# Patient Record
Sex: MALE | Race: BLACK OR AFRICAN AMERICAN | NOT HISPANIC OR LATINO | Employment: OTHER | URBAN - METROPOLITAN AREA
[De-identification: names, ages, dates, MRNs, and addresses within clinical notes are randomized per-mention and may not be internally consistent; named-entity substitution may affect disease eponyms.]

---

## 2017-01-04 ENCOUNTER — OFFICE VISIT - HEALTHEAST (OUTPATIENT)
Dept: CARDIOLOGY | Facility: CLINIC | Age: 62
End: 2017-01-04

## 2017-01-04 DIAGNOSIS — I10 ESSENTIAL HYPERTENSION WITH GOAL BLOOD PRESSURE LESS THAN 140/90: ICD-10-CM

## 2017-01-04 DIAGNOSIS — G47.33 OBSTRUCTIVE SLEEP APNEA (ADULT) (PEDIATRIC): ICD-10-CM

## 2017-01-04 DIAGNOSIS — I50.32 CHRONIC DIASTOLIC HEART FAILURE (H): ICD-10-CM

## 2017-01-04 DIAGNOSIS — Q21.0 VENTRICULAR SEPTAL DEFECT: ICD-10-CM

## 2017-01-04 DIAGNOSIS — I34.0 NON-RHEUMATIC MITRAL REGURGITATION: ICD-10-CM

## 2017-01-04 DIAGNOSIS — I48.19 PERSISTENT ATRIAL FIBRILLATION (H): ICD-10-CM

## 2017-01-04 ASSESSMENT — MIFFLIN-ST. JEOR: SCORE: 3204.38

## 2017-01-12 ENCOUNTER — RECORDS - HEALTHEAST (OUTPATIENT)
Dept: ADMINISTRATIVE | Facility: OTHER | Age: 62
End: 2017-01-12

## 2017-01-13 ENCOUNTER — COMMUNICATION - HEALTHEAST (OUTPATIENT)
Dept: NURSING | Facility: CLINIC | Age: 62
End: 2017-01-13

## 2017-01-13 DIAGNOSIS — I48.91 A-FIB (H): ICD-10-CM

## 2017-01-18 ENCOUNTER — COMMUNICATION - HEALTHEAST (OUTPATIENT)
Dept: FAMILY MEDICINE | Facility: CLINIC | Age: 62
End: 2017-01-18

## 2017-01-18 DIAGNOSIS — M10.9 GOUT: ICD-10-CM

## 2017-01-19 ENCOUNTER — OFFICE VISIT - HEALTHEAST (OUTPATIENT)
Dept: SURGERY | Facility: CLINIC | Age: 62
End: 2017-01-19

## 2017-01-19 DIAGNOSIS — E66.01 OBESITY, MORBID, BMI 50 OR HIGHER (H): ICD-10-CM

## 2017-01-19 DIAGNOSIS — Z98.84 BARIATRIC SURGERY STATUS: ICD-10-CM

## 2017-01-19 DIAGNOSIS — Z71.3 DIETARY COUNSELING: ICD-10-CM

## 2017-01-19 ASSESSMENT — MIFFLIN-ST. JEOR: SCORE: 3140.88

## 2017-01-24 ENCOUNTER — COMMUNICATION - HEALTHEAST (OUTPATIENT)
Dept: SURGERY | Facility: CLINIC | Age: 62
End: 2017-01-24

## 2017-01-24 ENCOUNTER — OFFICE VISIT - HEALTHEAST (OUTPATIENT)
Dept: SURGERY | Facility: CLINIC | Age: 62
End: 2017-01-24

## 2017-01-24 DIAGNOSIS — I48.21 PERMANENT ATRIAL FIBRILLATION (H): ICD-10-CM

## 2017-01-24 DIAGNOSIS — Z98.84 BARIATRIC SURGERY STATUS: ICD-10-CM

## 2017-01-24 DIAGNOSIS — G47.33 OBSTRUCTIVE SLEEP APNEA (ADULT) (PEDIATRIC): ICD-10-CM

## 2017-01-24 DIAGNOSIS — M1A.0490 IDIOPATHIC CHRONIC GOUT OF HAND WITHOUT TOPHUS, UNSPECIFIED LATERALITY: ICD-10-CM

## 2017-01-24 DIAGNOSIS — I10 ESSENTIAL HYPERTENSION WITH GOAL BLOOD PRESSURE LESS THAN 140/90: ICD-10-CM

## 2017-01-24 DIAGNOSIS — E66.01 MORBID OBESITY DUE TO EXCESS CALORIES (H): ICD-10-CM

## 2017-01-24 ASSESSMENT — MIFFLIN-ST. JEOR: SCORE: 3177.16

## 2017-02-01 ENCOUNTER — AMBULATORY - HEALTHEAST (OUTPATIENT)
Dept: LAB | Facility: CLINIC | Age: 62
End: 2017-02-01

## 2017-02-01 ENCOUNTER — COMMUNICATION - HEALTHEAST (OUTPATIENT)
Dept: NURSING | Facility: CLINIC | Age: 62
End: 2017-02-01

## 2017-02-01 DIAGNOSIS — I48.91 A-FIB (H): ICD-10-CM

## 2017-02-03 ENCOUNTER — COMMUNICATION - HEALTHEAST (OUTPATIENT)
Dept: SURGERY | Facility: CLINIC | Age: 62
End: 2017-02-03

## 2017-02-06 ENCOUNTER — OFFICE VISIT - HEALTHEAST (OUTPATIENT)
Dept: SURGERY | Facility: CLINIC | Age: 62
End: 2017-02-06

## 2017-02-06 DIAGNOSIS — E66.01 MORBID OBESITY (H): ICD-10-CM

## 2017-02-10 ENCOUNTER — HOSPITAL ENCOUNTER (OUTPATIENT)
Dept: RADIOLOGY | Facility: CLINIC | Age: 62
Discharge: HOME | End: 2017-02-10
Attending: SPECIALIST

## 2017-02-10 DIAGNOSIS — E66.01 MORBID OBESITY DUE TO EXCESS CALORIES (H): ICD-10-CM

## 2017-02-10 DIAGNOSIS — Z98.84 BARIATRIC SURGERY STATUS: ICD-10-CM

## 2017-02-28 ENCOUNTER — COMMUNICATION - HEALTHEAST (OUTPATIENT)
Dept: FAMILY MEDICINE | Facility: CLINIC | Age: 62
End: 2017-02-28

## 2017-02-28 DIAGNOSIS — I10 HYPERTENSION: ICD-10-CM

## 2017-03-01 ENCOUNTER — COMMUNICATION - HEALTHEAST (OUTPATIENT)
Dept: FAMILY MEDICINE | Facility: CLINIC | Age: 62
End: 2017-03-01

## 2017-03-01 DIAGNOSIS — M10.9 GOUT: ICD-10-CM

## 2017-03-02 ENCOUNTER — AMBULATORY - HEALTHEAST (OUTPATIENT)
Dept: LAB | Facility: CLINIC | Age: 62
End: 2017-03-02

## 2017-03-02 ENCOUNTER — COMMUNICATION - HEALTHEAST (OUTPATIENT)
Dept: FAMILY MEDICINE | Facility: CLINIC | Age: 62
End: 2017-03-02

## 2017-03-02 DIAGNOSIS — I48.91 A-FIB (H): ICD-10-CM

## 2017-03-07 ENCOUNTER — COMMUNICATION - HEALTHEAST (OUTPATIENT)
Dept: NURSING | Facility: CLINIC | Age: 62
End: 2017-03-07

## 2017-03-07 DIAGNOSIS — I48.91 ATRIAL FIBRILLATION (H): ICD-10-CM

## 2017-03-13 ENCOUNTER — OFFICE VISIT - HEALTHEAST (OUTPATIENT)
Dept: SURGERY | Facility: CLINIC | Age: 62
End: 2017-03-13

## 2017-03-13 DIAGNOSIS — E66.01 MORBID OBESITY (H): ICD-10-CM

## 2017-03-17 ENCOUNTER — COMMUNICATION - HEALTHEAST (OUTPATIENT)
Dept: SURGERY | Facility: CLINIC | Age: 62
End: 2017-03-17

## 2017-04-20 ENCOUNTER — COMMUNICATION - HEALTHEAST (OUTPATIENT)
Dept: NURSING | Facility: CLINIC | Age: 62
End: 2017-04-20

## 2017-05-16 ENCOUNTER — COMMUNICATION - HEALTHEAST (OUTPATIENT)
Dept: FAMILY MEDICINE | Facility: CLINIC | Age: 62
End: 2017-05-16

## 2017-05-16 DIAGNOSIS — D64.9 ANEMIA, UNSPECIFIED: ICD-10-CM

## 2017-05-17 ENCOUNTER — AMBULATORY - HEALTHEAST (OUTPATIENT)
Dept: LAB | Facility: CLINIC | Age: 62
End: 2017-05-17

## 2017-05-17 ENCOUNTER — COMMUNICATION - HEALTHEAST (OUTPATIENT)
Dept: NURSING | Facility: CLINIC | Age: 62
End: 2017-05-17

## 2017-05-17 DIAGNOSIS — I48.91 ATRIAL FIBRILLATION (H): ICD-10-CM

## 2017-05-17 DIAGNOSIS — I48.91 A-FIB (H): ICD-10-CM

## 2017-05-22 ENCOUNTER — AMBULATORY - HEALTHEAST (OUTPATIENT)
Dept: LAB | Facility: CLINIC | Age: 62
End: 2017-05-22

## 2017-05-22 ENCOUNTER — COMMUNICATION - HEALTHEAST (OUTPATIENT)
Dept: FAMILY MEDICINE | Facility: CLINIC | Age: 62
End: 2017-05-22

## 2017-05-22 DIAGNOSIS — I48.91 A-FIB (H): ICD-10-CM

## 2017-05-22 DIAGNOSIS — I48.91 ATRIAL FIBRILLATION (H): ICD-10-CM

## 2017-05-29 ENCOUNTER — COMMUNICATION - HEALTHEAST (OUTPATIENT)
Dept: FAMILY MEDICINE | Facility: CLINIC | Age: 62
End: 2017-05-29

## 2017-05-29 DIAGNOSIS — R60.0 LOWER EXTREMITY EDEMA: ICD-10-CM

## 2017-06-06 ENCOUNTER — AMBULATORY - HEALTHEAST (OUTPATIENT)
Dept: LAB | Facility: CLINIC | Age: 62
End: 2017-06-06

## 2017-06-06 ENCOUNTER — COMMUNICATION - HEALTHEAST (OUTPATIENT)
Dept: FAMILY MEDICINE | Facility: CLINIC | Age: 62
End: 2017-06-06

## 2017-06-06 DIAGNOSIS — I48.91 A-FIB (H): ICD-10-CM

## 2017-06-06 DIAGNOSIS — I48.91 ATRIAL FIBRILLATION (H): ICD-10-CM

## 2017-06-08 ENCOUNTER — COMMUNICATION - HEALTHEAST (OUTPATIENT)
Dept: FAMILY MEDICINE | Facility: CLINIC | Age: 62
End: 2017-06-08

## 2017-06-08 DIAGNOSIS — I10 HYPERTENSION: ICD-10-CM

## 2017-06-19 ENCOUNTER — COMMUNICATION - HEALTHEAST (OUTPATIENT)
Dept: FAMILY MEDICINE | Facility: CLINIC | Age: 62
End: 2017-06-19

## 2017-06-19 DIAGNOSIS — M10.9 GOUT: ICD-10-CM

## 2017-06-19 DIAGNOSIS — I10 ESSENTIAL HYPERTENSION: ICD-10-CM

## 2017-06-27 ENCOUNTER — COMMUNICATION - HEALTHEAST (OUTPATIENT)
Dept: NURSING | Facility: CLINIC | Age: 62
End: 2017-06-27

## 2017-07-06 ENCOUNTER — COMMUNICATION - HEALTHEAST (OUTPATIENT)
Dept: NURSING | Facility: CLINIC | Age: 62
End: 2017-07-06

## 2017-07-06 ENCOUNTER — AMBULATORY - HEALTHEAST (OUTPATIENT)
Dept: LAB | Facility: CLINIC | Age: 62
End: 2017-07-06

## 2017-07-06 DIAGNOSIS — I48.91 A-FIB (H): ICD-10-CM

## 2017-07-06 DIAGNOSIS — I48.91 ATRIAL FIBRILLATION (H): ICD-10-CM

## 2017-07-31 ENCOUNTER — COMMUNICATION - HEALTHEAST (OUTPATIENT)
Dept: FAMILY MEDICINE | Facility: CLINIC | Age: 62
End: 2017-07-31

## 2017-07-31 DIAGNOSIS — I10 HYPERTENSION: ICD-10-CM

## 2017-07-31 DIAGNOSIS — I48.91 ATRIAL FIBRILLATION (H): ICD-10-CM

## 2017-08-24 ENCOUNTER — COMMUNICATION - HEALTHEAST (OUTPATIENT)
Dept: NURSING | Facility: CLINIC | Age: 62
End: 2017-08-24

## 2017-08-25 ENCOUNTER — RECORDS - HEALTHEAST (OUTPATIENT)
Dept: ADMINISTRATIVE | Facility: OTHER | Age: 62
End: 2017-08-25

## 2017-08-25 ENCOUNTER — COMMUNICATION - HEALTHEAST (OUTPATIENT)
Dept: NURSING | Facility: CLINIC | Age: 62
End: 2017-08-25

## 2017-08-25 ENCOUNTER — AMBULATORY - HEALTHEAST (OUTPATIENT)
Dept: LAB | Facility: CLINIC | Age: 62
End: 2017-08-25

## 2017-08-25 DIAGNOSIS — I48.91 ATRIAL FIBRILLATION (H): ICD-10-CM

## 2017-09-20 ENCOUNTER — COMMUNICATION - HEALTHEAST (OUTPATIENT)
Dept: FAMILY MEDICINE | Facility: CLINIC | Age: 62
End: 2017-09-20

## 2017-09-20 DIAGNOSIS — M10.9 GOUT: ICD-10-CM

## 2017-09-20 DIAGNOSIS — I10 ESSENTIAL HYPERTENSION: ICD-10-CM

## 2017-09-22 ENCOUNTER — COMMUNICATION - HEALTHEAST (OUTPATIENT)
Dept: SCHEDULING | Facility: CLINIC | Age: 62
End: 2017-09-22

## 2017-09-22 ENCOUNTER — COMMUNICATION - HEALTHEAST (OUTPATIENT)
Dept: FAMILY MEDICINE | Facility: CLINIC | Age: 62
End: 2017-09-22

## 2017-09-22 DIAGNOSIS — I10 HYPERTENSION: ICD-10-CM

## 2017-10-13 ENCOUNTER — COMMUNICATION - HEALTHEAST (OUTPATIENT)
Dept: NURSING | Facility: CLINIC | Age: 62
End: 2017-10-13

## 2017-11-07 ENCOUNTER — AMBULATORY - HEALTHEAST (OUTPATIENT)
Dept: LAB | Facility: CLINIC | Age: 62
End: 2017-11-07

## 2017-11-07 ENCOUNTER — COMMUNICATION - HEALTHEAST (OUTPATIENT)
Dept: NURSING | Facility: CLINIC | Age: 62
End: 2017-11-07

## 2017-11-07 DIAGNOSIS — I48.91 ATRIAL FIBRILLATION (H): ICD-10-CM

## 2017-11-09 ENCOUNTER — COMMUNICATION - HEALTHEAST (OUTPATIENT)
Dept: ADMINISTRATIVE | Facility: CLINIC | Age: 62
End: 2017-11-09

## 2017-11-15 ENCOUNTER — AMBULATORY - HEALTHEAST (OUTPATIENT)
Dept: LAB | Facility: CLINIC | Age: 62
End: 2017-11-15

## 2017-11-15 ENCOUNTER — COMMUNICATION - HEALTHEAST (OUTPATIENT)
Dept: NURSING | Facility: CLINIC | Age: 62
End: 2017-11-15

## 2017-11-15 DIAGNOSIS — I48.91 ATRIAL FIBRILLATION (H): ICD-10-CM

## 2017-12-06 ENCOUNTER — COMMUNICATION - HEALTHEAST (OUTPATIENT)
Dept: NURSING | Facility: CLINIC | Age: 62
End: 2017-12-06

## 2017-12-20 ENCOUNTER — COMMUNICATION - HEALTHEAST (OUTPATIENT)
Dept: NURSING | Facility: CLINIC | Age: 62
End: 2017-12-20

## 2017-12-20 ENCOUNTER — AMBULATORY - HEALTHEAST (OUTPATIENT)
Dept: LAB | Facility: CLINIC | Age: 62
End: 2017-12-20

## 2017-12-20 DIAGNOSIS — I48.91 ATRIAL FIBRILLATION (H): ICD-10-CM

## 2017-12-21 ENCOUNTER — COMMUNICATION - HEALTHEAST (OUTPATIENT)
Dept: FAMILY MEDICINE | Facility: CLINIC | Age: 62
End: 2017-12-21

## 2017-12-30 ENCOUNTER — COMMUNICATION - HEALTHEAST (OUTPATIENT)
Dept: FAMILY MEDICINE | Facility: CLINIC | Age: 62
End: 2017-12-30

## 2017-12-30 DIAGNOSIS — D64.9 ANEMIA: ICD-10-CM

## 2017-12-30 DIAGNOSIS — I10 HYPERTENSION: ICD-10-CM

## 2017-12-30 DIAGNOSIS — E55.9 VITAMIN D DEFICIENCY: ICD-10-CM

## 2018-01-02 ENCOUNTER — COMMUNICATION - HEALTHEAST (OUTPATIENT)
Dept: FAMILY MEDICINE | Facility: CLINIC | Age: 63
End: 2018-01-02

## 2018-01-02 DIAGNOSIS — I10 HYPERTENSION: ICD-10-CM

## 2018-01-03 ENCOUNTER — COMMUNICATION - HEALTHEAST (OUTPATIENT)
Dept: NURSING | Facility: CLINIC | Age: 63
End: 2018-01-03

## 2018-01-08 ENCOUNTER — COMMUNICATION - HEALTHEAST (OUTPATIENT)
Dept: FAMILY MEDICINE | Facility: CLINIC | Age: 63
End: 2018-01-08

## 2018-01-08 ENCOUNTER — OFFICE VISIT - HEALTHEAST (OUTPATIENT)
Dept: FAMILY MEDICINE | Facility: CLINIC | Age: 63
End: 2018-01-08

## 2018-01-08 ENCOUNTER — COMMUNICATION - HEALTHEAST (OUTPATIENT)
Dept: NURSING | Facility: CLINIC | Age: 63
End: 2018-01-08

## 2018-01-08 DIAGNOSIS — E66.9 OBESITY (BMI 35.0-39.9 WITHOUT COMORBIDITY): ICD-10-CM

## 2018-01-08 DIAGNOSIS — Z00.00 HEALTH CARE MAINTENANCE: ICD-10-CM

## 2018-01-08 DIAGNOSIS — R73.03 PREDIABETES: ICD-10-CM

## 2018-01-08 DIAGNOSIS — N18.30 CHRONIC KIDNEY DISEASE, STAGE III (MODERATE) (H): ICD-10-CM

## 2018-01-08 DIAGNOSIS — I48.91 ATRIAL FIBRILLATION (H): ICD-10-CM

## 2018-01-08 DIAGNOSIS — I50.32 CHRONIC DIASTOLIC HEART FAILURE (H): ICD-10-CM

## 2018-01-08 DIAGNOSIS — I48.21 PERMANENT ATRIAL FIBRILLATION (H): ICD-10-CM

## 2018-01-08 DIAGNOSIS — M1A.0490 IDIOPATHIC CHRONIC GOUT OF HAND WITHOUT TOPHUS, UNSPECIFIED LATERALITY: ICD-10-CM

## 2018-01-08 DIAGNOSIS — H54.7 DECREASED VISUAL ACUITY: ICD-10-CM

## 2018-01-08 DIAGNOSIS — I10 ESSENTIAL HYPERTENSION: ICD-10-CM

## 2018-01-08 DIAGNOSIS — E74.9 DISORDER OF CARBOHYDRATE TRANSPORT AND METABOLISM (H): ICD-10-CM

## 2018-01-08 LAB
ALBUMIN SERPL-MCNC: 3.7 G/DL (ref 3.5–5)
ALP SERPL-CCNC: 73 U/L (ref 45–120)
ALT SERPL W P-5'-P-CCNC: 10 U/L (ref 0–45)
ANION GAP SERPL CALCULATED.3IONS-SCNC: 13 MMOL/L (ref 5–18)
AST SERPL W P-5'-P-CCNC: 22 U/L (ref 0–40)
BILIRUB SERPL-MCNC: 0.7 MG/DL (ref 0–1)
BUN SERPL-MCNC: 44 MG/DL (ref 8–22)
CALCIUM SERPL-MCNC: 9.5 MG/DL (ref 8.5–10.5)
CHLORIDE BLD-SCNC: 105 MMOL/L (ref 98–107)
CO2 SERPL-SCNC: 22 MMOL/L (ref 22–31)
CREAT SERPL-MCNC: 2.21 MG/DL (ref 0.7–1.3)
ERYTHROCYTE [DISTWIDTH] IN BLOOD BY AUTOMATED COUNT: 12.5 % (ref 11–14.5)
GFR SERPL CREATININE-BSD FRML MDRD: 30 ML/MIN/1.73M2
GLUCOSE BLD-MCNC: 101 MG/DL (ref 70–125)
HBA1C MFR BLD: 5.7 % (ref 3.5–6)
HCT VFR BLD AUTO: 39.1 % (ref 40–54)
HGB BLD-MCNC: 12.5 G/DL (ref 14–18)
INR PPP: 1.2 (ref 0.9–1.1)
MCH RBC QN AUTO: 27.4 PG (ref 27–34)
MCHC RBC AUTO-ENTMCNC: 32.1 G/DL (ref 32–36)
MCV RBC AUTO: 85 FL (ref 80–100)
PLATELET # BLD AUTO: 121 THOU/UL (ref 140–440)
PMV BLD AUTO: 9.3 FL (ref 7–10)
POTASSIUM BLD-SCNC: 5 MMOL/L (ref 3.5–5)
PROT SERPL-MCNC: 8.3 G/DL (ref 6–8)
RBC # BLD AUTO: 4.58 MILL/UL (ref 4.4–6.2)
SODIUM SERPL-SCNC: 140 MMOL/L (ref 136–145)
URATE SERPL-MCNC: 8 MG/DL (ref 3–8)
WBC: 6 THOU/UL (ref 4–11)

## 2018-01-08 ASSESSMENT — MIFFLIN-ST. JEOR: SCORE: 3154.49

## 2018-01-09 ENCOUNTER — COMMUNICATION - HEALTHEAST (OUTPATIENT)
Dept: FAMILY MEDICINE | Facility: CLINIC | Age: 63
End: 2018-01-09

## 2018-01-09 DIAGNOSIS — I10 ESSENTIAL HYPERTENSION: ICD-10-CM

## 2018-01-11 ENCOUNTER — AMBULATORY - HEALTHEAST (OUTPATIENT)
Dept: FAMILY MEDICINE | Facility: CLINIC | Age: 63
End: 2018-01-11

## 2018-01-12 ENCOUNTER — COMMUNICATION - HEALTHEAST (OUTPATIENT)
Dept: FAMILY MEDICINE | Facility: CLINIC | Age: 63
End: 2018-01-12

## 2018-01-16 ENCOUNTER — COMMUNICATION - HEALTHEAST (OUTPATIENT)
Dept: NURSING | Facility: CLINIC | Age: 63
End: 2018-01-16

## 2018-01-16 ENCOUNTER — AMBULATORY - HEALTHEAST (OUTPATIENT)
Dept: LAB | Facility: CLINIC | Age: 63
End: 2018-01-16

## 2018-01-16 DIAGNOSIS — I48.91 ATRIAL FIBRILLATION (H): ICD-10-CM

## 2018-01-16 LAB — INR PPP: 1.3 (ref 0.9–1.1)

## 2018-01-17 ENCOUNTER — RECORDS - HEALTHEAST (OUTPATIENT)
Dept: ADMINISTRATIVE | Facility: OTHER | Age: 63
End: 2018-01-17

## 2018-01-17 ENCOUNTER — COMMUNICATION - HEALTHEAST (OUTPATIENT)
Dept: FAMILY MEDICINE | Facility: CLINIC | Age: 63
End: 2018-01-17

## 2018-01-17 DIAGNOSIS — M10.9 GOUT: ICD-10-CM

## 2018-01-18 ENCOUNTER — OFFICE VISIT - HEALTHEAST (OUTPATIENT)
Dept: CARDIOLOGY | Facility: CLINIC | Age: 63
End: 2018-01-18

## 2018-01-18 DIAGNOSIS — I27.81 COR PULMONALE (H): ICD-10-CM

## 2018-01-18 DIAGNOSIS — E66.01 MORBID OBESITY DUE TO EXCESS CALORIES (H): ICD-10-CM

## 2018-01-18 ASSESSMENT — MIFFLIN-ST. JEOR: SCORE: 3128.63

## 2018-01-24 ENCOUNTER — AMBULATORY - HEALTHEAST (OUTPATIENT)
Dept: LAB | Facility: CLINIC | Age: 63
End: 2018-01-24

## 2018-01-24 ENCOUNTER — COMMUNICATION - HEALTHEAST (OUTPATIENT)
Dept: INTERNAL MEDICINE | Facility: CLINIC | Age: 63
End: 2018-01-24

## 2018-01-24 DIAGNOSIS — I48.91 ATRIAL FIBRILLATION (H): ICD-10-CM

## 2018-01-24 LAB — INR PPP: 1.9 (ref 0.9–1.1)

## 2018-01-25 ENCOUNTER — AMBULATORY - HEALTHEAST (OUTPATIENT)
Dept: SLEEP MEDICINE | Facility: CLINIC | Age: 63
End: 2018-01-25

## 2018-02-01 ENCOUNTER — COMMUNICATION - HEALTHEAST (OUTPATIENT)
Dept: FAMILY MEDICINE | Facility: CLINIC | Age: 63
End: 2018-02-01

## 2018-02-01 ENCOUNTER — AMBULATORY - HEALTHEAST (OUTPATIENT)
Dept: LAB | Facility: CLINIC | Age: 63
End: 2018-02-01

## 2018-02-01 ENCOUNTER — COMMUNICATION - HEALTHEAST (OUTPATIENT)
Dept: NURSING | Facility: CLINIC | Age: 63
End: 2018-02-01

## 2018-02-01 DIAGNOSIS — R60.0 LOWER EXTREMITY EDEMA: ICD-10-CM

## 2018-02-01 DIAGNOSIS — I48.91 ATRIAL FIBRILLATION (H): ICD-10-CM

## 2018-02-01 LAB — INR PPP: 2.9 (ref 0.9–1.1)

## 2018-02-19 ENCOUNTER — COMMUNICATION - HEALTHEAST (OUTPATIENT)
Dept: NURSING | Facility: CLINIC | Age: 63
End: 2018-02-19

## 2018-02-21 ENCOUNTER — AMBULATORY - HEALTHEAST (OUTPATIENT)
Dept: LAB | Facility: CLINIC | Age: 63
End: 2018-02-21

## 2018-02-21 ENCOUNTER — COMMUNICATION - HEALTHEAST (OUTPATIENT)
Dept: NURSING | Facility: CLINIC | Age: 63
End: 2018-02-21

## 2018-02-21 DIAGNOSIS — I48.91 ATRIAL FIBRILLATION (H): ICD-10-CM

## 2018-02-21 LAB — INR PPP: 3.5 (ref 0.9–1.1)

## 2018-03-05 ENCOUNTER — COMMUNICATION - HEALTHEAST (OUTPATIENT)
Dept: FAMILY MEDICINE | Facility: CLINIC | Age: 63
End: 2018-03-05

## 2018-03-05 DIAGNOSIS — M10.9 GOUT: ICD-10-CM

## 2018-03-07 ENCOUNTER — COMMUNICATION - HEALTHEAST (OUTPATIENT)
Dept: NURSING | Facility: CLINIC | Age: 63
End: 2018-03-07

## 2018-03-08 ENCOUNTER — AMBULATORY - HEALTHEAST (OUTPATIENT)
Dept: LAB | Facility: CLINIC | Age: 63
End: 2018-03-08

## 2018-03-08 ENCOUNTER — COMMUNICATION - HEALTHEAST (OUTPATIENT)
Dept: NURSING | Facility: CLINIC | Age: 63
End: 2018-03-08

## 2018-03-08 DIAGNOSIS — I48.91 ATRIAL FIBRILLATION (H): ICD-10-CM

## 2018-03-08 LAB — INR PPP: 2.8 (ref 0.9–1.1)

## 2018-03-14 ENCOUNTER — RECORDS - HEALTHEAST (OUTPATIENT)
Dept: ADMINISTRATIVE | Facility: OTHER | Age: 63
End: 2018-03-14

## 2018-03-16 ENCOUNTER — COMMUNICATION - HEALTHEAST (OUTPATIENT)
Dept: FAMILY MEDICINE | Facility: CLINIC | Age: 63
End: 2018-03-16

## 2018-03-22 ENCOUNTER — COMMUNICATION - HEALTHEAST (OUTPATIENT)
Dept: NURSING | Facility: CLINIC | Age: 63
End: 2018-03-22

## 2018-03-22 ENCOUNTER — AMBULATORY - HEALTHEAST (OUTPATIENT)
Dept: LAB | Facility: CLINIC | Age: 63
End: 2018-03-22

## 2018-03-22 DIAGNOSIS — I48.91 ATRIAL FIBRILLATION (H): ICD-10-CM

## 2018-03-22 LAB — INR PPP: 2.5 (ref 0.9–1.1)

## 2018-04-19 ENCOUNTER — OFFICE VISIT - HEALTHEAST (OUTPATIENT)
Dept: FAMILY MEDICINE | Facility: CLINIC | Age: 63
End: 2018-04-19

## 2018-04-19 ENCOUNTER — COMMUNICATION - HEALTHEAST (OUTPATIENT)
Dept: SURGERY | Facility: CLINIC | Age: 63
End: 2018-04-19

## 2018-04-19 ENCOUNTER — AMBULATORY - HEALTHEAST (OUTPATIENT)
Dept: LAB | Facility: CLINIC | Age: 63
End: 2018-04-19

## 2018-04-19 ENCOUNTER — COMMUNICATION - HEALTHEAST (OUTPATIENT)
Dept: ANTICOAGULATION | Facility: CLINIC | Age: 63
End: 2018-04-19

## 2018-04-19 DIAGNOSIS — K08.89 PAIN, DENTAL: ICD-10-CM

## 2018-04-19 DIAGNOSIS — I48.21 PERMANENT ATRIAL FIBRILLATION (H): ICD-10-CM

## 2018-04-19 DIAGNOSIS — M1A.0490 IDIOPATHIC CHRONIC GOUT OF HAND WITHOUT TOPHUS, UNSPECIFIED LATERALITY: ICD-10-CM

## 2018-04-19 DIAGNOSIS — I48.91 ATRIAL FIBRILLATION (H): ICD-10-CM

## 2018-04-19 DIAGNOSIS — E66.9 OBESITY: ICD-10-CM

## 2018-04-19 LAB
ALT SERPL W P-5'-P-CCNC: 20 U/L (ref 0–45)
CREAT SERPL-MCNC: 2.06 MG/DL (ref 0.7–1.3)
ERYTHROCYTE [DISTWIDTH] IN BLOOD BY AUTOMATED COUNT: 14.3 % (ref 11–14.5)
GFR SERPL CREATININE-BSD FRML MDRD: 33 ML/MIN/1.73M2
HCT VFR BLD AUTO: 40.1 % (ref 40–54)
HGB BLD-MCNC: 12.6 G/DL (ref 14–18)
INR PPP: 1.8 (ref 0.9–1.1)
MCH RBC QN AUTO: 26.9 PG (ref 27–34)
MCHC RBC AUTO-ENTMCNC: 31.5 G/DL (ref 32–36)
MCV RBC AUTO: 85 FL (ref 80–100)
PLATELET # BLD AUTO: 110 THOU/UL (ref 140–440)
PMV BLD AUTO: 10.2 FL (ref 7–10)
RBC # BLD AUTO: 4.69 MILL/UL (ref 4.4–6.2)
URATE SERPL-MCNC: 6.6 MG/DL (ref 3–8)
WBC: 6.1 THOU/UL (ref 4–11)

## 2018-04-20 ENCOUNTER — COMMUNICATION - HEALTHEAST (OUTPATIENT)
Dept: FAMILY MEDICINE | Facility: CLINIC | Age: 63
End: 2018-04-20

## 2018-05-10 ENCOUNTER — COMMUNICATION - HEALTHEAST (OUTPATIENT)
Dept: ANTICOAGULATION | Facility: CLINIC | Age: 63
End: 2018-05-10

## 2018-05-11 ENCOUNTER — COMMUNICATION - HEALTHEAST (OUTPATIENT)
Dept: FAMILY MEDICINE | Facility: CLINIC | Age: 63
End: 2018-05-11

## 2018-05-11 DIAGNOSIS — I10 HYPERTENSION: ICD-10-CM

## 2018-05-13 ENCOUNTER — COMMUNICATION - HEALTHEAST (OUTPATIENT)
Dept: FAMILY MEDICINE | Facility: CLINIC | Age: 63
End: 2018-05-13

## 2018-05-13 DIAGNOSIS — E55.9 VITAMIN D DEFICIENCY: ICD-10-CM

## 2018-05-16 ENCOUNTER — COMMUNICATION - HEALTHEAST (OUTPATIENT)
Dept: FAMILY MEDICINE | Facility: CLINIC | Age: 63
End: 2018-05-16

## 2018-05-16 DIAGNOSIS — M10.9 GOUT: ICD-10-CM

## 2018-06-08 ENCOUNTER — AMBULATORY - HEALTHEAST (OUTPATIENT)
Dept: LAB | Facility: CLINIC | Age: 63
End: 2018-06-08

## 2018-06-08 ENCOUNTER — COMMUNICATION - HEALTHEAST (OUTPATIENT)
Dept: ANTICOAGULATION | Facility: CLINIC | Age: 63
End: 2018-06-08

## 2018-06-08 DIAGNOSIS — I48.91 ATRIAL FIBRILLATION (H): ICD-10-CM

## 2018-06-08 LAB — INR PPP: 2.4 (ref 0.9–1.1)

## 2018-06-14 ENCOUNTER — COMMUNICATION - HEALTHEAST (OUTPATIENT)
Dept: FAMILY MEDICINE | Facility: CLINIC | Age: 63
End: 2018-06-14

## 2018-06-19 ENCOUNTER — OFFICE VISIT - HEALTHEAST (OUTPATIENT)
Dept: FAMILY MEDICINE | Facility: CLINIC | Age: 63
End: 2018-06-19

## 2018-06-19 ENCOUNTER — COMMUNICATION - HEALTHEAST (OUTPATIENT)
Dept: ANTICOAGULATION | Facility: CLINIC | Age: 63
End: 2018-06-19

## 2018-06-19 DIAGNOSIS — I48.21 PERMANENT ATRIAL FIBRILLATION (H): ICD-10-CM

## 2018-06-19 DIAGNOSIS — I10 ESSENTIAL HYPERTENSION: ICD-10-CM

## 2018-06-19 DIAGNOSIS — I50.32 CHRONIC DIASTOLIC HEART FAILURE (H): ICD-10-CM

## 2018-06-19 DIAGNOSIS — Z01.818 PRE-OP EXAM: ICD-10-CM

## 2018-06-19 LAB
ATRIAL RATE - MUSE: 50 BPM
DIASTOLIC BLOOD PRESSURE - MUSE: NORMAL MMHG
INR PPP: 2.9 (ref 0.9–1.1)
INTERPRETATION ECG - MUSE: NORMAL
P AXIS - MUSE: NORMAL DEGREES
POTASSIUM BLD-SCNC: 4.2 MMOL/L (ref 3.5–5)
PR INTERVAL - MUSE: NORMAL MS
QRS DURATION - MUSE: 118 MS
QT - MUSE: 430 MS
QTC - MUSE: 460 MS
R AXIS - MUSE: 7 DEGREES
SYSTOLIC BLOOD PRESSURE - MUSE: NORMAL MMHG
T AXIS - MUSE: -8 DEGREES
VENTRICULAR RATE- MUSE: 69 BPM

## 2018-06-28 ENCOUNTER — COMMUNICATION - HEALTHEAST (OUTPATIENT)
Dept: FAMILY MEDICINE | Facility: CLINIC | Age: 63
End: 2018-06-28

## 2018-07-19 ENCOUNTER — COMMUNICATION - HEALTHEAST (OUTPATIENT)
Dept: FAMILY MEDICINE | Facility: CLINIC | Age: 63
End: 2018-07-19

## 2018-07-19 DIAGNOSIS — D64.9 ANEMIA: ICD-10-CM

## 2018-07-24 ENCOUNTER — COMMUNICATION - HEALTHEAST (OUTPATIENT)
Dept: ANTICOAGULATION | Facility: CLINIC | Age: 63
End: 2018-07-24

## 2018-08-06 ENCOUNTER — AMBULATORY - HEALTHEAST (OUTPATIENT)
Dept: LAB | Facility: CLINIC | Age: 63
End: 2018-08-06

## 2018-08-06 ENCOUNTER — COMMUNICATION - HEALTHEAST (OUTPATIENT)
Dept: ANTICOAGULATION | Facility: CLINIC | Age: 63
End: 2018-08-06

## 2018-08-06 DIAGNOSIS — I48.91 ATRIAL FIBRILLATION (H): ICD-10-CM

## 2018-08-06 LAB — INR PPP: 1.8 (ref 0.9–1.1)

## 2018-08-09 ENCOUNTER — OUTPATIENT (OUTPATIENT)
Dept: OUTPATIENT SERVICES | Facility: HOSPITAL | Age: 63
LOS: 1 days | Discharge: HOME | End: 2018-08-09
Payer: MEDICARE

## 2018-08-09 DIAGNOSIS — R52 PAIN, UNSPECIFIED: Chronic | ICD-10-CM

## 2018-08-09 DIAGNOSIS — B99.9 UNSPECIFIED INFECTIOUS DISEASE: Chronic | ICD-10-CM

## 2018-08-09 DIAGNOSIS — T14.90 INJURY, UNSPECIFIED: Chronic | ICD-10-CM

## 2018-08-09 DIAGNOSIS — L97.801 NON-PRESSURE CHRONIC ULCER OF OTHER PART OF UNSPECIFIED LOWER LEG LIMITED TO BREAKDOWN OF SKIN: ICD-10-CM

## 2018-08-09 DIAGNOSIS — M51.9 UNSPECIFIED THORACIC, THORACOLUMBAR AND LUMBOSACRAL INTERVERTEBRAL DISC DISORDER: Chronic | ICD-10-CM

## 2018-08-09 DIAGNOSIS — K46.9 UNSPECIFIED ABDOMINAL HERNIA WITHOUT OBSTRUCTION OR GANGRENE: Chronic | ICD-10-CM

## 2018-08-09 DIAGNOSIS — D69.3 IMMUNE THROMBOCYTOPENIC PURPURA: Chronic | ICD-10-CM

## 2018-08-09 DIAGNOSIS — I25.10 ATHEROSCLEROTIC HEART DISEASE OF NATIVE CORONARY ARTERY WITHOUT ANGINA PECTORIS: Chronic | ICD-10-CM

## 2018-08-09 PROCEDURE — 99204 OFFICE O/P NEW MOD 45 MIN: CPT

## 2018-08-09 PROCEDURE — G0463: CPT

## 2018-08-11 DIAGNOSIS — Z89.511 ACQUIRED ABSENCE OF RIGHT LEG BELOW KNEE: ICD-10-CM

## 2018-08-11 DIAGNOSIS — D69.6 THROMBOCYTOPENIA, UNSPECIFIED: ICD-10-CM

## 2018-08-11 DIAGNOSIS — R73.03 PREDIABETES: ICD-10-CM

## 2018-08-11 DIAGNOSIS — F32.9 MAJOR DEPRESSIVE DISORDER, SINGLE EPISODE, UNSPECIFIED: ICD-10-CM

## 2018-08-11 DIAGNOSIS — Z79.899 OTHER LONG TERM (CURRENT) DRUG THERAPY: ICD-10-CM

## 2018-08-11 DIAGNOSIS — E66.8 OTHER OBESITY: ICD-10-CM

## 2018-08-11 DIAGNOSIS — L97.822 NON-PRESSURE CHRONIC ULCER OF OTHER PART OF LEFT LOWER LEG WITH FAT LAYER EXPOSED: ICD-10-CM

## 2018-08-11 DIAGNOSIS — Z72.0 TOBACCO USE: ICD-10-CM

## 2018-08-11 DIAGNOSIS — M41.9 SCOLIOSIS, UNSPECIFIED: ICD-10-CM

## 2018-08-11 DIAGNOSIS — Z98.61 CORONARY ANGIOPLASTY STATUS: ICD-10-CM

## 2018-08-11 DIAGNOSIS — Z79.82 LONG TERM (CURRENT) USE OF ASPIRIN: ICD-10-CM

## 2018-08-11 DIAGNOSIS — I83.228 VARICOSE VEINS OF LEFT LOWER EXTREMITY WITH BOTH ULCER OF OTHER PART OF LOWER EXTREMITY AND INFLAMMATION: ICD-10-CM

## 2018-08-11 DIAGNOSIS — I48.91 UNSPECIFIED ATRIAL FIBRILLATION: ICD-10-CM

## 2018-08-11 DIAGNOSIS — G62.9 POLYNEUROPATHY, UNSPECIFIED: ICD-10-CM

## 2018-08-11 DIAGNOSIS — I25.10 ATHEROSCLEROTIC HEART DISEASE OF NATIVE CORONARY ARTERY WITHOUT ANGINA PECTORIS: ICD-10-CM

## 2018-08-16 ENCOUNTER — OUTPATIENT (OUTPATIENT)
Dept: OUTPATIENT SERVICES | Facility: HOSPITAL | Age: 63
LOS: 1 days | Discharge: HOME | End: 2018-08-16
Payer: MEDICARE

## 2018-08-16 DIAGNOSIS — K46.9 UNSPECIFIED ABDOMINAL HERNIA WITHOUT OBSTRUCTION OR GANGRENE: Chronic | ICD-10-CM

## 2018-08-16 DIAGNOSIS — T14.90 INJURY, UNSPECIFIED: Chronic | ICD-10-CM

## 2018-08-16 DIAGNOSIS — M51.9 UNSPECIFIED THORACIC, THORACOLUMBAR AND LUMBOSACRAL INTERVERTEBRAL DISC DISORDER: Chronic | ICD-10-CM

## 2018-08-16 DIAGNOSIS — R52 PAIN, UNSPECIFIED: Chronic | ICD-10-CM

## 2018-08-16 DIAGNOSIS — D69.3 IMMUNE THROMBOCYTOPENIC PURPURA: Chronic | ICD-10-CM

## 2018-08-16 DIAGNOSIS — I25.10 ATHEROSCLEROTIC HEART DISEASE OF NATIVE CORONARY ARTERY WITHOUT ANGINA PECTORIS: Chronic | ICD-10-CM

## 2018-08-16 DIAGNOSIS — B99.9 UNSPECIFIED INFECTIOUS DISEASE: Chronic | ICD-10-CM

## 2018-08-16 DIAGNOSIS — I83.228 VARICOSE VEINS OF LEFT LOWER EXTREMITY WITH BOTH ULCER OF OTHER PART OF LOWER EXTREMITY AND INFLAMMATION: ICD-10-CM

## 2018-08-16 PROCEDURE — G0463: CPT

## 2018-08-17 DIAGNOSIS — F32.9 MAJOR DEPRESSIVE DISORDER, SINGLE EPISODE, UNSPECIFIED: ICD-10-CM

## 2018-08-17 DIAGNOSIS — Z98.61 CORONARY ANGIOPLASTY STATUS: ICD-10-CM

## 2018-08-17 DIAGNOSIS — R73.03 PREDIABETES: ICD-10-CM

## 2018-08-17 DIAGNOSIS — G62.9 POLYNEUROPATHY, UNSPECIFIED: ICD-10-CM

## 2018-08-17 DIAGNOSIS — D69.6 THROMBOCYTOPENIA, UNSPECIFIED: ICD-10-CM

## 2018-08-17 DIAGNOSIS — I83.228 VARICOSE VEINS OF LEFT LOWER EXTREMITY WITH BOTH ULCER OF OTHER PART OF LOWER EXTREMITY AND INFLAMMATION: ICD-10-CM

## 2018-08-17 DIAGNOSIS — I48.91 UNSPECIFIED ATRIAL FIBRILLATION: ICD-10-CM

## 2018-08-17 DIAGNOSIS — M41.9 SCOLIOSIS, UNSPECIFIED: ICD-10-CM

## 2018-08-17 DIAGNOSIS — L97.822 NON-PRESSURE CHRONIC ULCER OF OTHER PART OF LEFT LOWER LEG WITH FAT LAYER EXPOSED: ICD-10-CM

## 2018-08-17 DIAGNOSIS — I25.10 ATHEROSCLEROTIC HEART DISEASE OF NATIVE CORONARY ARTERY WITHOUT ANGINA PECTORIS: ICD-10-CM

## 2018-08-17 DIAGNOSIS — Z79.899 OTHER LONG TERM (CURRENT) DRUG THERAPY: ICD-10-CM

## 2018-08-17 DIAGNOSIS — E66.8 OTHER OBESITY: ICD-10-CM

## 2018-08-17 DIAGNOSIS — Z79.82 LONG TERM (CURRENT) USE OF ASPIRIN: ICD-10-CM

## 2018-08-17 DIAGNOSIS — Z72.0 TOBACCO USE: ICD-10-CM

## 2018-08-17 DIAGNOSIS — Z89.511 ACQUIRED ABSENCE OF RIGHT LEG BELOW KNEE: ICD-10-CM

## 2018-08-20 ENCOUNTER — COMMUNICATION - HEALTHEAST (OUTPATIENT)
Dept: ANTICOAGULATION | Facility: CLINIC | Age: 63
End: 2018-08-20

## 2018-08-20 ENCOUNTER — AMBULATORY - HEALTHEAST (OUTPATIENT)
Dept: LAB | Facility: CLINIC | Age: 63
End: 2018-08-20

## 2018-08-20 DIAGNOSIS — I48.91 ATRIAL FIBRILLATION (H): ICD-10-CM

## 2018-08-20 LAB — INR PPP: 2.5 (ref 0.9–1.1)

## 2018-08-29 ENCOUNTER — FORM ENCOUNTER (OUTPATIENT)
Age: 63
End: 2018-08-29

## 2018-08-30 ENCOUNTER — OUTPATIENT (OUTPATIENT)
Dept: OUTPATIENT SERVICES | Facility: HOSPITAL | Age: 63
LOS: 1 days | Discharge: HOME | End: 2018-08-30
Payer: MEDICARE

## 2018-08-30 ENCOUNTER — OUTPATIENT (OUTPATIENT)
Dept: OUTPATIENT SERVICES | Facility: HOSPITAL | Age: 63
LOS: 1 days | End: 2018-08-30

## 2018-08-30 DIAGNOSIS — R52 PAIN, UNSPECIFIED: Chronic | ICD-10-CM

## 2018-08-30 DIAGNOSIS — K46.9 UNSPECIFIED ABDOMINAL HERNIA WITHOUT OBSTRUCTION OR GANGRENE: Chronic | ICD-10-CM

## 2018-08-30 DIAGNOSIS — I83.228 VARICOSE VEINS OF LEFT LOWER EXTREMITY WITH BOTH ULCER OF OTHER PART OF LOWER EXTREMITY AND INFLAMMATION: ICD-10-CM

## 2018-08-30 DIAGNOSIS — B99.9 UNSPECIFIED INFECTIOUS DISEASE: Chronic | ICD-10-CM

## 2018-08-30 DIAGNOSIS — D69.3 IMMUNE THROMBOCYTOPENIC PURPURA: Chronic | ICD-10-CM

## 2018-08-30 DIAGNOSIS — S81.802A UNSPECIFIED OPEN WOUND, LEFT LOWER LEG, INITIAL ENCOUNTER: ICD-10-CM

## 2018-08-30 DIAGNOSIS — T14.90 INJURY, UNSPECIFIED: Chronic | ICD-10-CM

## 2018-08-30 DIAGNOSIS — I25.10 ATHEROSCLEROTIC HEART DISEASE OF NATIVE CORONARY ARTERY WITHOUT ANGINA PECTORIS: Chronic | ICD-10-CM

## 2018-08-30 DIAGNOSIS — M51.9 UNSPECIFIED THORACIC, THORACOLUMBAR AND LUMBOSACRAL INTERVERTEBRAL DISC DISORDER: Chronic | ICD-10-CM

## 2018-08-30 PROCEDURE — 93922 UPR/L XTREMITY ART 2 LEVELS: CPT

## 2018-08-30 PROCEDURE — 99214 OFFICE O/P EST MOD 30 MIN: CPT

## 2018-08-30 PROCEDURE — G0463: CPT

## 2018-08-30 PROCEDURE — 93926 LOWER EXTREMITY STUDY: CPT

## 2018-08-30 PROCEDURE — 93922 UPR/L XTREMITY ART 2 LEVELS: CPT | Mod: 26

## 2018-09-03 DIAGNOSIS — L97.822 NON-PRESSURE CHRONIC ULCER OF OTHER PART OF LEFT LOWER LEG WITH FAT LAYER EXPOSED: ICD-10-CM

## 2018-09-03 DIAGNOSIS — F32.9 MAJOR DEPRESSIVE DISORDER, SINGLE EPISODE, UNSPECIFIED: ICD-10-CM

## 2018-09-03 DIAGNOSIS — Z72.0 TOBACCO USE: ICD-10-CM

## 2018-09-03 DIAGNOSIS — I25.10 ATHEROSCLEROTIC HEART DISEASE OF NATIVE CORONARY ARTERY WITHOUT ANGINA PECTORIS: ICD-10-CM

## 2018-09-03 DIAGNOSIS — G62.9 POLYNEUROPATHY, UNSPECIFIED: ICD-10-CM

## 2018-09-03 DIAGNOSIS — D69.6 THROMBOCYTOPENIA, UNSPECIFIED: ICD-10-CM

## 2018-09-03 DIAGNOSIS — L29.9 PRURITUS, UNSPECIFIED: ICD-10-CM

## 2018-09-03 DIAGNOSIS — R73.03 PREDIABETES: ICD-10-CM

## 2018-09-03 DIAGNOSIS — Z98.61 CORONARY ANGIOPLASTY STATUS: ICD-10-CM

## 2018-09-03 DIAGNOSIS — I48.91 UNSPECIFIED ATRIAL FIBRILLATION: ICD-10-CM

## 2018-09-03 DIAGNOSIS — E66.8 OTHER OBESITY: ICD-10-CM

## 2018-09-03 DIAGNOSIS — Z79.82 LONG TERM (CURRENT) USE OF ASPIRIN: ICD-10-CM

## 2018-09-03 DIAGNOSIS — M79.605 PAIN IN LEFT LEG: ICD-10-CM

## 2018-09-03 DIAGNOSIS — Z79.899 OTHER LONG TERM (CURRENT) DRUG THERAPY: ICD-10-CM

## 2018-09-03 DIAGNOSIS — M41.9 SCOLIOSIS, UNSPECIFIED: ICD-10-CM

## 2018-09-03 DIAGNOSIS — Z89.511 ACQUIRED ABSENCE OF RIGHT LEG BELOW KNEE: ICD-10-CM

## 2018-09-03 DIAGNOSIS — I83.228 VARICOSE VEINS OF LEFT LOWER EXTREMITY WITH BOTH ULCER OF OTHER PART OF LOWER EXTREMITY AND INFLAMMATION: ICD-10-CM

## 2018-09-05 ENCOUNTER — RECORDS - HEALTHEAST (OUTPATIENT)
Dept: ADMINISTRATIVE | Facility: OTHER | Age: 63
End: 2018-09-05

## 2018-09-10 ENCOUNTER — COMMUNICATION - HEALTHEAST (OUTPATIENT)
Dept: ANTICOAGULATION | Facility: CLINIC | Age: 63
End: 2018-09-10

## 2018-09-12 ENCOUNTER — AMBULATORY - HEALTHEAST (OUTPATIENT)
Dept: LAB | Facility: CLINIC | Age: 63
End: 2018-09-12

## 2018-09-12 ENCOUNTER — COMMUNICATION - HEALTHEAST (OUTPATIENT)
Dept: ANTICOAGULATION | Facility: CLINIC | Age: 63
End: 2018-09-12

## 2018-09-12 DIAGNOSIS — I48.91 ATRIAL FIBRILLATION (H): ICD-10-CM

## 2018-09-12 LAB — INR PPP: 2.8 (ref 0.9–1.1)

## 2018-09-13 ENCOUNTER — OUTPATIENT (OUTPATIENT)
Dept: OUTPATIENT SERVICES | Facility: HOSPITAL | Age: 63
LOS: 1 days | Discharge: HOME | End: 2018-09-13
Payer: MEDICARE

## 2018-09-13 DIAGNOSIS — B99.9 UNSPECIFIED INFECTIOUS DISEASE: Chronic | ICD-10-CM

## 2018-09-13 DIAGNOSIS — M51.9 UNSPECIFIED THORACIC, THORACOLUMBAR AND LUMBOSACRAL INTERVERTEBRAL DISC DISORDER: Chronic | ICD-10-CM

## 2018-09-13 DIAGNOSIS — I83.228 VARICOSE VEINS OF LEFT LOWER EXTREMITY WITH BOTH ULCER OF OTHER PART OF LOWER EXTREMITY AND INFLAMMATION: ICD-10-CM

## 2018-09-13 DIAGNOSIS — K46.9 UNSPECIFIED ABDOMINAL HERNIA WITHOUT OBSTRUCTION OR GANGRENE: Chronic | ICD-10-CM

## 2018-09-13 DIAGNOSIS — R52 PAIN, UNSPECIFIED: Chronic | ICD-10-CM

## 2018-09-13 DIAGNOSIS — I25.10 ATHEROSCLEROTIC HEART DISEASE OF NATIVE CORONARY ARTERY WITHOUT ANGINA PECTORIS: Chronic | ICD-10-CM

## 2018-09-13 DIAGNOSIS — T14.90 INJURY, UNSPECIFIED: Chronic | ICD-10-CM

## 2018-09-13 DIAGNOSIS — D69.3 IMMUNE THROMBOCYTOPENIC PURPURA: Chronic | ICD-10-CM

## 2018-09-13 PROCEDURE — G0463: CPT

## 2018-09-15 DIAGNOSIS — F32.9 MAJOR DEPRESSIVE DISORDER, SINGLE EPISODE, UNSPECIFIED: ICD-10-CM

## 2018-09-15 DIAGNOSIS — G62.9 POLYNEUROPATHY, UNSPECIFIED: ICD-10-CM

## 2018-09-15 DIAGNOSIS — Z79.82 LONG TERM (CURRENT) USE OF ASPIRIN: ICD-10-CM

## 2018-09-15 DIAGNOSIS — R73.03 PREDIABETES: ICD-10-CM

## 2018-09-15 DIAGNOSIS — Z79.899 OTHER LONG TERM (CURRENT) DRUG THERAPY: ICD-10-CM

## 2018-09-15 DIAGNOSIS — L97.822 NON-PRESSURE CHRONIC ULCER OF OTHER PART OF LEFT LOWER LEG WITH FAT LAYER EXPOSED: ICD-10-CM

## 2018-09-15 DIAGNOSIS — M41.9 SCOLIOSIS, UNSPECIFIED: ICD-10-CM

## 2018-09-15 DIAGNOSIS — I83.228 VARICOSE VEINS OF LEFT LOWER EXTREMITY WITH BOTH ULCER OF OTHER PART OF LOWER EXTREMITY AND INFLAMMATION: ICD-10-CM

## 2018-09-15 DIAGNOSIS — M79.605 PAIN IN LEFT LEG: ICD-10-CM

## 2018-09-15 DIAGNOSIS — I25.10 ATHEROSCLEROTIC HEART DISEASE OF NATIVE CORONARY ARTERY WITHOUT ANGINA PECTORIS: ICD-10-CM

## 2018-09-15 DIAGNOSIS — Z72.0 TOBACCO USE: ICD-10-CM

## 2018-09-15 DIAGNOSIS — Z98.61 CORONARY ANGIOPLASTY STATUS: ICD-10-CM

## 2018-09-15 DIAGNOSIS — D69.6 THROMBOCYTOPENIA, UNSPECIFIED: ICD-10-CM

## 2018-09-15 DIAGNOSIS — Z89.511 ACQUIRED ABSENCE OF RIGHT LEG BELOW KNEE: ICD-10-CM

## 2018-09-15 DIAGNOSIS — E66.8 OTHER OBESITY: ICD-10-CM

## 2018-09-15 DIAGNOSIS — I48.91 UNSPECIFIED ATRIAL FIBRILLATION: ICD-10-CM

## 2018-09-17 ENCOUNTER — OFFICE VISIT - HEALTHEAST (OUTPATIENT)
Dept: FAMILY MEDICINE | Facility: CLINIC | Age: 63
End: 2018-09-17

## 2018-09-17 ENCOUNTER — COMMUNICATION - HEALTHEAST (OUTPATIENT)
Dept: SCHEDULING | Facility: CLINIC | Age: 63
End: 2018-09-17

## 2018-09-17 ENCOUNTER — COMMUNICATION - HEALTHEAST (OUTPATIENT)
Dept: ANTICOAGULATION | Facility: CLINIC | Age: 63
End: 2018-09-17

## 2018-09-17 DIAGNOSIS — I48.21 PERMANENT ATRIAL FIBRILLATION (H): ICD-10-CM

## 2018-09-17 DIAGNOSIS — T14.8XXA HEMATOMA OF SKIN: ICD-10-CM

## 2018-09-17 LAB — INR PPP: 2.6 (ref 0.9–1.1)

## 2018-09-17 ASSESSMENT — MIFFLIN-ST. JEOR: SCORE: 3024.08

## 2018-09-27 ENCOUNTER — OUTPATIENT (OUTPATIENT)
Dept: OUTPATIENT SERVICES | Facility: HOSPITAL | Age: 63
LOS: 1 days | Discharge: HOME | End: 2018-09-27
Payer: MEDICARE

## 2018-09-27 DIAGNOSIS — K46.9 UNSPECIFIED ABDOMINAL HERNIA WITHOUT OBSTRUCTION OR GANGRENE: Chronic | ICD-10-CM

## 2018-09-27 DIAGNOSIS — I83.228 VARICOSE VEINS OF LEFT LOWER EXTREMITY WITH BOTH ULCER OF OTHER PART OF LOWER EXTREMITY AND INFLAMMATION: ICD-10-CM

## 2018-09-27 DIAGNOSIS — I25.10 ATHEROSCLEROTIC HEART DISEASE OF NATIVE CORONARY ARTERY WITHOUT ANGINA PECTORIS: Chronic | ICD-10-CM

## 2018-09-27 DIAGNOSIS — B99.9 UNSPECIFIED INFECTIOUS DISEASE: Chronic | ICD-10-CM

## 2018-09-27 DIAGNOSIS — M51.9 UNSPECIFIED THORACIC, THORACOLUMBAR AND LUMBOSACRAL INTERVERTEBRAL DISC DISORDER: Chronic | ICD-10-CM

## 2018-09-27 DIAGNOSIS — T14.90 INJURY, UNSPECIFIED: Chronic | ICD-10-CM

## 2018-09-27 DIAGNOSIS — D69.3 IMMUNE THROMBOCYTOPENIC PURPURA: Chronic | ICD-10-CM

## 2018-09-27 DIAGNOSIS — R52 PAIN, UNSPECIFIED: Chronic | ICD-10-CM

## 2018-09-27 PROCEDURE — 99213 OFFICE O/P EST LOW 20 MIN: CPT

## 2018-09-27 PROCEDURE — G0463: CPT

## 2018-09-29 DIAGNOSIS — M41.9 SCOLIOSIS, UNSPECIFIED: ICD-10-CM

## 2018-09-29 DIAGNOSIS — I83.228 VARICOSE VEINS OF LEFT LOWER EXTREMITY WITH BOTH ULCER OF OTHER PART OF LOWER EXTREMITY AND INFLAMMATION: ICD-10-CM

## 2018-09-29 DIAGNOSIS — E66.8 OTHER OBESITY: ICD-10-CM

## 2018-09-29 DIAGNOSIS — Z95.5 PRESENCE OF CORONARY ANGIOPLASTY IMPLANT AND GRAFT: ICD-10-CM

## 2018-09-29 DIAGNOSIS — Z79.899 OTHER LONG TERM (CURRENT) DRUG THERAPY: ICD-10-CM

## 2018-09-29 DIAGNOSIS — G62.9 POLYNEUROPATHY, UNSPECIFIED: ICD-10-CM

## 2018-09-29 DIAGNOSIS — Z79.82 LONG TERM (CURRENT) USE OF ASPIRIN: ICD-10-CM

## 2018-09-29 DIAGNOSIS — R73.03 PREDIABETES: ICD-10-CM

## 2018-09-29 DIAGNOSIS — Z72.0 TOBACCO USE: ICD-10-CM

## 2018-09-29 DIAGNOSIS — Z89.511 ACQUIRED ABSENCE OF RIGHT LEG BELOW KNEE: ICD-10-CM

## 2018-09-29 DIAGNOSIS — I48.91 UNSPECIFIED ATRIAL FIBRILLATION: ICD-10-CM

## 2018-09-29 DIAGNOSIS — L97.822 NON-PRESSURE CHRONIC ULCER OF OTHER PART OF LEFT LOWER LEG WITH FAT LAYER EXPOSED: ICD-10-CM

## 2018-09-29 DIAGNOSIS — I25.10 ATHEROSCLEROTIC HEART DISEASE OF NATIVE CORONARY ARTERY WITHOUT ANGINA PECTORIS: ICD-10-CM

## 2018-09-29 DIAGNOSIS — D69.6 THROMBOCYTOPENIA, UNSPECIFIED: ICD-10-CM

## 2018-09-29 DIAGNOSIS — F32.9 MAJOR DEPRESSIVE DISORDER, SINGLE EPISODE, UNSPECIFIED: ICD-10-CM

## 2018-10-03 ENCOUNTER — COMMUNICATION - HEALTHEAST (OUTPATIENT)
Dept: FAMILY MEDICINE | Facility: CLINIC | Age: 63
End: 2018-10-03

## 2018-10-03 DIAGNOSIS — M10.9 GOUT: ICD-10-CM

## 2018-10-17 ENCOUNTER — COMMUNICATION - HEALTHEAST (OUTPATIENT)
Dept: FAMILY MEDICINE | Facility: CLINIC | Age: 63
End: 2018-10-17

## 2018-10-19 ENCOUNTER — COMMUNICATION - HEALTHEAST (OUTPATIENT)
Dept: SCHEDULING | Facility: CLINIC | Age: 63
End: 2018-10-19

## 2018-10-20 ENCOUNTER — COMMUNICATION - HEALTHEAST (OUTPATIENT)
Dept: FAMILY MEDICINE | Facility: CLINIC | Age: 63
End: 2018-10-20

## 2018-10-22 ENCOUNTER — COMMUNICATION - HEALTHEAST (OUTPATIENT)
Dept: ANTICOAGULATION | Facility: CLINIC | Age: 63
End: 2018-10-22

## 2018-11-05 ENCOUNTER — COMMUNICATION - HEALTHEAST (OUTPATIENT)
Dept: ANTICOAGULATION | Facility: CLINIC | Age: 63
End: 2018-11-05

## 2018-11-05 ENCOUNTER — AMBULATORY - HEALTHEAST (OUTPATIENT)
Dept: LAB | Facility: CLINIC | Age: 63
End: 2018-11-05

## 2018-11-05 DIAGNOSIS — I48.91 ATRIAL FIBRILLATION (H): ICD-10-CM

## 2018-11-05 LAB — INR PPP: 3.1 (ref 0.9–1.1)

## 2018-11-15 ENCOUNTER — COMMUNICATION - HEALTHEAST (OUTPATIENT)
Dept: ANTICOAGULATION | Facility: CLINIC | Age: 63
End: 2018-11-15

## 2018-11-15 ENCOUNTER — AMBULATORY - HEALTHEAST (OUTPATIENT)
Dept: LAB | Facility: CLINIC | Age: 63
End: 2018-11-15

## 2018-11-15 DIAGNOSIS — I48.91 ATRIAL FIBRILLATION (H): ICD-10-CM

## 2018-11-15 LAB — INR PPP: 2.9 (ref 0.9–1.1)

## 2018-11-29 ENCOUNTER — AMBULATORY - HEALTHEAST (OUTPATIENT)
Dept: LAB | Facility: CLINIC | Age: 63
End: 2018-11-29

## 2018-11-29 ENCOUNTER — COMMUNICATION - HEALTHEAST (OUTPATIENT)
Dept: ANTICOAGULATION | Facility: CLINIC | Age: 63
End: 2018-11-29

## 2018-11-29 DIAGNOSIS — I48.91 ATRIAL FIBRILLATION (H): ICD-10-CM

## 2018-11-29 LAB — INR PPP: 2.5 (ref 0.9–1.1)

## 2019-01-03 ENCOUNTER — COMMUNICATION - HEALTHEAST (OUTPATIENT)
Dept: ANTICOAGULATION | Facility: CLINIC | Age: 64
End: 2019-01-03

## 2019-01-10 ENCOUNTER — COMMUNICATION - HEALTHEAST (OUTPATIENT)
Dept: ANTICOAGULATION | Facility: CLINIC | Age: 64
End: 2019-01-10

## 2019-01-10 ENCOUNTER — COMMUNICATION - HEALTHEAST (OUTPATIENT)
Dept: FAMILY MEDICINE | Facility: CLINIC | Age: 64
End: 2019-01-10

## 2019-01-10 ENCOUNTER — OFFICE VISIT - HEALTHEAST (OUTPATIENT)
Dept: FAMILY MEDICINE | Facility: CLINIC | Age: 64
End: 2019-01-10

## 2019-01-10 DIAGNOSIS — N18.30 CHRONIC KIDNEY DISEASE, STAGE III (MODERATE) (H): ICD-10-CM

## 2019-01-10 DIAGNOSIS — G47.33 OBSTRUCTIVE SLEEP APNEA (ADULT) (PEDIATRIC): ICD-10-CM

## 2019-01-10 DIAGNOSIS — I48.91 ATRIAL FIBRILLATION, UNSPECIFIED TYPE (H): ICD-10-CM

## 2019-01-10 DIAGNOSIS — I48.19 PERSISTENT ATRIAL FIBRILLATION (H): ICD-10-CM

## 2019-01-10 DIAGNOSIS — J20.9 ACUTE BRONCHITIS, UNSPECIFIED ORGANISM: ICD-10-CM

## 2019-01-10 LAB — INR PPP: 2.1 (ref 0.9–1.1)

## 2019-01-16 ENCOUNTER — AMBULATORY - HEALTHEAST (OUTPATIENT)
Dept: LAB | Facility: CLINIC | Age: 64
End: 2019-01-16

## 2019-01-16 ENCOUNTER — COMMUNICATION - HEALTHEAST (OUTPATIENT)
Dept: ANTICOAGULATION | Facility: CLINIC | Age: 64
End: 2019-01-16

## 2019-01-16 ENCOUNTER — COMMUNICATION - HEALTHEAST (OUTPATIENT)
Dept: FAMILY MEDICINE | Facility: CLINIC | Age: 64
End: 2019-01-16

## 2019-01-16 DIAGNOSIS — M10.9 GOUT: ICD-10-CM

## 2019-01-16 DIAGNOSIS — I48.91 ATRIAL FIBRILLATION, UNSPECIFIED TYPE (H): ICD-10-CM

## 2019-01-16 LAB — INR PPP: 2.2 (ref 0.9–1.1)

## 2019-01-17 ENCOUNTER — RECORDS - HEALTHEAST (OUTPATIENT)
Dept: ADMINISTRATIVE | Facility: OTHER | Age: 64
End: 2019-01-17

## 2019-02-01 ENCOUNTER — COMMUNICATION - HEALTHEAST (OUTPATIENT)
Dept: FAMILY MEDICINE | Facility: CLINIC | Age: 64
End: 2019-02-01

## 2019-02-01 DIAGNOSIS — M1A.0490 IDIOPATHIC CHRONIC GOUT OF HAND WITHOUT TOPHUS, UNSPECIFIED LATERALITY: ICD-10-CM

## 2019-02-01 DIAGNOSIS — I10 ESSENTIAL HYPERTENSION: ICD-10-CM

## 2019-02-19 ENCOUNTER — COMMUNICATION - HEALTHEAST (OUTPATIENT)
Dept: ANTICOAGULATION | Facility: CLINIC | Age: 64
End: 2019-02-19

## 2019-02-21 ENCOUNTER — COMMUNICATION - HEALTHEAST (OUTPATIENT)
Dept: ANTICOAGULATION | Facility: CLINIC | Age: 64
End: 2019-02-21

## 2019-02-22 ENCOUNTER — COMMUNICATION - HEALTHEAST (OUTPATIENT)
Dept: PHYSICAL MEDICINE AND REHAB | Facility: CLINIC | Age: 64
End: 2019-02-22

## 2019-03-01 ENCOUNTER — COMMUNICATION - HEALTHEAST (OUTPATIENT)
Dept: FAMILY MEDICINE | Facility: CLINIC | Age: 64
End: 2019-03-01

## 2019-03-01 DIAGNOSIS — R60.0 LOWER EXTREMITY EDEMA: ICD-10-CM

## 2019-03-13 ENCOUNTER — AMBULATORY - HEALTHEAST (OUTPATIENT)
Dept: LAB | Facility: CLINIC | Age: 64
End: 2019-03-13

## 2019-03-13 ENCOUNTER — COMMUNICATION - HEALTHEAST (OUTPATIENT)
Dept: ANTICOAGULATION | Facility: CLINIC | Age: 64
End: 2019-03-13

## 2019-03-13 DIAGNOSIS — I48.91 ATRIAL FIBRILLATION, UNSPECIFIED TYPE (H): ICD-10-CM

## 2019-03-13 LAB — INR PPP: 2.5 (ref 0.9–1.1)

## 2019-04-14 ENCOUNTER — COMMUNICATION - HEALTHEAST (OUTPATIENT)
Dept: SCHEDULING | Facility: CLINIC | Age: 64
End: 2019-04-14

## 2019-04-16 ENCOUNTER — OFFICE VISIT - HEALTHEAST (OUTPATIENT)
Dept: FAMILY MEDICINE | Facility: CLINIC | Age: 64
End: 2019-04-16

## 2019-04-16 ENCOUNTER — COMMUNICATION - HEALTHEAST (OUTPATIENT)
Dept: ANTICOAGULATION | Facility: CLINIC | Age: 64
End: 2019-04-16

## 2019-04-16 DIAGNOSIS — M19.90 OSTEOARTHRITIS: ICD-10-CM

## 2019-04-16 DIAGNOSIS — I87.8 VENOUS STASIS: ICD-10-CM

## 2019-04-16 DIAGNOSIS — M54.50 CHRONIC BILATERAL LOW BACK PAIN WITHOUT SCIATICA: ICD-10-CM

## 2019-04-16 DIAGNOSIS — G89.29 CHRONIC BILATERAL LOW BACK PAIN WITHOUT SCIATICA: ICD-10-CM

## 2019-04-16 DIAGNOSIS — R73.9 HYPERGLYCEMIA: ICD-10-CM

## 2019-04-16 DIAGNOSIS — I48.91 ATRIAL FIBRILLATION, UNSPECIFIED TYPE (H): ICD-10-CM

## 2019-04-16 DIAGNOSIS — I50.32 CHRONIC DIASTOLIC HEART FAILURE (H): ICD-10-CM

## 2019-04-16 DIAGNOSIS — M1A.0490 IDIOPATHIC CHRONIC GOUT OF HAND WITHOUT TOPHUS, UNSPECIFIED LATERALITY: ICD-10-CM

## 2019-04-16 DIAGNOSIS — I10 ESSENTIAL HYPERTENSION: ICD-10-CM

## 2019-04-16 DIAGNOSIS — E66.9 OBESITY (BMI 35.0-39.9 WITHOUT COMORBIDITY): ICD-10-CM

## 2019-04-16 DIAGNOSIS — M72.2 PLANTAR FASCIITIS: ICD-10-CM

## 2019-04-16 DIAGNOSIS — N18.30 CHRONIC KIDNEY DISEASE, STAGE III (MODERATE) (H): ICD-10-CM

## 2019-04-16 DIAGNOSIS — C18.7 MALIGNANT NEOPLASM OF SIGMOID COLON (H): ICD-10-CM

## 2019-04-16 LAB
ALBUMIN SERPL-MCNC: 3.8 G/DL (ref 3.5–5)
ALP SERPL-CCNC: 83 U/L (ref 45–120)
ALT SERPL W P-5'-P-CCNC: 13 U/L (ref 0–45)
ANION GAP SERPL CALCULATED.3IONS-SCNC: 13 MMOL/L (ref 5–18)
AST SERPL W P-5'-P-CCNC: 17 U/L (ref 0–40)
BILIRUB SERPL-MCNC: 0.5 MG/DL (ref 0–1)
BUN SERPL-MCNC: 42 MG/DL (ref 8–22)
CALCIUM SERPL-MCNC: 9.4 MG/DL (ref 8.5–10.5)
CHLORIDE BLD-SCNC: 106 MMOL/L (ref 98–107)
CO2 SERPL-SCNC: 21 MMOL/L (ref 22–31)
CREAT SERPL-MCNC: 2.18 MG/DL (ref 0.7–1.3)
CREAT UR-MCNC: 37.1 MG/DL
ERYTHROCYTE [DISTWIDTH] IN BLOOD BY AUTOMATED COUNT: 13.7 % (ref 11–14.5)
GFR SERPL CREATININE-BSD FRML MDRD: 31 ML/MIN/1.73M2
GLUCOSE BLD-MCNC: 108 MG/DL (ref 70–125)
HBA1C MFR BLD: 6 % (ref 3.5–6)
HCT VFR BLD AUTO: 37.5 % (ref 40–54)
HGB BLD-MCNC: 12.5 G/DL (ref 14–18)
INR PPP: 2 (ref 0.9–1.1)
LDLC SERPL CALC-MCNC: 116 MG/DL
MCH RBC QN AUTO: 26.6 PG (ref 27–34)
MCHC RBC AUTO-ENTMCNC: 33.4 G/DL (ref 32–36)
MCV RBC AUTO: 80 FL (ref 80–100)
MICROALBUMIN UR-MCNC: 2.47 MG/DL (ref 0–1.99)
MICROALBUMIN/CREAT UR: 66.6 MG/G
PLATELET # BLD AUTO: 135 THOU/UL (ref 140–440)
PMV BLD AUTO: 8.8 FL (ref 7–10)
POTASSIUM BLD-SCNC: 4.7 MMOL/L (ref 3.5–5)
PROT SERPL-MCNC: 7.8 G/DL (ref 6–8)
RBC # BLD AUTO: 4.7 MILL/UL (ref 4.4–6.2)
SODIUM SERPL-SCNC: 140 MMOL/L (ref 136–145)
URATE SERPL-MCNC: 6.7 MG/DL (ref 3–8)
WBC: 4.8 THOU/UL (ref 4–11)

## 2019-04-17 ENCOUNTER — COMMUNICATION - HEALTHEAST (OUTPATIENT)
Dept: FAMILY MEDICINE | Facility: CLINIC | Age: 64
End: 2019-04-17

## 2019-04-17 DIAGNOSIS — I10 ESSENTIAL HYPERTENSION: ICD-10-CM

## 2019-04-17 DIAGNOSIS — M10.9 GOUT: ICD-10-CM

## 2019-04-17 LAB — 25(OH)D3 SERPL-MCNC: 24.9 NG/ML (ref 30–80)

## 2019-05-01 ENCOUNTER — RECORDS - HEALTHEAST (OUTPATIENT)
Dept: ADMINISTRATIVE | Facility: OTHER | Age: 64
End: 2019-05-01

## 2019-05-02 ENCOUNTER — COMMUNICATION - HEALTHEAST (OUTPATIENT)
Dept: SCHEDULING | Facility: CLINIC | Age: 64
End: 2019-05-02

## 2019-05-02 ENCOUNTER — COMMUNICATION - HEALTHEAST (OUTPATIENT)
Dept: FAMILY MEDICINE | Facility: CLINIC | Age: 64
End: 2019-05-02

## 2019-05-03 ENCOUNTER — OFFICE VISIT - HEALTHEAST (OUTPATIENT)
Dept: FAMILY MEDICINE | Facility: CLINIC | Age: 64
End: 2019-05-03

## 2019-05-03 DIAGNOSIS — L97.521 SKIN ULCER OF LEFT FOOT, LIMITED TO BREAKDOWN OF SKIN (H): ICD-10-CM

## 2019-05-03 DIAGNOSIS — I87.8 VENOUS STASIS: ICD-10-CM

## 2019-05-03 DIAGNOSIS — M17.10 ARTHRITIS OF KNEE: ICD-10-CM

## 2019-05-03 LAB
APPEARANCE FLD: ABNORMAL
COLOR FLD: YELLOW
CRYSTALS SNV MICRO: ABNORMAL
GLUCOSE FLD-MCNC: 97 MG/DL
MACROPHAGE % - HISTORICAL: 1 %
MONOCYTE % - HISTORICAL: 7 %
NEUTS BAND NFR FLD MANUAL: 93 %
PROT FLD-MCNC: 5.3 G/DL
RBC FLUID - HISTORICAL: ABNORMAL /UL
WBC # FLD AUTO: ABNORMAL /UL (ref 0–99)

## 2019-05-06 ENCOUNTER — COMMUNICATION - HEALTHEAST (OUTPATIENT)
Dept: FAMILY MEDICINE | Facility: CLINIC | Age: 64
End: 2019-05-06

## 2019-05-06 DIAGNOSIS — D64.9 ANEMIA: ICD-10-CM

## 2019-05-06 LAB
BACTERIA SPEC CULT: NO GROWTH
GRAM STAIN RESULT: NORMAL
GRAM STAIN RESULT: NORMAL

## 2019-05-07 ENCOUNTER — COMMUNICATION - HEALTHEAST (OUTPATIENT)
Dept: FAMILY MEDICINE | Facility: CLINIC | Age: 64
End: 2019-05-07

## 2019-05-29 ENCOUNTER — COMMUNICATION - HEALTHEAST (OUTPATIENT)
Dept: SCHEDULING | Facility: CLINIC | Age: 64
End: 2019-05-29

## 2019-05-31 ENCOUNTER — COMMUNICATION - HEALTHEAST (OUTPATIENT)
Dept: FAMILY MEDICINE | Facility: CLINIC | Age: 64
End: 2019-05-31

## 2019-05-31 DIAGNOSIS — M1A.0490 IDIOPATHIC CHRONIC GOUT OF HAND WITHOUT TOPHUS, UNSPECIFIED LATERALITY: ICD-10-CM

## 2019-06-04 ENCOUNTER — COMMUNICATION - HEALTHEAST (OUTPATIENT)
Dept: ANTICOAGULATION | Facility: CLINIC | Age: 64
End: 2019-06-04

## 2019-06-19 ENCOUNTER — COMMUNICATION - HEALTHEAST (OUTPATIENT)
Dept: ANTICOAGULATION | Facility: CLINIC | Age: 64
End: 2019-06-19

## 2019-06-19 ENCOUNTER — AMBULATORY - HEALTHEAST (OUTPATIENT)
Dept: LAB | Facility: CLINIC | Age: 64
End: 2019-06-19

## 2019-06-19 DIAGNOSIS — I48.91 ATRIAL FIBRILLATION, UNSPECIFIED TYPE (H): ICD-10-CM

## 2019-06-19 LAB — INR PPP: 2.4 (ref 0.9–1.1)

## 2019-06-20 ENCOUNTER — COMMUNICATION - HEALTHEAST (OUTPATIENT)
Dept: FAMILY MEDICINE | Facility: CLINIC | Age: 64
End: 2019-06-20

## 2019-06-20 DIAGNOSIS — I48.91 ATRIAL FIBRILLATION, UNSPECIFIED TYPE (H): ICD-10-CM

## 2019-07-15 ENCOUNTER — COMMUNICATION - HEALTHEAST (OUTPATIENT)
Dept: FAMILY MEDICINE | Facility: CLINIC | Age: 64
End: 2019-07-15

## 2019-07-15 DIAGNOSIS — M19.90 OSTEOARTHRITIS: ICD-10-CM

## 2019-08-01 ENCOUNTER — COMMUNICATION - HEALTHEAST (OUTPATIENT)
Dept: FAMILY MEDICINE | Facility: CLINIC | Age: 64
End: 2019-08-01

## 2019-08-01 DIAGNOSIS — I10 HYPERTENSION: ICD-10-CM

## 2019-08-07 ENCOUNTER — COMMUNICATION - HEALTHEAST (OUTPATIENT)
Dept: ANTICOAGULATION | Facility: CLINIC | Age: 64
End: 2019-08-07

## 2019-09-04 ENCOUNTER — AMBULATORY - HEALTHEAST (OUTPATIENT)
Dept: LAB | Facility: CLINIC | Age: 64
End: 2019-09-04

## 2019-09-04 ENCOUNTER — COMMUNICATION - HEALTHEAST (OUTPATIENT)
Dept: ANTICOAGULATION | Facility: CLINIC | Age: 64
End: 2019-09-04

## 2019-09-04 DIAGNOSIS — I48.91 ATRIAL FIBRILLATION, UNSPECIFIED TYPE (H): ICD-10-CM

## 2019-09-04 LAB — INR PPP: 2.5 (ref 0.9–1.1)

## 2019-09-19 ENCOUNTER — COMMUNICATION - HEALTHEAST (OUTPATIENT)
Dept: FAMILY MEDICINE | Facility: CLINIC | Age: 64
End: 2019-09-19

## 2019-09-19 DIAGNOSIS — R60.0 LOWER EXTREMITY EDEMA: ICD-10-CM

## 2019-11-07 ENCOUNTER — COMMUNICATION - HEALTHEAST (OUTPATIENT)
Dept: ANTICOAGULATION | Facility: CLINIC | Age: 64
End: 2019-11-07

## 2019-11-12 ENCOUNTER — AMBULATORY - HEALTHEAST (OUTPATIENT)
Dept: LAB | Facility: CLINIC | Age: 64
End: 2019-11-12

## 2019-11-12 ENCOUNTER — COMMUNICATION - HEALTHEAST (OUTPATIENT)
Dept: ANTICOAGULATION | Facility: CLINIC | Age: 64
End: 2019-11-12

## 2019-11-12 DIAGNOSIS — I48.91 ATRIAL FIBRILLATION, UNSPECIFIED TYPE (H): ICD-10-CM

## 2019-11-12 LAB — INR PPP: 2 (ref 0.9–1.1)

## 2019-11-20 ENCOUNTER — OFFICE VISIT - HEALTHEAST (OUTPATIENT)
Dept: FAMILY MEDICINE | Facility: CLINIC | Age: 64
End: 2019-11-20

## 2019-11-20 DIAGNOSIS — N39.41 URGE INCONTINENCE OF URINE: ICD-10-CM

## 2019-11-20 DIAGNOSIS — E66.9 OBESITY (BMI 35.0-39.9 WITHOUT COMORBIDITY): ICD-10-CM

## 2019-11-20 DIAGNOSIS — R73.9 HYPERGLYCEMIA: ICD-10-CM

## 2019-11-20 DIAGNOSIS — H54.7 DECREASED VISUAL ACUITY: ICD-10-CM

## 2019-11-20 DIAGNOSIS — I50.32 CHRONIC DIASTOLIC HEART FAILURE (H): ICD-10-CM

## 2019-11-20 DIAGNOSIS — M1A.0490 IDIOPATHIC CHRONIC GOUT OF HAND WITHOUT TOPHUS, UNSPECIFIED LATERALITY: ICD-10-CM

## 2019-11-20 DIAGNOSIS — N18.30 CHRONIC KIDNEY DISEASE, STAGE III (MODERATE) (H): ICD-10-CM

## 2019-11-20 DIAGNOSIS — I87.8 VENOUS STASIS: ICD-10-CM

## 2019-11-20 DIAGNOSIS — I48.91 ATRIAL FIBRILLATION, UNSPECIFIED TYPE (H): ICD-10-CM

## 2019-11-20 DIAGNOSIS — E78.2 MIXED HYPERLIPIDEMIA: ICD-10-CM

## 2019-11-20 DIAGNOSIS — C18.7 MALIGNANT NEOPLASM OF SIGMOID COLON (H): ICD-10-CM

## 2019-11-20 LAB
ALBUMIN SERPL-MCNC: 4 G/DL (ref 3.5–5)
ALBUMIN UR-MCNC: NEGATIVE MG/DL
ALP SERPL-CCNC: 76 U/L (ref 45–120)
ALT SERPL W P-5'-P-CCNC: 10 U/L (ref 0–45)
ANION GAP SERPL CALCULATED.3IONS-SCNC: 11 MMOL/L (ref 5–18)
APPEARANCE UR: CLEAR
AST SERPL W P-5'-P-CCNC: 18 U/L (ref 0–40)
BACTERIA #/AREA URNS HPF: ABNORMAL HPF
BILIRUB SERPL-MCNC: 0.7 MG/DL (ref 0–1)
BILIRUB UR QL STRIP: NEGATIVE
BUN SERPL-MCNC: 54 MG/DL (ref 8–22)
CALCIUM SERPL-MCNC: 9.2 MG/DL (ref 8.5–10.5)
CHLORIDE BLD-SCNC: 108 MMOL/L (ref 98–107)
CO2 SERPL-SCNC: 21 MMOL/L (ref 22–31)
COLOR UR AUTO: YELLOW
CREAT SERPL-MCNC: 2.65 MG/DL (ref 0.7–1.3)
ERYTHROCYTE [DISTWIDTH] IN BLOOD BY AUTOMATED COUNT: 14.1 % (ref 11–14.5)
GFR SERPL CREATININE-BSD FRML MDRD: 24 ML/MIN/1.73M2
GLUCOSE BLD-MCNC: 94 MG/DL (ref 70–125)
GLUCOSE UR STRIP-MCNC: NEGATIVE MG/DL
HBA1C MFR BLD: 5.5 % (ref 3.5–6)
HCT VFR BLD AUTO: 36.9 % (ref 40–54)
HGB BLD-MCNC: 12.2 G/DL (ref 14–18)
HGB UR QL STRIP: NEGATIVE
KETONES UR STRIP-MCNC: NEGATIVE MG/DL
LEUKOCYTE ESTERASE UR QL STRIP: ABNORMAL
MCH RBC QN AUTO: 27.3 PG (ref 27–34)
MCHC RBC AUTO-ENTMCNC: 33.1 G/DL (ref 32–36)
MCV RBC AUTO: 82 FL (ref 80–100)
NITRATE UR QL: NEGATIVE
PH UR STRIP: 5.5 [PH] (ref 5–8)
PLATELET # BLD AUTO: 117 THOU/UL (ref 140–440)
PMV BLD AUTO: 9.5 FL (ref 7–10)
POTASSIUM BLD-SCNC: 4.9 MMOL/L (ref 3.5–5)
PROT SERPL-MCNC: 7.8 G/DL (ref 6–8)
RBC # BLD AUTO: 4.47 MILL/UL (ref 4.4–6.2)
RBC #/AREA URNS AUTO: ABNORMAL HPF
SODIUM SERPL-SCNC: 140 MMOL/L (ref 136–145)
SP GR UR STRIP: 1.01 (ref 1–1.03)
SQUAMOUS #/AREA URNS AUTO: ABNORMAL LPF
URATE SERPL-MCNC: 6 MG/DL (ref 3–8)
UROBILINOGEN UR STRIP-ACNC: ABNORMAL
WBC #/AREA URNS AUTO: ABNORMAL HPF
WBC: 6.7 THOU/UL (ref 4–11)

## 2019-11-20 ASSESSMENT — MIFFLIN-ST. JEOR: SCORE: 3118.2

## 2019-11-21 LAB — 25(OH)D3 SERPL-MCNC: 21.4 NG/ML (ref 30–80)

## 2019-11-22 ENCOUNTER — COMMUNICATION - HEALTHEAST (OUTPATIENT)
Dept: FAMILY MEDICINE | Facility: CLINIC | Age: 64
End: 2019-11-22

## 2019-11-22 ENCOUNTER — COMMUNICATION - HEALTHEAST (OUTPATIENT)
Dept: ANTICOAGULATION | Facility: CLINIC | Age: 64
End: 2019-11-22

## 2019-11-22 ENCOUNTER — AMBULATORY - HEALTHEAST (OUTPATIENT)
Dept: FAMILY MEDICINE | Facility: CLINIC | Age: 64
End: 2019-11-22

## 2019-11-22 DIAGNOSIS — N39.41 URGE INCONTINENCE OF URINE: ICD-10-CM

## 2019-11-22 LAB — BACTERIA SPEC CULT: NORMAL

## 2019-11-26 ENCOUNTER — COMMUNICATION - HEALTHEAST (OUTPATIENT)
Dept: ANTICOAGULATION | Facility: CLINIC | Age: 64
End: 2019-11-26

## 2019-11-26 ENCOUNTER — AMBULATORY - HEALTHEAST (OUTPATIENT)
Dept: LAB | Facility: CLINIC | Age: 64
End: 2019-11-26

## 2019-11-26 DIAGNOSIS — I48.91 ATRIAL FIBRILLATION, UNSPECIFIED TYPE (H): ICD-10-CM

## 2019-11-26 LAB — INR PPP: 2.2 (ref 0.9–1.1)

## 2019-12-05 ENCOUNTER — OFFICE VISIT - HEALTHEAST (OUTPATIENT)
Dept: FAMILY MEDICINE | Facility: CLINIC | Age: 64
End: 2019-12-05

## 2019-12-05 DIAGNOSIS — I48.91 ATRIAL FIBRILLATION, UNSPECIFIED TYPE (H): ICD-10-CM

## 2019-12-05 DIAGNOSIS — Z01.818 PREOPERATIVE EXAMINATION: ICD-10-CM

## 2019-12-05 DIAGNOSIS — E66.813 CLASS 3 SEVERE OBESITY WITH SERIOUS COMORBIDITY AND BODY MASS INDEX (BMI) OF 50.0 TO 59.9 IN ADULT, UNSPECIFIED OBESITY TYPE (H): ICD-10-CM

## 2019-12-05 DIAGNOSIS — C18.7 MALIGNANT NEOPLASM OF SIGMOID COLON (H): ICD-10-CM

## 2019-12-05 DIAGNOSIS — I50.32 CHRONIC DIASTOLIC HEART FAILURE (H): ICD-10-CM

## 2019-12-05 DIAGNOSIS — E66.01 CLASS 3 SEVERE OBESITY WITH SERIOUS COMORBIDITY AND BODY MASS INDEX (BMI) OF 50.0 TO 59.9 IN ADULT, UNSPECIFIED OBESITY TYPE (H): ICD-10-CM

## 2019-12-05 DIAGNOSIS — N18.30 CHRONIC KIDNEY DISEASE, STAGE III (MODERATE) (H): ICD-10-CM

## 2019-12-05 LAB
ATRIAL RATE - MUSE: 93 BPM
DIASTOLIC BLOOD PRESSURE - MUSE: NORMAL
INTERPRETATION ECG - MUSE: NORMAL
P AXIS - MUSE: NORMAL
PR INTERVAL - MUSE: NORMAL
QRS DURATION - MUSE: 110 MS
QT - MUSE: 400 MS
QTC - MUSE: 444 MS
R AXIS - MUSE: 11 DEGREES
SYSTOLIC BLOOD PRESSURE - MUSE: NORMAL
T AXIS - MUSE: -2 DEGREES
VENTRICULAR RATE- MUSE: 74 BPM

## 2019-12-05 ASSESSMENT — MIFFLIN-ST. JEOR: SCORE: 3038.25

## 2019-12-09 ENCOUNTER — COMMUNICATION - HEALTHEAST (OUTPATIENT)
Dept: FAMILY MEDICINE | Facility: CLINIC | Age: 64
End: 2019-12-09

## 2019-12-10 ENCOUNTER — ANESTHESIA - HEALTHEAST (OUTPATIENT)
Dept: SURGERY | Facility: HOSPITAL | Age: 64
End: 2019-12-10

## 2019-12-12 ENCOUNTER — SURGERY - HEALTHEAST (OUTPATIENT)
Dept: SURGERY | Facility: HOSPITAL | Age: 64
End: 2019-12-12

## 2019-12-13 ENCOUNTER — COMMUNICATION - HEALTHEAST (OUTPATIENT)
Dept: ANTICOAGULATION | Facility: CLINIC | Age: 64
End: 2019-12-13

## 2019-12-13 DIAGNOSIS — I48.91 ATRIAL FIBRILLATION, UNSPECIFIED TYPE (H): ICD-10-CM

## 2019-12-24 ENCOUNTER — COMMUNICATION - HEALTHEAST (OUTPATIENT)
Dept: ANTICOAGULATION | Facility: CLINIC | Age: 64
End: 2019-12-24

## 2020-01-02 ENCOUNTER — AMBULATORY - HEALTHEAST (OUTPATIENT)
Dept: LAB | Facility: CLINIC | Age: 65
End: 2020-01-02

## 2020-01-02 ENCOUNTER — COMMUNICATION - HEALTHEAST (OUTPATIENT)
Dept: ANTICOAGULATION | Facility: CLINIC | Age: 65
End: 2020-01-02

## 2020-01-02 DIAGNOSIS — I48.91 ATRIAL FIBRILLATION, UNSPECIFIED TYPE (H): ICD-10-CM

## 2020-01-02 LAB — INR PPP: 1.4 (ref 0.9–1.1)

## 2020-01-05 ENCOUNTER — COMMUNICATION - HEALTHEAST (OUTPATIENT)
Dept: FAMILY MEDICINE | Facility: CLINIC | Age: 65
End: 2020-01-05

## 2020-01-05 DIAGNOSIS — I48.91 ATRIAL FIBRILLATION, UNSPECIFIED TYPE (H): ICD-10-CM

## 2020-01-10 ENCOUNTER — AMBULATORY - HEALTHEAST (OUTPATIENT)
Dept: LAB | Facility: CLINIC | Age: 65
End: 2020-01-10

## 2020-01-10 ENCOUNTER — COMMUNICATION - HEALTHEAST (OUTPATIENT)
Dept: ANTICOAGULATION | Facility: CLINIC | Age: 65
End: 2020-01-10

## 2020-01-10 DIAGNOSIS — I48.91 ATRIAL FIBRILLATION, UNSPECIFIED TYPE (H): ICD-10-CM

## 2020-01-10 LAB — INR PPP: 1.9 (ref 0.9–1.1)

## 2020-01-16 ENCOUNTER — OFFICE VISIT - HEALTHEAST (OUTPATIENT)
Dept: FAMILY MEDICINE | Facility: CLINIC | Age: 65
End: 2020-01-16

## 2020-01-16 DIAGNOSIS — M1A.0220 IDIOPATHIC CHRONIC GOUT OF LEFT ELBOW WITHOUT TOPHUS: ICD-10-CM

## 2020-01-16 DIAGNOSIS — Z23 ENCOUNTER FOR IMMUNIZATION: ICD-10-CM

## 2020-01-16 DIAGNOSIS — N18.30 CHRONIC KIDNEY DISEASE, STAGE III (MODERATE) (H): ICD-10-CM

## 2020-01-16 DIAGNOSIS — I48.91 ATRIAL FIBRILLATION, UNSPECIFIED TYPE (H): ICD-10-CM

## 2020-01-16 DIAGNOSIS — J40 BRONCHITIS: ICD-10-CM

## 2020-01-20 ENCOUNTER — COMMUNICATION - HEALTHEAST (OUTPATIENT)
Dept: FAMILY MEDICINE | Facility: CLINIC | Age: 65
End: 2020-01-20

## 2020-01-24 ENCOUNTER — AMBULATORY - HEALTHEAST (OUTPATIENT)
Dept: LAB | Facility: CLINIC | Age: 65
End: 2020-01-24

## 2020-01-24 ENCOUNTER — COMMUNICATION - HEALTHEAST (OUTPATIENT)
Dept: ANTICOAGULATION | Facility: CLINIC | Age: 65
End: 2020-01-24

## 2020-01-24 DIAGNOSIS — I48.91 ATRIAL FIBRILLATION, UNSPECIFIED TYPE (H): ICD-10-CM

## 2020-01-24 LAB — INR PPP: 2.3 (ref 0.9–1.1)

## 2020-02-05 ENCOUNTER — OFFICE VISIT - HEALTHEAST (OUTPATIENT)
Dept: FAMILY MEDICINE | Facility: CLINIC | Age: 65
End: 2020-02-05

## 2020-02-05 ENCOUNTER — COMMUNICATION - HEALTHEAST (OUTPATIENT)
Dept: ANTICOAGULATION | Facility: CLINIC | Age: 65
End: 2020-02-05

## 2020-02-05 ENCOUNTER — COMMUNICATION - HEALTHEAST (OUTPATIENT)
Dept: FAMILY MEDICINE | Facility: CLINIC | Age: 65
End: 2020-02-05

## 2020-02-05 DIAGNOSIS — I10 ESSENTIAL HYPERTENSION: ICD-10-CM

## 2020-02-05 DIAGNOSIS — Z01.818 PREOP EXAMINATION: ICD-10-CM

## 2020-02-05 DIAGNOSIS — I48.91 ATRIAL FIBRILLATION, UNSPECIFIED TYPE (H): ICD-10-CM

## 2020-02-05 LAB
ANION GAP SERPL CALCULATED.3IONS-SCNC: 14 MMOL/L (ref 5–18)
BUN SERPL-MCNC: 57 MG/DL (ref 8–22)
CALCIUM SERPL-MCNC: 9 MG/DL (ref 8.5–10.5)
CHLORIDE BLD-SCNC: 107 MMOL/L (ref 98–107)
CO2 SERPL-SCNC: 18 MMOL/L (ref 22–31)
CREAT SERPL-MCNC: 2.93 MG/DL (ref 0.7–1.3)
GFR SERPL CREATININE-BSD FRML MDRD: 22 ML/MIN/1.73M2
GLUCOSE BLD-MCNC: 98 MG/DL (ref 70–125)
INR PPP: 2.3 (ref 0.9–1.1)
POTASSIUM BLD-SCNC: 5.7 MMOL/L (ref 3.5–5)
SODIUM SERPL-SCNC: 139 MMOL/L (ref 136–145)

## 2020-02-05 ASSESSMENT — MIFFLIN-ST. JEOR: SCORE: 3109.13

## 2020-02-07 ENCOUNTER — COMMUNICATION - HEALTHEAST (OUTPATIENT)
Dept: FAMILY MEDICINE | Facility: CLINIC | Age: 65
End: 2020-02-07

## 2020-03-10 ENCOUNTER — AMBULATORY - HEALTHEAST (OUTPATIENT)
Dept: LAB | Facility: CLINIC | Age: 65
End: 2020-03-10

## 2020-03-10 ENCOUNTER — COMMUNICATION - HEALTHEAST (OUTPATIENT)
Dept: FAMILY MEDICINE | Facility: CLINIC | Age: 65
End: 2020-03-10

## 2020-03-10 ENCOUNTER — COMMUNICATION - HEALTHEAST (OUTPATIENT)
Dept: ANTICOAGULATION | Facility: CLINIC | Age: 65
End: 2020-03-10

## 2020-03-10 DIAGNOSIS — I48.91 ATRIAL FIBRILLATION, UNSPECIFIED TYPE (H): ICD-10-CM

## 2020-03-10 LAB — INR PPP: 2.2 (ref 0.9–1.1)

## 2020-04-06 ENCOUNTER — COMMUNICATION - HEALTHEAST (OUTPATIENT)
Dept: FAMILY MEDICINE | Facility: CLINIC | Age: 65
End: 2020-04-06

## 2020-04-06 DIAGNOSIS — I48.91 ATRIAL FIBRILLATION, UNSPECIFIED TYPE (H): ICD-10-CM

## 2020-04-09 ENCOUNTER — COMMUNICATION - HEALTHEAST (OUTPATIENT)
Dept: FAMILY MEDICINE | Facility: CLINIC | Age: 65
End: 2020-04-09

## 2020-04-09 DIAGNOSIS — M19.90 OSTEOARTHRITIS: ICD-10-CM

## 2020-04-14 ENCOUNTER — COMMUNICATION - HEALTHEAST (OUTPATIENT)
Dept: ANTICOAGULATION | Facility: CLINIC | Age: 65
End: 2020-04-14

## 2020-04-28 ENCOUNTER — COMMUNICATION - HEALTHEAST (OUTPATIENT)
Dept: FAMILY MEDICINE | Facility: CLINIC | Age: 65
End: 2020-04-28

## 2020-04-28 DIAGNOSIS — I10 HYPERTENSION: ICD-10-CM

## 2020-04-28 DIAGNOSIS — I10 ESSENTIAL HYPERTENSION: ICD-10-CM

## 2020-05-15 ENCOUNTER — COMMUNICATION - HEALTHEAST (OUTPATIENT)
Dept: FAMILY MEDICINE | Facility: CLINIC | Age: 65
End: 2020-05-15

## 2020-05-18 ENCOUNTER — AMBULATORY - HEALTHEAST (OUTPATIENT)
Dept: LAB | Facility: CLINIC | Age: 65
End: 2020-05-18

## 2020-05-18 ENCOUNTER — COMMUNICATION - HEALTHEAST (OUTPATIENT)
Dept: ANTICOAGULATION | Facility: CLINIC | Age: 65
End: 2020-05-18

## 2020-05-18 DIAGNOSIS — I48.91 ATRIAL FIBRILLATION, UNSPECIFIED TYPE (H): ICD-10-CM

## 2020-05-18 LAB — INR PPP: 2.8 (ref 0.9–1.1)

## 2020-05-19 ENCOUNTER — COMMUNICATION - HEALTHEAST (OUTPATIENT)
Dept: FAMILY MEDICINE | Facility: CLINIC | Age: 65
End: 2020-05-19

## 2020-05-19 DIAGNOSIS — D64.9 ANEMIA: ICD-10-CM

## 2020-06-05 ENCOUNTER — COMMUNICATION - HEALTHEAST (OUTPATIENT)
Dept: FAMILY MEDICINE | Facility: CLINIC | Age: 65
End: 2020-06-05

## 2020-06-05 DIAGNOSIS — M1A.0490 IDIOPATHIC CHRONIC GOUT OF HAND WITHOUT TOPHUS, UNSPECIFIED LATERALITY: ICD-10-CM

## 2020-06-28 ENCOUNTER — COMMUNICATION - HEALTHEAST (OUTPATIENT)
Dept: FAMILY MEDICINE | Facility: CLINIC | Age: 65
End: 2020-06-28

## 2020-06-28 DIAGNOSIS — I48.91 ATRIAL FIBRILLATION, UNSPECIFIED TYPE (H): ICD-10-CM

## 2020-06-29 ENCOUNTER — COMMUNICATION - HEALTHEAST (OUTPATIENT)
Dept: SCHEDULING | Facility: CLINIC | Age: 65
End: 2020-06-29

## 2020-06-29 ENCOUNTER — AMBULATORY - HEALTHEAST (OUTPATIENT)
Dept: FAMILY MEDICINE | Facility: CLINIC | Age: 65
End: 2020-06-29

## 2020-06-29 DIAGNOSIS — Z01.818 PREOP EXAMINATION: ICD-10-CM

## 2020-07-02 ENCOUNTER — OFFICE VISIT - HEALTHEAST (OUTPATIENT)
Dept: FAMILY MEDICINE | Facility: CLINIC | Age: 65
End: 2020-07-02

## 2020-07-02 ENCOUNTER — COMMUNICATION - HEALTHEAST (OUTPATIENT)
Dept: ANTICOAGULATION | Facility: CLINIC | Age: 65
End: 2020-07-02

## 2020-07-02 ENCOUNTER — COMMUNICATION - HEALTHEAST (OUTPATIENT)
Dept: FAMILY MEDICINE | Facility: CLINIC | Age: 65
End: 2020-07-02

## 2020-07-02 DIAGNOSIS — I48.91 ATRIAL FIBRILLATION, UNSPECIFIED TYPE (H): ICD-10-CM

## 2020-07-02 DIAGNOSIS — M1A.0490 IDIOPATHIC CHRONIC GOUT OF HAND WITHOUT TOPHUS, UNSPECIFIED LATERALITY: ICD-10-CM

## 2020-07-02 DIAGNOSIS — M10.9 GOUT: ICD-10-CM

## 2020-07-02 DIAGNOSIS — Z01.818 PREOP EXAMINATION: ICD-10-CM

## 2020-07-02 DIAGNOSIS — H26.9 CATARACT OF RIGHT EYE, UNSPECIFIED CATARACT TYPE: ICD-10-CM

## 2020-07-02 LAB
INR PPP: 2.6 (ref 0.9–1.1)
POTASSIUM BLD-SCNC: 5.2 MMOL/L (ref 3.5–5)

## 2020-07-02 ASSESSMENT — MIFFLIN-ST. JEOR: SCORE: 2941.29

## 2020-07-07 DIAGNOSIS — Z01.818 PREOP EXAMINATION: Primary | ICD-10-CM

## 2020-07-07 PROCEDURE — U0003 INFECTIOUS AGENT DETECTION BY NUCLEIC ACID (DNA OR RNA); SEVERE ACUTE RESPIRATORY SYNDROME CORONAVIRUS 2 (SARS-COV-2) (CORONAVIRUS DISEASE [COVID-19]), AMPLIFIED PROBE TECHNIQUE, MAKING USE OF HIGH THROUGHPUT TECHNOLOGIES AS DESCRIBED BY CMS-2020-01-R: HCPCS | Performed by: FAMILY MEDICINE

## 2020-07-07 NOTE — LETTER
July 11, 2020        Panda Miranda  [address] [place] [id number]    This letter provides a written record that you were tested for COVID-19 on 7/7/20.       Your result was negative. This means that we didn t find the virus that causes COVID-19 in your sample. A test may show negative when you do actually have the virus. This can happen when the virus is in the early stages of infection, before you feel illness symptoms.    If you have symptoms   Stay home and away from others (self-isolate) until you meet ALL of the guidelines below:    You ve had no fever--and no medicine that reduces fever--for 3 full days (72 hours). And      Your other symptoms have gotten better. For example, your cough or breathing has improved. And     At least 10 days have passed since your symptoms started.    During this time:    Stay home. Don t go to work, school or anywhere else.     Stay in your own room, including for meals. Use your own bathroom if you can.    Stay away from others in your home. No hugging, kissing or shaking hands. No visitors.    Clean  high touch  surfaces often (doorknobs, counters, handles, etc.). Use a household cleaning spray or wipes. You can find a full list on the EPA website at www.epa.gov/pesticide-registration/list-n-disinfectants-use-against-sars-cov-2.    Cover your mouth and nose with a mask, tissue or washcloth to avoid spreading germs.    Wash your hands and face often with soap and water.    Going back to work  Check with your employer for any guidelines to follow for going back to work.    Employers: This document serves as formal notice that your employee tested negative for COVID-19, as of the testing date shown above.

## 2020-07-08 LAB
SARS-COV-2 PCR COMMENT: NORMAL
SARS-COV-2 RNA SPEC QL NAA+PROBE: NEGATIVE
SARS-COV-2 RNA SPEC QL NAA+PROBE: NORMAL
SPECIMEN SOURCE: NORMAL
SPECIMEN SOURCE: NORMAL

## 2020-08-24 ENCOUNTER — COMMUNICATION - HEALTHEAST (OUTPATIENT)
Dept: ANTICOAGULATION | Facility: CLINIC | Age: 65
End: 2020-08-24

## 2020-08-24 ENCOUNTER — COMMUNICATION - HEALTHEAST (OUTPATIENT)
Dept: FAMILY MEDICINE | Facility: CLINIC | Age: 65
End: 2020-08-24

## 2020-08-24 DIAGNOSIS — D64.9 ANEMIA: ICD-10-CM

## 2020-09-02 ENCOUNTER — AMBULATORY - HEALTHEAST (OUTPATIENT)
Dept: LAB | Facility: CLINIC | Age: 65
End: 2020-09-02

## 2020-09-02 ENCOUNTER — COMMUNICATION - HEALTHEAST (OUTPATIENT)
Dept: ANTICOAGULATION | Facility: CLINIC | Age: 65
End: 2020-09-02

## 2020-09-02 DIAGNOSIS — I48.91 ATRIAL FIBRILLATION, UNSPECIFIED TYPE (H): ICD-10-CM

## 2020-09-02 LAB — INR PPP: 2.7 (ref 0.9–1.1)

## 2020-09-21 ENCOUNTER — COMMUNICATION - HEALTHEAST (OUTPATIENT)
Dept: SCHEDULING | Facility: CLINIC | Age: 65
End: 2020-09-21

## 2020-09-23 ENCOUNTER — COMMUNICATION - HEALTHEAST (OUTPATIENT)
Dept: SCHEDULING | Facility: CLINIC | Age: 65
End: 2020-09-23

## 2020-10-01 ENCOUNTER — COMMUNICATION - HEALTHEAST (OUTPATIENT)
Dept: FAMILY MEDICINE | Facility: CLINIC | Age: 65
End: 2020-10-01

## 2020-10-01 DIAGNOSIS — M1A.0490 IDIOPATHIC CHRONIC GOUT OF HAND WITHOUT TOPHUS, UNSPECIFIED LATERALITY: ICD-10-CM

## 2020-10-11 ENCOUNTER — COMMUNICATION - HEALTHEAST (OUTPATIENT)
Dept: FAMILY MEDICINE | Facility: CLINIC | Age: 65
End: 2020-10-11

## 2020-10-11 DIAGNOSIS — R60.0 LOWER EXTREMITY EDEMA: ICD-10-CM

## 2020-11-04 ENCOUNTER — COMMUNICATION - HEALTHEAST (OUTPATIENT)
Dept: ANTICOAGULATION | Facility: CLINIC | Age: 65
End: 2020-11-04

## 2020-11-06 ENCOUNTER — COMMUNICATION - HEALTHEAST (OUTPATIENT)
Dept: ANTICOAGULATION | Facility: CLINIC | Age: 65
End: 2020-11-06

## 2020-11-06 ENCOUNTER — AMBULATORY - HEALTHEAST (OUTPATIENT)
Dept: LAB | Facility: CLINIC | Age: 65
End: 2020-11-06

## 2020-11-06 DIAGNOSIS — I48.91 ATRIAL FIBRILLATION, UNSPECIFIED TYPE (H): ICD-10-CM

## 2020-11-06 LAB — INR PPP: 2.5 (ref 0.9–1.1)

## 2020-12-08 ENCOUNTER — COMMUNICATION - HEALTHEAST (OUTPATIENT)
Dept: FAMILY MEDICINE | Facility: CLINIC | Age: 65
End: 2020-12-08

## 2020-12-08 DIAGNOSIS — D64.9 ANEMIA: ICD-10-CM

## 2020-12-10 ENCOUNTER — AMBULATORY - HEALTHEAST (OUTPATIENT)
Dept: ANTICOAGULATION | Facility: CLINIC | Age: 65
End: 2020-12-10

## 2020-12-10 DIAGNOSIS — I48.91 ATRIAL FIBRILLATION, UNSPECIFIED TYPE (H): ICD-10-CM

## 2021-01-05 ENCOUNTER — OFFICE VISIT - HEALTHEAST (OUTPATIENT)
Dept: FAMILY MEDICINE | Facility: CLINIC | Age: 66
End: 2021-01-05

## 2021-01-05 DIAGNOSIS — I87.312 CHRONIC VENOUS HYPERTENSION (IDIOPATHIC) WITH ULCER OF LEFT LOWER EXTREMITY (H): ICD-10-CM

## 2021-01-05 DIAGNOSIS — L97.929 CHRONIC VENOUS HYPERTENSION (IDIOPATHIC) WITH ULCER OF LEFT LOWER EXTREMITY (H): ICD-10-CM

## 2021-01-05 DIAGNOSIS — E66.01 MORBID OBESITY (H): ICD-10-CM

## 2021-01-05 DIAGNOSIS — Z00.00 HEALTHCARE MAINTENANCE: ICD-10-CM

## 2021-01-05 DIAGNOSIS — M1A.0490 IDIOPATHIC CHRONIC GOUT OF HAND WITHOUT TOPHUS, UNSPECIFIED LATERALITY: ICD-10-CM

## 2021-01-05 DIAGNOSIS — L97.321 NON-PRESSURE CHRONIC ULCER OF LEFT ANKLE LIMITED TO BREAKDOWN OF SKIN (H): ICD-10-CM

## 2021-01-05 DIAGNOSIS — M10.9 GOUT: ICD-10-CM

## 2021-01-05 ASSESSMENT — MIFFLIN-ST. JEOR: SCORE: 3086.45

## 2021-01-08 ENCOUNTER — COMMUNICATION - HEALTHEAST (OUTPATIENT)
Dept: ANTICOAGULATION | Facility: CLINIC | Age: 66
End: 2021-01-08

## 2021-01-26 ENCOUNTER — COMMUNICATION - HEALTHEAST (OUTPATIENT)
Dept: ANTICOAGULATION | Facility: CLINIC | Age: 66
End: 2021-01-26

## 2021-01-26 ENCOUNTER — OFFICE VISIT - HEALTHEAST (OUTPATIENT)
Dept: FAMILY MEDICINE | Facility: CLINIC | Age: 66
End: 2021-01-26

## 2021-01-26 DIAGNOSIS — I34.0 NON-RHEUMATIC MITRAL REGURGITATION: ICD-10-CM

## 2021-01-26 DIAGNOSIS — M19.90 OSTEOARTHRITIS, UNSPECIFIED OSTEOARTHRITIS TYPE, UNSPECIFIED SITE: ICD-10-CM

## 2021-01-26 DIAGNOSIS — N18.32 STAGE 3B CHRONIC KIDNEY DISEASE (H): ICD-10-CM

## 2021-01-26 DIAGNOSIS — I50.32 CHRONIC DIASTOLIC HEART FAILURE (H): ICD-10-CM

## 2021-01-26 DIAGNOSIS — I48.91 ATRIAL FIBRILLATION, UNSPECIFIED TYPE (H): ICD-10-CM

## 2021-01-26 DIAGNOSIS — G47.33 OBSTRUCTIVE SLEEP APNEA (ADULT) (PEDIATRIC): ICD-10-CM

## 2021-01-26 DIAGNOSIS — I87.312 CHRONIC VENOUS HYPERTENSION (IDIOPATHIC) WITH ULCER OF LEFT LOWER EXTREMITY (H): ICD-10-CM

## 2021-01-26 DIAGNOSIS — L97.929 CHRONIC VENOUS HYPERTENSION (IDIOPATHIC) WITH ULCER OF LEFT LOWER EXTREMITY (H): ICD-10-CM

## 2021-01-26 LAB
ALT SERPL W P-5'-P-CCNC: <9 U/L (ref 0–45)
ANION GAP SERPL CALCULATED.3IONS-SCNC: 11 MMOL/L (ref 5–18)
BUN SERPL-MCNC: 40 MG/DL (ref 8–22)
CALCIUM SERPL-MCNC: 8.6 MG/DL (ref 8.5–10.5)
CHLORIDE BLD-SCNC: 106 MMOL/L (ref 98–107)
CO2 SERPL-SCNC: 25 MMOL/L (ref 22–31)
CREAT SERPL-MCNC: 2.46 MG/DL (ref 0.7–1.3)
ERYTHROCYTE [DISTWIDTH] IN BLOOD BY AUTOMATED COUNT: 12.7 % (ref 11–14.5)
GFR SERPL CREATININE-BSD FRML MDRD: 27 ML/MIN/1.73M2
GLUCOSE BLD-MCNC: 81 MG/DL (ref 70–125)
HCT VFR BLD AUTO: 32 % (ref 40–54)
HGB BLD-MCNC: 10.6 G/DL (ref 14–18)
INR PPP: 1.7 (ref 0.9–1.1)
MCH RBC QN AUTO: 27.3 PG (ref 27–34)
MCHC RBC AUTO-ENTMCNC: 33.1 G/DL (ref 32–36)
MCV RBC AUTO: 82 FL (ref 80–100)
PLATELET # BLD AUTO: 128 THOU/UL (ref 140–440)
PMV BLD AUTO: 8.3 FL (ref 7–10)
POTASSIUM BLD-SCNC: 4.7 MMOL/L (ref 3.5–5)
RBC # BLD AUTO: 3.88 MILL/UL (ref 4.4–6.2)
SODIUM SERPL-SCNC: 142 MMOL/L (ref 136–145)
URATE SERPL-MCNC: 6.5 MG/DL (ref 3–8)
WBC: 6.6 THOU/UL (ref 4–11)

## 2021-01-26 ASSESSMENT — MIFFLIN-ST. JEOR: SCORE: 3054.69

## 2021-01-28 ENCOUNTER — COMMUNICATION - HEALTHEAST (OUTPATIENT)
Dept: FAMILY MEDICINE | Facility: CLINIC | Age: 66
End: 2021-01-28

## 2021-02-03 ENCOUNTER — HOSPITAL ENCOUNTER (OUTPATIENT)
Dept: ULTRASOUND IMAGING | Facility: CLINIC | Age: 66
Discharge: HOME | End: 2021-02-03
Attending: FAMILY MEDICINE

## 2021-02-03 ENCOUNTER — COMMUNICATION - HEALTHEAST (OUTPATIENT)
Dept: FAMILY MEDICINE | Facility: CLINIC | Age: 66
End: 2021-02-03

## 2021-02-03 ENCOUNTER — COMMUNICATION - HEALTHEAST (OUTPATIENT)
Dept: SCHEDULING | Facility: CLINIC | Age: 66
End: 2021-02-03

## 2021-02-03 DIAGNOSIS — M10.9 GOUT: ICD-10-CM

## 2021-02-03 DIAGNOSIS — L97.321 NON-PRESSURE CHRONIC ULCER OF LEFT ANKLE LIMITED TO BREAKDOWN OF SKIN (H): ICD-10-CM

## 2021-02-09 ENCOUNTER — COMMUNICATION - HEALTHEAST (OUTPATIENT)
Dept: ANTICOAGULATION | Facility: CLINIC | Age: 66
End: 2021-02-09

## 2021-02-09 ENCOUNTER — AMBULATORY - HEALTHEAST (OUTPATIENT)
Dept: LAB | Facility: CLINIC | Age: 66
End: 2021-02-09

## 2021-02-09 DIAGNOSIS — I48.91 ATRIAL FIBRILLATION, UNSPECIFIED TYPE (H): ICD-10-CM

## 2021-02-09 LAB — INR PPP: 2.1 (ref 0.9–1.1)

## 2021-02-23 ENCOUNTER — COMMUNICATION - HEALTHEAST (OUTPATIENT)
Dept: ANTICOAGULATION | Facility: CLINIC | Age: 66
End: 2021-02-23

## 2021-02-23 ENCOUNTER — AMBULATORY - HEALTHEAST (OUTPATIENT)
Dept: LAB | Facility: CLINIC | Age: 66
End: 2021-02-23

## 2021-02-23 ENCOUNTER — COMMUNICATION - HEALTHEAST (OUTPATIENT)
Dept: HOME HEALTH SERVICES | Facility: HOME HEALTH | Age: 66
End: 2021-02-23

## 2021-02-23 ENCOUNTER — OFFICE VISIT - HEALTHEAST (OUTPATIENT)
Dept: VASCULAR SURGERY | Facility: CLINIC | Age: 66
End: 2021-02-23

## 2021-02-23 DIAGNOSIS — I48.91 ATRIAL FIBRILLATION, UNSPECIFIED TYPE (H): ICD-10-CM

## 2021-02-23 DIAGNOSIS — E55.9 VITAMIN D DEFICIENCY: ICD-10-CM

## 2021-02-23 DIAGNOSIS — R60.0 VENOUS STASIS ULCER OF LEFT LOWER LEG WITH EDEMA OF LEFT LOWER LEG (H): ICD-10-CM

## 2021-02-23 DIAGNOSIS — L97.929 VENOUS STASIS ULCER OF LEFT LOWER LEG WITH EDEMA OF LEFT LOWER LEG (H): ICD-10-CM

## 2021-02-23 DIAGNOSIS — N18.32 STAGE 3B CHRONIC KIDNEY DISEASE (H): ICD-10-CM

## 2021-02-23 DIAGNOSIS — R60.9 DEPENDENT EDEMA: ICD-10-CM

## 2021-02-23 DIAGNOSIS — I87.303 VENOUS HYPERTENSION OF BOTH LOWER EXTREMITIES: ICD-10-CM

## 2021-02-23 DIAGNOSIS — I50.32 CHRONIC DIASTOLIC HEART FAILURE (H): ICD-10-CM

## 2021-02-23 DIAGNOSIS — I83.892 VENOUS STASIS ULCER OF LEFT LOWER LEG WITH EDEMA OF LEFT LOWER LEG (H): ICD-10-CM

## 2021-02-23 DIAGNOSIS — I83.029 VENOUS STASIS ULCER OF LEFT LOWER LEG WITH EDEMA OF LEFT LOWER LEG (H): ICD-10-CM

## 2021-02-23 DIAGNOSIS — E66.01 MORBID OBESITY WITH BMI OF 50.0-59.9, ADULT (H): ICD-10-CM

## 2021-02-23 DIAGNOSIS — I87.8 VENOUS STASIS: ICD-10-CM

## 2021-02-23 LAB — INR PPP: 2.2 (ref 0.9–1.1)

## 2021-02-23 ASSESSMENT — MIFFLIN-ST. JEOR: SCORE: 3077.83

## 2021-02-24 ENCOUNTER — COMMUNICATION - HEALTHEAST (OUTPATIENT)
Dept: FAMILY MEDICINE | Facility: CLINIC | Age: 66
End: 2021-02-24

## 2021-02-24 DIAGNOSIS — I48.91 ATRIAL FIBRILLATION, UNSPECIFIED TYPE (H): ICD-10-CM

## 2021-02-25 LAB
BACTERIA SPEC CULT: ABNORMAL
GRAM STAIN RESULT: ABNORMAL
GRAM STAIN RESULT: ABNORMAL

## 2021-03-02 ENCOUNTER — COMMUNICATION - HEALTHEAST (OUTPATIENT)
Dept: FAMILY MEDICINE | Facility: CLINIC | Age: 66
End: 2021-03-02

## 2021-03-02 ENCOUNTER — COMMUNICATION - HEALTHEAST (OUTPATIENT)
Dept: VASCULAR SURGERY | Facility: CLINIC | Age: 66
End: 2021-03-02

## 2021-03-02 ENCOUNTER — AMBULATORY - HEALTHEAST (OUTPATIENT)
Dept: FAMILY MEDICINE | Facility: CLINIC | Age: 66
End: 2021-03-02

## 2021-03-02 DIAGNOSIS — I10 ESSENTIAL HYPERTENSION: ICD-10-CM

## 2021-03-03 ENCOUNTER — COMMUNICATION - HEALTHEAST (OUTPATIENT)
Dept: FAMILY MEDICINE | Facility: CLINIC | Age: 66
End: 2021-03-03

## 2021-03-03 DIAGNOSIS — I10 HYPERTENSION: ICD-10-CM

## 2021-03-19 ENCOUNTER — COMMUNICATION - HEALTHEAST (OUTPATIENT)
Dept: FAMILY MEDICINE | Facility: CLINIC | Age: 66
End: 2021-03-19

## 2021-03-19 DIAGNOSIS — D64.9 ANEMIA: ICD-10-CM

## 2021-03-22 ENCOUNTER — COMMUNICATION - HEALTHEAST (OUTPATIENT)
Dept: FAMILY MEDICINE | Facility: CLINIC | Age: 66
End: 2021-03-22

## 2021-03-22 DIAGNOSIS — D64.9 ANEMIA: ICD-10-CM

## 2021-03-29 ENCOUNTER — OFFICE VISIT - HEALTHEAST (OUTPATIENT)
Dept: VASCULAR SURGERY | Facility: CLINIC | Age: 66
End: 2021-03-29

## 2021-03-29 DIAGNOSIS — L97.929 VENOUS STASIS ULCER OF LEFT LOWER LEG WITH EDEMA OF LEFT LOWER LEG (H): ICD-10-CM

## 2021-03-29 DIAGNOSIS — R60.0 VENOUS STASIS ULCER OF LEFT LOWER LEG WITH EDEMA OF LEFT LOWER LEG (H): ICD-10-CM

## 2021-03-29 DIAGNOSIS — I83.029 VENOUS STASIS ULCER OF LEFT LOWER LEG WITH EDEMA OF LEFT LOWER LEG (H): ICD-10-CM

## 2021-03-29 DIAGNOSIS — I83.892 VENOUS STASIS ULCER OF LEFT LOWER LEG WITH EDEMA OF LEFT LOWER LEG (H): ICD-10-CM

## 2021-03-29 ASSESSMENT — MIFFLIN-ST. JEOR: SCORE: 3094.38

## 2021-03-30 ENCOUNTER — COMMUNICATION - HEALTHEAST (OUTPATIENT)
Dept: ANTICOAGULATION | Facility: CLINIC | Age: 66
End: 2021-03-30

## 2021-03-30 LAB
LAB AP CHARGES (HE HISTORICAL CONVERSION): NORMAL
PATH REPORT.COMMENTS IMP SPEC: NORMAL
PATH REPORT.COMMENTS IMP SPEC: NORMAL
PATH REPORT.FINAL DX SPEC: NORMAL
PATH REPORT.GROSS SPEC: NORMAL
PATH REPORT.MICROSCOPIC SPEC OTHER STN: NORMAL
PATH REPORT.RELEVANT HX SPEC: NORMAL
RESULT FLAG (HE HISTORICAL CONVERSION): NORMAL

## 2021-03-31 ENCOUNTER — COMMUNICATION - HEALTHEAST (OUTPATIENT)
Dept: ANTICOAGULATION | Facility: CLINIC | Age: 66
End: 2021-03-31

## 2021-03-31 ENCOUNTER — COMMUNICATION - HEALTHEAST (OUTPATIENT)
Dept: VASCULAR SURGERY | Facility: CLINIC | Age: 66
End: 2021-03-31

## 2021-03-31 ENCOUNTER — AMBULATORY - HEALTHEAST (OUTPATIENT)
Dept: LAB | Facility: CLINIC | Age: 66
End: 2021-03-31

## 2021-03-31 DIAGNOSIS — I48.91 ATRIAL FIBRILLATION, UNSPECIFIED TYPE (H): ICD-10-CM

## 2021-03-31 LAB — INR PPP: 2.6 (ref 0.9–1.1)

## 2021-04-26 ENCOUNTER — COMMUNICATION - HEALTHEAST (OUTPATIENT)
Dept: ADMINISTRATIVE | Facility: CLINIC | Age: 66
End: 2021-04-26

## 2021-04-26 ENCOUNTER — OFFICE VISIT - HEALTHEAST (OUTPATIENT)
Dept: VASCULAR SURGERY | Facility: CLINIC | Age: 66
End: 2021-04-26

## 2021-04-26 DIAGNOSIS — N18.32 STAGE 3B CHRONIC KIDNEY DISEASE (H): ICD-10-CM

## 2021-04-26 DIAGNOSIS — I50.32 CHRONIC DIASTOLIC HEART FAILURE (H): ICD-10-CM

## 2021-04-26 DIAGNOSIS — I87.303 VENOUS HYPERTENSION OF BOTH LOWER EXTREMITIES: ICD-10-CM

## 2021-04-26 DIAGNOSIS — I87.8 VENOUS STASIS: ICD-10-CM

## 2021-04-26 DIAGNOSIS — L97.929 VENOUS STASIS ULCER OF LEFT LOWER LEG WITH EDEMA OF LEFT LOWER LEG (H): ICD-10-CM

## 2021-04-26 DIAGNOSIS — E55.9 VITAMIN D DEFICIENCY: ICD-10-CM

## 2021-04-26 DIAGNOSIS — I83.892 VENOUS STASIS ULCER OF LEFT LOWER LEG WITH EDEMA OF LEFT LOWER LEG (H): ICD-10-CM

## 2021-04-26 DIAGNOSIS — I83.029 VENOUS STASIS ULCER OF LEFT LOWER LEG WITH EDEMA OF LEFT LOWER LEG (H): ICD-10-CM

## 2021-04-26 DIAGNOSIS — I87.2 VENOUS STASIS ULCER OF OTHER PART OF RIGHT LOWER LEG WITH FAT LAYER EXPOSED WITHOUT VARICOSE VEINS (H): ICD-10-CM

## 2021-04-26 DIAGNOSIS — R60.0 VENOUS STASIS ULCER OF LEFT LOWER LEG WITH EDEMA OF LEFT LOWER LEG (H): ICD-10-CM

## 2021-04-26 DIAGNOSIS — L97.812 VENOUS STASIS ULCER OF OTHER PART OF RIGHT LOWER LEG WITH FAT LAYER EXPOSED WITHOUT VARICOSE VEINS (H): ICD-10-CM

## 2021-04-26 DIAGNOSIS — E66.01 MORBID OBESITY WITH BMI OF 50.0-59.9, ADULT (H): ICD-10-CM

## 2021-04-26 DIAGNOSIS — R60.9 DEPENDENT EDEMA: ICD-10-CM

## 2021-04-28 ENCOUNTER — AMBULATORY - HEALTHEAST (OUTPATIENT)
Dept: LAB | Facility: CLINIC | Age: 66
End: 2021-04-28

## 2021-04-28 ENCOUNTER — COMMUNICATION - HEALTHEAST (OUTPATIENT)
Dept: ANTICOAGULATION | Facility: CLINIC | Age: 66
End: 2021-04-28

## 2021-04-28 DIAGNOSIS — I48.91 ATRIAL FIBRILLATION, UNSPECIFIED TYPE (H): ICD-10-CM

## 2021-04-28 LAB — INR PPP: 2.6 (ref 0.9–1.1)

## 2021-04-29 ENCOUNTER — AMBULATORY - HEALTHEAST (OUTPATIENT)
Dept: VASCULAR SURGERY | Facility: CLINIC | Age: 66
End: 2021-04-29

## 2021-04-29 DIAGNOSIS — I87.303 VENOUS HYPERTENSION OF BOTH LOWER EXTREMITIES: ICD-10-CM

## 2021-04-29 DIAGNOSIS — R60.0 VENOUS STASIS ULCER OF LEFT LOWER LEG WITH EDEMA OF LEFT LOWER LEG (H): ICD-10-CM

## 2021-04-29 DIAGNOSIS — I83.029 VENOUS STASIS ULCER OF LEFT LOWER LEG WITH EDEMA OF LEFT LOWER LEG (H): ICD-10-CM

## 2021-04-29 DIAGNOSIS — L97.929 VENOUS STASIS ULCER OF LEFT LOWER LEG WITH EDEMA OF LEFT LOWER LEG (H): ICD-10-CM

## 2021-04-29 DIAGNOSIS — I83.892 VENOUS STASIS ULCER OF LEFT LOWER LEG WITH EDEMA OF LEFT LOWER LEG (H): ICD-10-CM

## 2021-05-03 ENCOUNTER — AMBULATORY - HEALTHEAST (OUTPATIENT)
Dept: VASCULAR SURGERY | Facility: CLINIC | Age: 66
End: 2021-05-03

## 2021-05-03 DIAGNOSIS — I87.2 VENOUS STASIS ULCER OF OTHER PART OF RIGHT LOWER LEG WITH FAT LAYER EXPOSED WITHOUT VARICOSE VEINS (H): ICD-10-CM

## 2021-05-03 DIAGNOSIS — I87.303 VENOUS HYPERTENSION OF BOTH LOWER EXTREMITIES: ICD-10-CM

## 2021-05-03 DIAGNOSIS — R60.0 VENOUS STASIS ULCER OF LEFT LOWER LEG WITH EDEMA OF LEFT LOWER LEG (H): ICD-10-CM

## 2021-05-03 DIAGNOSIS — I83.892 VENOUS STASIS ULCER OF LEFT LOWER LEG WITH EDEMA OF LEFT LOWER LEG (H): ICD-10-CM

## 2021-05-03 DIAGNOSIS — I83.029 VENOUS STASIS ULCER OF LEFT LOWER LEG WITH EDEMA OF LEFT LOWER LEG (H): ICD-10-CM

## 2021-05-03 DIAGNOSIS — L97.812 VENOUS STASIS ULCER OF OTHER PART OF RIGHT LOWER LEG WITH FAT LAYER EXPOSED WITHOUT VARICOSE VEINS (H): ICD-10-CM

## 2021-05-03 DIAGNOSIS — L97.929 VENOUS STASIS ULCER OF LEFT LOWER LEG WITH EDEMA OF LEFT LOWER LEG (H): ICD-10-CM

## 2021-05-06 ENCOUNTER — AMBULATORY - HEALTHEAST (OUTPATIENT)
Dept: VASCULAR SURGERY | Facility: CLINIC | Age: 66
End: 2021-05-06

## 2021-05-06 DIAGNOSIS — I87.2 VENOUS STASIS ULCER OF OTHER PART OF RIGHT LOWER LEG WITH FAT LAYER EXPOSED WITHOUT VARICOSE VEINS (H): ICD-10-CM

## 2021-05-06 DIAGNOSIS — I83.892 VENOUS STASIS ULCER OF LEFT LOWER LEG WITH EDEMA OF LEFT LOWER LEG (H): ICD-10-CM

## 2021-05-06 DIAGNOSIS — I87.303 VENOUS HYPERTENSION OF BOTH LOWER EXTREMITIES: ICD-10-CM

## 2021-05-06 DIAGNOSIS — I83.029 VENOUS STASIS ULCER OF LEFT LOWER LEG WITH EDEMA OF LEFT LOWER LEG (H): ICD-10-CM

## 2021-05-06 DIAGNOSIS — L97.929 VENOUS STASIS ULCER OF LEFT LOWER LEG WITH EDEMA OF LEFT LOWER LEG (H): ICD-10-CM

## 2021-05-06 DIAGNOSIS — R60.0 VENOUS STASIS ULCER OF LEFT LOWER LEG WITH EDEMA OF LEFT LOWER LEG (H): ICD-10-CM

## 2021-05-06 DIAGNOSIS — L97.812 VENOUS STASIS ULCER OF OTHER PART OF RIGHT LOWER LEG WITH FAT LAYER EXPOSED WITHOUT VARICOSE VEINS (H): ICD-10-CM

## 2021-05-11 ENCOUNTER — RECORDS - HEALTHEAST (OUTPATIENT)
Dept: FAMILY MEDICINE | Facility: CLINIC | Age: 66
End: 2021-05-11

## 2021-05-14 ENCOUNTER — TELEPHONE (OUTPATIENT)
Dept: ENDOCRINOLOGY | Facility: CLINIC | Age: 66
End: 2021-05-14

## 2021-05-14 NOTE — TELEPHONE ENCOUNTER
Patient had 1995 weight loss surgery lap band      Scheduled to see CNP or HERBERT at 0915.    Spoke to patient.      Instructed to complete pre-visit questionnaires on [facility], or arrive 15 minutes early to complete.    [phone] contact center phone number.    Marcy Azevedo, EMT

## 2021-05-17 ENCOUNTER — RECORDS - HEALTHEAST (OUTPATIENT)
Dept: ADMINISTRATIVE | Facility: OTHER | Age: 66
End: 2021-05-17

## 2021-05-17 ENCOUNTER — COMMUNICATION - HEALTHEAST (OUTPATIENT)
Dept: VASCULAR SURGERY | Facility: CLINIC | Age: 66
End: 2021-05-17

## 2021-05-17 ENCOUNTER — OFFICE VISIT (OUTPATIENT)
Dept: ENDOCRINOLOGY | Facility: CLINIC | Age: 66
End: 2021-05-17
Payer: COMMERCIAL

## 2021-05-17 VITALS
WEIGHT: 315 LBS | HEIGHT: 78 IN | SYSTOLIC BLOOD PRESSURE: 114 MMHG | DIASTOLIC BLOOD PRESSURE: 61 MMHG | BODY MASS INDEX: 36.45 KG/M2

## 2021-05-17 DIAGNOSIS — K90.9 INTESTINAL MALABSORPTION, UNSPECIFIED TYPE: ICD-10-CM

## 2021-05-17 DIAGNOSIS — E66.01 MORBID OBESITY (H): ICD-10-CM

## 2021-05-17 DIAGNOSIS — Z98.84 STATUS POST BARIATRIC SURGERY: Primary | ICD-10-CM

## 2021-05-17 DIAGNOSIS — Z98.84 STATUS POST BARIATRIC SURGERY: ICD-10-CM

## 2021-05-17 PROBLEM — M17.10 ARTHRITIS OF KNEE: Status: ACTIVE | Noted: 2019-05-03

## 2021-05-17 LAB
ALBUMIN SERPL-MCNC: 3.8 G/DL (ref 3.4–5)
ALP SERPL-CCNC: 73 U/L (ref 40–150)
ALT SERPL W P-5'-P-CCNC: 14 U/L (ref 0–70)
ANION GAP SERPL CALCULATED.3IONS-SCNC: 8 MMOL/L (ref 3–14)
AST SERPL W P-5'-P-CCNC: 11 U/L (ref 0–45)
BILIRUB SERPL-MCNC: 0.7 MG/DL (ref 0.2–1.3)
BUN SERPL-MCNC: 55 MG/DL (ref 7–30)
CALCIUM SERPL-MCNC: 9.2 MG/DL (ref 8.5–10.1)
CHLORIDE SERPL-SCNC: 106 MMOL/L (ref 94–109)
CHOLEST SERPL-MCNC: 146 MG/DL
CO2 SERPL-SCNC: 25 MMOL/L (ref 20–32)
CREAT SERPL-MCNC: 2.65 MG/DL (ref 0.66–1.25)
ERYTHROCYTE [DISTWIDTH] IN BLOOD BY AUTOMATED COUNT: 15.1 % (ref 10–15)
FERRITIN SERPL-MCNC: 297 NG/ML (ref 26–388)
GFR SERPL CREATININE-BSD FRML MDRD: 24 ML/MIN/{1.73_M2}
GLUCOSE SERPL-MCNC: 96 MG/DL (ref 70–99)
HCT VFR BLD AUTO: 33.7 % (ref 40–53)
HDLC SERPL-MCNC: 50 MG/DL
HGB BLD-MCNC: 10.5 G/DL (ref 13.3–17.7)
LDLC SERPL CALC-MCNC: 79 MG/DL
MCH RBC QN AUTO: 26.7 PG (ref 26.5–33)
MCHC RBC AUTO-ENTMCNC: 31.2 G/DL (ref 31.5–36.5)
MCV RBC AUTO: 86 FL (ref 78–100)
NONHDLC SERPL-MCNC: 96 MG/DL
PLATELET # BLD AUTO: 123 10E9/L (ref 150–450)
POTASSIUM SERPL-SCNC: 4 MMOL/L (ref 3.4–5.3)
PROT SERPL-MCNC: 8.4 G/DL (ref 6.8–8.8)
PTH-INTACT SERPL-MCNC: 544 PG/ML (ref 18–80)
RBC # BLD AUTO: 3.93 10E12/L (ref 4.4–5.9)
SODIUM SERPL-SCNC: 139 MMOL/L (ref 133–144)
TRIGL SERPL-MCNC: 85 MG/DL
VIT B12 SERPL-MCNC: 348 PG/ML (ref 193–986)
WBC # BLD AUTO: 6.3 10E9/L (ref 4–11)

## 2021-05-17 PROCEDURE — 84590 ASSAY OF VITAMIN A: CPT | Mod: 90 | Performed by: PATHOLOGY

## 2021-05-17 PROCEDURE — 83970 ASSAY OF PARATHORMONE: CPT | Performed by: PATHOLOGY

## 2021-05-17 PROCEDURE — 85027 COMPLETE CBC AUTOMATED: CPT | Performed by: PATHOLOGY

## 2021-05-17 PROCEDURE — 82306 VITAMIN D 25 HYDROXY: CPT | Performed by: PATHOLOGY

## 2021-05-17 PROCEDURE — 99204 OFFICE O/P NEW MOD 45 MIN: CPT | Performed by: PHYSICIAN ASSISTANT

## 2021-05-17 PROCEDURE — 82607 VITAMIN B-12: CPT | Performed by: PATHOLOGY

## 2021-05-17 PROCEDURE — 36415 COLL VENOUS BLD VENIPUNCTURE: CPT | Performed by: PATHOLOGY

## 2021-05-17 PROCEDURE — 80053 COMPREHEN METABOLIC PANEL: CPT | Performed by: PATHOLOGY

## 2021-05-17 PROCEDURE — 82728 ASSAY OF FERRITIN: CPT | Performed by: PATHOLOGY

## 2021-05-17 PROCEDURE — 80061 LIPID PANEL: CPT | Performed by: PATHOLOGY

## 2021-05-17 RX ORDER — METOPROLOL TARTRATE 100 MG
100 TABLET ORAL 2 TIMES DAILY
COMMUNITY
Start: 2021-03-02 | End: 2021-08-24

## 2021-05-17 RX ORDER — LISINOPRIL 10 MG/1
TABLET ORAL
Status: ON HOLD | COMMUNITY
Start: 2020-04-28 | End: 2022-01-30

## 2021-05-17 RX ORDER — WARFARIN SODIUM 5 MG/1
TABLET ORAL
COMMUNITY
Start: 2021-02-24 | End: 2021-11-10

## 2021-05-17 ASSESSMENT — PAIN SCALES - GENERAL: PAINLEVEL: NO PAIN (0)

## 2021-05-17 ASSESSMENT — MIFFLIN-ST. JEOR: SCORE: 3102.09

## 2021-05-17 NOTE — NURSING NOTE
"Chief Complaint   Patient presents with     Consult     Consultation Re-establish Bariatric Larscopic Lap band Surgery       Vitals:    05/17/21 0910   BP: 114/61   BP Location: Left arm   Patient Position: Chair   Cuff Size: Thigh   Weight: (!) 219.2 kg (483 lb 3.2 oz)   Height: 1.969 m (6' 5.5\")       Body mass index is 56.56 kg/m .                          "

## 2021-05-17 NOTE — ASSESSMENT & PLAN NOTE
PLAN:  Remote hx of open VBG with Dr Arizmendi with weight regain (still down 400 lbs from highest wt 880 lbs but has regained all weight loss with VBG). Maladaptive eating due to tight VBG ring.  Also Upper GI shows GG fistula    Start topiramate ramp to 75 mg if ok per nephrologist. Will work on medical weight management.  Follow up Dr Damico to discuss possible VBG ring removal via laparoscopic vs endoscopic for maladaptive eating/weight regain after VBG in person visit. (teeth lost, vomiting once a week, maladaptive eating for years, UGI 2017 shows GG fistula)  Follow up [facility] pharmacist Lauren Bloch in 1 month phone visit to follow up topiramate start  Follow up Yamilex in 3 months return [facility] by video or in person on a Monday

## 2021-05-17 NOTE — PROGRESS NOTES
"45 minutes spent on the date of the encounter doing chart review, history and exam, documentation and further activities per the note    New Bariatric Surgery Consultation Note    May 17, 2021    RE: Panda Miranda  MR#: [id number]  : 1955      Referring provider: Self referred    Chief Complaint/Reason for visit: evaluation for possible weight loss surgery    Dear Ben Hamlin MD (General),    I had the pleasure of seeing your patient, Panda Miranda, to evaluate his obesity and consider him for possible weight loss surgery. As you know, Panda Miranda is 65 year old.  He has a height of 6' 5.5\", a weight of 483 lbs 3.2 oz, and calculated Body mass index is 56.56 kg/m .    Assessment & Plan   Problem List Items Addressed This Visit        Endocrine Diagnoses    Morbid obesity (H)    Relevant Orders    CBC with platelets (Completed)    Comprehensive metabolic panel (Completed)    Ferritin (Completed)    Lipid panel reflex to direct LDL Fasting (Completed)    Vitamin D Deficiency (Completed)    Vitamin B12 (Completed)    Vitamin A (Completed)    Parathyroid Hormone Intact (Completed)       Other    Status post bariatric surgery - Primary     PLAN:  Remote hx of open VBG with Dr Arizmendi with weight regain (still down 400 lbs from highest wt 880 lbs but has regained all weight loss with VBG). Maladaptive eating due to tight VBG ring.  Also Upper GI shows GG fistula    Start topiramate ramp to 75 mg if ok per nephrologist. Will work on medical weight management.  Follow up Dr Damico to discuss possible VBG ring removal via laparoscopic vs endoscopic for maladaptive eating/weight regain after VBG in person visit. (teeth lost, vomiting once a week, maladaptive eating for years, UGI  shows GG fistula)  Follow up [facility] pharmacist Lauren Bloch in 1 month phone visit to follow up topiramate start  Follow up Yamilex in 3 months return [facility] by video or in person on a Monday           Relevant Orders    CBC " with platelets (Completed)    Comprehensive metabolic panel (Completed)    Ferritin (Completed)    Lipid panel reflex to direct LDL Fasting (Completed)    Vitamin D Deficiency (Completed)    Vitamin B12 (Completed)    Vitamin A (Completed)    Parathyroid Hormone Intact (Completed)      Other Visit Diagnoses     Intestinal malabsorption, unspecified type        Relevant Orders    CBC with platelets (Completed)    Comprehensive metabolic panel (Completed)    Ferritin (Completed)    Lipid panel reflex to direct LDL Fasting (Completed)    Vitamin D Deficiency (Completed)    Vitamin B12 (Completed)    Vitamin A (Completed)    Parathyroid Hormone Intact (Completed)           HISTORY OF PRESENT ILLNESS:  Weight Loss History Reviewed with Patient 5/17/2021   How long have you been overweight? From Middle age and beyond   What is the most that you have ever weighed? 880   What is the most weight you have lost? 400   I have tried the following methods to lose weight Weight Loss Surgery   I have tried the following weight loss medications? (Check all that apply) None   Have you ever had weight loss surgery? Yes   Please select the type of weight loss surgery you had (select all that apply): vertical banded gastroplasty / VBG   Open VBG in 1996 Dr Arizmendi  880 lbs highest  Lost 400 lbs before doing VBG by walking 2-3 miles per day.   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  Maladaptive eating  Lost many teeth but still able to chew  Avoids steak due to VBG.  Can eat some ground beef and turkey and salami  Can eat some salads  Can't eat raw  kristi    Wants to re-establish care.  Having difficult with difficulty breathing due to weight regain.    No DM: A1C 5.5 one year ago  Gout  Taking iron oral once daily 325mg but not taking other vitamins.    No hx of kidney stones or glaucoma    TASHA: uses CPAP    CO-MORBIDITIES OF OBESITY INCLUDE:     5/17/2021   I have the following health issues associated with obesity: Heart Disease, High Blood Pressure, Sleep Apnea     Patient Active Problem List    Diagnosis     Morbid obesity (H)     Chronic venous hypertension (idiopathic) with ulcer of left lower extremity (H)     Urge incontinence of urine     Arthritis of knee     Malignant neoplasm of sigmoid colon (H)     Venous stasis     Non-rheumatic mitral regurgitation     Vitamin D deficiency     Atrial fibrillation (H)     Calcium pyrophosphate deposition disease     Diastolic congestive heart failure (H)     Essential hypertension     Stage 3 chronic kidney disease     Ventricular septal defect       PAST MEDICAL HISTORY:  No past medical history on file.  Afib: on warfarin  CKD stage 3   [facility]    [address] [place] [address] [phone]   Alfredo Coreas MD    [address] [place] [address] [phone] (Work)    [phone] (Fax)         PAST SURGICAL HISTORY:  VBG 1997 (open midline incision)  Knee surgery  Colorectal surgery colon polyps (low horizontal incision)    SOCIAL HISTORY:   Social History Questions Reviewed With Patient 5/17/2021   Which best describes your employment status (select all that apply) I am retired   If you work, what is your occupation? none   Which best describes your marital status:    Do you have children? Yes   Who do you have in your support network that can be available to help you for the first 2 weeks after surgery? wife   Who can you count on for support throughout your weight loss surgery journey? wife   Can you afford 3 meals a day?  Yes   Can you afford 50-60 dollars a  "month for vitamins? Yes       HABITS:     5/17/2021   How often do you drink alcohol? 2-3 times a week   If you do drink alcohol, how many drinks might you have in a day? (one drink = 5 oz. wine, 1 can/bottle of beer, 1 shot liquor) 1 or 2   Have you ever used any of the following nicotine products? No   Have you or are you currently using street drugs or prescription strength medication for which you do not have a prescription for? No   Do you have a history of chemical dependency (alcohol or drug abuse)? No     Currently taking narcotic/opioids No    PSYCHOLOGICAL HISTORY:   Psychological History Reviewed With Patient 5/17/2021   Have you ever attempted suicide? Never.   Have you had thoughts of suicide in the past year? No   Have you ever been hospitalized for mental illness or a suicide attempt? Never.   Do you have a history of chronic pain? Yes   Have you ever been diagnosed with fibromyalgia? No   Are you currently seeing a therapist or counselor?  No   Are you currently seeing a psychiatrist? No       ROS:     5/17/2021   Skin:  None of the above   HEENT: Teeth, dentures, or bridges needing repairs, None of these   If you answered yes to missing teeth, please indicate how many: 4   Musculoskeletal: Joint Pain, Back pain   Cardiovascular: Shortness of breath with activity   Pulmonary: Shortness of breath with activity   Gastrointestinal: None of the above   Genitourinary: None of the above   Hematological: None of the above   Neurological: None of the above       EATING BEHAVIORS:     5/17/2021   Have you or anyone else thought that you had an eating disorder? Yes   If you answered yes to the previous eating disorder question, select the types that apply from this list: Other   If you answered \"Other\" to the type of eating disorder question above, please describe what it is: eatting more than usual over eatting at times   Do you currently binge eat (eat a large amount of food in a short time)? No   Are you an " emotional eater? Yes   Do you get up to eat after falling asleep? No       EXERCISE:     5/17/2021   How often do you exercise? Never   What keeps you from being more active?  Pain, I have just had surgery on one or more of my joints, My ability to walk or move around is limited       MEDICATIONS:  Current Outpatient Medications   Medication Sig Dispense Refill     acetaminophen-codeine (TYLENOL W/CODEINE #3) 300-30 MG per tablet Take 1-2 tablets by mouth       amLODIPine (NORVASC) 5 MG tablet Take 5 mg by mouth       bumetanide (BUMEX) 1 MG tablet TAKE 3 TABLETS BY MOUTH TWICE DAILY AT 9AM AND 6PM       cholecalciferol (VITAMIN D-1000 MAX ST) 25 MCG (1000 UT) TABS Take 3,000 Units by mouth       colchicine (COLCYRS) 0.6 MG tablet TAKE 1 TABLET(0.6 MG) BY MOUTH DAILY       febuxostat (ULORIC) 80 MG TABS tablet Take 1 tablet by mouth       ferrous sulfate (FEROSUL) 325 (65 Fe) MG tablet Take 325 mg by mouth       lisinopril (ZESTRIL) 10 MG tablet TAKE 1 TABLET(10 MG) BY MOUTH DAILY       metoprolol tartrate (LOPRESSOR) 100 MG tablet Take 100 mg by mouth       warfarin ANTICOAGULANT (COUMADIN) 5 MG tablet Take 5 to 7.5mg (1 or 1.5 tabs) by mouth daily as directed.  Adjust dose based on INR.         ALLERGIES:  No Known Allergies    XR UPPER GI WO KUB WO AIR CONTRAST W DIGITAL RECORDING  2/10/2017 11:50 AM    INDICATION: s/p vertical banded gastroplasty, r/o gastrogastric fistula    COMPARISON: 1/26/2012.  TECHNIQUE: Upper GI exam with oral contrast.  1.7 minutes fluoroscopy time. 43 images saved (including spot fluoroscopic saves).    FINDINGS: Patient has a history of vertical banded gastroplasty. Oral contrast flows into the gastric pouch and then into the remaining stomach. No evidence of fistula or leak. Contrast flows freely through the esophagus and proximal small bowel. No   esophageal stricture or mass. Visualized small bowel normal in caliber and pattern. No gastroesophageal reflux during the time of the  "exam.   Other Result Information   Interface, Rad Results In - 02/10/2017 12:32 PM CST  XR UPPER GI WO KUB WO AIR CONTRAST W DIGITAL RECORDING  2/10/2017 11:50 AM    INDICATION: s/p vertical banded gastroplasty, r/o gastrogastric fistula    COMPARISON: 1/26/2012.  TECHNIQUE: Upper GI exam with oral contrast.  1.7 minutes fluoroscopy time. 43 images saved (including spot fluoroscopic saves).    FINDINGS: Patient has a history of vertical banded gastroplasty. Oral contrast flows into the gastric pouch and then into the remaining stomach. No evidence of fistula or leak. Contrast flows freely through the esophagus and proximal small bowel. No   esophageal stricture or mass. Visualized small bowel normal in caliber and pattern. No gastroesophageal reflux during the time of the exam.       PHYSICAL EXAM:  Objective      /61 (BP Location: Left arm, Patient Position: Chair, Cuff Size: Thigh)   Ht 1.969 m (6' 5.5\")   Wt (!) 219.2 kg (483 lb 3.2 oz)   BMI 56.56 kg/m    Body mass index is 56.56 kg/m .  Physical Exam   GENERAL: Healthy, alert and no distress  EYES: Eyes grossly normal to inspection.  No discharge or erythema, or obvious scleral/conjunctival abnormalities.  RESP: No audible wheeze, cough, or visible cyanosis.  No visible retractions or increased work of breathing.    SKIN: Visible skin clear. No significant rash, abnormal pigmentation or lesions.  NEURO: Cranial nerves grossly intact.  Mentation and speech appropriate for age.  PSYCH: Mentation appears normal, affect normal/bright, judgement and insight intact, normal speech and appearance well-groomed.        Sincerely,     Yamilex Trejo PA-C        "

## 2021-05-17 NOTE — LETTER
"2021       RE: Panda Miranda  [address]  [facility] [address]     Dear Colleague,    Thank you for referring your patient, Panda Miranda, to the [facility]. Please see a copy of my visit note below.    45 minutes spent on the date of the encounter doing chart review, history and exam, documentation and further activities per the note    New Bariatric Surgery Consultation Note    May 17, 2021    RE: Panda Miranda  MR#: [id number]  : 1955      Referring provider: Self referred    Chief Complaint/Reason for visit: evaluation for possible weight loss surgery    Dear Ben Hamlin MD (General),    I had the pleasure of seeing your patient, Panda Miranda, to evaluate his obesity and consider him for possible weight loss surgery. As you know, Panda Miranda is 65 year old.  He has a height of 6' 5.5\", a weight of 483 lbs 3.2 oz, and calculated Body mass index is 56.56 kg/m .    Assessment & Plan   Problem List Items Addressed This Visit        Endocrine Diagnoses    Morbid obesity (H)    Relevant Orders    CBC with platelets (Completed)    Comprehensive metabolic panel (Completed)    Ferritin (Completed)    Lipid panel reflex to direct LDL Fasting (Completed)    Vitamin D Deficiency (Completed)    Vitamin B12 (Completed)    Vitamin A (Completed)    Parathyroid Hormone Intact (Completed)       Other    Status post bariatric surgery - Primary     PLAN:  Remote hx of open VBG with Dr Arizmendi with weight regain (still down 400 lbs from highest wt 880 lbs but has regained all weight loss with VBG). Maladaptive eating due to tight VBG ring.  Also Upper GI shows GG fistula    Start topiramate ramp to 75 mg if ok per nephrologist. Will work on medical weight management.  Follow up Dr Damico to discuss possible VBG ring removal via laparoscopic vs endoscopic for maladaptive eating/weight " regain after VBG in person visit. (teeth lost, vomiting once a week, maladaptive eating for years, UGI 2017 shows GG fistula)  Follow up [facility] pharmacist Lauren Bloch in 1 month phone visit to follow up topiramate start  Follow up Yamilex in 3 months return [facility] by video or in person on a Monday           Relevant Orders    CBC with platelets (Completed)    Comprehensive metabolic panel (Completed)    Ferritin (Completed)    Lipid panel reflex to direct LDL Fasting (Completed)    Vitamin D Deficiency (Completed)    Vitamin B12 (Completed)    Vitamin A (Completed)    Parathyroid Hormone Intact (Completed)      Other Visit Diagnoses     Intestinal malabsorption, unspecified type        Relevant Orders    CBC with platelets (Completed)    Comprehensive metabolic panel (Completed)    Ferritin (Completed)    Lipid panel reflex to direct LDL Fasting (Completed)    Vitamin D Deficiency (Completed)    Vitamin B12 (Completed)    Vitamin A (Completed)    Parathyroid Hormone Intact (Completed)           HISTORY OF PRESENT ILLNESS:  Weight Loss History Reviewed with Patient 5/17/2021   How long have you been overweight? From Middle age and beyond   What is the most that you have ever weighed? 880   What is the most weight you have lost? 400   I have tried the following methods to lose weight Weight Loss Surgery   I have tried the following weight loss medications? (Check all that apply) None   Have you ever had weight loss surgery? Yes   Please select the type of weight loss surgery you had (select all that apply): vertical banded gastroplasty / VBG   Open VBG in 1996 Dr Arizmendi  880 lbs highest  Lost 400 lbs before doing VBG by walking 2-3 miles per day.   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  Maladaptive eating  Lost many teeth but still able to chew  Avoids steak due to VBG.  Can eat some ground beef and turkey and salami  Can eat some salads  Can't eat raw veggies    Wants to re-establish care.  Having difficult with difficulty breathing due to weight regain.    No DM: A1C 5.5 one year ago  Gout  Taking iron oral once daily 325mg but not taking other vitamins.    No hx of kidney stones or glaucoma    TASHA: uses CPAP    CO-MORBIDITIES OF OBESITY INCLUDE:     5/17/2021   I have the following health issues associated with obesity: Heart Disease, High Blood Pressure, Sleep Apnea     Patient Active Problem List    Diagnosis     Morbid obesity (H)     Chronic venous hypertension (idiopathic) with ulcer of left lower extremity (H)     Urge incontinence of urine     Arthritis of knee     Malignant neoplasm of sigmoid colon (H)     Venous stasis     Non-rheumatic mitral regurgitation     Vitamin D deficiency     Atrial fibrillation (H)     Calcium pyrophosphate deposition disease     Diastolic congestive heart failure (H)     Essential hypertension     Stage 3 chronic kidney disease     Ventricular septal defect       PAST MEDICAL HISTORY:  No past medical history on file.  Afib: on warfarin  CKD stage 3   [facility]    [address] [place] [address] [phone]   Alfredo Coreas MD    [address] [place] [address] [phone] (Work)    [phone] (Fax)         PAST SURGICAL HISTORY:  VBG 1997 (open midline incision)  Knee surgery  Colorectal surgery colon polyps (low horizontal incision)    SOCIAL HISTORY:   Social History Questions Reviewed With Patient 5/17/2021   Which best describes your employment status (select all that apply) I am retired   If you work, what is your occupation? none   Which best describes your  marital status:    Do you have children? Yes   Who do you have in your support network that can be available to help you for the first 2 weeks after surgery? wife   Who can you count on for support throughout your weight loss surgery journey? wife   Can you afford 3 meals a day?  Yes   Can you afford 50-60 dollars a month for vitamins? Yes       HABITS:     5/17/2021   How often do you drink alcohol? 2-3 times a week   If you do drink alcohol, how many drinks might you have in a day? (one drink = 5 oz. wine, 1 can/bottle of beer, 1 shot liquor) 1 or 2   Have you ever used any of the following nicotine products? No   Have you or are you currently using street drugs or prescription strength medication for which you do not have a prescription for? No   Do you have a history of chemical dependency (alcohol or drug abuse)? No     Currently taking narcotic/opioids No    PSYCHOLOGICAL HISTORY:   Psychological History Reviewed With Patient 5/17/2021   Have you ever attempted suicide? Never.   Have you had thoughts of suicide in the past year? No   Have you ever been hospitalized for mental illness or a suicide attempt? Never.   Do you have a history of chronic pain? Yes   Have you ever been diagnosed with fibromyalgia? No   Are you currently seeing a therapist or counselor?  No   Are you currently seeing a psychiatrist? No       ROS:     5/17/2021   Skin:  None of the above   HEENT: Teeth, dentures, or bridges needing repairs, None of these   If you answered yes to missing teeth, please indicate how many: 4   Musculoskeletal: Joint Pain, Back pain   Cardiovascular: Shortness of breath with activity   Pulmonary: Shortness of breath with activity   Gastrointestinal: None of the above   Genitourinary: None of the above   Hematological: None of the above   Neurological: None of the above       EATING BEHAVIORS:     5/17/2021   Have you or anyone else thought that you had an eating disorder? Yes   If you answered yes to  "the previous eating disorder question, select the types that apply from this list: Other   If you answered \"Other\" to the type of eating disorder question above, please describe what it is: eatting more than usual over eatting at times   Do you currently binge eat (eat a large amount of food in a short time)? No   Are you an emotional eater? Yes   Do you get up to eat after falling asleep? No       EXERCISE:     5/17/2021   How often do you exercise? Never   What keeps you from being more active?  Pain, I have just had surgery on one or more of my joints, My ability to walk or move around is limited       MEDICATIONS:  Current Outpatient Medications   Medication Sig Dispense Refill     acetaminophen-codeine (TYLENOL W/CODEINE #3) 300-30 MG per tablet Take 1-2 tablets by mouth       amLODIPine (NORVASC) 5 MG tablet Take 5 mg by mouth       bumetanide (BUMEX) 1 MG tablet TAKE 3 TABLETS BY MOUTH TWICE DAILY AT 9AM AND 6PM       cholecalciferol (VITAMIN D-1000 MAX ST) 25 MCG (1000 UT) TABS Take 3,000 Units by mouth       colchicine (COLCYRS) 0.6 MG tablet TAKE 1 TABLET(0.6 MG) BY MOUTH DAILY       febuxostat (ULORIC) 80 MG TABS tablet Take 1 tablet by mouth       ferrous sulfate (FEROSUL) 325 (65 Fe) MG tablet Take 325 mg by mouth       lisinopril (ZESTRIL) 10 MG tablet TAKE 1 TABLET(10 MG) BY MOUTH DAILY       metoprolol tartrate (LOPRESSOR) 100 MG tablet Take 100 mg by mouth       warfarin ANTICOAGULANT (COUMADIN) 5 MG tablet Take 5 to 7.5mg (1 or 1.5 tabs) by mouth daily as directed.  Adjust dose based on INR.         ALLERGIES:  No Known Allergies    XR UPPER GI WO KUB WO AIR CONTRAST W DIGITAL RECORDING  2/10/2017 11:50 AM    INDICATION: s/p vertical banded gastroplasty, r/o gastrogastric fistula    COMPARISON: 1/26/2012.  TECHNIQUE: Upper GI exam with oral contrast.  1.7 minutes fluoroscopy time. 43 images saved (including spot fluoroscopic saves).    FINDINGS: Patient has a history of vertical banded " "gastroplasty. Oral contrast flows into the gastric pouch and then into the remaining stomach. No evidence of fistula or leak. Contrast flows freely through the esophagus and proximal small bowel. No   esophageal stricture or mass. Visualized small bowel normal in caliber and pattern. No gastroesophageal reflux during the time of the exam.   Other Result Information   Interface, Rad Results In - 02/10/2017 12:32 PM CST  XR UPPER GI WO KUB WO AIR CONTRAST W DIGITAL RECORDING  2/10/2017 11:50 AM    INDICATION: s/p vertical banded gastroplasty, r/o gastrogastric fistula    COMPARISON: 1/26/2012.  TECHNIQUE: Upper GI exam with oral contrast.  1.7 minutes fluoroscopy time. 43 images saved (including spot fluoroscopic saves).    FINDINGS: Patient has a history of vertical banded gastroplasty. Oral contrast flows into the gastric pouch and then into the remaining stomach. No evidence of fistula or leak. Contrast flows freely through the esophagus and proximal small bowel. No   esophageal stricture or mass. Visualized small bowel normal in caliber and pattern. No gastroesophageal reflux during the time of the exam.       PHYSICAL EXAM:  Objective      /61 (BP Location: Left arm, Patient Position: Chair, Cuff Size: Thigh)   Ht 1.969 m (6' 5.5\")   Wt (!) 219.2 kg (483 lb 3.2 oz)   BMI 56.56 kg/m    Body mass index is 56.56 kg/m .  Physical Exam   GENERAL: Healthy, alert and no distress  EYES: Eyes grossly normal to inspection.  No discharge or erythema, or obvious scleral/conjunctival abnormalities.  RESP: No audible wheeze, cough, or visible cyanosis.  No visible retractions or increased work of breathing.    SKIN: Visible skin clear. No significant rash, abnormal pigmentation or lesions.  NEURO: Cranial nerves grossly intact.  Mentation and speech appropriate for age.  PSYCH: Mentation appears normal, affect normal/bright, judgement and insight intact, normal speech and appearance well-groomed.    Sincerely, "     Yamilex Trejo PA-C

## 2021-05-17 NOTE — PATIENT INSTRUCTIONS
Start topiramate ramp to 75 mg if ok per nephrologist. Will work on medical weight management.  Follow up Dr Damico to discuss possible VBG ring removal via laparoscopic vs endoscopic for maladaptive eating/weight regain after VBG in person visit. (teeth lost, vomiting once a week, maladaptive eating for years, UGI 2017 shows GG fistula)  Follow up [facility] pharmacist Lauren Bloch in 1 month phone visit to follow up topiramate start  Follow up Yamilex in 3 months return [facility] by video or in person on a Monday  Follow up RD visit soon      MEDICATION STARTED AT THIS APPOINTMENT  We are starting topiramate at bedtime.  Start one tab, 25 mg, for a week. Go up to 50 mg (2 tabs) for the next week. At the third week, take  3 tabs (75 mg).  Stay at 3 tabs until you are seen again.     For any questions/concerns contact Stella Ward LPN at [phone]    (Do not stop taking it if you don't think it's working. For some people it works even though they do not feel much different.)    Topiramate (Topamax) is a medication that is used most often to treat migraine headaches or for seizures. It has also been found to help with weight loss. Although it's not currently FDA approved for weight loss, it has been used safely for a number of years to help people who are carrying extra weight.     Just how topiramate helps with weight loss has not been exactly determined. However it seems to work on areas of the brain to quiet down signals related to eating.      Topiramate may make you:    >feel less interest in eating in between meals   >think less about food and eating   >find it easier to push the plate away   >find giving up pop easier    >have an easier time eating less    For some of our patients, the pills work right away. They feel and think quite differently about food. Other patients don't feel much of a change but find in fact they have lost weight! Like all weight loss medications, topiramate works best when you help it work.   This means:    1) Have less tempting high calorie (fattening) food around the house or office    2) Have lower calorie food (fruits, vegetables,low fat meats and dairy) for snacks    3) Eat out only one time or less each week.   4) Eat your meals at a table with the TV or computer off.    Side-effects. Topiramate is generally well tolerated. The main side-effects we see are:   Tingling in hands,feet, or face (usually not very troublesome)   Mental confusion and word finding trouble (about 10% of patients have this.)     Feeling sleepy or a bit dopey- this goes away very soon after starting.    One of the dangers of topiramate is the possibility of birth defects--if you get pregnant when you are on it, there is the risk that your baby will be born with a cleft lip or palate.  If you are on topiramate and of child bearing age, you need to be on a reliable form of birth control or refrain from sexual intercourse.     Please refer to the pharmacy insert for more information on side-effects. Since many pharmacists are not familiar with the use of topiramate in weight loss, calling the clinic will get you the most accurate information on the use of this medication for weight loss.     In order to get refills of this or any medication we prescribe you must be seen in the medical weight mgmt clinic every 2-3 months. Please have your pharmacy fax a refill request to [phone].

## 2021-05-18 ENCOUNTER — RECORDS - HEALTHEAST (OUTPATIENT)
Dept: ADMINISTRATIVE | Facility: OTHER | Age: 66
End: 2021-05-18

## 2021-05-18 ENCOUNTER — COMMUNICATION - HEALTHEAST (OUTPATIENT)
Dept: VASCULAR SURGERY | Facility: CLINIC | Age: 66
End: 2021-05-18

## 2021-05-18 ENCOUNTER — TELEPHONE (OUTPATIENT)
Dept: ENDOCRINOLOGY | Facility: CLINIC | Age: 66
End: 2021-05-18

## 2021-05-18 LAB — DEPRECATED CALCIDIOL+CALCIFEROL SERPL-MC: 21 UG/L (ref 20–75)

## 2021-05-19 LAB
ANNOTATION COMMENT IMP: NORMAL
RETINYL PALMITATE SERPL-MCNC: <0.02 MG/L (ref 0–0.1)
VIT A SERPL-MCNC: 0.32 MG/L (ref 0.3–1.2)

## 2021-05-21 NOTE — TELEPHONE ENCOUNTER
Instructed to contact patient's nephrologis for approval to start on Topiramate.    Phone call to:     [facility]    [address] [place] [address] [phone]  Alfredo Coreas MD    [address] [place] [address] [phone] (Work)    [phone] (Fax)      Notified by office that patient never showed up for appointment so he is not an established patient.  Yamilex Trejo PA-C notified.

## 2021-05-24 ENCOUNTER — OFFICE VISIT - HEALTHEAST (OUTPATIENT)
Dept: VASCULAR SURGERY | Facility: CLINIC | Age: 66
End: 2021-05-24

## 2021-05-24 DIAGNOSIS — N18.32 STAGE 3B CHRONIC KIDNEY DISEASE (H): ICD-10-CM

## 2021-05-24 DIAGNOSIS — I50.32 CHRONIC DIASTOLIC HEART FAILURE (H): ICD-10-CM

## 2021-05-24 DIAGNOSIS — I87.2 VENOUS STASIS ULCER OF OTHER PART OF RIGHT LOWER LEG WITH FAT LAYER EXPOSED WITHOUT VARICOSE VEINS (H): ICD-10-CM

## 2021-05-24 DIAGNOSIS — E66.01 MORBID OBESITY WITH BMI OF 50.0-59.9, ADULT (H): ICD-10-CM

## 2021-05-24 DIAGNOSIS — E55.9 VITAMIN D DEFICIENCY: ICD-10-CM

## 2021-05-24 DIAGNOSIS — I83.029 VENOUS STASIS ULCER OF LEFT LOWER LEG WITH EDEMA OF LEFT LOWER LEG (H): ICD-10-CM

## 2021-05-24 DIAGNOSIS — L97.929 VENOUS STASIS ULCER OF LEFT LOWER LEG WITH EDEMA OF LEFT LOWER LEG (H): ICD-10-CM

## 2021-05-24 DIAGNOSIS — L97.812 VENOUS STASIS ULCER OF OTHER PART OF RIGHT LOWER LEG WITH FAT LAYER EXPOSED WITHOUT VARICOSE VEINS (H): ICD-10-CM

## 2021-05-24 DIAGNOSIS — I87.8 VENOUS STASIS: ICD-10-CM

## 2021-05-24 DIAGNOSIS — I83.892 VENOUS STASIS ULCER OF LEFT LOWER LEG WITH EDEMA OF LEFT LOWER LEG (H): ICD-10-CM

## 2021-05-24 DIAGNOSIS — R60.9 DEPENDENT EDEMA: ICD-10-CM

## 2021-05-24 DIAGNOSIS — R60.0 VENOUS STASIS ULCER OF LEFT LOWER LEG WITH EDEMA OF LEFT LOWER LEG (H): ICD-10-CM

## 2021-05-24 DIAGNOSIS — I87.303 VENOUS HYPERTENSION OF BOTH LOWER EXTREMITIES: ICD-10-CM

## 2021-05-26 ENCOUNTER — OFFICE VISIT - HEALTHEAST (OUTPATIENT)
Dept: FAMILY MEDICINE | Facility: CLINIC | Age: 66
End: 2021-05-26

## 2021-05-26 DIAGNOSIS — R06.09 DOE (DYSPNEA ON EXERTION): ICD-10-CM

## 2021-05-26 DIAGNOSIS — I34.0 NON-RHEUMATIC MITRAL REGURGITATION: ICD-10-CM

## 2021-05-26 DIAGNOSIS — E66.9 OBESITY (BMI 35.0-39.9 WITHOUT COMORBIDITY): ICD-10-CM

## 2021-05-26 DIAGNOSIS — C18.7 MALIGNANT NEOPLASM OF SIGMOID COLON (H): ICD-10-CM

## 2021-05-26 DIAGNOSIS — I50.32 CHRONIC DIASTOLIC HEART FAILURE (H): ICD-10-CM

## 2021-05-26 DIAGNOSIS — Q21.0 VENTRICULAR SEPTAL DEFECT: ICD-10-CM

## 2021-05-26 DIAGNOSIS — N18.32 STAGE 3B CHRONIC KIDNEY DISEASE (H): ICD-10-CM

## 2021-05-26 DIAGNOSIS — N25.81 SECONDARY RENAL HYPERPARATHYROIDISM (H): ICD-10-CM

## 2021-05-27 VITALS
RESPIRATION RATE: 18 BRPM | TEMPERATURE: 97.6 F | HEART RATE: 78 BPM | DIASTOLIC BLOOD PRESSURE: 78 MMHG | SYSTOLIC BLOOD PRESSURE: 124 MMHG

## 2021-05-27 VITALS
TEMPERATURE: 98 F | DIASTOLIC BLOOD PRESSURE: 78 MMHG | HEART RATE: 80 BPM | RESPIRATION RATE: 18 BRPM | SYSTOLIC BLOOD PRESSURE: 132 MMHG

## 2021-05-27 VITALS
TEMPERATURE: 99.2 F | SYSTOLIC BLOOD PRESSURE: 128 MMHG | DIASTOLIC BLOOD PRESSURE: 78 MMHG | HEART RATE: 76 BPM | RESPIRATION RATE: 20 BRPM

## 2021-05-27 NOTE — TELEPHONE ENCOUNTER
Central PA team  [phone]  Pool: YAMILETH PA MED ([id number])          PA has been initiated.       PA form completed and faxed insurance via Cover My Meds     Key:  [id number]     Medication: ULORIC 80 mg Tab    Insurance:  [employer] Part D        Response will be received via fax and may take up to 5-10 business days depending on plan         unknown

## 2021-05-27 NOTE — TELEPHONE ENCOUNTER
Pt calling  [phone]    Pt states he uses a CPAP  Sometimes he gets a scratchy/irritable sore throat that he believes is due to his CPAP use.  Sx began yesterday.  Denies any other Sx.  Pt states he was having the same Sx when he saw Dr Herring in clinic on 1/10/19 - Dr Herring prescribed him an abx ( Z-Marco) which cleared up his Sx.  Pt is requesting to have a Z-Marco called into his pharmacy without him having to come into clinic.    PLAN  Writer advised Pt he should schedule an appt.  Pt declined and would like a message sent to Dr Herring requesting an abx for his scratchy/irritable throat.  Joanna Maya, RN, [facility] RN Triage/Med Refills      Reason for Disposition    [1] Sore throat is the only symptom AND [2] sore throat present < 48 hours    Protocols used: SORE THROAT-A-

## 2021-05-27 NOTE — TELEPHONE ENCOUNTER
"Called pt and left a message to call clinic back. Called Home [phone], mobile number on file is invalid. Please get up valid mobile number.  Pt has a package that was delivered to the Clinic. The package consist of Nasal mask.  Pt can pick package up from front the desk.  \"okay to relay message\"  "

## 2021-05-27 NOTE — PROGRESS NOTES
Assessment/ Plan  I spent over 45 minutes spent with patient discussing multiple issues below, greater than 50% of this counseling  Problem List Items Addressed This Visit        High    Essential hypertension - Primary     Well-controlled on metoprolol 150, amlodipine 5 and lisinopril 10         Relevant Orders    Comprehensive Metabolic Panel    LDL Cholesterol, Direct    Obesity (BMI 35.0-39.9 without comorbidity)    Relevant Orders    Glycosylated Hemoglobin A1c (Completed)    Atrial fibrillation (H)     Strategy is rate control/ anticoagulation  Anticoagulation? Yes/ medication for stroke prevention: Warfarin  INR ordered today? Yes   Lab Results   Component Value Date    INR 2.00 (H) 04/16/2019    INR 2.50 (H) 03/13/2019    INR 2.20 (H) 01/16/2019        Chronotropic med? Yes: Metoprolol 150 mg daily  Pulse today:  Pulse Readings from Last 3 Encounters:   04/16/19 64   02/18/19 69   01/10/19 66     On warfarin, INR ordered         Relevant Orders    INR (Completed)    Ambulatory referral to Cardiology    Diastolic Congestive Heart Failure     Diastolic Congestive Heart Failure iswell compensated  Wt Readings from Last 3 Encounters:   04/16/19 (!) 476 lb (215.9 kg)   02/18/19 (!) 490 lb (222.3 kg)   01/10/19 (!) 480 lb (217.7 kg)     BP Readings from Last 3 Encounters:   04/16/19 127/73   02/18/19 125/65   01/10/19 118/72       Meds:  Antihypertensives: Metoprolol 150, amlodipine 5, lisinopril 10 mg  Diuretic?  Yes/Bumex, patient reports he takes 1 mg daily  Nitrites/other? No    Changes/Recommendations: Needs to follow-up with cardiology, CMP ordered             Relevant Orders    Comprehensive Metabolic Panel    Ambulatory referral to Cardiology    Chronic Kidney Disease, Stage 3     Condition followed by: Dr. Coreas    Next visit (approx): He is due for a visit, last visit was about a year ago, Dr. Coreas wish to see him in 9 months  Comment: none referral made, on lisinopril, hemoglobin, CMP checked            Relevant Orders    Comprehensive Metabolic Panel    Vitamin D, Total (25-Hydroxy)    Microalbumin, Random Urine    Ambulatory referral to Nephrology    Gout     No recent attacks, uric acid ordered today  You [place] and colchicine         Relevant Orders    Uric Acid    HM2(CBC w/o Differential) (Completed)       Medium    Osteoarthritis Of The Knee     Ongoing severe pain, orthopedics is told me he would need to lose a significant amount of weight before surgery            Unprioritized    Lower Back Pain     Chronic pain, requesting a few Tylenol 3 for severe pain which I prescribed         Hyperglycemia      A1c ordered today  Encouraged healthy eating and exercise, have pushed for weight loss surgery redo         Relevant Orders    Glycosylated Hemoglobin A1c (Completed)    Venous stasis     Severe, lower extremities, with occasional ulcers that bleed.  Had one bleeding this morning, small, pinpoint, reports these usually heal  Discussed importance of elevation and compression, missed placed Jobst stockings, reordered today         Plantar fasciitis     On ball of the foot as well as the heel, recommend supportive foot wear which he has been using and is already helpful.  Discussed possibility of prescribing orthotics         Malignant neoplasm of sigmoid colon (H)     Needs follow-up colonoscopy from Dr. Magaña, Dr. Magaña is actually called him to get him in.  Referral made         Relevant Orders    Ambulatory referral to Colorectal Surgery      Other Visit Diagnoses     Osteoarthritis        Relevant Medications    acetaminophen-codeine (TYLENOL #3) 300-30 mg per tablet        Subjective  CC:  Chief Complaint   Patient presents with     medication review     Sore     on both legs     HPI:  Upper Respiratory infection  Duration 4day(s)      Worst Symptoms: runny nose, congestion, cough and sore throat  Runny nose?  Yes  Sore throat?  Yes: scratchy  Cough/ productive or dry?  Yes: Productive  Fever?   No  HA/ achiness?  No  Symptoms at start of illness : runny nose, congestion and cough  Any medications?  No          Sores on legs opening up occasionally  Not a lot of swelling   Diastolic CHF - Stable. EF 60% on echo 2017 with no/mild MR and left to right shunt across the small perimembranous VSD. His physical exam is not fluid overloaded, but does show some persistent lower extremity edema due to venous insufficiency. Will continue BB, ACE-I, and bumex and have re-encouraged low salt/sodium diet. He should wear his knee high compression socks as well.       Right ventricular dilation and dysfunction - I am asking him to f/u with Dr. Ireland regarding his sleep apnea to make sure things are as optimized as possible from a sleep apnea standpoint. There is septal flattening on his most recent echo but the VSD shunt remains left to right.     Intermittent MR - dependent on fluid status. Last echo showed no MR.       Atrial Fibrillation - His heart rate seems to be well controlled on metoprolol. Last Holter in 2013 did show some pauses when likely sleeping due to sleep apnea so his BB has not been increased. He is compliant with his coumadin.       Ventricular ectopy - metoprolol       Small ednise-membranous VSD - stable on last echo      F/U 1 year       Atrial fibrillation      Symptomatic: No       CHADS2 : 2  Anticoagulation: Yes    Follows with cardiology?  Yes: Dr. Arvizu due to see        Reviewed Dr. Magaña's note dated 9/10/2018, history of malignant neoplasm sigmoid colon, small polyp, recommended follow-up colonoscopy.  Follow-up CHF  diastolic    Current medications: bumex 1 metop 150, lis 10  Patient reports Good control of symtoms    Baseline symptoms class II  ____________________  NYHA Stages:  Symptomatic at ...  I- unusual activities  II-usual activities  III-self care  IV- at rest  ____________________    Wt Readings from Last 3 Encounters:   02/18/19 (!) 490 lb (222.3 kg)   01/10/19 (!) 480 lb  (217.7 kg)   09/17/18 (!) 471 lb 4 oz (213.8 kg)       Is patient monitoring weight?  Yes    Results for orders placed or performed in visit on 10/20/14   BASIC METABOLIC PANEL   Result Value Ref Range    Sodium 140 136 - 145 mmol/L    Potassium 4.9 3.5 - 5.0 mmol/L    Chloride 105 98 - 107 mmol/L    CO2 23 22 - 31 mmol/L    Anion Gap, Calculation 12 5 - 18 mmol/L    Glucose 98 70 - 125 mg/dL    Calcium 9.5 8.5 - 10.5 mg/dL    BUN 35 (H) 8 - 22 mg/dL    Creatinine 2.03 (H) 0.70 - 1.30 mg/dL    GFR MDRD Af Amer 41 (L) >60 mL/min/1.73m2    GFR MDRD Non Af Amer 34 (L) >60 mL/min/1.73m2     Lab Results   Component Value Date     (H) 10/20/2014       Chronic Kidney Disease    GFR 3Stage a  3a GFR 45-59  3b GFR 30-44  4    GFR 15-29  5    GFR < 15  Results for orders placed or performed in visit on 10/20/14   BASIC METABOLIC PANEL   Result Value Ref Range    Sodium 140 136 - 145 mmol/L    Potassium 4.9 3.5 - 5.0 mmol/L    Chloride 105 98 - 107 mmol/L    CO2 23 22 - 31 mmol/L    Anion Gap, Calculation 12 5 - 18 mmol/L    Glucose 98 70 - 125 mg/dL    Calcium 9.5 8.5 - 10.5 mg/dL    BUN 35 (H) 8 - 22 mg/dL    Creatinine 2.03 (H) 0.70 - 1.30 mg/dL    GFR MDRD Af Amer 41 (L) >60 mL/min/1.73m2    GFR MDRD Non Af Amer 34 (L) >60 mL/min/1.73m2       Meds  Ace ARB?  Lisinopril 10  Statin? No, not currently  Vitamin D?  No  Off of NSAIDs, reduced dose allopurinol, off bisphosphonate (GFR<30)  BP Readings from Last 3 Encounters:   02/18/19 125/65   01/10/19 118/72   09/17/18 128/80       Follow Up Gout  Narrative: Suzette omitted pseudogout and gout no recent flares    Medications:   PRN/Flares: colchicine  Prevention: Uloric 80  Comment of dietary efforts/ alcohol intake: Trying to eat better    Lab Results   Component Value Date    URICACID 6.6 04/19/2018     Lab Results   Component Value Date    CREATININE 2.06 (H) 04/19/2018     Lab Results   Component Value Date    WBC 6.1 04/19/2018    HGB 12.6 (L) 04/19/2018    HCT 40.1  04/19/2018    MCV 85 04/19/2018     (L) 04/19/2018     Lab Results   Component Value Date    ALT 20 04/19/2018       Comment: Uric acid ordered today  PFSH:  Patient Active Problem List   Diagnosis     Lower Back Pain     Osteoarthritis Of The Knee     Hematuria     Glucose Intolerance     Essential hypertension     Anemia     Ventricular Septal Defect     Obesity (BMI 35.0-39.9 without comorbidity)     Obstructive Sleep Apnea     Pseudogout     Atrial fibrillation (H)     Diastolic Congestive Heart Failure     Chronic Kidney Disease, Stage 3     Onychomycosis     Gout     Vitamin D deficiency     Health care maintenance     Compliance with medication regimen     Non-rheumatic mitral regurgitation     Cor pulmonale (H)     Obesity     Hyperglycemia      Venous stasis     Plantar fasciitis     Malignant neoplasm of sigmoid colon (H)     Current medications reviewed as follows:  Current Outpatient Medications on File Prior to Visit   Medication Sig     amLODIPine (NORVASC) 5 MG tablet Take 5 mg by mouth.     bumetanide (BUMEX) 1 MG tablet TAKE 3 TABLETS BY MOUTH TWICE DAILY AT 9AM AND 6PM     cholecalciferol, vitamin D3, (CHOLECALCIFEROL) 1,000 unit tablet Take 3 tablets (3,000 Units total) by mouth daily.     COLCRYS 0.6 mg tablet TAKE 1 TABLET(0.6 MG) BY MOUTH DAILY     ferrous sulfate 325 (65 FE) MG tablet TAKE 1 TABLET BY MOUTH ONCE DAILY WITH BREAKFAST     lisinopril (PRINIVIL,ZESTRIL) 10 MG tablet Take 1 tablet (10 mg total) by mouth daily.     metoprolol tartrate (LOPRESSOR) 50 MG tablet TAKE 3 TABLETS BY MOUTH DAILY     ULORIC 80 mg Tab TAKE 1 TABLET BY MOUTH DAILY     warfarin (COUMADIN) 5 MG tablet TAKE 1 AND 1/2 TABLETS BY MOUTH EVERY DAY     [DISCONTINUED] acetaminophen-codeine (TYLENOL #3) 300-30 mg per tablet Take 1-2 tablets by mouth every 6 (six) hours as needed for pain.     aspirin 81 MG EC tablet Take 81 mg by mouth.     cyclobenzaprine (FLEXERIL) 10 MG tablet TK 1 T PO 30 MINUTES BEFORE  BEDTIME     lidocaine 4 % patch Place 1 patch on the skin daily. Remove and discard patch with 12 hours or as directed by MD.     metoprolol tartrate (LOPRESSOR) 50 MG tablet TAKE 1 TABLET BY MOUTH DAILY.     oxyCODONE (ROXICODONE) 5 MG immediate release tablet Take 1 tablet (5 mg total) by mouth every 4 (four) hours as needed for pain.     senna (SENNA) 8.6 mg tablet Take 8.6 mg by mouth daily as needed.      warfarin (COUMADIN/JANTOVEN) 5 MG tablet TAKE ONE TO ONE AND ONE-HALF TABLET BY MOUTH DAILY AS DIRECTED BY CLINIC     [DISCONTINUED] acetaminophen-codeine (TYLENOL #3) 300-30 mg per tablet TAKE 1 TO 2 TABLETS BY MOUTH EVERY 4 HOURS AS NEEDED FOR PAIN     No current facility-administered medications on file prior to visit.      Social History     Tobacco Use   Smoking Status Never Smoker   Smokeless Tobacco Never Used     Social History     Social History Narrative     Not on file     Patient Care Team:  Ben Hamlin MD as PCP - General  ROS  Full 10 system review including constitutional, respiratory, cardiac, gi, urinary, rheumatologic, neurologic, reproductive, dermatologic psychiatric is  performed (via questionnaire) and is negative except back pain, joint pain        Objective  Physical Exam  Vitals:    04/16/19 1133   BP: 127/73   Patient Site: Left Arm   Patient Position: Sitting   Cuff Size: Adult Large   Pulse: 64   Resp: 16   Weight: (!) 476 lb (215.9 kg)     Body mass index is 55.01 kg/m .  Gen- alert, oriented/ appropriately responsive  Morbidly obese  HEENT- normal cephalic, atraumatic.   Chest- Normal inspiration and expiration.    Clear to ascultation.    No chest wall deformity or scar.  CV- Heart regular rate and rhythm  normal tones, no murmurs   No gallops or rubs.  Ext- appear well perfused, no edema  Skin-pigmentation, small diameter full-thickness ulcers on both legs, 2+ edema both sides  no visualized rash    Diagnostics:   Results for orders placed or performed in visit on  04/16/19   Glycosylated Hemoglobin A1c   Result Value Ref Range    Hemoglobin A1c 6.0 3.5 - 6.0 %   INR   Result Value Ref Range    INR 2.00 (H) 0.90 - 1.10   HM2(CBC w/o Differential)   Result Value Ref Range    WBC 4.8 4.0 - 11.0 thou/uL    RBC 4.70 4.40 - 6.20 mill/uL    Hemoglobin 12.5 (L) 14.0 - 18.0 g/dL    Hematocrit 37.5 (L) 40.0 - 54.0 %    MCV 80 80 - 100 fL    MCH 26.6 (L) 27.0 - 34.0 pg    MCHC 33.4 32.0 - 36.0 g/dL    RDW 13.7 11.0 - 14.5 %    Platelets 135 (L) 140 - 440 thou/uL    MPV 8.8 7.0 - 10.0 fL           Please note: Voice recognition software was used in this dictation.  It may therefore contain typographical errors.

## 2021-05-27 NOTE — TELEPHONE ENCOUNTER
Prior Authorization Request  Who s requesting:  [facility]  Pharm[facility] and Location[facility]  Medication Name:   ULORIC 80 mg Tab 90 tablet 0 2/4/2019     Sig: TAKE 1 TABLET BY MOUTH DAILY    Sent to pharmacy as: ULORIC 80 mg Tab        Insurance Plan: Medicare  Insurance Member ID Number:  [id number]  Informed patient that prior authorizations can take up to 10 business days for response:   No  Okay to leave a detailed message: No

## 2021-05-28 NOTE — PROGRESS NOTES
Assessment/ Plan  Problem List Items Addressed This Visit        Unprioritized    Venous stasis     With venous stasis ulcers, patient has been instructed on elevating and wrapping, on Bumex in the past.  Question adherence.  Given foot ulcers coexisting, referral to [facility] vascular         Relevant Orders    Ambulatory referral to Vascular Medicine    Arthritis of knee - Primary     Acute inflammatory arthritis left lower extremity, suspect gout or pseudogout, rule out joint infection    Knee Injection Procedure Note    Pre-operative Diagnosis: left,  Knee osteoarthritis    Post-operative Diagnosis: same    Indications: pain      Procedure Details   Verbal consent was obtained for procedure.  Area was prepped with Betadine landmarks were palpated and 27-gauge 1-1/4 needle advanced from medial approach, 4 cc of's of lidocaine injecte  19-gauge needle then advanced into the joint space d aspirated about 20 cc of straw-colored slightly cloudy fluid.  Then medications injected in typical fashion.  Medications injected:  Depo-medrol 80 mg/1 cc  Lidocaine 1 cc    Complications:  None; patient tolerated the procedure well.         Relevant Medications    lidocaine 10 mg/mL (1 %) injection 1 mL (Completed)    methylPREDNISolone acetate injection 80 mg (DEPO-MEDROL) (Completed)    oxyCODONE-acetaminophen (PERCOCET) 5-325 mg per tablet    Other Relevant Orders    Joint Fluid Analysis Panel(IP Only)    Joint Fluid Exam    Glucose, Body Fluid    Protein, Body Fluid    Culture/Gram Stain: Body Fluid    Skin ulcer of left foot, limited to breakdown of skin (H)     Full-thickness on end of the toe, ulcerated callus, distal end of left fifth metatarsal.  Extensively pared down today.  Discussed foot care, referral to [facility] vascular/wound care             Subjective  CC:  Chief Complaint   Patient presents with     Knee Pain     left knee     HPI:  L Knee Pain    Duration/ timing of onset: 4 days  Location of pain post  lateral    Severity/ Quality: sharp  Modifying factors: Make it worse- moving it bearing wt   Make it better- sitting  History of knee problems/injury or previous work-up/ treatment? Has gone   Clicking or popping? no  Swelling? yes  Patient has had a little bit of a headache lately, no overt fevers.        L fFoot pain    Duration/ timing of onset: last week  Location of pain foot is somewhat numb but describes this in the hindfoot, plantar surface.  Patient had some discharge in the bathtub/shower earlier, concerned about infection  Severity/ Quality: Vague pain given numbness    Swelling?  Yes, and patient has had vascular ulcers in his lower extremities, elated the swelling before and has an open one on inside of his left lower extremity    PFSH:  Patient Active Problem List   Diagnosis     Lower Back Pain     Osteoarthritis Of The Knee     Hematuria     Glucose Intolerance     Essential hypertension     Anemia     Ventricular Septal Defect     Obesity (BMI 35.0-39.9 without comorbidity)     Obstructive Sleep Apnea     Pseudogout     Atrial fibrillation (H)     Diastolic Congestive Heart Failure     Chronic Kidney Disease, Stage 3     Onychomycosis     Gout     Vitamin D deficiency     Health care maintenance     Compliance with medication regimen     Non-rheumatic mitral regurgitation     Cor pulmonale (H)     Obesity     Hyperglycemia      Venous stasis     Plantar fasciitis     Malignant neoplasm of sigmoid colon (H)     Arthritis of knee     Skin ulcer of left foot, limited to breakdown of skin (H)     Current medications reviewed as follows:  Current Outpatient Medications on File Prior to Visit   Medication Sig     acetaminophen-codeine (TYLENOL #3) 300-30 mg per tablet Take 1-2 tablets by mouth every 6 (six) hours as needed for pain (maximum 8 pills/ day).     amLODIPine (NORVASC) 5 MG tablet Take 5 mg by mouth.     aspirin 81 MG EC tablet Take 81 mg by mouth.     bumetanide (BUMEX) 1 MG tablet TAKE 3  TABLETS BY MOUTH TWICE DAILY AT 9AM AND 6PM     cholecalciferol, vitamin D3, (CHOLECALCIFEROL) 1,000 unit tablet Take 3 tablets (3,000 Units total) by mouth daily.     COLCRYS 0.6 mg tablet TAKE 1 TABLET(0.6 MG) BY MOUTH DAILY     cyclobenzaprine (FLEXERIL) 10 MG tablet TK 1 T PO 30 MINUTES BEFORE BEDTIME     ferrous sulfate 325 (65 FE) MG tablet TAKE 1 TABLET BY MOUTH ONCE DAILY WITH BREAKFAST     lidocaine 4 % patch Place 1 patch on the skin daily. Remove and discard patch with 12 hours or as directed by MD.     lisinopril (PRINIVIL,ZESTRIL) 10 MG tablet Take 1 tablet (10 mg total) by mouth daily.     metoprolol tartrate (LOPRESSOR) 50 MG tablet TAKE 3 TABLETS BY MOUTH DAILY     metoprolol tartrate (LOPRESSOR) 50 MG tablet TAKE 1 TABLET BY MOUTH DAILY.     oxyCODONE (ROXICODONE) 5 MG immediate release tablet Take 1 tablet (5 mg total) by mouth every 4 (four) hours as needed for pain.     senna (SENNA) 8.6 mg tablet Take 8.6 mg by mouth daily as needed.      ULORIC 80 mg Tab TAKE 1 TABLET BY MOUTH DAILY     warfarin (COUMADIN) 5 MG tablet TAKE 1 AND 1/2 TABLETS BY MOUTH EVERY DAY     No current facility-administered medications on file prior to visit.      Social History     Tobacco Use   Smoking Status Never Smoker   Smokeless Tobacco Never Used     Social History     Social History Narrative     Not on file     Patient Care Team:  Ben Hamlin MD as PCP - General  ROS  Full 10 system review including constitutional, respiratory, cardiac, gi, urinary, rheumatologic, neurologic, reproductive, dermatologic psychiatric is  performed  and is otherwise negative         Objective  Physical Exam  Vitals:    05/03/19 1525 05/03/19 1646   BP: (!) 136/99 138/84   Patient Site: Left Arm Left Arm   Patient Position: Sitting Sitting   Cuff Size: Adult Large Adult Large   Pulse: 83    Resp: 18    Temp: 98.5  F (36.9  C)    TempSrc: Oral      There is no height or weight on file to calculate BMI.  Left knee much warmer  than right knee, effusion present, limited tenderness on palpation.  Negative drawer test, unable to do marries, no tenderness on stressing medial or lateral collaterals  Patient has medial ulcer left lower extremity, venous stasis changes/hyperpigmentation  Shallow ulcer which was debrided on the very end of his left great toe, large ulcerated callus, lateral foot.  Was extensively debrided  Diagnostics:   None      Please note: Voice recognition software was used in this dictation.  It may therefore contain typographical errors.

## 2021-05-28 NOTE — TELEPHONE ENCOUNTER
----- Message from Ben Hamlin MD sent at 5/7/2019 11:43 AM CDT -----  Please contact him and let him know that his joint fluid showed gout  Usually a cortisone injection helps this a lot,let me know if it is not better.  No sign of infection

## 2021-05-28 NOTE — TELEPHONE ENCOUNTER
Refill Approved    Rx renewed per Medication Renewal Policy. Medication was last renewed on 7/19/18.    Corina Ortiz, [facility] Triage/Med Refill 5/6/2019     Requested Prescriptions   Pending Prescriptions Disp Refills     ferrous sulfate 325 (65 FE) MG tablet [Pharmacy Med Name: FERROUS SULFATE 325MG (5GR) TABS] 90 tablet 0     Sig: TAKE 1 TABLET BY MOUTH ONCE DAILY WITH BREAKFAST       Iron Supplements Refill Protocol Passed - 5/6/2019 11:22 AM        Passed - PCP or prescribing provider visit in past 12 months       Last office visit with prescriber/PCP: 5/3/2019 Ben Hamlin MD OR same dept: 5/3/2019 Ben Hamlin MD OR same specialty: 5/3/2019 Ben Hamlin MD  Last physical: 6/19/2018 Last MTM visit: Visit date not found   Next visit within 3 mo: Visit date not found  Next physical within 3 mo: Visit date not found  Prescriber OR PCP: Ben Hamlin MD  Last diagnosis associated with med order: 1. Anemia  - ferrous sulfate 325 (65 FE) MG tablet [Pharmacy Med Name: FERROUS SULFATE 325MG (5GR) TABS]; TAKE 1 TABLET BY MOUTH ONCE DAILY WITH BREAKFAST  Dispense: 90 tablet; Refill: 0    If protocol passes may refill for 12 months if within 3 months of last provider visit (or a total of 15 months).             Passed - Hemoglobin or Hematocrit in last 12 months     Hemoglobin   Date Value Ref Range Status   04/16/2019 12.5 (L) 14.0 - 18.0 g/dL Final     Hematocrit   Date Value Ref Range Status   04/16/2019 37.5 (L) 40.0 - 54.0 % Final

## 2021-05-28 NOTE — TELEPHONE ENCOUNTER
Patient needs an appointment with provider for knee injections. Please call and help make appointment for patient to come in to be seen.

## 2021-05-28 NOTE — TELEPHONE ENCOUNTER
Triage call:   Patient reports severe pain in his right Knee- started earlier this week and over did it with activity yesterday- patient thought that he could not use his pain medications while using his lidocaine patches- advised that he can take his prescription pain medication while having the lidocaine patch on. Patient will take his medication as prescribed for pain while waiting to be seen by provider to help with pain. Patient is requesting a cortisone injection at appointment.     Triaged to be seen today but no available appointments- patient only wants to see PCP- patient requesting to see PCP tomorrow. Reviewed additional care advice and patient verbalizes understanding. Patient warm transferred to scheduling for appointment. Appointment scheduled for tomorrow @ 3:20 pm with PCP.     Margaux Marquez RN BA [facility] Triage/Med Refill 5/2/2019 3:35 PM    Reason for Disposition    SEVERE pain (e.g., excruciating, unable to walk)    Protocols used: KNEE PAIN-A-OH

## 2021-05-28 NOTE — TELEPHONE ENCOUNTER
Refill Approved    Rx renewed per Medication Renewal Policy. Medication was last renewed on 1/16/19. 9/22/17    Corina Ortiz, [facility] Connection Triage/Med Refill 4/18/2019     Requested Prescriptions   Pending Prescriptions Disp Refills     metoprolol tartrate (LOPRESSOR) 50 MG tablet [Pharmacy Med Name: METOPROLOL TARTRATE 50MG TABLETS] 90 tablet 0     Sig: TAKE 1 TABLET BY MOUTH DAILY.       Beta-Blockers Refill Protocol Passed - 4/17/2019  9:40 AM        Passed - PCP or prescribing provider visit in past 12 months or next 3 months     Last office visit with prescriber/PCP: 4/16/2019 Ben Hamlin MD OR same dept: 4/16/2019 Ben Hamlin MD OR same specialty: 4/16/2019 Ben Hamlin MD  Last physical: 6/19/2018 Last MTM visit: Visit date not found   Next visit within 3 mo: Visit date not found  Next physical within 3 mo: Visit date not found  Prescriber OR PCP: Ben Hamlin MD  Last diagnosis associated with med order: 1. Essential hypertension  - metoprolol tartrate (LOPRESSOR) 50 MG tablet [Pharmacy Med Name: METOPROLOL TARTRATE 50MG TABLETS]; TAKE 1 TABLET BY MOUTH DAILY.  Dispense: 90 tablet; Refill: 0    2. Gout  - COLCRYS 0.6 mg tablet [Pharmacy Med Name: COLCRYS 0.6MG TABLETS]; TAKE 1 TABLET(0.6 MG) BY MOUTH DAILY  Dispense: 90 tablet; Refill: 0    If protocol passes may refill for 12 months if within 3 months of last provider visit (or a total of 15 months).             Passed - Blood pressure filed in past 12 months     BP Readings from Last 1 Encounters:   04/16/19 127/73             COLCRYS 0.6 mg tablet [Pharmacy Med Name: COLCRYS 0.6MG TABLETS] 90 tablet 0     Sig: TAKE 1 TABLET(0.6 MG) BY MOUTH DAILY       Colchicine Refill Protocol Passed - 4/17/2019  9:40 AM        Passed - LFT or AST or ALT in last year     Albumin   Date Value Ref Range Status   04/16/2019 3.8 3.5 - 5.0 g/dL Final     Bilirubin, Total   Date Value Ref Range Status   04/16/2019 0.5 0.0 - 1.0 mg/dL  Final     Alkaline Phosphatase   Date Value Ref Range Status   04/16/2019 83 45 - 120 U/L Final     AST   Date Value Ref Range Status   04/16/2019 17 0 - 40 U/L Final     ALT   Date Value Ref Range Status   04/16/2019 13 0 - 45 U/L Final     Protein, Total   Date Value Ref Range Status   04/16/2019 7.8 6.0 - 8.0 g/dL Final                Passed - CBC (Hemogram 2) in last year      WBC   Date Value Ref Range Status   04/16/2019 4.8 4.0 - 11.0 thou/uL Final     RBC   Date Value Ref Range Status   04/16/2019 4.70 4.40 - 6.20 mill/uL Final     Hemoglobin   Date Value Ref Range Status   04/16/2019 12.5 (L) 14.0 - 18.0 g/dL Final     Hematocrit   Date Value Ref Range Status   04/16/2019 37.5 (L) 40.0 - 54.0 % Final     MCV   Date Value Ref Range Status   04/16/2019 80 80 - 100 fL Final     MCH   Date Value Ref Range Status   04/16/2019 26.6 (L) 27.0 - 34.0 pg Final     MCHC   Date Value Ref Range Status   04/16/2019 33.4 32.0 - 36.0 g/dL Final     RDW   Date Value Ref Range Status   04/16/2019 13.7 11.0 - 14.5 % Final     Platelets   Date Value Ref Range Status   04/16/2019 135 (L) 140 - 440 thou/uL Final     MPV   Date Value Ref Range Status   04/16/2019 8.8 7.0 - 10.0 fL Final                Passed - Visit with PCP or prescribing provider visit in past 12 months     Last office visit with prescriber/PCP: 4/16/2019 Ben Hamlin MD OR same dept: 4/16/2019 Ben Hamlin MD OR same specialty: 4/16/2019 Ben Hamlin MD  Last physical: 6/19/2018 Last MTM visit: Visit date not found   Next visit within 3 mo: Visit date not found  Next physical within 3 mo: Visit date not found  Prescriber OR PCP: Ben Hamlin MD  Last diagnosis associated with med order: 1. Essential hypertension  - metoprolol tartrate (LOPRESSOR) 50 MG tablet [Pharmacy Med Name: METOPROLOL TARTRATE 50MG TABLETS]; TAKE 1 TABLET BY MOUTH DAILY.  Dispense: 90 tablet; Refill: 0    2. Gout  - COLCRYS 0.6 mg tablet [Pharmacy Med  Name: COLCRYS 0.6MG TABLETS]; TAKE 1 TABLET(0.6 MG) BY MOUTH DAILY  Dispense: 90 tablet; Refill: 0    If protocol passes may refill for 12 months if within 3 months of last provider visit (or a total of 15 months).             Passed - Serum creatinine in last year     Creatinine   Date Value Ref Range Status   04/16/2019 2.18 (H) 0.70 - 1.30 mg/dL Final

## 2021-05-28 NOTE — TELEPHONE ENCOUNTER
New Appointment Needed  What is the reason for the visit:    Same Date/Next Day Appt Request  What is the reason for your visit?: Knee pain requesting shot    Provider Preference: PCP only  How soon do you need to be seen?: tomorrow  Waitlist offered?: No  Okay to leave a detailed message:  Yes

## 2021-05-29 NOTE — TELEPHONE ENCOUNTER
Refill Approved    Rx renewed per Medication Renewal Policy. Medication was last renewed on 2/4/2019.  Last office visit: 5/3/2019 with PCP Dr MARIA GUADALUPE Plummer, [facility] Triage/Med Refill 6/1/2019     Requested Prescriptions   Pending Prescriptions Disp Refills     ULORIC 80 mg Tab [Pharmacy Med Name: ULORIC 80MG TABLETS] 90 tablet 0     Sig: TAKE 1 TABLET BY MOUTH DAILY       Allopurinol/Febuxostat Refill Protocol  Passed - 5/31/2019 10:24 AM        Passed - LFT or AST or ALT in last 12 months     Albumin   Date Value Ref Range Status   04/16/2019 3.8 3.5 - 5.0 g/dL Final     Bilirubin, Total   Date Value Ref Range Status   04/16/2019 0.5 0.0 - 1.0 mg/dL Final     Alkaline Phosphatase   Date Value Ref Range Status   04/16/2019 83 45 - 120 U/L Final     AST   Date Value Ref Range Status   04/16/2019 17 0 - 40 U/L Final     ALT   Date Value Ref Range Status   04/16/2019 13 0 - 45 U/L Final     Protein, Total   Date Value Ref Range Status   04/16/2019 7.8 6.0 - 8.0 g/dL Final                Passed - Visit with PCP or prescribing provider visit in past 12 months     Last office visit with prescriber/PCP: 5/3/2019 Ben Hamlin MD OR same dept: 5/3/2019 Ben Hamlin MD OR same specialty: 5/3/2019 Ben Hamlin MD  Last physical: 6/19/2018 Last MTM visit: Visit date not found   Next visit within 3 mo: Visit date not found  Next physical within 3 mo: Visit date not found  Prescriber OR PCP: Ben Hamlin MD  Last diagnosis associated with med order: 1. 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

## 2021-05-29 NOTE — TELEPHONE ENCOUNTER
Spoke with Cathi she put in a request to have the supplies picked up. She is not sure on the actual date. Please completed task after .

## 2021-05-29 NOTE — TELEPHONE ENCOUNTER
ANTICOAGULATION  MANAGEMENT PROGRAM    Panda Miranda is overdue for INR check.  Reminder call made.    Left message for Panda. If returning call, please schedule INR check as soon as possible.    Virginia Ferguson RN

## 2021-05-29 NOTE — TELEPHONE ENCOUNTER
"Called and left message for Cathi to call clinic back. Please let Cathi know that the supplies are at the  and are ready for . \"  Okay to relay message\"  "

## 2021-05-29 NOTE — TELEPHONE ENCOUNTER
ANTICOAGULATION  MANAGEMENT    Assessment     Today's INR result of 2.4 is Therapeutic (goal INR of 2.0-3.0)        Warfarin taken as previously instructed    No new diet changes affecting INR    No new medication/supplements affecting INR    Continues to tolerate warfarin with no reported s/s of bleeding or thromboembolism     Previous INR was Therapeutic    Plan:     Left a detailed message for Panda regarding INR result and instructed:     Warfarin Dosing Instructions:  Continue current warfarin dose 7.5 mg daily on sun/thu; and 5 mg daily rest of week      Instructed patient to follow up no later than: 6 weeks      Instructed to call the [facility] for any changes, questions or concerns. [phone])   ?   Patricia Acosta RN    Subjective/Objective:      Panda Miranda, a 64 y.o. male is on warfarin.     Panda reports:     Home warfarin dose: as updated on anticoagulation calendar per template     Missed doses: No     Medication changes:  No     S/S of bleeding or thromboembolism:  No     New Injury or illness:  No     Changes in diet or alcohol consumption:  No     Upcoming surgery, procedure or cardioversion:  No    Anticoagulation Episode Summary     Current INR goal:   2.0-3.0   TTR:   71.4 % (2.4 y)   Next INR check:   7/31/2019   INR from last check:   2.40 (6/19/2019)   Weekly max warfarin dose:      Target end date:   Indefinite   INR check location:      Preferred lab:      Send INR reminders to:   [facility]    Indications    A-fib (H) (Resolved) [I48.91]           Comments:            Anticoagulation Care Providers     Provider Role Specialty Phone number    Ben Hamlin MD Referring Family Medicine [phone]

## 2021-05-29 NOTE — TELEPHONE ENCOUNTER
Lab Results   Component Value Date    INR 2.40 (H) 06/19/2019    INR 2.00 (H) 04/16/2019    INR 2.50 (H) 03/13/2019         Next INR advised: 7/31/19    Current warfarin dose per anticoagulation flowsheet:   Outpatient Anticoagulation Plan Latest Ref Rng & Units 6/19/2019   ANTICOAG FULL INSTRUCTIONS  7.5 mg every Sun, Thu; 5 mg all other days       Last health maintenance OV/physical exam: 5/3/19    Patient is up to date.  Warfarin refilled per protocol.

## 2021-05-29 NOTE — TELEPHONE ENCOUNTER
Who is calling:  Cathi from [facility]                                  Reason for Call:  C-Pap supplies for patient Panda Miranda. The supplies where sent to the clinic instead of the patient.  Can someone look to see if the supplies are still at the clinic.  They were a mask, filter, cushion and hose.  Date of last appointment with primary care: 05/03/2019  Okay to leave a detailed message: Yes.  Please call Cathi at [facility] at [phone] ext [phone].

## 2021-05-29 NOTE — TELEPHONE ENCOUNTER
Who is calling:  The patient   Reason for Call:  The patient is calling to check the status of the medication request. The patient is aware that the script is pending review. The patient would like the script marked as high priority due to being out of the as of today.    Date of last appointment with primary care: 5/3/2019  Okay to leave a detailed message: Yes

## 2021-05-29 NOTE — TELEPHONE ENCOUNTER
ANTICOAGULATION  MANAGEMENT PROGRAM    Panda Miranda is overdue for INR check.  Reminder call made.    Left message for Panda. If returning call, please schedule INR check as soon as possible.    Carmencita Lopez RN

## 2021-05-30 VITALS — HEIGHT: 76 IN | BODY MASS INDEX: 38.36 KG/M2 | WEIGHT: 315 LBS

## 2021-05-30 VITALS — HEIGHT: 76 IN | WEIGHT: 315 LBS | BODY MASS INDEX: 38.36 KG/M2

## 2021-05-30 NOTE — TELEPHONE ENCOUNTER
Please contact patient.  I am renewing medications but I am concerned he is due for a cardiology follow-up (was referred earlier this year) and I want to make sure that his leg wound healed-last time I referred him to a wound doctor but I do not see that he followed up.    Also, notify him that he should make an appointment before he needs his next refill on his Tylenol 3.  Rather than requesting refills over the phone, I would like to proactively this medicine on a regular basis if he is using it on a regular basis and we should sign a controlled substance agreement

## 2021-05-30 NOTE — TELEPHONE ENCOUNTER
Patient is not responding. We attempted three attempts to reach patient. Are you willing to send a letter?

## 2021-05-30 NOTE — TELEPHONE ENCOUNTER
Controlled Substance Refill Request  Medication Name:   Requested Prescriptions     Pending Prescriptions Disp Refills     acetaminophen-codeine (TYLENOL #3) 300-30 mg per tablet 40 tablet 2     Sig: Take 1-2 tablets by mouth every 6 (six) hours as needed for pain (maximum 8 pills/ day).     Date Last Fill: 4/16/2019  Pharmacy: [facility] [address] [place] [address] AV AT [facility] [address]      Submit electronically to pharmacy  Controlled Substance Agreement Date Scanned:   Encounter-Level CSA Scan Date:    There are no encounter-level csa scan date.       Last office visit with prescriber/PCP: 5/3/2019 Ben Hamlin MD OR same dept: 5/3/2019 Ben Hamlin MD OR same specialty: 5/3/2019 Ben Hamlin MD  Last physical: 6/19/2018 Last MTM visit: Visit date not found      Due to knew prescribing laws the pharmacist states that a new prescription is needed.

## 2021-05-31 ENCOUNTER — COMMUNICATION - HEALTHEAST (OUTPATIENT)
Dept: FAMILY MEDICINE | Facility: CLINIC | Age: 66
End: 2021-05-31

## 2021-05-31 VITALS — WEIGHT: 315 LBS | BODY MASS INDEX: 38.36 KG/M2 | HEIGHT: 76 IN

## 2021-05-31 VITALS — HEIGHT: 76 IN | BODY MASS INDEX: 38.36 KG/M2 | WEIGHT: 315 LBS

## 2021-05-31 DIAGNOSIS — I10 ESSENTIAL HYPERTENSION: ICD-10-CM

## 2021-05-31 NOTE — TELEPHONE ENCOUNTER
Refill Approved    Rx renewed per Medication Renewal Policy. Medication was last renewed on 5/12/18.    Corina Ortiz, [facility] Connection Triage/Med Refill 8/1/2019     Requested Prescriptions   Pending Prescriptions Disp Refills     lisinopril (PRINIVIL,ZESTRIL) 10 MG tablet [Pharmacy Med Name: LISINOPRIL 10MG TABLETS] 90 tablet 0     Sig: TAKE 1 TABLET(10 MG) BY MOUTH DAILY       Ace Inhibitors Refill Protocol Passed - 8/1/2019 10:31 AM        Passed - PCP or prescribing provider visit in past 12 months       Last office visit with prescriber/PCP: Visit date not found OR same dept: 5/3/2019 Ben Hamlin MD OR same specialty: 5/3/2019 Ben Hamlin MD  Last physical: Visit date not found Last MTM visit: Visit date not found   Next visit within 3 mo: Visit date not found  Next physical within 3 mo: Visit date not found  Prescriber OR PCP: Félix Guevara MD  Last diagnosis associated with med order: 1. Hypertension  - lisinopril (PRINIVIL,ZESTRIL) 10 MG tablet [Pharmacy Med Name: LISINOPRIL 10MG TABLETS]; TAKE 1 TABLET(10 MG) BY MOUTH DAILY  Dispense: 90 tablet; Refill: 0    If protocol passes may refill for 12 months if within 3 months of last provider visit (or a total of 15 months).             Passed - Serum Potassium in past 12 months     Lab Results   Component Value Date    Potassium 4.7 04/16/2019             Passed - Blood pressure filed in past 12 months     BP Readings from Last 1 Encounters:   05/03/19 138/84             Passed - Serum Creatinine in past 12 months     Creatinine   Date Value Ref Range Status   04/16/2019 2.18 (H) 0.70 - 1.30 mg/dL Final

## 2021-05-31 NOTE — TELEPHONE ENCOUNTER
Who is calling:   Patient  Reason for Call:  Patient is scheduled for INR 09/04/19 at 2:00 pm.  Date of last appointment with primary care: none  Okay to leave a detailed message: Yes

## 2021-06-01 ENCOUNTER — COMMUNICATION - HEALTHEAST (OUTPATIENT)
Dept: ANTICOAGULATION | Facility: CLINIC | Age: 66
End: 2021-06-01

## 2021-06-01 ENCOUNTER — RECORDS - HEALTHEAST (OUTPATIENT)
Dept: GENERAL RADIOLOGY | Facility: CLINIC | Age: 66
End: 2021-06-01

## 2021-06-01 ENCOUNTER — RECORDS - HEALTHEAST (OUTPATIENT)
Dept: ADMINISTRATIVE | Facility: CLINIC | Age: 66
End: 2021-06-01

## 2021-06-01 ENCOUNTER — OFFICE VISIT - HEALTHEAST (OUTPATIENT)
Dept: FAMILY MEDICINE | Facility: CLINIC | Age: 66
End: 2021-06-01

## 2021-06-01 VITALS — BODY MASS INDEX: 59.28 KG/M2 | WEIGHT: 315 LBS

## 2021-06-01 VITALS — WEIGHT: 315 LBS | BODY MASS INDEX: 57.97 KG/M2

## 2021-06-01 DIAGNOSIS — R06.09 DOE (DYSPNEA ON EXERTION): ICD-10-CM

## 2021-06-01 DIAGNOSIS — I48.91 ATRIAL FIBRILLATION, UNSPECIFIED TYPE (H): ICD-10-CM

## 2021-06-01 DIAGNOSIS — E66.01 MORBID OBESITY (H): ICD-10-CM

## 2021-06-01 DIAGNOSIS — I50.32 CHRONIC DIASTOLIC HEART FAILURE (H): ICD-10-CM

## 2021-06-01 DIAGNOSIS — I50.32 CHRONIC DIASTOLIC (CONGESTIVE) HEART FAILURE (H): ICD-10-CM

## 2021-06-01 DIAGNOSIS — R06.00 DYSPNEA, UNSPECIFIED: ICD-10-CM

## 2021-06-01 LAB — INR PPP: 2.9 (ref 0.9–1.1)

## 2021-06-01 NOTE — TELEPHONE ENCOUNTER
Anticoagulation Management    Unable to reach Panda today.    Today's INR result of 2.5 is Therapeutic (goal INR of 2.0-3.0)     Follow up required to confirm home dose     Continue same dose tonight         ACN to follow up    Virginia Ferguson RN

## 2021-06-01 NOTE — TELEPHONE ENCOUNTER
Anticoagulation Management    Unable to reach Panda today.    Yesterday's INR result of 2.5 is Therapeutic (goal INR of 2.0-3.0)     Follow up required to confirm home dose     Left message to continue current dose of warfarin tonight     ACN to follow up    Carmencita Lopez RN

## 2021-06-01 NOTE — TELEPHONE ENCOUNTER
ANTICOAGULATION  MANAGEMENT    Assessment     Wednesday's INR result of 2.5 is Therapeutic (goal INR of 2.0-3.0)        Unable to determine if warfarin was taken as instructed, after multiple calls and VM left patient has not called back    No new diet changes affecting INR    No new medication/supplements affecting INR    Continues to tolerate warfarin with no reported s/s of bleeding or thromboembolism     Previous INR was Therapeutic    Plan:     Left a detailed message for aPnda regarding INR result and instructed:     Warfarin Dosing Instructions:  Continue current warfarin dose    7.5 mg every Sun, Thu; 5 mg all other days     Asked patient to please call back to clarify if this is not the dose he is taking    Instructed patient to follow up no later than: 6-8 weeks    Education provided: importance of therapeutic range and target INR goal and significance of current INR result    Instructed to call the [facility] for any changes, questions or concerns. (#[phone])   ?   Carmencita Lopez RN    Subjective/Objective:      Panda Miranda, a 64 y.o. male is on warfarin.     Panda reports:     Home warfarin dose: Unable to verify, patient did not fill out a dose on the template and has not returned multiple calls. Template was filled out with correct dose with last INR in June.     Missed doses: No     Medication changes:  No     S/S of bleeding or thromboembolism:  No     New Injury or illness:  No     Changes in diet or alcohol consumption:  No     Upcoming surgery, procedure or cardioversion:  No    Anticoagulation Episode Summary     Current INR goal:   2.0-3.0   TTR:   93.4 %   Next INR check:   10/31/2019   INR from last check:   2.50 (9/4/2019)   Weekly max warfarin dose:      Target end date:   Indefinite   INR check location:      Preferred lab:      Send INR reminders to:   [facility]    Indications    A-fib (H) (Resolved) [I48.91]           Comments:            Anticoagulation Care  Providers     Provider Role Specialty Phone number    Ben Hamlin MD Referring Family Medicine [phone]

## 2021-06-01 NOTE — TELEPHONE ENCOUNTER
Refill Approved    Rx renewed per Medication Renewal Policy. Medication was last renewed on 3/1/19.    Corina Ortiz, [facility] Connection Triage/Med Refill 9/19/2019     Requested Prescriptions   Pending Prescriptions Disp Refills     bumetanide (BUMEX) 1 MG tablet [Pharmacy Med Name: BUMETANIDE 1MG TABLETS] 540 tablet 0     Sig: TAKE 3 TABLETS BY MOUTH TWICE DAILY AT 9 AM AND 6 PM       Diuretics/Combination Diuretics Refill Protocol  Passed - 9/19/2019 11:20 AM        Passed - Visit with PCP or prescribing provider visit in past 12 months     Last office visit with prescriber/PCP: Visit date not found OR same dept: 5/3/2019 Ben Hamlin MD OR same specialty: 5/3/2019 Ben Hamlin MD  Last physical: Visit date not found Last MTM visit: Visit date not found   Next visit within 3 mo: Visit date not found  Next physical within 3 mo: Visit date not found  Prescriber OR PCP: Marilou Rosario MD  Last diagnosis associated with med order: 1. Lower extremity edema  - bumetanide (BUMEX) 1 MG tablet [Pharmacy Med Name: BUMETANIDE 1MG TABLETS]; TAKE 3 TABLETS BY MOUTH TWICE DAILY AT 9 AM AND 6 PM  Dispense: 540 tablet; Refill: 0    If protocol passes may refill for 12 months if within 3 months of last provider visit (or a total of 15 months).             Passed - Serum Potassium in past 12 months      Lab Results   Component Value Date    Potassium 4.7 04/16/2019             Passed - Serum Sodium in past 12 months      Lab Results   Component Value Date    Sodium 140 04/16/2019             Passed - Blood pressure on file in past 12 months     BP Readings from Last 1 Encounters:   05/03/19 138/84             Passed - Serum Creatinine in past 12 months      Creatinine   Date Value Ref Range Status   04/16/2019 2.18 (H) 0.70 - 1.30 mg/dL Final

## 2021-06-02 ENCOUNTER — RECORDS - HEALTHEAST (OUTPATIENT)
Dept: ADMINISTRATIVE | Facility: CLINIC | Age: 66
End: 2021-06-02

## 2021-06-02 VITALS — WEIGHT: 315 LBS | HEIGHT: 76 IN | BODY MASS INDEX: 38.36 KG/M2

## 2021-06-02 VITALS — BODY MASS INDEX: 58.43 KG/M2 | WEIGHT: 315 LBS

## 2021-06-02 LAB — BNP SERPL-MCNC: 523 PG/ML (ref 0–59)

## 2021-06-03 VITALS — WEIGHT: 315 LBS | BODY MASS INDEX: 55.01 KG/M2

## 2021-06-03 NOTE — TELEPHONE ENCOUNTER
ANTICOAGULATION  MANAGEMENT PROGRAM    Panda Miranda is overdue for INR check.     Left message to call and schedule INR appointment as soon as possible.      Carmencita Lopez RN

## 2021-06-03 NOTE — TELEPHONE ENCOUNTER
Who is calling:  patient  Reason for Call:  Patient called to schedule his overdue INR.  Patient did schedule for 11/12 at [facility] for 11:40.   Date of last appointment with primary care: 05/03/19  Okay to leave a detailed message: yes

## 2021-06-03 NOTE — TELEPHONE ENCOUNTER
ANTICOAGULATION  MANAGEMENT    Assessment     Today's INR result of 2.0 is Therapeutic (goal INR of 2.0-3.0)        Warfarin taken as previously instructed    No new diet changes affecting INR    No new medication/supplements affecting INR    Continues to tolerate warfarin with no reported s/s of bleeding or thromboembolism     Previous INR was Therapeutic    Plan:     Spoke with Panda regarding INR result and instructed:     Warfarin Dosing Instructions:  Continue current warfarin dose    7.5 mg every Sun, Thu; 5 mg all other days         Instructed patient to follow up no later than: 6-8 weeks.     Education provided: importance of taking warfarin as instructed    Panda verbalizes understanding and agrees to warfarin dosing plan.    Instructed to call the [facility] for any changes, questions or concerns. (#[phone])   ?   Flash Barraza RN    Subjective/Objective:      Panda Miranda, a 64 y.o. male is on warfarin.     Panda reports:     Home warfarin dose: template incorrect; verbally confirmed home dose with pt and updated on anticoagulation calendar     Missed doses: No     Medication changes:  No     S/S of bleeding or thromboembolism:  No     New Injury or illness:  No     Changes in diet or alcohol consumption:  No     Upcoming surgery, procedure or cardioversion:  No    Anticoagulation Episode Summary     Current INR goal:   2.0-3.0   TTR:   100.0 %   Next INR check:   1/7/2020   INR from last check:   2.00 (11/12/2019)   Weekly max warfarin dose:      Target end date:   Indefinite   INR check location:      Preferred lab:      Send INR reminders to:   [facility]    Indications    A-fib (H) (Resolved) [I48.91]           Comments:            Anticoagulation Care Providers     Provider Role Specialty Phone number    Ben Hamlin MD Referring Family Medicine [phone]

## 2021-06-03 NOTE — PROGRESS NOTES
Assessment/ Plan  Problem List Items Addressed This Visit        High    Obesity (BMI 35.0-39.9 without comorbidity)     Touched on briefly, did not discuss gastric bypass again,  May come into play if patient needs right knee replacement         Relevant Orders    Influenza, Recombinant, Inj, Quadrivalent, PF, 18+YRS (Completed)    Atrial fibrillation (H)     Strategy is rate control and anticoagulation.  Rate is controlled, recent INR normal         Diastolic Congestive Heart Failure     With A. fib, mitral valve disease, has not seen Dr. Morgan for over a year and a half.  Referral to cardiology.  Appears euvolemic, blood pressure controlled         Relevant Orders    Ambulatory referral to Cardiology    Chronic Kidney Disease, Stage 3     Did not follow-up with Dr. Coreas when referred back in May.  Re-referral, check CMP.         Relevant Orders    Vitamin D, Total (25-Hydroxy)    Comprehensive Metabolic Panel    Influenza, Recombinant, Inj, Quadrivalent, PF, 18+YRS (Completed)    Gout    Relevant Orders    HM2(CBC w/o Differential) (Completed)    Uric Acid    Comprehensive Metabolic Panel       Medium    Osteoarthritis Of The Knee     Severe, posttraumatic end-stage disease right knee.  Now with some swelling though joint is not warm.  Consider gout/pseudogout but gradual onset and joints not warm or red so doubt this.  Suspect osteoarthritis with inflammation.    Patient has proven end-stage disease on x-ray.  Weight makes surgery less attractive.  Still may need to pursue this.  Will try injection.  Patient requests, agrees    Prescribe Tylenol 3, emphasized that this is not a long-term since/chronic solution      Knee Injection Procedure Note    Pre-operative Diagnosis: right,  Knee osteoarthritis    Post-operative Diagnosis: same    Indications: pain      Procedure Details   Verbal consent was obtained for procedure.  Area was prepped with alcohol.  Landmarks were palpated and 27-gauge 1-1/4 needle  advanced from medial approach under the patella into the joint space.  Medications then injected in typical fashion.  Medications injected:  Depo-medrol 80 mg/1 cc  Lidocaine 1 cc    Complications:  None; patient tolerated the procedure well.         Relevant Medications    lidocaine 10 mg/mL (1 %) injection 1 mL (Completed)    methylPREDNISolone acetate injection 80 mg (DEPO-MEDROL) (Completed)    acetaminophen-codeine (TYLENOL-CODEINE #3) 300-30 mg per tablet       Unprioritized    Hyperglycemia      Check A1c         Relevant Orders    Glycosylated Hemoglobin A1c (Completed)    Venous stasis     Referred last time to wound care.  Patient never went to this appointment.  Wounds healed, currently minimal problem.         Malignant neoplasm of sigmoid colon (H)     Needs annual screening colonoscopy.  Referred in May, patient canceled appointment.  Referred again         Relevant Orders    Ambulatory referral to Colorectal Surgery    Urge incontinence of urine - Primary     Quite severe, patient not describing preceding slow flow.  Check UA, referral to urology.  Complicated by treatment of CHF.         Relevant Orders    Ambulatory referral to Urology    Urinalysis-UC if Indicated (Completed)    Culture, Urine    Decreased visual acuity    Relevant Orders    Ambulatory referral to Ophthalmology      Other Visit Diagnoses     Mixed hyperlipidemia        Relevant Orders    Influenza, Recombinant, Inj, Quadrivalent, PF, 18+YRS (Completed)        Subjective  CC:  Chief Complaint   Patient presents with     Knee Pain     right     Other     medication check     HPI:  Panda is a 64-year-old with history of obesity, comes in for knee pain and medication check.    Patient with history of morbid obesity, vertical banded gastroplasty in the past.  Recommended pursuing tree previously.  She has considered and decided against for now.    History of chronic kidney disease followed by Dr. Coreas.  Dr. Coreas is recommended q.  9-month follow-up.  On lisinopril 10 mg.  Reviewed chart and I do not see a kidney follow-up.    Hypertension.  Takes amlodipine 5 mg, Bumex 3 tablets twice daily, lisinopril 10, metoprolol 150 mg daily.    Mitral valve regurgitation and history of atrial fibrillation, on warfarin.  Cardiology, Dr. Argueta.  Due to see her    History of pseudogout, on Colcrys 0.6 daily    Patient had a a colon malignancy removed a number of years ago.  Was to have follow-up with Dr. Magaña.  Wt Readings from Last 3 Encounters:   11/20/19 (!) 485 lb (220 kg)   04/16/19 (!) 476 lb (215.9 kg)   02/18/19 (!) 490 lb (222.3 kg)   Last INR was done 11/12    Left knee Pain    Duration/ timing of onset: Long-standing pain, gradually worse over the last few years, much worse over the last few weeks.  Location of pain global  Severity/ Quality: Achy, severe, particularly when he bears weight, sometimes at night when he sleeps  Modifying factors: Make it worse-bearing weight   make it better-nothing  History of knee problems/injury or previous work-up/ treatment?  Yes, severe/devastating knee injury in high school  Clicking or popping?  No  Swelling?  Yes  Comments injected here 5/3/2019    With lower extremity edema, venous stasis changes, refer to [facility]   Care.  He was never seen.    Complains of urinary incontinence, wearing depends because he is leaking.  Wonders if there is some sort of device -scribes condom catheter-that can help.  No previous history of slow stream.    Previous cataract surgery and right eye, now having similar problems on the left.  Requesting referral.  PFSH:  Patient Active Problem List   Diagnosis     Lower Back Pain     Osteoarthritis Of The Knee     Hematuria     Glucose Intolerance     Essential hypertension     Anemia     Ventricular Septal Defect     Obesity (BMI 35.0-39.9 without comorbidity)     Obstructive Sleep Apnea     Pseudogout     Atrial fibrillation (H)     Diastolic Congestive Heart  Failure     Chronic Kidney Disease, Stage 3     Onychomycosis     Gout     Vitamin D deficiency     Health care maintenance     Compliance with medication regimen     Non-rheumatic mitral regurgitation     Cor pulmonale (H)     Obesity     Hyperglycemia      Venous stasis     Plantar fasciitis     Malignant neoplasm of sigmoid colon (H)     Arthritis of knee     Skin ulcer of left foot, limited to breakdown of skin (H)     Urge incontinence of urine     Decreased visual acuity     Current medications reviewed as follows:  Current Outpatient Medications on File Prior to Visit   Medication Sig     amLODIPine (NORVASC) 5 MG tablet Take 5 mg by mouth.     aspirin 81 MG EC tablet Take 81 mg by mouth.     bumetanide (BUMEX) 1 MG tablet TAKE 3 TABLETS BY MOUTH TWICE DAILY AT 9 AM AND 6 PM     cholecalciferol, vitamin D3, (CHOLECALCIFEROL) 1,000 unit tablet Take 3 tablets (3,000 Units total) by mouth daily.     COLCRYS 0.6 mg tablet TAKE 1 TABLET(0.6 MG) BY MOUTH DAILY     cyclobenzaprine (FLEXERIL) 10 MG tablet TK 1 T PO 30 MINUTES BEFORE BEDTIME     febuxostat (ULORIC) 80 mg Tab Take 1 tablet by mouth daily.     ferrous sulfate 325 (65 FE) MG tablet TAKE 1 TABLET BY MOUTH ONCE DAILY WITH BREAKFAST     lidocaine 4 % patch Place 1 patch on the skin daily. Remove and discard patch with 12 hours or as directed by MD.     lisinopril (PRINIVIL,ZESTRIL) 10 MG tablet TAKE 1 TABLET(10 MG) BY MOUTH DAILY     metoprolol tartrate (LOPRESSOR) 50 MG tablet TAKE 3 TABLETS BY MOUTH DAILY     metoprolol tartrate (LOPRESSOR) 50 MG tablet TAKE 1 TABLET BY MOUTH DAILY.     senna (SENNA) 8.6 mg tablet Take 8.6 mg by mouth daily as needed.      warfarin (COUMADIN/JANTOVEN) 5 MG tablet TAKE 1 TO 1 AND 1/2 TABLETS(5 TO 7.5 MG) BY MOUTH DAILY AS DIRECTED. ADJUST DOSE BASED ON INR     acetaminophen-codeine (TYLENOL #3) 300-30 mg per tablet Take 1-2 tablets by mouth every 6 (six) hours as needed for pain (maximum 8 pills/ day).     oxyCODONE  "(ROXICODONE) 5 MG immediate release tablet Take 1 tablet (5 mg total) by mouth every 4 (four) hours as needed for pain.     oxyCODONE-acetaminophen (PERCOCET) 5-325 mg per tablet Take 1-2 tablets by mouth every 6 (six) hours as needed for pain. Max 8 per day     No current facility-administered medications on file prior to visit.      Social History     Tobacco Use   Smoking Status Never Smoker   Smokeless Tobacco Never Used     Social History     Social History Narrative     Not on file     Patient Care Team:  Ben Hamlin MD as PCP - General  Ben Hamlin MD as Assigned PCP  ROS  Full 10 system review including constitutional, respiratory, cardiac, gi, urinary, rheumatologic, neurologic, reproductive, dermatologic psychiatric is  performed  and is otherwise negative         Objective  Physical Exam  Vitals:    11/20/19 1347   BP: 106/67   Patient Site: Right Arm   Patient Position: Sitting   Cuff Size: Thigh   Pulse: 77   Resp: 18   Weight: (!) 485 lb (220 kg)   Height: 6' 6\" (1.981 m)     Body mass index is 56.05 kg/m .  Gen- alert, oriented/ appropriately responsive  HEENT- normal cephalic, atraumatic.   Chest- Normal inspiration and expiration.    Clear to ascultation.    No chest wall deformity or scar.  CV- Heart regular rate and rhythm  normal tones, no murmurs   No gallops or rubs.  Right knee is swollen, not warm, obvious degenerative changes, medial scar from previous surgery.    1+ lower extremity edema, venous stasis changes.  No open sores except over the knee.  Diagnostics:   Results for orders placed or performed in visit on 11/20/19   Glycosylated Hemoglobin A1c   Result Value Ref Range    Hemoglobin A1c 5.5 3.5 - 6.0 %   HM2(CBC w/o Differential)   Result Value Ref Range    WBC 6.7 4.0 - 11.0 thou/uL    RBC 4.47 4.40 - 6.20 mill/uL    Hemoglobin 12.2 (L) 14.0 - 18.0 g/dL    Hematocrit 36.9 (L) 40.0 - 54.0 %    MCV 82 80 - 100 fL    MCH 27.3 27.0 - 34.0 pg    MCHC 33.1 32.0 - " 36.0 g/dL    RDW 14.1 11.0 - 14.5 %    Platelets 117 (L) 140 - 440 thou/uL    MPV 9.5 7.0 - 10.0 fL   Urinalysis-UC if Indicated   Result Value Ref Range    Color, UA Yellow Colorless, Yellow, Straw, Light Yellow    Clarity, UA Clear Clear    Glucose, UA Negative Negative    Bilirubin, UA Negative Negative    Ketones, UA Negative Negative    Specific Gravity, UA 1.010 1.005 - 1.030    Blood, UA Negative Negative    pH, UA 5.5 5.0 - 8.0    Protein, UA Negative Negative mg/dL    Urobilinogen, UA 0.2 E.U./dL 0.2 E.U./dL, 1.0 E.U./dL    Nitrite, UA Negative Negative    Leukocytes, UA Trace (!) Negative    Bacteria, UA Moderate (!) None Seen hpf    RBC, UA 0-2 None Seen, 0-2 hpf    WBC, UA 5-10 (!) None Seen, 0-5 hpf    Squam Epithel, UA 0-5 None Seen, 0-5 lpf           Please note: Voice recognition software was used in this dictation.  It may therefore contain typographical errors.

## 2021-06-03 NOTE — TELEPHONE ENCOUNTER
ANTICOAGULATION  MANAGEMENT    Assessment     Today's INR result of 2.2 is Therapeutic (goal INR of 2.0-3.0)        Warfarin taken as previously instructed    No new diet changes affecting INR    No new medication/supplements affecting INR    Continues to tolerate warfarin with no reported s/s of bleeding or thromboembolism     Previous INR was Therapeutic    Plan:     Spoke with Panda regarding INR result and instructed:     Warfarin Dosing Instructions:  Continue current warfarin dose 7.5 mg daily on Sun/Thurs; and 5 mg daily rest of week  (0 % change)    Instructed patient to follow up no later than: one week after colonoscopy (12/12) unless directed otherwise (sent message to PCP regarding holding/bridging)    Education provided: importance of therapeutic range    Panda verbalizes understanding and agrees to warfarin dosing plan.    Instructed to call the [facility] for any changes, questions or concerns. (#[phone])   ?   Virginia Ferguson RN    Subjective/Objective:      Panda Miranda, a 64 y.o. male is on warfarin.     Panda reports:     Home warfarin dose: as updated on anticoagulation calendar per template     Missed doses: No     Medication changes:  No     S/S of bleeding or thromboembolism:  No     New Injury or illness:  No     Changes in diet or alcohol consumption:  No     Upcoming surgery, procedure or cardioversion:  Yes colonoscopy on 12/12    Anticoagulation Episode Summary     Current INR goal:   2.0-3.0   TTR:   100.0 % (1 y)   Next INR check:   12/19/2019   INR from last check:   2.20 (11/26/2019)   Weekly max warfarin dose:      Target end date:   Indefinite   INR check location:      Preferred lab:      Send INR reminders to:   [facility]    Indications    A-fib (H) (Resolved) [I48.91]           Comments:            Anticoagulation Care Providers     Provider Role Specialty Phone number    Ben Hamlin MD Referring Family Medicine [phone]

## 2021-06-03 NOTE — TELEPHONE ENCOUNTER
HUYEN-PROCEDURAL ANTICOAGULATION  MANAGEMENT    Assessment     Warfarin interruption plan for Colonoscopy on 12/12.    Atrial Fibrillation      Risk stratification for thromboembolism: low. (2017 ACC periprocedure pathway for NVAF)       [id number] = 1-2  (HTN, CHF)    2017 ACC periprocedure pathway for NVAF advises NO bridge for low risk stratification ([id number] score <=4 or and no prior hx of stroke, TIA or systemic embolism       Hx Intermittent MR (no MR last echo) per cardiology 1/2018 visit  CKD    Recommendation         Okay to hold warfarin 5 days prior to procedure    No Bridge    Restart warfarin evening of procedure 12/12 is okay with provider doing procedure.    Recheck INR ~ 1week after resuming warfarin   ?   Lucrecia Tucker, PharmD    Subjective/Objective:      Panda Miranda, a 64 y.o. male    Reason for Anticoagulation: Atrial Fibrillation    Goal INR Range: 2-3    Pertinent History: Diastolic CHF (EF 60% 2017);  Intermittent MR (no MR last echo) per cardiology 1/2018 visit,  Hypertension, CKD    Wt Readings from Last 3 Encounters:   11/20/19 (!) 485 lb (220 kg)   04/16/19 (!) 476 lb (215.9 kg)   02/18/19 (!) 490 lb (222.3 kg)     Ideal body weight: 91.4 kg (201 lb 8 oz)  Adjusted ideal body weight: 142.8 kg (314 lb 14.4 oz)     Lab Results   Component Value Date    INR 2.20 (H) 11/26/2019    INR 2.00 (H) 11/12/2019    INR 2.50 (H) 09/04/2019     Lab Results   Component Value Date    HGB 12.2 (L) 11/20/2019    HCT 36.9 (L) 11/20/2019     (L) 11/20/2019     Lab Results   Component Value Date    CREATININE 2.65 (H) 11/20/2019    CREATININE 2.18 (H) 04/16/2019    CREATININE 2.06 (H) 04/19/2018

## 2021-06-03 NOTE — TELEPHONE ENCOUNTER
"----- Message from Ben Hamlin MD sent at 11/22/2019 12:56 PM CST -----  Please contact Panda.  While his urine culture came back negative and therefore he technically does not have a urinary tract infection, his urine did show some signs of bladder inflammation and it is possible for a man to \"hide\" a UTI in his prostate.  So, I want to treat him with 14 days of an antibiotic to see if it helps his urinary symptoms.  I have called in trimethoprim sulfa.  It is at a somewhat lower dose than usual because of his kidney function being low.  Also, let him know, his kidney function was lower than before and it is super important that he see the kidney doctor.  "

## 2021-06-03 NOTE — TELEPHONE ENCOUNTER
ANTICOAGULATION  MANAGEMENT - BPA Interacting Medication Review    Interacting medication(s): Sulfamethoxazole-trimethoprim (Bactrim) with warfarin.    Duration: 14 days, 11/22 to 12/6    New medication?:  Yes, interaction per Micromedex, may increase INR and risk of bleeding.    Plan:    Spoke with Panda regarding potential interaction.     Warfarin instructions: continue current warfarin dose    Follow up INR recommended in:  11/26     Anticoagulation calendar updated    Virginia Ferguson RN

## 2021-06-03 NOTE — TELEPHONE ENCOUNTER
HUYEN-PROCEDURAL ANTICOAGULATION MANAGEMENT    Returned call to Panda  Relayed      Okay to hold warfarin 5 days prior to procedure, starting 12/7    No bridge     Resume warfarin night of procedure if okay with provider doing procedure    INR check 1 week after procedure    Panda noted he'd be at stroke risk while off warfarin. Confirmed this but indicated Dr. Hamlin had reviewed and approved that it would be acceptable to hold for a few days, due to importance of colonoscopy. Discussed there are Lovenox injections used for short term protection but we did not recommend them for him due to his overall risk and potential bleeding risk of the Lovenox.  Discussed monitoring for s/sx of stroke and when to go to the emergency room.    Panda verbalized understanding and agrees to plan.    Scheduled follow up INR 12/19    Lucrecia Tucker, PharmD  [facility]

## 2021-06-03 NOTE — TELEPHONE ENCOUNTER
"Attempt call 1. Unable to leave message - \"wireless user you are trying to contact is currently not available.\" Will try again later. Okay to relay message.   "

## 2021-06-03 NOTE — TELEPHONE ENCOUNTER
Panda has a colonoscopy on 12/12. Panda states he thinks he was told to hold his warfarin 5 days but doesn't remember if he was told anything about bridging. Please verify if patient to hold warfarin 5 days prior to procedure and if he needs to bridge with Lovenox.

## 2021-06-04 VITALS
HEIGHT: 76 IN | HEART RATE: 97 BPM | WEIGHT: 315 LBS | TEMPERATURE: 95.9 F | SYSTOLIC BLOOD PRESSURE: 106 MMHG | RESPIRATION RATE: 24 BRPM | BODY MASS INDEX: 38.36 KG/M2 | DIASTOLIC BLOOD PRESSURE: 73 MMHG | OXYGEN SATURATION: 100 %

## 2021-06-04 VITALS
HEART RATE: 73 BPM | WEIGHT: 315 LBS | SYSTOLIC BLOOD PRESSURE: 96 MMHG | DIASTOLIC BLOOD PRESSURE: 64 MMHG | TEMPERATURE: 99 F | BODY MASS INDEX: 60.22 KG/M2

## 2021-06-04 VITALS
RESPIRATION RATE: 18 BRPM | HEART RATE: 77 BPM | BODY MASS INDEX: 36.45 KG/M2 | SYSTOLIC BLOOD PRESSURE: 106 MMHG | WEIGHT: 315 LBS | HEIGHT: 78 IN | DIASTOLIC BLOOD PRESSURE: 67 MMHG

## 2021-06-04 VITALS
SYSTOLIC BLOOD PRESSURE: 92 MMHG | DIASTOLIC BLOOD PRESSURE: 58 MMHG | BODY MASS INDEX: 38.36 KG/M2 | OXYGEN SATURATION: 99 % | HEART RATE: 75 BPM | RESPIRATION RATE: 22 BRPM | TEMPERATURE: 99.5 F | WEIGHT: 315 LBS | HEIGHT: 76 IN

## 2021-06-04 VITALS
BODY MASS INDEX: 39.17 KG/M2 | RESPIRATION RATE: 16 BRPM | OXYGEN SATURATION: 98 % | DIASTOLIC BLOOD PRESSURE: 63 MMHG | HEART RATE: 76 BPM | HEIGHT: 75 IN | TEMPERATURE: 98.3 F | WEIGHT: 315 LBS | SYSTOLIC BLOOD PRESSURE: 90 MMHG

## 2021-06-04 VITALS — BODY MASS INDEX: 59.23 KG/M2 | WEIGHT: 315 LBS

## 2021-06-04 NOTE — ANESTHESIA CARE TRANSFER NOTE
Last vitals:   Vitals:    12/12/19 0657   BP: 118/64   Pulse: 81   Resp: 18   Temp: 37.1  C (98.7  F)   SpO2: 100%     Patient's level of consciousness is drowsy  Spontaneous respirations: yes  Maintains airway independently: yes  Dentition unchanged: yes  Oropharynx: oropharynx clear of all foreign objects    QCDR Measures:  ASA# 20 - Surgical Safety Checklist: WHO surgical safety checklist completed prior to induction    PQRS# 430 - Adult PONV Prevention: 4558F - Pt received => 2 anti-emetic agents (different classes) preop & intraop  ASA# 8 - Peds PONV Prevention: NA - Not pediatric patient, not GA or 2 or more risk factors NOT present  PQRS# 424 - Latasha-op Temp Management: 4559F - At least one body temp DOCUMENTED => 35.5C or 95.9F within required timeframe  PQRS# 426 - PACU Transfer Protocol: - Transfer of care checklist used  ASA# 14 - Acute Post-op Pain: ASA14B - Patient did NOT experience pain >= 7 out of 10

## 2021-06-04 NOTE — TELEPHONE ENCOUNTER
ANTICOAGULATION  MANAGEMENT PROGRAM    Panda Miranda is overdue for INR check.     Left message to call and schedule INR appointment as soon as possible.      Virginia Ferguson RN

## 2021-06-04 NOTE — TELEPHONE ENCOUNTER
The order for cologuard has been entered in their system and they will mail the kit,,, we do not have kits here,, the only delivery is my mail

## 2021-06-04 NOTE — TELEPHONE ENCOUNTER
ANTICOAGULATION  MANAGEMENT: Discharge Review    Panda Miranda chart reviewed for anticoagulation continuity of care    Outpatient surgery/procedure on  12/12 for colonoscopy.    Discharge disposition: Home    INR Results:       Recent labs: (last 7 days)     12/12/19  0741   INR 1.43*       Warfarin inpatient management: not applicable    Warfarin discharge instructions: home regimen continued     Medication Changes Affecting Anticoagulation: No    Additional Factors Affecting Anticoagulation: No    Plan     No adjustment to anticoagulation plan needed      Recommended follow up is scheduled         Carmencita Lopez RN

## 2021-06-04 NOTE — TELEPHONE ENCOUNTER
Called [phone] and left message for patient to return call. I also called [phone] and got a hold of the patient. He stated to disregard this encounter as it was a mistake. Completing task.

## 2021-06-04 NOTE — TELEPHONE ENCOUNTER
Who is calling:  Patient  Reason for Call:  Patient is questioning where he is supposed to  the cologuard kit? Patient stated that he needs this as soon as possible and does not want this mailed. Patient would like a call back.  Date of last appointment with primary care: 12/5/19  Okay to leave a detailed message: No  [phone]

## 2021-06-04 NOTE — TELEPHONE ENCOUNTER
"Attempted to call patient and received a recording stating \"The number you have dialed is not in service\". Will try again. If patient returns call please relay below message.  "

## 2021-06-04 NOTE — TELEPHONE ENCOUNTER
RN cannot approve Refill Request    RN can NOT refill this medication med is not covered by policy/route to provider. Last office visit: 11/20/2019 Ben Hamlin MD Last Physical: 12/5/2019 Last MTM visit: Visit date not found Last visit same specialty: 11/20/2019 Ben Hamlin MD.  Next visit within 3 mo: Visit date not found  Next physical within 3 mo: Visit date not found      Emily Severino, [facility] Triage/Med Refill 1/5/2020    Requested Prescriptions   Pending Prescriptions Disp Refills     warfarin ANTICOAGULANT (COUMADIN/JANTOVEN) 5 MG tablet [Pharmacy Med Name: WARFARIN SOD 5MG TABLETS (PEACH)] 125 tablet 0     Sig: TAKE 1 TO 1 AND 1/2 TABLETS(5 TO 7.5 MG) BY MOUTH DAILY AS DIRECTED. ADJUST DOSE BASED ON INR       Warfarin Refill Protocol  Failed - 1/5/2020 12:22 PM        Failed -  Route to appropriate pool/provider     Last Anticoagulation Summary:   Anticoagulation Episode Summary     Current INR goal:   2.0-3.0   TTR:   90.9 % (1 y)   Next INR check:   1/9/2020   INR from last check:   1.40! (1/2/2020)   Weekly max warfarin dose:      Target end date:   Indefinite   INR check location:      Preferred lab:      Send INR reminders to:   [facility]    Indications    A-fib (H) (Resolved) [I48.91]           Comments:            Anticoagulation Care Providers     Provider Role Specialty Phone number    Ben Hamlin MD Referring Family Medicine [phone]                Passed - Provider visit in last year     Last office visit with prescriber/PCP: 11/20/2019 Ben Hamlin MD OR same dept: 11/20/2019 Ben Hamlin MD OR same specialty: 11/20/2019 Ben Hamlin MD  Last physical: 12/5/2019 Last MTM visit: Visit date not found    Next appt within 3 mo: Visit date not found Next physical within 3 mo: Visit date not found  Prescriber OR PCP: Ben Hamlin MD  Last diagnosis associated with med order: 1. Atrial fibrillation, unspecified  type (H)  - warfarin ANTICOAGULANT (COUMADIN/JANTOVEN) 5 MG tablet [Pharmacy Med Name: WARFARIN SOD 5MG TABLETS (PEACH)]; TAKE 1 TO 1 AND 1/2 TABLETS(5 TO 7.5 MG) BY MOUTH DAILY AS DIRECTED. ADJUST DOSE BASED ON INR  Dispense: 125 tablet; Refill: 0    If protocol passes may refill for 6 months if within 3 months of last provider visit (or a total of 9 months).

## 2021-06-04 NOTE — ANESTHESIA POSTPROCEDURE EVALUATION
Patient: Panda Miranda  COLONOSCOPY  Anesthesia type: MAC    Patient location: Phase II Recovery  Last vitals:   Vitals Value Taken Time   /70 12/12/2019  9:48 AM   Temp 36.1  C (97  F) 12/12/2019  9:37 AM   Pulse 79 12/12/2019  9:49 AM   Resp 20 12/12/2019  9:47 AM   SpO2 100 % 12/12/2019  9:49 AM   Vitals shown include unvalidated device data.  Post vital signs: stable  Level of consciousness: awake and responds to simple questions  Post-anesthesia pain: pain controlled  Post-anesthesia nausea and vomiting: no  Pulmonary: unassisted, return to baseline  Cardiovascular: stable and blood pressure at baseline  Hydration: adequate  Anesthetic events: no    QCDR Measures:  ASA# 11 - Latasha-op Cardiac Arrest: ASA11B - Patient did NOT experience unanticipated cardiac arrest  ASA# 12 - Latasha-op Mortality Rate: ASA12B - Patient did NOT die  ASA# 13 - PACU Re-Intubation Rate: NA - No ETT / LMA used for case  ASA# 10 - Composite Anes Safety: ASA10A - No serious adverse event    Additional Notes:

## 2021-06-04 NOTE — ANESTHESIA PREPROCEDURE EVALUATION
"Anesthesia Evaluation      Patient summary reviewed     Airway   Mallampati: III  Neck ROM: full   Pulmonary     breath sounds clear to auscultation  (+) sleep apnea on CPAP, ,                          Cardiovascular   Exercise tolerance: (Ambulates with cane. Able to do stairs)  (+) hypertension, valvular problems/murmurs (VSD), dysrhythmias (a-fib), CHF, ,     Rhythm: irregular   murmur      Neuro/Psych - negative ROS     Endo/Other    (+) arthritis, obesity,      Comments: Hx of colon CA    GI/Hepatic/Renal    (+)   chronic renal disease CRI,      Other findings:     NPO > 8 hrs     ECHO 9/30/17:    Indications for Study:Cor Pulmonale.    CONCLUSIONS    SUMMARY    Estimated left ventricular ejection fraction; 50-55%.  No regional wall motion abnormality, probable.  Right ventricular enlargement with decreased systolic function.  Tricuspid valve insufficiency, moderate.  Biatrial enlargement, marked.  Decreased right ventricular systolic performance-severe.  The estimated PA systolic pressure is 41 mmHg + RA.  Based on the IVC, the RA pressure is probably elevated.  Ventricular septal defect \"restrictive\" - left to right shunt.  Pericardial effusion - posterior, small.      Dental    (+) poor dentition    Comment: Multiple missing teeth                       Anesthesia Plan  Planned anesthetic: MAC      Light MAC  Lateral position  GETA prn if not tolerating light MAC  No ketamine, no versed, no fentanyl      ASA 3     Anesthetic plan and risks discussed with: patient  Anesthesia plan special considerations: antiemetics,   Post-op plan: routine recovery          "

## 2021-06-04 NOTE — TELEPHONE ENCOUNTER
Anticoagulation Annual Referral Renewal Review    Panda Miranda's chart reviewed for annual renewal of referral to anticoagulation monitoring.        Criteria for anticoagulation nurse and/or pharmacist renewal met   Warfarin indication: Atrial Fibrillation Yes, per indication   Current with INR monitoring/compliant Yes Yes   Date of last office visit 12/5/19 Yes, had office visit within last year   Time in Therapeutic Range (TTR) 97 % Yes, TTR > 60%       Panda Miranda met all criteria for anticoagulation management program initiated renewal.  New INR standing orders and anticoagulation referral renewal placed.      Carmencita Lopez RN  3:18 PM

## 2021-06-04 NOTE — TELEPHONE ENCOUNTER
ANTICOAGULATION  MANAGEMENT    Assessment     Today's INR result of 1.4 is Subtherapeutic (goal INR of 2.0-3.0)        Warfarin taken as previously instructed    No new diet changes affecting INR    No new medication/supplements affecting INR    Continues to tolerate warfarin with no reported s/s of bleeding or thromboembolism     Previous INR was Subtherapeutic    Plan:     Left a detailed message for Panda regarding INR result and instructed:     Warfarin Dosing Instructions:  boost dose of warfarin today Thur 1/2 take 15mg then change warfarin dose to    7.5 mg every Sun, Tue, Thu; 5 mg all other days      (6.3 % change with larger boost dose, patient was previously quite stable on 7.5mg x 2 warfarin dose)    Instructed patient to follow up no later than: 1 week    Education provided: importance of consistent vitamin K intake, target INR goal and significance of current INR result and monitoring for clotting signs and symptoms        Instructed to call the [facility] for any changes, questions or concerns. (#[phone])   ?   Carmencita Lopez RN    Subjective/Objective:      Panda Miranda, a 64 y.o. male is on warfarin.     Panda reports:     Home warfarin dose: as updated on anticoagulation calendar per template     Missed doses: No     Medication changes:  No     S/S of bleeding or thromboembolism:  No     New Injury or illness:  No     Changes in diet or alcohol consumption:  No     Upcoming surgery, procedure or cardioversion:  No    Anticoagulation Episode Summary     Current INR goal:   2.0-3.0   TTR:   91.0 % (1 y)   Next INR check:   1/9/2020   INR from last check:   1.40! (1/2/2020)   Weekly max warfarin dose:      Target end date:   Indefinite   INR check location:      Preferred lab:      Send INR reminders to:   [facility]    Indications    A-fib (H) (Resolved) [I48.91]           Comments:            Anticoagulation Care Providers     Provider Role Specialty Phone number    Boubacar,  Ben Bennett MD [facility] [phone]

## 2021-06-04 NOTE — PROGRESS NOTES
[facility] PRE-OPERATIVE VISIT FOR:    Panda Miranda,  1955, MRN [id number]    Upcoming procedure date:   Surgeon: Dr. Magaña  Surgery Facility: [facility]    Chief Complaint: Preprocedure evaluation, colonoscopy for follow-up sigmoid adenocarcinoma    PCP: Ben Hamlin MD, [phone]    SUBJECTIVE:  History of Present Illness:   Panda Miranda is a 64 y.o. male with history of colon cancer resection/re-anastomosis with Dr. aMgaña  .  Colonoscopy scheduled for follow-up surveillance.    Patient with multiple medical problems that include:  Severe obesity, BMI 59  Diastolic congestive heart failure, mitral regurgitation, atrial fibrillation.  Obstructive sleep apnea with CPAP at 14 cmH2O  Cor pulmonale  Chronic kidney disease stage IV  Osteoarthritis of the knee, gout and pseudogout    Currently stable, feeling well.    Takes warfarin for atrial fibrillation  Reviewed MTM recommendations, chads 2 is 2, no bridging recommended    Past Medical History:  Patient Active Problem List   Diagnosis     Lower Back Pain     Osteoarthritis Of The Knee     Hematuria     Glucose Intolerance     Essential hypertension     Anemia     Ventricular Septal Defect     Obesity (BMI 35.0-39.9 without comorbidity)     Obstructive Sleep Apnea     Pseudogout     Atrial fibrillation (H)     Diastolic Congestive Heart Failure     Chronic Kidney Disease, Stage 3     Onychomycosis     Gout     Vitamin D deficiency     Health care maintenance     Compliance with medication regimen     Non-rheumatic mitral regurgitation     Cor pulmonale (H)     Obesity     Hyperglycemia      Venous stasis     Plantar fasciitis     Malignant neoplasm of sigmoid colon (H)     Arthritis of knee     Skin ulcer of left foot, limited to breakdown of skin (H)     Urge incontinence of urine     Decreased visual acuity     Past Surgical History:   Procedure Laterality Date     GASTROPLASTY VERTICAL BANDED        [facility] 1996 Initial weight 800++ Lost to 440- (was 320# at lowest)     KNEE SURGERY       CO PART REMOVAL COLON W ANASTOMOSIS      Description: Partial Colectomy;  Recorded: 12/02/2011;  Comments: Dr Magaña     Any history of anesthesia reaction no  Do you have difficulty breathing or chest pain after walking up a flight of stairs: No  History of obstructive sleep apnea: Yes: CPAP, 14  Steroid use in the last 6 months: Yes: Corticosteroid injected in right knee, 11/20  Frequent Aspirin/NSAID use: No  Prior Blood Transfusion: No  Prior Blood Transfusion Reaction: No  If for some reason prior to, during or after the procedure, if it is medically indicated, would you be willing to have a blood transfusion?:  There is no transfusion refusal.    Social History:  he  reports that he has never smoked. He has never used smokeless tobacco. He reports current alcohol use of about 0.8 standard drinks of alcohol per week. He reports that he does not use drugs.    Family History:  Family History   Problem Relation Age of Onset     Diabetes type II Unknown      Heart failure Unknown      Heart disease Mother      Hypertension Mother      Diabetes Sister        Current Medications:  Current Outpatient Medications   Medication Sig Dispense Refill     acetaminophen-codeine (TYLENOL #3) 300-30 mg per tablet Take 1-2 tablets by mouth every 6 (six) hours as needed for pain (maximum 8 pills/ day). 40 tablet 2     acetaminophen-codeine (TYLENOL-CODEINE #3) 300-30 mg per tablet Take 1-2 tablets by mouth every 6 (six) hours as needed for pain (Maximum 8 tablets/day). 40 tablet 0     amLODIPine (NORVASC) 5 MG tablet Take 5 mg by mouth.       bumetanide (BUMEX) 1 MG tablet TAKE 3 TABLETS BY MOUTH TWICE DAILY AT 9 AM AND 6  tablet 2     cholecalciferol, vitamin D3, (CHOLECALCIFEROL) 1,000 unit tablet Take 3 tablets (3,000 Units total) by mouth daily. 270 tablet 3     COLCRYS 0.6 mg tablet TAKE 1 TABLET(0.6 MG) BY  MOUTH DAILY 90 tablet 3     febuxostat (ULORIC) 80 mg Tab Take 1 tablet by mouth daily. 90 tablet 3     ferrous sulfate 325 (65 FE) MG tablet TAKE 1 TABLET BY MOUTH ONCE DAILY WITH BREAKFAST 90 tablet 3     lisinopril (PRINIVIL,ZESTRIL) 10 MG tablet TAKE 1 TABLET(10 MG) BY MOUTH DAILY 90 tablet 2     metoprolol tartrate (LOPRESSOR) 50 MG tablet TAKE 1 TABLET BY MOUTH DAILY. 90 tablet 3     sulfamethoxazole-trimethoprim (SEPTRA DS) 800-160 mg per tablet Take 1 tablet by mouth daily for 14 doses. 14 tablet 0     warfarin (COUMADIN/JANTOVEN) 5 MG tablet TAKE 1 TO 1 AND 1/2 TABLETS(5 TO 7.5 MG) BY MOUTH DAILY AS DIRECTED. ADJUST DOSE BASED ON  tablet 1     aspirin 81 MG EC tablet Take 81 mg by mouth.       cyclobenzaprine (FLEXERIL) 10 MG tablet TK 1 T PO 30 MINUTES BEFORE BEDTIME  0     lidocaine 4 % patch Place 1 patch on the skin daily. Remove and discard patch with 12 hours or as directed by MD. 12 patch 0     metoprolol tartrate (LOPRESSOR) 50 MG tablet TAKE 3 TABLETS BY MOUTH DAILY 270 tablet 0     oxyCODONE (ROXICODONE) 5 MG immediate release tablet Take 1 tablet (5 mg total) by mouth every 4 (four) hours as needed for pain. 12 tablet 0     oxyCODONE-acetaminophen (PERCOCET) 5-325 mg per tablet Take 1-2 tablets by mouth every 6 (six) hours as needed for pain. Max 8 per day 20 tablet 0     senna (SENNA) 8.6 mg tablet Take 8.6 mg by mouth daily as needed.        No current facility-administered medications for this visit.        Allergies:  Patient has no known allergies.    Review or Systems:  Constitution: normal  HEENT: normal  Pulmonary: normal  Cardiovascular: Occasional, sharp chest pain just for seconds.  Nothing sustained, no shortness of breath.  GI: normal  : normal  Musculoskeletal: normal  Neuro: normal  Endocrine: normal  Psych: normal  Lymph/Heme: normal    OBJECTIVE:  Vitals:    12/05/19 1040   BP: 90/63   Pulse: 76   Resp: 16   Temp: 98.3  F (36.8  C)   SpO2: 98%     General Appearance:     Alert, cooperative, no distress, appears stated age   Head:    Normocephalic, without obvious abnormality, atraumatic   Eyes:    PERRL, conjunctiva/corneas clear, EOM's intact   Nose:   Mucosa moist, normal    Throat:   Lips, mucosa, and tongue normal; ora phaynx clear   Neck:   Supple, symmetrical, trachea midline, no adenopathy;     thyroid:  no enlargement/tenderness/nodules; no carotid    bruit or JVD   Lungs:     Clear to auscultation bilaterally, respirations unlabored    Heart:    Regular rate and rhythm, S1 and S2 normal, no murmur, rub   or gallop   Abdomen:     Soft, non-tender, bowel sounds active all four quadrants,     no masses, no organomegaly   Extremities:   Extremities normal, atraumatic, no cyanosis or edema   Pulses:   2+ and symmetric all extremities   Skin:   Skin color, texture, turgor normal, no rashes or lesions   Neurologic:   No focal deficits         Labs:  Results for orders placed or performed in visit on 11/26/19   INR   Result Value Ref Range    INR 2.20 (H) 0.90 - 1.10       Results for orders placed or performed in visit on 12/05/19   Electrocardiogram Perform - Clinic   Result Value Ref Range    SYSTOLIC BLOOD PRESSURE      DIASTOLIC BLOOD PRESSURE      VENTRICULAR RATE 74 BPM    ATRIAL RATE 93 BPM    P-R INTERVAL      QRS DURATION 110 ms    Q-T INTERVAL 400 ms    QTC CALCULATION (BEZET) 444 ms    P Axis      R AXIS 11 degrees    T AXIS -2 degrees    MUSE DIAGNOSIS       Atrial fibrillation with premature ventricular or aberrantly conducted complexes  Abnormal ECG  When compared with ECG of 19-JUN-2018 09:15,  No significant change was found         EKG is personally reviewed.  Atrial fibrillation with rate of 74.  Borderline left axis deviation, incomplete right bundle branch block.  Agree as above  ASSESSMENT/PLAN:  1. Preoperative examination  Electrocardiogram Perform - Clinic   2. Malignant neoplasm of sigmoid colon (H)     3. Class 3 severe obesity with serious comorbidity  and body mass index (BMI) of 50.0 to 59.9 in adult, unspecified obesity type (H)     4. Atrial fibrillation, unspecified type (H)  Electrocardiogram Perform - Clinic   5. Chronic diastolic heart failure (H)  Electrocardiogram Perform - Clinic   6. Chronic Kidney Disease, Stage 3       Low risk procedure  No known coronary artery disease the patient does have diastolic congestive heart failure/atrial fibrillation.  Chest pain is very brief/sharp in nature and description is not consistent with angina.  Unknown functional capacity    Acceptable risk for procedure  No special recommendations:  Okay to hold warfarin.  Otherwise medications with clear liquids morning of surgery as recommended by gastroenterologist.          Ben Hamlin MD

## 2021-06-05 VITALS
SYSTOLIC BLOOD PRESSURE: 114 MMHG | HEIGHT: 76 IN | TEMPERATURE: 97.3 F | HEART RATE: 75 BPM | RESPIRATION RATE: 26 BRPM | BODY MASS INDEX: 38.36 KG/M2 | WEIGHT: 315 LBS | DIASTOLIC BLOOD PRESSURE: 74 MMHG

## 2021-06-05 VITALS
DIASTOLIC BLOOD PRESSURE: 66 MMHG | TEMPERATURE: 97.9 F | RESPIRATION RATE: 23 BRPM | WEIGHT: 315 LBS | BODY MASS INDEX: 38.36 KG/M2 | HEIGHT: 76 IN | HEART RATE: 88 BPM | SYSTOLIC BLOOD PRESSURE: 106 MMHG

## 2021-06-05 VITALS
SYSTOLIC BLOOD PRESSURE: 116 MMHG | HEART RATE: 84 BPM | TEMPERATURE: 98.8 F | RESPIRATION RATE: 20 BRPM | HEIGHT: 78 IN | BODY MASS INDEX: 36.45 KG/M2 | DIASTOLIC BLOOD PRESSURE: 62 MMHG | WEIGHT: 315 LBS

## 2021-06-05 VITALS
HEIGHT: 78 IN | DIASTOLIC BLOOD PRESSURE: 45 MMHG | SYSTOLIC BLOOD PRESSURE: 110 MMHG | BODY MASS INDEX: 36.45 KG/M2 | WEIGHT: 315 LBS | HEART RATE: 80 BPM | RESPIRATION RATE: 20 BRPM | TEMPERATURE: 98.5 F

## 2021-06-05 NOTE — TELEPHONE ENCOUNTER
----- Message from Barrington Ward PharmD sent at 1/20/2020  9:08 AM CST -----  GoodRx Coupons would bring the cost for 30 tabs down to about $50 per month.     If that is still not affordable it looks like there is a patient assistance application directly through the  (does have some income requirements, but seems comparatively generous). If pt feels comfortable downloading, completing and sending in with appropriate documentation, the link is [email or web address]     Otherwise, he can contact [place] Patient Assistance Program at [phone] and they can help with this process.       ----- Message -----  From: Meri Urrutia MD  Sent: 1/16/2020   2:35 PM CST  To: Barrington Ward, PharmD    Pt notes he can't afford colchicine on regular basis but needs to take 1 a day to prevent gout.  Any ideas?

## 2021-06-05 NOTE — TELEPHONE ENCOUNTER
ANTICOAGULATION  MANAGEMENT    Assessment     Today's INR result of 2.3 is Therapeutic (goal INR of 2.0-3.0)        Warfarin taken as previously instructed    No new diet changes affecting INR    He took 5 days of prednisone.    Continues to tolerate warfarin with no reported s/s of bleeding or thromboembolism     Previous INR was Subtherapeutic    Plan:     Spoke with Panda regarding INR result and instructed:     Warfarin Dosing Instructions:  Continue current warfarin dose 7.5 mg daily on Sundays, Tuesdays and Thursdays; and 5 mg daily rest of week  (0 % change)    Instructed patient to follow up no later than: 1-2 weeks.    Education provided: importance of therapeutic range, importance of following up for INR monitoring at instructed interval and importance of taking warfarin as instructed    Panda verbalizes understanding and agrees to warfarin dosing plan.    Instructed to call the [facility] for any changes, questions or concerns. (#[phone])   ?   Chely Groves RN    Subjective/Objective:      Panda Miranda, a 64 y.o. male is on warfarin.     Panda reports:     Home warfarin dose: verbally confirmed home dose with Panda and updated on anticoagulation calendar     Missed doses: No     Medication changes:  Yes: took 5 days of Prednisone. Finished a few days ago.      S/S of bleeding or thromboembolism:  No     New Injury or illness:  Yes: gout.     Changes in diet or alcohol consumption:  No     Upcoming surgery, procedure or cardioversion:  No    Anticoagulation Episode Summary     Current INR goal:   2.0-3.0   TTR:   87.8 % (1 y)   Next INR check:   2/7/2020   INR from last check:   2.30 (1/24/2020)   Weekly max warfarin dose:      Target end date:   Indefinite   INR check location:      Preferred lab:      Send INR reminders to:   [facility]    Indications    A-fib (H) (Resolved) [I48.91]           Comments:            Anticoagulation Care Providers     Provider Role Specialty  Phone number    Ben Hamlin MD [facility] [phone]

## 2021-06-05 NOTE — TELEPHONE ENCOUNTER
----- Message from Ben Hamlin MD sent at 2/6/2020  6:52 PM CST -----  Please forward this for preop and contact patient.  His potassium is a little bit high this time which, at this point is okay.  I would not make any changes.  I would ask that he follow-up with me in 1 or 2 months to recheck.  That is, unless he sees the kidney doctor in the meantime.

## 2021-06-05 NOTE — TELEPHONE ENCOUNTER
Can you please contact pt with this information regarding the cost of his gout pill    How is his elbow doing this week?

## 2021-06-05 NOTE — TELEPHONE ENCOUNTER
ANTICOAGULATION  MANAGEMENT    Assessment     Today's INR result of 1.9 is Subtherapeutic (goal INR of 2.0-3.0)        Warfarin taken as previously instructed    No new diet changes affecting INR    No new medication/supplements affecting INR    Continues to tolerate warfarin with no reported s/s of bleeding or thromboembolism     Previous INR was Subtherapeutic    Plan:     Spoke with Panda regarding INR result and instructed:     Warfarin Dosing Instructions:  Continue current warfarin dose    7.5 mg every Sun, Tue, Thu; 5 mg all other days         Instructed patient to follow up no later than: 2 weeks    Education provided: target INR goal and significance of current INR result    Panda verbalizes understanding and agrees to warfarin dosing plan.    Instructed to call the [facility] for any changes, questions or concerns. (#[phone])   ?   Carmencita Lopez RN    Subjective/Objective:      Panda Miranda, a 64 y.o. male is on warfarin.     Panda reports:     Home warfarin dose: verbally confirmed home dose with Panda and updated on anticoagulation calendar     Missed doses: No     Medication changes:  No     S/S of bleeding or thromboembolism:  No     New Injury or illness:  No     Changes in diet or alcohol consumption:  No     Upcoming surgery, procedure or cardioversion:  No    Anticoagulation Episode Summary     Current INR goal:   2.0-3.0   TTR:   88.8 % (1 y)   Next INR check:   1/24/2020   INR from last check:   1.90! (1/10/2020)   Weekly max warfarin dose:      Target end date:   Indefinite   INR check location:      Preferred lab:      Send INR reminders to:   [facility]    Indications    A-fib (H) (Resolved) [I48.91]           Comments:            Anticoagulation Care Providers     Provider Role Specialty Phone number    Ben Hamlin MD Referring Family Medicine [phone]

## 2021-06-05 NOTE — PROGRESS NOTES
[facility] Office Visit    Chief Complaint:  Chief Complaint   Patient presents with     Edema     left elbow, painful, interested in aspiration of elbow     Gout         Assessment/Plan:  1. 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  Diffuse swelling in left elbow joint without obvious fluid collection to remove.  No signs of infection.  On coumadin so cautious about joint injection.  Stage 3-4 ckd so avoid NSAIDs.  Pt did restart colchicine- will consult with MTM to see if he can get assistance with drug costs to prevent exacerbation of his kidney stones and gout.  Continue uloric 80mg daily.  Prednisone burst as below as colchicine is kicking in.  F/u if not better in 3-4 days.   - predniSONE (DELTASONE) 20 MG tablet; Take 40 mg by mouth daily for 5 days.  Dispense: 10 tablet; Refill: 0    2. Encounter for immunization  - Pneumococcal conjugate vaccine 13-valent 6wks-17yrs; >50yrs    3. Bronchitis  Pt gets recurrent cough/URI.  Mild sx at this time.  Gets 1 bottle of cough syrup with codeine/ year and needs a refill.    - codeine-guaiFENesin (GUAIFENESIN AC)  mg/5 mL liquid; Take 5-10 mL by mouth every 6 (six) hours as needed for cough.  Dispense: 240 mL; Refill: 0    4. Chronic Kidney Disease, Stage 3  Careful about NSAIDs.  Continue current meds.  F/u with [facility] and pcp as scheduled.      5. Atrial fibrillation, unspecified type (H)  Stable on coumadin.  F/u next week for recheck on INR.  Avoid left elbow injection today.        Return in about 6 weeks (around 2/27/2020) for Recheck.    Patient Education/AVS:  There are no Patient Instructions on file for this visit.    HPI:   Panda Miranda is a 64 y.o. male c/o left elbow swelling and wondering if he can get this aspirated.  Has h/o gout and pseudogout in the past in his knees requiring aspiration and steroid  injection, most recently 5/2019.      Ran out of colchicine a week ago and it is expensive over $100 for 15 pills.  Doesn't like his current insurance.  Had to wait until he got paid.  Left elbow started swelling and pain a couple days ago and has been getting worse and seems like it will spread to the left hand.  Mostly will get gout in knee, hands/wrist.  This is the first time he has had this in the elbow has been affected.  Did end up paying for his colchicine with his credit card and started today. Not using his T#3 because pain isn't bad enough to justify his constipation.  Continues to take the uloric but finds that it only works if he also takes the colchicine.  No fevers/chills. No trauma to elbow.       Has also developed a cold with bad cough- does have his T#3 for pain but needs a refill on his cough syrup with codeine- uses about 1 bottle a year.      Did get his colonoscopy.  Has to reschedule his heart doctor b/c he was running too late.      ROS:  Constitutional, ENT, CV, Resp, GI, , MSK, skin, neuro, psych all negative except as outlined in the HPI above and patient denies any other symptoms.      History summarized from1-2:12/5/19 preop for colonoscopy.   Old Records-1: Outside allergies, meds, problems and immunizations were reconciled as needed  Lab tests reviewed-1: Cr 2.65 11/19, joint fluid from left knee reviewed showing gout crystals 5/2019    Health Maintenance reviewed and ordered as appropriate as part of shared decision making with patient.     Physical Exam:  BP 96/64 (Patient Site: Right Arm, Patient Position: Standing, Cuff Size: Adult Large)   Pulse 73   Temp 99  F (37.2  C) (Oral)   Wt (!) 485 lb (220 kg)   BMI 60.22 kg/m   Body mass index is 60.22 kg/m . No LMP for male patient.  Vital signs reviewed  Wt Readings from Last 3 Encounters:   01/16/20 (!) 485 lb (220 kg)   12/10/19 (!) 477 lb (216.4 kg)   12/05/19 (!) 477 lb (216.4 kg)     Social History     Tobacco Use   Smoking  Status Never Smoker   Smokeless Tobacco Never Used     Social History     Substance and Sexual Activity   Sexual Activity Not on file     No data recorded  No data recorded  PHQ-2 Total Score: 0 (11/20/2019  1:47 PM)    No data recorded    All normal as below except abnormalities include: left elbow moderately swollen compared to right.  Tender to palpation superiorly more than distal.  Mildly warm to touch.  No redness.  No discreet effusion or fluid collection.  Limited movement due to swellign and pain.  No pain in left shoulder/upper arm, left forearm, wrist.  Mildly tender and swollen left index finger with possible tophi.  Bilateral lower ext 1+ edema in both feet/ankles/lower shins without redness/warmth/swelling  General is a  64 y.o. male sitting comfortably in no apparent distress.   HEENT: Eye, nasal, oral exams within normal   CV: distant heart sounds, irregularly irregular  Lungs: Clear to auscultation bilaterally- occ productive cough  Extremities: Warm, 2+ radial pulses bilaterally  Neuro/MSK: Able to ambulate around the exam room with equal movement, strength and normal coordination of the lower extremeties symmetrically    Results for orders placed or performed in visit on 01/10/20   INR   Result Value Ref Range    INR 1.90 (H) 0.90 - 1.10       Med list and active problem list reviewed and updated as part of this encounter    Current Outpatient Medications on File Prior to Visit   Medication Sig Dispense Refill     acetaminophen-codeine (TYLENOL #3) 300-30 mg per tablet Take 1-2 tablets by mouth every 6 (six) hours as needed for pain (maximum 8 pills/ day). 40 tablet 2     amLODIPine (NORVASC) 5 MG tablet Take 5 mg by mouth.       bumetanide (BUMEX) 1 MG tablet TAKE 3 TABLETS BY MOUTH TWICE DAILY AT 9 AM AND 6  tablet 2     cholecalciferol, vitamin D3, (CHOLECALCIFEROL) 1,000 unit tablet Take 3 tablets (3,000 Units total) by mouth daily. 270 tablet 3     COLCRYS 0.6 mg tablet TAKE 1  TABLET(0.6 MG) BY MOUTH DAILY 90 tablet 3     febuxostat (ULORIC) 80 mg Tab Take 1 tablet by mouth daily. 90 tablet 3     ferrous sulfate 325 (65 FE) MG tablet TAKE 1 TABLET BY MOUTH ONCE DAILY WITH BREAKFAST 90 tablet 3     lidocaine 4 % patch Place 1 patch on the skin daily. Remove and discard patch with 12 hours or as directed by MD. 12 patch 0     lisinopril (PRINIVIL,ZESTRIL) 10 MG tablet TAKE 1 TABLET(10 MG) BY MOUTH DAILY 90 tablet 2     metoprolol tartrate (LOPRESSOR) 50 MG tablet TAKE 1 TABLET BY MOUTH DAILY. 90 tablet 3     warfarin ANTICOAGULANT (COUMADIN/JANTOVEN) 5 MG tablet Take 1-1.5 tablets (5-7.5 mg) daily as directed. Adjust dose per INR results. 125 tablet 0     No current facility-administered medications on file prior to visit.          Meri Urrutia MD

## 2021-06-05 NOTE — TELEPHONE ENCOUNTER
"Called and spoke with Panda Miranda's wife Lana on [facility]  , Message was given, Panda Miranda's wife understood, no further questions.    \" Okay to relay message\"   Labs sent to [facility] [phone]  "

## 2021-06-05 NOTE — PROGRESS NOTES
[facility] PRE-OPERATIVE VISIT FOR:    Panda Miranda,  1955, MRN [id number]    Upcoming surgery date:   Surgeon: Laureano  Surgery Facility: [facility]    Chief Complaint: Preop left cataract    PCP: Ben Hamlin MD, [phone]    SUBJECTIVE:  History of Present Illness:   Panda Miranda is a 64 y.o. male with history of gradual visual loss, painless, left eye    History of severe obesity, BMI nearly 60, chronic kidney disease, diastolic congestive heart failure, mitral regurgitation, hypertension, colon cancer, obstructive sleep apnea    Patient takes warfarin for atrial fibrillation.  Chads 2 score is 2, bridging not needed if he needs to hold warfarin.     Uses CPAP at 14 cm of water.    Past Medical History:  Patient Active Problem List   Diagnosis     Lower Back Pain     Osteoarthritis Of The Knee     Hematuria     Glucose Intolerance     Essential hypertension     Anemia     Ventricular Septal Defect     Obesity (BMI 35.0-39.9 without comorbidity)     Obstructive Sleep Apnea     Pseudogout     Atrial fibrillation (H)     Diastolic Congestive Heart Failure     Chronic Kidney Disease, Stage 3     Onychomycosis     Gout     Vitamin D deficiency     Health care maintenance     Compliance with medication regimen     Non-rheumatic mitral regurgitation     Cor pulmonale (H)     Obesity     Hyperglycemia      Venous stasis     Plantar fasciitis     Malignant neoplasm of sigmoid colon (H)     Arthritis of knee     Skin ulcer of left foot, limited to breakdown of skin (H)     Urge incontinence of urine     Decreased visual acuity     Past Surgical History:   Procedure Laterality Date     COLON SURGERY       COLONOSCOPY N/A 2019    Procedure: COLONOSCOPY;  Surgeon: Flaco Magaña MD;  Location: [facility];  Service: General     GASTROPLASTY VERTICAL BANDED      Dr. Arizmendi  of [place]  Initial weight 800++ Lost to 440- (was 320# at lowest)     KNEE SURGERY        NV PART REMOVAL COLON W ANASTOMOSIS      Description: Partial Colectomy;  Recorded: 12/02/2011;  Comments: Dr Magaña     Any history of anesthesia reaction no  Do you have difficulty breathing or chest pain after walking up a flight of stairs: No  History of obstructive sleep apnea: Yes: CPAP at 14  Steroid use in the last 6 months: Yes: Knee corticosteroid in November  Frequent Aspirin/NSAID use: Yes: Warfarin  Prior Blood Transfusion: No  Prior Blood Transfusion Reaction: No  If for some reason prior to, during or after the procedure, if it is medically indicated, would you be willing to have a blood transfusion?:  There is no transfusion refusal.    Social History:  he  reports that he has never smoked. He has never used smokeless tobacco. He reports current alcohol use of about 0.8 standard drinks of alcohol per week. He reports that he does not use drugs.    Family History:  Family History   Problem Relation Age of Onset     Diabetes type II Other      Heart failure Other      Heart disease Mother      Hypertension Mother      Diabetes Sister        Current Medications:  Current Outpatient Medications   Medication Sig Dispense Refill     acetaminophen-codeine (TYLENOL #3) 300-30 mg per tablet Take 1-2 tablets by mouth every 6 (six) hours as needed for pain (maximum 8 pills/ day). 40 tablet 2     amLODIPine (NORVASC) 5 MG tablet Take 5 mg by mouth.       bumetanide (BUMEX) 1 MG tablet TAKE 3 TABLETS BY MOUTH TWICE DAILY AT 9 AM AND 6  tablet 2     cholecalciferol, vitamin D3, (CHOLECALCIFEROL) 1,000 unit tablet Take 3 tablets (3,000 Units total) by mouth daily. 270 tablet 3     COLCRYS 0.6 mg tablet TAKE 1 TABLET(0.6 MG) BY MOUTH DAILY 90 tablet 3     febuxostat (ULORIC) 80 mg Tab Take 1 tablet by mouth daily. 90 tablet 3     ferrous sulfate 325 (65 FE) MG tablet TAKE 1 TABLET BY MOUTH ONCE DAILY WITH BREAKFAST 90 tablet 3     lidocaine 4 % patch Place 1 patch on the skin daily. Remove and discard  patch with 12 hours or as directed by MD. 12 patch 0     lisinopril (PRINIVIL,ZESTRIL) 10 MG tablet TAKE 1 TABLET(10 MG) BY MOUTH DAILY 90 tablet 2     metoprolol tartrate (LOPRESSOR) 50 MG tablet TAKE 1 TABLET BY MOUTH DAILY. 90 tablet 3     warfarin ANTICOAGULANT (COUMADIN/JANTOVEN) 5 MG tablet Take 1-1.5 tablets (5-7.5 mg) daily as directed. Adjust dose per INR results. 125 tablet 0     No current facility-administered medications for this visit.        Allergies:  Patient has no known allergies.    Review or Systems:  Constitution: normal  HEENT: normal  Pulmonary: normal  Cardiovascular: normal  GI: normal  : normal  Musculoskeletal: normal  Neuro: normal  Endocrine: normal  Psych: normal  Lymph/Heme: normal    OBJECTIVE:  Vitals:    02/05/20 1107   BP: 92/58   Pulse: 75   Resp: 22   Temp: 99.5  F (37.5  C)   SpO2: 99%     General Appearance:    Alert, cooperative, no distress, appears stated age   Head:    Normocephalic, without obvious abnormality, atraumatic   Eyes:    PERRL, conjunctiva/corneas clear, EOM's intact   Nose:   Mucosa moist, normal    Throat:   Lips, mucosa, and tongue normal; ora phaynx clear   Neck:   Supple, symmetrical, trachea midline, no adenopathy;     thyroid:  no enlargement/tenderness/nodules; no carotid    bruit or JVD   Lungs:     Clear to auscultation bilaterally, respirations unlabored    Heart:    Regular rate and rhythm, S1 and S2 normal, no murmur, rub   or gallop   Abdomen:     Soft, non-tender, bowel sounds active all four quadrants,     no masses, no organomegaly   Extremities:   Extremities normal, 1+ lower extremity edema,or edema                     Labs:  Results for orders placed or performed in visit on 02/05/20   INR   Result Value Ref Range    INR 2.30 (H) 0.90 - 1.10     BMP is drawn today and is pending  Last EKG 12/5/2019, printed and included  ASSESSMENT/PLAN:  1. Preop examination  Basic Metabolic Panel   2. Atrial fibrillation, unspecified type (H)  INR   3.  Essential hypertension  Basic Metabolic Panel     Low risk surgery  No known cardiac disease  Acceptable functional capacity    Acceptable risk for surgery  Please note sleep apnea  We will contact Dr Tinsley to determine whether patient should continue to take warfarin through the time of surgery or whether he should hold it.  Okay to hold if recommendation is to do so, 5 days, no bridging is necessary given the fact that his chads 2 score is 2    Lisinopril, metoprolol, amlodipine on morning of surgery.    Ben Hamlin MD

## 2021-06-05 NOTE — TELEPHONE ENCOUNTER
Writer did not note the message to ask how patient elbow was doing.  Writer called the and left a vm for patient to call to update on how his elbow is doing.      Patient Returning Call  Reason for call: Medication  Information relayed to patient:  The writer read the following to patient per Dr Urrutia: GoodRx Coupons would bring the cost for 30 tabs down to about $50 per month.    If that is still not affordable it looks like there is a patient assistance application directly through the  (does have some income requirements, but seems comparatively generous). If pt feels comfortable downloading, completing and sending in with appropriate documentation, the link is [email or web address].mobintent/[email or web address]-us/corporate-[email or web address]-assistance    Otherwise, he can contact [facility] Patient Assistance Program at [phone] and they can help with this process.     Patient opted to call the [employer] Patient Assistance with this.  He cannot afford the price with coupon.    Patient has additional questions:  No  If YES, what are your questions/concerns:  NA  Okay to leave a detailed message?: Yes

## 2021-06-05 NOTE — TELEPHONE ENCOUNTER
Called and left message#1 for patient to return call to clinic for message.  Okay to relay message

## 2021-06-05 NOTE — TELEPHONE ENCOUNTER
ANTICOAGULATION  MANAGEMENT    Assessment     Today's INR result of 2.3 is Therapeutic (goal INR of 2.0-3.0)        Warfarin taken as previously instructed    No new diet changes affecting INR    No new medication/supplements affecting INR    Continues to tolerate warfarin with no reported s/s of bleeding or thromboembolism     Previous INR was Therapeutic     Cataract surgery 2/13- no need to hold warfarin (confirmed with [facility] today).    Plan:     Spoke with Panda regarding INR result and instructed:     Warfarin Dosing Instructions:  Continue current warfarin dose    7.5 mg every Sun, Tue, Thu; 5 mg all other days         Instructed patient to follow up no later than: 4 weeks.    Education provided: importance of taking warfarin as instructed    Panda verbalizes understanding and agrees to warfarin dosing plan.    Instructed to call the [facility] for any changes, questions or concerns. (#[phone])   ?   Flash Barraza RN    Subjective/Objective:      Panda Miranda, a 64 y.o. male is on warfarin.     Panda reports:     Home warfarin dose: template incorrect; verbally confirmed home dose with pt and updated on anticoagulation calendar     Missed doses: No     Medication changes:  No     S/S of bleeding or thromboembolism:  No     New Injury or illness:  No     Changes in diet or alcohol consumption:  No     Upcoming surgery, procedure or cardioversion:  Yes, cataract surgery 2/13.    Anticoagulation Episode Summary     Current INR goal:   2.0-3.0   TTR:   87.8 % (1 y)   Next INR check:   3/4/2020   INR from last check:   2.30 (2/5/2020)   Weekly max warfarin dose:      Target end date:   Indefinite   INR check location:      Preferred lab:      Send INR reminders to:   [facility]    Indications    A-fib (H) (Resolved) [I48.91]           Comments:            Anticoagulation Care Providers     Provider Role Specialty Phone number    Ben Hamlin MD Referring Family  Medicine [phone]

## 2021-06-05 NOTE — TELEPHONE ENCOUNTER
Who is calling:  Whitley from [facility]  Reason for Call:  Caller stated that someone was reaching to them regarding this patient. Caller stated that the phone may have dropped the call or technical issues etc. Caller is returning that call. Please call back.  Date of last appointment with primary care: 2/5/2020  Okay to leave a detailed message: No, ask for Whitley, triage  [phone]

## 2021-06-05 NOTE — TELEPHONE ENCOUNTER
[place] confirmed with nurse Arreaga from [facility] that patient does not need to hold warfarin for his cataract surgery 2/13.

## 2021-06-06 ENCOUNTER — COMMUNICATION - HEALTHEAST (OUTPATIENT)
Dept: FAMILY MEDICINE | Facility: CLINIC | Age: 66
End: 2021-06-06

## 2021-06-06 DIAGNOSIS — M1A.0490 IDIOPATHIC CHRONIC GOUT OF HAND WITHOUT TOPHUS, UNSPECIFIED LATERALITY: ICD-10-CM

## 2021-06-06 NOTE — TELEPHONE ENCOUNTER
New Appointment Needed  What is the reason for the visit:    Same Date/Next Day Appt Request  What is the reason for your visit?: Same day INR lab. Patient added for today at 2pm.    Provider Preference: Any available  How soon do you need to be seen?: today  Waitlist offered?: No  Okay to leave a detailed message:  Yes, not needed

## 2021-06-07 NOTE — TELEPHONE ENCOUNTER
ANTICOAGULATION  MANAGEMENT PROGRAM    Panda Miranda is overdue for INR check.     First reminder letter sent.       Carmencita Lopze RN

## 2021-06-07 NOTE — TELEPHONE ENCOUNTER
Called and spoke with Panda Miranda, Message was given, medication refill request done and sent to pharmacy. Panda Miranda understood, no further questions.

## 2021-06-07 NOTE — TELEPHONE ENCOUNTER
Controlled Substance Refill Request  Medication Name:   Requested Prescriptions     Pending Prescriptions Disp Refills     acetaminophen-codeine (TYLENOL #3) 300-30 mg per tablet 40 tablet 2     Sig: Take 1-2 tablets by mouth every 6 (six) hours as needed for pain (maximum 8 pills/ day).     Date Last Fill: 7/16/19  [facility]ed [id number]  Submit electronically to pharmacy  Controlled Substance Agreement on file:   Encounter-Level CSA Scan Date:    There are no encounter-level csa scan date.        Last office visit:  2/5/2020      Patient was not sure if he had refills left.  Patient's right knee is starting to hurt again.  Once in a while he gets a sharp pain when he takes a step.  Sharp pain started yesterday, 4/8/2020.  There is no swelling or tightness.  If it continues to get worse, patient will call the Provider.  Thank you.

## 2021-06-07 NOTE — TELEPHONE ENCOUNTER
Requested Prescriptions     Pending Prescriptions Disp Refills     warfarin ANTICOAGULANT (COUMADIN/JANTOVEN) 5 MG tablet [Pharmacy Med Name: WARFARIN SOD 5MG TABLETS (PEACH)] 125 tablet 0     Sig: TAKE 1 TO 1 AND 1/2 TABLETS BY MOUTH DAILY AS DIRECTED. ADJUST DOSE PER INR RESULTS       Kaitlynn Denise

## 2021-06-08 NOTE — TELEPHONE ENCOUNTER
ANTICOAGULATION  MANAGEMENT    Assessment     Today's INR result of 2.8 is Therapeutic (goal INR of 2.0-3.0)        patient has been taking a lower warfarin dose at least since his INR in Feb. See notes below    No new diet changes affecting INR    No new medication/supplements affecting INR    Continues to tolerate warfarin with no reported s/s of bleeding or thromboembolism     Previous INR was Therapeutic    Plan:     Spoke with Panda regarding INR result and instructed:     Warfarin Dosing Instructions:  Continue current warfarin dose    7.5 mg every Sun, Thu; 5 mg all other days         Instructed patient to follow up no later than: 6 weeks    Education provided: target INR goal and significance of current INR result    Panda verbalizes understanding and agrees to warfarin dosing plan.    Instructed to call the [facility] for any changes, questions or concerns. (#[phone])   ?   Carmencita Lopez RN    Subjective/Objective:      Panda Miranda, a 64 y.o. male is on warfarin.     Panda reports:     Home warfarin dose: verbally confirmed home dose with Panda and updated on anticoagulation calendar. Dose reported on template is 7.5mg x 2, 5mg ROW. We have his dose recorded as 7.5mg x 3, 5mg ROW. He states he has been taking this way for a long time. Reviewed template from last INR on 3/10, he did indeed report 7.5mg x 2 and 5mg ROW but this was not verbally confirmed by [facility]. He likely switched unintentionally sometime after his INR in Feb.      Missed doses: No     Medication changes:  No     S/S of bleeding or thromboembolism:  No     New Injury or illness:  No     Changes in diet or alcohol consumption:  No     Upcoming surgery, procedure or cardioversion:  No    Anticoagulation Episode Summary     Current INR goal:   2.0-3.0   TTR:   87.8 % (1 y)   Next INR check:   6/29/2020   INR from last check:   2.80 (5/18/2020)   Weekly max warfarin dose:      Target end date:   Indefinite   INR check  location:      Preferred lab:      Send INR reminders to:   [facility]    Indications    A-fib (H) (Resolved) [I48.91]           Comments:            Anticoagulation Care Providers     Provider Role Specialty Phone number    Ben Hamlin MD Referring Family Medicine [phone]

## 2021-06-08 NOTE — TELEPHONE ENCOUNTER
Refill Request  Did you contact pharmacy: Yes.  Date: 6/3/20  Medication name:   Requested Prescriptions     Pending Prescriptions Disp Refills     febuxostat (ULORIC) 80 mg Tab 90 tablet 3     Sig: Take 1 tablet by mouth daily.     Who prescribed the medication: Ben Hamlin MD   Requested Pharmacy: [facility]  Is patient out of medication: Yes  Patient notified refills processed in 3 business days:  yes  Okay to leave a detailed message: yes

## 2021-06-08 NOTE — PROGRESS NOTES
Thank you for asking the [facility] team to see Panda Miranda      Assessment/Plan:      Diastolic CHF - Stable. EF 60% on echo 2014 with no MR and left to right shunt across the small perimembranous VSD. His physical exam is not fluid overloaded, but does show some persistent lower extremity edema due to venous insufficiency. Will continue BB, ACE-I, and bumex and have re-encouraged low salt/sodium diet. He should wear his knee high compression socks as well.      Intermittent MR - dependent on fluid status. Last echo showed no MR.      Atrial Fibrillation - His heart rate seems to be well controlled on metoprolol. Last Holter in 2013 did show some pauses when likely sleeping due to sleep apnea so his BB has not been increased. He is compliant with his coumadin.      Ventricular ectopy - metoprolol      Small denise-membranous VSD - stable on last echo      TASHA - reports compliance    Pre-operative assessment - currently he is going up a flight of stairs and he had a normal left coronary artery on angiogram within the past 5 years. His RCA could not be engaged. He will contact us once he has lost the 10% of his body weight required to undergo weight loss surgery and we will reassess his needs for pre-operative evaluation at that time.    F/U 1 year     Current History:   Panda Miranda is a 61 y.o. man with super morbid obesity, hypertension, obstructive sleep apnea and persistent atrial fibrillation. He also has a history of restrictive small perimembranous VSD and intermittent mitral regurgitation depending on his fluid status.     He returns for annual follow up today and denies PND or orthopnea, chest pain, and palpitations. There have been no hospitalizations. His lower extremity is stable/improved with compression socks, but there is still some present. Currently his venous stasis ulcers are not open. He chronic dyspnea is stable and he is going up/down the stairs at home. He is mostly limited  by severe knee pain.    He was recently seen in the weight management clinic and is considering weight loss surgery if he can meet their goal of a 10% weight loss pre-op.     Past Medical History:     Past Medical History   Diagnosis Date     Arthropathy of wrist      Atrial fibrillation      CHF (congestive heart failure)      CKD (chronic kidney disease)      Gout      Hand swelling      HTN (hypertension)      Obesity      TASHA (obstructive sleep apnea)      Pseudogout        Past Surgical History:     Past Surgical History   Procedure Laterality Date     Pr part removal colon w anastomosis       Description: Partial Colectomy;  Recorded: 12/02/2011;  Comments: Dr Magaña     Knee surgery       Gastroplasty vertical banded       Dr. Arizmendi [facility] 1996 Initial weight 800++ Lost to 440- (was 320# at lowest)       Family History:     Family History   Problem Relation Age of Onset     Diabetes type II       Heart failure       Heart disease Mother      Hypertension Mother      Diabetes Sister        Social History:    reports that he has never smoked. He has never used smokeless tobacco. He reports that he drinks about 0.5 oz of alcohol per week  He reports that he uses illicit drugs about 3.5 times per week.    Meds:     Current Outpatient Prescriptions   Medication Sig Note     acetaminophen-codeine (TYLENOL #3) 300-30 mg per tablet Take 1-2 tablets by mouth every 6 (six) hours as needed for pain.      amLODIPine (NORVASC) 5 MG tablet Take 5 mg by mouth. 2/6/2015: Received from: [facility]     bumetanide (BUMEX) 1 MG tablet TAKE 3 TABLETS BY MOUTH TWICE DAILY AT 9 AM AND AT 6 PM      cholecalciferol, vitamin D3, (VITAMIN D3) 1,000 unit capsule Take 3 capsules (3,000 Units total) by mouth daily.      codeine-guaiFENesin (GUAIFENESIN AC)  mg/5 mL liquid Take 5 mL by mouth every 4 (four) hours as needed. Take 5-10ML by mouth every 4 hours as needed      colchicine (COLCRYS) 0.6 mg tablet  "Take 1 tablet (0.6 mg total) by mouth daily.      febuxostat (ULORIC) 40 mg tablet TAKE 1 TABLET BY MOUTH EVERY DAY      ferrous sulfate 325 (65 FE) MG tablet TAKE 1 TABLET BY MOUTH EVERY DAY      HYDROcodone-acetaminophen (NORCO )  mg per tablet Take by mouth every 4 (four) hours as needed.  2/6/2015: Received from: [facility]     lisinopril (PRINIVIL,ZESTRIL) 10 MG tablet TAKE 1 TABLET BY MOUTH EVERY DAY      metoprolol (LOPRESSOR) 50 MG tablet TAKE 3 TABLETS BY MOUTH DAILY      metoprolol tartrate (LOPRESSOR) 50 MG tablet TAKE 3 TABLETS BY MOUTH EVERY DAY      predniSONE (DELTASONE) 10 MG tablet TAKE 1 TABLET BY MOUTH EVERY DAY      predniSONE (DELTASONE) 5 MG tablet Take 1 tablet (5 mg total) by mouth daily.      senna (SENNA) 8.6 mg tablet Take 8.6 mg by mouth daily as needed.  2/6/2015: Received from: [facility]     warfarin (COUMADIN) 5 MG tablet TAKE ONE AND ONE-HALF TABLET BY MOUTH DAILY      warfarin (COUMADIN) 5 MG tablet TAKE 1 AND 1/2 TABLETS BY MOUTH EVERY DAY      warfarin (COUMADIN) 5 MG tablet TAKE 1 AND 1/2 TABLETS BY MOUTH EVERY DAY        Allergies:   Review of patient's allergies indicates no known allergies.    Review of Systems:   Review of Systems:   General: WNL  Eyes: WNL  Ears/Nose/Throat: WNL  Lungs: WNL  Heart: WNL  Stomach: WNL  Bladder: WNL  Muscle/Joints: WNL  Skin: WNL  Nervous System: WNL  Mental Health: WNL     Blood: WNL       Objective:      Physical Exam  @LASTENCWT:3@  6' 4\" (1.93 m)  @BMI:3@  Visit Vitals     /84 (Patient Site: Left Arm, Patient Position: Sitting, Cuff Size: Adult Large)     Pulse 82     Resp 20     Ht 6' 4\" (1.93 m)     Wt (!) 511 lb (231.8 kg)     SpO2 99%     BMI 62.2 kg/m2       General Appearance:   Alert, cooperative and in no acute distress.   HEENT:  No scleral icterus; the mucous membranes were pink and moist.   Neck: JVP flat. No thyromegaly. No HJR   Chest: The spine was straight. The chest " was symmetric.   Lungs:   Respirations unlabored; the lungs are clear to auscultation.   Cardiovascular:   S1 and S2 irregular and without murmur. No clicks or rubs. No carotid bruits noted. Right DP, PT, and radial pulses 2+. Left DP, PT, and radial pulses 2+.   Abdomen:  No organomegaly, masses, bruits, or tenderness. Bowels sounds are present   Extremities: No cyanosis, clubbing. Chronic edema.   Skin: No xanthelasma.   Neurologic: Mood and affect are appropriate.         Lab Review   Lab Results   Component Value Date     12/23/2016     03/09/2016     01/08/2016    K 4.6 12/23/2016    K 5.1 (H) 03/09/2016    K 4.9 01/08/2016     12/23/2016     03/09/2016     (H) 01/08/2016    CO2 20 (L) 12/23/2016    CO2 20 (L) 03/09/2016    CO2 21 (L) 01/08/2016    BUN 33 (H) 12/23/2016    BUN 39 (H) 03/09/2016    BUN 46 (H) 01/08/2016    CREATININE 2.15 (H) 12/23/2016    CREATININE 2.42 (H) 03/09/2016    CREATININE 2.11 (H) 01/08/2016    CALCIUM 9.4 12/23/2016    CALCIUM 9.5 03/09/2016    CALCIUM 9.2 01/08/2016     Lab Results   Component Value Date    WBC 7.8 12/23/2016    WBC 7.4 03/09/2016    WBC 8.2 01/08/2016    HGB 13.0 (L) 12/23/2016    HGB 13.5 (L) 12/01/2016    HGB 13.7 (L) 03/09/2016    HCT 40.4 12/23/2016    HCT 43.5 03/09/2016    HCT 40.6 01/08/2016    MCV 87 12/23/2016    MCV 86 03/09/2016    MCV 86 01/08/2016     12/23/2016     (L) 03/09/2016     (L) 01/08/2016     Lab Results   Component Value Date    CHOL 173 08/17/2016    CHOL 157 01/26/2012    TRIG 103 08/17/2016    TRIG 78 01/26/2012    HDL 49 08/17/2016    HDL 45 01/26/2012    LDLDIRECT 89 10/20/2014    LDLDIRECT 90 12/21/2012    LDLDIRECT 90 11/02/2009     Lab Results   Component Value Date     (H) 10/20/2014     (H) 03/11/2014     (H) 10/31/2011         Cynthia Argueta M.D.

## 2021-06-08 NOTE — PROGRESS NOTES
Health and Behavior Assessment with Intervention, Initial (60 minutes): Met with patient 1:1 to obtain demographics and background information, reasons for surgery, typical eating pattern/fluid intake as well as psychiatric history.  Panda is a 61-year-old  -American male who would like to follow through with vertical sleeve gastrectomy for health reasons.  He has struggled with his weight for much of his life and now is concerned about various comorbidities.  He has a history of stress, emotional and boredom eating.  He did have a vertical banded gastroplasty in 1996 through the [facility] and was up to 880 pounds at that time.  He did lose down to less than 300 pounds but has since gained over 200 pounds.  He has good knowledge of the surgery and good support.  He will follow-up and complete psychological testing.  Diagnoses: F 54; E 66.01

## 2021-06-08 NOTE — TELEPHONE ENCOUNTER
Who is calling:  Patient   Reason for Call:  Patient wants a call from the [facility] clinic in regards to being open or closed. Reason being is that patient stated he went for his INR at 12 pm on 05.07.20 and he stated that the clinic was closed at that time and wants to make sure that they will be open for his upcoming appointment.  Date of last appointment with primary care: 02.05.20  Okay to leave a detailed message: Yes

## 2021-06-08 NOTE — PROGRESS NOTES
Initial Structured Weight Loss Supervised Diet Evaluation     Assessment:  Pt. is a 61 y.o. male is being seen today for initial RD nutritional evaluation. Pt. has been unsuccessful with non-surgical weight loss methods and is interested in bariatric surgery. Today we reviewed current eating habits and level of physical activity, and instructed on the changes that are required for successful bariatric outcomes.    Workflow review: Pt has not attended support group, has had initial labs drawn and has not started with psych.  Weight goal: 450lbs  ++pt getting revision     Pt's Initial Weight: 507 lbs  Weight: 497 lb (225.4 kg)  Weight loss from initial: 10  % Weight loss: 1.97 %  BMI: Body mass index is 60.5 kg/(m^2).    Estimated RMR ([place]-St Jeor equation):  3211 calories    Food allergies, intolerances, and Tenriism customs: none    Vitamins   Educated on post-op vitamin regimen: Multi Vit + iron 2x/day, calcium citrate 500-600 mg 2x/day, 1240-6652 mcg of Sublingual B-12 daily, and 5000 IU Vitamin D3 daily.    Biggest struggle with weight loss: controlling food choices and amounts (craves a lot)    Diet/Weight History  Most amount lost with first surgery over 400lbs    Who does the grocery shopping for your household? Wife and Self   Who prepares your meals at home? Wife and self during the week  Lives with wife and son    Diet Recall/Time:   Breakfast: 2 sausage, 2 eggs w/ wheat toast butter  Am Snack: fruit occasionally  Lunch:  Turkey burger (no fries) OR soup   Pm snack: chips  Dinner: Pro/Veg/CHO - (21g)  HS Snack: none    Typical Snacks: chips, fruit, corn-less popcorn    Meal preparation methods: boil, stove-top  Fats used at home: nuts, olive oil, veg oil, butter    Fried Foods: 1 times per week    Meals per week away from home: daily  Sit down: none  Fast Food: 2X/week  Ice Cream/FrozenYogurt/Bakery: stopped eating ice cream in 2 months    Recommended limiting eating out to no more than  2x/week.  Patient and I reviewed the importance of eating three consistent meals per day; as well as meal timing to be spaced 4-5 hours apart.  Snack choices: 100-150 calories (1-2x/day if physically hungry), incorporating a fruit/vegetable w/ protein source.    Portion Sizes problematic? Yes per patient/diet recall  Encouraged slowing meal times down, 20-30 minutes, chewing to applesauce consistency.   To aid in proper portion control and slow meal time down discussed consuming meals off smaller plates, use toddler/children utensils and set utensils down after each bite.    Protein, vegetables/fruits, carbohydrates:   Reviewed lean protein sources today. Recommended consuming 15-20gm protein at 3 meals daily    Beverages (Type/Oz. per day)  Water: 64oz  Coffee: 1cup/every other day  Tea: black tea sweetened - 1 cup/day  Milk: none  Regular soda: 2-3X/day cut down to 1X/day - recently eliminated  Diet soda: none  Juice: diluted juice 1glass/day with meds  Lamont-Aid/lemonade/etc: none  Alcohol: 1-2 beers/week and couple shots of whiskey    Discussed the importance of adequate hydration after surgery and the goal of 64+ oz. of fluid daily.   The patient understands the importance of avoiding all carbonated, caffeinated and sweetened drinks; instead choosing 64 oz. plain water.    Fluid-meal separation:  The patient and I reviewed the anatomy of the bypass and why  fluids from a meal is so important.    Exercise  None with bad knees - bought a stationary bike   Pt. s understands that 30-60 minutes of daily activity is an important part of bariatric surgery success.   Encouraged pt. to incorporate upper body strength training exercise, even if its lifting soup cans while watching TV at night, doing pushups/sit-ups, and abdominal work.    PES statement:   1. (NI-1.3)Excessive energy intake related to Food and nutrition related knowledge deficit concerning excessive energy/oral intake as evidenced by Intake of  high caloric density foods/beverages (juice, soda, alcohol) at meals and/or snacks; large portions; frequent grazing; Estimated intake that exceeds estimated daily energy intake; Frequent excessive fast food or restaurant intake; and BMI 60.50    2. (NC-3.3.5) Obese, class III, BMI ?40 related to physical inactivity as evidenced by Infrequent, low-duration and or low intensity physical activity; and Large amounts of sedentary activities, no structured exercise regimen.     Intervention  Discussion:    1. Educated pt on the [place] to Bariatric Success handout.  2. Reviewed lean protein sources today. Recommended consuming 15-20gm protein at 3 meals daily  3.   Instructions/Goals:     1. Include 15-20gm lean protein at each meal.  2. Increase vegetable/fruit intake, by having a vegetable or fruit with each meal daily. Recommended pt to increase vegetable/fruit intake to 4-5 servings daily.  3. Increase fluid intake to 64oz daily: choose plain or calorie/carbonation-free beverages.  4. Incorporate daily structured activity, 30-60 minutes most days of the week  5. Practice plate method: 1/2 plate lean/low fat protein source, vegetable/fruit, <25% of plate complex carbohydrates.  6. Read food labels more consistently: keeping total fat grams <10, total sugar grams <10,  saturated fat <3gm per serving.  7. Separate fluids 30 minutes before/after meal times.  8. Practice eating off of smaller plates/bowls, chewing to applesauce consistency, taking 20-30 minutes to eat in a calm/relaxed environment without distractions of tv/email/cell phone.    Handouts Provided:  Keys to Bariatric Success  Lean Protein sources  Protein supplement list    Monitor/Evaluation:  Pt. s target weight: no gain from initial visit, pt. verbalized understanding.     Has realistic expectations for weight loss: Yes  Verbalizes understanding of dietary changes post procedure: No  Verbalizes understanding of supplement needs: No  Verbalizes willingness  to participate in physical activity: Yes  Motivation for change: average  RD s prediction for client success and compliance on a scale of 1 (low) to 10 (high):  7    Plan for next visit:   Bariatric plate. Give food journal homework.  Educate on dumping syndrome and reading food labels.  Check understanding    Time In: 10:00a  Time Out: 11:00a    ABN signed: Yes

## 2021-06-08 NOTE — PROGRESS NOTES
HPI: Panda Miranda is a 61 y.o. male here today for consideration of metabolic and bariatric surgery. He is referred by Dr. Hamlin.  He actually has had a vertical banded gastroplasty performed at the [facility] in the 1990s.  He started out in 880 pounds.  He was told at the [facility] that he would have to lose 400 pounds before they would do surgery which he did.  He states he was close to 450 pounds when they did surgery.  He was able to lose down to about 230 pounds which lasted for a couple years but he slowly started regaining that.  In 2011 he had a partial colectomy though I do not have that operative report.  It's unclear whether this is a right left colectomy.  He states it was for polyps.  He would like to give consideration to weight loss surgery is other attempts have not worked well.  He is finding it more and more difficult to get around.  He has severe pain in his right knee and uses a cane to ambulate.  He has obstructive sleep apnea which is controlled with CPAP.  He has chronic atrial fibrillation for which she is on Coumadin.  He denies any history of DVTs or PEs.  He has a history of gout which mostly affects his hands.  He had been on prednisone in the past for this but this has been discontinued.  He has a history of chronic renal insufficiency with a creatinine of 2.15.  He also has history of hypertension.      Allergies:Review of patient's allergies indicates no known allergies.    Past Medical History   Diagnosis Date     Arthropathy of wrist      Atrial fibrillation      CHF (congestive heart failure)      CKD (chronic kidney disease)      Gout      Hand swelling      HTN (hypertension)      Obesity      TASHA (obstructive sleep apnea)      Pseudogout        Past Surgical History   Procedure Laterality Date     Pr part removal colon w anastomosis       Description: Partial Colectomy;  Recorded: 12/02/2011;  Comments: Dr Magaña     Knee surgery       Gastroplasty vertical  farhan Arizmendi [facility] 1996 Initial weight 800++ Lost to 440- (was 320# at lowest)       CURRENT MEDS:  Current Outpatient Prescriptions   Medication Sig Dispense Refill     acetaminophen-codeine (TYLENOL #3) 300-30 mg per tablet Take 1-2 tablets by mouth every 6 (six) hours as needed for pain. 40 tablet 2     amLODIPine (NORVASC) 5 MG tablet Take 5 mg by mouth.       bumetanide (BUMEX) 1 MG tablet TAKE 3 TABLETS BY MOUTH TWICE DAILY AT 9 AM AND AT 6  tablet 0     cholecalciferol, vitamin D3, (VITAMIN D3) 1,000 unit capsule Take 3 capsules (3,000 Units total) by mouth daily. 90 capsule 11     codeine-guaiFENesin (GUAIFENESIN AC)  mg/5 mL liquid Take 5 mL by mouth every 4 (four) hours as needed. Take 5-10ML by mouth every 4 hours as needed       colchicine 0.6 mg tablet TAKE 1 TABLET BY MOUTH DAILY 90 tablet 0     febuxostat (ULORIC) 40 mg tablet TAKE 1 TABLET BY MOUTH EVERY DAY 90 tablet 0     ferrous sulfate 325 (65 FE) MG tablet TAKE 1 TABLET BY MOUTH EVERY DAY 30 tablet 11     HYDROcodone-acetaminophen (NORCO )  mg per tablet Take by mouth every 4 (four) hours as needed.        lisinopril (PRINIVIL,ZESTRIL) 10 MG tablet TAKE 1 TABLET BY MOUTH EVERY DAY 90 tablet 0     metoprolol (LOPRESSOR) 50 MG tablet TAKE 3 TABLETS BY MOUTH DAILY 270 tablet 0     metoprolol tartrate (LOPRESSOR) 50 MG tablet TAKE 3 TABLETS BY MOUTH EVERY  tablet 3     senna (SENNA) 8.6 mg tablet Take 8.6 mg by mouth daily as needed.        warfarin (COUMADIN) 5 MG tablet TAKE ONE AND ONE-HALF TABLET BY MOUTH DAILY 135 tablet 0     warfarin (COUMADIN) 5 MG tablet TAKE 1 AND 1/2 TABLETS BY MOUTH EVERY  tablet 0     warfarin (COUMADIN) 5 MG tablet TAKE 1 AND 1/2 TABLETS BY MOUTH EVERY  tablet 0     predniSONE (DELTASONE) 10 MG tablet TAKE 1 TABLET BY MOUTH EVERY DAY 30 tablet 0     predniSONE (DELTASONE) 5 MG tablet Take 1 tablet (5 mg total) by mouth daily. 30 tablet 3     No current  "facility-administered medications for this visit.          Family History   Problem Relation Age of Onset     Diabetes type II       Heart failure       Heart disease Mother      Hypertension Mother      Diabetes Sister         reports that he has never smoked. He has never used smokeless tobacco. He reports that he drinks about 0.5 oz of alcohol per week  He reports that he uses illicit drugs about 3.5 times per week.    Review of Systems -  A 12 point ROS was reviewed and except for what is listed in the HPI above, all others are negative  PSYCHIATRIC: He has undergone a lifestyle assessment and has been deemed a good candidate for bariatric surgery by the psychologist.    Visit Vitals     /76 (Patient Site: Right Arm, Patient Position: Sitting, Cuff Size: Adult Large)     Pulse 91     Resp 22     Ht 6' 4\" (1.93 m)     Wt (!) 505 lb (229.1 kg)     SpO2 97%     BMI 61.47 kg/m2     Wt Readings from Last 3 Encounters:   01/24/17 (!) 505 lb (229.1 kg)   01/19/17 (!) 497 lb (225.4 kg)   01/04/17 (!) 511 lb (231.8 kg)     Body mass index is 61.47 kg/(m^2).    EXAM:  GENERAL: This is a well-developed 61 y.o. male who appears his stated age  HEAD & NECK: Grossly normal.  No palpable thyroid lesions  CARDIAC: Irregularly irregular rhythm with a 2/6 systolic murmur  CHEST/LUNG:  Clear to auscultation  ABDOMEN: Central obesity.  He has a well-healed midline scar with no appreciable hernia.  LYMPHATIC:  No significant adenopathy appreciated.    EXTREMITIES: Grossly normal.  He has evidence of chronic venous stasis bilaterally    NEUROLOGIC: Focally intact  INTEGUMENT: No open lesions or ulcers  PSYCHIATRIC: Normal affect. He has a good grasp on the nature of his obesity and the treatment options.    LABS:  Lab Results   Component Value Date    WBC 7.8 12/23/2016    HGB 13.0 (L) 12/23/2016    HCT 40.4 12/23/2016    MCV 87 12/23/2016     12/23/2016     INR/Prothrombin Time      Lab Results   Component Value Date    " HGBA1C 5.8 12/23/2016     Lab Results   Component Value Date    ALT 16 12/23/2016    AST 20 12/23/2016    ALKPHOS 89 12/23/2016    BILITOT 0.7 12/23/2016       Assessment/Plan: 61 y.o. male who is status post a vertical banded gastroplasty in the past as well as a partial colectomy.  He has an extremely large waist circumference and super morbid obesity.  He has multiple comorbid conditions which I think could be alleviated with significant weight loss.  However he is at increased risk for any surgery.  I discussed this at length with him but also told him that I think that there are surgical options for him.    I would like to get an upper GI to better define his anatomy.  I think at this time would be okay for him to continue in the program but I also discussed with him that I would like to have him continue with his weight loss regimen and I would like to see him down to 425 pounds before surgery.    I think it may be a situation where converting him to a sleeve gastrectomy may be an option, but likely given his colectomy in the past.  I also briefly discussed with him the possibility of doing a Manjula-en-Y gastric bypass.  I told him we would meet up again when he has completed the requirements for the bariatric program as well as his weight loss.  I would then determine at that time what the best surgical options are.    Félix Costello MD  [facility] Department of Surgery

## 2021-06-09 ENCOUNTER — OFFICE VISIT - HEALTHEAST (OUTPATIENT)
Dept: CARDIOLOGY | Facility: CLINIC | Age: 66
End: 2021-06-09

## 2021-06-09 DIAGNOSIS — R06.09 DOE (DYSPNEA ON EXERTION): ICD-10-CM

## 2021-06-09 DIAGNOSIS — E66.2 OBESITY HYPOVENTILATION SYNDROME (H): ICD-10-CM

## 2021-06-09 DIAGNOSIS — G47.33 OSA (OBSTRUCTIVE SLEEP APNEA): ICD-10-CM

## 2021-06-09 DIAGNOSIS — I34.0 NONRHEUMATIC MITRAL VALVE REGURGITATION: ICD-10-CM

## 2021-06-09 DIAGNOSIS — I48.20 CHRONIC ATRIAL FIBRILLATION (H): ICD-10-CM

## 2021-06-09 ASSESSMENT — MIFFLIN-ST. JEOR: SCORE: 3121.14

## 2021-06-09 NOTE — TELEPHONE ENCOUNTER
RN cannot approve Refill Request    RN can NOT refill this medication med is not covered by policy/route to provider. Last office visit: 11/20/2019 Ben Hamlin MD Last Physical: 2/5/2020 Last MTM visit: Visit date not found Last visit same specialty: 1/16/2020 Meri Urrutia MD.  Next visit within 3 mo: Visit date not found  Next physical within 3 mo: Visit date not found      Emily Severino, [facility] Triage/Med Refill 6/28/2020    Requested Prescriptions   Pending Prescriptions Disp Refills     warfarin ANTICOAGULANT (COUMADIN/JANTOVEN) 5 MG tablet [Pharmacy Med Name: WARFARIN SOD 5MG TABLETS (PEACH)] 125 tablet 0     Sig: TAKE 1 TO 1.5 TABLETS BY MOUTH DAILY AS DIRECTED. ADJUST DOSE PER INR RESULT       Warfarin Refill Protocol  Failed - 6/28/2020 10:30 AM        Failed -  Route to appropriate pool/provider     Last Anticoagulation Summary:   Anticoagulation Episode Summary     Current INR goal:   2.0-3.0   TTR:   86.3 % (10.8 mo)   Next INR check:   6/29/2020   INR from last check:   2.80 (5/18/2020)   Weekly max warfarin dose:      Target end date:   Indefinite   INR check location:      Preferred lab:      Send INR reminders to:   [facility]    Indications    A-fib (H) (Resolved) [I48.91]           Comments:            Anticoagulation Care Providers     Provider Role Specialty Phone number    Ben Hamlin MD Referring Family Medicine [phone]                Passed - Provider visit in last year     Last office visit with prescriber/PCP: 11/20/2019 Ben Hamlin MD OR same dept: 1/16/2020 Meri Urrutia MD OR same specialty: 1/16/2020 Meri Urrutia MD  Last physical: 2/5/2020 Last MTM visit: Visit date not found    Next appt within 3 mo: Visit date not found Next physical within 3 mo: Visit date not found  Prescriber OR PCP: Ben Hamlin MD  Last diagnosis associated with med order: 1. Atrial fibrillation, unspecified type (H)  - warfarin  ANTICOAGULANT (COUMADIN/JANTOVEN) 5 MG tablet [Pharmacy Med Name: WARFARIN SOD 5MG TABLETS (PEACH)]; TAKE 1 TO 1.5 TABLETS BY MOUTH DAILY AS DIRECTED. ADJUST DOSE PER INR RESULT  Dispense: 125 tablet; Refill: 0    If protocol passes may refill for 6 months if within 3 months of last provider visit (or a total of 9 months).

## 2021-06-09 NOTE — TELEPHONE ENCOUNTER
ANTICOAGULATION  MANAGEMENT    Assessment     Today's INR result of 2.6 is Therapeutic (goal INR of 2.0-3.0)        Warfarin taken as previously instructed    No new diet changes affecting INR    No new medication/supplements affecting INR    Continues to tolerate warfarin with no reported s/s of bleeding or thromboembolism     Previous INR was Therapeutic    Plan:     Spoke with Panda regarding INR result and instructed:     Warfarin Dosing Instructions:  Continue current warfarin dose 7.5 mg daily on Sundays and Thursdays; and 5 mg daily rest of week  (0 % change)    Instructed patient to follow up no later than: 6 weeks.    Education provided: importance of therapeutic range, importance of following up for INR monitoring at instructed interval and importance of taking warfarin as instructed    Panda verbalizes understanding and agrees to warfarin dosing plan.    Instructed to call the [facility] for any changes, questions or concerns. (#[phone])   ?   Chely Groves RN    Subjective/Objective:      Panda Miranda, a 65 y.o. male is on warfarin.     Panda reports:     Home warfarin dose: verbally confirmed home dose with Panda and updated on anticoagulation calendar     Missed doses: No     Medication changes:  No     S/S of bleeding or thromboembolism:  No     New Injury or illness:  No     Changes in diet or alcohol consumption:  No     Upcoming surgery, procedure or cardioversion:  Yes: cataract 7/9/20.    Anticoagulation Episode Summary     Current INR goal:   2.0-3.0   TTR:   87.8 % (1 y)   Next INR check:   8/13/2020   INR from last check:   2.60 (7/2/2020)   Weekly max warfarin dose:      Target end date:   Indefinite   INR check location:      Preferred lab:      Send INR reminders to:   [facility]    Indications    A-fib (H) (Resolved) [I48.91]           Comments:            Anticoagulation Care Providers     Provider Role Specialty Phone number    Ben Hamlin MD  [facility] [phone]

## 2021-06-09 NOTE — PROGRESS NOTES
[facility] PRE-OPERATIVE VISIT FOR:     Panda Miranda,  1955, MRN [id number]     Upcoming surgery date:   Surgeon: Laureano  Surgery Facility: [facility]     Chief Complaint: Preop R cataract     PCP: Ben Hamlin MD, [phone]     SUBJECTIVE:  History of Present Illness:   Panda Miranda is a 65 y.o. male with history of gradual visual loss, painless, left eye     History of severe obesity, BMI nearly 60, chronic kidney disease, diastolic congestive heart failure, mitral regurgitation, hypertension, colon cancer, obstructive sleep apnea     Patient takes warfarin for atrial fibrillation.  Chads 2 score is 2, bridging not needed if he needs to hold warfarin.      Uses CPAP at 14 cm of water.     Past Medical History:      Patient Active Problem List   Diagnosis     Lower Back Pain     Osteoarthritis Of The Knee     Hematuria     Glucose Intolerance     Essential hypertension     Anemia     Ventricular Septal Defect     Obesity (BMI 35.0-39.9 without comorbidity)     Obstructive Sleep Apnea     Pseudogout     Atrial fibrillation (H)     Diastolic Congestive Heart Failure     Chronic Kidney Disease, Stage 3     Onychomycosis     Gout     Vitamin D deficiency     Health care maintenance     Compliance with medication regimen     Non-rheumatic mitral regurgitation     Cor pulmonale (H)     Obesity     Hyperglycemia      Venous stasis     Plantar fasciitis     Malignant neoplasm of sigmoid colon (H)     Arthritis of knee     Skin ulcer of left foot, limited to breakdown of skin (H)     Urge incontinence of urine     Decreased visual acuity           Past Surgical History:   Procedure Laterality Date     COLON SURGERY         COLONOSCOPY N/A 2019     Procedure: COLONOSCOPY;  Surgeon: Flaco Magaña MD;  Location: [facility];  Service: General     GASTROPLASTY VERTICAL BANDED         Dr. Arizmendi  of [place]  Initial weight 800++ Lost to 440- (was 320# at  Return in about 3 months (around 4/3/2018).    During the hours of 8:00 am to 5:00 pm Monday through Thursday, please contact us at [facility] [phone]. You can also contact us anytime thru [facility] to make an appointment, questions for your provider and medication refills.    If it is urgent that you speak with someone outside of these hours, our [facility] Call Center will be able to assist you at [phone].    Thank you for choosing [facility] for your Dermatology care.      Add doxycycline 100 mg daily.   lowest)     KNEE SURGERY         [place] PART REMOVAL COLON W ANASTOMOSIS         Description: Partial Colectomy;  Recorded: 12/02/2011;  Comments: Dr Magaña   Cataract, L2/2020  Any history of anesthesia reaction no  Do you have difficulty breathing or chest pain after walking up a flight of stairs: No  History of obstructive sleep apnea: Yes: CPAP at 14  Steroid use in the last 6 months: Yes: Knee corticosteroid in November  Frequent Aspirin/NSAID use: Yes: Warfarin  Prior Blood Transfusion: No  Prior Blood Transfusion Reaction: No  If for some reason prior to, during or after the procedure, if it is medically indicated, would you be willing to have a blood transfusion?:  There is no transfusion refusal.     Social History:  he  reports that he has never smoked. He has never used smokeless tobacco. He reports current alcohol use of about 0.8 standard drinks of alcohol per week. He reports that he does not use drugs.     Family History:        Family History   Problem Relation Age of Onset     Diabetes type II Other       Heart failure Other       Heart disease Mother       Hypertension Mother       Diabetes Sister          Current Medications:  Current Medications          Current Outpatient Medications   Medication Sig Dispense Refill     acetaminophen-codeine (TYLENOL #3) 300-30 mg per tablet Take 1-2 tablets by mouth every 6 (six) hours as needed for pain (maximum 8 pills/ day). 40 tablet 2     amLODIPine (NORVASC) 5 MG tablet Take 5 mg by mouth.         bumetanide (BUMEX) 1 MG tablet TAKE 3 TABLETS BY MOUTH TWICE DAILY AT 9 AM AND 6  tablet 2     cholecalciferol, vitamin D3, (CHOLECALCIFEROL) 1,000 unit tablet Take 3 tablets (3,000 Units total) by mouth daily. 270 tablet 3     COLCRYS 0.6 mg tablet TAKE 1 TABLET(0.6 MG) BY MOUTH DAILY 90 tablet 3     febuxostat (ULORIC) 80 mg Tab Take 1 tablet by mouth daily. 90 tablet 3     ferrous sulfate 325 (65 FE) MG tablet TAKE 1 TABLET BY MOUTH ONCE DAILY WITH  BREAKFAST 90 tablet 3     lidocaine 4 % patch Place 1 patch on the skin daily. Remove and discard patch with 12 hours or as directed by MD. 12 patch 0     lisinopril (PRINIVIL,ZESTRIL) 10 MG tablet TAKE 1 TABLET(10 MG) BY MOUTH DAILY 90 tablet 2     metoprolol tartrate (LOPRESSOR) 50 MG tablet TAKE 1 TABLET BY MOUTH DAILY. 90 tablet 3     warfarin ANTICOAGULANT (COUMADIN/JANTOVEN) 5 MG tablet Take 1-1.5 tablets (5-7.5 mg) daily as directed. Adjust dose per INR results. 125 tablet 0      No current facility-administered medications for this visit.            Allergies:  Patient has no known allergies.     Review or Systems:  Constitution: normal  HEENT: normal  Pulmonary: normal  Cardiovascular: normal  GI: normal  : normal  Musculoskeletal: normal  Neuro: normal  Endocrine: normal  Psych: normal  Lymph/Heme: normal     OBJECTIVE:      Vitals:     02/05/20 1107   BP: 92/58   Pulse: 75   Resp: 22   Temp: 99.5  F (37.5  C)   SpO2: 99%     General Appearance:    Alert, cooperative, no distress, appears stated age   Head:    Normocephalic, without obvious abnormality, atraumatic   Eyes:    PERRL, conjunctiva/corneas clear, EOM's intact   Nose:   Mucosa moist, normal    Throat:   Lips, mucosa, and tongue normal; ora phaynx clear   Neck:   Supple, symmetrical, trachea midline, no adenopathy;     thyroid:  no enlargement/tenderness/nodules; no carotid    bruit or JVD   Lungs:     Clear to auscultation bilaterally, respirations unlabored    Heart:    Regular rate and rhythm, S1 and S2 normal, no murmur, rub   or gallop   Abdomen:     Soft, non-tender, bowel sounds active all four quadrants,     no masses, no organomegaly   Extremities:   Extremities normal, 1+ lower extremity edema,or edema                              Labs:  potassium is drawn today and pending  Last EKG 12/5/2019, printed and included  ASSESSMENT/PLAN:  1. Preop examination  Basic Metabolic Panel   2. Atrial fibrillation, unspecified type (H)  INR   3.  Essential hypertension  Basic Metabolic Panel     Low risk surgery  No known cardiac disease  Acceptable functional capacity     Acceptable risk for surgery  Please note sleep apnea  Instructed him to continue warfarin unless he is otherwise ophthalmology.  Lisinopril, metoprolol, amlodipine on morning of surgery.     Ben Hamlin MD

## 2021-06-09 NOTE — TELEPHONE ENCOUNTER
Who is calling:  Patient    Reason for Call:  Patient is having surgery on 7/9 at [facility] and need a COVID test. Please contact patient if you are able to get an order placed for the COvid test    Date of last appointment with primary care: NA    Okay to leave a detailed message: No

## 2021-06-09 NOTE — PROGRESS NOTES
Health and Behavior Assessment with Intervention, Follow up (60 minutes): Met with patient 1:1 to review support system, psychological testing and mindful eating strategies.  Weight today was 492-1/2 pounds wearing shoes.  He has started to make some changes in his eating and lifestyle but still needs to be more mindful about his eating.  He also has not yet completed psychological testing.  He will follow up with a list of activities and mindful eating strategies.  Diagnoses: F 54; E 66.01

## 2021-06-09 NOTE — TELEPHONE ENCOUNTER
"Attempted#1 to call patient w/'s message below. \"The person you are trying to call is not available at this time. Please try again later. Unable to leave message. \"Okay to relay message\"    "

## 2021-06-10 NOTE — TELEPHONE ENCOUNTER
ANTICOAGULATION  MANAGEMENT PROGRAM    Panda Miranda is overdue for INR check.     Was unable to reach patient and was unable to leave a voicemail. If patient calls, please schedule INR appointment as soon as possible.       Carmencita Lopez RN

## 2021-06-11 NOTE — TELEPHONE ENCOUNTER
RN Triage:     Patient calling in stating that he is having diarrhea. Per patient he has liquid stools that leak at night. 3-4 episodes per day.  He reports he does not feel the stool coming out. He is having to wear a diaper at night. This has never happened before. This has been going on for a week. Not black stool. He is having a gout attack for the last week. He stated his right elbow and right wrist are sore. Unknown temp, no thermometer. He has a cough that he stated is from choking on popcorn he was eating. He stated he is a little short of breath which is not new for him.       Patient requests only PCP and asked for an appointment tomorrow.   Transferred to scheduling.     Fabiola Cardenas RN, BSN [facility] Triage Nurse      Reason for Disposition    Patient wants to be seen    MODERATE pain (e.g., interferes with normal activities) and present > 3 days    Additional Information    Negative: Shock suspected (e.g., cold/pale/clammy skin, too weak to stand, low BP, rapid pulse)    Negative: Difficult to awaken or acting confused (e.g., disoriented, slurred speech)    Negative: Sounds like a life-threatening emergency to the triager    Negative: Vomiting also present and worse than the diarrhea    Negative: Blood in stool and without diarrhea    Negative: SEVERE abdominal pain (e.g., excruciating) and present > 1 hour    Negative: SEVERE abdominal pain and age > 60    Negative: Bloody, black, or tarry bowel movements    Negative: SEVERE diarrhea (e.g., 7 or more times / day more than normal) and age > 60 years    Negative: Constant abdominal pain lasting > 2 hours    Negative: Drinking very little and has signs of dehydration (e.g., no urine > 12 hours, very dry mouth, very lightheaded)    Negative: Patient sounds very sick or weak to the triager    Negative: SEVERE diarrhea (e.g., 7 or more times / day more than normal) and present > 24 hours (1 day)    Negative: MODERATE diarrhea (e.g., 4-6 times / day  more than normal) and present > 48 hours (2 days)    Negative: MODERATE diarrhea (e.g., 4-6 times / day more than normal) and age > 70 years    Negative: Abdominal pain  (Exception: pain clears completely with each passage of diarrhea stool)    Negative: Fever > 101 F (38.3 C)    Negative: Blood in the stool    Negative: Mucus or pus in stool has been present > 2 days and diarrhea is more than mild    Negative: Weak immune system (e.g., HIV positive, cancer chemo, splenectomy, organ transplant, chronic steroids)    Negative: MILD diarrhea (e.g., 1-3 or more stools than normal in past 24 hours) diarrhea without known cause and present > 7 days    Negative: Travel to a foreign country in past month    Negative: Recent antibiotic therapy (i.e., within last 2 months) and diarrhea present > 3 days since antibiotic was stopped    Negative: Recent hospitalization and diarrhea present > 3 days    Negative: Tube feedings (e.g., nasogastric, g-tube, j-tube)    Negative: Followed a hand or wrist injury    Negative: Chest pain    Negative: Caused by an animal bite    Negative: Wound looks infected    Negative: Similar pain previously and it was from 'heart attack'    Negative: Similar pain previously from 'angina' and not relieved by nitroglycerin    Negative: Sounds like a life-threatening emergency to the triager    Negative: Fever and red area (or area very tender to touch)    Negative: Fever and swollen joint    Negative: Patient sounds very sick or weak to the triager    Negative: Weakness (i.e., loss of strength) of new onset in hand or fingers    Negative: Numbness (i.e., loss of sensation) of new onset in hand or fingers    Negative: Looks like a boil, infected sore, deep ulcer, or other infected rash (spreading redness, pus)    Negative: Localized rash is very painful (no fever)    Negative: SEVERE pain (e.g., excruciating, unable to use hand at all)    Negative: Red area or streak and large (> 2 in or 5  cm)    Protocols used: DIARRHEA-A-OH, HAND AND WRIST PAIN-A-OH

## 2021-06-11 NOTE — TELEPHONE ENCOUNTER
"Called pt, unable to leave VM due to being full, will try again#2. Okay to schedule virtual visit upon return call.  \" Okay to relay message\"    "

## 2021-06-11 NOTE — TELEPHONE ENCOUNTER
Medication Request  Medication name: Pain medication  Requested [facility]acy: [id number]  Reason for request: Patient states is having a significant flare up of his gout, right elbow and that it's traveling towards his right thumb.  Patient is on the Colchizine, but really needs something stronger to help with the pain that he is in.  Please advise.  When did you use medication last?:  unknown  Patient offered appointment:  patient declined, patient request handled via phone if at all possible please.  Okay to leave a detailed message: yes

## 2021-06-11 NOTE — TELEPHONE ENCOUNTER
ANTICOAGULATION  MANAGEMENT PROGRAM    Panda KIRA Ruben is overdue for INR check.     Spoke with adult female who answered the phone who declined to schedule INR at this time, states she will ask Panda to call back. If calling back please schedule as soon as possible.      Carmencita Lopez RN

## 2021-06-11 NOTE — TELEPHONE ENCOUNTER
ANTICOAGULATION  MANAGEMENT    Assessment     Today's INR result of 2.7 is Therapeutic (goal INR of 2.0-3.0)        Warfarin taken differently than instructed, but no impact to total weekly dose    No new diet changes affecting INR    No new medication/supplements affecting INR    Continues to tolerate warfarin with no reported s/s of bleeding or thromboembolism     Previous INR was Therapeutic    Plan:     Spoke on phone with Panda regarding INR result and instructed:     Warfarin Dosing Instructions:  Continue current warfarin dose 7.5 mg daily on Sundays and Thursdays; and 5 mg daily rest of week  (0 % change)    Instructed patient to follow up no later than: 8 weeks.    Education provided: importance of therapeutic range, importance of following up for INR monitoring at instructed interval and importance of taking warfarin as instructed    Panda verbalizes understanding and agrees to warfarin dosing plan.    Instructed to call the [facility] for any changes, questions or concerns. (#[phone])   ?   Chely Groves RN    Subjective/Objective:      Panda MALONE Ruben, a 65 y.o. male is on warfarin.     Panda reports:     Home warfarin dose: verbally confirmed home dose with Panda and updated on anticoagulation calendar     Missed doses: No     Medication changes:  No     S/S of bleeding or thromboembolism:  No     New Injury or illness:  No     Changes in diet or alcohol consumption:  No     Upcoming surgery, procedure or cardioversion:  No    Anticoagulation Episode Summary     Current INR goal:   2.0-3.0   TTR:   87.8 % (1 y)   Next INR check:   10/28/2020   INR from last check:   2.70 (9/2/2020)   Weekly max warfarin dose:      Target end date:   Indefinite   INR check location:      Preferred lab:      Send INR reminders to:   [facility]    Indications    A-fib (H) (Resolved) [I48.91]           Comments:            Anticoagulation Care Providers     Provider Role Specialty Phone number     Ben Hamlin MD [facility] [phone]

## 2021-06-12 NOTE — TELEPHONE ENCOUNTER
ANTICOAGULATION  MANAGEMENT    Assessment     Today's INR result of 2.5 is Therapeutic (goal INR of 2.0-3.0)        Warfarin taken as previously instructed    No new diet changes affecting INR    No new medication/supplements affecting INR    Continues to tolerate warfarin with no reported s/s of bleeding or thromboembolism     Previous INR was Therapeutic    Plan:     Spoke on phone with Panda regarding INR result and instructed:     Warfarin Dosing Instructions:  Continue current warfarin dose 7.5 mg daily on Sundays and Thursdays; and 5 mg daily rest of week  (0 % change)    Instructed patient to follow up no later than: 8 weeks    Education provided: importance of therapeutic range, importance of following up for INR monitoring at instructed interval and importance of taking warfarin as instructed    Panda verbalizes understanding and agrees to warfarin dosing plan.    Instructed to call the [facility] for any changes, questions or concerns. (#[phone])   ?   Chely Groves RN    Subjective/Objective:      Panda Miranda, a 65 y.o. male is on warfarin.     Panda reports:     Home warfarin dose: verbally confirmed home dose with Panda and updated on anticoagulation calendar     Missed doses: No     Medication changes:  No     S/S of bleeding or thromboembolism:  No     New Injury or illness:  No     Changes in diet or alcohol consumption:  No     Upcoming surgery, procedure or cardioversion:  No    Anticoagulation Episode Summary     Current INR goal:  2.0-3.0   TTR:  87.8 % (1 y)   Next INR check:  1/1/2021   INR from last check:  2.50 (11/6/2020)   Weekly max warfarin dose:     Target end date:  Indefinite   INR check location:     Preferred lab:     Send INR reminders to:  [facility]    Indications    A-fib (H) (Resolved) [I48.91]           Comments:           Anticoagulation Care Providers     Provider Role Specialty Phone number    Ben Hamlin MD Referring Family  Medicine [phone]

## 2021-06-12 NOTE — TELEPHONE ENCOUNTER
ANTICOAGULATION  MANAGEMENT PROGRAM    Panda Miranda is overdue for INR check.     Spoke with Panda and scheduled INR appointment on 11/6.      Chely Groves RN

## 2021-06-13 NOTE — PROGRESS NOTES
Anticoagulation Annual Referral Renewal Review    Panda Miranda's chart reviewed for annual renewal of referral to anticoagulation monitoring.        Criteria for anticoagulation nurse and/or pharmacist renewal met   Warfarin indication: Atrial Fibrillation Yes, per indication   Current with INR monitoring/compliant Yes Yes   Date of last office visit 7/2/20 Yes, had office visit within last year   Time in Therapeutic Range (TTR) 88 % Yes, TTR > 60%       Panda Miranda met all criteria for anticoagulation management program initiated renewal.  New INR standing orders and anticoagulation referral renewal placed.      Carmencita Lopez RN  3:04 PM

## 2021-06-14 ENCOUNTER — VIRTUAL VISIT (OUTPATIENT)
Dept: PHARMACY | Facility: CLINIC | Age: 66
End: 2021-06-14

## 2021-06-14 ENCOUNTER — TELEPHONE (OUTPATIENT)
Dept: ENDOCRINOLOGY | Facility: CLINIC | Age: 66
End: 2021-06-14

## 2021-06-14 DIAGNOSIS — Z53.9 ERRONEOUS ENCOUNTER--DISREGARD: Primary | ICD-10-CM

## 2021-06-14 NOTE — PATIENT INSTRUCTIONS - HE
Duoderm CGF to deep ankle wound.  Change 2 or 3 times per week.  Use nonadherent dressing like Telfa on top of Vaseline to the shallower smaller ulcers.  Change every day  Wrap the whole thing with Kerlix gauze roll to absorb the drainage.  You will probably need to change that every day  Keep the whole leg compressed with either Ace wraps or compression stockings when you are up and about.  Okay to take them off overnight.  Also, elevate your legs when possible.  See me back for wound recheck 2 to 3 weeks.    We should get an ultrasound of your arteries to make sure your legs are getting enough blood flow for wound healing.  I put in a referral and somebody should contact you.

## 2021-06-14 NOTE — TELEPHONE ENCOUNTER
Seen a week ago and was given gel dressings for wounds.    Cannot find them anywhere. Found thin ones but he likes the thick ones.    WHere can he order them?  I also gave him the number to [facility] as they do order many special thngs in for patients that we use in clinic.  Clinic assistant may call over there and give product info and patient info to them so they can prep an order for patient to order.      Janett Funk RN  Triage Nurse Advisor

## 2021-06-14 NOTE — PROGRESS NOTES
Assessment/ Plan  Problem List Items Addressed This Visit        High    Gout     With pseudogout, no recent outbreaks.  On Uloric.  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  Deeper ulcer lateral left ankle.  Stage II.  Does not appear infected, moderate amount of discharge  Duoderm CG-had a packet in the office.  Gave patient this, change twice weekly.  Nonstick dressing on top of Vaseline on shallower ulcers.  Wrap the whole thing with gauze.  Then compression is mandatory.  Ace wraps or Job stockings plus elevation.  Follow-up 2 weeks.  Lower extremity Doppler ultrasound.  Wound care clinic if not improving         Relevant Orders    US Arterial Legs Bilateral Complete      Other Visit Diagnoses     Chronic venous hypertension (idiopathic) with ulcer of left lower extremity (H)    -  Primary        Subjective  CC:  Chief Complaint   Patient presents with     Other     sores on legs     HPI:  65-year-old with history of super morbid obesity, lower extremity edema, diastolic heart failure, atrial fibrillation, presents with wounds of lower extremity that not been healing over the last 2 weeks.  Worst wound is on left lateral ankle.  More shallow wounds elsewhere.  Background of dry skin and itching.  Patient has been trying to keep it covered, using bacitracin.  Painful at night.  PFSH:  Patient Active Problem List   Diagnosis     Lower Back Pain     Osteoarthritis Of The Knee      Hematuria     Glucose Intolerance     Essential hypertension     Anemia     Ventricular Septal Defect     Obesity (BMI 35.0-39.9 without comorbidity)     Obstructive Sleep Apnea     Pseudogout     Atrial fibrillation (H)     Diastolic Congestive Heart Failure     Chronic Kidney Disease, Stage 3     Onychomycosis     Gout     Vitamin D deficiency     Healthcare maintenance     Compliance with medication regimen     Non-rheumatic mitral regurgitation     Cor pulmonale (H)     Obesity     Hyperglycemia      Venous stasis     Plantar fasciitis     Malignant neoplasm of sigmoid colon (H)     Arthritis of knee     Skin ulcer of left foot, limited to breakdown of skin (H)     Urge incontinence of urine     Decreased visual acuity     Morbid obesity (H)     Non-pressure chronic ulcer of left ankle limited to breakdown of skin (H)      Current medications reviewed as follows:  Current Outpatient Medications on File Prior to Visit   Medication Sig     amLODIPine (NORVASC) 5 MG tablet Take 5 mg by mouth.     bumetanide (BUMEX) 1 MG tablet TAKE 3 TABLETS BY MOUTH TWICE DAILY AT 9AM AND 6PM     cholecalciferol, vitamin D3, (CHOLECALCIFEROL) 1,000 unit tablet Take 3 tablets (3,000 Units total) by mouth daily.     COLCRYS 0.6 mg tablet TAKE 1 TABLET(0.6 MG) BY MOUTH DAILY     ferrous sulfate 325 (65 FE) MG tablet Take 1 tablet (325 mg total) by mouth daily with breakfast.     lisinopriL (PRINIVIL,ZESTRIL) 10 MG tablet TAKE 1 TABLET(10 MG) BY MOUTH DAILY     metoprolol tartrate (LOPRESSOR) 50 MG tablet TAKE 1 TABLET BY MOUTH DAILY.     warfarin ANTICOAGULANT (COUMADIN/JANTOVEN) 5 MG tablet Take 1-1.5 tablets (5-7.5 mg) daily as directed. Adjust dose per INR results.     [DISCONTINUED] colchicine (COLCRYS) 0.6 mg tablet TAKE 1 TABLET(0.6 MG) BY MOUTH DAILY     [DISCONTINUED] febuxostat (ULORIC) 80 mg Tab Take 1 tablet by mouth daily.     acetaminophen-codeine (TYLENOL #3) 300-30 mg per tablet Take 1-2 tablets by mouth every 6 (six)  "hours as needed for pain (maximum 8 pills/ day).     ferrous sulfate 325 (65 FE) MG tablet Take 1 tablet (325 mg total) by mouth daily with breakfast.     lidocaine 4 % patch Place 1 patch on the skin daily. Remove and discard patch with 12 hours or as directed by MD.     No current facility-administered medications on file prior to visit.      Social History     Tobacco Use   Smoking Status Never Smoker   Smokeless Tobacco Never Used     Social History     Social History Narrative     Not on file     Patient Care Team:  Ben Hamlin MD as PCP - General  Ben Hamlin MD as Assigned PCP  ROS  Denies fever, chills, shortness of breath      Objective  Physical Exam  Vitals:    01/05/21 1656   BP: 106/66   Patient Site: Left Arm   Patient Position: Sitting   Cuff Size: Adult Large   Pulse: 88   Resp: 23   Temp: 97.9  F (36.6  C)   TempSrc: Temporal   Weight: (!) 485 lb (220 kg)   Height: 6' 4\" (1.93 m)     Body mass index is 59.04 kg/m .  Swelling is quite mild.  Venous stasis changes lower extremity, dry skin background.  Multiple shallow ulcers in anterior and lateral shins.  Per ulcer, measuring about 1 cm diameter, clean edges, no erythema left lateral ankle.  Diagnostics:   Pending      Please note: Voice recognition software was used in this dictation.  It may therefore contain typographical errors.      "

## 2021-06-14 NOTE — TELEPHONE ENCOUNTER
No show for today's video visit. Sent text with link to join. Called pt, no answer, unable to leave voicemail (not accepting messages at this time).Closed video after 15 mins.     Elizabeth Lerma RD, LD

## 2021-06-14 NOTE — PROGRESS NOTES
Error.    Patient was scheduled for 9 am video visit. Sent text message x 2 on [facility] to connect to video visit. Also tried calling patient x2 to connect. Patient's phone message said that the person was not available and no way to leave voicemail.     Lauren Bloch, PharmD  Medication Therapy Management Pharmacist   [facility]

## 2021-06-14 NOTE — TELEPHONE ENCOUNTER
ANTICOAGULATION  MANAGEMENT PROGRAM    Panda KIRA Ruben is overdue for INR check.     Was unable to reach patient and was unable to leave a voicemail. If patient calls, please schedule INR appointment as soon as possible.      Reminder letter generated and mailed.       Carmencita Lopez RN

## 2021-06-14 NOTE — PROGRESS NOTES
Assessment/ Plan  Problem List Items Addressed This Visit        High    Atrial fibrillation (H)     INR ordered         Diastolic Congestive Heart Failure     As discussed, increase Bumex to recommended dose of 3 twice daily, follow-up with Dr. Argueta         Relevant Orders    Ambulatory referral to Cardiology - [facility]    Chronic Kidney Disease, Stage 3 - Primary     Referral for follow-up with nephrology.         Relevant Orders    Ambulatory referral to Nephrology    Non-rheumatic mitral regurgitation     For cardiology follow-up, referral made.  Some weight gain, lower extremity edema worsened.  Prescribed Bumex 3 mg twice daily, only taking daily.  Encouraged twice daily dosing but second dose early afternoon              Unprioritized    Obstructive Sleep Apnea     Requesting referral to sleep medicine, focus is on equipment         Relevant Orders    Ambulatory referral to Sleep Medicine    Chronic venous hypertension (idiopathic) with ulcer of left lower extremity (H)     Despite compression, treatment with DuoDERM, ulcer left lower extremity has worsened.  Does not appear infected, referral to wound care         Relevant Orders    Ambulatory referral to Vascular Surgery - [facility]      Other Visit Diagnoses     Osteoarthritis, unspecified osteoarthritis type, unspecified site        Relevant Medications    acetaminophen-codeine (TYLENOL #3) 300-30 mg per tablet        Subjective  CC:  Chief Complaint   Patient presents with     Sore     HPI:  65-year-old with multiple medical problems listed below, severe obesity, presents for follow-up on lower extremity ulcer.  Patient was seen 1/5/2021, DuoDERM CG, gauze wrap, elastic wrap recommended for compression and treatment.  Largest ulcer has worsened.  Somewhat painful to him.  Requesting pain medications for this.    Otherwise, doing well, admits to some increased lower extremity edema.  Has been taking Bumex but only 1 dose a day because of problems with  urination.  PFSH:  Patient Active Problem List   Diagnosis     Lower Back Pain     Osteoarthritis Of The Knee     Hematuria     Glucose Intolerance     Essential hypertension     Anemia     Ventricular Septal Defect     Obesity (BMI 35.0-39.9 without comorbidity)     Obstructive Sleep Apnea     Pseudogout     Atrial fibrillation (H)     Diastolic Congestive Heart Failure     Chronic Kidney Disease, Stage 3     Onychomycosis     Gout     Vitamin D deficiency     Healthcare maintenance     Compliance with medication regimen     Non-rheumatic mitral regurgitation     Cor pulmonale (H)     Obesity     Hyperglycemia      Venous stasis     Plantar fasciitis     Malignant neoplasm of sigmoid colon (H)     Arthritis of knee     Skin ulcer of left foot, limited to breakdown of skin (H)     Urge incontinence of urine     Decreased visual acuity     Morbid obesity (H)     Non-pressure chronic ulcer of left ankle limited to breakdown of skin (H)      Chronic venous hypertension (idiopathic) with ulcer of left lower extremity (H)     Current medications reviewed as follows:  Current Outpatient Medications on File Prior to Visit   Medication Sig     amLODIPine (NORVASC) 5 MG tablet Take 5 mg by mouth.     bumetanide (BUMEX) 1 MG tablet TAKE 3 TABLETS BY MOUTH TWICE DAILY AT 9AM AND 6PM     cholecalciferol, vitamin D3, (CHOLECALCIFEROL) 1,000 unit tablet Take 3 tablets (3,000 Units total) by mouth daily.     colchicine (COLCRYS) 0.6 mg tablet TAKE 1 TABLET(0.6 MG) BY MOUTH DAILY     COLCRYS 0.6 mg tablet TAKE 1 TABLET(0.6 MG) BY MOUTH DAILY     febuxostat (ULORIC) 80 mg Tab Take 1 tablet by mouth daily.     ferrous sulfate 325 (65 FE) MG tablet Take 1 tablet (325 mg total) by mouth daily with breakfast.     ferrous sulfate 325 (65 FE) MG tablet Take 1 tablet (325 mg total) by mouth daily with breakfast.     lidocaine 4 % patch Place 1 patch on the skin daily. Remove and discard patch with 12 hours or as directed by MD.      "lisinopriL (PRINIVIL,ZESTRIL) 10 MG tablet TAKE 1 TABLET(10 MG) BY MOUTH DAILY     metoprolol tartrate (LOPRESSOR) 50 MG tablet TAKE 1 TABLET BY MOUTH DAILY.     warfarin ANTICOAGULANT (COUMADIN/JANTOVEN) 5 MG tablet Take 1-1.5 tablets (5-7.5 mg) daily as directed. Adjust dose per INR results.     [DISCONTINUED] acetaminophen-codeine (TYLENOL #3) 300-30 mg per tablet Take 1-2 tablets by mouth every 6 (six) hours as needed for pain (maximum 8 pills/ day).     No current facility-administered medications on file prior to visit.      Social History     Tobacco Use   Smoking Status Never Smoker   Smokeless Tobacco Never Used     Social History     Social History Narrative     Not on file     Patient Care Team:  Ben Hamlin MD as PCP - General  Ben Hamlin MD as Assigned PCP  ROS  As above      Objective  Physical Exam  Vitals:    01/26/21 1605   BP: 114/74   Patient Site: Right Arm   Patient Position: Sitting   Cuff Size: Adult Large   Pulse: 75   Resp: 26   Temp: 97.3  F (36.3  C)   Weight: (!) 478 lb (216.8 kg)   Height: 6' 4\" (1.93 m)     Body mass index is 58.18 kg/m .  3 cm oval ulcer left lateral leg, some exudate, no significant redness.  Smaller pinpoint ulcers scattered throughout the leg present elsewhere.  3+ lower extremity edema  Patient pleasant, alert and oriented  Diagnostics:   Pending      Please note: Voice recognition software was used in this dictation.  It may therefore contain typographical errors.        Wt Readings from Last 3 Encounters:   01/26/21 (!) 478 lb (216.8 kg)   01/05/21 (!) 485 lb (220 kg)   07/02/20 (!) 453 lb (205.5 kg)     "

## 2021-06-15 ENCOUNTER — HOSPITAL ENCOUNTER (INPATIENT)
Facility: CLINIC | Age: 66
LOS: 3 days | Discharge: HOME | DRG: 291 | End: 2021-06-18
Attending: EMERGENCY MEDICINE | Admitting: INTERNAL MEDICINE
Payer: COMMERCIAL

## 2021-06-15 ENCOUNTER — APPOINTMENT (OUTPATIENT)
Dept: GENERAL RADIOLOGY | Facility: CLINIC | Age: 66
DRG: 291 | End: 2021-06-15
Attending: EMERGENCY MEDICINE
Payer: COMMERCIAL

## 2021-06-15 ENCOUNTER — RECORDS - HEALTHEAST (OUTPATIENT)
Dept: ADMINISTRATIVE | Facility: OTHER | Age: 66
End: 2021-06-15

## 2021-06-15 DIAGNOSIS — I10 ESSENTIAL HYPERTENSION: Primary | ICD-10-CM

## 2021-06-15 DIAGNOSIS — Z11.52 ENCOUNTER FOR SCREENING LABORATORY TESTING FOR SEVERE ACUTE RESPIRATORY SYNDROME CORONAVIRUS 2 (SARS-COV-2): ICD-10-CM

## 2021-06-15 DIAGNOSIS — I50.33 ACUTE ON CHRONIC DIASTOLIC CONGESTIVE HEART FAILURE (H): ICD-10-CM

## 2021-06-15 LAB
ALBUMIN SERPL-MCNC: 3.5 G/DL (ref 3.4–5)
ALBUMIN SERPL-MCNC: 3.5 G/DL (ref 3.4–5)
ALP SERPL-CCNC: 75 U/L (ref 40–150)
ALP SERPL-CCNC: 76 U/L (ref 40–150)
ALT SERPL W P-5'-P-CCNC: 12 U/L (ref 0–70)
ALT SERPL W P-5'-P-CCNC: 14 U/L (ref 0–70)
ANION GAP SERPL CALCULATED.3IONS-SCNC: 2 MMOL/L (ref 3–14)
AST SERPL W P-5'-P-CCNC: 7 U/L (ref 0–45)
AST SERPL W P-5'-P-CCNC: 9 U/L (ref 0–45)
BASOPHILS # BLD AUTO: 0 10E9/L (ref 0–0.2)
BASOPHILS NFR BLD AUTO: 0.4 %
BILIRUB DIRECT SERPL-MCNC: 0.3 MG/DL (ref 0–0.2)
BILIRUB SERPL-MCNC: 0.8 MG/DL (ref 0.2–1.3)
BILIRUB SERPL-MCNC: 0.9 MG/DL (ref 0.2–1.3)
BUN SERPL-MCNC: 37 MG/DL (ref 7–30)
CALCIUM SERPL-MCNC: 8.7 MG/DL (ref 8.5–10.1)
CHLORIDE SERPL-SCNC: 109 MMOL/L (ref 94–109)
CO2 SERPL-SCNC: 27 MMOL/L (ref 20–32)
CREAT SERPL-MCNC: 2.21 MG/DL (ref 0.66–1.25)
CRP SERPL-MCNC: 14 MG/L (ref 0–8)
DIFFERENTIAL METHOD BLD: ABNORMAL
EOSINOPHIL # BLD AUTO: 0.1 10E9/L (ref 0–0.7)
EOSINOPHIL NFR BLD AUTO: 1.2 %
ERYTHROCYTE [DISTWIDTH] IN BLOOD BY AUTOMATED COUNT: 15.9 % (ref 10–15)
ERYTHROCYTE [SEDIMENTATION RATE] IN BLOOD BY WESTERGREN METHOD: 62 MM/H (ref 0–20)
GFR SERPL CREATININE-BSD FRML MDRD: 30 ML/MIN/{1.73_M2}
GLUCOSE SERPL-MCNC: 86 MG/DL (ref 70–99)
HBA1C MFR BLD: 5.9 % (ref 0–5.6)
HCT VFR BLD AUTO: 35 % (ref 40–53)
HGB BLD-MCNC: 10.4 G/DL (ref 13.3–17.7)
IMM GRANULOCYTES # BLD: 0 10E9/L (ref 0–0.4)
IMM GRANULOCYTES NFR BLD: 0.4 %
INR PPP: 2.06 (ref 0.86–1.14)
LABORATORY COMMENT REPORT: NORMAL
LYMPHOCYTES # BLD AUTO: 1.1 10E9/L (ref 0.8–5.3)
LYMPHOCYTES NFR BLD AUTO: 21.3 %
MCH RBC QN AUTO: 26.3 PG (ref 26.5–33)
MCHC RBC AUTO-ENTMCNC: 29.7 G/DL (ref 31.5–36.5)
MCV RBC AUTO: 89 FL (ref 78–100)
MONOCYTES # BLD AUTO: 0.4 10E9/L (ref 0–1.3)
MONOCYTES NFR BLD AUTO: 8.3 %
NEUTROPHILS # BLD AUTO: 3.4 10E9/L (ref 1.6–8.3)
NEUTROPHILS NFR BLD AUTO: 68.4 %
NRBC # BLD AUTO: 0 10*3/UL
NRBC BLD AUTO-RTO: 0 /100
NT-PROBNP SERPL-MCNC: 9028 PG/ML (ref 0–900)
PLATELET # BLD AUTO: 121 10E9/L (ref 150–450)
POTASSIUM SERPL-SCNC: 4 MMOL/L (ref 3.4–5.3)
POTASSIUM SERPL-SCNC: 4.2 MMOL/L (ref 3.4–5.3)
PROT SERPL-MCNC: 7.9 G/DL (ref 6.8–8.8)
PROT SERPL-MCNC: 8.1 G/DL (ref 6.8–8.8)
RBC # BLD AUTO: 3.95 10E12/L (ref 4.4–5.9)
SARS-COV-2 RNA RESP QL NAA+PROBE: NEGATIVE
SODIUM SERPL-SCNC: 139 MMOL/L (ref 133–144)
SPECIMEN SOURCE: NORMAL
TROPONIN I SERPL-MCNC: <0.015 UG/L (ref 0–0.04)
WBC # BLD AUTO: 4.9 10E9/L (ref 4–11)

## 2021-06-15 PROCEDURE — 93308 TTE F-UP OR LMTD: CPT | Performed by: EMERGENCY MEDICINE

## 2021-06-15 PROCEDURE — 99285 EMERGENCY DEPT VISIT HI MDM: CPT | Mod: 25 | Performed by: EMERGENCY MEDICINE

## 2021-06-15 PROCEDURE — 84484 ASSAY OF TROPONIN QUANT: CPT | Performed by: EMERGENCY MEDICINE

## 2021-06-15 PROCEDURE — 96366 THER/PROPH/DIAG IV INF ADDON: CPT | Performed by: EMERGENCY MEDICINE

## 2021-06-15 PROCEDURE — 71045 X-RAY EXAM CHEST 1 VIEW: CPT | Mod: 26 | Performed by: RADIOLOGY

## 2021-06-15 PROCEDURE — 93308 TTE F-UP OR LMTD: CPT | Mod: 26 | Performed by: EMERGENCY MEDICINE

## 2021-06-15 PROCEDURE — 36415 COLL VENOUS BLD VENIPUNCTURE: CPT | Performed by: NURSE PRACTITIONER

## 2021-06-15 PROCEDURE — 250N000011 HC RX IP 250 OP 636: Performed by: NURSE PRACTITIONER

## 2021-06-15 PROCEDURE — U0003 INFECTIOUS AGENT DETECTION BY NUCLEIC ACID (DNA OR RNA); SEVERE ACUTE RESPIRATORY SYNDROME CORONAVIRUS 2 (SARS-COV-2) (CORONAVIRUS DISEASE [COVID-19]), AMPLIFIED PROBE TECHNIQUE, MAKING USE OF HIGH THROUGHPUT TECHNOLOGIES AS DESCRIBED BY CMS-2020-01-R: HCPCS | Performed by: EMERGENCY MEDICINE

## 2021-06-15 PROCEDURE — 80053 COMPREHEN METABOLIC PANEL: CPT | Performed by: EMERGENCY MEDICINE

## 2021-06-15 PROCEDURE — 93005 ELECTROCARDIOGRAM TRACING: CPT | Performed by: EMERGENCY MEDICINE

## 2021-06-15 PROCEDURE — 93010 ELECTROCARDIOGRAM REPORT: CPT | Performed by: EMERGENCY MEDICINE

## 2021-06-15 PROCEDURE — 85652 RBC SED RATE AUTOMATED: CPT | Performed by: EMERGENCY MEDICINE

## 2021-06-15 PROCEDURE — 84132 ASSAY OF SERUM POTASSIUM: CPT | Performed by: NURSE PRACTITIONER

## 2021-06-15 PROCEDURE — 96375 TX/PRO/DX INJ NEW DRUG ADDON: CPT | Performed by: EMERGENCY MEDICINE

## 2021-06-15 PROCEDURE — 71045 X-RAY EXAM CHEST 1 VIEW: CPT

## 2021-06-15 PROCEDURE — 96365 THER/PROPH/DIAG IV INF INIT: CPT | Performed by: EMERGENCY MEDICINE

## 2021-06-15 PROCEDURE — 120N000003 HC R&B IMCU UMMC

## 2021-06-15 PROCEDURE — 83880 ASSAY OF NATRIURETIC PEPTIDE: CPT | Performed by: EMERGENCY MEDICINE

## 2021-06-15 PROCEDURE — 80076 HEPATIC FUNCTION PANEL: CPT | Performed by: NURSE PRACTITIONER

## 2021-06-15 PROCEDURE — C9803 HOPD COVID-19 SPEC COLLECT: HCPCS | Performed by: EMERGENCY MEDICINE

## 2021-06-15 PROCEDURE — 250N000009 HC RX 250: Performed by: NURSE PRACTITIONER

## 2021-06-15 PROCEDURE — 250N000011 HC RX IP 250 OP 636: Performed by: EMERGENCY MEDICINE

## 2021-06-15 PROCEDURE — 96376 TX/PRO/DX INJ SAME DRUG ADON: CPT | Performed by: EMERGENCY MEDICINE

## 2021-06-15 PROCEDURE — 250N000013 HC RX MED GY IP 250 OP 250 PS 637: Performed by: EMERGENCY MEDICINE

## 2021-06-15 PROCEDURE — 85025 COMPLETE CBC W/AUTO DIFF WBC: CPT | Performed by: EMERGENCY MEDICINE

## 2021-06-15 PROCEDURE — 86140 C-REACTIVE PROTEIN: CPT | Performed by: EMERGENCY MEDICINE

## 2021-06-15 PROCEDURE — 99291 CRITICAL CARE FIRST HOUR: CPT | Mod: 25 | Performed by: EMERGENCY MEDICINE

## 2021-06-15 PROCEDURE — 85610 PROTHROMBIN TIME: CPT | Performed by: EMERGENCY MEDICINE

## 2021-06-15 PROCEDURE — U0005 INFEC AGEN DETEC AMPLI PROBE: HCPCS | Performed by: EMERGENCY MEDICINE

## 2021-06-15 PROCEDURE — 83036 HEMOGLOBIN GLYCOSYLATED A1C: CPT | Performed by: EMERGENCY MEDICINE

## 2021-06-15 RX ORDER — AZITHROMYCIN 250 MG/1
500 TABLET, FILM COATED ORAL ONCE
Status: COMPLETED | OUTPATIENT
Start: 2021-06-15 | End: 2021-06-15

## 2021-06-15 RX ORDER — VITAMIN B COMPLEX
3000 TABLET ORAL DAILY
Status: DISCONTINUED | OUTPATIENT
Start: 2021-06-16 | End: 2021-06-18 | Stop reason: HOSPADM

## 2021-06-15 RX ORDER — MAGNESIUM HYDROXIDE/ALUMINUM HYDROXICE/SIMETHICONE 120; 1200; 1200 MG/30ML; MG/30ML; MG/30ML
30 SUSPENSION ORAL EVERY 4 HOURS PRN
Status: DISCONTINUED | OUTPATIENT
Start: 2021-06-15 | End: 2021-06-18 | Stop reason: HOSPADM

## 2021-06-15 RX ORDER — ONDANSETRON 2 MG/ML
4 INJECTION INTRAMUSCULAR; INTRAVENOUS EVERY 6 HOURS PRN
Status: DISCONTINUED | OUTPATIENT
Start: 2021-06-15 | End: 2021-06-18 | Stop reason: HOSPADM

## 2021-06-15 RX ORDER — AMLODIPINE BESYLATE 5 MG/1
5 TABLET ORAL DAILY
Status: DISCONTINUED | OUTPATIENT
Start: 2021-06-16 | End: 2021-06-18 | Stop reason: HOSPADM

## 2021-06-15 RX ORDER — ACETAMINOPHEN 325 MG/1
650 TABLET ORAL EVERY 4 HOURS PRN
Status: DISCONTINUED | OUTPATIENT
Start: 2021-06-15 | End: 2021-06-18 | Stop reason: HOSPADM

## 2021-06-15 RX ORDER — LISINOPRIL 10 MG/1
10 TABLET ORAL DAILY
Status: DISCONTINUED | OUTPATIENT
Start: 2021-06-16 | End: 2021-06-18 | Stop reason: HOSPADM

## 2021-06-15 RX ORDER — ONDANSETRON 4 MG/1
4 TABLET, ORALLY DISINTEGRATING ORAL EVERY 6 HOURS PRN
Status: DISCONTINUED | OUTPATIENT
Start: 2021-06-15 | End: 2021-06-18 | Stop reason: HOSPADM

## 2021-06-15 RX ORDER — NITROGLYCERIN 0.4 MG/1
0.4 TABLET SUBLINGUAL EVERY 5 MIN PRN
Status: DISCONTINUED | OUTPATIENT
Start: 2021-06-15 | End: 2021-06-18 | Stop reason: HOSPADM

## 2021-06-15 RX ORDER — FUROSEMIDE 10 MG/ML
20 INJECTION INTRAMUSCULAR; INTRAVENOUS ONCE
Status: COMPLETED | OUTPATIENT
Start: 2021-06-15 | End: 2021-06-15

## 2021-06-15 RX ORDER — FEBUXOSTAT 40 MG/1
80 TABLET, FILM COATED ORAL DAILY
Status: DISCONTINUED | OUTPATIENT
Start: 2021-06-16 | End: 2021-06-18 | Stop reason: HOSPADM

## 2021-06-15 RX ORDER — ACETAMINOPHEN 650 MG/1
650 SUPPOSITORY RECTAL EVERY 4 HOURS PRN
Status: DISCONTINUED | OUTPATIENT
Start: 2021-06-15 | End: 2021-06-18 | Stop reason: HOSPADM

## 2021-06-15 RX ORDER — FERROUS SULFATE 325(65) MG
325 TABLET ORAL
Status: DISCONTINUED | OUTPATIENT
Start: 2021-06-16 | End: 2021-06-18 | Stop reason: HOSPADM

## 2021-06-15 RX ORDER — LIDOCAINE 40 MG/G
CREAM TOPICAL
Status: DISCONTINUED | OUTPATIENT
Start: 2021-06-15 | End: 2021-06-18 | Stop reason: HOSPADM

## 2021-06-15 RX ORDER — WARFARIN SODIUM 7.5 MG/1
7.5 TABLET ORAL
Status: COMPLETED | OUTPATIENT
Start: 2021-06-15 | End: 2021-06-15

## 2021-06-15 RX ORDER — BUMETANIDE 0.25 MG/ML
4 INJECTION INTRAMUSCULAR; INTRAVENOUS ONCE
Status: COMPLETED | OUTPATIENT
Start: 2021-06-15 | End: 2021-06-15

## 2021-06-15 RX ADMIN — BUMETANIDE 1 MG/HR: 0.25 INJECTION, SOLUTION INTRAMUSCULAR; INTRAVENOUS at 18:51

## 2021-06-15 RX ADMIN — FUROSEMIDE 20 MG: 10 INJECTION, SOLUTION INTRAVENOUS at 16:16

## 2021-06-15 RX ADMIN — FUROSEMIDE 20 MG: 10 INJECTION, SOLUTION INTRAVENOUS at 15:54

## 2021-06-15 RX ADMIN — BUMETANIDE 4 MG: 0.25 INJECTION, SOLUTION INTRAMUSCULAR; INTRAVENOUS at 18:47

## 2021-06-15 RX ADMIN — WARFARIN SODIUM 7.5 MG: 7.5 TABLET ORAL at 18:50

## 2021-06-15 RX ADMIN — AZITHROMYCIN MONOHYDRATE 500 MG: 250 TABLET ORAL at 16:15

## 2021-06-15 ASSESSMENT — ENCOUNTER SYMPTOMS
WOUND: 1
SHORTNESS OF BREATH: 1
FEVER: 0

## 2021-06-15 NOTE — TELEPHONE ENCOUNTER
Refill Approved    Rx renewed per Medication Renewal Policy. Medication was last renewed on 4/28/20.    [facility], [facility] Triage/Med Refill 3/3/2021     Requested Prescriptions   Pending Prescriptions Disp Refills     lisinopriL (PRINIVIL,ZESTRIL) 10 MG tablet [Pharmacy Med Name: LISINOPRIL 10MG TABLETS] 90 tablet 2     Sig: TAKE 1 TABLET(10 MG) BY MOUTH DAILY       [employer] Inhibitors Refill Protocol Passed - 3/3/2021 10:59 AM        Passed - PCP or prescribing provider visit in past 12 months       Last office visit with prescriber/PCP: 1/26/2021 Ben Hamlin MD OR same dept: 1/26/2021 Ben Hamlin MD OR same specialty: 1/26/2021 Ben Hamlin MD  Last physical: 7/2/2020 Last MTM visit: Visit date not found   Next visit within 3 mo: Visit date not found  Next physical within 3 mo: Visit date not found  Prescriber OR PCP: Ben Hamlin MD  Last diagnosis associated with med order: 1. Hypertension  - lisinopriL (PRINIVIL,ZESTRIL) 10 MG tablet [Pharmacy Med Name: LISINOPRIL 10MG TABLETS]; TAKE 1 TABLET(10 MG) BY MOUTH DAILY  Dispense: 90 tablet; Refill: 2    If protocol passes may refill for 12 months if within 3 months of last provider visit (or a total of 15 months).             Passed - Serum Potassium in past 12 months     Lab Results   Component Value Date    Potassium 4.7 01/26/2021             Passed - Blood pressure filed in past 12 months     BP Readings from Last 1 Encounters:   02/23/21 116/62             Passed - Serum Creatinine in past 12 months     Creatinine   Date Value Ref Range Status   01/26/2021 2.46 (H) 0.70 - 1.30 mg/dL Final

## 2021-06-15 NOTE — TELEPHONE ENCOUNTER
ANTICOAGULATION  MANAGEMENT    Assessment     Today's INR result of 2.1 is Therapeutic (goal INR of 2.0-3.0)        Warfarin taken as previously instructed    No new diet changes affecting INR    No new medication/supplements affecting INR    Continues to tolerate warfarin with no reported s/s of bleeding or thromboembolism     Previous INR was Subtherapeutic    Plan:     Spoke on phone with Panda regarding INR result and instructed:      Warfarin Dosing Instructions:  Continue current warfarin dose 7.5 mg daily on Tue, thu; and 5 mg daily rest of week  (0 % change)    Instructed patient to follow up no later than: 2 weeks    Education provided: target INR goal and significance of current INR result, importance of following up for INR monitoring at instructed interval, importance of taking warfarin as instructed, importance of notifying clinic for changes in medications and importance of notifying clinic for diarrhea, nausea/vomiting, reduced intake and/or illness    Panda verbalizes understanding and agrees to warfarin dosing plan.    Instructed to call the [facility] for any changes, questions or concerns. (#[phone])   ?   Trang Batista RN    Subjective/Objective:      Panda MALONE Ruben, a 65 y.o. male is on warfarin. Panda Mosqueda reports:     Home warfarin dose: verbally confirmed home dose with Panda and updated on anticoagulation calendar     Missed doses: No     Medication changes:  No     S/S of bleeding or thromboembolism:  No     New Injury or illness:  No     Changes in diet or alcohol consumption:  No     Upcoming surgery, procedure or cardioversion:  No    Anticoagulation Episode Summary     Current INR goal:  2.0-3.0   TTR:  88.8 % (1 y)   Next INR check:  2/23/2021   INR from last check:  2.10 (2/9/2021)   Weekly max warfarin dose:     Target end date:  Indefinite   INR check location:     Preferred lab:     Send INR reminders to:  [facility]    Indications    A-fib (H)  (Resolved) [I48.91]           Comments:           Anticoagulation Care Providers     Provider Role Specialty Phone number    Ben Hamlin MD Referring Family Medicine [phone]

## 2021-06-15 NOTE — TELEPHONE ENCOUNTER
Hello,    Recently an order was placed with [facility]  to provide in home nursing for this patient.    Unfortunately at this time we are unable to accept this referral due to our capacity. We are also unable to vend this patient to another agency due to their limited capacity as well.    We have sent this patient information to our Clinic Care Transition team to see what they can do to assist with the situation.    We are so sorry for the inconvenience this causes. If you have any questions, please call our office at [phone].    Thank you,  Velia

## 2021-06-15 NOTE — H&P
"    [facility]   Cardiology Service  History and Physical      Panda Miranda MRN# [id number]   YOB: 1955 Age: 66 year old       Admission Date: 6/15/2021      Assessment and plan:   Panda Miranda is a 66 y.o.M with a PMHx of HFpEF, mild-mod mitral regurgitation, permanent atrial fibrillation (on Warfarin), perimembranous VSD, HTN, CKD 3, TASHA (CPAP), gout, morbid obesity s/p VBG ring (5/21) admitted with several weeks of progressing SOB c/f HFpEF exacerbation    # Acute on Chronic Diastolic Heart Failure  # Mild-Mod Mitral Regurgitation  # Small, perimembranous VSD  Pt admitted with several weeks of progressing MACKEY, BLE edema. Chest xray with pulm edema, NT BNP 9K. EDW around 478-485#, current 491#. Patient recently seen by primary cardiologist ([facility] [place]) and had Bumex increased from 3 mg daily to BID with no improvement. He does report some increased salt intake over last several days.     - Volume status: hypervolemic, given 4 mg IV Bumex x 1, then start gtt 1 mg/hr  - Strict I/O, goal net negative 2-4L/day  - Daily weights  - BID BMP    # Permanent Atrial Fibrillation  Currently in afib, HR 70-90's. CHADSVASC 3, on chronic Coumadin  - Rate Control: PTA Metoprolol on hold while aggressively diuresing, consider resuming tomorrow  - Anticoagulation: Continue Warfarin  - Pharmacy to dose  - Goal INR 2-3  - Daily INR    # HTN  BP stable, 100-128/70-80's  - Diuresis as listed above  - Continue PTA Lisinopril, Norvasc, holding parameters in place    # CKD 3  # IRA  Baseline Cr 2.1-2.9. Currently 2.2. Hgb stable 10.4  - BID BMP  - Diuresis as listed above  - Continue PTA Iron     # Chronic Venous Stasis  # Chronic, non healing ulcers  Pt reports chronic ulcers BLE; left ankle, right heel for months, worsened with \"extra fluid.\" He changes dressings at home  - WOCN consult  - Lymphedema consult    # Morbid Obesity  Patient historically 880 pounds, lost 400 pounds prior to " "bariatric surgery. S/p vertical banded gastroplasty 5/21. Working with bariatric surgery for possible VBG ring removal  - Ongoing OP management; per chart review, follow up in 1-2 months    # Gout  - Continue PTA Uloric, Colchicine    # TASHA  - Continue CPAP at night    Vizient Present on Admission:   Cardiovascular: Cardiac Arrhythmia: Chronic atrial fibrillation, mitral regurgitation, VSD  Fluid & Electrolyte Disorders: Fluid overload, unspecified  Nephrology: Chronic Kidney Disease: Chronic kidney disease, stage 3 unspecified  Pulmonology: Other Pulmonary Conditions: Other specified pleural conditions- TASHA    FEN: 2 gm Sodium, 2L Fluid restriction  Code status: Full  Prophylaxis:  PO Warfarin  Isolation: None; COVID swab in process, pt not PUI, SOB likely 2/2 CHF  Disposition: Admit to inpatient    Patient to be formally staffed tomorrow am. Dr. Mohamud aware of plan    Kanwal Gaytan, APRN, CNP  [facility] Cardiology  [phone]      Chief Complaint: SOB    HPI:   Panda Miranda is a 66 y.o.M with a PMHx of HFpEF, mild-mod mitral regurgitation, permanent atrial fibrillation (on Warfarin), perimembranous VSD, HTN, CKD 3, TASHA (CPAP), gout, morbid obesity admitted with several weeks of progressing SOB c/f HFpEF exacerbation    Patient reports over last several weeks he has noticed increased MACKEY. This morning he was struggling to breath getting out of bed and to the shower.  He also reports increased pain in chronic wounds, \"from fluid,\" is currently wearing lymphedema wraps. He takes Bumex 3 mg BID, does report some missed doses in increased salt intake over last several days.      ED work up included chest xray with pulm congestion, NT BNP 9028, Cr 2.2 (around baseline), and weight 491lbs, EDW per patient around 485 # (per chart review, some weights near 480#).  Patient was given 20 mg IV Lasix in ED, as well as IV abx for possible pneumonia.    Patient follows with cardiology at [facility] [place] [place], was seen in clinic 2 weeks ago, and had " Bumex increased from 3 mg daily to BID. Patient reports no change in symptoms with this dose increase.     At time of interview, patient reports mild SOB while laying in bed, reports if he laid flat, would be very SOB. He denies any chest pain, palpitations, lightheadedness, or dizziness. Afebrile, -120/80's, HR 70-90's.    No past medical history on file.    No past surgical history on file.    No current facility-administered medications on file prior to encounter.   acetaminophen-codeine (TYLENOL W/CODEINE #3) 300-30 MG per tablet, Take 1-2 tablets by mouth  amLODIPine (NORVASC) 5 MG tablet, Take 5 mg by mouth  bumetanide (BUMEX) 1 MG tablet, TAKE 3 TABLETS BY MOUTH TWICE DAILY AT 9AM AND 6PM  cholecalciferol (VITAMIN D-1000 MAX ST) 25 MCG (1000 UT) TABS, Take 3,000 Units by mouth  colchicine (COLCYRS) 0.6 MG tablet, TAKE 1 TABLET(0.6 MG) BY MOUTH DAILY  febuxostat (ULORIC) 80 MG TABS tablet, Take 1 tablet by mouth  ferrous sulfate (FEROSUL) 325 (65 Fe) MG tablet, Take 325 mg by mouth  lisinopril (ZESTRIL) 10 MG tablet, TAKE 1 TABLET(10 MG) BY MOUTH DAILY  metoprolol tartrate (LOPRESSOR) 100 MG tablet, Take 100 mg by mouth  warfarin ANTICOAGULANT (COUMADIN) 5 MG tablet, Take 5 to 7.5mg (1 or 1.5 tabs) by mouth daily as directed.  Adjust dose based on INR.        No family history on file.    Social History     Tobacco Use     Smoking status: Not on file   Substance Use Topics     Alcohol use: Not on file       No Known Allergies      ROS:   CONSTITUTIONAL:No report of fevers or chills  RESPIRATORY: See HPI  CARDIOVASCULAR: see HPI  MUSCULO-SKELETAL: No joint pain/swelling, no muscle pain  NEURO: No paresthesias, syncope, pre-syncope, lightheadness, dizziness or vertigo  ENDOCRINE: No temperature intolerance, no skin/hair changes  PSYCHIATRIC: No change in mood, feeling down/anxious, no change in sleep or appetite  GI: no melena or hematochezia, no change in bowel habits  : no hematuria or dysuria, no  hesitancy, dribbling or incontinence  HEME: no easy bruising or bleeding, no history of anemia, no history of blood clots  SKIN: chronic bilateral wounds      Physical Examination:  Vitals: /83   Pulse 79   Temp 98.5  F (36.9  C) (Oral)   Resp 22   Wt (!) 223.1 kg (491 lb 13.5 oz)   SpO2 100%   BMI 57.57 kg/m    BMI= Body mass index is 57.57 kg/m .    GENERAL APPEARANCE: healthy, alert and no distress  HEENT: no icterus, no xanthelasmas, normal pupil size and reaction, normal palate, mucosa moist  NECK: JVP elevated 10-12 cm, brisk carotid upstroke bilaterally  CHEST: lungs clear upper lobes, crackles in bases  CARDIOVASCULAR: regular rhythm, normal S1 and S2, 2/6 diastolic murmur  ABDOMEN: rounded, soft, non tender, without hepatosplenomegaly, no masses palpable, bowel sounds normal  EXTREMITIES: warm, 1+ BLE edema, DP/PT pulses 2+ bilaterally, no clubbing or cyanosis   NEURO: alert and oriented to person/place/time, normal speech, gait and affect  SKIN: no ecchymoses, no rashes; chronic venous stasis, wounds left ankle, right heel    Laboratory:  CMP  Recent Labs   Lab 06/15/21  1428      POTASSIUM 4.2   CHLORIDE 109   CO2 27   ANIONGAP 2*   GLC 86   BUN 37*   CR 2.21*   GFRESTIMATED 30*   GFRESTBLACK 35*   JOHN 8.7   PROTTOTAL 7.9   ALBUMIN 3.5   BILITOTAL 0.9   ALKPHOS 75   AST 7   ALT 12     CBC  Recent Labs   Lab 06/15/21  1428   WBC 4.9   RBC 3.95*   HGB 10.4*   HCT 35.0*   MCV 89   MCH 26.3*   MCHC 29.7*   RDW 15.9*   *       Lab Results   Component Value Date    TROPI <0.015 06/15/2021       EKG 6/15/2021: Afib with PVC, HR 74      TTE  9/2017 ([facility]):  CONCLUSIONS    SUMMARY  Estimated left ventricular ejection fraction; 50-55%.  No regional wall motion abnormality, probable.  Right ventricular enlargement with decreased systolic function.  Tricuspid valve insufficiency, moderate.  Biatrial enlargement, marked.  Decreased right ventricular systolic performance-severe.  The estimated  "PA systolic pressure is 41 mmHg + RA.  Based on the IVC, the RA pressure is probably elevated.  Ventricular septal defect \"restrictive\" - left to right shunt.  Pericardial effusion - posterior, small.        "

## 2021-06-15 NOTE — ED NOTES
Bed: ED17  Expected date: 6/15/21  Expected time: 1:20 PM  Means of arrival: Ambulance  Comments:  [facility] [place] [facility] with a 65 yo M reports of possible pneumonia. Triaged yellow in 12 mins

## 2021-06-15 NOTE — TELEPHONE ENCOUNTER
----- Message from Xin Brush NP sent at 2/26/2021  7:38 AM CST -----  Can you call the patient and let him know that his culture was negative no need for oral antibiotics at this time

## 2021-06-15 NOTE — TELEPHONE ENCOUNTER
Reason for Call:  Other call back      Detailed comments: Mary Grace called and spoke to him he has more questions regarding the medications     Phone Number Patient can be reached at: Home number on file [phone] (home)    Best Time: as soon as possible    Can we leave a detailed message on this number?: Yes    Call taken on 3/3/2021 at 10:57 AM by Tracy Ferrell

## 2021-06-15 NOTE — TELEPHONE ENCOUNTER
ANTICOAGULATION  MANAGEMENT    Assessment     Today's INR result of 2.2 is Therapeutic (goal INR of 2.0-3.0)        Warfarin taken as previously instructed    No new diet changes affecting INR    No new medication/supplements affecting INR    Continues to tolerate warfarin with no reported s/s of bleeding or thromboembolism     Previous INR was Therapeutic    Plan:     Spoke on phone with Panda regarding INR result and instructed:      Warfarin Dosing Instructions:  Continue current warfarin dose 7.5 mg daily on Tuesdays and Thurdsays; and 5 mg daily rest of week  (0 % change)    Instructed patient to follow up no later than: 4 weeks.    Education provided: importance of therapeutic range, importance of following up for INR monitoring at instructed interval and importance of taking warfarin as instructed    Panda verbalizes understanding and agrees to warfarin dosing plan.    Instructed to call the [facility] for any changes, questions or concerns. (#[phone])   ?   Chely Groves RN    Subjective/Objective:      Panda Miranda, a 65 y.o. male is on warfarin. Panda Mosqueda reports:     Home warfarin dose: verbally confirmed home dose with Panda and updated on anticoagulation calendar     Missed doses: No     Medication changes:  No     S/S of bleeding or thromboembolism:  No     New Injury or illness:  No     Changes in diet or alcohol consumption:  No     Upcoming surgery, procedure or cardioversion:  No    Anticoagulation Episode Summary     Current INR goal:  2.0-3.0   TTR:  88.8 % (1 y)   Next INR check:  3/23/2021   INR from last check:  2.20 (2/23/2021)   Weekly max warfarin dose:     Target end date:  Indefinite   INR check location:     Preferred lab:     Send INR reminders to:  [employer] [facility] [employer]    Indications    A-fib (H) (Resolved) [I48.91]           Comments:           Anticoagulation Care Providers     Provider Role Specialty Phone number    Ben Hamlin MD Referring  Family Medicine [phone]

## 2021-06-15 NOTE — PROGRESS NOTES
Panda is scheduled for a Cpap follow up visit 2/5/18. Documentation shows [facility] as his DME. They have no record of patient. After further investigation, I found that he was using [facility] as his DME most recently. [email or web address] last recorded 12/10/15. I called Margie to get more information. Conclusion was that patients Cpap was picked up in June 2016. No longer using Cpap.

## 2021-06-15 NOTE — TELEPHONE ENCOUNTER
RN cannot approve Refill Request    RN can NOT refill this medication med is not covered by policy/route to provider. Last office visit: 1/26/2021 Ben Hamlin MD Last Physical: 7/2/2020 Last MTM visit: Visit date not found Last visit same specialty: 1/26/2021 Ben Hamlin MD.  Next visit within 3 mo: Visit date not found  Next physical within 3 mo: Visit date not found      Nohemi Plummer, [facility] Triage/Med Refill 2/24/2021    Requested Prescriptions   Pending Prescriptions Disp Refills     warfarin ANTICOAGULANT (COUMADIN/JANTOVEN) 5 MG tablet 120 tablet 1     Sig: Take 1-1.5 tablets (5-7.5 mg) daily as directed. Adjust dose per INR results.       Warfarin Refill Protocol  Failed - 2/24/2021 10:54 AM        Failed -  Route to appropriate pool/provider     Last Anticoagulation Summary:   Anticoagulation Episode Summary     Current INR goal:  2.0-3.0   TTR:  88.8 % (1 y)   Next INR check:  3/23/2021   INR from last check:  2.20 (2/23/2021)   Weekly max warfarin dose:     Target end date:  Indefinite   INR check location:     Preferred lab:     Send INR reminders to:  [facility]    Indications    A-fib (H) (Resolved) [I48.91]           Comments:           Anticoagulation Care Providers     Provider Role Specialty Phone number    Ben Hamlin MD Referring Family Medicine [phone]                Passed - Provider visit in last year     Last office visit with prescriber/PCP: 1/26/2021 Ben Hamlin MD OR same dept: 1/26/2021 Ben Hamlin MD OR same specialty: 1/26/2021 Ben Hamlin MD  Last physical: 7/2/2020 Last MTM visit: Visit date not found    Next appt within 3 mo: Visit date not found Next physical within 3 mo: Visit date not found  Prescriber OR PCP: Ben Hamlin MD  Last diagnosis associated with med order: 1. Atrial fibrillation, unspecified type (H)  - warfarin ANTICOAGULANT (COUMADIN/JANTOVEN) 5 MG tablet; Take 1-1.5  tablets (5-7.5 mg) daily as directed. Adjust dose per INR results.  Dispense: 120 tablet; Refill: 1    If protocol passes may refill for 6 months if within 3 months of last provider visit (or a total of 9 months).

## 2021-06-15 NOTE — ED NOTES
[facility]   ED Nurse to Floor Handoff     Panda Miranda is a 66 year old male who speaks English and lives with a spouse,  in a home  They arrived in the ED by ambulance from home    ED Chief Complaint: Shortness of Breath    ED Dx;   Final diagnoses:   Acute on chronic diastolic congestive heart failure (H)         Needed?: No    Allergies: No Known Allergies.  Past Medical Hx: No past medical history on file.   Baseline Mental status: WDL  Current Mental Status changes: at basesline    Infection present or suspected this encounter: no and yes respiratory  Sepsis suspected: No  Isolation type: Special Precautions-COVID-19  Patient tested for COVID 19 prior to admission: YES     Activity level - Baseline/Home:  Cane  Activity Level - Current:   Cane    Bariatric equipment needed?: Yes    In the ED these meds were given:   Medications   azithromycin (ZITHROMAX) tablet 500 mg (has no administration in time range)   furosemide (LASIX) injection 20 mg (has no administration in time range)   furosemide (LASIX) injection 20 mg (20 mg Intravenous Given 6/15/21 1554)       Drips running?  No    Home pump  No    Current LDAs  Peripheral IV 06/15/21 Right Wrist (Active)   Site Assessment WDL 06/15/21 1440   Line Status Saline locked 06/15/21 1440   Dressing Intervention New dressing  06/15/21 1440   Phlebitis Scale 0-->no symptoms 06/15/21 1440   Number of days: 0       Labs results:   Labs Ordered and Resulted from Time of ED Arrival Up to the Time of Departure from the ED   CBC WITH PLATELETS DIFFERENTIAL - Abnormal; Notable for the following components:       Result Value    RBC Count 3.95 (*)     Hemoglobin 10.4 (*)     Hematocrit 35.0 (*)     MCH 26.3 (*)     MCHC 29.7 (*)     RDW 15.9 (*)     Platelet Count 121 (*)     All other components within normal limits   INR - Abnormal; Notable for the following components:    INR 2.06 (*)     All other components  within normal limits   COMPREHENSIVE METABOLIC PANEL - Abnormal; Notable for the following components:    Anion Gap 2 (*)     Urea Nitrogen 37 (*)     Creatinine 2.21 (*)     GFR Estimate 30 (*)     GFR Estimate If Black 35 (*)     All other components within normal limits   NT PROBNP INPATIENT - Abnormal; Notable for the following components:    N-Terminal Pro BNP Inpatient 9,028 (*)     All other components within normal limits   CRP INFLAMMATION - Abnormal; Notable for the following components:    CRP Inflammation 14.0 (*)     All other components within normal limits   TROPONIN I   ERYTHROCYTE SEDIMENTATION RATE AUTO   SARS-COV-2 (COVID-19) VIRUS RT-PCR       Imaging Studies:   Recent Results (from the past 24 hour(s))   XR Chest Port 1 View    Narrative    EXAM: XR CHEST PORT 1 VIEW  6/15/2021 3:19 PM     HISTORY:  chest pain, increased cough       COMPARISON:  None available    FINDINGS:     Portable upright view of the chest. Trachea is midline. Pulmonary  vasculature is within normal limits. Prominent cardiac silhouette.  Cardiac silhouette is largely obscured. Aortic atherosclerosis.   Diffuse mixed interstitial and airspace opacities.    No acute osseous abnormality. Visualized upper abdomen is  unremarkable.        Impression    IMPRESSION:   Diffuse interstitial and airspace opacities, may represent infectious  processes vs. edema. Consider CT for further evaluation.     I have personally reviewed the examination and initial interpretation  and I agree with the findings.    PHU SO MD   POC US ECHO LIMITED    Impression    Limited Bedside Cardiac Ultrasound, performed and interpreted by me.   Indication: Shortness of Breath.  Parasternal long axis, parasternal short axis and apical 4 chamber views were acquired.   Image quality was limited due to habitus.    Findings:    Global left ventricular function appears intact.  Dilated right atrium  There is no evidence of free fluid within the  pericardium.    IMPRESSION: Grossly normal left ventricular function and chamber size.  No pericardial effusion..       Recent vital signs:   /78   Pulse 78   Temp 98.5  F (36.9  C) (Oral)   Resp 22   Wt (!) 223.1 kg (491 lb 13.5 oz)   SpO2 100%   BMI 57.57 kg/m      Charis Coma Scale Score: 15 (06/15/21 1357)       Cardiac Rhythm: A fib  Pt needs tele? Yes  Skin/wound Issues: leg wounds    Code Status: Full Code    Pain control: good    Nausea control: pt had none    Abnormal labs/tests/findings requiring intervention: See labs    Family present during ED course? No   Family Comments/Social Situation comments: Pt lives at home with wife    Tasks needing completion: None    Lo Knott    [phone] [facility]

## 2021-06-15 NOTE — PROGRESS NOTES
Thank you for asking the [facility] team to see Panda Miranda      Assessment/Plan:     Diastolic CHF - Stable. EF 60% on echo 2017 with no/mild MR and left to right shunt across the small perimembranous VSD. His physical exam is not fluid overloaded, but does show some persistent lower extremity edema due to venous insufficiency. Will continue BB, ACE-I, and bumex and have re-encouraged low salt/sodium diet. He should wear his knee high compression socks as well.       Right ventricular dilation and dysfunction - I am asking him to f/u with Dr. Ireland regarding his sleep apnea to make sure things are as optimized as possible from a sleep apnea standpoint. There is septal flattening on his most recent echo but the VSD shunt remains left to right.    Intermittent MR - dependent on fluid status. Last echo showed no MR.       Atrial Fibrillation - His heart rate seems to be well controlled on metoprolol. Last Holter in 2013 did show some pauses when likely sleeping due to sleep apnea so his BB has not been increased. He is compliant with his coumadin.       Ventricular ectopy - metoprolol       Small denise-membranous VSD - stable on last echo      F/U 1 year        Current History:   Panda Miranda is a 62 y.o. man with super morbid obesity, hypertension, obstructive sleep apnea and persistent atrial fibrillation. He also has a history of restrictive small perimembranous VSD and intermittent mitral regurgitation depending on his fluid status. He was hospitalized in September for hypotension and his metoprolol was decreased. Echo showed EF 50-55%, moderate TR, and severe RV enlargement and dysfunction. The discharge note attributes this to TASHA and likely obesity-hypoventilation syndrome.      He returns for annual follow up today and denies PND or orthopnea, chest pain, and palpitations. His lower extremity is stable/improved. He has difficulty wearing compression socks. Currently his venous stasis ulcers  are not open. He chronic dyspnea is stable and he is going up/down the stairs at home. He is mostly limited by severe knee pain.     He talks about exercise and diet modification, as he has for years, and is frustrated with his inability to act on his desire to exercise and modify his diet. 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    Past Medical History:     Past Medical History:   Diagnosis Date     Arthropathy of wrist      Atrial fibrillation      CHF (congestive heart failure)      CKD (chronic kidney disease)      Gout      Hand swelling      HTN (hypertension)      Obesity      TASHA (obstructive sleep apnea)      Pseudogout        Past Surgical History:     Past Surgical History:   Procedure Laterality Date     GASTROPLASTY VERTICAL BANDED      Dr. Arizmendi [facility] 1996 Initial weight 800++ Lost to 440- (was 320# at lowest)     KNEE SURGERY       IN PART REMOVAL COLON W ANASTOMOSIS      Description: Partial Colectomy;  Recorded: 12/02/2011;  Comments: Dr Magaña       Family History:     Family History   Problem Relation Age of Onset     Diabetes type II       Heart failure       Heart disease Mother      Hypertension Mother      Diabetes Sister        Social History:    reports that he has never smoked. He has never used smokeless tobacco. He reports that he drinks about 0.5 oz of alcohol per week  He reports that he uses illicit drugs about 3.5 times per week.    Meds:     Current Outpatient Prescriptions   Medication Sig Note     aspirin 81 MG EC tablet Take 81 mg by mouth. 1/18/2018: Received from: [facility] Received Sig: Take 81 mg by mouth once daily with a meal.     bumetanide (BUMEX) 1 MG tablet TAKE 3 TABLETS BY MOUTH TWICE DAILY AT 9 AM AND AT 6 PM      cholecalciferol, vitamin D3, (CHOLECALCIFEROL) 1,000 unit tablet Take 3 tablets (3,000 Units total) by mouth daily.       "colchicine (COLCRYS) 0.6 mg tablet Take 1 tablet (0.6 mg total) by mouth daily. Due for lab work in December for more refills.      febuxostat (ULORIC) 80 mg Tab Take 80 mg by mouth daily.      ferrous sulfate 325 (65 FE) MG tablet Take 1 tablet (325 mg total) by mouth daily with breakfast.      lisinopril (PRINIVIL,ZESTRIL) 10 MG tablet TAKE 1 TABLET BY MOUTH EVERY DAY      metoprolol tartrate (LOPRESSOR) 50 MG tablet TAKE 1 TABLET BY MOUTH DAILY.      warfarin (COUMADIN) 5 MG tablet TAKE 1 AND 1/2 TABLETS BY MOUTH EVERY DAY      acetaminophen-codeine (TYLENOL #3) 300-30 mg per tablet Take 1-2 tablets by mouth every 6 (six) hours as needed for pain.      amLODIPine (NORVASC) 5 MG tablet Take 5 mg by mouth. 2/6/2015: Received from: [facility]     metoprolol tartrate (LOPRESSOR) 50 MG tablet TAKE 3 TABLETS BY MOUTH DAILY      senna (SENNA) 8.6 mg tablet Take 8.6 mg by mouth daily as needed.  2/6/2015: Received from: [facility]     warfarin (COUMADIN) 5 MG tablet Take 1 tablet (5 mg) by mouth daily as directed. Adjust dose per INR results.        Allergies:   Review of patient's allergies indicates no known allergies.    Review of Systems:   Review of Systems:   General: WNL  Eyes: WNL  Ears/Nose/Throat: WNL  Lungs: WNL  Heart: WNL  Stomach: WNL  Bladder: WNL  Muscle/Joints: Joint Pain  Skin: WNL  Nervous System: WNL  Mental Health: WNL     Blood: WNL       Objective:      Physical Exam  @LASTENCWT:3@  6' 4\" (1.93 m)  @BMI:3@  /90 (Patient Site: Right Arm, Patient Position: Standing, Cuff Size: Adult Large)  Pulse 82  Resp 16  Ht 6' 4\" (1.93 m)  Wt (!) 494 lb 4.8 oz (224.2 kg)  BMI 60.17 kg/m2    General Appearance:   Alert, cooperative and in no acute distress.   HEENT:  No scleral icterus; the mucous membranes were pink and moist.   Neck: JVP flat. No thyromegaly. No HJR   Chest: The spine was straight. The chest was symmetric.   Lungs:   Respirations unlabored; the " lungs are clear to auscultation.   Cardiovascular:   S1 and S2 normal and with systolic murmur. No clicks or rubs. No carotid bruits noted. Right DP, PT, and radial pulses 2+. Left DP, PT, and radial pulses 2+.   Abdomen:  No organomegaly, masses, bruits, or tenderness. Bowels sounds are present   Extremities: No cyanosis, clubbing. + chronic edema.   Skin: No xanthelasma.   Neurologic: Mood and affect are appropriate.         Lab Review   Lab Results   Component Value Date     01/08/2018     12/23/2016     03/09/2016    K 5.0 01/08/2018    K 4.6 12/23/2016    K 5.1 (H) 03/09/2016     01/08/2018     12/23/2016     03/09/2016    CO2 22 01/08/2018    CO2 20 (L) 12/23/2016    CO2 20 (L) 03/09/2016    BUN 44 (H) 01/08/2018    BUN 33 (H) 12/23/2016    BUN 39 (H) 03/09/2016    CREATININE 2.21 (H) 01/08/2018    CREATININE 2.15 (H) 12/23/2016    CREATININE 2.42 (H) 03/09/2016    CALCIUM 9.5 01/08/2018    CALCIUM 9.4 12/23/2016    CALCIUM 9.5 03/09/2016     Lab Results   Component Value Date    WBC 6.0 01/08/2018    WBC 7.8 12/23/2016    WBC 7.4 03/09/2016    HGB 12.5 (L) 01/08/2018    HGB 13.0 (L) 12/23/2016    HGB 13.5 (L) 12/01/2016    HCT 39.1 (L) 01/08/2018    HCT 40.4 12/23/2016    HCT 43.5 03/09/2016    MCV 85 01/08/2018    MCV 87 12/23/2016    MCV 86 03/09/2016     (L) 01/08/2018     12/23/2016     (L) 03/09/2016     Lab Results   Component Value Date    CHOL 173 08/17/2016    CHOL 157 01/26/2012    TRIG 103 08/17/2016    TRIG 78 01/26/2012    HDL 49 08/17/2016    HDL 45 01/26/2012    LDLDIRECT 89 10/20/2014    LDLDIRECT 90 12/21/2012    LDLDIRECT 90 11/02/2009     Lab Results   Component Value Date     (H) 10/20/2014     (H) 03/11/2014     (H) 10/31/2011         Cynthia Argueta M.D.

## 2021-06-15 NOTE — TELEPHONE ENCOUNTER
----- Message from Ben Hamlin MD sent at 3/2/2021  1:04 PM CST -----  Regarding: medication  Please contact patient blood pressure medicine.    Wound clear clinic made the suggestion that we stop amlodipine because of lower extremity edema.  I agree with this suggestion.  I am going to call in a higher dose of his metoprolol.  I will increase it to 100 mg twice a day.  He should stop taking the amlodipine when he starts that.  Then we should make him follow-up in 2 to 3 weeks to recheck blood pressure.

## 2021-06-15 NOTE — PHARMACY-ANTICOAGULATION SERVICE
Clinical Pharmacy - Warfarin Dosing Consult     Pharmacy has been consulted to manage this patient s warfarin therapy.  Indication: Atrial Fibrillation  Therapy Goal: INR 2-3  Warfarin Prior to Admission: Yes  Warfarin PTA Regimen: 7.5mg TueThu, 5mg ROW  Recent documented change in oral intake/nutrition: Unknown    INR   Date Value Ref Range Status   06/15/2021 2.06 (H) 0.86 - 1.14 Final       Recommend warfarin 7.5 mg today.  Pharmacy will monitor Panda Miranda daily and order warfarin doses to achieve specified goal.      Please contact pharmacy as soon as possible if the warfarin needs to be held for a procedure or if the warfarin goals change.      Andreas Espinosa, PharmD, BCPS

## 2021-06-15 NOTE — PROGRESS NOTES
Assessment/ Plan  Problem List Items Addressed This Visit        High    Glucose Intolerance     A1c checked today and is excellent, again encourage weight loss and exercise         Essential hypertension     Controlled on amlodipine 5 mg, metoprolol 50 mg twice daily (though there is some question about this dosing since patient had the dose changed at [facility] by his account.  I note that he was supposed to be on 150 mg of extended release daily per the last HCM see note-will ask him to bring in this medication bottle with next visit) lisinopril 10 mg.  Underlying chronic kidney disease stage IIIb         Relevant Orders    Comprehensive Metabolic Panel    Obesity (BMI 35.0-39.9 without comorbidity)     Reviewed above explanation with him.  Encouraged him that if it was not for his weight related problems he be in pretty good health.  I think he should pursue the surgery.  He may need to enroll again.  Patient indicated understanding and agreement         Atrial fibrillation     Rate reasonably controlled, anticoagulated         Relevant Orders    INR (Completed)    Diastolic Congestive Heart Failure     Weight up but edema not bad, blood pressure well controlled.  On Bumex.  Check basic metabolic profile         Relevant Orders    Glycosylated Hemoglobin A1c (Completed)    Ambulatory referral to Cardiology    Chronic Kidney Disease, Stage 3 - Primary     Patient is on ACE inhibitor, hemoglobin slightly low and rechecked, blood pressure well controlled, avoiding nonsteroidals, referral to nephrology for routine follow-up         Relevant Orders    Comprehensive Metabolic Panel    Ambulatory referral to Nephrology    Gout     Check uric acid, continue Uloric, colchicine daily, no recent flares up when he ran out of medication         Relevant Orders    HM2(CBC w/o Differential) (Completed)    Uric Acid    Comprehensive Metabolic Panel       Unprioritized    Health care maintenance     Flu and  zoster even         Relevant Orders    Influenza, Seasonal,Quad Inj, 36+ MOS (Completed)    Varicella-zoster vaccine subcutaneous (Completed)      Other Visit Diagnoses     Decreased visual acuity        Prediabetes        Relevant Orders    Glycosylated Hemoglobin A1c (Completed)      Patient unfortunately does not have insurance to cover eye doctor visit currently by his account.  Consider optometry visit in the short run and ophthalmology when he has full insurance  Subjective  CC:  Chief Complaint   Patient presents with     Medication Check     HPI:  Gradually onset of blurry vision - distant vision  See some spots  cpap mask maybe caused problems      Chronic Kidney Disease    GFR 41 stage 3B  3a GFR 45-59  3b GFR 30-44  4    GFR 15-29  5    GFR < 15  Results for orders placed or performed in visit on 10/20/14   BASIC METABOLIC PANEL   Result Value Ref Range    Sodium 140 136 - 145 mmol/L    Potassium 4.9 3.5 - 5.0 mmol/L    Chloride 105 98 - 107 mmol/L    CO2 23 22 - 31 mmol/L    Anion Gap, Calculation 12 5 - 18 mmol/L    Glucose 98 70 - 125 mg/dL    Calcium 9.5 8.5 - 10.5 mg/dL    BUN 35 (H) 8 - 22 mg/dL    Creatinine 2.03 (H) 0.70 - 1.30 mg/dL    GFR MDRD Af Amer 41 (L) >60 mL/min/1.73m2    GFR MDRD Non Af Amer 34 (L) >60 mL/min/1.73m2       Meds  Ace ARB?  Lisinopril 10  Statin?  No  Vitamin D?  Previously yes, not currently  Off of NSAIDs, reduced dose allopurinol, off bisphosphonate (GFR<30)  BP Readings from Last 3 Encounters:   01/08/18 116/80   01/24/17 138/76   01/04/17 130/84       Follow Up Gout  Narrative: Long-standing history of severe knee problems, gout with pseudogout  Recent flares?  Only one, when he ran out of medication  Medications:  PRN/Flares: Does not have currently  Prevention: Colchicine, Uloric 40  Comment of dietary efforts/ alcohol intake: Has tried to do well with diet by his indication    Lab Results   Component Value Date    URICACID 7.3 03/09/2016     Lab Results   Component  Value Date    CREATININE 2.15 (H) 12/23/2016     Lab Results   Component Value Date    WBC 7.8 12/23/2016    HGB 13.0 (L) 12/23/2016    HCT 40.4 12/23/2016    MCV 87 12/23/2016     12/23/2016     Lab Results   Component Value Date    ALT 16 12/23/2016         HTN Follow-up  Narrative/ comments: There was some change made in metoprolol when he was and [place] with some presyncope.  Attempted to review this but it does not correlate with what he indicates  Adherent with antihypertensives?  Amlodipine 5 mg, metoprolol 50 twice daily, lisinopril 10 (see assessment section for drugs)  Does patient check blood pressure on his/her own?  No  Comments on lifestyle factors: Up-and-down with efforts  Meds that may raise BP (NSAIDs, decongestants etc)?  None  ----------------------  JNC 8 bp goals  > 60 treat to < 150/90- good evidence for SBP number in this group, DBP number based on extrapolation  < 60 140/90.  Systolic goal is consensus and there is little evidence.  Diastolic goal is strongly evidence based.   DM or CKD at any age 140/90- this is an ongoing area of study   adults with DM/ CKD or age < 70 albuminuria, GFR < 60- < 140/90  It may make more sense to treat all 60-70 more aggressively.    Follow-up CHF  Diastolic  Narrative/ current symptoms very little  Note current mediations/adherance    CHF class II comment/ comparison to baseline at baseline  _Patient is playing [employer] for Super Bowl festivities.  Needs to be of the brief to place trombone _________________  NYHA Stages:  Symptomatic at ...  I- unusual activities  II-usual activities  III-self care  IV- at rest  ____________________    Wt Readings from Last 3 Encounters:   01/08/18 (!) 500 lb (226.8 kg)   01/24/17 (!) 505 lb (229.1 kg)   01/19/17 (!) 497 lb (225.4 kg)         Monitoring weight?  No    Results for orders placed or performed in visit on 10/20/14   BASIC METABOLIC PANEL   Result Value Ref Range    Sodium 140 136 - 145  mmol/L    Potassium 4.9 3.5 - 5.0 mmol/L    Chloride 105 98 - 107 mmol/L    CO2 23 22 - 31 mmol/L    Anion Gap, Calculation 12 5 - 18 mmol/L    Glucose 98 70 - 125 mg/dL    Calcium 9.5 8.5 - 10.5 mg/dL    BUN 35 (H) 8 - 22 mg/dL    Creatinine 2.03 (H) 0.70 - 1.30 mg/dL    GFR MDRD Af Amer 41 (L) >60 mL/min/1.73m2    GFR MDRD Non Af Amer 34 (L) >60 mL/min/1.73m2     Lab Results   Component Value Date     (H) 10/20/2014     She takes Bumex 3 mg twice daily.  Due for basic metabolic profile    Obesity  Note from Dr. Costello last spring, was going to have him enter bariatric program and work through then follow-up.  Dr. Costello did not know the details of partial colectomy and wondered whether he be a candidate for a Manjula-en-Y procedure, thought probably a sleeve gastrectomy.    He saw Dr. Maher 1/12/17.  Tuyet Beckwith recommended six-month follow-up.  Saw Dr. Argueta 1/4/17.  Recommended follow-up in 1 year.  All was stable.  PFSH:  Patient Active Problem List   Diagnosis     Lower Back Pain     Osteoarthritis Of The Knee     Hematuria     Glucose Intolerance     Essential hypertension     Anemia     Ventricular Septal Defect     Obesity (BMI 35.0-39.9 without comorbidity)     Obstructive Sleep Apnea     Pseudogout     Atrial fibrillation     Diastolic Congestive Heart Failure     Chronic Kidney Disease, Stage 3     Onychomycosis     Gout     Vitamin D deficiency     Health care maintenance     Compliance with medication regimen     Non-rheumatic mitral regurgitation     Current medications reviewed as follows:  Current Outpatient Prescriptions on File Prior to Visit   Medication Sig     acetaminophen-codeine (TYLENOL #3) 300-30 mg per tablet Take 1-2 tablets by mouth every 6 (six) hours as needed for pain.     amLODIPine (NORVASC) 5 MG tablet Take 5 mg by mouth.     bumetanide (BUMEX) 1 MG tablet TAKE 3 TABLETS BY MOUTH TWICE DAILY AT 9 AM AND AT 6 PM     cholecalciferol, vitamin D3, (CHOLECALCIFEROL) 1,000 unit  tablet Take 3 tablets (3,000 Units total) by mouth daily.     colchicine (COLCRYS) 0.6 mg tablet Take 1 tablet (0.6 mg total) by mouth daily. Due for lab work in December for more refills.     ferrous sulfate 325 (65 FE) MG tablet Take 1 tablet (325 mg total) by mouth daily with breakfast.     lisinopril (PRINIVIL,ZESTRIL) 10 MG tablet TAKE 1 TABLET BY MOUTH EVERY DAY     metoprolol tartrate (LOPRESSOR) 50 MG tablet Take 1 tablet (50 mg total) by mouth daily. Due for visit in December for more refills.Call 24/7     metoprolol tartrate (LOPRESSOR) 50 MG tablet TAKE 3 TABLETS BY MOUTH DAILY     senna (SENNA) 8.6 mg tablet Take 8.6 mg by mouth daily as needed.      ULORIC 40 mg tablet TAKE ONE TABLET BY MOUTH EVERY DAY     warfarin (COUMADIN) 5 MG tablet Take 1 tablet (5 mg) by mouth daily as directed. Adjust dose per INR results.     [DISCONTINUED] predniSONE (DELTASONE) 10 MG tablet TAKE 1 TABLET BY MOUTH EVERY DAY     [DISCONTINUED] predniSONE (DELTASONE) 5 MG tablet Take 1 tablet (5 mg total) by mouth daily.     [DISCONTINUED] warfarin (COUMADIN) 5 MG tablet TAKE ONE AND ONE-HALF TABLET BY MOUTH DAILY     [DISCONTINUED] warfarin (COUMADIN) 5 MG tablet TAKE 1 AND 1/2 TABLETS BY MOUTH EVERY DAY     [DISCONTINUED] codeine-guaiFENesin (GUAIFENESIN AC)  mg/5 mL liquid Take 5 mL by mouth every 4 (four) hours as needed. Take 5-10ML by mouth every 4 hours as needed     [DISCONTINUED] HYDROcodone-acetaminophen (NORCO )  mg per tablet Take by mouth every 4 (four) hours as needed.      No current facility-administered medications on file prior to visit.      History   Smoking Status     Never Smoker   Smokeless Tobacco     Never Used     Social History     Social History Narrative     Patient Care Team:  Ben Hamlin MD as PCP - General  ROS  Full 10 system review including constitutional, respiratory, cardiac, gi, urinary, rheumatologic, neurologic, reproductive, dermatologic psychiatric is   "performed (via questionnaire) and is negative except change in vision, blurred vision      Objective  Physical Exam  Vitals:    01/08/18 1331   BP: 116/80   Pulse: 76   Resp: 20   Weight: (!) 500 lb (226.8 kg)   Height: 6' 4\" (1.93 m)     Body mass index is 60.86 kg/(m^2).  Morbidly obese 62-year-old, no acute distress.  Gen- alert, oriented/ appropriately responsive  HEENT- normal cephalic, atraumatic.   Chest- Normal inspiration and expiration.  Clear to ascultation.  No chest wall deformity or scar.  CV- Heart regular rate and rhythm, normal tones, no murmurs gallops or rubs.  Ext- appear well perfused, no edema  Skin- warm and dry, no visualized rash    Diagnostics:   Results for orders placed or performed in visit on 01/08/18   INR   Result Value Ref Range    INR 1.20 (H) 0.90 - 1.10   Glycosylated Hemoglobin A1c   Result Value Ref Range    Hemoglobin A1c 5.7 3.5 - 6.0 %   HM2(CBC w/o Differential)   Result Value Ref Range    WBC 6.0 4.0 - 11.0 thou/uL    RBC 4.58 4.40 - 6.20 mill/uL    Hemoglobin 12.5 (L) 14.0 - 18.0 g/dL    Hematocrit 39.1 (L) 40.0 - 54.0 %    MCV 85 80 - 100 fL    MCH 27.4 27.0 - 34.0 pg    MCHC 32.1 32.0 - 36.0 g/dL    RDW 12.5 11.0 - 14.5 %    Platelets 121 (L) 140 - 440 thou/uL    MPV 9.3 7.0 - 10.0 fL       Data Documentation  Additional History from Old Records Summarized (2):'s, searched care everywhere for [facility] records, also reviewed notes from cardiology, surgery and renal in the last year  Decision to Obtain Records (1): Yes  Radiology Tests Summarized or Ordered (1): No  Labs Reviewed or Ordered (1): Yes  Medicine Test Summarized or Ordered (1): Echocardiogram reviewed from 9/30/17.  Technically difficult, ejection fraction 50-55%.  Right ventricle with decreased systolic function, ventricular septal defect with left-to-right shunt, small pericardial effusion.  Independent Review of EKG or X-RAY(2 each): No      Please note: Voice recognition software was " used in this dictation.  It may therefore contain typographical errors.

## 2021-06-15 NOTE — TELEPHONE ENCOUNTER
Lab Results   Component Value Date    INR 2.20 (H) 02/23/2021    INR 2.10 (H) 02/09/2021    INR 1.70 (H) 01/26/2021         Next INR advised: 3/23    Current warfarin dose per anticoagulation flowsheet:   Outpatient Anticoagulation Plan Latest Ref Rng & Units 2/23/2021   ANTICOAG FULL INSTRUCTIONS  7.5 mg every Tue, Thu; 5 mg all other days       Last health maintenance OV/physical exam: 1/26    Patient is up to date.  Warfarin refilled per protocol.

## 2021-06-15 NOTE — ED PROVIDER NOTES
"    [place] EMERGENCY DEPARTMENT ([facility])  6/15/21     History     Chief Complaint   Patient presents with     Shortness of Breath     The history is provided by the patient and medical records.     Panda Miranda is a 66 year old male with a past medical history significant for morbid obesity, HTN, chronic renal insufficiency who presents here to the Emergency Department via EMS due to shortness of breath and chest pain.  Patient reports he has had pain in his \"left lung\" since Saturday, 3 days ago.  He describes the pain as sharp.  He states he has been producing a lot of mucus that he coughs up for the past 1-2 weeks as well.  Patient states he became more short of breath this morning with increased pain with inhalation.  Patient states he has had similar symptoms in the past with bronchitis.  He denies a history of clots.  Patient notes he is anticoagulated on Warfarin due to atrial fibrillation.  He denies missed or extra doses.  Patient denies recent antibiotic use.  Patient denies fevers.  Patient notes he has had leg pain and swelling with some wounds, all of which he has been treated for.  Patient states he last saw the wound clinic a few weeks ago.  His only open wound left is on his left ankle and it has been getting smaller.    Past Medical History  No past medical history on file.  No past surgical history on file.  acetaminophen-codeine (TYLENOL W/CODEINE #3) 300-30 MG per tablet  amLODIPine (NORVASC) 5 MG tablet  bumetanide (BUMEX) 1 MG tablet  cholecalciferol (VITAMIN D-1000 MAX ST) 25 MCG (1000 UT) TABS  colchicine (COLCYRS) 0.6 MG tablet  febuxostat (ULORIC) 80 MG TABS tablet  ferrous sulfate (FEROSUL) 325 (65 Fe) MG tablet  lisinopril (ZESTRIL) 10 MG tablet  metoprolol tartrate (LOPRESSOR) 100 MG tablet  warfarin ANTICOAGULANT (COUMADIN) 5 MG tablet      No Known Allergies  Family History  No family history on file.  Social History   Social History     Tobacco Use     Smoking " status: Not on file   Substance Use Topics     Alcohol use: Not on file     Drug use: Not on file      Past medical history, past surgical history, medications, allergies, family history, and social history were reviewed with the patient. No additional pertinent items.       Review of Systems   Constitutional: Negative for fever.   Respiratory: Positive for shortness of breath.    Cardiovascular: Positive for chest pain.   Skin: Positive for wound (L ankle).   All other systems reviewed and are negative.    A complete review of systems was performed with pertinent positives and negatives noted in the HPI, and all other systems negative.    Physical Exam   BP: 112/86  Pulse: 72  Temp: 98.5  F (36.9  C)  Resp: 22  Weight: (!) 223.1 kg (491 lb 13.5 oz)  SpO2: 98 %  Physical Exam  General: patient is alert and oriented and in no acute distress   Head: atraumatic and normocephalic   EENT: moist mucus membranes, sclera anicteric  Neck: supple   Cardiovascular: Regular rate, regular rhythm, no murmur appreciated, extremities warm and well perfused, 2+ bilateral lower extremity edema similar to baseline  Pulmonary: lungs clear to auscultation bilaterally   Abdomen: soft, non-tender   Musculoskeletal: normal range of motion   Neurological: alert and oriented, moving all extremities symmetrically, gait normal   Skin: warm, dry    ED Course     1:54 PM  The patient was seen and examined by Sheila Larios MD in Room ED17.    Procedures             EKG Interpretation:      Interpreted by Sheila Larios MD  Time reviewed: 1410  Symptoms at time of EKG: chest pain   Rhythm: atrial fibrillation - controlled  Rate: Normal  Axis: Normal  Ectopy: none  Conduction: normal  ST Segments/ T Waves: No acute ischemic changes  Q Waves: none  Comparison to prior: Unchanged    Clinical Impression: atrial fibrillation (chronic)                   No results found for any visits on 06/15/21.  Medications - No data to display     Assessments  & Plan (with Medical Decision Making)   Mr. Miranda is a 66 year old male with a past medical history significant for morbid obesity, HTN, chronic renal insufficiency who presents here to the Emergency Department via EMS due to shortness of breath.  He is hemodynamically stable, afebrile and in no respiratory distress.  He is saturating 98% on room air.  His ECG shows he is in atrial fibrillation which has been chronic for him.  He does not have any acute ischemic changes. Differential diagnosis includes but is not limited to pneumonia, bronchitis, ACS, CHF exacerbation, PE, pericarditis, pericardial effusion, covid.  I have reviewed his labs which demonstrate a negative troponin.  His BNP is elevated over 9000.  He has no white count and CRP is 14.  Creatinine is elevated to 2.21 though similar to previous.  His INR is therapeutic at 2.06.  Lower suspicion for PE at this time given his therapeutic INR.  Bedside cardiac ultrasound shows no pericardial effusion and relatively good LV function.  His chest x-ray does show multiple opacities that could be infectious versus edema.  I suspect that this is most likely pulmonary edema as he has not been compliant with his home Bumex and yesterday was traveling and eating fast food, soup and pizza.  He was given Lasix in the ED.  He did also get a dose of azithromycin to cover for possible atypical pneumonia.  Patient will be admitted to cardiology to continue diuresis.    I have reviewed the nursing notes. I have reviewed the findings, diagnosis, plan and need for follow up with the patient.    New Prescriptions    No medications on file       Final diagnoses:   Acute on chronic diastolic congestive heart failure (H)   ILacy, am serving as a trained medical scribe to document services personally performed by Sheila Larios MD, based on the provider's statements to me.     I, Sheila Larios MD, was physically present and have reviewed and verified  the accuracy of this note documented by Lacy Bocanegra.     --  Sheila Larios MD  [facility] EMERGENCY DEPARTMENT  6/15/2021     Sheila Larios MD  06/15/21 [id number]

## 2021-06-15 NOTE — ED TRIAGE NOTES
66 yr old male hx CHF, BIBA from home with SOB and productive cough x couple of weeks. SOB worsened this am. Left sided chest pain x 4 days. VSS en route. 117/84. HR 95. 95% on RA. 12 lead concerning for afib en route. Anticoagulated with coumadin. Describes chest pain as sharp. Chest pain worse with deep breathing.

## 2021-06-16 ENCOUNTER — APPOINTMENT (OUTPATIENT)
Dept: PHYSICAL THERAPY | Facility: CLINIC | Age: 66
DRG: 291 | End: 2021-06-16
Attending: NURSE PRACTITIONER
Payer: COMMERCIAL

## 2021-06-16 ENCOUNTER — APPOINTMENT (OUTPATIENT)
Dept: CARDIOLOGY | Facility: CLINIC | Age: 66
DRG: 291 | End: 2021-06-16
Attending: NURSE PRACTITIONER
Payer: COMMERCIAL

## 2021-06-16 LAB
ANION GAP SERPL CALCULATED.3IONS-SCNC: 3 MMOL/L (ref 3–14)
ANION GAP SERPL CALCULATED.3IONS-SCNC: 5 MMOL/L (ref 3–14)
BUN SERPL-MCNC: 37 MG/DL (ref 7–30)
BUN SERPL-MCNC: 40 MG/DL (ref 7–30)
CALCIUM SERPL-MCNC: 9 MG/DL (ref 8.5–10.1)
CALCIUM SERPL-MCNC: 9 MG/DL (ref 8.5–10.1)
CHLORIDE SERPL-SCNC: 108 MMOL/L (ref 94–109)
CHLORIDE SERPL-SCNC: 110 MMOL/L (ref 94–109)
CO2 SERPL-SCNC: 26 MMOL/L (ref 20–32)
CO2 SERPL-SCNC: 29 MMOL/L (ref 20–32)
CREAT SERPL-MCNC: 2.09 MG/DL (ref 0.66–1.25)
CREAT SERPL-MCNC: 2.18 MG/DL (ref 0.66–1.25)
ERYTHROCYTE [DISTWIDTH] IN BLOOD BY AUTOMATED COUNT: 15.9 % (ref 10–15)
GFR SERPL CREATININE-BSD FRML MDRD: 30 ML/MIN/{1.73_M2}
GFR SERPL CREATININE-BSD FRML MDRD: 32 ML/MIN/{1.73_M2}
GLUCOSE BLDC GLUCOMTR-MCNC: 103 MG/DL (ref 70–99)
GLUCOSE BLDC GLUCOMTR-MCNC: 105 MG/DL (ref 70–99)
GLUCOSE SERPL-MCNC: 90 MG/DL (ref 70–99)
GLUCOSE SERPL-MCNC: 92 MG/DL (ref 70–99)
HCT VFR BLD AUTO: 34.9 % (ref 40–53)
HGB BLD-MCNC: 10.7 G/DL (ref 13.3–17.7)
INR PPP: 2 (ref 0.86–1.14)
INTERPRETATION ECG - MUSE: NORMAL
MCH RBC QN AUTO: 26.5 PG (ref 26.5–33)
MCHC RBC AUTO-ENTMCNC: 30.7 G/DL (ref 31.5–36.5)
MCV RBC AUTO: 86 FL (ref 78–100)
PLATELET # BLD AUTO: 125 10E9/L (ref 150–450)
POTASSIUM SERPL-SCNC: 4 MMOL/L (ref 3.4–5.3)
POTASSIUM SERPL-SCNC: 4.1 MMOL/L (ref 3.4–5.3)
POTASSIUM SERPL-SCNC: 4.1 MMOL/L (ref 3.4–5.3)
RBC # BLD AUTO: 4.04 10E12/L (ref 4.4–5.9)
SODIUM SERPL-SCNC: 140 MMOL/L (ref 133–144)
SODIUM SERPL-SCNC: 141 MMOL/L (ref 133–144)
WBC # BLD AUTO: 5.3 10E9/L (ref 4–11)

## 2021-06-16 PROCEDURE — 99221 1ST HOSP IP/OBS SF/LOW 40: CPT | Mod: 25 | Performed by: INTERNAL MEDICINE

## 2021-06-16 PROCEDURE — 999N000157 HC STATISTIC RCP TIME EA 10 MIN

## 2021-06-16 PROCEDURE — 250N000009 HC RX 250: Performed by: NURSE PRACTITIONER

## 2021-06-16 PROCEDURE — 84132 ASSAY OF SERUM POTASSIUM: CPT | Performed by: NURSE PRACTITIONER

## 2021-06-16 PROCEDURE — 36415 COLL VENOUS BLD VENIPUNCTURE: CPT | Performed by: NURSE PRACTITIONER

## 2021-06-16 PROCEDURE — G0463 HOSPITAL OUTPT CLINIC VISIT: HCPCS

## 2021-06-16 PROCEDURE — 97530 THERAPEUTIC ACTIVITIES: CPT | Mod: GP

## 2021-06-16 PROCEDURE — 250N000013 HC RX MED GY IP 250 OP 250 PS 637: Performed by: INTERNAL MEDICINE

## 2021-06-16 PROCEDURE — 80048 BASIC METABOLIC PNL TOTAL CA: CPT | Performed by: NURSE PRACTITIONER

## 2021-06-16 PROCEDURE — 97161 PT EVAL LOW COMPLEX 20 MIN: CPT | Mod: GP

## 2021-06-16 PROCEDURE — 85610 PROTHROMBIN TIME: CPT | Performed by: NURSE PRACTITIONER

## 2021-06-16 PROCEDURE — 93306 TTE W/DOPPLER COMPLETE: CPT | Mod: 26 | Performed by: INTERNAL MEDICINE

## 2021-06-16 PROCEDURE — 94660 CPAP INITIATION&MGMT: CPT

## 2021-06-16 PROCEDURE — 255N000002 HC RX 255 OP 636: Performed by: INTERNAL MEDICINE

## 2021-06-16 PROCEDURE — 999N001017 HC STATISTIC GLUCOSE BY METER IP

## 2021-06-16 PROCEDURE — 999N000208 ECHOCARDIOGRAM COMPLETE

## 2021-06-16 PROCEDURE — 120N000003 HC R&B IMCU UMMC

## 2021-06-16 PROCEDURE — 250N000013 HC RX MED GY IP 250 OP 250 PS 637: Performed by: NURSE PRACTITIONER

## 2021-06-16 PROCEDURE — 85027 COMPLETE CBC AUTOMATED: CPT | Performed by: NURSE PRACTITIONER

## 2021-06-16 RX ORDER — WARFARIN SODIUM 7.5 MG/1
7.5 TABLET ORAL
Status: COMPLETED | OUTPATIENT
Start: 2021-06-16 | End: 2021-06-16

## 2021-06-16 RX ORDER — METOPROLOL TARTRATE 100 MG
100 TABLET ORAL 2 TIMES DAILY
Status: DISCONTINUED | OUTPATIENT
Start: 2021-06-16 | End: 2021-06-18 | Stop reason: HOSPADM

## 2021-06-16 RX ADMIN — AMLODIPINE BESYLATE 5 MG: 5 TABLET ORAL at 08:25

## 2021-06-16 RX ADMIN — LISINOPRIL 10 MG: 10 TABLET ORAL at 08:25

## 2021-06-16 RX ADMIN — Medication 3000 UNITS: at 08:25

## 2021-06-16 RX ADMIN — METOPROLOL TARTRATE 100 MG: 50 TABLET, FILM COATED ORAL at 20:02

## 2021-06-16 RX ADMIN — METOPROLOL TARTRATE 100 MG: 50 TABLET, FILM COATED ORAL at 10:31

## 2021-06-16 RX ADMIN — FERROUS SULFATE TAB 325 MG (65 MG ELEMENTAL FE) 325 MG: 325 (65 FE) TAB at 08:25

## 2021-06-16 RX ADMIN — FEBUXOSTAT 80 MG: 40 TABLET, FILM COATED ORAL at 08:25

## 2021-06-16 RX ADMIN — BUMETANIDE 1 MG/HR: 0.25 INJECTION, SOLUTION INTRAMUSCULAR; INTRAVENOUS at 18:57

## 2021-06-16 RX ADMIN — HUMAN ALBUMIN MICROSPHERES AND PERFLUTREN 5 ML: 10; .22 INJECTION, SOLUTION INTRAVENOUS at 07:27

## 2021-06-16 RX ADMIN — WARFARIN SODIUM 7.5 MG: 7.5 TABLET ORAL at 18:07

## 2021-06-16 ASSESSMENT — ACTIVITIES OF DAILY LIVING (ADL)
ADLS_ACUITY_SCORE: 17
ADLS_ACUITY_SCORE: 14
ADLS_ACUITY_SCORE: 17

## 2021-06-16 NOTE — PROGRESS NOTES
"1 month f/u left leg ulcer.    \"it wont heal, it is about the same, it is not as deep as it was. I ran out of bandages, they told me I have to call the doctor. I am at the bare minimum. I have been trying to give it some air\"  "

## 2021-06-16 NOTE — TELEPHONE ENCOUNTER
Telephone Encounter by Jeanette Marie at 4/19/2019  2:25 PM     Author: Jeanette Marie Service: -- Author Type: --    Filed: 4/19/2019  2:25 PM Encounter Date: 4/17/2019 Status: Signed    : Jeanette Marie APPROVED:    Approval start date:4/18/19  Approval end date: 12/31/2019    Pharmacy has been notified of approval and will contact patient when medication is ready for pickup.

## 2021-06-16 NOTE — TELEPHONE ENCOUNTER
Wound care supplies was not ordered on 3/29, writer ordered. Writer updated pt that [facility] should be contacting him for shipment. Writer asked if current dressing are intact and they are. Informed him to call back if they come off or get saturated.

## 2021-06-16 NOTE — CONSULTS
[facility] Nurse Inpatient Wound Assessment   Reason for consultation: Evaluate and treat bilateral leg wounds    Assessment  Lower leg wounds due to Venous Ulcer  Status: initial assessment    Treatment Plan  Bilateral lower leg wounds: Every other day and PRN  Cleanse both legs and wound beds with MicroKlenz and dry with gauze.   Paint periwound skin with No sting barrier film (653204) and allow to dry  Cut Aquacel Ag ([id number]) to fit over open wound beds and apply.   Cover with Mepilex 4x4 ([id number])   Ok to omit Aquacel AG from right ankle wound only.   Apply Sween 24 (pink top) to bilateral shins and calves.  Ok to apply leg wraps over top of dressings.     Orders Written  Recommended provider order: None, at this time. Lymphedema consulted already.   [facility] Nurse follow-up plan:weekly  Nursing to notify the Provider(s) and re-consult the [facility] Nurse if wound(s) deteriorates or new skin concern.    Patient History  According to provider note(s):  Panda Miranda is a 66 y.o.M with a PMHx of HFpEF, mild-mod mitral regurgitation, permanent atrial fibrillation (on Warfarin), perimembranous VSD, HTN, CKD 3, TASHA (CPAP), gout, morbid obesity s/p VBG ring (5/21) admitted with several weeks of progressing SOB c/f HFpEF exacerbation    Objective Data  Containment of urine/stool: Continent of bladder and Continent of bowel    Active Diet Order  Orders Placed This Encounter      2 Gram Sodium Diet      Output:   I/O last 3 completed shifts:  In: 1433.6 [P.O.:1397; I.V.:36.6]  Out: 5200 [Urine:5200]    Risk Assessment:   Sensory Perception: 4-->no impairment  Moisture: 3-->occasionally moist  Activity: 3-->walks occasionally  Mobility: 3-->slightly limited  Nutrition: 2-->probably inadequate  Friction and Shear: 2-->potential problem  Sandoval Score: 17                          Labs:   Recent Labs   Lab 06/16/21  0523 06/15/21  2156 06/15/21  1428   ALBUMIN  --  3.5 3.5   HGB 10.7*  --  10.4*   INR 2.00*  --  2.06*   WBC 5.3  --  4.9   A1C   --   --  5.9*   CRP  --   --  14.0*       Physical Exam  Areas of skin assessed: focused bilateral lower legs and feet      Left lateral lower leg wound 6/16    Right shin wound (just below knee) 6/16    Right lateral ankle wound 6/16    Wound History: Present on admission. Patient follows with [facility] in outpatient setting for wound cares.     Wound Base: Left: 100% dermis   Right knee:100% dermis and superficial scab Right ankle: 100% serous scabbing and fibrin     Palpation of the wound bed: normal      Drainage: moderate     Description of drainage: bloody from left leg wound only     Measurements (length x width x depth, in cm) Left: 5 x 3 x 0.2 cm; Right knee: 5 x 0.7 x 0.1 cm; Right ankle: 0.5 x 0.5 x 0.1cm     Tunneling N/A     Undermining N/A  Periwound skin: dry/scaly and macerated - maceration primarily on left lower leg      Color: normal and consistent with surrounding tissue      Temperature: normal   Odor: none  Pain: denies , none    Interventions  Visual inspection and assessment completed   Wound Care Rationale Protect periwound skin, Improve absorptive capacity and Decrease bacterial load  Wound Care: done per plan of care  Supplies: ordered: Aquacel AG and discussed with patient  Current off-loading measures: Pillows  Current support surface: Standard  Atmos Air mattress  Education provided to: plan of care and wound progress  Discussed plan of care with Patient    Marcelina Hernandes RN, CWOCN

## 2021-06-16 NOTE — TELEPHONE ENCOUNTER
ANTICOAGULATION  MANAGEMENT PROGRAM    Panda Miranda is overdue for INR check.     Spoke with Panda and scheduled INR appointment on 3/31.      Mackenzie Ross RN

## 2021-06-16 NOTE — TELEPHONE ENCOUNTER
Pt called to check on the status of his wound care supplies. Pt stated he needs to change his bandages as they were last changed on 3/29/21 when he was seen in clinic. Please call pt to discuss.

## 2021-06-16 NOTE — TELEPHONE ENCOUNTER
ANTICOAGULATION  MANAGEMENT    Assessment     Today's INR result of 2.6 is Therapeutic (goal INR of 2.0-3.0)        Warfarin taken as previously instructed    No new diet changes affecting INR    No new medication/supplements affecting INR    Continues to tolerate warfarin with no reported s/s of bleeding or thromboembolism     Previous INR was Therapeutic    Plan:     Spoke on phone with Panda regarding INR result and instructed:      Warfarin Dosing Instructions:  Continue current warfarin dose 7.5 mg daily on Tue/Thur; and 5 mg daily rest of week      Instructed patient to follow up no later than: 1 month    Education provided: target INR goal and significance of current INR result    Panda verbalizes understanding and agrees to warfarin dosing plan.    Instructed to call the [facility] for any changes, questions or concerns. (#[phone])   ?   Carmencita Lopez RN    Subjective/Objective:      Panda Miranda, a 65 y.o. male is on warfarin. Panda Mosqueda reports:     Home warfarin dose: as updated on anticoagulation calendar per template     Missed doses: No     Medication changes:  No     S/S of bleeding or thromboembolism:  No     New Injury or illness:  No     Changes in diet or alcohol consumption:  No     Upcoming surgery, procedure or cardioversion:  No    Anticoagulation Episode Summary     Current INR goal:  2.0-3.0   TTR:  88.8 % (1 y)   Next INR check:  4/28/2021   INR from last check:  2.60 (3/31/2021)   Weekly max warfarin dose:     Target end date:  Indefinite   INR check location:     Preferred lab:     Send INR reminders to:  [facility]    Indications    A-fib (H) (Resolved) [I48.91]           Comments:           Anticoagulation Care Providers     Provider Role Specialty Phone number    Ben Hamlin MD Referring Family Medicine [phone]

## 2021-06-16 NOTE — PROGRESS NOTES
06/16/21 1545   Quick Adds   Type of Visit Initial PT Evaluation   Living Environment   People in home spouse   Current Living Arrangements house   Home Accessibility stairs to enter home;stairs within home   Number of Stairs, Main Entrance 5   Number of Stairs, Within Home, Primary other (see comments)  (16 to 2nd level bed/bath; 14 to basement)   Transportation Anticipated family or friend will provide   Living Environment Comments Pt lives with supportive spouse that can provide PRN assist   Self-Care   Usual Activity Tolerance moderate   Current Activity Tolerance fair   Regular Exercise No   Equipment Currently Used at Home cane, straight   Activity/Exercise/Self-Care Comment Retired . Doesn't have many hobbies now outside of watching TV   Disability/Function   Change in Functional Status Since Onset of Current Illness/Injury yes   General Information   Onset of Illness/Injury or Date of Surgery 06/15/21   Patient/Family Therapy Goals Statement (PT) Walk with less SOB   Pertinent History of Current Problem (include personal factors and/or comorbidities that impact the POC) Panda Miranda is a 66 y.o.M with a PMHx of HFpEF, mild-mod mitral regurgitation, permanent atrial fibrillation (on Warfarin), perimembranous VSD, HTN, CKD 3, TASHA (CPAP), gout, morbid obesity s/p VBG ring (5/21) admitted with several weeks of progressing SOB c/f HFpEF exacerbation.   Cognition   Orientation Status (Cognition) oriented x 4   Pain Assessment   Patient Currently in Pain Yes, see Vital Sign flowsheet   Integumentary/Edema   Integumentary/Edema Comments Mild non pitting firm edema. Wraps self with short stretch bandage from wound clinic. Needs new bandages and base layer. Otherwise not contributing to functional loss   Posture    Posture Forward head position;Protracted shoulders   Range of Motion (ROM)   ROM Comment B LE AROM grossly WFL   Strength   Strength Comments B LE strength at least 3+/5   Bed Mobility    Comment (Bed Mobility) Supine>sit: mod A mainly limited by environmental barriers   Transfers   Transfer Safety Comments sit<>stand: CGA up with SPC   Gait/Stairs (Locomotion)   Comment (Gait/Stairs) Amb 15' with SPC and other UE on IV pole. Forward flexed, staggered and very slow shakila.   Balance   Balance Comments CGA for standing static/dynamic balanc for safety   Clinical Impression   Criteria for Skilled Therapeutic Intervention yes, treatment indicated   PT Diagnosis (PT) Impaired functional mobility   Influenced by the following impairments SOB, activity intolerance, pain, posture   Functional limitations due to impairments Transfers, gait, balance, endurance   Clinical Presentation Stable/Uncomplicated   Clinical Presentation Rationale Clinical judgement   Clinical Decision Making (Complexity) low complexity   Therapy Frequency (PT) 4x/week   Predicted Duration of Therapy Intervention (days/wks) 1-2 weeks   Planned Therapy Interventions (PT) gait training;home exercise program;postural re-education;stair training;strengthening;stretching;neuromuscular re-education;transfer training   Risk & Benefits of therapy have been explained evaluation/treatment results reviewed;care plan/treatment goals reviewed;risks/benefits reviewed;current/potential barriers reviewed;participants voiced agreement with care plan;participants included;patient   PT Discharge Planning    PT Discharge Recommendation (DC Rec) home with assist   PT Rationale for DC Rec Pending ability to amb further and perform stairs   PT Brief overview of current status  A x 1 for pivoting bed<>chair and ambulation   Total Evaluation Time   Total Evaluation Time (Minutes) 10

## 2021-06-16 NOTE — PLAN OF CARE
Plan of Care [id number]  Neuro:    - A&Ox4    Cardiac:    - Rhythm: AFib   - VSS.    - 4 AM BP 96/68    Respiratory:    - SPO2 > 90% on RA.   - CPAP NOC    :   - Voiding in Urinal    GI:   - Last BM: 6/15    Diet/appetite:    - 2g Sodium, 2000 mL restriction diet.    Activity:     - Assist of 1 and Cane.   - Bed Alarm Active.    Pain:    - No pain reported.    Skin:    - Multiple wounds to lower extremities    LDA's:   - 1 PIV - Bumex gtt    Plan:    - Continue with POC.    - Notify primary team with changes.    - Continue Bumex.   - Fluid Restrictions   - Wound Cares.

## 2021-06-16 NOTE — PROGRESS NOTES
"[facility]    Cardiology Progress Note- Cards 1        Date of Admission:  6/15/2021     Assessment & Plan: HVSL   Panda Miranda is a 66 y.o.M with a PMHx of HFpEF, mild-mod mitral regurgitation, permanent atrial fibrillation (on Warfarin), perimembranous VSD, HTN, CKD 3, TASHA (CPAP), gout, morbid obesity s/p VBG ring (5/21) admitted with several weeks of progressing SOB c/f HFpEF exacerbation.    # Acute on Chronic Diastolic Heart Failure  # Mild-Mod Mitral Regurgitation  # Small, perimembranous VSD  Pt admitted with several weeks of progressing MACKEY, BLE edema. Chest xray with pulm edema, NT BNP 9K. EDW around 478-485#, admit weight 491#. Patient recently [place] [place] and had Bumex increased from 3 mg daily to BID with no improvement. He does report some increased salt intake over last several days.      I/Os: - 863/-1063   Weight: 474 lbs today   - Volume status: hypervolemic, continue IV Bumex gtt 1 mg/hr, consider change to PO tomorrow  - Strict I/O, goal net negative -1-2L/day  - Daily weights  - BID BMP     # Permanent Atrial Fibrillation  #CHADSVASC 3, on chronic Coumadin  - Resume PTA Metoprolol   - Continue PTA Warfarin, pharmacy to dose (goal INR 2-3)  - Daily INR     # HTN  BP stable, 113/75 today  - Diuresis as listed above  - Continue PTA Lisinopril, Norvasc     # CKD 3  # IRA  Baseline Cr 2.1-2.9. Currently 2.09. Hgb stable 10.7  - BID BMP  - Diuresis as listed above  - Continue PTA Iron      # Chronic Venous Stasis  # Chronic, non healing ulcers  Pt reports chronic ulcers BLE; left ankle, right heel for months, worsened with \"extra fluid.\" He changes dressings at home  - WOCN consult  - Lymphedema consult     # Morbid Obesity  Patient historically 880 pounds, lost 400 pounds prior to bariatric surgery. S/p vertical banded gastroplasty 5/21. Working with bariatric surgery for possible VBG ring removal  - Ongoing OP management; " per chart review, follow up in 1-2 months     # Gout  - Continue PTA Uloric, Colchicine     # TASHA  - Continue CPAP at night    Diet:  2g sodium, 2L fluid restriction  DVT Prophylaxis: PO Warfarin  Cheema Catheter: not present  Code Status:  Full        Disposition Plan   Expected discharge: 2 - 3 days, recommended to prior living arrangement once fluid volume status optimized on oral medication.      Entered: Trang Mathews 06/16/2021, 8:15 AM       The patient's care was discussed with the Attending Physician, Dr. Mohamud.    Trang Mathews, MS4  [employer]    [facility]    ICaryl, COLIN, saw this patient with the Med Student and agree with the findings and plan of care as documented in the above note. I personally reviewed vital signs, labs, images (including EKG, echocardiogram, chest xray, I/O) . Key findings: pt remains hypervolemic, greatly improving, Cr improving. PT to see pt today, monitor symptoms with activity  ______________________________________________________________________    Interval History   Breathing better today, was able to stand up yesterday without dyspnea. Notes little sleep due to frequent urination. Denies any chest pain or palpitations. No cramping in legs or arms. Denies abd pain, n/v.       Physical Exam   Vital Signs: Temp: 98.3  F (36.8  C) Temp src: Oral BP: 113/75 Pulse: 85   Resp: 16 SpO2: 100 % O2 Device: None (Room air)    Weight: 491 lbs 13.54 oz  Constitutional: awake, alert, cooperative, no apparent distress, and appears stated age  Respiratory: bibasilar crackles, no wheezing  Cardiovascular: RRR, normal S1 and S2, no S3 or S4, and no murmur noted. JVP elevated ~8, mild 1+ pitting edema of LE BL   GI: No scars, normal bowel sounds, soft, non-distended, non-tender  Neuropsychiatric: General: normal, calm and normal eye contact    Data   Recent Labs   Lab 06/16/21  0523 06/15/21  2156 06/15/21  1428   WBC  5.3  --  4.9   HGB 10.7*  --  10.4*   MCV 86  --  89   *  --  121*   INR 2.00*  --  2.06*   NA  --   --  139   POTASSIUM 4.1 4.0 4.2   CHLORIDE  --   --  109   CO2  --   --  27   BUN  --   --  37*   CR  --   --  2.21*   ANIONGAP  --   --  2*   JOHN  --   --  8.7   GLC  --   --  86   ALBUMIN  --  3.5 3.5   PROTTOTAL  --  8.1 7.9   BILITOTAL  --  0.8 0.9   ALKPHOS  --  76 75   ALT  --  14 12   AST  --  9 7   TROPI  --   --  <0.015     Recent Results (from the past 24 hour(s))   XR Chest Port 1 View    Narrative    EXAM: XR CHEST PORT 1 VIEW  6/15/2021 3:19 PM     HISTORY:  chest pain, increased cough       COMPARISON:  None available    FINDINGS:     Portable upright view of the chest. Trachea is midline. Pulmonary  vasculature is within normal limits. Prominent cardiac silhouette.  Cardiac silhouette is largely obscured. Aortic atherosclerosis.   Diffuse mixed interstitial and airspace opacities.    No acute osseous abnormality. Visualized upper abdomen is  unremarkable.        Impression    IMPRESSION:   Diffuse interstitial and airspace opacities, may represent infectious  processes vs. edema. Consider CT for further evaluation.     I have personally reviewed the examination and initial interpretation  and I agree with the findings.    PHU OS MD   POC US ECHO LIMITED    Impression    Limited Bedside Cardiac Ultrasound, performed and interpreted by me.   Indication: Shortness of Breath.  Parasternal long axis, parasternal short axis and apical 4 chamber views were acquired.   Image quality was limited due to habitus.    Findings:    Global left ventricular function appears intact.  Dilated right atrium  There is no evidence of free fluid within the pericardium.    IMPRESSION: Grossly normal left ventricular function and chamber size.  No pericardial effusion..     Echocardiogram 6/16/2021:  Interpretation Summary  Technically difficult study. Poor acoustic windows due to body habitus.  Mild left  ventricular dilation. Borderline (EF 50-55%) reduced left  ventricular function.  Small, perimembranous VSD with bidirectional flow. Holosystolic, left to right  predominant with peak velocity of 4.4m/s. Early diastolic right to left flow.  Based on limited views the right ventricle is probably moderately dilated with  moderately reduced global systolic function.  Mild to moderate MR.  Moderate TR.  Moderate pulmonary hypertension. Estimated pulmonary artery systolic pressure  is 46 mmHg plus right atrial pressure.  IVC diameter and respiratory changes fall into an intermediate range  suggesting an RA pressure of 8 mmHg.  No pericardial effusion is present.     There is no prior study for direct comparison. The VSD peak velocity is higher  than reported in CareEverywhere from echo done at [facility] in 2017.  Otherwise the findings are similar.    Medications     bumetanide 1 mg/hr (06/16/21 0400)     - MEDICATION INSTRUCTIONS -       - MEDICATION INSTRUCTIONS -       Warfarin Therapy Reminder         amLODIPine  5 mg Oral Daily     febuxostat  80 mg Oral Daily     ferrous sulfate  325 mg Oral Daily with breakfast     lisinopril  10 mg Oral Daily     sodium chloride (PF)  3 mL Intracatheter Q8H     Vitamin D3  3,000 Units Oral Daily

## 2021-06-16 NOTE — TELEPHONE ENCOUNTER
----- Message from Xin Brush NP sent at 3/31/2021  3:28 PM CDT -----  Can you call the patient and let him know that his biopsy showed that the wound is from his swelling; or venous stasis

## 2021-06-16 NOTE — PHARMACY-ADMISSION MEDICATION HISTORY
Admission Medication History Completed by Pharmacy    See [facility] Admission Navigator for allergy information, preferred outpatient pharmacy, prior to admission medications and immunization status.     Medication History Sources:     Patient, dispense report, care everywhere.    Changes made to PTA medication list (reason):    Added: None    Deleted:   o Acetaminophen-codeine 300-30mg tablet - take 1-2 tablets by mouth daily  o Amlodipine 5mg tablet - take 5mg by mouth daily    Changed: None    Additional Information:    Medications verified with patient via phone.    Warfarin:  anticoag office: [facility]  Diagnosis (patient reported): heart palpitations - hx of afib  INR goal: 2-3  Reported missed doses in past 2 weeks: yes, this AM (6/15/21)  Last dose: 6/14/21 AM    Prior to Admission medications    Medication Sig Last Dose Taking? Auth Provider   bumetanide (BUMEX) 1 MG tablet TAKE 3 TABLETS BY MOUTH TWICE DAILY AT 9AM AND 6PM 6/14/2021 at PM Yes Reported, Patient   colchicine (COLCYRS) 0.6 MG tablet TAKE 1 TABLET(0.6 MG) BY MOUTH DAILY 6/14/2021 at AM Yes Reported, Patient   febuxostat (ULORIC) 80 MG TABS tablet Take 1 tablet by mouth daily  6/14/2021 at AM Yes Reported, Patient   ferrous sulfate (FEROSUL) 325 (65 Fe) MG tablet Take 325 mg by mouth daily  6/14/2021 at AM Yes Reported, Patient   lisinopril (ZESTRIL) 10 MG tablet TAKE 1 TABLET(10 MG) BY MOUTH DAILY 6/14/2021 at AM Yes Reported, Patient   metoprolol tartrate (LOPRESSOR) 100 MG tablet Take 100 mg by mouth 2 times daily  6/14/2021 at AM Yes Reported, Patient   warfarin ANTICOAGULANT (COUMADIN) 5 MG tablet Take 7.5mg by mouth every Tuesday and Thursday, and take 5mg by mouth all other days. 6/14/2021 at AM Yes Reported, Patient       Date completed: 06/15/21    Medication history completed by: Catarino Barillas

## 2021-06-16 NOTE — TELEPHONE ENCOUNTER
Telephone Encounter by Patricia Acosta RN at 3/10/2020  2:09 PM     Author: Patricia Acosta RN Service: -- Author Type: Registered Nurse    Filed: 3/10/2020  2:19 PM Encounter Date: 3/10/2020 Status: Attested    : Patricia Acosta RN (Registered Nurse) Cosigner: Ben Hamlin MD at 3/13/2020  4:57 PM    Attestation signed by Ben Hamlin MD at 3/13/2020  4:57 PM    reviewed                ANTICOAGULATION  MANAGEMENT    Assessment     Today's INR result of 2.2 is Therapeutic (goal INR of 2.0-3.0)        Warfarin taken as previously instructed    No new diet changes affecting INR    No new medication/supplements affecting INR    Continues to tolerate warfarin with no reported s/s of bleeding or thromboembolism     Previous INR was Therapeutic     Cataract surgery 4/9    Plan:     Spoke with Panda regarding INR result and instructed:     Warfarin Dosing Instructions:  Continue current warfarin dose 7.5 mg daily on sun/tue; and 5 mg daily rest of week  (0 % change)    Instructed patient to follow up no later than: one month    Panda verbalizes understanding and agrees to warfarin dosing plan.    Instructed to call the [facility] for any changes, questions or concerns. (#[phone])   ?   Patricia Acosta RN    Subjective/Objective:      Panda Miranda, a 64 y.o. male is on warfarin.     Panda reports:     Home warfarin dose: verbally confirmed home dose with Panda and updated on anticoagulation calendar     Missed doses: No     Medication changes:  No     S/S of bleeding or thromboembolism:  No     New Injury or illness:  No     Changes in diet or alcohol consumption:  No     Upcoming surgery, procedure or cardioversion:  No    Anticoagulation Episode Summary     Current INR goal:   2.0-3.0   TTR:   87.8 % (1 y)   Next INR check:   4/7/2020   INR from last check:   2.20 (3/10/2020)   Weekly max warfarin dose:      Target end date:   Indefinite   INR check location:      Preferred lab:       Send INR reminders to:   [facility] [employer] [place]    Indications    A-fib (H) (Resolved) [I48.91]           Comments:            Anticoagulation Care Providers     Provider Role Specialty Phone number    Ben Hamlin MD Referring Family Medicine [phone]

## 2021-06-17 LAB
ANION GAP SERPL CALCULATED.3IONS-SCNC: 5 MMOL/L (ref 3–14)
ANION GAP SERPL CALCULATED.3IONS-SCNC: 6 MMOL/L (ref 3–14)
BUN SERPL-MCNC: 41 MG/DL (ref 7–30)
BUN SERPL-MCNC: 43 MG/DL (ref 7–30)
CALCIUM SERPL-MCNC: 9.5 MG/DL (ref 8.5–10.1)
CALCIUM SERPL-MCNC: 9.5 MG/DL (ref 8.5–10.1)
CHLORIDE SERPL-SCNC: 105 MMOL/L (ref 94–109)
CHLORIDE SERPL-SCNC: 107 MMOL/L (ref 94–109)
CO2 SERPL-SCNC: 26 MMOL/L (ref 20–32)
CO2 SERPL-SCNC: 28 MMOL/L (ref 20–32)
CREAT SERPL-MCNC: 2.13 MG/DL (ref 0.66–1.25)
CREAT SERPL-MCNC: 2.38 MG/DL (ref 0.66–1.25)
ERYTHROCYTE [DISTWIDTH] IN BLOOD BY AUTOMATED COUNT: 15.8 % (ref 10–15)
GFR SERPL CREATININE-BSD FRML MDRD: 27 ML/MIN/{1.73_M2}
GFR SERPL CREATININE-BSD FRML MDRD: 31 ML/MIN/{1.73_M2}
GLUCOSE SERPL-MCNC: 116 MG/DL (ref 70–99)
GLUCOSE SERPL-MCNC: 98 MG/DL (ref 70–99)
HCT VFR BLD AUTO: 37.9 % (ref 40–53)
HGB BLD-MCNC: 11.8 G/DL (ref 13.3–17.7)
INR PPP: 2.06 (ref 0.86–1.14)
MCH RBC QN AUTO: 26.8 PG (ref 26.5–33)
MCHC RBC AUTO-ENTMCNC: 31.1 G/DL (ref 31.5–36.5)
MCV RBC AUTO: 86 FL (ref 78–100)
PLATELET # BLD AUTO: 143 10E9/L (ref 150–450)
POTASSIUM SERPL-SCNC: 4.2 MMOL/L (ref 3.4–5.3)
POTASSIUM SERPL-SCNC: 4.4 MMOL/L (ref 3.4–5.3)
RBC # BLD AUTO: 4.4 10E12/L (ref 4.4–5.9)
SODIUM SERPL-SCNC: 138 MMOL/L (ref 133–144)
SODIUM SERPL-SCNC: 139 MMOL/L (ref 133–144)
WBC # BLD AUTO: 5.6 10E9/L (ref 4–11)

## 2021-06-17 PROCEDURE — 94660 CPAP INITIATION&MGMT: CPT

## 2021-06-17 PROCEDURE — 85027 COMPLETE CBC AUTOMATED: CPT | Performed by: NURSE PRACTITIONER

## 2021-06-17 PROCEDURE — 250N000013 HC RX MED GY IP 250 OP 250 PS 637: Performed by: PHYSICIAN ASSISTANT

## 2021-06-17 PROCEDURE — 80048 BASIC METABOLIC PNL TOTAL CA: CPT | Performed by: NURSE PRACTITIONER

## 2021-06-17 PROCEDURE — 999N000157 HC STATISTIC RCP TIME EA 10 MIN

## 2021-06-17 PROCEDURE — 250N000013 HC RX MED GY IP 250 OP 250 PS 637: Performed by: NURSE PRACTITIONER

## 2021-06-17 PROCEDURE — 250N000013 HC RX MED GY IP 250 OP 250 PS 637: Performed by: INTERNAL MEDICINE

## 2021-06-17 PROCEDURE — 36415 COLL VENOUS BLD VENIPUNCTURE: CPT | Performed by: NURSE PRACTITIONER

## 2021-06-17 PROCEDURE — 120N000003 HC R&B IMCU UMMC

## 2021-06-17 PROCEDURE — 85610 PROTHROMBIN TIME: CPT | Performed by: NURSE PRACTITIONER

## 2021-06-17 PROCEDURE — 99232 SBSQ HOSP IP/OBS MODERATE 35: CPT | Performed by: INTERNAL MEDICINE

## 2021-06-17 RX ORDER — WARFARIN SODIUM 7.5 MG/1
7.5 TABLET ORAL
Status: COMPLETED | OUTPATIENT
Start: 2021-06-17 | End: 2021-06-17

## 2021-06-17 RX ADMIN — Medication 3000 UNITS: at 08:18

## 2021-06-17 RX ADMIN — BUMETANIDE 3 MG: 2 TABLET ORAL at 10:09

## 2021-06-17 RX ADMIN — FEBUXOSTAT 80 MG: 40 TABLET, FILM COATED ORAL at 08:17

## 2021-06-17 RX ADMIN — FERROUS SULFATE TAB 325 MG (65 MG ELEMENTAL FE) 325 MG: 325 (65 FE) TAB at 08:18

## 2021-06-17 RX ADMIN — WARFARIN SODIUM 7.5 MG: 7.5 TABLET ORAL at 17:29

## 2021-06-17 RX ADMIN — LISINOPRIL 10 MG: 10 TABLET ORAL at 08:18

## 2021-06-17 RX ADMIN — METOPROLOL TARTRATE 100 MG: 50 TABLET, FILM COATED ORAL at 21:34

## 2021-06-17 RX ADMIN — BUMETANIDE 3 MG: 2 TABLET ORAL at 15:15

## 2021-06-17 RX ADMIN — AMLODIPINE BESYLATE 5 MG: 5 TABLET ORAL at 08:18

## 2021-06-17 RX ADMIN — METOPROLOL TARTRATE 100 MG: 50 TABLET, FILM COATED ORAL at 08:18

## 2021-06-17 ASSESSMENT — ACTIVITIES OF DAILY LIVING (ADL)
ADLS_ACUITY_SCORE: 17

## 2021-06-17 NOTE — PATIENT INSTRUCTIONS - HE
- Please call us if your compression wraps fall more than 1-2 inches below the bend of the knee. Remove the wraps if you experience any shortness of breathe or notice your toes turning blue/purple and then give us a call. Call if they are too painful. Call if they get wet. If it is a weekend and the wraps fall down, are too painful, or get wet take the wraps off and put on another form of compression. Compression such as velcro wraps, compression stockings, short stretch bandages, or tubular compression. Apply one of these until you can be seen in clinic. Please call us if you have any questions [phone].    - Treatment:  Layered Compression Bandaging (2-layer)    What is it?  The layered compression bandaging has a layer of absorbent material that will soak up drainage.     Why we do it.   This is done to treat swelling, wounds, or both.  This will in turn help circulation and healing.    How to care for your bandages.  The wraps need to be kept dry. If  the wraps become wet, remove them and call the clinic to have another wrap applied.    What to expect.  It is common for the wraps to be uncomfortable at the beginning. The first two days are usually the hardest; then they will become more comfortable.       Elevating your legs will help the discomfort. Try to elevate your legs as much as possible.    If rest and elevation does not help your discomfort, call your provider.  If your provider is not available you can remove the wrap and leave a message for further instructions.    - Swelling and Compression Therapy    Swelling in the legs can be caused by many reasons. No matter what the reason, treatment usually includes some type of compression. This may be done with a support sock, dressing, ace wrap, or layered wraps.     It is important to treat the swelling for many reasons. If the swelling is not treated you may develop blisters that can lead to ulcerations. This is caused when extra fluid goes into tissue  causing damage and blocking blood flow to the tissue.     It is important that you wear your compression every day, including days that you will be seen in clinic.     Compression is often the most important part of treating leg wounds. Without controlling the swelling it is often not possible to heal wounds.     Going without compression for even brief periods of time can be damaging to your legs and your health.  Your compression should be put on first thing in the morning. Take the compression off at night only when instructed by your care provider to do so. Sometimes wearing compression 24 hours a day will be recommended.       If you are having difficulty wearing your compression it is important to notify your care provider so that other options may be reviewed.    Thank you for choosing [facility]. Please call us if you have any questions [phone].

## 2021-06-17 NOTE — PROGRESS NOTES
Assessment/ Plan  Problem List Items Addressed This Visit        High    Atrial fibrillation     INR ordered today         Gout - Primary     cochicne prn for gout attacks  uloric 80  Will check uric acid           Relevant Orders    Uric Acid    HM2(CBC w/o Differential) (Completed)    ALT (SGPT)    Creatinine       Unprioritized    Obesity     Encourage patient's follow-up, again, with surgical bariatrics given his BMI of nearly 60 and comorbidity.  Patient admits fear to going through with surgery.  I encouraged patient at least to follow through with obtaining knowledge for options.         Relevant Orders    Ambulatory referral to Bariatric Care: Surgical and Non-Surgical      Other Visit Diagnoses     Pain, dental        Agree with this dentist that this is dental pain.  Prescribed 30 Tylenol 3, procedure anticipated 1 week        Subjective  CC:  Chief Complaint   Patient presents with     Jaw Pain     and headaches     HPI:  Facial Pain  duration/onset about 1 week  location: Right maxillary region,  quality/severity: Throbbing/better now, was severe last week  frequency/ duration of episodes (if applicable): Constant but gradually improved  associated symptoms: Patient has had problems with his only molar left on the right side.  Saw a dentist last week and needs to have a root canal on that side.  Also, wears his CPAP mask and has needed to loosen the strap.  Also, was told that he grinds his teeth and given cyclobenzaprine.  All of these things have helped his pain significantly.  He still has some however and is requesting some pain medication  triggers?  Cold and hot food have bothered  Anything that helps?  Ibuprofen has not helped adequately            Follow Up Gout  Narrativedoesnt have a plan for attacks  Recent flares? No   Medications:  PRN/Flares: none- instructions given for colchicne  Prevention: Colchicine 0.6, uloric 80 qd  Comment of dietary efforts/ alcohol intake: Minimal    Lab Results    Component Value Date    URICACID 8.0 01/08/2018     Lab Results   Component Value Date    CREATININE 2.21 (H) 01/08/2018     Lab Results   Component Value Date    WBC 6.0 01/08/2018    HGB 12.5 (L) 01/08/2018    HCT 39.1 (L) 01/08/2018    MCV 85 01/08/2018     (L) 01/08/2018     Lab Results   Component Value Date    ALT 10 01/08/2018       Goal is uric acid level less than 6      Discussed obesity at length as well.  Reviewed Dr. Félix Crawford from 1/24/17.  Dr. Costello had wished that he obtained an upper GI to better define his anatomy.  Discussed option of sleeve gastrectomy versus Manjula-en-Y    XR UPPER GI WO KUB WO AIR CONTRAST W DIGITAL RECORDING  2/10/2017 11:50 AM     INDICATION: s/p vertical banded gastroplasty, r/o gastrogastric fistula     COMPARISON: 1/26/2012.  TECHNIQUE: Upper GI exam with oral contrast.  1.7 minutes fluoroscopy time. 43 images saved (including spot fluoroscopic saves).     FINDINGS: Patient has a history of vertical banded gastroplasty. Oral contrast flows into the gastric pouch and then into the remaining stomach. No evidence of fistula or leak. Contrast flows freely through the esophagus and proximal small bowel. No   esophageal stricture or mass. Visualized small bowel normal in caliber and pattern. No gastroesophageal reflux during the time of the exam.        PFSH:  Patient Active Problem List   Diagnosis     Lower Back Pain     Osteoarthritis Of The Knee     Hematuria     Glucose Intolerance     Essential hypertension     Anemia     Ventricular Septal Defect     Obesity (BMI 35.0-39.9 without comorbidity)     Obstructive Sleep Apnea     Pseudogout     Atrial fibrillation     Diastolic Congestive Heart Failure     Chronic Kidney Disease, Stage 3     Onychomycosis     Gout     Vitamin D deficiency     Health care maintenance     Compliance with medication regimen     Non-rheumatic mitral regurgitation     Cor pulmonale     Obesity     Current medications reviewed as  follows:  Current Outpatient Prescriptions on File Prior to Visit   Medication Sig     acetaminophen-codeine (TYLENOL #3) 300-30 mg per tablet Take 1-2 tablets by mouth every 6 (six) hours as needed for pain.     amLODIPine (NORVASC) 5 MG tablet Take 5 mg by mouth.     aspirin 81 MG EC tablet Take 81 mg by mouth.     bumetanide (BUMEX) 1 MG tablet TAKE 3 TABLETS BY MOUTH TWICE DAILY AT 9AM AND AT 6PM     cholecalciferol, vitamin D3, (CHOLECALCIFEROL) 1,000 unit tablet Take 3 tablets (3,000 Units total) by mouth daily.     colchicine 0.6 mg tablet TAKE 1 TABLET BY MOUTH DAILY     COLCRYS 0.6 mg tablet TAKE 1 TABLET(0.6 MG) BY MOUTH DAILY     febuxostat (ULORIC) 80 mg Tab Take 80 mg by mouth daily.     lisinopril (PRINIVIL,ZESTRIL) 10 MG tablet TAKE 1 TABLET BY MOUTH EVERY DAY     metoprolol tartrate (LOPRESSOR) 50 MG tablet TAKE 3 TABLETS BY MOUTH DAILY     metoprolol tartrate (LOPRESSOR) 50 MG tablet TAKE 1 TABLET BY MOUTH DAILY.     senna (SENNA) 8.6 mg tablet Take 8.6 mg by mouth daily as needed.      warfarin (COUMADIN) 5 MG tablet TAKE 1 AND 1/2 TABLETS BY MOUTH EVERY DAY     [DISCONTINUED] ferrous sulfate 325 (65 FE) MG tablet Take 1 tablet (325 mg total) by mouth daily with breakfast.     [DISCONTINUED] warfarin (COUMADIN) 5 MG tablet Take 1 tablet (5 mg) by mouth daily as directed. Adjust dose per INR results.     No current facility-administered medications on file prior to visit.      History   Smoking Status     Never Smoker   Smokeless Tobacco     Never Used     Social History     Social History Narrative     Patient Care Team:  Ben Hamlin MD as PCP - General  ROS  Full 10 system review including constitutional, respiratory, cardiac, gi, urinary, rheumatologic, neurologic, reproductive, dermatologic psychiatric is  performed (via questionnaire) and is negative except change in vision        Objective  Physical Exam  Vitals:    04/19/18 1356   BP: 104/68   Patient Site: Right Arm   Patient  Position: Sitting   Cuff Size: Adult Large   Pulse: 80   Resp: 20   Temp: 98.4  F (36.9  C)   TempSrc: Oral   Weight: (!) 487 lb (220.9 kg)     Body mass index is 59.28 kg/(m^2).  Mild discomfort in right facial region, in maxillary region, some distance below the zygoma.  Mouth examined and there is a single molar in upper right gum with multiple caries  No significant swelling noted in gum itself.  Patient has some TMJ dysfunction with tracking but no tenderness over the TMJ and no palpable click  Diagnostics:   Results for orders placed or performed in visit on 04/19/18   HM2(CBC w/o Differential)   Result Value Ref Range    WBC 6.1 4.0 - 11.0 thou/uL    RBC 4.69 4.40 - 6.20 mill/uL    Hemoglobin 12.6 (L) 14.0 - 18.0 g/dL    Hematocrit 40.1 40.0 - 54.0 %    MCV 85 80 - 100 fL    MCH 26.9 (L) 27.0 - 34.0 pg    MCHC 31.5 (L) 32.0 - 36.0 g/dL    RDW 14.3 11.0 - 14.5 %    Platelets 110 (L) 140 - 440 thou/uL    MPV 10.2 (H) 7.0 - 10.0 fL           Please note: Voice recognition software was used in this dictation.  It may therefore contain typographical errors.

## 2021-06-17 NOTE — PLAN OF CARE
End of Shift Summary. See flowsheets for vital signs and detailed assessment.    Changes this shift: On bumex gtt. Frequent urination, unable to wear primapore external catheter d/t body habitus. Walked with PT. Denies SOB. Legs wrapped and wound care completed to venous stasis ulcers per WOC. Echo completed.     Plan:  Continue with diuresis and physical therapy.

## 2021-06-17 NOTE — PROGRESS NOTES
Pt is alert and oriented x4. Pt was on a Bumex gtt at the beginning of the shift and was transitioned to a PO Bumix order. Pt continues to urinate using a urinal  And remains on a 2L fluid restriction. Pt reported no pain during the shift and with assist of one ws able to sit on the edge of the bed. Pt was observed in the hallway, walking with a cane. Pt was stopped and educated that he is at a risk of falls and needs to call for a nurse to assist him if he would like ambulate. Pt verbalizes he understands. NST walked with pt in house and back to his room. Pt's gait was steady and even,  pt did not report any SOB. Pt reports that laying in bed is causing his skin to itch. Pt was provided moisturizer and assisted with applying it. Pt reports improvement and decline in itching. Pt was asked if he wanted to sit in a chair, rather then the edge of the bed and pt declined. Pt provided a bed bath and linen change, pt was able to perform all repositions and boost in bed independently.

## 2021-06-17 NOTE — PATIENT INSTRUCTIONS - HE
You should expect a call from [facility] within the next 2 business days. They will want to schedule a time with you to come out to your home. If you need to reach [facility] please call them at [phone].        - Please call us if your compression wraps fall more than 1-2 inches below the bend of the knee. Remove the wraps if you experience any shortness of breathe or notice your toes turning blue/purple and then give us a call. Call if they are too painful. Call if they get wet. If it is a weekend and the wraps fall down, are too painful, or get wet take the wraps off and put on another form of compression. Compression such as velcro wraps, compression stockings, short stretch bandages, or tubular compression. Apply one of these until you can be seen in clinic. Please call us if you have any questions [phone].    - Treatment:  Layered Compression Bandaging (2-layer)    What is it?  The layered compression bandaging has a layer of absorbent material that will soak up drainage.     Why we do it.   This is done to treat swelling, wounds, or both.  This will in turn help circulation and healing.    How to care for your bandages.  The wraps need to be kept dry. If  the wraps become wet, remove them and call the clinic to have another wrap applied.    What to expect.  It is common for the wraps to be uncomfortable at the beginning. The first two days are usually the hardest; then they will become more comfortable.       Elevating your legs will help the discomfort. Try to elevate your legs as much as possible.    If rest and elevation does not help your discomfort, call your provider.  If your provider is not available you can remove the wrap and leave a message for further instructions.  [facility] the next 2-3 business days. They will want to schedule a time with you to come out to your home. If you need to reach [facility] please call them at  [phone].        - Swelling and Compression Therapy    Swelling in the legs can be caused by many reasons. No matter what the reason, treatment usually includes some type of compression. This may be done with a support sock, dressing, ace wrap, or layered wraps.     It is important to treat the swelling for many reasons. If the swelling is not treated you may develop blisters that can lead to ulcerations. This is caused when extra fluid goes into tissue causing damage and blocking blood flow to the tissue.     It is important that you wear your compression every day, including days that you will be seen in clinic.     Compression is often the most important part of treating leg wounds. Without controlling the swelling it is often not possible to heal wounds.     Going without compression for even brief periods of time can be damaging to your legs and your health.  Your compression should be put on first thing in the morning. Take the compression off at night only when instructed by your care provider to do so. Sometimes wearing compression 24 hours a day will be recommended.       If you are having difficulty wearing your compression it is important to notify your care provider so that other options may be reviewed.    Thank you for choosing [facility]. Please call us if you have any questions [phone].

## 2021-06-17 NOTE — TELEPHONE ENCOUNTER
Caller: Corina with [facility]    Provider: COLIN Brush    Detailed reason for call: [facility] asking if it is ok to delay start of home care until 05/09/2021.    Best phone number to contact: [phone]     Best time to contact: any    Ok to leave a detailed message: yes

## 2021-06-17 NOTE — TELEPHONE ENCOUNTER
Telephone Encounter by Yeimi Mckay RN at 1/26/2021  4:37 PM     Author: Yeimi Mckay RN Service: -- Author Type: Registered Nurse    Filed: 1/26/2021  5:13 PM Encounter Date: 1/26/2021 Status: Signed    : Yeimi Mckay RN (Registered Nurse)       ANTICOAGULATION  MANAGEMENT    Assessment     Today's INR result of 1.7 is Subtherapeutic (goal INR of 2.0-3.0)        Missed dose(on Sat) may be affecting INR    Patient reported that he takes 7.5 mg on Tues and Thurs then 5 mg all other days    No new diet changes affecting INR    No new medication/supplements affecting INR    Continues to tolerate warfarin with no reported s/s of bleeding or thromboembolism     Previous INR was Therapeutic    Plan:     Spoke on phone with Panda regarding INR result and instructed:   Per patient, he already took his dose this morning.    Warfarin Dosing Instructions:  take 7.5 mg booster dose tomorrow then continue current warfarin dose    7.5 mg every Tue, Thu; 5 mg all other days     (0 % change)    Instructed patient to follow up no later than: 2 weeks, appointment made    Education provided: importance of therapeutic range, target INR goal and significance of current INR result and importance of taking warfarin as instructed    Panda verbalizes understanding and agrees to warfarin dosing plan.    Instructed to call the [facility] for any changes, questions or concerns. (#[phone])   ?   Yeimi Mckay RN    Subjective/Objective:      Panda Miranda, a 65 y.o. male is on warfarin.     Panda reports:     Home warfarin dose: as updated on anticoagulation calendar per template     Missed doses: Yes: Sat     Medication changes:  No     S/S of bleeding or thromboembolism:  No     New Injury or illness:  No     Changes in diet or alcohol consumption:  No     Upcoming surgery, procedure or cardioversion:  No    Anticoagulation Episode Summary     Current INR goal:  2.0-3.0   TTR:  91.7 % (1 y)   Next INR  check:  2/9/2021   INR from last check:  1.70 (1/26/2021)   Weekly max warfarin dose:     Target end date:  Indefinite   INR check location:     Preferred lab:     Send INR reminders to:  [facility]    Indications    A-fib (H) (Resolved) [I48.91]           Comments:           Anticoagulation Care Providers     Provider Role Specialty Phone number    Ben Hamlin MD Referring Family Medicine [phone]

## 2021-06-17 NOTE — TELEPHONE ENCOUNTER
[facility]    Provider: COLIN Brush    Detailed reason for call: HC will be seeing patient tomorrow around noon, but patient is requesting to be discharged as he has been doing wound cares by himself and states his wounds are looking better. HC wondering if [facility] is OK with this. Patient is scheduled for f/u next week.     Best phone number to contact: [phone]    Best time to contact: Any time    Ok to leave a detailed message: Yes    Ok to speak to authorized person if needed: N/A      (Noted to patient if reason is related to wound or incision, to please send a photo via email or InboundWritert.)

## 2021-06-17 NOTE — PLAN OF CARE
Neuro: A&Ox4. Afebrile and denies headache.   Cardiac: A fib. VSS.   Respiratory: Sating >92 on RA and CPAP at night.  GI/: Adequate urine output via urinal. Last BM yesterday.  Diet/appetite: Tolerating 2g Na diet with 2L FR. Eating well.  Activity:  Assist of 1-2 with cane.  Pain: At acceptable level on current regimen.   Skin: No new deficits noted.   LDA's: PIV with Bumex gtt    /79 (BP Location: Left arm)   Pulse 68   Temp 97.1  F (36.2  C) (Axillary)   Resp 17   Wt (!) 215.3 kg (474 lb 11.2 oz)   SpO2 99%   BMI 55.57 kg/m      Plan: Continue with POC. Notify primary team with changes.

## 2021-06-17 NOTE — TELEPHONE ENCOUNTER
ANTICOAGULATION  MANAGEMENT    Assessment     Today's INR result of 2.6 is Therapeutic (goal INR of 2.0-3.0)        Warfarin taken as previously instructed    No new diet changes affecting INR    No new medication/supplements affecting INR    Continues to tolerate warfarin with no reported s/s of bleeding or thromboembolism     Previous INR was Therapeutic    Plan:     Spoke on phone with Panda regarding INR result and instructed:      Warfarin Dosing Instructions:  Continue current warfarin dose 7.5 mg daily on Tuesdays and Thursdays; and 5 mg daily rest of week  (0 % change)    Instructed patient to follow up no later than: 4 weeks or sooner if changes. Per chart home care may start next month. Explained the nurse that sees him can also check his INR .    Education provided: importance of therapeutic range and target INR goal and significance of current INR result    Panda verbalizes understanding and agrees to warfarin dosing plan.    Instructed to call the [facility] for any changes, questions or concerns. (#[phone])   ?   Chely Groves RN    Subjective/Objective:      Panda Miranda, a 65 y.o. male is on warfarin. Panda Mosqueda reports:     Home warfarin dose: verbally confirmed home dose with Panda and updated on anticoagulation calendar     Missed doses: No     Medication changes:  No     S/S of bleeding or thromboembolism:  No     New Injury or illness:  No     Changes in diet or alcohol consumption:  No     Upcoming surgery, procedure or cardioversion:  No    Anticoagulation Episode Summary     Current INR goal:  2.0-3.0   TTR:  88.8 % (1 y)   Next INR check:  5/26/2021   INR from last check:  2.60 (4/28/2021)   Weekly max warfarin dose:     Target end date:  Indefinite   INR check location:     Preferred lab:     Send INR reminders to:  [facility]    Indications    A-fib (H) (Resolved) [I48.91]           Comments:           Anticoagulation Care Providers     Provider Role  Specialty Phone number    Ben Hamlin MD Referring Family Medicine [phone]

## 2021-06-17 NOTE — PROGRESS NOTES
"[facility]    Cardiology Progress Note- Cards 1        Date of Admission:  6/15/2021     Assessment & Plan: HVSL   Panda Miranda is a 66 y.o.M with a PMHx of HFpEF, mild-mod mitral regurgitation, permanent atrial fibrillation (on Warfarin), perimembranous VSD, HTN, CKD 3, TASHA (CPAP), gout, morbid obesity s/p VBG ring (5/21) admitted with several weeks of progressing SOB c/f HFpEF exacerbation.    # Acute on Chronic HFpEF exacerbation   # Mild-Mod Mitral Regurgitation  # Small, perimembranous VSD  Pt admitted with several weeks of progressing MACKEY, BLE edema. Chest xray with pulm edema, NT BNP 9K. EDW around 478-485#, admit weight 491#. Patient recently seen by primary cardiologist ([facility]) and had Bumex increased from 3 mg daily to BID with no improvement. He does report some increased salt intake over last several days before admission.     I/Os: - 4734 yesterday/-893 since midnight  Weight: 462 lbs today (down 12 lb from yesterday). EDW unknown.   - Volume status: continues to appear mildly hypervolemic but breathing is back to baseline.  transition to PTA oral Bumex 3 mg BID, plan for discharge tomorrow  - [facility] clinic consultation for outpatient follow up  - Strict I/O, goal net negative -1-2L/day  - Daily weights  - BID BMP  - replete K and Mg per protocol     # Permanent Atrial Fibrillation  # CHADSVASC 3, on chronic Coumadin  - Continue PTA metoprolol tartrate 100 mg BID   - Continue PTA Warfarin, pharmacy to dose (goal INR 2-3)  - Daily INR     # HTN  BP stable, 104/75 today  - Diuresis as listed above  - Continue PTA Lisinopril 10 mg, Norvasc 5 mg      # CKD 3  # IRA  Baseline Cr 2.1-2.9. Currently 2.13. Hgb stable 11.8  - BID BMP  - Diuresis as listed above  - Continue PTA Iron      # Chronic Venous Stasis  # Chronic, non healing ulcers  Pt reports chronic ulcers BLE; left ankle, right heel for months, worsened with \"extra fluid.\" He changes dressings at " home  - WOCN consult  - Lymphedema consult     # Morbid Obesity  # S/p bariatric surgery  Patient historically 880 pounds, lost 400 pounds prior to bariatric surgery. S/p vertical banded gastroplasty 5/21. Working with bariatric surgery for possible VBG ring removal  - Ongoing OP management; per chart review, follow up in 1-2 months     # Gout  - Continue PTA Uloric, Colchicine     # TASHA  - Continue CPAP at night    Diet:  2g sodium, 2L fluid restriction  DVT Prophylaxis: PO Warfarin  Cheema Catheter: not present  Code Status:  Full        Disposition Plan   Expected discharge: Tomorrow, recommended to prior living arrangement once fluid volume status optimized on oral medication.      Entered: Trang Mathews 06/17/2021, 9:28 AM       The patient's care was discussed with the Attending Physician, Dr. Mohamud.    Trang Mathews, MS4  [employer]    [facility]      I personally saw and evaluated Mr. Miranda, reviewed all laboratory findings, and discussed the above plan with the medical student. Summary of my findings: Significant improvement in dyspnea and lower extremity edema. Patient reports breathing is almost back to baseline. EDW uncertain but excellent response to diuresis yesterday. Mild bump in Cr from yesterday indicating that patient is likely close to euvolemic. Agree with the plan to transition to PTA po Bumex 3 mg BID. No need to increase diuretic regimen as the precipitator for decompensation was medication and dietary noncompliance.     Patient was also seen and staffed with Dr. Mohamud.     Lore Mcginnis PA-C  Cardiology      ________________________________________________    Interval History   Breathing well, was able to stand up and walk around yesterday. Denies any chest pain or SOB at rest. Denies any palpitations or lightheadedness. No cramping in legs or arms. Denies abd pain, n/v.       Physical Exam   Vital Signs: Temp: 98.1  F  (36.7  C) Temp src: Oral BP: 104/75 Pulse: 68   Resp: 18 SpO2: 99 % O2 Device: BiPAP/CPAP    Weight: 462 lbs 4.87 oz  Constitutional: awake, alert, cooperative, no apparent distress, and appears stated age  Respiratory: mild bibasilar crackles, no wheezing  Cardiovascular: intermittent irregularly irregular rhythm, regular rate, normal S1 and S2, no S3 or S4, and no murmur noted. No appreciated elevation in JVP   GI: No scars, normal bowel sounds, soft, non-distended, non-tender  Neuropsychiatric: General: normal, calm and normal eye contact    Data   Recent Labs   Lab 06/17/21  0745 06/16/21  1744 06/16/21  0746 06/16/21  0523 06/15/21  2156 06/15/21  1428   WBC 5.6  --   --  5.3  --  4.9   HGB 11.8*  --   --  10.7*  --  10.4*   MCV 86  --   --  86  --  89   *  --   --  125*  --  121*   INR 2.06*  --   --  2.00*  --  2.06*    140 141  --   --  139   POTASSIUM 4.2 4.1 4.0 4.1 4.0 4.2   CHLORIDE 107 108 110*  --   --  109   CO2 26 29 26  --   --  27   BUN 41* 40* 37*  --   --  37*   CR 2.13* 2.18* 2.09*  --   --  2.21*   ANIONGAP 6 3 5  --   --  2*   JOHN 9.5 9.0 9.0  --   --  8.7   GLC 98 92 90  --   --  86   ALBUMIN  --   --   --   --  3.5 3.5   PROTTOTAL  --   --   --   --  8.1 7.9   BILITOTAL  --   --   --   --  0.8 0.9   ALKPHOS  --   --   --   --  76 75   ALT  --   --   --   --  14 12   AST  --   --   --   --  9 7   TROPI  --   --   --   --   --  <0.015     No results found for this or any previous visit (from the past 24 hour(s)).  Echocardiogram 6/16/2021:  Interpretation Summary  Technically difficult study. Poor acoustic windows due to body habitus.  Mild left ventricular dilation. Borderline (EF 50-55%) reduced left  ventricular function.  Small, perimembranous VSD with bidirectional flow. Holosystolic, left to right  predominant with peak velocity of 4.4m/s. Early diastolic right to left flow.  Based on limited views the right ventricle is probably moderately dilated with  moderately reduced  global systolic function.  Mild to moderate MR.  Moderate TR.  Moderate pulmonary hypertension. Estimated pulmonary artery systolic pressure  is 46 mmHg plus right atrial pressure.  IVC diameter and respiratory changes fall into an intermediate range  suggesting an RA pressure of 8 mmHg.  No pericardial effusion is present.     There is no prior study for direct comparison. The VSD peak velocity is higher  than reported in [facility]where from echo done at [facility] in 2017.  Otherwise the findings are similar.    Medications     - MEDICATION INSTRUCTIONS -       - MEDICATION INSTRUCTIONS -       Warfarin Therapy Reminder         amLODIPine  5 mg Oral Daily     bumetanide  3 mg Oral BID     febuxostat  80 mg Oral Daily     ferrous sulfate  325 mg Oral Daily with breakfast     lisinopril  10 mg Oral Daily     metoprolol tartrate  100 mg Oral BID     sodium chloride (PF)  3 mL Intracatheter Q8H     Vitamin D3  3,000 Units Oral Daily     warfarin ANTICOAGULANT  7.5 mg Oral ONCE at 18:00

## 2021-06-17 NOTE — TELEPHONE ENCOUNTER
Spoke to Corina in intake at [facility]. Let her know I spoke to Xin Brush CNP and she okayed the start date for home care of 5/9/21. Plan is for patient to come in to clinic twice weekly until HC starts.     Per Corina tejeda to respond to telephone message in Epic.

## 2021-06-17 NOTE — TELEPHONE ENCOUNTER
Reason for Call:  Home Health Care    Aleyda with [facility] called regarding (reason for call): Verbal Order    Orders are needed for this patient. Skilled Nursing    PT: n/a    OT: n/a    Skilled Nursin x/ week for the next 60 days for wound care    Pt Provider: Dr. Hamlin    Phone Number Homecare Nurse can be reached at: [phone]    Can we leave a detailed message on this number? Yes     Phone number patient can be reached at: Home number on file [phone] (home)    Best Time: any    Call taken on 2021 at 1:29 PM by Fernando Dalton

## 2021-06-18 ENCOUNTER — RECORDS - HEALTHEAST (OUTPATIENT)
Dept: ADMINISTRATIVE | Facility: OTHER | Age: 66
End: 2021-06-18

## 2021-06-18 VITALS
OXYGEN SATURATION: 98 % | WEIGHT: 315 LBS | TEMPERATURE: 96.7 F | SYSTOLIC BLOOD PRESSURE: 110 MMHG | HEART RATE: 63 BPM | BODY MASS INDEX: 54.12 KG/M2 | DIASTOLIC BLOOD PRESSURE: 64 MMHG | RESPIRATION RATE: 16 BRPM

## 2021-06-18 LAB
ANION GAP SERPL CALCULATED.3IONS-SCNC: 8 MMOL/L (ref 3–14)
BUN SERPL-MCNC: 42 MG/DL (ref 7–30)
CALCIUM SERPL-MCNC: 9.2 MG/DL (ref 8.5–10.1)
CHLORIDE SERPL-SCNC: 103 MMOL/L (ref 94–109)
CO2 SERPL-SCNC: 28 MMOL/L (ref 20–32)
CREAT SERPL-MCNC: 2.35 MG/DL (ref 0.66–1.25)
ERYTHROCYTE [DISTWIDTH] IN BLOOD BY AUTOMATED COUNT: 15.6 % (ref 10–15)
GFR SERPL CREATININE-BSD FRML MDRD: 28 ML/MIN/{1.73_M2}
GLUCOSE SERPL-MCNC: 122 MG/DL (ref 70–99)
HCT VFR BLD AUTO: 37 % (ref 40–53)
HGB BLD-MCNC: 11.2 G/DL (ref 13.3–17.7)
INR PPP: 2.28 (ref 0.86–1.14)
MCH RBC QN AUTO: 26.5 PG (ref 26.5–33)
MCHC RBC AUTO-ENTMCNC: 30.3 G/DL (ref 31.5–36.5)
MCV RBC AUTO: 88 FL (ref 78–100)
PLATELET # BLD AUTO: 147 10E9/L (ref 150–450)
POTASSIUM SERPL-SCNC: 4 MMOL/L (ref 3.4–5.3)
RBC # BLD AUTO: 4.22 10E12/L (ref 4.4–5.9)
SODIUM SERPL-SCNC: 140 MMOL/L (ref 133–144)
WBC # BLD AUTO: 6.3 10E9/L (ref 4–11)

## 2021-06-18 PROCEDURE — 250N000013 HC RX MED GY IP 250 OP 250 PS 637: Performed by: NURSE PRACTITIONER

## 2021-06-18 PROCEDURE — 36415 COLL VENOUS BLD VENIPUNCTURE: CPT | Performed by: NURSE PRACTITIONER

## 2021-06-18 PROCEDURE — 85027 COMPLETE CBC AUTOMATED: CPT | Performed by: NURSE PRACTITIONER

## 2021-06-18 PROCEDURE — 85610 PROTHROMBIN TIME: CPT | Performed by: NURSE PRACTITIONER

## 2021-06-18 PROCEDURE — 999N000157 HC STATISTIC RCP TIME EA 10 MIN

## 2021-06-18 PROCEDURE — 80048 BASIC METABOLIC PNL TOTAL CA: CPT | Performed by: NURSE PRACTITIONER

## 2021-06-18 PROCEDURE — 99238 HOSP IP/OBS DSCHRG MGMT 30/<: CPT | Performed by: INTERNAL MEDICINE

## 2021-06-18 PROCEDURE — 250N000013 HC RX MED GY IP 250 OP 250 PS 637: Performed by: PHYSICIAN ASSISTANT

## 2021-06-18 PROCEDURE — 94660 CPAP INITIATION&MGMT: CPT

## 2021-06-18 RX ORDER — AMLODIPINE BESYLATE 5 MG/1
5 TABLET ORAL DAILY
Qty: 90 TABLET | Refills: 0 | Status: SHIPPED | OUTPATIENT
Start: 2021-06-18 | End: 2021-09-21

## 2021-06-18 RX ORDER — WARFARIN SODIUM 5 MG/1
5 TABLET ORAL
Status: DISCONTINUED | OUTPATIENT
Start: 2021-06-18 | End: 2021-06-18 | Stop reason: HOSPADM

## 2021-06-18 RX ADMIN — LISINOPRIL 10 MG: 10 TABLET ORAL at 09:13

## 2021-06-18 RX ADMIN — METOPROLOL TARTRATE 100 MG: 50 TABLET, FILM COATED ORAL at 09:12

## 2021-06-18 RX ADMIN — BUMETANIDE 3 MG: 2 TABLET ORAL at 09:11

## 2021-06-18 RX ADMIN — FEBUXOSTAT 80 MG: 40 TABLET, FILM COATED ORAL at 09:11

## 2021-06-18 RX ADMIN — AMLODIPINE BESYLATE 5 MG: 5 TABLET ORAL at 09:14

## 2021-06-18 RX ADMIN — FERROUS SULFATE TAB 325 MG (65 MG ELEMENTAL FE) 325 MG: 325 (65 FE) TAB at 09:14

## 2021-06-18 RX ADMIN — Medication 3000 UNITS: at 09:20

## 2021-06-18 ASSESSMENT — ACTIVITIES OF DAILY LIVING (ADL)
ADLS_ACUITY_SCORE: 17

## 2021-06-18 NOTE — PATIENT INSTRUCTIONS - HE
Patient Instructions by Xin Brush NP at 2/23/2021  8:40 AM     Author: Xin Brush NP Service: -- Author Type: Nurse Practitioner    Filed: 2/23/2021  9:38 AM Encounter Date: 2/23/2021 Status: Signed    : Xin Brush NP (Nurse Practitioner)       Today we obtained a culture this will take 5 days to get results we will call you with these    Today we ordered supplies for you from [employer]. To reorder supplies or if you have any questions about your order please call [employer] Customer Service at [phone]            Wound Care Instructions    daily Cleanse your left lateral leg wound(s) with Dilute hibiclens 30cc in 500cc NS    Pat Dry with non-sterile gauze    Apply  Skin prep to the skin surrounding the wound    Apply silvercel  into/onto the wounds; cut to the size of your wound    Cover with bordered foam; note insurance will only cover 12 per month    Secure with non-sterile roll gauze and tape as needed; avoid adhesive directly on the skin    Compression: tubular compression and short stretch bilaterally    It is not ok to get your wound wet in the bath or shower    SEEK MEDICAL CARE IF:    You have an increase in swelling, pain, or redness around the wound.    You have an increase in the amount of pus coming from the wound.    There is a bad smell coming from the wound.    The wound appears to be worsening/enlarging    You have a fever greater than 101.5 F        It is ok to continue current wound care treatment/products for the next 2-3 days until new wound care supplies are ordered and arrive. If longer than this please contact our office at [phone].      Xin Brush DNP, RN, CNP, [facility]  [phone]      It is recommended that you do not get your ulcer wet when showering.  Listed below are several ways of keeping it dry when you shower.     1. Wrap it with [employer] Seal plastic wrap.  It can be found in the stores where the plastic wraps or tin  foil is kept.    2.  Some people take a bath and hang their leg/foot out of the tub.    3  Put your leg in a plastic bag and tape it on.         4. You can purchase a shower cover for casts at some pharmacies and through the Internet.

## 2021-06-18 NOTE — DISCHARGE SUMMARY
I, Kaylie Rodriguez, was present with the medical/KEO student who participated in the service and in the documentation of the note.  I have verified the history and personally performed the physical exam and medical decision making.  I agree with the assessment and plan of care as documented in the note.      I personally reviewed vital signs, medications, labs and imaging.    Panda Miranda is a 66 y.o male with PMHx of HFpEF, mild-mod MR, permanent atrial fibrillation (on Warfarin), perimembranous VSD, HTN, CKD 3, TASHA (CPAP), gout, morbid obesity s/p [facility] (5/21) admitted with HFpEF exacerbation due to medication non-compliance.     Patient was appropriately diuresed, euvolemic at discharge. Plan to follow-up with primary cardiology at [facility].     Kaylie Rodriguez, CNP  Date of Service (when I saw the patient): 06/18/21        [facility]    Cardiology Discharge Summary- Cards 1         Date of Admission:  6/15/2021  Date of Discharge:  6/18/2021  Discharging Provider: Dr. Mohamud      Discharge Diagnoses   # Acute on Chronic HFpEF exacerbation   # Mild-Mod Mitral Regurgitation  # Small, perimembranous VSD  # Permanent Atrial Fibrillation  # CHADSVASC 3, on chronic Coumadin  # HTN  # CKD 3  # IRA  # Chronic Venous Stasis  # Chronic, non healing ulcers  # Morbid Obesity  # S/p bariatric surgery  # Gout  # TASHA    Follow-ups Needed After Discharge   - PCP in 7-10 days for hospitalization follow-up  - Primary cardiologist, Dr. Cynthia Argueta, in 7-10 days at [facility]     Unresulted Labs Ordered in the Past 30 Days of this Admission     No orders found from 5/16/2021 to 6/16/2021.          Discharge Disposition   Discharged to home  Condition at discharge: Stable    Hospital Course by Diagnosis   Panda Miranda is a 66 y.o.M with a PMHx of HFpEF, mild-mod mitral regurgitation, permanent atrial fibrillation (on Warfarin), perimembranous VSD, HTN, CKD 3, TASHA (CPAP),  gout, morbid obesity s/p VBG ring (5/21) admitted with several weeks of progressing SOB c/f HFpEF exacerbation.    # Acute on Chronic HFpEF exacerbation   # Mild-Mod Mitral Regurgitation  # Small, perimembranous VSD  Pt admitted with several weeks of progressing MACKEY and BLE edema. Chest xray with pulm edema, NT BNP 9K. EDW around 478-485#, admit weight 491#. Patient recently seen by primary cardiologist ([facility]) and had Bumex increased from 3 mg daily to BID with no improvement. He does report some increased salt intake over last several days before admission. Patient also reported only taking his morning dose of bumex due to forgetfulness. Echo (6/16/21) revealed borderline reduced (EF 50-55%) LV function with mild left ventricular dilation. Small, perimembranous VSD with bidirectional flow. Mild to moderate MR and moderate TR. Over hospital course, patient was on IV Bumex gtt 1 mg/hr and transitioned to PO Bumex 3 mg BID day before discharge. Strict I/Os and patient met goal net negative of -1-2L/day. Patient's measured weight upon admission was 491 lbs, measured weight upon discharge was 462 lbs. Dry weight unknown.      - Volume status: breathing is back to baseline. Continue PTA oral Bumex 3 mg BID  - Discussed importance of medication compliance with diuretics to ensure twice daily dosing.   - Repeat BMP in 1-2 weeks   - Outpatient follow up with Dr. Argueta at [facility] in 7-10 days     # Permanent Atrial Fibrillation  # CHADSVASC 3, on chronic Coumadin  - Continue PTA metoprolol tartrate 100 mg BID   - Continue PTA Warfarin (goal INR 2-3)  - INR check as scheduled      # HTN  BP stable, 100/63 today  - Diuresis as listed above  - Continue PTA Lisinopril 10 mg, Norvasc 5 mg      # CKD 3  # IRA  Baseline Cr 2.1-2.9. Today: 2.35. Hgb stable 11.2  - Repeat BMP/CBC in 1-2 weeks   - Continue diuresis, as above   - Continue PTA Iron      # Chronic Venous Stasis  # Chronic, non healing ulcers  Pt reports chronic  "ulcers BLE; left ankle, right heel for months, worsened with \"extra fluid.\" He changes dressings at home  - Continue PTA dressing changes and home cares      # Morbid Obesity  # S/p bariatric surgery  Patient historically 880 pounds, lost 400 pounds prior to bariatric surgery. S/p vertical banded gastroplasty 5/21. Working with bariatric surgery for possible VBG ring removal  - Follow-up with weight management clinic on 6/24/21     # Gout  - Continue PTA Uloric, Colchicine     # TASHA  - Continue CPAP at night     Consultations This Hospital Stay   PHARMACY TO DOSE WARFARIN  WOUND OSTOMY CONTINENCE NURSE  IP CONSULT  LYMPHEDEMA THERAPY IP CONSULT  PHYSICAL THERAPY ADULT IP CONSULT  CORE CLINIC EVALUATION IP CONSULT  SMOKING CESSATION PROGRAM IP CONSULT    Code Status   Full Code        Trang Mathews, MS4  [employer] [employer]    [facility]    ______________________________________________________________________    Physical Exam   Vital Signs: Temp: 98.1  F (36.7  C) Temp src: Oral BP: 100/63 Pulse: 69   Resp: 14 SpO2: 98 % O2 Device: None (Room air)    Weight: 462 lbs 4.87 oz  Constitutional: awake, alert, cooperative, no apparent distress, and appears stated age  Respiratory: mild bibasilar crackles, no wheezing  Cardiovascular: regular rate, irregularly irregular rhythm, normal S1 and S2, no S3 or S4, and no murmur noted  GI: soft, non-distended, non-tender  Skin: normal skin color, texture, turgor  Musculoskeletal: minimal lower extremity pitting edema present  Neuropsychiatric: General: normal, calm and normal eye contact         Primary Care Physician   Ben Hamlin    Discharge Orders   No discharge procedures on file.    Significant Results and Procedures   Results for orders placed or performed during the hospital encounter of 06/15/21   XR Chest Port 1 View    Narrative    EXAM: XR CHEST PORT 1 VIEW  6/15/2021 3:19 PM     HISTORY:  chest pain, " increased cough       COMPARISON:  None available    FINDINGS:     Portable upright view of the chest. Trachea is midline. Pulmonary  vasculature is within normal limits. Prominent cardiac silhouette.  Cardiac silhouette is largely obscured. Aortic atherosclerosis.   Diffuse mixed interstitial and airspace opacities.    No acute osseous abnormality. Visualized upper abdomen is  unremarkable.        Impression    IMPRESSION:   Diffuse interstitial and airspace opacities, may represent infectious  processes vs. edema. Consider CT for further evaluation.     I have personally reviewed the examination and initial interpretation  and I agree with the findings.    PHU SO MD   POC US ECHO LIMITED    Impression    Limited Bedside Cardiac Ultrasound, performed and interpreted by me.   Indication: Shortness of Breath.  Parasternal long axis, parasternal short axis and apical 4 chamber views were acquired.   Image quality was limited due to habitus.    Findings:    Global left ventricular function appears intact.  Dilated right atrium  There is no evidence of free fluid within the pericardium.    IMPRESSION: Grossly normal left ventricular function and chamber size.  No pericardial effusion..   Echocardiogram Complete    Narrative    [id number]  [id number]  [id number]  [id number]^TREY^COOKIE^ROBERT     [facility],[place]  Echocardiography Laboratory  [address] [place] [id number]     Name: NARENDRA WADE  MRN: [id number]  : 1955  Study Date: 2021 07:05 AM  Age: 66 yrs  Gender: Male  Patient Location: [facility]  Reason For Study: Heart Failure  Ordering Physician: COOKIE YANG  Performed By: Nataly Sorensen RDCS     BSA: 3.2 m2  Height: 77 in  Weight: 474 lb  HR: 79  BP: 96/68 mmHg  ______________________________________________________________________________  Procedure  Echocardiogram with two-dimensional, color and spectral Doppler performed.  Contrast Optison.  Technically difficult study. Poor acoustic windows. Optison  (NDC #6057-9538-16) given intravenously. Patient was given 5 ml mixture of 3  ml Optison and 6 ml saline. 4 ml wasted.  ______________________________________________________________________________  Interpretation Summary  Technically difficult study. Poor acoustic windows due to body habitus.  Mild left ventricular dilation. Borderline (EF 50-55%) reduced left  ventricular function.  Small, perimembranous VSD with bidirectional flow. Holosystolic, left to right  predominant with peak velocity of 4.4m/s. Early diastolic right to left flow.  Based on limited views the right ventricle is probably moderately dilated with  moderately reduced global systolic function.  Mild to moderate MR.  Moderate TR.  Moderate pulmonary hypertension. Estimated pulmonary artery systolic pressure  is 46 mmHg plus right atrial pressure.  IVC diameter and respiratory changes fall into an intermediate range  suggesting an RA pressure of 8 mmHg.  No pericardial effusion is present.     There is no prior study for direct comparison. The VSD peak velocity is higher  than reported in [facility] from echo done at [facility] in 2017.  Otherwise the findings are similar.  ______________________________________________________________________________  Left Ventricle  Mild left ventricular dilation is present. Borderline (EF 50-55%) reduced left  ventricular function is present. Diastolic function not assessed due to atrial  fibrillation. Flattened septum is consistent with right ventricular pressure  and volume overload. Small, perimembranous VSD with bidirectional flow.  Holosystolic, left to right predominant with peak velocity of 4.4m/s. Early  diastolic right to left flow.     Right Ventricle  Based on limited views the right ventricle is probably moderately dilated with  moderately reduced global systolic function.     Atria  The atria cannot be assessed.     Mitral  Valve  Mild mitral annular calcification is present. Mitral leaflet thickness is  increased . Mild to moderate mitral insufficiency is present.     Aortic Valve  Trileaflet aortic sclerosis without stenosis.     Tricuspid Valve  Moderate tricuspid insufficiency is present. Estimated pulmonary artery  systolic pressure is 46 mmHg plus right atrial pressure. Moderate pulmonary  hypertension is present.     Pulmonic Valve  The pulmonic valve is normal. Trace pulmonic insufficiency is present.     Vessels  Normal diameter aortic root and proximal ascending aorta. IVC diameter and  respiratory changes fall into an intermediate range suggesting an RA pressure  of 8 mmHg.     Pericardium  No pericardial effusion is present.     Compared to Previous Study  There is no prior study for direct comparison.  ______________________________________________________________________________  MMode/2D Measurements & Calculations     IVSd: 1.0 cm  LVIDd: 6.2 cm  LVIDs: 3.8 cm  LVPWd: 1.4 cm  FS: 38.4 %  LV mass(C)d: 335.2 grams  LV mass(C)dI: 103.9 grams/m2  Ao root diam: 3.9 cm  asc Aorta Diam: 3.9 cm  LVOT diam: 2.4 cm  LVOT area: 4.5 cm2  RWT: 0.46     Doppler Measurements & Calculations  TR max [place]: 323.3 cm/sec  TR max P.9 mmHg     ______________________________________________________________________________  Report approved by: Hussain Hollis 2021 08:43 AM             Discharge Medications   Current Discharge Medication List      CONTINUE these medications which have CHANGED    Details   amLODIPine (NORVASC) 5 MG tablet Take 1 tablet (5 mg) by mouth daily  Qty: 90 tablet, Refills: 0    Associated Diagnoses: Essential hypertension         CONTINUE these medications which have NOT CHANGED    Details   bumetanide (BUMEX) 1 MG tablet TAKE 3 TABLETS BY MOUTH TWICE DAILY AT 9AM AND 6PM      colchicine (COLCYRS) 0.6 MG tablet TAKE 1 TABLET(0.6 MG) BY MOUTH DAILY      febuxostat (ULORIC) 80 MG TABS tablet Take 1  tablet by mouth daily       ferrous sulfate (FEROSUL) 325 (65 Fe) MG tablet Take 325 mg by mouth daily       lisinopril (ZESTRIL) 10 MG tablet TAKE 1 TABLET(10 MG) BY MOUTH DAILY      metoprolol tartrate (LOPRESSOR) 100 MG tablet Take 100 mg by mouth 2 times daily       warfarin ANTICOAGULANT (COUMADIN) 5 MG tablet Take 7.5mg by mouth every Tuesday and Thursday, and take 5mg by mouth all other days.         STOP taking these medications       cholecalciferol (VITAMIN D-1000 MAX ST) 25 MCG (1000 UT) TABS Comments:   Reason for Stopping:             Allergies   No Known Allergies

## 2021-06-18 NOTE — PLAN OF CARE
Transfer  Transferred to: 6C  Via:bed  Reason for transfer:Pt no longer appropriate for 6B- improved patient condition  Family: Not aware of transfer yet  Belongings: Packed and sent with pt  Chart: Delivered with pt to next unit  Medications: Meds sent to new unit with pt  Report given to: 6C charge nurse  Pt status:  Neuro: A&Ox4. Afebrile and denies headache. Able to make needs known.  Cardiac: A fib, rate controlled. VSS.   Respiratory: Sating >92 on RA. CPAP at night @ 21%.  GI/: Adequate urine output via urinal. BM X1 last evening  Diet/appetite: Tolerating 2g Na diet with 2L FR. Eating well.  Activity:  Assist of 1-2, uses cane.  Pain: At acceptable level on current regimen. Denies pain throughout the shift.  Skin: No new deficits noted. BLE ulcers.  LDA's: PIVx1    /52 (BP Location: Left arm)   Pulse 68   Temp 98.4  F (36.9  C) (Oral)   Resp 16   Wt (!) 209.7 kg (462 lb 4.9 oz)   SpO2 97%   BMI 54.12 kg/m      Plan: Discharge today. Continue with POC. Notify primary team with changes.

## 2021-06-18 NOTE — PATIENT INSTRUCTIONS - HE
"Patient Instructions by Xin Brush NP at 5/24/2021  9:20 AM     Author: Xin Brush NP Service: -- Author Type: Nurse Practitioner    Filed: 5/24/2021  9:41 AM Encounter Date: 5/24/2021 Status: Addendum    : Xin Brush NP (Nurse Practitioner)    Related Notes: Original Note by Xin Brush NP (Nurse Practitioner) filed at 5/24/2021  9:12 AM       Wound Care Instructions    Twice per week Cleanse your BLE wound(s) with Dilute hibiclens 30cc in 500cc NS; use non-sterile 4x4 gauze to gently remove any loosening tissue or debris    Pat Dry with non-sterile gauze    Apply Lotion to the intact skin surrounding your wound and other dry skin locations. Some good lotions include: Remedy Skin Repair Cream, Sarna, Vanicream or Cetaphil    Primary Dressing: Apply silvercel into/onto the wounds    Secondary dressing: Cover with ABD    Secure with non-sterile roll gauze (4\" x 75\" roll) and tape (1\" roll tape) as needed; avoid adhesive directly on the skin    Compression: tubular compression with short stretch    It is not ok to get your wound wet in the bath or shower    SEEK MEDICAL CARE IF:    You have an increase in swelling, pain, or redness around the wound.    You have an increase in the amount of pus coming from the wound.    There is a bad smell coming from the wound.    The wound appears to be worsening/enlarging    You have a fever greater than 101.5 F        It is ok to continue current wound care treatment/products for the next 2-3 days until new wound care supplies are ordered and arrive. If longer than this please contact our office at [phone].      Xin Brush DNP, RN, CNP, [facility]  [phone]        It is recommended that you do not get your ulcer wet when showering.  Listed below are several ways of keeping it dry when you shower.     1. Wrap it with Press and Seal plastic wrap.  It can be found in the stores where the plastic wraps or tin foil " is kept.    2.  Some people take a bath and hang their leg/foot out of the tub.    3  Put your leg in a plastic bag and tape it on.         4. You can purchase a shower cover for casts at some pharmacies and through the Internet.      Comprilan Wrapping Instructions(short stretch Bandaging)          1. Before bandaging, carefully      Apply lotion into the skin.   Avoid any open sores  You may be instructed to use cotton padding,stockinette, or tubigrip as a first layer.    2.  Begin with two turns of the        short-stretch bandage(like a [place])       (Comprilan ) foot     No bandage tension...Do not pull it tight.        3.  Take the bandage into a         extended figure  eight          4. The bandage runs in figures of       eight 2-3 times around the upper       ankle and the foot.      5. Bandage the rest of the lower leg      In a circular method up to the knee.  Between the individual turns of bandage is especially important. The bandage should  be smoothed down well and creasing avoided.      The Comprilan bandages should NOT be placed in the washer or dryer but washed by hand and hung to dry.  Compression bandages (and compression garments - see below) used in the management of lymphedema should be properly washed on a regular basis, so skin cells and oils wont become trapped in the fibers of the bandages and damage the integrity of the textile. Compression bandages may be machine or hand washed; machine wash is generally the preferred method. Once the bandages go through the spin cycle they are easy to hang and will dry much faster.  Daily washing is recommended, especially if lotions or creams are being used. If the bandages are machine washed it is recommended to place the unrolled bandages in a mesh laundry bag in order to protect the fabric during the washing cycle (the gentle cycle should be utilized).  Bandages are best washed in warm water (between 108 - 140oF); if the bandages are very dirty,  they may be boil-washed up to 203oF.  It is best to have more than one set of bandages - one to wear, one to wash. The sets should be applied alternately to allow the build in elasticity of the bandages to recover and to prolong their effectiveness.  Tips for hand washing procedures:  1. Start by filling a bowl, bucket, sink, or small tub with water.   2. The compression bandages should be dipped gently into the water to dampen.   3. Add a small amount of washing solution (see below).   4. Let the compression bandages soak for a few minutes.   5. For better cleaning, gently rub the fibers of the compression bandages together without stretching them excessively.   6. Then, empty the tub and refill with water - dip or rinse the clean compression bandages thoroughly to rid the bandages of residual salts and oils from perspiration.   7. Gently squeeze the compression bandages to remove excess water.   8. Refer to the drying options below

## 2021-06-18 NOTE — PLAN OF CARE
DISCHARGE   Discharged to: Home  Via: Automobile  Accompanied by: Family  Discharge Instructions: diet, activity, medications, follow up appointments, when to call the MD, and what to watchout for (i.e. s/s of infection, increasing SOB, palpitations, chest pain)  Prescriptions: To be filled by [place] discharge pharmacy per pt's request; medication list reviewed & sent with pt  Follow Up Appointments: arranged; information given  Belongings: All sent with pt  IV: out  Telemetry: off  Pt exhibits understanding of above discharge instructions; all questions answered.    Suki Kunz RN

## 2021-06-18 NOTE — LETTER
Letter by Ben Hamlin MD at      Author: Ben Hamlin MD Service: -- Author Type: --    Filed:  Encounter Date: 2/21/2019 Status: (Other)       Panda Miranda  [address]  [facility] [address]      March 12, 2019      Dear Mr. Miranda,    You are currently under the care of [facility] for your warfarin (Coumadin ) therapy. Our records show that your last INR was on 1/16/2019 and you were due to come back on 2/13/2019. We have attempted to reach you by phone to schedule an INR appointment, but have not heard back from you.    The correct dose of warfarin for you may change from time to time based on your INR result. We would like to ensure that your warfarin dose is correct and that you are not having any side effects. It is not safe for you to be on Warfarin if your INR is not checked regularly. If your INR is too high, serious bleeding can occur. If your INR is too low, blood clots can form and lead to heart attack, stroke or a blood clot in the lung.      Getting your INR checked as instructed is required in order for the [facility] to continue managing and refilling your warfarin. We realize that it might be difficult for you to have frequent blood testing, but it is for your own safety.    Please contact us at [phone] as soon as possible to schedule an INR appointment. Our clinic staff is available Monday through Friday 8:00 am to 5:00 pm.  If you have been told to stop taking warfarin or your warfarin is being managed by another healthcare provider, please call and inform our staff.       Sincerely,     [facility]

## 2021-06-18 NOTE — PATIENT INSTRUCTIONS - HE
"Patient Instructions by Xin Brush NP at 4/26/2021  8:20 AM     Author: Xin Brush NP Service: -- Author Type: Nurse Practitioner    Filed: 4/26/2021  8:51 AM Encounter Date: 4/26/2021 Status: Signed    : Xin Brush NP (Nurse Practitioner)       Wound Care Instructions    Twice per week until home care can start come to the clinic Cleanse your BLE wound(s) with Dilute hibiclens 30cc in 500cc NS; use non-sterile 4x4 gauze to gently remove any loosening tissue or debris    Pat Dry with non-sterile gauze    Apply Lotion to the intact skin surrounding your wound and other dry skin locations. Some good lotions include: Remedy Skin Repair Cream, Sarna, Vanicream or Cetaphil    Primary Dressing: Apply silvercel into/onto the wounds    Secondary dressing: Cover with ABD    Secure with non-sterile roll gauze (4\" x 75\" roll) and tape (1\" roll tape) as needed; avoid adhesive directly on the skin    Compression: 2 layer compression bilaterally    It is not ok to get your wound wet in the bath or shower    SEEK MEDICAL CARE IF:    You have an increase in swelling, pain, or redness around the wound.    You have an increase in the amount of pus coming from the wound.    There is a bad smell coming from the wound.    The wound appears to be worsening/enlarging    You have a fever greater than 101.5 F        It is ok to continue current wound care treatment/products for the next 2-3 days until new wound care supplies are ordered and arrive. If longer than this please contact our office at [phone].      Xin Brush DNP, RN, CNP, [facility]  [phone]        It is recommended that you do not get your ulcer wet when showering.  Listed below are several ways of keeping it dry when you shower.     1. Wrap it with Press and Seal plastic wrap.  It can be found in the stores where the plastic wraps or tin foil is kept.    2.  Some people take a bath and hang their leg/foot out of " the tub.    3  Put your leg in a plastic bag and tape it on.         4. You can purchase a shower cover for casts at some pharmacies and through the Internet.

## 2021-06-18 NOTE — PROGRESS NOTES
"Patient transferred from  to room 6418-1. 2 RN Skin check Anastasia MARIE RN and Sandy WILSON RN. No new skin issues noted. BLE ulcers with lymphedema wraps in place. Belongings at bedside. Patient stated, \"I will call my wife to tell her my new room.\"  Neuro: Alert and oriented x4. Afebrile. Makes needs known.   Cardiac: A fib, rate controlled. Denies chest pain. VSS.  Respiratory: CPAP at night @ 21%. RA during the day. Keep o2 >92%.   GI/: Adequate urine output via urinal. BM on 6/17 per patient.  Diet: Tolerating 2g Na diet with 2L FR.   Activity: Uses cane. Assist of 1. Patient states, \"I am independent with my cane.\"   Pain: Denies pain at this time.  Skin: No new skin issues. BLE ulcers and Lymphedema wraps.   LDA's: PIVx1    POC: Discharge today. Continue with POC. Notify primary team with changes.     "

## 2021-06-18 NOTE — CONSULTS
"66 year old male presenting with SOB and CP. CORE consult requested. Panda is currently admitted with HF    Panda was provided with a CHF book.  I reviewed the importance of daily weights, 2 gm sodium diet, 2L fluid restriction and compliance with medications upon hospital discharge.  CORE contact phone numbers provided and patient is encouraged to call with any questions or concerns, including any weight gain or loss of 2 or more pounds in 24 hours or 5 or more pounds in 1 week.      Patient is not interested in following with [facility] at this time. Left pt the packet with our number in case he changes his mind.     Mireya Lopez RN  Cardiology Care Coordinator - [facility]  Questions and [phone]  First press #1 for the [facility] and then press #3 for \"Medical Advise\" to reach us Cardiology Nurses.     "

## 2021-06-18 NOTE — PATIENT INSTRUCTIONS - HE
Patient Instructions by Xin Brush NP at 3/29/2021  8:20 AM     Author: Xin Brush NP Service: -- Author Type: Nurse Practitioner    Filed: 3/29/2021  9:03 AM Encounter Date: 3/29/2021 Status: Signed    : Xin Brush NP (Nurse Practitioner)       We took biopsies today this will take 2 weeks to get results we will call you with these    Take Vitamin D 2000 units daily    Focus on protein in the diet    culture was negative    Take bumex 3 tabs in the morning and 3 tabs in the evening      Today we ordered supplies for you from [employer]. To reorder supplies or if you have any questions about your order please call [employer] Customer Service at [phone]            Wound Care Instructions    Every 2-3 days Cleanse your left lateral leg wound(s) with Dilute hibiclens 30cc in 500cc NS    Pat Dry with non-sterile gauze    Apply  Skin prep to the skin surrounding the wound    Apply silvercel  into/onto the wounds; cut to the size of your wound    Cover with bordered foam 6x6; note insurance will only cover 12 per month    Secure with non-sterile roll gauze and tape as needed; avoid adhesive directly on the skin    Compression: tubular compression and short stretch bilaterally; toes to below knee; elevation    It is not ok to get your wound wet in the bath or shower    SEEK MEDICAL CARE IF:    You have an increase in swelling, pain, or redness around the wound.    You have an increase in the amount of pus coming from the wound.    There is a bad smell coming from the wound.    The wound appears to be worsening/enlarging    You have a fever greater than 101.5 F        It is ok to continue current wound care treatment/products for the next 2-3 days until new wound care supplies are ordered and arrive. If longer than this please contact our office at [phone].      Xin Brush DNP, RN, CNP, [facility]  [phone]      It is recommended that you do not get your ulcer wet  when showering.  Listed below are several ways of keeping it dry when you shower.     1. Wrap it with Press and Seal plastic wrap.  It can be found in the stores where the plastic wraps or tin foil is kept.    2.  Some people take a bath and hang their leg/foot out of the tub.    3  Put your leg in a plastic bag and tape it on.         4. You can purchase a shower cover for casts at some pharmacies and through the Internet.

## 2021-06-18 NOTE — PROGRESS NOTES
[facility] PRE-OPERATIVE VISIT FOR:    Panda Miranda,  1955, MRN [id number]    Upcoming surgery date:   Surgeon: Jessee Carpenter Facility: [facility]    Chief Complaint: Preop, cataract surgery    PCP: Ben Hamlin MD, [phone]    SUBJECTIVE:  History of Present Illness:   Panda Miranda is a 63 y.o. male with gradual, painless visual loss bilat - worse in R eye; plan right eye phaco with lens implant    Patient with multiple medical problems including:    Severe obesity-previous bariatric surgery, now working on diet and has lost 10 pounds.    Well compensated diastolic heart failure and paroxysmal atrial fibrillation, treatment strategy rate control and anticoagulation.  He has mitral regurgitation.  Patient took his last warfarin yesterday.    Chronic kidney disease stage III    Gout/pseudogout and osteoarthritis.      Past Medical History:  Patient Active Problem List   Diagnosis     Lower Back Pain     Osteoarthritis Of The Knee     Hematuria     Glucose Intolerance     Essential hypertension     Anemia     Ventricular Septal Defect     Obesity (BMI 35.0-39.9 without comorbidity)     Obstructive Sleep Apnea     Pseudogout     Atrial fibrillation (H)     Diastolic Congestive Heart Failure     Chronic Kidney Disease, Stage 3     Onychomycosis     Gout     Vitamin D deficiency     Health care maintenance     Compliance with medication regimen     Non-rheumatic mitral regurgitation     Cor pulmonale (H)     Obesity     Past Surgical History:   Procedure Laterality Date     GASTROPLASTY VERTICAL BANDED      Dr. Arizmendi [facility]  Initial weight 800++ Lost to 440- (was 320# at lowest)     KNEE SURGERY       LA PART REMOVAL COLON W ANASTOMOSIS      Description: Partial Colectomy;  Recorded: 2011;  Comments: Dr Magaña     Any history of anesthesia reaction no  Do you have difficulty breathing or chest pain after walking up a flight of stairs: No  History of obstructive sleep  apnea: Yes: Adherent with CPAP, pressure 14  Steroid use in the last 6 months: No  Frequent Aspirin/NSAID use: No  Prior Blood Transfusion: No  Prior Blood Transfusion Reaction: No  If for some reason prior to, during or after the procedure, if it is medically indicated, would you be willing to have a blood transfusion?:  There is no transfusion refusal.    Social History:  he  reports that he has never smoked. He has never used smokeless tobacco. He reports that he drinks about 0.5 oz of alcohol per week  He reports that he uses illicit drugs about 3.5 times per week.    Family History:  Family History   Problem Relation Age of Onset     Diabetes type II       Heart failure       Heart disease Mother      Hypertension Mother      Diabetes Sister        Current Medications:  Current Outpatient Prescriptions   Medication Sig Dispense Refill     acetaminophen-codeine (TYLENOL #3) 300-30 mg per tablet Take 1-2 tablets by mouth every 6 (six) hours as needed for pain. 40 tablet 2     amLODIPine (NORVASC) 5 MG tablet Take 5 mg by mouth.       aspirin 81 MG EC tablet Take 81 mg by mouth.       bumetanide (BUMEX) 1 MG tablet TAKE 3 TABLETS BY MOUTH TWICE DAILY AT 9AM AND AT 6PM 540 tablet 3     cholecalciferol, vitamin D3, (CHOLECALCIFEROL) 1,000 unit tablet Take 3 tablets (3,000 Units total) by mouth daily. 270 tablet 3     colchicine (COLCRYS) 0.6 mg tablet Take 1 tablet (0.6 mg total) by mouth daily. 90 tablet 0     cyclobenzaprine (FLEXERIL) 10 MG tablet TK 1 T PO 30 MINUTES BEFORE BEDTIME  0     febuxostat (ULORIC) 80 mg Tab Take 80 mg by mouth daily. 90 tablet 3     ferrous sulfate 325 (65 FE) MG tablet Take 1 tablet (325 mg total) by mouth daily with breakfast. 90 tablet 0     lisinopril (PRINIVIL,ZESTRIL) 10 MG tablet Take 1 tablet (10 mg total) by mouth daily. 90 tablet 3     metoprolol tartrate (LOPRESSOR) 50 MG tablet TAKE 3 TABLETS BY MOUTH DAILY 270 tablet 0     metoprolol tartrate (LOPRESSOR) 50 MG tablet TAKE  1 TABLET BY MOUTH DAILY. 90 tablet 3     senna (SENNA) 8.6 mg tablet Take 8.6 mg by mouth daily as needed.        warfarin (COUMADIN) 5 MG tablet TAKE 1 AND 1/2 TABLETS BY MOUTH EVERY  tablet 0     warfarin (COUMADIN) 5 MG tablet Take 1 tablet (5 mg) by mouth daily as directed. Adjust dose per INR results. 90 tablet 1     No current facility-administered medications for this visit.        Allergies:  Review of patient's allergies indicates no known allergies.    Review or Systems:  Constitution: normal  HEENT: normal  Pulmonary: normal  Cardiovascular: normal  GI: normal  : normal  Musculoskeletal: normal  Neuro: normal  Endocrine: normal  Psych: normal  Lymph/Heme: normal    OBJECTIVE:  Vitals:    06/19/18 0842   BP: 102/78   Pulse: (!) 56   Resp: 12   Temp: 98.1  F (36.7  C)     General Appearance:    Alert, cooperative, no distress, appears stated age-obese  Wt Readings from Last 3 Encounters:   06/19/18 (!) 476 lb 4 oz (216 kg)   04/19/18 (!) 487 lb (220.9 kg)   01/18/18 (!) 494 lb 4.8 oz (224.2 kg)        Head:    Normocephalic, without obvious abnormality, atraumatic   Eyes:    PERRL, conjunctiva/corneas clear, EOM's intact   Nose:   Mucosa moist, normal    Throat:   Lips, mucosa, and tongue normal; ora phaynx clear   Neck:   Supple, symmetrical, trachea midline, no adenopathy;     thyroid:  no enlargement/tenderness/nodules; no carotid    bruit or JVD   Lungs:     Clear to auscultation bilaterally, respirations unlabored    Heart:    Regular rate and rhythm, S1 and S2 normal, no murmur, rub   or gallop   Abdomen:     Soft, non-tender, bowel sounds active all four quadrants,     no masses, no organomegaly   Extremities:   Extremities normal, atraumatic, no cyanosis or edema   Pulses:   2+ and symmetric all extremities   Skin:   Skin color, texture, turgor normal, no rashes or lesions   Neurologic:   No focal deficits         Labs:  Results for orders placed or performed in visit on 06/08/18   INR    Result Value Ref Range    INR 2.40 (H) 0.90 - 1.10   Potassium is pending    ASSESSMENT/PLAN:  1. Pre-op exam  Electrocardiogram Perform and Read     Acceptable preop candidate.  Low risk surgery  Intermediate risk candidate, no high risk Romero criteria.    Note, patient's last dose of warfarin was yesterday and surgery is scheduled for tomorrow.  Will check INR today and contact surgeon to make sure that he is willing to operate with patient on anticoagulation.    Note weight and history of obstructive sleep apnea.  Await K+, INR.    Ben Hamlin MD

## 2021-06-18 NOTE — DISCHARGE INSTRUCTIONS
Please call [phone] to schedule a follow up with the [facility] cardiology team.

## 2021-06-19 NOTE — LETTER
Letter by Ben Hamlin MD at      Author: Ben Hamlin MD Service: -- Author Type: --    Filed:  Encounter Date: 5/7/2019 Status: (Other)         Panda Miranda  [address]  [facility] [address]             May 9, 2019        Dear Mr. Miranda,    Below are the results from your recent visit:    Resulted Orders   Joint Fluid Exam   Result Value Ref Range    Color, Fluid Yellow     Clarity, Fluid Cloudy     WBC, Fluid 10,281 (H) 0 - 99 /uL    RBC, Fluid <50,000 <50,000 /ul    Crystals, Fluid Uric Acid Crystals Seen     Neutrophil % 93 (H) <=25 %    Monocyte % 7 <=71 %    Macrophage % 1 <=71 %    Narrative    Slide reviewed.   Glucose, Body Fluid   Result Value Ref Range    Glucose, Fluid 97 mg/dL      Comment:      This is an appended report.  These results have been appended to a previously final verified report.    Narrative    The reference range has not been established for this  analyte in body fluid. The result should be integrated     into the clinical context for interpretation.  This test was developed and its performance characteristics validated by [employer].   The US Food and Drug Adminstration has not approved or cleared this test; FDA clearance is not required for clinical use.   The results are not intended to be the sole means for clinical diagnosis or patient management decisions.   Protein, Body Fluid   Result Value Ref Range    Protein, Fluid 5.3 g/dL      Comment:      This is an appended report.  These results have been appended to a previously final verified report.    Narrative    The reference range has not been established for this  analyte in body fluid. The result should be integrated     into the clinical context for interpretation.  This test was developed and its performance characteristics validated by [employer].   The US Food and Drug Adminstration has not approved or cleared this test; FDA clearance is not  required for clinical use.   The results are not intended to be the sole means for clinical diagnosis or patient management decisions.   Culture/Gram Stain: Body Fluid   Result Value Ref Range    Culture No Growth     Gram Stain Result 3+ Polymorphonuclear leukocytes     Gram Stain Result No organisms seen       Your joint fluid showed gout   Usually a cortisone injection helps this a lot, let me know if it is not better.   No sign of infection       Please call with questions or contact us using [employer].    Sincerely,        Electronically signed by Ben Hamlin MD

## 2021-06-19 NOTE — LETTER
Letter by Ben Hamlin MD at      Author: Ben Hamlin MD Service: -- Author Type: --    Filed:  Encounter Date: 8/7/2019 Status: (Other)         Panda Miranda  [address] [facility] [address]      August 29, 2019      Dear Mr. Miranda,    You are currently under the care of [facility] for your warfarin (Coumadin ) therapy. Our records show that your last INR was on 6/19/2019 and you were due to come back on 7/31/2019. We have attempted to reach you by phone to schedule an INR appointment, but have not heard back from you.    The correct dose of warfarin for you may change from time to time based on your INR result. We would like to ensure that your warfarin dose is correct and that you are not having any side effects. It is not safe for you to be on Warfarin if your INR is not checked regularly. If your INR is too high, serious bleeding can occur. If your INR is too low, blood clots can form and lead to heart attack, stroke or a blood clot in the lung.      Getting your INR checked as instructed is required in order for the [facility] to continue managing and refilling your warfarin. We realize that it might be difficult for you to have frequent blood testing, but it is for your own safety.    Please contact us at [phone] as soon as possible to schedule an INR appointment. Our clinic staff is available Monday through Friday 8:00 am to 5:00 pm.  If you have been told to stop taking warfarin or your warfarin is being managed by another healthcare provider, please call and inform our staff.       Sincerely,     [facility]

## 2021-06-19 NOTE — LETTER
Letter by Ben Hamlin MD at      Author: Ben Hamlin MD Service: -- Author Type: --    Filed:  Encounter Date: 4/17/2019 Status: (Other)         Panda Miranda  [address]  [facility] [address]             April 17, 2019         Dear Mr. Miranda,    Below are the results from your recent visit:    Resulted Orders   Comprehensive Metabolic Panel   Result Value Ref Range    Sodium 140 136 - 145 mmol/L    Potassium 4.7 3.5 - 5.0 mmol/L    Chloride 106 98 - 107 mmol/L    CO2 21 (L) 22 - 31 mmol/L    Anion Gap, Calculation 13 5 - 18 mmol/L    Glucose 108 70 - 125 mg/dL    BUN 42 (H) 8 - 22 mg/dL    Creatinine 2.18 (H) 0.70 - 1.30 mg/dL    GFR MDRD Af Amer 37 (L) >60 mL/min/1.73m2    GFR MDRD Non Af Amer 31 (L) >60 mL/min/1.73m2    Bilirubin, Total 0.5 0.0 - 1.0 mg/dL    Calcium 9.4 8.5 - 10.5 mg/dL    Protein, Total 7.8 6.0 - 8.0 g/dL    Albumin 3.8 3.5 - 5.0 g/dL    Alkaline Phosphatase 83 45 - 120 U/L    AST 17 0 - 40 U/L    ALT 13 0 - 45 U/L    Narrative    Fasting Glucose reference range is 70-99 mg/dL per  American Diabetes Association (ADA) guidelines.   Uric Acid   Result Value Ref Range    Uric Acid 6.7 3.0 - 8.0 mg/dL   LDL Cholesterol, Direct   Result Value Ref Range    Direct  <=129 mg/dl   Glycosylated Hemoglobin A1c   Result Value Ref Range    Hemoglobin A1c 6.0 3.5 - 6.0 %   Vitamin D, Total (25-Hydroxy)   Result Value Ref Range    Vitamin D, Total (25-Hydroxy) 24.9 (L) 30.0 - 80.0 ng/mL    Narrative    Deficiency <10.0 ng/mL  Insufficiency 10.0-29.9 ng/mL  Sufficiency 30.0-80.0 ng/mL  Toxicity (possible) >100.0 ng/mL   Microalbumin, Random Urine   Result Value Ref Range    Microalbumin, Random Urine 2.47 (H) 0.00 - 1.99 mg/dL    Creatinine, Urine 37.1 mg/dL    Microalbumin/Creatinine Ratio Random Urine 66.6 (H) <=19.9 mg/g    Narrative    Microalbumin, Random Urine  <2.0 mg/dL . . . . . . . . Normal  3.0-30.0 mg/dL . . . . . . Microalbuminuria  >30.0 mg/dL . . . . .  .  . Clinical Proteinuria  Microalbumin/Creatinine Ratio, Random Urine  <20 mg/g . . . . .. . . . Normal   mg/g . . . . . . . Microalbuminuria  >300 mg/g . . . . . . . . Clinical Proteinuria   HM2(CBC w/o Differential)   Result Value Ref Range    WBC 4.8 4.0 - 11.0 thou/uL    RBC 4.70 4.40 - 6.20 mill/uL    Hemoglobin 12.5 (L) 14.0 - 18.0 g/dL    Hematocrit 37.5 (L) 40.0 - 54.0 %    MCV 80 80 - 100 fL    MCH 26.6 (L) 27.0 - 34.0 pg    MCHC 33.4 32.0 - 36.0 g/dL    RDW 13.7 11.0 - 14.5 %    Platelets 135 (L) 140 - 440 thou/uL    MPV 8.8 7.0 - 10.0 fL       Panda, here are your test results.    I would make sure you are taking vitamin D, at least 2000 units.    I would bring these results to the kidney doctor, Dr. Coreas.  He may wish to increase her lisinopril.  I want to leave that to him.    Generally, things look stable/okay.    It was good to see you.    Please call with questions or contact us using [employer].    Sincerely,        Electronically signed by Ben Hamlin MD

## 2021-06-20 NOTE — PROGRESS NOTES
"Subjective:  63 y.o. male with concerns as reported in phone call:    Pt fell onto front porch.  Occurred yesterday.  \"Was carrying too many things out the door.\"     Olivera \"bled for a few minutes.\"  Today \"has a big red and purple bruise on L shin.\"  Approx 2.5 inches long.  Pt applied cold packs yesterday.  Therefore no significant swelling today.    Walking ok.  Pain initially, now back at baseline.  Bruise not growing anymore.    Outpatient Medications Prior to Visit   Medication Sig Dispense Refill     acetaminophen-codeine (TYLENOL #3) 300-30 mg per tablet Take 1-2 tablets by mouth every 6 (six) hours as needed for pain. 40 tablet 2     amLODIPine (NORVASC) 5 MG tablet Take 5 mg by mouth.       aspirin 81 MG EC tablet Take 81 mg by mouth.       bumetanide (BUMEX) 1 MG tablet TAKE 3 TABLETS BY MOUTH TWICE DAILY AT 9AM AND AT 6PM 540 tablet 3     cholecalciferol, vitamin D3, (CHOLECALCIFEROL) 1,000 unit tablet Take 3 tablets (3,000 Units total) by mouth daily. 270 tablet 3     colchicine (COLCRYS) 0.6 mg tablet Take 1 tablet (0.6 mg total) by mouth daily. 90 tablet 0     cyclobenzaprine (FLEXERIL) 10 MG tablet TK 1 T PO 30 MINUTES BEFORE BEDTIME  0     febuxostat (ULORIC) 80 mg Tab Take 80 mg by mouth daily. 90 tablet 3     ferrous sulfate 325 (65 FE) MG tablet TAKE 1 TABLET BY MOUTH ONCE DAILY WITH BREAKFAST 90 tablet 2     lisinopril (PRINIVIL,ZESTRIL) 10 MG tablet Take 1 tablet (10 mg total) by mouth daily. 90 tablet 3     metoprolol tartrate (LOPRESSOR) 50 MG tablet TAKE 3 TABLETS BY MOUTH DAILY 270 tablet 0     metoprolol tartrate (LOPRESSOR) 50 MG tablet TAKE 1 TABLET BY MOUTH DAILY. 90 tablet 3     senna (SENNA) 8.6 mg tablet Take 8.6 mg by mouth daily as needed.        warfarin (COUMADIN) 5 MG tablet TAKE 1 AND 1/2 TABLETS BY MOUTH EVERY  tablet 0     warfarin (COUMADIN) 5 MG tablet Take 1 tablet (5 mg) by mouth daily as directed. Adjust dose per INR results. 90 tablet 1     No " "facility-administered medications prior to visit.       History   Smoking Status     Never Smoker   Smokeless Tobacco     Never Used      Objective:  /80 (Patient Site: Left Arm, Patient Position: Sitting, Cuff Size: Adult Large)  Pulse 69  Temp 98.4  F (36.9  C) (Oral)   Ht 6' 4\" (1.93 m)  Wt (!) 471 lb 4 oz (213.8 kg)  SpO2 98%  BMI 57.36 kg/m2  Knee Left   4 cm x 4 cm bruise with a mild abrasion over inferior half the patella and somewhat lower.    No intra-articular effusion  No erythema  No warmth    No tenderness to palpation    Lachmans: negative   Anterior drawer: negative     Varus stress: non tender  Valgus stress: nontender    Patellar grind: deferred  Patella not tender to palpation.     Chronic venous stasis skin changes in more distal extremities.  Trace edema.  Warm and well perfused.  No other bruising.    Recent Results (from the past 24 hour(s))   INR   Result Value Ref Range    INR 2.60 (H) 0.90 - 1.10      Assessment and Plan:   1. Permanent atrial fibrillation (H)  - INR therapeutic, no does changes.    2. Hematoma of skin  Prepatellar and pretibial.  Fairly mild.  Doesn't feel it is expanding.  Discussed compression if it is.  Would not recommend drainage.  Normal activities as tolerated.  Follow up if worsening.  Expect gradual improvement of bruise.      "

## 2021-06-20 NOTE — LETTER
Letter by Carmencita Lopez RN at      Author: Carmencita Lopez RN Service: -- Author Type: --    Filed:  Encounter Date: 4/14/2020 Status: (Other)         Panda Miranda  [address] [address]      April 28, 2020      Dear Mr. Miranda,    We are contacting you because our records show you were due for an INR on 4/7/20.?   ?  We understand that this is a difficult time. In response to the COVID-19 [facility] has made several changes to help keep you safe. Some examples include limiting in person office visits to essential appointments only, wearing masks, and calling patients the day before appointments to screen for symptoms of illness.?   ?  INR testing is considered essential care during this time. There are potentially serious risks when taking warfarin without careful monitoring, and we want to make sure you stay safe.  ?    Please call the INR clinic at [phone] to discuss your warfarin care, and to set up an appointment. If there has been a change in your medications or provider, please let us know so we can update our records.  ?  Sincerely,  ?  [facility]

## 2021-06-21 ENCOUNTER — PATIENT OUTREACH (OUTPATIENT)
Dept: CARE COORDINATION | Facility: CLINIC | Age: 66
End: 2021-06-21

## 2021-06-21 DIAGNOSIS — Z71.89 OTHER SPECIFIED COUNSELING: ICD-10-CM

## 2021-06-21 NOTE — LETTER
Letter by Ben Hamlin MD at      Author: Ben Hamlin MD Service: -- Author Type: --    Filed:  Encounter Date: 1/28/2021 Status: (Other)         Panda Miranda  [address]  [facility] [address]             January 28, 2021         Dear Mr. Miranda,    Below are the results from your recent visit:    Resulted Orders   HM2(CBC w/o Differential)   Result Value Ref Range    WBC 6.6 4.0 - 11.0 thou/uL    RBC 3.88 (L) 4.40 - 6.20 mill/uL    Hemoglobin 10.6 (L) 14.0 - 18.0 g/dL    Hematocrit 32.0 (L) 40.0 - 54.0 %    MCV 82 80 - 100 fL    MCH 27.3 27.0 - 34.0 pg    MCHC 33.1 32.0 - 36.0 g/dL    RDW 12.7 11.0 - 14.5 %    Platelets 128 (L) 140 - 440 thou/uL    MPV 8.3 7.0 - 10.0 fL   Basic Metabolic Panel   Result Value Ref Range    Sodium 142 136 - 145 mmol/L    Potassium 4.7 3.5 - 5.0 mmol/L    Chloride 106 98 - 107 mmol/L    CO2 25 22 - 31 mmol/L    Anion Gap, Calculation 11 5 - 18 mmol/L    Glucose 81 70 - 125 mg/dL    Calcium 8.6 8.5 - 10.5 mg/dL    BUN 40 (H) 8 - 22 mg/dL    Creatinine 2.46 (H) 0.70 - 1.30 mg/dL    GFR MDRD Af Amer 32 (L) >60 mL/min/1.73m2    GFR MDRD Non Af Amer 27 (L) >60 mL/min/1.73m2    Narrative    Fasting Glucose reference range is 70-99 mg/dL per  American Diabetes Association (ADA) guidelines.   ALT (SGPT)   Result Value Ref Range    ALT <9 0 - 45 U/L   Uric Acid   Result Value Ref Range    Uric Acid 6.5 3.0 - 8.0 mg/dL       Matt, kidney function is bad, but actually better than before.  I still think you should see the kidney doctor in follow-up.  Low blood count is probably due to decreased kidney function as well.  Please call with questions or contact us using [email or web address].    Sincerely,        Electronically signed by Ben Hamlin MD

## 2021-06-21 NOTE — LETTER
Letter by Carmencita Lopez RN at      Author: Carmencita Lopez RN Service: -- Author Type: --    Filed:  Encounter Date: 1/8/2021 Status: (Other)         Panda Miranda  [address]  [facility] [address]      January 8, 2021      Dear Mr. Miranda,    You are currently under the care of [facility] for your warfarin (Coumadin ) therapy.  We are contacting you because our records show you were due for an INR on 1/2/21.    There are potentially serious risks when taking warfarin without careful monitoring and we want to make sure you are safely managed.  Routine INR monitoring is required for warfarin refills.     Please call [phone] as soon as possible to schedule an appointment.  If there has been a change in your care or other concerns, please let us know so we can help and or update our records.     Sincerely,       [facility]

## 2021-06-21 NOTE — LETTER
Letter by Xin Brush NP at      Author: Xin Brush NP Service: -- Author Type: --    Filed:  Encounter Date: 3/31/2021 Status: (Other)       3/31/2021    [facility]   Fax: [phone] Wound Dressing Rx and Order Form  Customer Service: [phone] Order Status: New Order   Verbal: Mayra MEADE RN            Patient Info:  Name: Panda Miranda  : 1955  Address:   [address] [place] [address]  Phone: [phone]      Insurance Info:  Primary: Payor: MEDICARE / Plan: MEDICARE A AND B / Product Type: Medicare /    Secondary: N/A N/A  [id number] - (Medicare)      Physician Info:   Name:  Xin Brush NP   Dept Address/Phones:   [address] [place] [address], [address]  [facility] [address] [phone]  Fax: [phone]    Lymphedema circumferential measurements (in cm):  Right just above MTP: 28.8    Right Ankle: 25.7    Right Widest Calf: 45    No data recorded  Left - just above MTP: 29.8    Left Ankle: 27.2    Left Widest Calf: 47.4    Left Thigh Up 10cm: .      Wound info:  No diagnosis found.  Wound info:  No diagnosis found.  VASC Wound 21 Lt leg (Active)   Pre Size Length 4.8 21 0800   Pre Size Width 3.5 21 0800   Pre Size Depth 0.1 21 0800   Pre Total Sq cm 16.8 21 0800       VASC Wound 21 right below knee (Active)   Pre Size Length 1.7 21 0800   Pre Size Width 0.2 21 0800   Pre Size Depth 0 21 0800   Pre Total Sq cm 0.34 21 0800       Wound 04/16/15 Other (Comment) (Active)     Drainage:Heavy  Thickness:  Full  Duration of Need: 30  Days Supply: 30  Start Date: 2021  Starter Kit: Ancillary Kit (saline, gloves, gauze)  Qualifying wound/Debridement Yes      Dressing Type Brand Size Number of pieces Frequency of change   Primary Silvercel   4.25''x4.25'' 10 sheets 2-3 days    Allevyn bordered adhesive dressing   4''x4'' Max allowed  2-3 days      Latex free  tubular compression bandage   Size F  1 box  2-3 days          Comprilan   4'' 8 rolls  2-3 days    Paper Tape   2'' 2 rolls  2-3 days   ABD pads                                                       5x9                  Max                 2-3 days                Note: If total out of pocket is more than $50.00 please contact the patient before processing order.     OK to forward to covered supplier.     Electronically Signed Physician: Xin Brush NP Date: 3/31/2021

## 2021-06-21 NOTE — LETTER
Letter by Ben Hamlin MD at      Author: Ben Hamlin MD Service: -- Author Type: --    Filed:  Encounter Date: 2/3/2021 Status: (Other)         Panda Miranda  [address]  [facility] [address]             February 3, 2021         Dear Mr. Miranda,    Below are the results from your recent visit:    Resulted Orders   US Arterial Segmental Pressures Legs 3 or More Levels    Narrative    EXAM: US ARTERIAL LEGS BILATERAL COMPLETE, US ARTERIAL PRESSURES LEGS BILATERAL  LOCATION: [facility]  DATE/TIME: 2/3/2021 12:16 PM    INDICATION: Non-pressure chronic ulcer of left ankle limited to breakdown of skin  COMPARISON: None.  TECHNIQUE: Duplex utilizing 2D gray-scale imaging, Doppler interrogation with color-flow and spectral waveform analysis.    FINDINGS:   Pressure measurements in mmHg    RIGHT  Brachial: 111  Ankle (PT): 164, 1.48  Ankle (DP): 149, 1.34  Digit: 145, 1.31    LEFT  Brachial: 103  Ankle (PT): 160, 1.44  Ankle (DP): 152, 1.37  Digit: 87, 0.78    RIGHT LOWER EXTREMITY ARTERIAL ASSESSMENT:  External iliac artery 122 cm/s  Common femoral artery: 110 cm/s  Profunda femoris artery: 70 cm/s  SFA (proximal): 109 cm/s  SFA (mid): 112 cm/s  SFA (distal): 82 cm/s  Popliteal artery: 101 cm/s  Posterior tibial artery: 87 cm/s  Anterior tibial artery: 28 cm/s  Dorsalis pedis artery: 76 cm/s    LEFT LOWER EXTREMITY ARTERIAL ASSESSMENT:  External iliac artery 108 cm/s  Common femoral artery: 136 cm/s  Profunda femoris artery: 83 cm/s  SFA (proximal): 119 cm/s  SFA (mid): 89 cm/s  SFA (distal): 29 cm/s  Popliteal artery: 57 cm/s  Posterior tibial artery: 82 cm/s  Anterior tibial artery: 28 cm/s  Dorsalis pedis artery: 76 cm/s    DUPLEX: Diffuse atheromatous plaque. There are normal triphasic waveforms throughout the right and left lower extremities. No evidence of occlusion or significant stenosis.      Impression    1.  Resting NAHID is 1.48. This may be falsely  elevated due to arterial wall calcification. Right lower extremity duplex demonstrates normal waveforms through the right foot without significant stenosis or occlusion.  2.  Resting NAHID is 1.44. This may be falsely elevated due to arterial wall calcification. Left lower extremity duplex demonstrates normal waveforms through the left foot without CT evidence stenosis or occlusion.         Matt, there is hardening of the arteries and calcium seen in your arteries.  But there is no blockage of blood flow that would impair healing of those ulcers.  So I think the problem comes from the veins that drain blood from your legs.  We will wait to see what the wound care doctor says.    Please call with questions or contact us using [employer].    Sincerely,        Electronically signed by Ben Hamlin MD

## 2021-06-21 NOTE — LETTER
Letter by Xin Brush NP at      Author: Xin Brush NP Service: -- Author Type: --    Filed:  Encounter Date: 2021 Status: (Other)       2021    [facility]   Fax: [phone] Wound Dressing Rx and Order Form  Customer Service: [phone] Order Status: New Order   Verbal: Vero    Patient Info:  Name: Panda Miranda  : 1955  Address:   [address] [address]  Phone: [phone]    Insurance Info:  Primary: Payor: [facility] SELECT CARE/LABORCARE / Plan: [facility] MEDICARE ADVANTAGE / Product Type: MEDICARE ADVANTAGE /    Secondary: N/A N/A  [id number] - ([employer])    Physician Info:   Name:  Xin Brush NP   Dept Address/Phones:   [address] [place] [address], [address]  [facility] [address] [phone]  Fax: [phone]    Lymphedema circumferential measurements (in cm):  Right just above MTP: 28.2    Right Ankle: 25.8    Right Widest Calf: 44.6    No data recorded  Left - just above MTP: 28.6    Left Ankle: 26.8    Left Widest Calf: 44.8    No data recorded    Wound info:  Encounter Diagnoses   Name Primary?   ? Venous stasis ulcer of left lower leg with edema of left lower leg (H) Yes   ? Dependent edema    ? Venous hypertension of both lower extremities    ? Morbid obesity with BMI of 50.0-59.9, adult (H)    ? Stage 3b chronic kidney disease    ? Chronic diastolic heart failure (H)    ? Vitamin D deficiency    ? Venous stasis    ? Venous stasis ulcer of other part of right lower leg with fat layer exposed without varicose veins (H)      VASC Wound 21 Lt leg (Active)   Pre Size Length 4 21 1000   Pre Size Width 3.4 21 1000   Pre Size Depth 0.1 21 1000   Pre Total Sq cm 13.6 21 1000       VASC Wound 21 right below knee (Active)   Pre Size Length 3.8 21 1000   Pre Size Width 1.2 21 1000   Pre Size Depth 0.1 21 1000   Pre Total Sq cm 4.56 21 1000        VASC Wound 04/26/21 RT LATERAL LEG (Active)   Pre Size Length 0.3 05/06/21 1000   Pre Size Width 0.4 05/06/21 1000   Pre Size Depth 0.1 05/06/21 1000   Pre Total Sq cm 0.12 05/06/21 1000     Drainage: Moderate  Thickness:  Full  Duration of Need: 30  Days Supply: 30  Start Date: 5/18/2021  Starter Kit: Ancillary Kit (saline, gloves, gauze)  Qualifying wound/Debridement Yes      Dressing Type Brand Size Number of pieces Frequency of change   Primary Silvercel   4.25''x4.25'' 10 sheets  twice a week    Secondary  ABD Pads   5''x9'' 10 pads  twice a week     Sof form roll gauze   4''x75'' 16 rolls  twice a week     Square gauze   4''x4'' 2 loafs  twice a week     2 layer compression system    16 boxes needs one for each leg twice weekly changes  twice a week   Tape Paper  1'' 2 rolls  twice a week   Note: If total out of pocket is more than $50.00 please contact the patient before processing order.     OK to forward to covered supplier.     Electronically Signed Physician: Xin Brush NP Date: 5/18/2021

## 2021-06-21 NOTE — LETTER
Letter by Xin Brush NP at      Author: Xin Brush NP Service: -- Author Type: --    Filed:  Encounter Date: 2021 Status: (Other)       2021    [facility]   Fax: [phone] Wound Dressing Rx and Order Form  Customer Service: [phone] Order Status: New Order   Verbal: Vero              Patient Info:  Name: Panda Miranda  : 1955  Address:   [address] [address]  Phone: [phone]      Insurance Info:  Primary: Payor: [facility] SELECT CARE/LABORCARE / Plan: [facility] MEDICARE ADVANTAGE / Product Type: MEDICARE ADVANTAGE /    Secondary: N/A N/A  [id number] - ([employer])      Physician Info:   Name:  Xin Brush NP   Dept Address/Phones:   [address] [place] [address], [address]  [facility] [address] [phone]  Fax: [phone]    Lymphedema circumferential measurements (in cm):  Right just above MTP: 29.2    Right Ankle: 26.6    Right Widest Calf: 46    No data recorded  Left - just above MTP: 29.2    Left Ankle: 27    Left Widest Calf: 46.8    Left Thigh Up 10cm: .      Wound info:  Encounter Diagnoses   Name Primary?   ? Venous stasis ulcer of left lower leg with edema of left lower leg (H) Yes   ? Dependent edema    ? Venous hypertension of both lower extremities    ? Morbid obesity with BMI of 50.0-59.9, adult (H)    ? Stage 3b chronic kidney disease    ? Chronic diastolic heart failure (H)    ? Vitamin D deficiency    ? Venous stasis      VASC Wound 21 Lt leg (Active)   Pre Size Length 3.5 21 0800   Pre Size Width 3.5 21 0800   Pre Size Depth 0.1 21 0800   Pre Total Sq cm 12.25 21 0800     Drainage: Moderate  Thickness:  Full  Duration of Need: 30  Days Supply: 30  Start Date: 2021  Starter Kit: Ancillary Kit (saline, gloves, gauze)  Qualifying wound/Debridement Yes      Dressing Type Brand Size Number of pieces Frequency of change   Primary Silvercel   4.25''x4.25''  10 sheets Daily    Allevyn bordered adhesive dressing   4''x4'' Max allowed  Daily    Latex free tubular compression bandage   Size F  1 box  Daily    Comprilan   4'' 8 rolls  Daily    Paper Tape   2'' 2 rolls  Daily     Note: If total out of pocket is more than $50.00 please contact the patient before processing order.     OK to forward to covered supplier.     Electronically Signed Physician: Xin Brush NP Date: 2/23/2021

## 2021-06-21 NOTE — PROGRESS NOTES
[facility]: Post-Discharge Note  SITUATION                                                      Admission:    Admission Date: 06/15/21   Reason for Admission: Heart Failure  Discharge:   Discharge Date: 06/18/21  Discharge Diagnosis: Heart Failure    BACKGROUND                                                      66 y.o.M with a PMHx of HFpEF, mild-mod mitral regurgitation, permanent atrial fibrillation (on Warfarin), perimembranous VSD, HTN, CKD 3, TASHA (CPAP), gout, morbid obesity s/p VBG ring (5/21) admitted with several weeks of progressing SOB c/f HFpEF exacerbation.       ASSESSMENT      Discharge Assessment  Patient reports symptoms are: Improved  Does the patient have all of their medications?: Yes  Does patient know what their new medications are for?: Yes  Does patient have a follow-up appointment scheduled?: No  Does patient have any other questions or concerns?: No    Post-op  Did the patient have surgery or a procedure: No  Fever: No  Chills: No  Eating & Drinking: eating and drinking without complaints/concerns  PO Intake: other  Bowel Function: normal  Urinary Status: voiding without complaint/concerns        PLAN                                                      Outpatient Plan:  Adult [facility] Follow-up and recommended labs and tests  Follow up with Dr. Argueta , at [facility], within 7-10 days for  hospital follow- up. The following labs/tests are recommended: CBC/  BMP.  Appointments on [place] and/or [place] campus (with [facility] or  [facility] provider or service). Call [phone] if you haven't heard  regarding these appointments within 7 days of discharge.    Future Appointments   Date Time Provider Department Center   6/24/2021  7:30 AM Nasir Damico MD [facility]   8/16/2021  8:45 AM Yamilex Trejo PA-C [facility]           Josselyn Kramer MA

## 2021-06-21 NOTE — LETTER
Letter by Xin Brush NP at      Author: Xin Brush NP Service: -- Author Type: --    Filed:  Encounter Date: 3/31/2021 Status: (Other)       3/31/2021    [facility]   Fax: [phone] Wound Dressing Rx and Order Form  Customer Service: [phone] Order Status: New Order   Verbal: Mayra MEADE RN             Patient Info:  Name: Panda Miranda  : 1955  Address:   [address] [place] [address]  Phone: [phone]      Insurance Info:  Primary: Payor: MEDICARE / Plan: MEDICARE A AND B / Product Type: Medicare /    Secondary: N/A N/A  [id number] - (Medicare)      Physician Info:   Name:  Xin Brush NP   Dept Address/Phones:   [address] [place] [address], [address]  [facility] [address] [phone]  Fax: [phone]    Lymphedema circumferential measurements (in cm):  No data recorded  No data recorded  No data recorded  No data recorded  No data recorded  No data recorded  No data recorded  No data recorded  Right just above MTP: 28.8    Right Ankle: 25.7    Right Widest Calf: 45    No data recorded  Left - just above MTP: 29.8    Left Ankle: 27.2    Left Widest Calf: 47.4    Left Thigh Up 10cm: .      Wound info:  No diagnosis found.  VASC Wound 21 Lt leg (Active)   Pre Size Length 4.8 21 0800   Pre Size Width 3.5 21 0800   Pre Size Depth 0.1 21 0800   Pre Total Sq cm 16.8 21 0800       VASC Wound 21 right below knee (Active)   Pre Size Length 1.7 21 0800   Pre Size Width 0.2 21 0800   Pre Size Depth 0 21 0800   Pre Total Sq cm 0.34 21 0800       Wound 04/16/15 Other (Comment) (Active)     Drainage: Heavy  Thickness:  Full  Duration of Need: 30  Days Supply: 30  Start Date: 3/31/21  Starter Kit: Ancillary Kit (saline, gloves, gauze)  Qualifying wound/Debridement Yes      Dressing Type Brand Size Number of pieces Frequency of change   Primary     {Change Frequency  (Optional):73403}        {Change Frequency (Optional):05656}        {Change Frequency (Optional):86320}        {Change Frequency (Optional):63006}        {Change Frequency (Optional):12462}        {Change Frequency (Optional):74477}   Secondary     {Change Frequency (Optional):84997}        {Change Frequency (Optional):87352}        {Change Frequency (Optional):22506}        {Change Frequency (Optional):29272}        {Change Frequency (Optional):21790}   Tape     {Change Frequency (Optional):09810}        {Change Frequency (Optional):85913}        {Change Frequency (Optional):18605}     Note: If total out of pocket is more than $50.00 please contact the patient before processing order.     OK to forward to covered supplier.     Electronically Signed Physician: Xin Brush NP Date: 3/31/2021

## 2021-06-23 NOTE — TELEPHONE ENCOUNTER
Refill Approved    Rx renewed per Medication Renewal Policy. Medication was last renewed on 1/10/18. 1/11/18    Corina Ortiz, [facility] Connection Triage/Med Refill 2/4/2019     Requested Prescriptions   Pending Prescriptions Disp Refills     metoprolol tartrate (LOPRESSOR) 50 MG tablet [Pharmacy Med Name: METOPROLOL TARTRATE 50MG TABLETS] 90 tablet 0     Sig: TAKE 1 TABLET BY MOUTH DAILY.    Beta-Blockers Refill Protocol Passed - 2/1/2019 11:52 AM       Passed - PCP or prescribing provider visit in past 12 months or next 3 months    Last office visit with prescriber/PCP: 4/19/2018 Ben Hamlin MD OR same dept: 1/10/2019 Julio César Herring MD OR same specialty: 1/10/2019 Julio César Herring MD  Last physical: 6/19/2018 Last MTM visit: Visit date not found   Next visit within 3 mo: Visit date not found  Next physical within 3 mo: Visit date not found  Prescriber OR PCP: Ben Hamlin MD  Last diagnosis associated with med order: 1. Essential hypertension  - metoprolol tartrate (LOPRESSOR) 50 MG tablet [Pharmacy Med Name: METOPROLOL TARTRATE 50MG TABLETS]; TAKE 1 TABLET BY MOUTH DAILY.  Dispense: 90 tablet; Refill: 0    If protocol passes may refill for 12 months if within 3 months of last provider visit (or a total of 15 months).            Passed - Blood pressure filed in past 12 months    BP Readings from Last 1 Encounters:   01/10/19 118/72             ULORIC 80 mg Tab [Pharmacy Med Name: ULORIC 80MG TABLETS] 90 tablet 0     Sig: TAKE 1 TABLET BY MOUTH DAILY    Allopurinol/Febuxostat Refill Protocol  Passed - 2/1/2019 11:52 AM       Passed - LFT or AST or ALT in last 12 months    Albumin   Date Value Ref Range Status   01/08/2018 3.7 3.5 - 5.0 g/dL Final     Bilirubin, Total   Date Value Ref Range Status   01/08/2018 0.7 0.0 - 1.0 mg/dL Final     Alkaline Phosphatase   Date Value Ref Range Status   01/08/2018 73 45 - 120 U/L Final     AST   Date Value Ref Range Status   01/08/2018 22 0 - 40 U/L Final     Comment:      Specimen slightly hemolyzed - may falsely elevate result     ALT   Date Value Ref Range Status   04/19/2018 20 0 - 45 U/L Final     Protein, Total   Date Value Ref Range Status   01/08/2018 8.3 (H) 6.0 - 8.0 g/dL Final               Passed - Visit with PCP or prescribing provider visit in past 12 months    Last office visit with prescriber/PCP: 4/19/2018 Ben Hamlin MD OR same dept: 1/10/2019 Julio César Herring MD OR same specialty: 1/10/2019 Julio César Herring MD  Last physical: 6/19/2018 Last MTM visit: Visit date not found   Next visit within 3 mo: Visit date not found  Next physical within 3 mo: Visit date not found  Prescriber OR PCP: Ben Hamlin MD  Last diagnosis associated with med order: 1. Essential hypertension  - metoprolol tartrate (LOPRESSOR) 50 MG tablet [Pharmacy Med Name: METOPROLOL TARTRATE 50MG TABLETS]; TAKE 1 TABLET BY MOUTH DAILY.  Dispense: 90 tablet; Refill: 0    If protocol passes may refill for 12 months if within 3 months of last provider visit (or a total of 15 months).

## 2021-06-23 NOTE — TELEPHONE ENCOUNTER
Anticoagulation Annual Referral Renewal Review    Panda Miranda's chart reviewed for annual renewal of referral to anticoagulation monitoring.        Criteria for anticoagulation nurse and/or pharmacist renewal met   Warfarin indication: Atrial Fibrillation Yes, per indication   Current with INR monitoring/compliant Yes Yes   Date of last office visit 1/10/19 Yes, had office visit within last year   Time in Therapeutic Range (TTR) 83.68 % Yes, TTR > 60%       Panda Miranda met all criteria for anticoagulation management program initiated renewal.  New INR standing orders and anticoagulation referral renewal placed.      Carmencita Lopez RN  4:30 PM

## 2021-06-23 NOTE — TELEPHONE ENCOUNTER
Refill Approved    Rx renewed per Medication Renewal Policy. Medication was last renewed on 10/4/18.    Ov: 1/10/19    Liana Osorio, [facility] Triage/Med Refill 1/16/2019     Requested Prescriptions   Pending Prescriptions Disp Refills     COLCRYS 0.6 mg tablet [Pharmacy Med Name: COLCRYS 0.6MG TABLETS] 90 tablet 0     Sig: TAKE 1 TABLET(0.6 MG) BY MOUTH DAILY    Colchicine Refill Protocol Passed - 1/16/2019  9:23 AM       Passed - LFT or AST or ALT in last year    Albumin   Date Value Ref Range Status   01/08/2018 3.7 3.5 - 5.0 g/dL Final     Bilirubin, Total   Date Value Ref Range Status   01/08/2018 0.7 0.0 - 1.0 mg/dL Final     Alkaline Phosphatase   Date Value Ref Range Status   01/08/2018 73 45 - 120 U/L Final     AST   Date Value Ref Range Status   01/08/2018 22 0 - 40 U/L Final     Comment:     Specimen slightly hemolyzed - may falsely elevate result     ALT   Date Value Ref Range Status   04/19/2018 20 0 - 45 U/L Final     Protein, Total   Date Value Ref Range Status   01/08/2018 8.3 (H) 6.0 - 8.0 g/dL Final               Passed - CBC (Hemogram 2) in last year     WBC   Date Value Ref Range Status   04/19/2018 6.1 4.0 - 11.0 thou/uL Final     RBC   Date Value Ref Range Status   04/19/2018 4.69 4.40 - 6.20 mill/uL Final     Hemoglobin   Date Value Ref Range Status   04/19/2018 12.6 (L) 14.0 - 18.0 g/dL Final     Hematocrit   Date Value Ref Range Status   04/19/2018 40.1 40.0 - 54.0 % Final     MCV   Date Value Ref Range Status   04/19/2018 85 80 - 100 fL Final     MCH   Date Value Ref Range Status   04/19/2018 26.9 (L) 27.0 - 34.0 pg Final     MCHC   Date Value Ref Range Status   04/19/2018 31.5 (L) 32.0 - 36.0 g/dL Final     RDW   Date Value Ref Range Status   04/19/2018 14.3 11.0 - 14.5 % Final     Platelets   Date Value Ref Range Status   04/19/2018 110 (L) 140 - 440 thou/uL Final     MPV   Date Value Ref Range Status   04/19/2018 10.2 (H) 7.0 - 10.0 fL Final               Passed - Visit with  PCP or prescribing provider visit in past 12 months    Last office visit with prescriber/PCP: 4/19/2018 Ben Hamlin MD OR same dept: 1/10/2019 Julio éCsar Herring MD OR same specialty: 1/10/2019 Julio César Herring MD  Last physical: 6/19/2018 Last MTM visit: Visit date not found   Next visit within 3 mo: Visit date not found  Next physical within 3 mo: Visit date not found  Prescriber OR PCP: Ben Hamlin MD  Last diagnosis associated with med order: 1. Gout  - COLCRYS 0.6 mg tablet [Pharmacy Med Name: COLCRYS 0.6MG TABLETS]; TAKE 1 TABLET(0.6 MG) BY MOUTH DAILY  Dispense: 90 tablet; Refill: 0    If protocol passes may refill for 12 months if within 3 months of last provider visit (or a total of 15 months).            Passed - Serum creatinine in last year    Creatinine   Date Value Ref Range Status   04/19/2018 2.06 (H) 0.70 - 1.30 mg/dL Final

## 2021-06-23 NOTE — TELEPHONE ENCOUNTER
ANTICOAGULATION  MANAGEMENT    Assessment     Today's INR result of 2.2 is Therapeutic (goal INR of 2.0-3.0)        Warfarin taken as previously instructed    No new diet changes affecting INR    finished zpac with no sign of interaction    Continues to tolerate warfarin with no reported s/s of bleeding or thromboembolism     Previous INR was Therapeutic    Plan:     Left a detailed message for Panda regarding INR result and instructed:     Warfarin Dosing Instructions:  Continue current warfarin dose 7.5 mg daily on Sundays and Thursdays; and 5 mg daily rest of week  (0 % change)    Instructed patient to follow up no later than: one month.    Education provided:     Instructed to call the [facility] for any changes, questions or concerns. (#[phone])   ?   Chely Groves RN    Subjective/Objective:      Panda Miranda, a 63 y.o. male is on warfarin.     Panda reports:     Home warfarin dose: as updated on anticoagulation calendar per template     Missed doses: No     Medication changes:  Yes: finished the z pack.     S/S of bleeding or thromboembolism:  No     New Injury or illness:  No     Changes in diet or alcohol consumption:  No     Upcoming surgery, procedure or cardioversion:  No    Anticoagulation Episode Summary     Current INR goal:   2.0-3.0   TTR:   65.3 % (2 y)   Next INR check:   2/13/2019   INR from last check:   2.20 (1/16/2019)   Weekly max warfarin dose:      Target end date:   Indefinite   INR check location:      Preferred lab:      Send INR reminders to:   ANTICOAGULATION POOL A (WBY,WBE,MID,RSC)    Indications    A-fib (H) (Resolved) [I48.91]           Comments:            Anticoagulation Care Providers     Provider Role Specialty Phone number    Ben Hamlin MD Referring Family Medicine [phone]

## 2021-06-23 NOTE — TELEPHONE ENCOUNTER
ANTICOAGULATION  MANAGEMENT    Assessment     Today's INR result of 2.1 is Therapeutic (goal INR of 2.0-3.0)        Warfarin taken as previously instructed    No new diet changes affecting INR     OV today for URI    Potential interaction between Z pack and warfarin which may affect subsequent INRs, prescribed at OV today    Continues to tolerate warfarin with no reported s/s of bleeding or thromboembolism     Previous INR was Therapeutic    Plan:     Left a detailed message for Panda regarding INR result and instructed:     Warfarin Dosing Instructions:  Continue current warfarin dose    7.5 mg on Sun, Thu; 5 mg all other days         Instructed patient to follow up no later than: 4-5 days, depending on when he starts the Z pack    Education provided: target INR goal and significance of current INR result and potential interaction between warfarin and abx    Instructed to call the [facility] for any changes, questions or concerns. (#[phone])   ?   Carmencita Lopez RN    Subjective/Objective:      Panda Miranda, a 63 y.o. male is on warfarin.     Panda reports:     Home warfarin dose: as updated on anticoagulation calendar per template     Missed doses: No     Medication changes:  Yes: prescribed a Z pack today at OV     S/S of bleeding or thromboembolism:  No     New Injury or illness:  Yes: URI     Changes in diet or alcohol consumption:  No     Upcoming surgery, procedure or cardioversion:  No    Anticoagulation Episode Summary     Current INR goal:   2.0-3.0   TTR:   65.0 % (2 y)   Next INR check:   1/14/2019   INR from last check:   2.10 (1/10/2019)   Weekly max warfarin dose:      Target end date:   Indefinite   INR check location:      Preferred lab:      Send INR reminders to:   ANTICOAGULATION POOL A (WBY,WBE,MID,RSC)    Indications    A-fib (H) (Resolved) [I48.91]           Comments:            Anticoagulation Care Providers     Provider Role Specialty Phone number    Ben Hamlin MD  [facility] [phone]

## 2021-06-23 NOTE — PROGRESS NOTES
Bad cold.  Think it's coming from cpap machine.  The humidifier is broken.   Need a new mask and hose.  Needs a referral for this.  Is congested.  Wants something stronger than otc syrup.  3 wks and worsening.    Productive.  Mucus.  Not really shortness of breath.  But trouble breathing as is congested.      Never smoked    Atrial fibrillation denies palpitations. Denies any bleeding    On warfarin.    Needs inr today.  No ankle edema    Hx sores on legs.  Not as bad as before    Avoid salt.    Renal insufficiency denies nausea or vomiting          OBJECTIVE:   Vitals:    01/10/19 1013   BP: 118/72   Pulse: 66   Resp: 16   Temp: 97.5  F (36.4  C)   SpO2: 99%      Eyes: non icteric, noninflamed  Lungs: no resp distress  Rhonchi  Heart: irregular  Rate 80  Ankles: no edema  Muscles: nontender  Mental status: euthymic  Neuro: nonfocal    Body mass index is 58.43 kg/m .     ASSESSMENT/PLAN:    1. Acute bronchitis, unspecified organism  azithromycin (ZITHROMAX) 250 MG tablet    codeine-guaiFENesin (GUAIFENESIN AC)  mg/5 mL liquid   2. Persistent atrial fibrillation (H)  INR   3. Chronic Kidney Disease, Stage 3     4. Obstructive Sleep Apnea  CPAP    CPAP

## 2021-06-24 NOTE — TELEPHONE ENCOUNTER
ANTICOAGULATION  MANAGEMENT: Discharge Continuity of Care Review    Emergency room visit on  2/18 for back pain.    Discharge disposition: Home    INR Results:     No results for input(s): INR in the last 168 hours.    Warfarin inpatient management: Home regimen continued    Warfarin discharge instructions: home regimen continued     Medication Changes Affecting Anticoagulation: No    Additional Factors Affecting Anticoagulation: No    Plan     No adjustment to anticoagulation plan needed        Virginia Ferguson RN

## 2021-06-24 NOTE — TELEPHONE ENCOUNTER
RN cannot approve Refill Request    RN can NOT refill this medication PCP messaged that patient is overdue for Labs.     Corina Ortiz, [facility] Triage/Med Refill 3/1/2019    Requested Prescriptions   Pending Prescriptions Disp Refills     bumetanide (BUMEX) 1 MG tablet [Pharmacy Med Name: BUMETANIDE 1MG TABLETS] 540 tablet 0     Sig: TAKE 3 TABLETS BY MOUTH TWICE DAILY AT 9AM AND 6PM    Diuretics/Combination Diuretics Refill Protocol  Failed - 3/1/2019  1:22 PM       Failed - Serum Sodium in past 12 months     No results found for: LN-SODIUM         Passed - Visit with PCP or prescribing provider visit in past 12 months    Last office visit with prescriber/PCP: 4/19/2018 Ben Hamlin MD OR same dept: 1/10/2019 Julio César Herring MD OR same specialty: 1/10/2019 Julio César Herring MD  Last physical: 6/19/2018 Last [facility] visit: Visit date not found   Next visit within 3 mo: Visit date not found  Next physical within 3 mo: Visit date not found  Prescriber OR PCP: Ben Hamlin MD  Last diagnosis associated with med order: 1. Lower extremity edema  - bumetanide (BUMEX) 1 MG tablet [Pharmacy Med Name: BUMETANIDE 1MG TABLETS]; TAKE 3 TABLETS BY MOUTH TWICE DAILY AT 9AM AND 6PM  Dispense: 540 tablet; Refill: 0    If protocol passes may refill for 12 months if within 3 months of last provider visit (or a total of 15 months).            Passed - Serum Potassium in past 12 months     Lab Results   Component Value Date    Potassium 4.2 06/19/2018            Passed - Blood pressure on file in past 12 months    BP Readings from Last 1 Encounters:   02/18/19 125/65            Passed - Serum Creatinine in past 12 months     Creatinine   Date Value Ref Range Status   04/19/2018 2.06 (H) 0.70 - 1.30 mg/dL Final

## 2021-06-24 NOTE — TELEPHONE ENCOUNTER
ANTICOAGULATION  MANAGEMENT    Assessment     Today's INR result of 2.5 is Therapeutic (goal INR of 2.0-3.0)        Warfarin taken as previously instructed    No new diet changes affecting INR    No new medication/supplements affecting INR    Continues to tolerate warfarin with no reported s/s of bleeding or thromboembolism     Previous INR was Therapeutic    Plan:     Spoke with Panda regarding INR result and instructed:     Warfarin Dosing Instructions:  Continue current warfarin dose 7.5 mg daily on Sun/Thurs; and 5 mg daily rest of week  (0 % change)    Instructed patient to follow up no later than: 4-6 weeks    Education provided: importance of therapeutic range    Panda verbalizes understanding and agrees to warfarin dosing plan.    Instructed to call the [facility] for any changes, questions or concerns. (#[phone])   ?   Virginia Ferguson RN    Subjective/Objective:      Panda Miranda, a 63 y.o. male is on warfarin.     Panda reports:     Home warfarin dose: as updated on anticoagulation calendar per template     Missed doses: No     Medication changes:  No     S/S of bleeding or thromboembolism:  No     New Injury or illness:  No     Changes in diet or alcohol consumption:  No     Upcoming surgery, procedure or cardioversion:  No    Anticoagulation Episode Summary     Current INR goal:   2.0-3.0   TTR:   67.8 % (2.1 y)   Next INR check:   4/24/2019   INR from last check:   2.50 (3/13/2019)   Weekly max warfarin dose:      Target end date:   Indefinite   INR check location:      Preferred lab:      Send INR reminders to:   ANTICOAGULATION POOL A (WBY,WBE,MID,RSC)    Indications    A-fib (H) (Resolved) [I48.91]           Comments:            Anticoagulation Care Providers     Provider Role Specialty Phone number    Ben Hamlin MD Referring Family Medicine [phone]

## 2021-06-25 NOTE — TELEPHONE ENCOUNTER
metoprolol tartrate (LOPRESSOR) 50 MG tablet [[id number]]    Electronically signed by: Ben Hamlin MD on 04/28/20 1122 Status: Discontinued   Ordering user: Ben Hamlin MD 04/28/20 1122 Authorized by: Ben Hamlin MD   Frequency:  04/28/20 - 03/02/21  Released by: Ben Hamlin MD 04/28/20 1122   Discontinued by: Ben Hamlin MD 03/02/21 1306   Diagnoses   Essential hypertension [I10]

## 2021-06-25 NOTE — PROGRESS NOTES
Panda Miranda is a 65 y.o. male who is being evaluated via a billable telephone visit.      What phone number would you like to be contacted at? [phone]  How would you like to obtain your AVS? AVS Preference: Mail a copy.    Wt Readings from Last 3 Encounters:   03/29/21 (!) 478 lb (216.8 kg)   02/23/21 (!) 476 lb 1.6 oz (216 kg)   01/26/21 (!) 478 lb (216.8 kg)     Subjective   Panda Miranda is 65 y.o. and presents today for the following health issues   65-year-old with history of super morbid obesity, previous bariatric surgery with ring device, prediabetes, VSD, diastolic congestive heart failure, atrial fibrillation, mitral regurgitation, chronic kidney disease stage III, anemia, colon cancer and pseudogout    Patient calling because of shortness of breath which is gradually worsened over the last year.  Always been short of breath with exertion but now worse.  Denies PND, orthopnea.      Patient with chronic severe lower extremity edema, does not think he is retaining extra water at this point.  Patient has followed with cardiology for A. fib, CHF, mitral regurgitation in the past, I have referred him back to cardiology as recent as 1/26/2021 but it does not appear that he has seen Dr. Arvizu since January 2018.  Reviewing labs, labs were done 5/17/2021.  At that time hemoglobin 10.5, creatinine 2.65 ferritin 297.  Parathyroid hormone was elevated at 544 at that time as well  Last visit with nephrology, Dr. Coreas, 5/1/2019, recommended follow-up 9 months    Patient has been seeing the [facility] bariatric program.  Plan is apparently of removal of esophageal ring device and intensive nutrition counseling for weight loss.  He apparently vomits  fruits and vegetables and is unable to eat them.  Start topiramate ramp to 75 mg if ok per nephrologist. Will work on medical weight management.  -------------------------  Follow up Dr Damico to discuss possible VBG ring removal via  laparoscopic vs endoscopic for maladaptive eating/weight regain after VBG in person visit. 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 Arvizu planned upon follow-up, in person.         Relevant Orders    Ambulatory referral to [facility]    Chronic Kidney Disease, Stage 3     Needs to see Dr. Coreas again,  Patient saw him once and 2019 and has been lost to follow-up since.  Now, creatinine 2.65, markedly elevated parathyroid.         Relevant Orders    Ambulatory referral to [facility]    Non-rheumatic mitral regurgitation     Anticipate referral back to cardiology         Relevant Orders    Ambulatory referral to Cardiology [facility]       Unprioritized    Ventricular Septal Defect     Referral back to cardiology         Relevant Orders    Ambulatory referral to [facility]    Secondary renal hyperparathyroidism (H)     Needs to see nephrology         Relevant Orders    Ambulatory referral to [facility]    MACKEY (dyspnea on exertion) - Primary     Causes most likely multifactorial  Obesity, congestive heart failure, chronic A. fib, anemia of chronic kidney disease    In person visit to check BNP, assess fluid status    Needs follow-up with cardiology         Relevant Orders    Ambulatory referral to Cardiology [facility]            Phone call duration: 17 minutes

## 2021-06-25 NOTE — PROGRESS NOTES
Assessment/ Plan    Problem List Items Addressed This Visit        High    Atrial fibrillation (H)    Diastolic Congestive Heart Failure - Primary    Relevant Orders    XR Chest 2 Views (Completed)    BNP(B-type Natriuretic Peptide) (Completed)       Unprioritized    Morbid obesity (H)     Enormous problem, contributing to dyspnea  Requesting wheelchair to help with mobility outside the home.    I stressed importance of maintaining mobility inside the home.  Wife called concerned that he is not walking at all.    Continue to make a plan, work with bariatrics  Prescription written for manual wheelchair           MACKEY (dyspnea on exertion)     Likely multifactorial  Obesity  Obesity causing restrictive lung disease  Congestive heart failure-diastolic dysfunction, mitral insufficiency, atrial fibrillation  BNP ordered  I considered PFTs but will forego these because I think the likelihood of primary pulmonary problem is low  Hemoglobin is slightly low likely due to renal disease but I do not think this is a big contributor to sensation of shortness of breath    Patient is to follow-up with cardiology, has been referred a number of times but still no appointment.  Stressed the importance of this and I sent a message to our schedulers    Consider increasing diuretic but he has chronic kidney disease stage IV.  He will be seeing nephrologist in the near future, this month           Relevant Orders    XR Chest 2 Views (Completed)    BNP(B-type Natriuretic Peptide) (Completed)          Subjective  CC:  Chief Complaint   Patient presents with     Follow-up     HPI:  65-year-old, history of super morbid obesity, complains of shortness of breath exertion that is gradually worsened over the last year and a half or so.  It sounds like there is been some waxing and waning but pretty much in a linear fashion.  Patient denies any pulmonary symptoms to go with this.  No significant cough or wheezing.  Has had severe lower extremity  edema with venous ulcers.  Thinks fluid retention is better than it has been recently but it is hard to tell where he is at in terms of water retention.    Wt Readings from Last 3 Encounters:   21 (!) 485 lb 3.2 oz (220.1 kg)   21 (!) 478 lb (216.8 kg)   21 (!) 476 lb 1.6 oz (216 kg)   Reviewing records, weight on 1/10/2019 was 480 pounds    Patient with following relevant history    Morbid obesity-remote vertical gastroplasty  Recently met with [facility] bariatrics, contemplating taking this down, aggressive dietary measures.    Mitral valve disease, atrial fibrillation, diastolic congestive heart failure, VSD  Patient has cardiologist who he has not seen in a long time.  Due for follow-up.  Appointment has not been made yet.    Chronic kidney disease, now stage IV    Venous stasis ulcers, lower extremities.  Followed by wound care.    Patient posttraumatic osteoarthritis and pseudogout of his knee.  This limits his walking.    Patient had requested wheelchair on telephone encounter with me.  Indicates that he does not want a scooter or power wheelchair but would like to have a manual wheelchair to be able to navigate stores and spend time outside of the house.  I had stressed importance of maintaining his walking for short distances.    Phone call from his wife today.  She apparently was planning to come with him but he snuck out.  She worries that he does not walk at all.  He mckeon his truck on the lawn, walks a few steps to the kitchen table, then goes up to bed.  If it was not for the steps he has to go up, she thinks he would have  by now.    PFSH:  Patient Active Problem List   Diagnosis     Lower Back Pain     Osteoarthritis Of The Knee     Hematuria     Glucose Intolerance     Essential hypertension     Anemia     Ventricular Septal Defect     Obesity (BMI 35.0-39.9 without comorbidity)     Obstructive Sleep Apnea     Pseudogout     Atrial fibrillation (H)     Diastolic  Congestive Heart Failure     Chronic Kidney Disease, Stage 3     Onychomycosis     Gout     Vitamin D deficiency     Healthcare maintenance     Compliance with medication regimen     Non-rheumatic mitral regurgitation     Cor pulmonale (H)     Obesity     Hyperglycemia      Venous stasis     Plantar fasciitis     Malignant neoplasm of sigmoid colon (H)     Arthritis of knee     Skin ulcer of left foot, limited to breakdown of skin (H)     Urge incontinence of urine     Decreased visual acuity     Morbid obesity (H)     Non-pressure chronic ulcer of left ankle limited to breakdown of skin (H)      Chronic venous hypertension (idiopathic) with ulcer of left lower extremity (H)     Secondary renal hyperparathyroidism (H)     MACKEY (dyspnea on exertion)     Current medications reviewed as follows:  Current Outpatient Medications on File Prior to Visit   Medication Sig     acetaminophen-codeine (TYLENOL #3) 300-30 mg per tablet Take 1-2 tablets by mouth every 6 (six) hours as needed for pain (maximum 8 pills/ day).     bumetanide (BUMEX) 1 MG tablet TAKE 3 TABLETS BY MOUTH TWICE DAILY AT 9AM AND 6PM     cholecalciferol, vitamin D3, (CHOLECALCIFEROL) 1,000 unit tablet Take 3 tablets (3,000 Units total) by mouth daily.     colchicine (COLCRYS) 0.6 mg tablet TAKE 1 TABLET(0.6 MG) BY MOUTH DAILY     COLCRYS 0.6 mg tablet TAKE 1 TABLET(0.6 MG) BY MOUTH DAILY     febuxostat (ULORIC) 80 mg Tab Take 1 tablet by mouth daily.     ferrous sulfate 325 (65 FE) MG tablet Take 1 tablet (325 mg total) by mouth daily with breakfast.     ferrous sulfate 325 (65 FE) MG tablet Take 1 tablet (325 mg total) by mouth daily with breakfast.     lidocaine 4 % patch Place 1 patch on the skin daily. Remove and discard patch with 12 hours or as directed by MD.     lisinopriL (PRINIVIL,ZESTRIL) 10 MG tablet TAKE 1 TABLET(10 MG) BY MOUTH DAILY     metoprolol tartrate (LOPRESSOR) 100 MG tablet Take 1 tablet (100 mg total) by mouth 2 (two) times a day.      warfarin ANTICOAGULANT (COUMADIN/JANTOVEN) 5 MG tablet Take 5 to 7.5mg (1 or 1.5 tabs) by mouth daily as directed.  Adjust dose based on INR.     Current Facility-Administered Medications on File Prior to Visit   Medication     lidocaine 2 % jelly (XYLOCAINE)     Social History     Tobacco Use   Smoking Status Never Smoker   Smokeless Tobacco Never Used     Social History     Social History Narrative     Not on file     Patient Care Team:  Ben Hamlin MD as PCP - General  Ben Hamlin MD as Assigned PCP  Xin Brush NP as Assigned Surgical Provider  ROS  Negative orthopnea/PND      Objective  Physical Exam  Vitals:    06/01/21 1522   BP: 95/64   Patient Site: Left Arm   Patient Position: Sitting   Cuff Size: Adult Large   Pulse: 68   Resp: 24   Weight: (!) 485 lb 3.2 oz (220.1 kg)     Body mass index is 55.36 kg/m .  In a wheelchair, very large, no acute distress.  Chest with decreased breath sounds, cardiovascular exam decreased heart tones, no murmurs, gallops, rubs.  Extremities are wrapped.  Diagnostics:   Results for orders placed or performed in visit on 06/01/21   BNP(B-type Natriuretic Peptide)   Result Value Ref Range     (H) 0 - 59 pg/mL   INR   Result Value Ref Range    INR 2.90 (H) 0.90 - 1.10           Please note: Voice recognition software was used in this dictation.  It may therefore contain typographical errors.

## 2021-06-25 NOTE — TELEPHONE ENCOUNTER
ANTICOAGULATION  MANAGEMENT    Assessment     Today's INR result of 2.9 is Therapeutic (goal INR of 2.0-3.0)        Warfarin taken as previously instructed    No new diet changes affecting INR    No new medication/supplements affecting INR    Continues to tolerate warfarin with no reported s/s of bleeding or thromboembolism     Previous INR was Therapeutic    Plan:     Spoke on phone with Panda regarding INR result and instructed:      Warfarin Dosing Instructions:  Continue current warfarin dose 7.5 mg daily on tue/thu; and 5 mg daily rest of week  (0 % change)    Instructed patient to follow up no later than: one month      Panda verbalizes understanding and agrees to warfarin dosing plan.    Instructed to call the [facility] for any changes, questions or concerns. (#[phone])   ?   Patricia Acosta RN    Subjective/Objective:      Panda Miranda, a 65 y.o. male is on warfarin. Panda Mosqueda reports:     Home warfarin dose: verbally confirmed home dose with Gume and updated on anticoagulation calendar     Missed doses: No     Medication changes:  No     S/S of bleeding or thromboembolism:  No     New Injury or illness:  No     Changes in diet or alcohol consumption:  No     Upcoming surgery, procedure or cardioversion:  No    Anticoagulation Episode Summary     Current INR goal:  2.0-3.0   TTR:  88.8 % (1 y)   Next INR check:  6/29/2021   INR from last check:  2.90 (6/1/2021)   Weekly max warfarin dose:     Target end date:  Indefinite   INR check location:     Preferred lab:     Send INR reminders to:  [facility]    Indications    A-fib (H) (Resolved) [I48.91]           Comments:           Anticoagulation Care Providers     Provider Role Specialty Phone number    Ben Hamlin MD Referring Family Medicine [phone]

## 2021-06-26 NOTE — PATIENT INSTRUCTIONS - HE
- Echocardiogram to check out your mitral valve and right heart failure    - Holter monitor to see what your heart rate is doing    - Take that second dose of water pill around dinner time    - See me in 2 months

## 2021-06-29 ENCOUNTER — RECORDS - HEALTHEAST (OUTPATIENT)
Dept: ADMINISTRATIVE | Facility: OTHER | Age: 66
End: 2021-06-29

## 2021-06-30 ENCOUNTER — COMMUNICATION - HEALTHEAST (OUTPATIENT)
Dept: ANTICOAGULATION | Facility: CLINIC | Age: 66
End: 2021-06-30

## 2021-06-30 ENCOUNTER — AMBULATORY - HEALTHEAST (OUTPATIENT)
Dept: LAB | Facility: CLINIC | Age: 66
End: 2021-06-30

## 2021-06-30 DIAGNOSIS — I48.91 ATRIAL FIBRILLATION, UNSPECIFIED TYPE (H): ICD-10-CM

## 2021-06-30 LAB — INR PPP: 2.8 (ref 0.9–1.1)

## 2021-06-30 NOTE — PROGRESS NOTES
Progress Notes by Xin Brush NP at 2/23/2021  8:40 AM     Author: Xin Brush NP Service: -- Author Type: Nurse Practitioner    Filed: 2/23/2021 11:28 AM Encounter Date: 2/23/2021 Status: Signed    : Xin Brush NP (Nurse Practitioner)               [facility] Consult Note    Date of Service:  2/23/2021    Requesting Provider: Dr. Ben Hamlin    Chief Complaint: BLE swelling and leg weeping    History of Present Illness: Panda Miranda is being seen at [facility] today regarding BLE swelling and leg weeping. They arrive to the clinic today alone. The patient reports that he developed swelling in the legs several years ago; believes this coincided with starting on norvasc. He developed weeping and wounds on/off over the past several years. His bumex was just increased from 3 tablets daily  To 6 tablets daily however he admits that he has not started this. Was currently/previously using neosporin and duoderm; changing daily due to amount of drainage. Reports pain of 5/10 pain in the wound; sharp; stabbing; unsure of what makes this worse; pain is worse at night; feels better when wrapped; currently using nothing for pain. Has used ace wraps as compression in the past, is currently using ace wraps for compression. Denies any fevers, chills, or generalized ill feeling.+ history of cancer colon cancer; underwent hemicolectomy in 2011; no chemo no radiation; no recurrence; just had repeat colonoscopy. Sleeps in a bed/recliner with legs elevated; he does occasionally has to hang the left leg over the side so it is dependent. Lives in a house with his wife. P Denies history of DVT, joint replacement, cellulitis and vein procedures. Had knee surgery on the right at age 17. NAHID was done 2/3/2021 and this was normal.    LOCATION: [facility]  DATE/TIME: 2/3/2021 12:16 PM     INDICATION: Non-pressure chronic ulcer of left ankle limited to  breakdown of skin  COMPARISON: None.  TECHNIQUE: Duplex utilizing 2D gray-scale imaging, Doppler interrogation with color-flow and spectral waveform analysis.     FINDINGS:   Pressure measurements in mmHg     RIGHT  Brachial: 111  Ankle (PT): 164, 1.48  Ankle (DP): 149, 1.34  Digit: 145, 1.31     LEFT  Brachial: 103  Ankle (PT): 160, 1.44  Ankle (DP): 152, 1.37  Digit: 87, 0.78     RIGHT LOWER EXTREMITY ARTERIAL ASSESSMENT:  External iliac artery 122 cm/s  Common femoral artery: 110 cm/s  Profunda femoris artery: 70 cm/s  SFA (proximal): 109 cm/s  SFA (mid): 112 cm/s  SFA (distal): 82 cm/s  Popliteal artery: 101 cm/s  Posterior tibial artery: 87 cm/s  Anterior tibial artery: 28 cm/s  Dorsalis pedis artery: 76 cm/s     LEFT LOWER EXTREMITY ARTERIAL ASSESSMENT:  External iliac artery 108 cm/s  Common femoral artery: 136 cm/s  Profunda femoris artery: 83 cm/s  SFA (proximal): 119 cm/s  SFA (mid): 89 cm/s  SFA (distal): 29 cm/s  Popliteal artery: 57 cm/s  Posterior tibial artery: 82 cm/s  Anterior tibial artery: 28 cm/s  Dorsalis pedis artery: 76 cm/s     DUPLEX: Diffuse atheromatous plaque. There are normal triphasic waveforms throughout the right and left lower extremities. No evidence of occlusion or significant stenosis.     IMPRESSION:   1.  Resting NAHID is 1.48. This may be falsely elevated due to arterial wall calcification. Right lower extremity duplex demonstrates normal waveforms through the right foot without significant stenosis or occlusion.  2.  Resting NAHID is 1.44. This may be falsely elevated due to arterial wall calcification. Left lower extremity duplex demonstrates normal waveforms through the left foot without CT evidence stenosis or occlusion.          Review of Systems:   Constitutional:  negative  for fever, chills or night sweats  EENTM: negative for glasses;  negative [place]  GI:  negative for nausea/vomiting;  negative for constipation  negative diarrhea;  negative for fecal incontinence negative  weight loss  :  negative dysuria, negative incontinence  MS: patient is not ambulatory;  does use assistive devices  Cardiac:  positive chf, afib on anticoagulation  Respiratory:  positive shortness of breath with exertion; +wilmer wears cpap  Endocrine:  negative diabetes  Psych:  negative depression/anxiety    Past Medical History:    Past Medical History:   Diagnosis Date   ? Arthritis    ? Arthropathy of wrist    ? Atrial fibrillation (H)    ? Cancer (H) 2011    colon cancer; hemicolectomy   ? CHF (congestive heart failure) (H)    ? Chronic kidney disease    ? Gout    ? Hand swelling    ? HTN (hypertension)    ? Obesity    ? WILMER (obstructive sleep apnea)     CPAP        Surgical History:   Past Surgical History:   Procedure Laterality Date   ? COLON SURGERY     ? COLONOSCOPY N/A 12/12/2019    Procedure: COLONOSCOPY;  Surgeon: Flaco Magaña MD;  Location: [facility];  Service: General   ? GASTROPLASTY VERTICAL BANDED      Dr. Arizmendi U [facility] 1996 Initial weight 800++ Lost to 440- (was 320# at lowest)   ? KNEE SURGERY     ? DE PART REMOVAL COLON W ANASTOMOSIS      Description: Partial Colectomy;  Recorded: 12/02/2011;  Comments: Dr Magaña        Medications:    Current Outpatient Medications:   ?  acetaminophen-codeine (TYLENOL #3) 300-30 mg per tablet, Take 1-2 tablets by mouth every 6 (six) hours as needed for pain (maximum 8 pills/ day)., Disp: 40 tablet, Rfl: 2  ?  amLODIPine (NORVASC) 5 MG tablet, Take 5 mg by mouth., Disp: , Rfl:   ?  bumetanide (BUMEX) 1 MG tablet, TAKE 3 TABLETS BY MOUTH TWICE DAILY AT 9AM AND 6PM, Disp: 540 tablet, Rfl: 2  ?  cholecalciferol, vitamin D3, (CHOLECALCIFEROL) 1,000 unit tablet, Take 3 tablets (3,000 Units total) by mouth daily., Disp: 270 tablet, Rfl: 3  ?  colchicine (COLCRYS) 0.6 mg tablet, TAKE 1 TABLET(0.6 MG) BY MOUTH DAILY, Disp: 90 tablet, Rfl: 3  ?  COLCRYS 0.6 mg tablet, TAKE 1 TABLET(0.6 MG) BY MOUTH DAILY, Disp: 90 tablet, Rfl: 3  ?  febuxostat  (ULORIC) 80 mg Tab, Take 1 tablet by mouth daily., Disp: 90 tablet, Rfl: 3  ?  ferrous sulfate 325 (65 FE) MG tablet, Take 1 tablet (325 mg total) by mouth daily with breakfast., Disp: 90 tablet, Rfl: 0  ?  ferrous sulfate 325 (65 FE) MG tablet, Take 1 tablet (325 mg total) by mouth daily with breakfast., Disp: 90 tablet, Rfl: 0  ?  lidocaine 4 % patch, Place 1 patch on the skin daily. Remove and discard patch with 12 hours or as directed by MD., Disp: 12 patch, Rfl: 0  ?  lisinopriL (PRINIVIL,ZESTRIL) 10 MG tablet, TAKE 1 TABLET(10 MG) BY MOUTH DAILY, Disp: 90 tablet, Rfl: 2  ?  metoprolol tartrate (LOPRESSOR) 50 MG tablet, TAKE 1 TABLET BY MOUTH DAILY., Disp: 90 tablet, Rfl: 3  ?  warfarin ANTICOAGULANT (COUMADIN/JANTOVEN) 5 MG tablet, Take 1-1.5 tablets (5-7.5 mg) daily as directed. Adjust dose per INR results., Disp: 120 tablet, Rfl: 1    Allergies: No Known Allergies    Family history:   Family History   Problem Relation Age of Onset   ? Diabetes type II Other    ? Heart failure Other    ? Heart disease Mother    ? Hypertension Mother    ? Diabetes Sister         Social History:   Social History     Socioeconomic History   ? Marital status:      Spouse name: Not on file   ? Number of children: Not on file   ? Years of education: Not on file   ? Highest education level: Not on file   Occupational History   ? Occupation:      Employer: RETIRED   Social Needs   ? Financial resource strain: Not on file   ? Food insecurity     Worry: Not on file     Inability: Not on file   ? Transportation needs     Medical: Not on file     Non-medical: Not on file   Tobacco Use   ? Smoking status: Never Smoker   ? Smokeless tobacco: Never Used   Substance and Sexual Activity   ? Alcohol use: Yes     Alcohol/week: 0.8 standard drinks     Types: 1 Standard drinks or equivalent per week     Comment: occasional   ? Drug use: No   ? Sexual activity: Not on file   Lifestyle   ? Physical activity     Days per week: Not  "on file     Minutes per session: Not on file   ? Stress: Not on file   Relationships   ? Social connections     Talks on phone: Not on file     Gets together: Not on file     Attends Confucianist service: Not on file     Active member of club or organization: Not on file     Attends meetings of clubs or organizations: Not on file     Relationship status: Not on file   ? Intimate partner violence     Fear of current or ex partner: Not on file     Emotionally abused: Not on file     Physically abused: Not on file     Forced sexual activity: Not on file   Other Topics Concern   ? Not on file   Social History Narrative   ? Not on file        Physical Exam  Vitals: Blood pressure 116/62, pulse 84, temperature 98.8  F (37.1  C), resp. rate 20, height 6' 6\" (1.981 m), weight (!) 476 lb 1.6 oz (216 kg).  General: This is a 65 y.o. male who appears their reported age, not in acute distress  Head: normocephalic, Atraumatic; wearing glasses; non-icteric sclera; no exudate; mild hearing loss exam difficult due to body habitus  Respiratory: Clear but diminished throughout all lung fields; slightly labored breathing; no cough  Cardiac: Irregularly irregular, Rate and Rhythm, no murmurs appreciated   Skin: Uniformly warm and dry  Psych: Alert and oriented x4; calm and cooperative throughout exam  Abdomen: Normal bowel sounds. Morbid obesity; exam limitedSoft, symmetric, no guarding or rigidity, nontender with palpation.  No organomegaly or masses palpated.   Lymphatics: DARLENE due to body habitus  Extremities: BLE with weeping; brawny edema; tissues are firm and fibrotic; the left lateral leg with full thickness ulcer which is slough covered; there his heavy yellow drainage; no pain; strength testing revealed 4/4 to BLEs.    Sensation: Decreased to pinprick and light touch in a stocking distribution bilaterally     Peripheral Vascular: normal dorsalis pedis, posterior tibial pulses to bilateral feet , using a handheld doppler these were " strong biphasic in nature.  Good capillary refill. No unusual venous distention. Positive for hemosiderin deposition or hyperpigmentation and fibrosis or scarring  Negative for spider veins and telangiectasias      Circumferential volume measures:    Vasc Edema 2/23/2021   Right just above MTP 29.2   Right Ankle 26.6   Right Widest Calf 46   Left - just above MTP 29.2   Left Ankle 27   Left Widest Calf 46.8   Left Thigh Up 10cm .       Ulceration(s)/Wound(s):     VASC Wound 02/23/21 Lt leg (Active)   Pre Size Length 3.5 02/23/21 0800   Pre Size Width 3.5 02/23/21 0800   Pre Size Depth 0.1 02/23/21 0800   Pre Total Sq cm 12.25 02/23/21 0800       Wound 04/16/15 Other (Comment) (Active)       Laboratory studies:   Lab Results   Component Value Date    SEDRATE 33 (H) 03/09/2016     Lab Results   Component Value Date    CREATININE 2.46 (H) 01/26/2021     Lab Results   Component Value Date    HGBA1C 5.5 11/20/2019     Lab Results   Component Value Date    BUN 40 (H) 01/26/2021     Lab Results   Component Value Date    ALBUMIN 4.0 11/20/2019     Vitamin D, Total (25-Hydroxy)   Date Value Ref Range Status   11/20/2019 21.4 (L) 30.0 - 80.0 ng/mL Final             Impression:   1. Venous stasis ulcer of left lower leg with edema of left lower leg (H)  Culture/Gram Stain: Wound   2. Dependent edema     3. Venous hypertension of both lower extremities     4. Morbid obesity with BMI of 50.0-59.9, adult (H)     5. Stage 3b chronic kidney disease     6. Chronic diastolic heart failure (H)     7. Vitamin D deficiency     8. Venous stasis     2/23/2021 right leg    2/23/2021 left leg    2/23/2021 left lateral leg          Assessment/Plan:  1. Debridement: After discussion of risk factors and verbal consent was obtained 2% Lidocaine HCL jelly was applied, under clean conditions, the left lateral  ulceration(s) were debrided using currette. Devitalized and nonviable tissue, along with any fibrin and slough, was removed to improve  granulation tissue formation, stimulate wound healing, decrease overall bacteria load, disrupt biofilm formation and decrease edge senescence.  Total excisional debridement was 12.25 sq cm from the epidermis/dermis area and into the subcutaneous tissue with a depth of 0.1 cm.   Ulcers were improved afterwards and .  Measures were unchanged after debridement.    2. Treatment: wound treatment will include irrigation and dressings to promote autolytic debridement which will include: A barkley culture was taken today; will await sensitivities before prescribing antibiotics at this time; we will call patient with results. Pt did not feel he could do the wound cares; will order home care; will use silvercel; bordered foam; rolled gauze; change daily due to drainage amounts; NAHID was normal; due to weight and immobility will hold off on venous insufficiency ultrasound at this time    3. Edema. We spoke at length regarding their swelling, potential causes, and treatment options. Answered all questions. Their swelling is likely multifactorial including but not limited to the following: their weight, dependency of the limbs for most hours of the day, reduced activity with reduced calf pump mechanism, congestive heart failure, kidney disease, venous hypertension, valvular incompetence, high sodium diet, hypoabluminemia and side effect of certain medications. I would like to first reduce their swelling down using short stretch layered compression wraps and then transition them into compression garments; such as compression stockings or velcro wraps. The patient should continue to elevate their legs periodically throughout the day. Continue to take their diuretics per their PMD recommendations. I encouraged him to begin taking him diuretics as prescribed by his PCP. I will send message to PCP re: norvasc and see if alternative can be used         4. Offloading: na    5. Nutrition: noted to have historically low vitamin d;  recommended to take 2000 units daily    6. CKD: has upcoming appt with nephrology    Patient to return to clinic in 4 week(s) for re-evaluation. They were instructed to call the clinic sooner with any further questions or concerns. Answered all questions.              Xin Brush DNP, RN, CNP, CWOCN  [facility]  [phone]      This note was electronically signed by Xin Brush

## 2021-06-30 NOTE — PROGRESS NOTES
Progress Notes by Xin Brush NP at 4/26/2021  8:20 AM     Author: Xin Brush NP Service: -- Author Type: Nurse Practitioner    Filed: 4/26/2021 10:56 AM Encounter Date: 4/26/2021 Status: Signed    : Xin Brush NP (Nurse Practitioner)                   Follow up Vascular Visit       Date of Service:4/26/2021    Date Last Seen: 3/31/2021; 3/31/2021    Chief Complaint: LLE ulcers; chronic; BLE edema; chronic; new right leg ulcers        Pt returns to [facility] with regards to their  LLE ulcers; chronic; BLE edema; chronic; new right leg ulcers.  They arrive today with long time friend. They are currently using nothing to the wounds. He decided to experiment and leave all the wounds open to the air as he felt they were not getting better with the dressing; he arrives today with blood on his socks and pants. He has developed new wounds over the past 2 weeks. He also stopped wearing his compression. He was supposed to be using silvercel; gauze; rolled gauze and short stretch. I have seen the patient 3 times and at each visit he is not wearing dressings or compression appropriately. At his last visit we did a biopsy and this demonstrated venous stasis. He had NAHID done and this was WNL. They are feeling well today. Denies fevers, chills. No shortness of breath. He is now taking his bumex two times a day as prescribed. He has history of lap band weight loss surgery; he is interested in talking with weight loss doctor to see if anything else can be done. Last culture was negative.     Allergies: Patient has no known allergies.    Medications:   Current Outpatient Medications:   ?  acetaminophen-codeine (TYLENOL #3) 300-30 mg per tablet, Take 1-2 tablets by mouth every 6 (six) hours as needed for pain (maximum 8 pills/ day)., Disp: 40 tablet, Rfl: 2  ?  bumetanide (BUMEX) 1 MG tablet, TAKE 3 TABLETS BY MOUTH TWICE DAILY AT 9AM AND 6PM, Disp: 540 tablet, Rfl: 2  ?  cholecalciferol,  vitamin D3, (CHOLECALCIFEROL) 1,000 unit tablet, Take 3 tablets (3,000 Units total) by mouth daily., Disp: 270 tablet, Rfl: 3  ?  colchicine (COLCRYS) 0.6 mg tablet, TAKE 1 TABLET(0.6 MG) BY MOUTH DAILY, Disp: 90 tablet, Rfl: 3  ?  COLCRYS 0.6 mg tablet, TAKE 1 TABLET(0.6 MG) BY MOUTH DAILY, Disp: 90 tablet, Rfl: 3  ?  febuxostat (ULORIC) 80 mg Tab, Take 1 tablet by mouth daily., Disp: 90 tablet, Rfl: 3  ?  ferrous sulfate 325 (65 FE) MG tablet, Take 1 tablet (325 mg total) by mouth daily with breakfast., Disp: 90 tablet, Rfl: 0  ?  ferrous sulfate 325 (65 FE) MG tablet, Take 1 tablet (325 mg total) by mouth daily with breakfast., Disp: 90 tablet, Rfl: 0  ?  lidocaine 4 % patch, Place 1 patch on the skin daily. Remove and discard patch with 12 hours or as directed by MD., Disp: 12 patch, Rfl: 0  ?  lisinopriL (PRINIVIL,ZESTRIL) 10 MG tablet, TAKE 1 TABLET(10 MG) BY MOUTH DAILY, Disp: 90 tablet, Rfl: 3  ?  metoprolol tartrate (LOPRESSOR) 100 MG tablet, Take 1 tablet (100 mg total) by mouth 2 (two) times a day., Disp: 60 tablet, Rfl: 3  ?  warfarin ANTICOAGULANT (COUMADIN/JANTOVEN) 5 MG tablet, Take 5 to 7.5mg (1 or 1.5 tabs) by mouth daily as directed.  Adjust dose based on INR., Disp: 110 tablet, Rfl: 1    Current Facility-Administered Medications:   ?  lidocaine 2 % jelly (XYLOCAINE), , Topical, PRN, Xin Brush NP, 1 application at 03/29/21 0819    History:   Past Medical History:   Diagnosis Date   ? Arthritis    ? Arthropathy of wrist    ? Atrial fibrillation (H)    ? Cancer (H) 2011    colon cancer; hemicolectomy   ? CHF (congestive heart failure) (H)    ? Chronic kidney disease    ? Gout    ? Hand swelling    ? HTN (hypertension)    ? Obesity    ? TASHA (obstructive sleep apnea)     CPAP       Physical Exam:    /78   Pulse 76   Temp 99.2  F (37.3  C)   Resp 20     General:  Patient presents to clinic in no apparent distress.  Head: normocephalic atraumatic  Psychiatric:  Alert and oriented x3.    Respiratory: unlabored breathing; no cough  Integumentary:  Skin is uniformly warm, dry and pink.    Wound #1 Location: left lateral leg  Size: 5L x 4.2W x 0.1depth.  No sinus tract present, Wound base: slough  No undermining present. Wound is full thickness. There is moderate drainage. Periwound: no denudement, erythema, induration, maceration or warmth.        Wound #2 Location: left medial leg  Size: 0.5x0.5x 0.1depth.  No sinus tract present, Wound base: slough  No undermining present. Wound is full thickness. There is moderate drainage. Periwound: no denudement, erythema, induration, maceration or warmth.          Wound #3 Location: right knee Size:5x1.3x0.1cm depth.  No sinus tract present, Wound base: slough  No undermining present. Wound is full thickness. There is moderate drainage. Periwound: no denudement, erythema, induration, maceration or warmth.        Wound #4 Location: right lateral leg Size: 1x0.5x 0.1depth.  No sinus tract present, Wound base: slough  No undermining present. Wound is full thickness. There is moderate drainage. Periwound: no denudement, erythema, induration, maceration or warmth.        Circumferential volume measures:    Vasc Edema 2/23/2021 3/29/2021 4/26/2021   Right just above MTP 29.2 28.8 28.8   Right Ankle 26.6 25.7 26.6   Right Widest Calf 46 45 46.5   Left - just above MTP 29.2 29.8 29.5   Left Ankle 27 27.2 27   Left Widest Calf 46.8 47.4 46.2   Left Thigh Up 10cm . - -       Ulceration(s)/Wound(s):     VASC Wound 02/23/21 Lt leg (Active)   Pre Size Length 5 04/26/21 0800   Pre Size Width 4.2 04/26/21 0800   Pre Size Depth 0.1 04/26/21 0800   Pre Total Sq cm 21 04/26/21 0800       VASC Wound 03/29/21 right below knee (Active)   Pre Size Length 5 04/26/21 0800   Pre Size Width 1.3 04/26/21 0800   Pre Size Depth 0.1 04/26/21 0800   Pre Total Sq cm 6.5 04/26/21 0800       VASC Wound 04/26/21 LT MEDIAL LOWER LEG (Active)   Pre Size Length 0.5 04/26/21 0800   Pre Size Width  0.5 04/26/21 0800   Pre Size Depth 0.1 04/26/21 0800   Pre Total Sq cm 0.25 04/26/21 0800       VASC Wound 04/26/21 RT LATERAL LEG (Active)   Pre Size Length 1 04/26/21 0800   Pre Size Width 0.5 04/26/21 0800   Pre Size Depth 0.1 04/26/21 0800   Pre Total Sq cm 0.5 04/26/21 0800       Wound 04/16/15 Other (Comment) (Active)        Lab Values    Lab Results   Component Value Date    SEDRATE 33 (H) 03/09/2016     Lab Results   Component Value Date    CREATININE 2.46 (H) 01/26/2021     Lab Results   Component Value Date    HGBA1C 5.5 11/20/2019     Lab Results   Component Value Date    BUN 40 (H) 01/26/2021     Lab Results   Component Value Date    ALBUMIN 4.0 11/20/2019     Vitamin D, Total (25-Hydroxy)   Date Value Ref Range Status   11/20/2019 21.4 (L) 30.0 - 80.0 ng/mL Final             Impression:  1. Venous stasis ulcer of left lower leg with edema of left lower leg (H)     2. Dependent edema     3. Venous hypertension of both lower extremities     4. Morbid obesity with BMI of 50.0-59.9, adult (H)     5. Stage 3b chronic kidney disease     6. Chronic diastolic heart failure (H)     7. Vitamin D deficiency     8. Venous stasis     9. Venous stasis ulcer of other part of right lower leg with fat layer exposed without varicose veins (H)                          Are any of these wounds new today: Yes; Location: BLE    Assessment/Plan:          1. Debridement: After discussion of risk factors and verbal consent was obtained 2% Lidocaine HCL jelly was applied, under clean conditions, the BLE ulceration(s) were debrided using currette. Devitalized and nonviable tissue, along with any fibrin and slough, was removed to improve granulation tissue formation, stimulate wound healing, decrease overall bacteria load, disrupt biofilm formation and decrease edge senescence.  Total excisional debridement was 55.55 sq cm from the epidermis/dermis area and into the subcutaneous tissue with a depth of 0.1 cm.   Ulcers were improved  afterwards and .  Measures were unchanged after debridement.       2.  Wound treatment: wound treatment will include irrigation and dressings to promote autolytic debridement which will include:wounds are worse and now has new wounds; I again explained why the wounds need to be covered; need to have warm moist healing environment; pt has been resistant to home care in the past; he is clearing failing on his own; he was more open to this; will order; however they are booked out 1 month so he will have to come to clinic twice per week until then; will use silvercel to the wound; biopsy was negative for neoplasm; culture was negative Worsened            3. Edema: now taking his bumex as prescribed; however he is not wearing compression again today; again explained that without adequate compression these wounds will never heal and will continue to progress; due to his body habitus he is not able to get to the feet; we will wrap him in 2 layers; because home care is booked out 1 month; will have him come to the clinic twice per week until home care is able to start. The compression wraps were applied today in clinic. Worsened            4. Nutrition: was willing to speak with bariatrician again about possibility of donna en y without significant weight loss this will be a continued and progressive problem           5. Offloading: avoid pressure over wound areas     Patient will follow up with me in 4 weeks for reevaluation. They were instructed to call the clinic sooner with any signs or symptoms of infection or any further questions/concerns. Answered all questions.          Xin Brush DNP, RN, CNP, CWOCN, CFCN, CLT  [facility]   [phone]        This note was electronically signed by Xin Brush

## 2021-06-30 NOTE — PROGRESS NOTES
Progress Notes by Kathleen Yang RN at 5/6/2021  9:00 AM     Author: Kathleen Yang RN Service: -- Author Type: Registered Nurse    Filed: 5/6/2021 10:14 AM Encounter Date: 5/6/2021 Status: Signed    : Kathleen Yang RN (Registered Nurse)           Left lateral leg 5/6        Right lateral leg 5/6        Right leg upper (below knee) 5/6    Nurse Visit    Chief Complaint: Patient presents to clinic for assessment and treatment of their bilateral leg wounds and bilateral leg swelling    Dressing on Arrival: 2-layer bilaterally C/D/I    Patient came in today for dressing change and 2 layer rewrap per order from Xin Brush CNP. Patient rated pain 0 out of 10. Removed dressings and cleansed skin with soap and cleaned wound bed with hibilclens. Applied lotion to intact skin.  Applied silver alginate to wound and covered with ABD pad. Rewrapped 2 layer compression wrap to bilateral legs. Patient tolerated dressing change well. Home care is tentatively scheduled to begin on 5/9/21. Provided phone number for patient to call home care if he does not hear from them in the next 2-3 days.       Allergies: No Known Allergies    Medications:   Current Outpatient Medications:   ?  acetaminophen-codeine (TYLENOL #3) 300-30 mg per tablet, Take 1-2 tablets by mouth every 6 (six) hours as needed for pain (maximum 8 pills/ day)., Disp: 40 tablet, Rfl: 2  ?  bumetanide (BUMEX) 1 MG tablet, TAKE 3 TABLETS BY MOUTH TWICE DAILY AT 9AM AND 6PM, Disp: 540 tablet, Rfl: 2  ?  cholecalciferol, vitamin D3, (CHOLECALCIFEROL) 1,000 unit tablet, Take 3 tablets (3,000 Units total) by mouth daily., Disp: 270 tablet, Rfl: 3  ?  colchicine (COLCRYS) 0.6 mg tablet, TAKE 1 TABLET(0.6 MG) BY MOUTH DAILY, Disp: 90 tablet, Rfl: 3  ?  COLCRYS 0.6 mg tablet, TAKE 1 TABLET(0.6 MG) BY MOUTH DAILY, Disp: 90 tablet, Rfl: 3  ?  febuxostat (ULORIC) 80 mg Tab, Take 1 tablet by mouth daily., Disp: 90 tablet, Rfl: 3  ?  ferrous sulfate 325 (65 FE) MG tablet,  Take 1 tablet (325 mg total) by mouth daily with breakfast., Disp: 90 tablet, Rfl: 0  ?  ferrous sulfate 325 (65 FE) MG tablet, Take 1 tablet (325 mg total) by mouth daily with breakfast., Disp: 90 tablet, Rfl: 0  ?  lidocaine 4 % patch, Place 1 patch on the skin daily. Remove and discard patch with 12 hours or as directed by MD., Disp: 12 patch, Rfl: 0  ?  lisinopriL (PRINIVIL,ZESTRIL) 10 MG tablet, TAKE 1 TABLET(10 MG) BY MOUTH DAILY, Disp: 90 tablet, Rfl: 3  ?  metoprolol tartrate (LOPRESSOR) 100 MG tablet, Take 1 tablet (100 mg total) by mouth 2 (two) times a day., Disp: 60 tablet, Rfl: 3  ?  warfarin ANTICOAGULANT (COUMADIN/JANTOVEN) 5 MG tablet, Take 5 to 7.5mg (1 or 1.5 tabs) by mouth daily as directed.  Adjust dose based on INR., Disp: 110 tablet, Rfl: 1    Current Facility-Administered Medications:   ?  lidocaine 2 % jelly (XYLOCAINE), , Topical, PRN, Xin Brush NP, Given at 04/26/21 0820    Vital Signs: /78 (Patient Site: Left Arm, Patient Position: Semi-felix, Cuff Size: Adult Large)   Pulse 78   Temp 97.6  F (36.4  C) (Temporal)   Resp 18       Assessment:    General:  Patient presents to clinic in no apparent distress.  Psychiatric:  Alert and oriented .   Lower extremity:  edema is present.    Integumentary:  Skin is uniformly warm, dry and pink.    Wound size:   VASC Wound 02/23/21 Lt leg (Active)   Pre Size Length 4 05/06/21 1000   Pre Size Width 3.4 05/06/21 1000   Pre Size Depth 0.1 05/06/21 1000   Pre Total Sq cm 13.6 05/06/21 1000       VASC Wound 03/29/21 right below knee (Active)   Pre Size Length 3.8 05/06/21 1000   Pre Size Width 1.2 05/06/21 1000   Pre Size Depth 0.1 05/06/21 1000   Pre Total Sq cm 4.56 05/06/21 1000       VASC Wound 04/26/21 LT MEDIAL LOWER LEG (Active)   Pre Size Length 0.3 04/29/21 0900   Pre Size Width 0.3 04/29/21 0900   Pre Size Depth 0.1 04/29/21 0900   Pre Total Sq cm 0.09 04/29/21 0900       VASC Wound 04/26/21 RT LATERAL LEG (Active)   Pre Size  Length 0.3 21 1000   Pre Size Width 0.4 21 1000   Pre Size Depth 0.1 21 1000   Pre Total Sq cm 0.12 21 1000       Wound 04/16/15 Other (Comment) (Active)      Undermining is not present.    The periwoundskin is WNL          Plan:         1. Patient will follow up on 21 with Xin Brush CNP. Home care to start on 21. Provided number to call if he does not hear from them by 21.         2. Treatment provided will include irrigation and dressings to promote autolytic debridement and will be as listed below     Cleansed with: Dilute Hibiclens    Protected skin with: Remedy Skin Repair    Dressings Applied: silvercel, ABD, roll gauze,    Compression Applied to the Left Le-Layer Coban      2-Layer Coban:Applied the inner foam layer with the foot dorsiflexed and starting atthe base of the fifth metatarsal head. Leaving the bottom of the heel exposed, proceed by winding the foam up the leg using minimaloverlap to just below the fibular head. Apply the compression layer with the foot dorsiflexed and startingat the base of the fifth metatarsal head. Apply at full stretch and proceed up the leg using 50% overlap. The bottom of the heel is covered with the compression layer. The end at the fibular head or just below the back of the knee and levelwith the top edge of the foam layer.  Gently pressed and conformed the entire surface of the system to ensurethat the two layers are firmly bound together    Compression Applied to the Right Le-Layer Coban    2-Layer Coban:Applied the inner foam layer with the foot dorsiflexed and starting atthe base of the fifth metatarsal head. Leaving the bottom of the heel exposed, proceed by winding the foam up the leg using minimaloverlap to just below the fibular head. Apply the compression layer with the foot dorsiflexed and startingat the base of the fifth metatarsal head. Apply at full stretch and proceed up the leg using 50% overlap. The  bottom of the heel is covered with the compression layer. The end at the fibular head or just below the back of the knee and levelwith the top edge of the foam layer.  Gently pressed and conformed the entire surface of the system to ensurethat the two layers are firmly bound together    Offloading applied: None  Trial Products: no  Provider notified regarding concerns: no  Treatment Changes: no    Educational Barriers: none  Taught Regarding: Activity, Compliance, Compression, Dressing, Hygiene and Infection  Teaching Method: Explanation and Handout

## 2021-06-30 NOTE — PROGRESS NOTES
Progress Notes by Kathleen Yang RN at 5/3/2021  9:00 AM     Author: Kathleen Yang RN Service: -- Author Type: Registered Nurse    Filed: 5/3/2021 10:03 AM Encounter Date: 5/3/2021 Status: Signed    : Kathleen Yang RN (Registered Nurse)           Bilateral legs 5/3        Right lateral leg 5/3        Right leg (under knee) 5/3        Left lateral leg 5/3    Nurse Visit    Chief Complaint: Patient presents to clinic for assessment and treatment of their bilateral leg ulcers and bilateral lower extremity swelling.    Dressing on Arrival: 2-layer C/D/I    Patient came in today for dressing change and 2 layer rewrap per order from Xin Brush CNP. Patient rated pain 1 out of 10. Removed dressings and cleansed skin with soap and cleaned wound bed with hibilcens. Applied lotion to intact skin.  Applied silver algiante to wound and covered with ABD and roll gauze. Rewrapped 2 layer compression wrap to bilateral legs. Patient tolerated dressing change well.     Allergies: No Known Allergies    Medications:   Current Outpatient Medications:   ?  acetaminophen-codeine (TYLENOL #3) 300-30 mg per tablet, Take 1-2 tablets by mouth every 6 (six) hours as needed for pain (maximum 8 pills/ day)., Disp: 40 tablet, Rfl: 2  ?  bumetanide (BUMEX) 1 MG tablet, TAKE 3 TABLETS BY MOUTH TWICE DAILY AT 9AM AND 6PM, Disp: 540 tablet, Rfl: 2  ?  cholecalciferol, vitamin D3, (CHOLECALCIFEROL) 1,000 unit tablet, Take 3 tablets (3,000 Units total) by mouth daily., Disp: 270 tablet, Rfl: 3  ?  colchicine (COLCRYS) 0.6 mg tablet, TAKE 1 TABLET(0.6 MG) BY MOUTH DAILY, Disp: 90 tablet, Rfl: 3  ?  COLCRYS 0.6 mg tablet, TAKE 1 TABLET(0.6 MG) BY MOUTH DAILY, Disp: 90 tablet, Rfl: 3  ?  febuxostat (ULORIC) 80 mg Tab, Take 1 tablet by mouth daily., Disp: 90 tablet, Rfl: 3  ?  ferrous sulfate 325 (65 FE) MG tablet, Take 1 tablet (325 mg total) by mouth daily with breakfast., Disp: 90 tablet, Rfl: 0  ?  ferrous sulfate 325 (65 FE) MG tablet,  Take 1 tablet (325 mg total) by mouth daily with breakfast., Disp: 90 tablet, Rfl: 0  ?  lidocaine 4 % patch, Place 1 patch on the skin daily. Remove and discard patch with 12 hours or as directed by MD., Disp: 12 patch, Rfl: 0  ?  lisinopriL (PRINIVIL,ZESTRIL) 10 MG tablet, TAKE 1 TABLET(10 MG) BY MOUTH DAILY, Disp: 90 tablet, Rfl: 3  ?  metoprolol tartrate (LOPRESSOR) 100 MG tablet, Take 1 tablet (100 mg total) by mouth 2 (two) times a day., Disp: 60 tablet, Rfl: 3  ?  warfarin ANTICOAGULANT (COUMADIN/JANTOVEN) 5 MG tablet, Take 5 to 7.5mg (1 or 1.5 tabs) by mouth daily as directed.  Adjust dose based on INR., Disp: 110 tablet, Rfl: 1    Current Facility-Administered Medications:   ?  lidocaine 2 % jelly (XYLOCAINE), , Topical, PRN, Xin Brush NP, Given at 04/26/21 0820    Vital Signs: /78 (Patient Site: Left Arm, Patient Position: Semi-felix, Cuff Size: Adult Large)   Pulse 80   Temp 98  F (36.7  C) (Temporal)   Resp 18       Assessment:    General:  Patient presents to clinic in no apparent distress.  Psychiatric:  Alert and oriented .   Lower extremity:  edema is present.    Integumentary:  Skin is uniformly warm, dry and pink.    Wound size:   VASC Wound 02/23/21 Lt leg (Active)   Pre Size Length 4.6 05/03/21 0900   Pre Size Width 3.4 05/03/21 0900   Pre Size Depth 0.1 05/03/21 0900   Pre Total Sq cm 15.64 05/03/21 0900       VASC Wound 03/29/21 right below knee (Active)   Pre Size Length 4 05/03/21 0900   Pre Size Width 1.4 05/03/21 0900   Pre Size Depth 0.1 05/03/21 0900   Pre Total Sq cm 5.6 05/03/21 0900       VASC Wound 04/26/21 LT MEDIAL LOWER LEG (Active)   Pre Size Length 0.3 04/29/21 0900   Pre Size Width 0.3 04/29/21 0900   Pre Size Depth 0.1 04/29/21 0900   Pre Total Sq cm 0.09 04/29/21 0900       VASC Wound 04/26/21 RT LATERAL LEG (Active)   Pre Size Length 0.5 05/03/21 0900   Pre Size Width 0.5 05/03/21 0900   Pre Size Depth 0.1 05/03/21 0900   Pre Total Sq cm 0.25 05/03/21 0900        Wound 04/16/15 Other (Comment) (Active)      Undermining is not present.    The periwoundskin is WNL        Plan:         1. Patient will follow up on Thursday for nurse visit/dressing change         2. Treatment provided will include irrigation and dressings to promote autolytic debridement and will be as listed below     Cleansed with: Dilute Hibiclens    Protected skin with: Remedy Skin Repair    Dressings Applied: silver alginate/ ABD, roll gauze    Compression Applied to the Left Le-Layer Coban      2-Layer Coban:Applied the inner foam layer with the foot dorsiflexed and starting atthe base of the fifth metatarsal head. Leaving the bottom of the heel exposed, proceed by winding the foam up the leg using minimaloverlap to just below the fibular head. Apply the compression layer with the foot dorsiflexed and startingat the base of the fifth metatarsal head. Apply at full stretch and proceed up the leg using 50% overlap. The bottom of the heel is covered with the compression layer. The end at the fibular head or just below the back of the knee and levelwith the top edge of the foam layer.  Gently pressed and conformed the entire surface of the system to ensurethat the two layers are firmly bound together    Compression Applied to the Right Le-Layer Coban    2-Layer Coban:Applied the inner foam layer with the foot dorsiflexed and starting atthe base of the fifth metatarsal head. Leaving the bottom of the heel exposed, proceed by winding the foam up the leg using minimaloverlap to just below the fibular head. Apply the compression layer with the foot dorsiflexed and startingat the base of the fifth metatarsal head. Apply at full stretch and proceed up the leg using 50% overlap. The bottom of the heel is covered with the compression layer. The end at the fibular head or just below the back of the knee and levelwith the top edge of the foam layer.  Gently pressed and conformed the entire surface of the  system to ensurethat the two layers are firmly bound together    Offloading applied: None  Trial Products: no  Provider notified regarding concerns: no  Treatment Changes: no    Educational Barriers: none  Taught Regarding: Activity, Compliance, Compression and Dressing  Teaching Method: Explanation and Handout

## 2021-06-30 NOTE — PROGRESS NOTES
"Progress Notes by Xin Brush NP at 5/24/2021  9:20 AM     Author: Xin Brush NP Service: -- Author Type: Nurse Practitioner    Filed: 5/24/2021  5:32 PM Encounter Date: 5/24/2021 Status: Signed    : Xin Brush NP (Nurse Practitioner)                   Follow up Vascular Visit       Date of Service:5/24/2021    Date Last Seen: 5/18/2021; 5/18/2021    Chief Complaint: BLE ulcers and swelling; chronic        Pt returns to [facility] with regards to their BLE ulcers and swelling; chronic.  They arrive today with his best friend. They are currently using silvercel; gauze to the wounds. This is being done by clinic staff; then we ordered home care; he was not happy with home care and \"fired them\". States that they did not come out in a timely fashion.  They are using ace wraps for compression; this was not ordered; was ordered to have 2 layers bilaterally. They are feeling well today. Denies fevers, chills. No shortness of breath. He met with bariatrician; they told him that the gastric ring is malpositioned and he will be meeting with a surgeon next month to get this removed. They are unable to do further surgery as it is too high risk. He also has an upcoming appt with a dietician to get a meal plan set up for weight loss. We did a biopsy on the left ankle wound consistent with venous stasis. Nutritional labs done and were normal.  NAHID done 2/2021 and was normal. Venous studies were not done due to body habitus.     Allergies: Patient has no known allergies.    Medications:   Current Outpatient Medications:   ?  acetaminophen-codeine (TYLENOL #3) 300-30 mg per tablet, Take 1-2 tablets by mouth every 6 (six) hours as needed for pain (maximum 8 pills/ day)., Disp: 40 tablet, Rfl: 2  ?  bumetanide (BUMEX) 1 MG tablet, TAKE 3 TABLETS BY MOUTH TWICE DAILY AT 9AM AND 6PM, Disp: 540 tablet, Rfl: 2  ?  cholecalciferol, vitamin D3, (CHOLECALCIFEROL) 1,000 unit tablet, Take 3 tablets " (3,000 Units total) by mouth daily., Disp: 270 tablet, Rfl: 3  ?  colchicine (COLCRYS) 0.6 mg tablet, TAKE 1 TABLET(0.6 MG) BY MOUTH DAILY, Disp: 90 tablet, Rfl: 3  ?  COLCRYS 0.6 mg tablet, TAKE 1 TABLET(0.6 MG) BY MOUTH DAILY, Disp: 90 tablet, Rfl: 3  ?  febuxostat (ULORIC) 80 mg Tab, Take 1 tablet by mouth daily., Disp: 90 tablet, Rfl: 3  ?  ferrous sulfate 325 (65 FE) MG tablet, Take 1 tablet (325 mg total) by mouth daily with breakfast., Disp: 90 tablet, Rfl: 0  ?  ferrous sulfate 325 (65 FE) MG tablet, Take 1 tablet (325 mg total) by mouth daily with breakfast., Disp: 90 tablet, Rfl: 0  ?  lidocaine 4 % patch, Place 1 patch on the skin daily. Remove and discard patch with 12 hours or as directed by MD., Disp: 12 patch, Rfl: 0  ?  lisinopriL (PRINIVIL,ZESTRIL) 10 MG tablet, TAKE 1 TABLET(10 MG) BY MOUTH DAILY, Disp: 90 tablet, Rfl: 3  ?  metoprolol tartrate (LOPRESSOR) 100 MG tablet, Take 1 tablet (100 mg total) by mouth 2 (two) times a day., Disp: 60 tablet, Rfl: 3  ?  warfarin ANTICOAGULANT (COUMADIN/JANTOVEN) 5 MG tablet, Take 5 to 7.5mg (1 or 1.5 tabs) by mouth daily as directed.  Adjust dose based on INR., Disp: 110 tablet, Rfl: 1    Current Facility-Administered Medications:   ?  lidocaine 2 % jelly (XYLOCAINE), , Topical, PRN, Xin Brush NP, Given at 04/26/21 0820    History:   Past Medical History:   Diagnosis Date   ? Arthritis    ? Arthropathy of wrist    ? Atrial fibrillation (H)    ? Cancer (H) 2011    colon cancer; hemicolectomy   ? CHF (congestive heart failure) (H)    ? Chronic kidney disease    ? Gout    ? Hand swelling    ? HTN (hypertension)    ? Obesity    ? TASHA (obstructive sleep apnea)     CPAP       Physical Exam:    /70   Pulse 72   Temp 99.8  F (37.7  C)   Resp 20     General:  Patient presents to clinic in no apparent distress.  Head: normocephalic atraumatic  Psychiatric:  Alert and oriented x3.   Respiratory: unlabored breathing; no cough  Integumentary:  Skin is  uniformly warm, dry and pink.    Wound #1 Location: left lateral leg  Size: 4.5L x 3.4W x 0.1depth.  No sinus tract present, Wound base: slough  No undermining present. Wound is full thickness. There is moderate drainage. Periwound: no denudement, erythema, induration, maceration or warmth.     Wound #2 Location:right shin Size: 4.4x1 x 0.1depth.  No sinus tract present, Wound base: slough  No undermining present. Wound is full thickness. There is moderate drainage. Periwound: no denudement, erythema, induration, maceration or warmth.       Circumferential volume measures:    [facility] Edema 2/23/2021 3/29/2021 4/26/2021 4/29/2021   Right just above MTP 29.2 28.8 28.8 28.2   Right Ankle 26.6 25.7 26.6 25.8   Right Widest Calf 46 45 46.5 44.6   Left - just above MTP 29.2 29.8 29.5 28.6   Left Ankle 27 27.2 27 26.8   Left Widest Calf 46.8 47.4 46.2 44.8   Left Thigh Up 10cm . - - -       Ulceration(s)/Wound(s):     VASC Wound 02/23/21 Lt leg (Active)   Pre Size Length 4.5 05/24/21 0900   Pre Size Width 3.4 05/24/21 0900   Pre Size Depth 0.1 05/24/21 0900   Pre Total Sq cm 15.3 05/24/21 0900       VASC Wound 03/29/21 right below knee (Active)   Pre Size Length 4.4 05/24/21 0900   Pre Size Width 1 05/24/21 0900   Pre Size Depth 0.1 05/24/21 0900   Pre Total Sq cm 4.4 05/24/21 0900       VASC Wound 04/26/21 LT MEDIAL LOWER LEG (Active)   Pre Size Length 0.3 04/29/21 0900   Pre Size Width 0.3 04/29/21 0900   Pre Size Depth 0.1 04/29/21 0900   Pre Total Sq cm 0.09 04/29/21 0900       VASC Wound 04/26/21 RT LATERAL LEG (Active)   Pre Size Length 0.3 05/06/21 1000   Pre Size Width 0.4 05/06/21 1000   Pre Size Depth 0.1 05/06/21 1000   Pre Total Sq cm 0.12 05/06/21 1000       Wound 04/16/15 Other (Comment) (Active)        Lab Values    Lab Results   Component Value Date    SEDRATE 33 (H) 03/09/2016     Lab Results   Component Value Date    CREATININE 2.46 (H) 01/26/2021     Lab Results   Component Value Date    HGBA1C 5.5  11/20/2019     Lab Results   Component Value Date    BUN 40 (H) 01/26/2021     Lab Results   Component Value Date    ALBUMIN 4.0 11/20/2019     Vitamin D, Total (25-Hydroxy)   Date Value Ref Range Status   11/20/2019 21.4 (L) 30.0 - 80.0 ng/mL Final             Impression:  1. Venous stasis ulcer of left lower leg with edema of left lower leg (H)     2. Venous stasis ulcer of other part of right lower leg with fat layer exposed without varicose veins (H)     3. Venous hypertension of both lower extremities     4. Dependent edema     5. Morbid obesity with BMI of 50.0-59.9, adult (H)     6. Stage 3b chronic kidney disease     7. Chronic diastolic heart failure (H)     8. Vitamin D deficiency     9. Venous stasis       5/24/2021 left lateral ankle     5/24/2021 right shin      2/23/2021 left lateral ankle            Are any of these wounds new today: No; Location: na    Assessment/Plan:          1. Debridement: After discussion of risk factors and verbal consent was obtained 2% Lidocaine HCL jelly was applied, under clean conditions, the BLE ulceration(s) were debrided using currette. Devitalized and nonviable tissue, along with any fibrin and slough, was removed to improve granulation tissue formation, stimulate wound healing, decrease overall bacteria load, disrupt biofilm formation and decrease edge senescence.  Total excisional debridement was 19.7 sq cm from the epidermis/dermis area and into the subcutaneous tissue with a depth of 0.1 cm.   Ulcers were improved afterwards and .  Measures were unchanged after debridement.       2.  Wound treatment: wound treatment will include irrigation and dressings to promote autolytic debridement which will include:will continue silvercel; abd; rolled gauze; change every 2-3 days Stable            3. Edema: educated pt again on the importance of adequate compression for healing; ace wraps are not safe to use; he declined to come to clinic for wound care and layered  compression wraps; will use tubular compression and short stretch; encouraged elevation. The compression wraps were applied today in clinic. Stable            4. Nutrition: focus on weight loss; seeing a surgeon next month to discuss removal of the old ring on his stomach; and referral to dietician           5. Offloading: na     Patient will follow up with me in 4 weeks for reevaluation. They were instructed to call the clinic sooner with any signs or symptoms of infection or any further questions/concerns. Answered all questions.          Xin Brush DNP, RN, CNP, CWOCN, CFCN, CLT  [facility]   [phone]        This note was electronically signed by Xin Brush

## 2021-06-30 NOTE — PROGRESS NOTES
Progress Notes by Xin Brush NP at 3/29/2021  8:20 AM     Author: Xin Brush NP Service: -- Author Type: Nurse Practitioner    Filed: 3/29/2021  5:55 PM Encounter Date: 3/29/2021 Status: Signed    : Xin Brush NP (Nurse Practitioner)                   Follow up Vascular Visit       Date of Service:3/29/2021    Date Last Seen: 2/23/2021; 3/2/2021    Chief Complaint: left ankle ulcer; BLE edema; chronic        Pt returns to [facility] with regards to their left ankle ulcer; BLE edema.  They arrive today with friend. They are currently using gauze and coban to the wounds; he has run out of supplies; he has declined home care. This is being done by the patient every day. They are using tubular compression with short stretch from the ankle to the knee for compression; he is applying this incorrectly; he is unable to reach his feet. He is taking vitamin d daily. He still is not taking his bumex as prescribed; supposed to take 3 tabs in the am and pm; he is just taking once per day. We contacted his pcp about the amlodipine and this was discontinued and metoprolol was increased. He feels the weeping on the legs has stopped. . They are feeling well today. Denies fevers, chills. No shortness of breath. Last culture was negative. NAHID done 2/2021 and was normal.     Allergies: Patient has no known allergies.    Medications:   Current Outpatient Medications:   ?  acetaminophen-codeine (TYLENOL #3) 300-30 mg per tablet, Take 1-2 tablets by mouth every 6 (six) hours as needed for pain (maximum 8 pills/ day)., Disp: 40 tablet, Rfl: 2  ?  bumetanide (BUMEX) 1 MG tablet, TAKE 3 TABLETS BY MOUTH TWICE DAILY AT 9AM AND 6PM, Disp: 540 tablet, Rfl: 2  ?  cholecalciferol, vitamin D3, (CHOLECALCIFEROL) 1,000 unit tablet, Take 3 tablets (3,000 Units total) by mouth daily., Disp: 270 tablet, Rfl: 3  ?  colchicine (COLCRYS) 0.6 mg tablet, TAKE 1 TABLET(0.6 MG) BY MOUTH DAILY, Disp: 90 tablet, Rfl:  "3  ?  COLCRYS 0.6 mg tablet, TAKE 1 TABLET(0.6 MG) BY MOUTH DAILY, Disp: 90 tablet, Rfl: 3  ?  febuxostat (ULORIC) 80 mg Tab, Take 1 tablet by mouth daily., Disp: 90 tablet, Rfl: 3  ?  ferrous sulfate 325 (65 FE) MG tablet, Take 1 tablet (325 mg total) by mouth daily with breakfast., Disp: 90 tablet, Rfl: 0  ?  ferrous sulfate 325 (65 FE) MG tablet, Take 1 tablet (325 mg total) by mouth daily with breakfast., Disp: 90 tablet, Rfl: 0  ?  lidocaine 4 % patch, Place 1 patch on the skin daily. Remove and discard patch with 12 hours or as directed by MD., Disp: 12 patch, Rfl: 0  ?  lisinopriL (PRINIVIL,ZESTRIL) 10 MG tablet, TAKE 1 TABLET(10 MG) BY MOUTH DAILY, Disp: 90 tablet, Rfl: 3  ?  metoprolol tartrate (LOPRESSOR) 100 MG tablet, Take 1 tablet (100 mg total) by mouth 2 (two) times a day., Disp: 60 tablet, Rfl: 3  ?  warfarin ANTICOAGULANT (COUMADIN/JANTOVEN) 5 MG tablet, Take 5 to 7.5mg (1 or 1.5 tabs) by mouth daily as directed.  Adjust dose based on INR., Disp: 110 tablet, Rfl: 1    Current Facility-Administered Medications:   ?  lidocaine 2 % jelly (XYLOCAINE), , Topical, PRN, Xin Brush NP, 1 application at 03/29/21 0819    History:   Past Medical History:   Diagnosis Date   ? Arthritis    ? Arthropathy of wrist    ? Atrial fibrillation (H)    ? Cancer (H) 2011    colon cancer; hemicolectomy   ? CHF (congestive heart failure) (H)    ? Chronic kidney disease    ? Gout    ? Hand swelling    ? HTN (hypertension)    ? Obesity    ? TASHA (obstructive sleep apnea)     CPAP       Physical Exam:    /45   Pulse 80   Temp 98.5  F (36.9  C)   Resp 20   Ht 6' 6.5\" (1.994 m)   Wt (!) 478 lb (216.8 kg)   BMI 54.54 kg/m      General:  Patient presents to clinic in no apparent distress.  Head: normocephalic atraumatic  Psychiatric:  Alert and oriented x3.   Respiratory: unlabored breathing; no cough  Integumentary:  Skin is uniformly warm, dry and pink.    Wound #1 Location: left leg  Size: 4.8L x 3.5W x " 0.1depth.  No sinus tract present, Wound base: slough; cleaned up after debridement; friable  No undermining present. Wound is full thickness. There is moderate to heavy drainage. Periwound: no denudement, erythema, induration, maceration or warmth.      Circumferential volume measures:    Vasc Edema 2/23/2021 3/29/2021   Right just above MTP 29.2 28.8   Right Ankle 26.6 25.7   Right Widest Calf 46 45   Left - just above MTP 29.2 29.8   Left Ankle 27 27.2   Left Widest Calf 46.8 47.4   Left Thigh Up 10cm . -       Ulceration(s)/Wound(s):     VASC Wound 02/23/21 Lt leg (Active)   Pre Size Length 4.8 03/29/21 0800   Pre Size Width 3.5 03/29/21 0800   Pre Size Depth 0.1 03/29/21 0800   Pre Total Sq cm 16.8 03/29/21 0800       VASC Wound 03/29/21 right below knee (Active)   Pre Size Length 1.7 03/29/21 0800   Pre Size Width 0.2 03/29/21 0800   Pre Size Depth 0 03/29/21 0800   Pre Total Sq cm 0.34 03/29/21 0800       Wound 04/16/15 Other (Comment) (Active)        Lab Values    Lab Results   Component Value Date    SEDRATE 33 (H) 03/09/2016     Lab Results   Component Value Date    CREATININE 2.46 (H) 01/26/2021     Lab Results   Component Value Date    HGBA1C 5.5 11/20/2019     Lab Results   Component Value Date    BUN 40 (H) 01/26/2021     Lab Results   Component Value Date    ALBUMIN 4.0 11/20/2019     Vitamin D, Total (25-Hydroxy)   Date Value Ref Range Status   11/20/2019 21.4 (L) 30.0 - 80.0 ng/mL Final             Impression:  1. Venous stasis ulcer of left lower leg with edema of left lower leg (H)  lidocaine 2 % jelly (XYLOCAINE)    Change dressing    Biopsy - Clinic    Surgical Pathology Exam       3/29/2021          2/23/2021 left ulcer            Are any of these wounds new today: No; Location: na    Assessment/Plan:          1. Debridement: After discussion of risk factors and verbal consent was obtained 2% Lidocaine HCL jelly was applied, under clean conditions, the left leg ulceration(s) were debrided using  carlos. Devitalized and nonviable tissue, along with any fibrin and slough, was removed to improve granulation tissue formation, stimulate wound healing, decrease overall bacteria load, disrupt biofilm formation and decrease edge senescence.  Total excisional debridement was 16.8 sq cm from the epidermis/dermis area and into the subcutaneous tissue with a depth of 0.1 cm.   Ulcers were improved afterwards and .  Measures were unchanged after debridement.       2.  Wound treatment: wound treatment will include irrigation and dressings to promote autolytic debridement which will include:the wound is worse; with unclear etiology; will obtain biopsy to rule out neoplasm; autoimmune process; he was agreeable to this; will order him additional supplies; he again declined home care; although I strongly feel he would benefit from this; will go back to silvercel; ABD; rolled gauze; change every 1-2 days Worsened       Punch Biopsy Procedure Note    Pre-operative Diagnosis: Suspicious lesion    Post-operative Diagnosis: same    Locations:left lower extremity    Indications: non-healing wound    Anesthesia: Lidocaine 1% with epinephrine without added sodium bicarbonate    Procedure Details   History of allergy to iodine: no  Patient informed of the risks (including bleeding and infection) and benefits of the   procedure and Written informed consent obtained.    The lesion and surrounding area was given a sterile prep using betadyne and draped in the usual sterile fashion. The skin was then stretched perpendicular to the skin tension lines and the lesion removed using the 4mm punch a total of 2 biopsies were done. Bleeding was controlled with surgicel dressing; silver nitrate; pressure dressing for 30 minutes. Once hemostasis achieved a sterile dressing applied. The specimen were sent for pathologic examination. The patient tolerated the procedure well.    EBL: 100  ml    Findings:  tbd    Condition:  Stable    Complications:  bleeding.    Plan:  1. Instructed to keep the wound dry and covered for 24-48h and clean thereafter.  2. Warning signs of infection were reviewed.    3. Recommended that the patient use OTC acetaminophen as needed for pain.              3. Edema: tubular compression; short stretch; elevation; again told him he needs to take bumex two times a day as prescribed by pcp. The compression wraps were applied today in clinic. Stable            4. Nutrition: needs to work on weight loss; continue on vitamin d supplement           5. Offloading: na     Patient will follow up with me in 4 weeks for reevaluation. They were instructed to call the clinic sooner with any signs or symptoms of infection or any further questions/concerns. Answered all questions.          Xin Brush DNP, RN, CNP, CWOCN, CFCN, CLT  [facility]   [phone]        This note was electronically signed by Xin Brush

## 2021-06-30 NOTE — PROGRESS NOTES
Progress Notes by Kathleen Yang RN at 4/29/2021  9:00 AM     Author: Kathleen Yang RN Service: -- Author Type: Registered Nurse    Filed: 4/29/2021 10:15 AM Encounter Date: 4/29/2021 Status: Signed    : Kathleen Yang RN (Registered Nurse)           Bilateral legs 4/29        Left lateral leg 4/29        Right lateral leg 4/29        Right leg (upper-below knee)    Nurse Visit    Chief Complaint: Patient presents to clinic for assessment and treatment of their bilateral leg ulcers and bilateral lower extremity swelling    Dressing on Arrival: 2-layer bilaterally C/D/I    Patient came in today for dressing change and 2 layer rewrap per order from Xin Brush CNP. Patient rated pain 1 out of 10. Removed dressings and cleansed skin with soap and cleaned wound bed with hibiclens. Applied lotion to intact skin.  Applied silver alginate to wound and covered with ABD and roll gauze. Rewrapped 2 layer compression wrap to bilateral legs. Patient tolerated dressing change well.       Allergies: No Known Allergies    Medications:   Current Outpatient Medications:   ?  acetaminophen-codeine (TYLENOL #3) 300-30 mg per tablet, Take 1-2 tablets by mouth every 6 (six) hours as needed for pain (maximum 8 pills/ day)., Disp: 40 tablet, Rfl: 2  ?  bumetanide (BUMEX) 1 MG tablet, TAKE 3 TABLETS BY MOUTH TWICE DAILY AT 9AM AND 6PM, Disp: 540 tablet, Rfl: 2  ?  cholecalciferol, vitamin D3, (CHOLECALCIFEROL) 1,000 unit tablet, Take 3 tablets (3,000 Units total) by mouth daily., Disp: 270 tablet, Rfl: 3  ?  colchicine (COLCRYS) 0.6 mg tablet, TAKE 1 TABLET(0.6 MG) BY MOUTH DAILY, Disp: 90 tablet, Rfl: 3  ?  COLCRYS 0.6 mg tablet, TAKE 1 TABLET(0.6 MG) BY MOUTH DAILY, Disp: 90 tablet, Rfl: 3  ?  febuxostat (ULORIC) 80 mg Tab, Take 1 tablet by mouth daily., Disp: 90 tablet, Rfl: 3  ?  ferrous sulfate 325 (65 FE) MG tablet, Take 1 tablet (325 mg total) by mouth daily with breakfast., Disp: 90 tablet, Rfl: 0  ?  ferrous sulfate 325  (65 FE) MG tablet, Take 1 tablet (325 mg total) by mouth daily with breakfast., Disp: 90 tablet, Rfl: 0  ?  lidocaine 4 % patch, Place 1 patch on the skin daily. Remove and discard patch with 12 hours or as directed by MD., Disp: 12 patch, Rfl: 0  ?  lisinopriL (PRINIVIL,ZESTRIL) 10 MG tablet, TAKE 1 TABLET(10 MG) BY MOUTH DAILY, Disp: 90 tablet, Rfl: 3  ?  metoprolol tartrate (LOPRESSOR) 100 MG tablet, Take 1 tablet (100 mg total) by mouth 2 (two) times a day., Disp: 60 tablet, Rfl: 3  ?  warfarin ANTICOAGULANT (COUMADIN/JANTOVEN) 5 MG tablet, Take 5 to 7.5mg (1 or 1.5 tabs) by mouth daily as directed.  Adjust dose based on INR., Disp: 110 tablet, Rfl: 1    Current Facility-Administered Medications:   ?  lidocaine 2 % jelly (XYLOCAINE), , Topical, PRN, Xin Brush NP, Given at 04/26/21 0820    Vital Signs: There were no vitals taken for this visit.      Assessment:    General:  Patient presents to clinic in no apparent distress.  Psychiatric:  Alert and oriented .   Lower extremity:  edema is present.    Integumentary:  Skin is uniformly warm, dry and pink.    Wound size:   VASC Wound 02/23/21 Lt leg (Active)   Pre Size Length 4.8 04/29/21 0900   Pre Size Width 3.2 04/29/21 0900   Pre Size Depth 0.1 04/29/21 0900   Pre Total Sq cm 15.36 04/29/21 0900       VASC Wound 03/29/21 right below knee (Active)   Pre Size Length 5 04/29/21 0900   Pre Size Width 1 04/29/21 0900   Pre Size Depth 0.1 04/29/21 0900   Pre Total Sq cm 5 04/29/21 0900       VASC Wound 04/26/21 LT MEDIAL LOWER LEG (Active)   Pre Size Length 0.3 04/29/21 0900   Pre Size Width 0.3 04/29/21 0900   Pre Size Depth 0.1 04/29/21 0900   Pre Total Sq cm 0.09 04/29/21 0900       VASC Wound 04/26/21 RT LATERAL LEG (Active)   Pre Size Length 0.8 04/29/21 0900   Pre Size Width 0.8 04/29/21 0900   Pre Size Depth 0.1 04/29/21 0900   Pre Total Sq cm 0.64 04/29/21 0900       Wound 04/16/15 Other (Comment) (Active)      Undermining is not present.    The  periwoundskin is WNL      Vasc Edema 2021 3/29/2021 2021 2021   Right just above MTP 29.2 28.8 28.8 28.2   Right Ankle 26.6 25.7 26.6 25.8   Right Widest Calf 46 45 46.5 44.6   Left - just above MTP 29.2 29.8 29.5 28.6   Left Ankle 27 27.2 27 26.8   Left Widest Calf 46.8 47.4 46.2 44.8   Left Thigh Up 10cm . - - -       Plan:         1. Patient will follow up on Monday for nurse visit/dressing change         2. Treatment provided will include irrigation and dressings to promote autolytic debridement and will be as listed below     Cleansed with: Dilute Hibiclens    Protected skin with: Remedy Skin Repair    Dressings Applied: Silver alginate cut to size, ABD, roll gauze     Compression Applied to the Left Le-Layer Coban      2-Layer Coban:Applied the inner foam layer with the foot dorsiflexed and starting atthe base of the fifth metatarsal head. Leaving the bottom of the heel exposed, proceed by winding the foam up the leg using minimaloverlap to just below the fibular head. Apply the compression layer with the foot dorsiflexed and startingat the base of the fifth metatarsal head. Apply at full stretch and proceed up the leg using 50% overlap. The bottom of the heel is covered with the compression layer. The end at the fibular head or just below the back of the knee and levelwith the top edge of the foam layer.  Gently pressed and conformed the entire surface of the system to ensurethat the two layers are firmly bound together    Compression Applied to the Right Le-Layer Coban    2-Layer Coban:Applied the inner foam layer with the foot dorsiflexed and starting atthe base of the fifth metatarsal head. Leaving the bottom of the heel exposed, proceed by winding the foam up the leg using minimaloverlap to just below the fibular head. Apply the compression layer with the foot dorsiflexed and startingat the base of the fifth metatarsal head. Apply at full stretch and proceed up the leg using 50%  overlap. The bottom of the heel is covered with the compression layer. The end at the fibular head or just below the back of the knee and levelwith the top edge of the foam layer.  Gently pressed and conformed the entire surface of the system to ensurethat the two layers are firmly bound together    Offloading applied: None  Trial Products: no  Provider notified regarding concerns: no  Treatment Changes: no    Educational Barriers: none  Taught Regarding: Activity, Compliance, Compression, Dressing and Hygiene  Teaching Method: Explanation and Handout

## 2021-07-02 ENCOUNTER — COMMUNICATION - HEALTHEAST (OUTPATIENT)
Dept: FAMILY MEDICINE | Facility: CLINIC | Age: 66
End: 2021-07-02

## 2021-07-02 DIAGNOSIS — D64.9 ANEMIA: ICD-10-CM

## 2021-07-04 NOTE — TELEPHONE ENCOUNTER
Telephone Encounter by Omega Jasmine, RN at 7/2/2021 10:38 AM     Author: Omega Jasmine, RN Service: -- Author Type: Registered Nurse    Filed: 7/2/2021 10:39 AM Encounter Date: 7/2/2021 Status: Signed    : Omega Jasmine, RN (Registered Nurse)       Refill Approved    Rx renewed per Medication Renewal Policy. Medication was last renewed on 3/19/21.    Omega Jasmine, [facility] Triage/Med Refill 7/2/2021     Requested Prescriptions   Pending Prescriptions Disp Refills   ? FEROSUL 325 mg (65 mg iron) tablet [Pharmacy Med Name: FERROUS SULFATE 325MG (5GR) TABS] 90 tablet 0     Sig: TAKE 1 TABLET(325 MG) BY MOUTH DAILY WITH BREAKFAST       Iron Supplements Refill Protocol Passed - 7/2/2021 10:09 AM        Passed - PCP or prescribing provider visit in past 12 months       Last office visit with prescriber/PCP: 6/1/2021 Ben Hamlin MD OR same dept: 6/1/2021 Ben Hamlin MD OR same specialty: 6/1/2021 Ben Hamlin MD  Last physical: 7/2/2020 Last MTM visit: Visit date not found   Next visit within 3 mo: Visit date not found  Next physical within 3 mo: Visit date not found  Prescriber OR PCP: Ben Hamlin MD  Last diagnosis associated with med order: 1. Anemia  - FEROSUL 325 mg (65 mg iron) tablet [Pharmacy Med Name: FERROUS SULFATE 325MG (5GR) TABS]; TAKE 1 TABLET(325 MG) BY MOUTH DAILY WITH BREAKFAST  Dispense: 90 tablet; Refill: 0    If protocol passes may refill for 12 months if within 3 months of last provider visit (or a total of 15 months).             Passed - Hemoglobin or Hematocrit in last 12 months     Hemoglobin   Date Value Ref Range Status   01/26/2021 10.6 (L) 14.0 - 18.0 g/dL Final     Hematocrit   Date Value Ref Range Status   01/26/2021 32.0 (L) 40.0 - 54.0 % Final

## 2021-07-04 NOTE — TELEPHONE ENCOUNTER
Telephone Encounter by Carmencita Lopez, RN at 6/30/2021  3:35 PM     Author: Carmencita Lopez RN Service: -- Author Type: Registered Nurse    Filed: 6/30/2021  3:41 PM Encounter Date: 6/30/2021 Status: Signed    : Carmencita Lopez, RN (Registered Nurse)       ANTICOAGULATION MANAGEMENT     Panda Miranda 66 y.o., male is on warfarin with Therapeutic INR result (goal range 2.0-3.0)    Recent labs: (last 7 days)     06/30/21  1347   INR 2.80*       ASSESSMENT     Source: Patient/Caregiver Call and Template      Warfarin dosing taken: Warfarin taken as instructed    Diet: No new diet changes affecting INR    Illness, Injury or hospitalization: No    Medication changes: None    Signs or symptoms of bleeding or clotting: No    Previous INR: therapeutic last 2(+) visits    Additional findings: None     PLAN     Recommended plan for no diet, medication or health factor changes affecting INR:     Dosing instructions: Continue your current warfarin dose    7.5 mg every Tue, Thu; 5 mg all other days           Follow up no later than: 5 weeks     Telephone call with Panda who verbalizes understanding and agrees to plan    Patient offered & declined to schedule next visit    Education provided: target INR goal and significance of current INR result    Plan made per [facility] anticoagulation protocol    Carmencita Lopez  Anticoagulation Clinic   [phone]    Anticoagulation Episode Summary     Current INR goal:  2.0-3.0   TTR:  88.8 % (1 y)   Next INR check:  8/4/2021   INR from last check:  2.80 (6/30/2021)   Weekly max warfarin dose:     Target end date:  Indefinite   INR check location:     Preferred lab:     Send INR reminders to:  [facility]    Indications    A-fib (H) (Resolved) [I48.91]           Comments:           Anticoagulation Care Providers     Provider Role Specialty Phone number    Ben Hamlin MD Referring Family Medicine [phone]

## 2021-07-04 NOTE — PROGRESS NOTES
Progress Notes by Cynthia Argueta MD at 6/9/2021  9:10 AM     Author: Cynthia Argueta MD Service: -- Author Type: Physician    Filed: 6/9/2021 10:19 AM Encounter Date: 6/9/2021 Status: Signed    : Cynthia Argueta MD (Physician)         Thank you, Dr. Hamiln, for asking the [facility] team to see Mr. Panda Miranda to evaluate heart disease.      Assessment/Recommendations   Assessment/Plan:    Dyspnea on exertion - BNP is elevated in the 500s. Will get an echo to re-evaluate his MR and RV size and function. Will also check a holter to look for rate control as he is in chronic afib. Peng is frequently not taking his PM dose of bumex and so I asked him to move it up to dinner time to help him remember.    TASHA/Obesity hypoventilation - refer to sleep medicine to see if he is adequately treated.    MR/pulmonary hypertension/RV failure - echo as above, PM diuretic    F/U 2 months         History of Present Illness/Subjective    Mr. Panda Miranda is a 65 y.o. male with super morbid obesity, hypertension, chronic renal insufficiency (creat 2.6) obstructive sleep apnea/obesity hypoventilation syndrome, persistent atrial fibrillation, a restrictive small perimembranous VSD and intermittent mitral regurgitation depending on his fluid status. He was hospitalized in September 2017 at [facility] for hypotension and his metoprolol was decreased. Echo showed EF 50-55%, moderate TR, and severe RV enlargement and dysfunction. The discharge note attributes this to TASHA and likely obesity-hypoventilation syndrome.     Panda Miranda returns for f/u today. I have not seen him in 2.5 years, at which time he was doing okay. Peng, which is what he goes by, notes that he has had progressive dyspnea with exertion over the past 1-2 years. He notes that he has to stop midway up a flight of stairs and when walking longer distances. He notes no dyspnea getting dressed or laying flat in bed, which he does. His  chronic leg edema is better now that he is wrapping his legs to help the venous stasis ulcers heal. Peng denies chest pain, palpitations or syncope. He does get some orthostasis. Gabe wears his CPAP nightly but has not had it checked out in some time.         Physical Examination Review of Systems   Vitals:    06/09/21 0916   BP: 120/82   Pulse: 85   Resp: 18     Body mass index is 55.34 kg/m .  Wt Readings from Last 3 Encounters:   06/09/21 (!) 485 lb (220 kg)   06/01/21 (!) 485 lb 3.2 oz (220.1 kg)   03/29/21 (!) 478 lb (216.8 kg)     [unfilled]  General Appearance:   no distress, normal body habitus   ENT/Mouth: membranes moist, no oral lesions or bleeding gums.      EYES:  no scleral icterus, normal conjunctivae   Neck: no carotid bruits or thyromegaly   Chest/Lungs:   lungs are clear to auscultation, no rales or wheezing, no sternal scar, equal chest wall expansion    Cardiovascular:   irRegular. Normal first and second heart sounds with faint apical blowing systolic murmur. No rubs or gallops. The right radial, dorsalis pedis and posterior tibial pulses are intact.  The left radial, dorsalis pedis and posterior tibial pulses are intact. Jugular venous pressure elevated, lower legs wrapped and no thigh edema   Abdomen:  no organomegaly, masses, bruits, or tenderness; bowel sounds are present   Extremities: no cyanosis or clubbing   Skin: no xanthelasma, warm.    Neurologic: normal  bilateral, no tremors     Psychiatric: alert and oriented x3, calm     General: WNL  Eyes: WNL  Ears/Nose/Throat: WNL  Lungs: Shortness of Breath  Heart: WNL  Stomach: WNL  Bladder: WNL  Muscle/Joints: WNL  Skin: WNL  Nervous System: WNL  Mental Health: WNL     Blood: WNL     Medical History  Surgical History Family History Social History   Past Medical History:   Diagnosis Date     (HFpEF) heart failure with preserved ejection fraction (H)      Arthritis      Arthropathy of wrist      Atrial fibrillation (H)       Bradycardia      Cancer (H) 2011    colon cancer; hemicolectomy     CHF (congestive heart failure) (H)      Chronic kidney disease      Gout      Hand swelling      Heart valve disease     intermittent mitral regurgitation     HTN (hypertension)      Hyperlipidemia      Morbid obesity (H)      Obesity      TASHA (obstructive sleep apnea)     CPAP     Perimembranous ventricular septal defect     Past Surgical History:   Procedure Laterality Date     COLON SURGERY       COLONOSCOPY N/A 12/12/2019    Procedure: COLONOSCOPY;  Surgeon: Flaco Magaña MD;  Location: [facility] OR;  Service: General     GASTROPLASTY VERTICAL BANDED      Dr. Arizmendi [facility] 1996 Initial weight 800++ Lost to 440- (was 320# at lowest)     KNEE SURGERY       NC PART REMOVAL COLON W ANASTOMOSIS      Description: Partial Colectomy;  Recorded: 12/02/2011;  Comments: Dr Magaña    Family History   Problem Relation Age of Onset     Diabetes type II Other      Heart failure Other      Heart disease Mother      Hypertension Mother      Diabetes Sister     Social History     Socioeconomic History     Marital status:      Spouse name: Not on file     Number of children: Not on file     Years of education: Not on file     Highest education level: Not on file   Occupational History     Occupation:      Employer: RETIRED   Social Needs     Financial resource strain: Not on file     Food insecurity     Worry: Not on file     Inability: Not on file     Transportation needs     Medical: Not on file     Non-medical: Not on file   Tobacco Use     Smoking status: Never Smoker     Smokeless tobacco: Never Used   Substance and Sexual Activity     Alcohol use: Yes     Alcohol/week: 0.8 standard drinks     Types: 1 Standard drinks or equivalent per week     Comment: occasional     Drug use: No     Sexual activity: Not on file   Lifestyle     Physical activity     Days per week: Not on file     Minutes per session: Not on file     Stress:  Not on file   Relationships     Social connections     Talks on phone: Not on file     Gets together: Not on file     Attends Islam service: Not on file     Active member of club or organization: Not on file     Attends meetings of clubs or organizations: Not on file     Relationship status: Not on file     Intimate partner violence     Fear of current or ex partner: Not on file     Emotionally abused: Not on file     Physically abused: Not on file     Forced sexual activity: Not on file   Other Topics Concern     Not on file   Social History Narrative     Not on file          Medications  Allergies   Current Outpatient Medications   Medication Sig Dispense Refill     acetaminophen-codeine (TYLENOL #3) 300-30 mg per tablet Take 1-2 tablets by mouth every 6 (six) hours as needed for pain (maximum 8 pills/ day). 40 tablet 2     bumetanide (BUMEX) 1 MG tablet TAKE 3 TABLETS BY MOUTH TWICE DAILY AT 9AM AND 6PM 540 tablet 2     cholecalciferol, vitamin D3, (CHOLECALCIFEROL) 1,000 unit tablet Take 3 tablets (3,000 Units total) by mouth daily. 270 tablet 3     colchicine (COLCRYS) 0.6 mg tablet TAKE 1 TABLET(0.6 MG) BY MOUTH DAILY 90 tablet 3     COLCRYS 0.6 mg tablet TAKE 1 TABLET(0.6 MG) BY MOUTH DAILY 90 tablet 3     febuxostat (ULORIC) 80 mg Tab Take 1 tablet by mouth daily. 90 tablet 3     ferrous sulfate 325 (65 FE) MG tablet Take 1 tablet (325 mg total) by mouth daily with breakfast. 90 tablet 0     lidocaine 4 % patch Place 1 patch on the skin daily. Remove and discard patch with 12 hours or as directed by MD. 12 patch 0     lisinopriL (PRINIVIL,ZESTRIL) 10 MG tablet TAKE 1 TABLET(10 MG) BY MOUTH DAILY 90 tablet 3     metoprolol tartrate (LOPRESSOR) 100 MG tablet Take 1 tablet (100 mg total) by mouth 2 (two) times a day. 60 tablet 3     warfarin ANTICOAGULANT (COUMADIN/JANTOVEN) 5 MG tablet Take 5 to 7.5mg (1 or 1.5 tabs) by mouth daily as directed.  Adjust dose based on INR. 110 tablet 1     Current  Facility-Administered Medications   Medication Dose Route Frequency Provider Last Rate Last Admin     lidocaine 2 % jelly (XYLOCAINE)   Topical PRN Xin Brush NP   Given at 04/26/21 0820    No Known Allergies      Lab Results    Chemistry/lipid CBC Cardiac Enzymes/BNP/TSH/INR   Lab Results   Component Value Date    CHOL 173 08/17/2016    HDL 49 08/17/2016    LDLCALC 103 08/17/2016    TRIG 103 08/17/2016    CREATININE 2.46 (H) 01/26/2021    BUN 40 (H) 01/26/2021    K 4.7 01/26/2021     01/26/2021     01/26/2021    CO2 25 01/26/2021    Lab Results   Component Value Date    WBC 6.6 01/26/2021    HGB 10.6 (L) 01/26/2021    HCT 32.0 (L) 01/26/2021    MCV 82 01/26/2021     (L) 01/26/2021    Lab Results   Component Value Date    CKMB 1 05/23/2013    TROPONINI 0.04 03/11/2014     (H) 06/01/2021    TSH 1.89 12/23/2016    INR 2.90 (H) 06/01/2021

## 2021-07-06 VITALS
RESPIRATION RATE: 20 BRPM | SYSTOLIC BLOOD PRESSURE: 130 MMHG | DIASTOLIC BLOOD PRESSURE: 70 MMHG | HEART RATE: 72 BPM | TEMPERATURE: 99.8 F

## 2021-07-06 VITALS
SYSTOLIC BLOOD PRESSURE: 95 MMHG | HEART RATE: 68 BPM | WEIGHT: 315 LBS | BODY MASS INDEX: 56.8 KG/M2 | DIASTOLIC BLOOD PRESSURE: 64 MMHG | RESPIRATION RATE: 24 BRPM

## 2021-07-06 VITALS
SYSTOLIC BLOOD PRESSURE: 120 MMHG | RESPIRATION RATE: 18 BRPM | WEIGHT: 315 LBS | HEART RATE: 85 BPM | DIASTOLIC BLOOD PRESSURE: 82 MMHG | BODY MASS INDEX: 36.45 KG/M2 | HEIGHT: 78 IN

## 2021-07-07 ENCOUNTER — RECORDS - HEALTHEAST (OUTPATIENT)
Dept: ADMINISTRATIVE | Facility: OTHER | Age: 66
End: 2021-07-07

## 2021-07-14 ENCOUNTER — TELEPHONE (OUTPATIENT)
Dept: VASCULAR SURGERY | Facility: CLINIC | Age: 66
End: 2021-07-14

## 2021-07-14 NOTE — TELEPHONE ENCOUNTER
Called to let them know that we cannot order dressings as pt has not been seen since 5/24. Insurance will not cover replacement without current wound measurements.

## 2021-07-14 NOTE — TELEPHONE ENCOUNTER
Writer called pt tonight to reschedule the 7/8/21 NO SHOW appt with Xin Brush. Pt stated he needs more wound care supplies. Please call pt to discuss.

## 2021-07-20 DIAGNOSIS — I48.91 ATRIAL FIBRILLATION, UNSPECIFIED TYPE (H): Primary | ICD-10-CM

## 2021-07-27 ENCOUNTER — TRANSFERRED RECORDS (OUTPATIENT)
Dept: HEALTH INFORMATION MANAGEMENT | Facility: CLINIC | Age: 66
End: 2021-07-27

## 2021-08-03 PROBLEM — I48.91 A-FIB (H): Status: RESOLVED | Noted: 2017-01-13 | Resolved: 2017-07-09

## 2021-08-04 DIAGNOSIS — I50.32 CHRONIC DIASTOLIC CONGESTIVE HEART FAILURE (H): Primary | ICD-10-CM

## 2021-08-08 NOTE — TELEPHONE ENCOUNTER
"Routing refill request to provider for review/approval because:  Labs out of range:  Creat    ___________________________________________________________    Copy of Last Rx:        Last office visit with provider:  6/1/21   ___________________________________________________________    Requested Prescriptions   Pending Prescriptions Disp Refills     bumetanide (BUMEX) 1 MG tablet         Diuretics (Including Combos) Protocol Failed - 8/4/2021  4:54 PM        Failed - Normal serum creatinine on file in past 12 months     Recent Labs   Lab Test 06/18/21  0608   CR 2.35*              Passed - Blood pressure under 140/90 in past 12 months     BP Readings from Last 3 Encounters:   06/18/21 110/64   06/09/21 120/82   06/01/21 95/64                 Passed - Recent (12 mo) or future (30 days) visit within the authorizing provider's specialty     Patient has had an office visit with the authorizing provider or a provider within the authorizing providers department within the previous 12 mos or has a future within next 30 days. See \"Patient Info\" tab in inbasket, or \"Choose Columns\" in Meds & Orders section of the refill encounter.              Passed - Medication is active on med list        Passed - Patient is age 18 or older        Passed - Normal serum potassium on file in past 12 months     Recent Labs   Lab Test 06/18/21  0608   POTASSIUM 4.0                    Passed - Normal serum sodium on file in past 12 months     Recent Labs   Lab Test 06/18/21  0608                      Yu Cole RN 08/08/21 1:48 PM  "

## 2021-08-10 RX ORDER — BUMETANIDE 1 MG/1
TABLET ORAL
Qty: 180 TABLET | Refills: 3 | Status: ON HOLD | OUTPATIENT
Start: 2021-08-10 | End: 2022-01-30

## 2021-08-24 DIAGNOSIS — I10 ESSENTIAL HYPERTENSION: Primary | ICD-10-CM

## 2021-08-27 DIAGNOSIS — I10 ESSENTIAL HYPERTENSION: ICD-10-CM

## 2021-08-27 RX ORDER — METOPROLOL TARTRATE 100 MG
100 TABLET ORAL 2 TIMES DAILY
Qty: 180 TABLET | Refills: 3 | Status: SHIPPED | OUTPATIENT
Start: 2021-08-27 | End: 2021-08-28

## 2021-08-27 NOTE — TELEPHONE ENCOUNTER
"Disp Refills Start End REBEKAH    metoprolol tartrate (LOPRESSOR) 100 MG tablet 60 tablet 3 3/2/2021  --   Sig - Route: Take 1 tablet (100 mg total) by mouth 2 (two) times a day. - Oral   Sent to pharmacy as: metoprolol tartrate 100 mg tablet (LOPRESSOR)   E-Prescribing Status: Receipt confirmed by pharmacy (3/2/2021  1:06 PM CST)       Last Written Prescription Date:  3/2/21  Last Fill Quantity: 60,  # refills: 3   Last office visit provider:  6/1/21     Requested Prescriptions   Pending Prescriptions Disp Refills     metoprolol tartrate (LOPRESSOR) 100 MG tablet       Sig: Take 1 tablet (100 mg) by mouth 2 times daily       Beta-Blockers Protocol Passed - 8/24/2021  8:36 AM        Passed - Blood pressure under 140/90 in past 12 months     BP Readings from Last 3 Encounters:   06/18/21 110/64   06/09/21 120/82   06/01/21 95/64                 Passed - Patient is age 6 or older        Passed - Recent (12 mo) or future (30 days) visit within the authorizing provider's specialty     Patient has had an office visit with the authorizing provider or a provider within the authorizing providers department within the previous 12 mos or has a future within next 30 days. See \"Patient Info\" tab in inbasket, or \"Choose Columns\" in Meds & Orders section of the refill encounter.              Passed - Medication is active on med list             Omega Jasmine RN 08/27/21 9:58 AM  "

## 2021-08-28 RX ORDER — METOPROLOL TARTRATE 100 MG
100 TABLET ORAL 2 TIMES DAILY
Qty: 180 TABLET | Refills: 0 | Status: ON HOLD | OUTPATIENT
Start: 2021-08-28 | End: 2022-01-30

## 2021-08-30 ENCOUNTER — DOCUMENTATION ONLY (OUTPATIENT)
Dept: ANTICOAGULATION | Facility: CLINIC | Age: 66
End: 2021-08-30

## 2021-08-30 NOTE — PROGRESS NOTES
Patient is overdue for INR.   Updating check date to today so patient shows on our reminder list.      Patient was due for INR on 8/4/21    Overdue INR reminder updated

## 2021-09-01 ENCOUNTER — TELEPHONE (OUTPATIENT)
Dept: FAMILY MEDICINE | Facility: CLINIC | Age: 66
End: 2021-09-01

## 2021-09-01 NOTE — TELEPHONE ENCOUNTER
ANTICOAGULATION     Panda Miranda is overdue for INR check.      Was unable to reach patient and was unable to leave a voicemail. If patient calls, please schedule INR check as soon as possible.  and Reminder letter sent    Carmencita Lopez RN

## 2021-09-01 NOTE — LETTER
September 1, 2021       Panda Miranda  [address]  [facility] [address]      Dear Panda,    You are currently under the care of [facility] for your warfarin (Coumadin ) therapy.  We are contacting you because our records show you were due for an INR on 8/4/21.    There are potentially serious risks when taking warfarin without careful monitoring and we want to make sure you are safely managed.  Routine INR monitoring is required for warfarin refills.     Please call [phone] as soon as possible to schedule an appointment.  If there has been a change in your care or other concerns, please let us know so we can help and or update our records.     Sincerely,       [facility]

## 2021-09-08 ENCOUNTER — OFFICE VISIT (OUTPATIENT)
Dept: VASCULAR SURGERY | Facility: CLINIC | Age: 66
End: 2021-09-08
Attending: NURSE PRACTITIONER
Payer: COMMERCIAL

## 2021-09-08 VITALS
WEIGHT: 315 LBS | DIASTOLIC BLOOD PRESSURE: 60 MMHG | RESPIRATION RATE: 18 BRPM | SYSTOLIC BLOOD PRESSURE: 100 MMHG | BODY MASS INDEX: 52.05 KG/M2 | HEART RATE: 64 BPM | TEMPERATURE: 97.4 F

## 2021-09-08 DIAGNOSIS — I87.2 VENOUS STASIS ULCER OF OTHER PART OF RIGHT LOWER LEG WITH FAT LAYER EXPOSED WITHOUT VARICOSE VEINS (H): ICD-10-CM

## 2021-09-08 DIAGNOSIS — R60.0 VENOUS STASIS ULCER OF LEFT LOWER LEG WITH EDEMA OF LEFT LOWER LEG (H): Primary | ICD-10-CM

## 2021-09-08 DIAGNOSIS — E66.01 MORBID OBESITY WITH BMI OF 50.0-59.9, ADULT (H): ICD-10-CM

## 2021-09-08 DIAGNOSIS — N18.32 STAGE 3B CHRONIC KIDNEY DISEASE (H): ICD-10-CM

## 2021-09-08 DIAGNOSIS — I87.8 VENOUS STASIS: ICD-10-CM

## 2021-09-08 DIAGNOSIS — L97.929 VENOUS STASIS ULCER OF LEFT LOWER LEG WITH EDEMA OF LEFT LOWER LEG (H): Primary | ICD-10-CM

## 2021-09-08 DIAGNOSIS — R60.9 DEPENDENT EDEMA: ICD-10-CM

## 2021-09-08 DIAGNOSIS — I83.029 VENOUS STASIS ULCER OF LEFT LOWER LEG WITH EDEMA OF LEFT LOWER LEG (H): Primary | ICD-10-CM

## 2021-09-08 DIAGNOSIS — L97.812 VENOUS STASIS ULCER OF OTHER PART OF RIGHT LOWER LEG WITH FAT LAYER EXPOSED WITHOUT VARICOSE VEINS (H): ICD-10-CM

## 2021-09-08 DIAGNOSIS — I50.32 CHRONIC DIASTOLIC HEART FAILURE (H): ICD-10-CM

## 2021-09-08 DIAGNOSIS — I87.303 VENOUS HYPERTENSION OF BOTH LOWER EXTREMITIES: ICD-10-CM

## 2021-09-08 DIAGNOSIS — I83.892 VENOUS STASIS ULCER OF LEFT LOWER LEG WITH EDEMA OF LEFT LOWER LEG (H): Primary | ICD-10-CM

## 2021-09-08 PROCEDURE — 11044 DBRDMT BONE 1ST 20 SQ CM/<: CPT | Performed by: NURSE PRACTITIONER

## 2021-09-08 PROCEDURE — 11045 DBRDMT SUBQ TISS EACH ADDL: CPT | Performed by: NURSE PRACTITIONER

## 2021-09-08 PROCEDURE — 99213 OFFICE O/P EST LOW 20 MIN: CPT | Mod: 25 | Performed by: NURSE PRACTITIONER

## 2021-09-08 PROCEDURE — 11042 DBRDMT SUBQ TIS 1ST 20SQCM/<: CPT | Performed by: NURSE PRACTITIONER

## 2021-09-08 PROCEDURE — G0463 HOSPITAL OUTPT CLINIC VISIT: HCPCS | Mod: 25 | Performed by: NURSE PRACTITIONER

## 2021-09-08 RX ORDER — FUROSEMIDE 20 MG
TABLET ORAL
COMMUNITY
End: 2022-01-28

## 2021-09-08 RX ORDER — LIDOCAINE HYDROCHLORIDE 20 MG/ML
JELLY TOPICAL DAILY PRN
Status: DISCONTINUED | OUTPATIENT
Start: 2021-09-08 | End: 2022-01-30

## 2021-09-08 ASSESSMENT — PAIN SCALES - GENERAL: PAINLEVEL: MILD PAIN (2)

## 2021-09-08 NOTE — PROGRESS NOTES
"            Follow up Vascular Visit       Date of Service:09/08/21      Chief Complaint: BLE swelling and leg ulcers; new      Pt returns to [facility] with regards to their BLE swelling and ulcers; new. Was last seen May 2021.  They arrive today with his friend. He has been lost to follow up. He needs a ride and his friend was unavailable so he could not make it to clinic appt. They are currently using gauze or baudilio to the wounds. This is being done by the patient or his wife. They are using nothing for compression as he is \"trying to dry the legs out\" otherwise he was trying to wrap these with ace wraps . They are feeling well today. Denies fevers, chills. No shortness of breath. Pt is on chronic anticoagulation for afib. He was last seen by cardiology 6/21 was told to take water pill twice per day; was ordered to have holter monitor did not do this; and echo did not do this; was referred to sleep medicine did not do this; was told to follow up in 2 months; did not do this. 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 Again all this is related to him not having a ride.     Allergies: No Known Allergies    Medications:   Current Outpatient Medications:      amLODIPine (NORVASC) 5 MG tablet, Take 1 tablet (5 mg) by mouth daily, Disp: 90 tablet, Rfl: 0     bumetanide (BUMEX) 1 MG tablet, TAKE 3 TABLETS BY MOUTH TWICE DAILY AT 9AM AND 6PM, Disp: 180 tablet, Rfl: 3     cholecalciferol 25 MCG (1000 UT) TABS, Take 3,000 Units by mouth, Disp: , Rfl:      colchicine (COLCYRS) 0.6 MG tablet, TAKE 1 TABLET(0.6 MG) BY MOUTH DAILY, Disp: , Rfl:      febuxostat (ULORIC) 80 MG TABS tablet, Take 1 tablet by mouth daily , Disp: , Rfl:      ferrous sulfate (FEROSUL) 325 (65 Fe) MG tablet, Take 325 mg by mouth daily , Disp: , Rfl:      furosemide (LASIX) 20 MG tablet, , Disp: , Rfl:      lidocaine (XYLOCAINE) 2 % " external gel, , Disp: , Rfl:      lisinopril (ZESTRIL) 10 MG tablet, TAKE 1 TABLET(10 MG) BY MOUTH DAILY, Disp: , Rfl:      metoprolol tartrate (LOPRESSOR) 100 MG tablet, Take 1 tablet (100 mg) by mouth 2 times daily, Disp: 180 tablet, Rfl: 0     warfarin ANTICOAGULANT (COUMADIN) 5 MG tablet, Take 7.5mg by mouth every Tuesday and Thursday, and take 5mg by mouth all other days., Disp: , Rfl:     Current Facility-Administered Medications:      lidocaine (XYLOCAINE) 2 % external gel, , Topical, Daily PRN, Xin Brush NP    History:   Past Medical History:   Diagnosis Date     (HFpEF) heart failure with preserved ejection fraction (H)      Arthritis      Arthropathy of wrist      Atrial fibrillation (H)      Bradycardia      Cancer (H) 2011    colon cancer; hemicolectomy     CHF (congestive heart failure) (H)      Chronic kidney disease      Gout      Hand swelling      Heart valve disease     intermittent mitral regurgitation     HTN (hypertension)      Hyperlipidemia      Morbid obesity (H)      Obesity      TASHA (obstructive sleep apnea)     CPAP     Perimembranous ventricular septal defect        Physical Exam:    /60   Pulse 64   Temp 97.4  F (36.3  C)   Resp 18   Wt (!) 456 lb 3.2 oz (206.9 kg)   BMI 52.05 kg/m      General:  Patient presents to clinic in no apparent distress.  Head: normocephalic atraumatic  Psychiatric:  Alert and oriented x3.   Respiratory: unlabored breathing; no cough  Integumentary:  Skin is uniformly warm, dry and pink.    Extremities: BLE with worsening edema; tissues are taut with fluid/edema; there area multiple full thickness ulcers to both legs the wounds are desiccated with eschar beginning to form on the edema; there is no associated odor or purulence. See wound measures below.        Wound Leg Abrasion(s) (Active)   Number of days: 84       Wound Leg Abrasion(s) (Active)   Number of days:        Wound Leg Abrasion(s) (Active)   Number of days: 84       VASC Wound Right  shin (Active)   Pre Size Length 0.7 09/08/21 1300   Pre Size Width 0.6 09/08/21 1300   Pre Size Depth 0.1 09/08/21 1300   Pre Total Sq cm 0.42 09/08/21 1300   Number of days:        VASC Wound Left lateral ankle (Active)   Number of days:        VASC Wound Rt knee (Active)   Pre Size Length 3.5 09/08/21 1300   Pre Size Width 1.5 09/08/21 1300   Pre Size Depth 0.1 09/08/21 1300   Pre Total Sq cm 5.25 09/08/21 1300   Number of days: 0       VASC Wound rt lateral leg proximal (Active)   Pre Size Length 0.4 09/08/21 1300   Pre Size Width 0.4 09/08/21 1300   Pre Size Depth 0.1 09/08/21 1300   Pre Total Sq cm 0.16 09/08/21 1300   Number of days: 0       VASC Wound Rt leg below knee (Active)   Pre Size Length 1.2 09/08/21 1300   Pre Size Width 0.5 09/08/21 1300   Pre Size Depth 0.1 09/08/21 1300   Pre Total Sq cm 0.6 09/08/21 1300   Number of days: 0       VASC Wound Lt lateral ankle (Active)   Pre Size Length 5.3 09/08/21 1300   Pre Size Width 3.5 09/08/21 1300   Pre Size Depth 0.2 09/08/21 1300   Pre Total Sq cm 18.55 09/08/21 1300   Number of days: 0       VASC Wound Lt alteral shin (Active)   Pre Size Length 1 09/08/21 1300   Pre Size Width 1.2 09/08/21 1300   Pre Size Depth 0.1 09/08/21 1300   Pre Total Sq cm 1.2 09/08/21 1300   Number of days: 0       VASC Wound Lt shin (Active)   Pre Size Length 1.5 09/08/21 1300   Pre Size Width 0.3 09/08/21 1300   Pre Size Depth 0.1 09/08/21 1300   Pre Total Sq cm 0.45 09/08/21 1300   Number of days: 0       VASC Wound Below Lt knee (Active)   Pre Size Length 3.3 09/08/21 1300   Pre Size Width 0.8 09/08/21 1300   Pre Size Depth 0.1 09/08/21 1300   Pre Total Sq cm 2.64 09/08/21 1300   Number of days: 0            Circumferential volume measures:      Circumferential Measures 2/23/2021 3/29/2021 4/26/2021 4/29/2021 9/8/2021   Right just above MTP 29.2 28.8 28.8 28.2 28.8   Right Ankle 26.6 25.7 26.6 25.8 26.3   Right Widest Calf 46 45 46.5 44.6 46.1   Right Knee to Ankle 42 - - - 43    Left - just above MTP 29.2 29.8 29.5 28.6 30.1   Left Ankle 27 27.2 27 26.8 29.9   Left Widest Calf 46.8 47.4 46.2 44.8 48.8   Left Thigh Up 10cm . - - - -   Left Knee to Ankle 42 - - - 43       Labs:    I personally reviewed the following lab results today and those on care everywhere, if indicated     CRP Inflammation   Date Value Ref Range Status   06/15/2021 14.0 (H) 0.0 - 8.0 mg/L Final      Sed Rate   Date Value Ref Range Status   06/15/2021 62 (H) 0 - 20 mm/h Final      Last Renal Panel:  Sodium   Date Value Ref Range Status   06/18/2021 140 133 - 144 mmol/L Final     Potassium   Date Value Ref Range Status   06/18/2021 4.0 3.4 - 5.3 mmol/L Final     Chloride   Date Value Ref Range Status   06/18/2021 103 94 - 109 mmol/L Final     Carbon Dioxide   Date Value Ref Range Status   06/18/2021 28 20 - 32 mmol/L Final     Anion Gap   Date Value Ref Range Status   06/18/2021 8 3 - 14 mmol/L Final     Glucose   Date Value Ref Range Status   06/18/2021 122 (H) 70 - 99 mg/dL Final     Urea Nitrogen   Date Value Ref Range Status   06/18/2021 42 (H) 7 - 30 mg/dL Final     Creatinine   Date Value Ref Range Status   06/18/2021 2.35 (H) 0.66 - 1.25 mg/dL Final     GFR Estimate   Date Value Ref Range Status   06/18/2021 28 (L) >60 mL/min/[1.73_m2] Final     Comment:     Non  GFR Calc  Starting 12/18/2018, serum creatinine based estimated GFR (eGFR) will be   calculated using the Chronic Kidney Disease Epidemiology Collaboration   (CKD-EPI) equation.       Calcium   Date Value Ref Range Status   06/18/2021 9.2 8.5 - 10.1 mg/dL Final     Albumin   Date Value Ref Range Status   06/15/2021 3.5 3.4 - 5.0 g/dL Final      Lab Results   Component Value Date    WBC 6.3 06/18/2021     Lab Results   Component Value Date    RBC 4.22 06/18/2021     Lab Results   Component Value Date    HGB 11.2 06/18/2021     Lab Results   Component Value Date    HCT 37.0 06/18/2021     No components found for: MCT  Lab Results    Component Value Date    MCV 88 06/18/2021     Lab Results   Component Value Date    MCH 26.5 06/18/2021     Lab Results   Component Value Date    MCHC 30.3 06/18/2021     Lab Results   Component Value Date    RDW 15.6 06/18/2021     Lab Results   Component Value Date     06/18/2021      Lab Results   Component Value Date    A1C 5.9 06/15/2021    A1C 5.5 11/20/2019    A1C 6.0 04/16/2019    A1C 5.7 01/08/2018    A1C 5.8 12/23/2016    A1C 5.9 04/15/2016      No results found for: TSH   Lab Results   Component Value Date    VITDT 21 05/17/2021                   Impression:  Encounter Diagnoses   Name Primary?     Venous stasis ulcer of left lower leg with edema of left lower leg (H) Yes     Venous stasis ulcer of other part of right lower leg with fat layer exposed without varicose veins (H)      Venous hypertension of both lower extremities      Dependent edema      Morbid obesity with BMI of 50.0-59.9, adult (H)      Stage 3b chronic kidney disease      Chronic diastolic heart failure (H)      Venous stasis                                Are any of these wounds new today: Yes; Location: BLE    Assessment/Plan:          1. Debridement: After discussion of risk factors and verbal consent was obtained 2% Lidocaine HCL jelly was applied, under clean conditions, the BLE ulceration(s) were debrided using currette. Devitalized and nonviable tissue, along with any fibrin and slough, was removed to improve granulation tissue formation, stimulate wound healing, decrease overall bacteria load, disrupt biofilm formation and decrease edge senescence.  Total excisional debridement was 29.27 sq cm from the epidermis/dermis area and into the subcutaneous tissue with a depth of 0.1-0.2 cm.   Ulcers were improved afterwards and .  Measures were unchanged after debridement.       2.  Wound treatment: wound treatment will include irrigation and dressings to promote autolytic debridement which will include:pt has been lost  to follow up; he does not want home care; will have him wash the wounds with dilute hibilces; apply silvercel to the wounds; cover with gauze and rolled gauze change every 2-3 days; no evidence of infection today; culture not done.  Worsened new wounds           3. Edema: edema is being under managed; will apply tubular compression and short stretch; pt cannot make it to frequent clinic appt for 2 layer or 4 layer and he declines home care. Will have him make appt with cardiology again; he still needs to get holter monitor; echo; recheck labs; continue taking diuretics bid. The compression wraps were applied today in clinic. Last NAHID normal. Last biopsy negative for neoplasm.  Worsened            4. 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 He is down 22 pounds since March; this is mostly water weight; we previously checked nutritional labs and these were normal.            5. Offloading: na     Patient will follow up with me in 4 weeks for reevaluation. They were instructed to call the clinic sooner with any signs or symptoms of infection or any further questions/concerns. Answered all questions.          Xin Brush DNP, RN, CNP, CWOCN, CFCN, CLT  [facility]   [phone]        This note was electronically signed by Xin Brush NP

## 2021-09-08 NOTE — PATIENT INSTRUCTIONS
"Make appt with Dr. Olivia with cardiology you need holter monitor, echo and follow up on your congestive heart failure    Need to make appt with surgeon to address your gastric ring which is malpositioned    Need to make appt with dietician to work on weight loss    Wound Care Instructions    EVERY OTHER DAY and as needed, Cleanse your bilateral leg wound(s) with Dilute hibiclens 30cc in 500cc NS.    Pat Dry with non-sterile gauze    Apply Lotion to the intact skin surrounding your wound and other dry skin locations. Some good lotions include: Remedy Skin Repair Cream, Sarna, Vanicream or Cetaphil    Primary Dressing: Apply silvercel into/onto the wounds    Secondary dressing: Cover with gauze    Secure with non-sterile roll gauze (4\" x 75\" roll) and tape (1\" roll tape) as needed; avoid adhesive directly on the skin    Compression: tubular compression and short stretch bilaterally    It is not ok to get your wound wet in the bath or shower    SEEK MEDICAL CARE IF:    You have an increase in swelling, pain, or redness around the wound.    You have an increase in the amount of pus coming from the wound.    There is a bad smell coming from the wound.    The wound appears to be worsening/enlarging    You have a fever greater than 101.5 F      It is ok to continue current wound care treatment/products for the next 2-3 days until new wound care supplies are ordered and arrive. If longer than this please contact our office at [phone].      It is recommended that you do not get your ulcer wet when showering.  Listed below are several ways of keeping it dry when you shower.     1. Wrap it with Press and Seal plastic wrap.  It can be found in the stores where the plastic wraps or tin foil is kept.               2.  Some people take a bath and hang their leg/foot out of the tub.                        3  Put your leg in a plastic bag and tape it on.           4. You can purchase a shower cover for casts at some " pharmacies and through the Internet.            5. Take a Bed Bath or wash up at the sink

## 2021-09-08 NOTE — LETTER
2021    [facility]  Fax: [phone] Wound Dressing Rx and Order Form  Customer Service: [phone] Order Status: New   Verbal: Vero   Patient Info:  Name: Panda Miranda  : 1955  Address: [address] [facility] [address]    Insurance Info:  INSURER: Payor: [facility] / Plan: [facility] MEDICARE ADVANTAGE / Product Type: HMO /   Policy ID#:  [id number]  : 1955    Physician Info:   Name: Xin Brush   Dept Address/Phones:   [facility], [address]  [facility] [address] [phone]  Fax: [phone]  Impression:   Encounter Diagnoses   Name Primary?     Venous stasis ulcer of left lower leg with edema of left lower leg (H) Yes     Venous stasis ulcer of other part of right lower leg with fat layer exposed without varicose veins (H)        Lymphedema circumferential measurements (in cm):      Circumferential Measures (cm)  Right just above MTP: 28.8  Right Ankle: 26.3  Right Widest Calf: 46.1  Right Knee to Ankle: 43  Left - just above MTP: 30.1  Left Ankle: 29.9  Left Widest Calf: 48.8  Left Knee to Ankle: 43       Wound info:    VASC Wound Right shin (Active)   Pre Size Length 0.7 21 1300   Pre Size Width 0.6 21 1300   Pre Size Depth 0.1 21 1300   Pre Total Sq cm 0.42 21 1300       VASC Wound Rt knee (Active)   Pre Size Length 3.5 21 1300   Pre Size Width 1.5 21 1300   Pre Size Depth 0.1 21 1300   Pre Total Sq cm 5.25 21 1300       VASC Wound rt lateral leg proximal (Active)   Pre Size Length 0.4 21 1300   Pre Size Width 0.4 21 1300   Pre Size Depth 0.1 21 1300   Pre Total Sq cm 0.16 21 1300       VASC Wound Rt leg below knee (Active)   Pre Size Length 1.2 21 1300   Pre Size Width 0.5 21 1300   Pre Size Depth 0.1 21 1300   Pre Total Sq cm 0.6 21 1300       VASC Wound Lt lateral ankle (Active)   Pre Size Length 5.3  09/08/21 1300   Pre Size Width 3.5 09/08/21 1300   Pre Size Depth 0.2 09/08/21 1300   Pre Total Sq cm 18.55 09/08/21 1300       VASC Wound Lt alteral shin (Active)   Pre Size Length 1 09/08/21 1300   Pre Size Width 1.2 09/08/21 1300   Pre Size Depth 0.1 09/08/21 1300   Pre Total Sq cm 1.2 09/08/21 1300       VASC Wound Lt shin (Active)   Pre Size Length 1.5 09/08/21 1300   Pre Size Width 0.3 09/08/21 1300   Pre Size Depth 0.1 09/08/21 1300   Pre Total Sq cm 0.45 09/08/21 1300       VASC Wound Below Lt knee (Active)   Pre Size Length 3.3 09/08/21 1300   Pre Size Width 0.8 09/08/21 1300   Pre Size Depth 0.1 09/08/21 1300   Pre Total Sq cm 2.64 09/08/21 1300         Drainage: moderate  Thickness:  full  Duration of Need: 30 days  Days Supply: 30 days  Start Date: 9/8/2021  Starter Kit:Ancillary   Qualifying wound/Debridement: Yes/Yes      Dressing Type Brand Size Number of pieces Frequency of change   Primary Silvercel   4.25''x4.25'' 15 Every other day   Secondary  Square gauze   4''x4'' 2 loafs  Every other day    Sof form roll gauze   4''x75'' 30 rolls  Every other day    Latex free tubular compression   Size G  1 box  Every other day    Comprillian    4'' 2 boxes  Every other day    Paper tape   1'' 2 rolls  Every other day     Note: If total out of pocket is more than $50.00 please contact the patient before processing order.     OK to forward to covered supplier.    Electronically Signed Physician: CATRACHITO HERR                         Date: 9/8/2021

## 2021-09-08 NOTE — PROGRESS NOTES
Compression Applied to Bilateral  Tubigrip Size g   Compression Applied to Bilateral  Short Stretch

## 2021-09-09 ENCOUNTER — TELEPHONE (OUTPATIENT)
Dept: FAMILY MEDICINE | Facility: CLINIC | Age: 66
End: 2021-09-09

## 2021-09-09 NOTE — TELEPHONE ENCOUNTER
ANTICOAGULATION     Panda Miranda is overdue for INR check.      Spoke with Panda and scheduled lab appointment on 9/13    Carmencita Lopez RN

## 2021-09-13 ENCOUNTER — LAB (OUTPATIENT)
Dept: LAB | Facility: CLINIC | Age: 66
End: 2021-09-13
Payer: COMMERCIAL

## 2021-09-13 ENCOUNTER — ANTICOAGULATION THERAPY VISIT (OUTPATIENT)
Dept: ANTICOAGULATION | Facility: CLINIC | Age: 66
End: 2021-09-13

## 2021-09-13 DIAGNOSIS — I48.91 ATRIAL FIBRILLATION, UNSPECIFIED TYPE (H): ICD-10-CM

## 2021-09-13 LAB — INR BLD: 3 (ref 0.9–1.1)

## 2021-09-13 PROCEDURE — 36416 COLLJ CAPILLARY BLOOD SPEC: CPT | Performed by: FAMILY MEDICINE

## 2021-09-13 PROCEDURE — 85610 PROTHROMBIN TIME: CPT | Performed by: FAMILY MEDICINE

## 2021-09-13 NOTE — PROGRESS NOTES
ANTICOAGULATION MANAGEMENT     Panda Miranda 66 year old male is on warfarin with therapeutic INR result. (Goal INR 2.0-3.0)    Recent labs: (last 7 days)     09/13/21  1329   INR 3.0*       ASSESSMENT     Source(s): Chart Review and Patient/Caregiver Call       Warfarin doses taken: Warfarin taken as instructed    Diet: No new diet changes identified    New illness, injury, or hospitalization: No    Medication/supplement changes: None noted    Signs or symptoms of bleeding or clotting: No    Previous INR: Therapeutic last 2(+) visits    Additional findings: None     PLAN     Recommended plan for no diet, medication or health factor changes affecting INR     Dosing Instructions: Continue your current warfarin dose with next INR in 6 weeks       Summary  As of 9/13/2021    Full warfarin instructions:  7.5 mg every Tue, Thu; 5 mg all other days   Next INR check:  10/25/2021             Telephone call with Panda who verbalizes understanding and agrees to plan    Patient offered & declined to schedule next visit    Education provided: Importance of therapeutic range and Importance of following up at instructed interval    Plan made per [facility] anticoagulation protocol    Chely Groves RN  Anticoagulation Clinic  9/13/2021    _______________________________________________________________________     Anticoagulation Episode Summary     Current INR goal:  2.0-3.0   TTR:  88.3 % (11.7 mo)   Target end date:  Indefinite   Send INR reminders to:  [address]       Comments:           Anticoagulation Care Providers     Provider Role Specialty Phone number    Ben Hamlin MD Referring Family Medicine [phone]

## 2021-09-17 ENCOUNTER — TELEPHONE (OUTPATIENT)
Dept: VASCULAR SURGERY | Facility: CLINIC | Age: 66
End: 2021-09-17

## 2021-09-20 NOTE — TELEPHONE ENCOUNTER
Spoke to Panda this morning. He reports that he still has not received his supplies. Let Panda know that the order has been resent this morning and a message has been sent to the [employer] representative Kathy Lucas requesting a follow up regarding this. Supply order was originally sent on 9/8/21.

## 2021-09-21 ENCOUNTER — TELEPHONE (OUTPATIENT)
Dept: FAMILY MEDICINE | Facility: CLINIC | Age: 66
End: 2021-09-21

## 2021-09-21 DIAGNOSIS — I10 ESSENTIAL HYPERTENSION: ICD-10-CM

## 2021-09-21 RX ORDER — AMLODIPINE BESYLATE 5 MG/1
5 TABLET ORAL DAILY
Qty: 90 TABLET | Refills: 0 | Status: ON HOLD | OUTPATIENT
Start: 2021-09-21 | End: 2022-01-30

## 2021-09-21 NOTE — TELEPHONE ENCOUNTER
Pt was prescribed amlodipine in the hospital. Pt is out and would like take this medication along with his lisinopril. Medication refill requested. Please authorize medication if appropriate.           Reason for Call:  Other prescription    Detailed comments: Patient called to request a call from provider's team regarding these two medication, amlodipine and lisinopril. He was prescribed the amlodipine when he was in the hospital. He is out of the amlodipine and would like to know if he is to continue taking this medication along with his lisinopril? Please call patient to advise.    Phone Number Patient can be reached at: Home number on file [phone] (home)    Best Time: any    Can we leave a detailed message on this number? YES    Call taken on 9/21/2021 at 1:23 PM by Fernando Dalton

## 2021-11-01 ENCOUNTER — TELEPHONE (OUTPATIENT)
Dept: ANTICOAGULATION | Facility: CLINIC | Age: 66
End: 2021-11-01

## 2021-11-01 NOTE — TELEPHONE ENCOUNTER
ANTICOAGULATION     Pnada iMranda is overdue for INR check.      Was unable to reach patient and was unable to leave a voicemail. If patient calls, please schedule INR check as soon as possible.  and Reminder letter sent    Carmencita Lopez RN

## 2021-11-01 NOTE — LETTER
Panda Miranda  [address]  [facility] [address]    Dear Panda,    You are currently under the care of [facility] for your warfarin (Coumadin ) therapy.  We are contacting you because our records show you were due for an INR on 10/25/21.    There are potentially serious risks when taking warfarin without careful monitoring and we want to make sure you are safely managed.  Routine INR monitoring is required for warfarin refills.     Please call [phone] as soon as possible to schedule an appointment.  If there has been a change in your care or other concerns, please let us know so we can help and or update our records.     Sincerely,       [facility]

## 2021-11-09 ENCOUNTER — ANTICOAGULATION THERAPY VISIT (OUTPATIENT)
Dept: ANTICOAGULATION | Facility: CLINIC | Age: 66
End: 2021-11-09

## 2021-11-09 ENCOUNTER — TELEPHONE (OUTPATIENT)
Dept: VASCULAR SURGERY | Facility: CLINIC | Age: 66
End: 2021-11-09

## 2021-11-09 ENCOUNTER — LAB (OUTPATIENT)
Dept: LAB | Facility: CLINIC | Age: 66
End: 2021-11-09
Payer: COMMERCIAL

## 2021-11-09 ENCOUNTER — DOCUMENTATION ONLY (OUTPATIENT)
Dept: ANTICOAGULATION | Facility: CLINIC | Age: 66
End: 2021-11-09

## 2021-11-09 DIAGNOSIS — I48.91 ATRIAL FIBRILLATION, UNSPECIFIED TYPE (H): Primary | ICD-10-CM

## 2021-11-09 DIAGNOSIS — I48.91 ATRIAL FIBRILLATION, UNSPECIFIED TYPE (H): ICD-10-CM

## 2021-11-09 LAB — INR BLD: 2.7 (ref 0.9–1.1)

## 2021-11-09 PROCEDURE — 36416 COLLJ CAPILLARY BLOOD SPEC: CPT | Performed by: FAMILY MEDICINE

## 2021-11-09 PROCEDURE — 85610 PROTHROMBIN TIME: CPT | Performed by: FAMILY MEDICINE

## 2021-11-09 NOTE — PROGRESS NOTES
ANTICOAGULATION MANAGEMENT      Panda Miranda due for annual renewal of referral to anticoagulation monitoring. Order pended for your review and signature.      ANTICOAGULATION SUMMARY      Warfarin indication(s)     Atrial fibrillation    Heart valve present?  NO       Current goal range   INR: 2.0-3.0     Goal appropriate for indication? Yes, INR 2-3 appropriate for hx of DVT, PE, hypercoagulable state, Afib, LVAD, or bileaflet AVR without risk factors     Current duration of therapy Indefinite/long term therapy   Time in Therapeutic Range (TTR)  (Goal > 60%) 88.3%       Office visit with referring provider's group within last year yes on 6/1/21       Chely Groves RN

## 2021-11-09 NOTE — PROGRESS NOTES
ANTICOAGULATION MANAGEMENT     Panda Miranda 66 year old male is on warfarin with therapeutic INR result. (Goal INR 2.0-3.0)    Recent labs: (last 7 days)     11/09/21  1308   INR 2.7*       ASSESSMENT     Source(s): Chart Review, Patient/Caregiver Call and Template       Warfarin doses taken: Warfarin taken as instructed    Diet: No new diet changes identified    New illness, injury, or hospitalization: No    Medication/supplement changes: None noted    Signs or symptoms of bleeding or clotting: No    Previous INR: Therapeutic last 2(+) visits    Additional findings: None     PLAN     Recommended plan for no diet, medication or health factor changes affecting INR     Dosing Instructions: Continue your current warfarin dose with next INR in 6 weeks       Summary  As of 11/9/2021    Full warfarin instructions:  7.5 mg every Tue, Thu; 5 mg all other days   Next INR check:  12/21/2021             Telephone call with Panda who verbalizes understanding and agrees to plan    Lab visit scheduled    Education provided: Importance of therapeutic range    Plan made per [facility] anticoagulation protocol    Chely Groves RN  Anticoagulation Clinic  11/9/2021    _______________________________________________________________________     Anticoagulation Episode Summary     Current INR goal:  2.0-3.0   TTR:  88.3 % (11.7 mo)   Target end date:  Indefinite   Send INR reminders to:  [facility]       Comments:           Anticoagulation Care Providers     Provider Role Specialty Phone number    Ben Hamlin MD Referring Family Medicine [phone]

## 2021-11-09 NOTE — TELEPHONE ENCOUNTER
Called patient and discussed missed nurse visit today at 1000. Pt stated he normally has someone who brings him to his appts in his wheelchair, whom was unavailable today, so he was unable to make it to appt. Pt prefers afternoon appts. Appt rescheduled. Patient denied any questions. Clinic phone number provided.    Future Appointments   Date Time Provider Department Center   11/9/2021  1:00 PM [facility]   12/7/2021  1:00 [facility]

## 2021-11-10 DIAGNOSIS — I48.91 ATRIAL FIBRILLATION (H): Primary | ICD-10-CM

## 2021-11-10 RX ORDER — WARFARIN SODIUM 5 MG/1
TABLET ORAL
Qty: 100 TABLET | Refills: 1 | Status: ON HOLD | OUTPATIENT
Start: 2021-11-10 | End: 2022-01-30

## 2021-11-10 NOTE — TELEPHONE ENCOUNTER
Component      Latest Ref Rng & Units 6/30/2021 9/13/2021 11/9/2021   INR      0.90 - 1.10 2.80 (H)     INR Point of Care      0.9 - 1.1  3.0 (H) 2.7 (H)         Current warfarin dose per last [facility] note: 7.5 mg every Tue, Thu; 5 mg all other days    Last OV with responsible provider's group: 6/1/21    Patient is up to date.   Refilled per protocol.

## 2021-11-15 PROBLEM — I48.91 ATRIAL FIBRILLATION, UNSPECIFIED TYPE (H): Status: ACTIVE | Noted: 2021-11-15

## 2021-12-07 ENCOUNTER — OFFICE VISIT (OUTPATIENT)
Dept: VASCULAR SURGERY | Facility: CLINIC | Age: 66
End: 2021-12-07
Attending: NURSE PRACTITIONER
Payer: COMMERCIAL

## 2021-12-07 VITALS
WEIGHT: 315 LBS | TEMPERATURE: 98.9 F | RESPIRATION RATE: 18 BRPM | DIASTOLIC BLOOD PRESSURE: 60 MMHG | BODY MASS INDEX: 52.65 KG/M2 | SYSTOLIC BLOOD PRESSURE: 126 MMHG | HEART RATE: 80 BPM

## 2021-12-07 DIAGNOSIS — I87.303 VENOUS HYPERTENSION OF BOTH LOWER EXTREMITIES: ICD-10-CM

## 2021-12-07 DIAGNOSIS — R60.0 VENOUS STASIS ULCER OF LEFT LOWER LEG WITH EDEMA OF LEFT LOWER LEG (H): Primary | ICD-10-CM

## 2021-12-07 DIAGNOSIS — E66.01 MORBID OBESITY WITH BMI OF 50.0-59.9, ADULT (H): ICD-10-CM

## 2021-12-07 DIAGNOSIS — I83.892 VENOUS STASIS ULCER OF LEFT LOWER LEG WITH EDEMA OF LEFT LOWER LEG (H): Primary | ICD-10-CM

## 2021-12-07 DIAGNOSIS — I87.2 VENOUS STASIS ULCER OF OTHER PART OF RIGHT LOWER LEG WITH FAT LAYER EXPOSED WITHOUT VARICOSE VEINS (H): ICD-10-CM

## 2021-12-07 DIAGNOSIS — I83.029 VENOUS STASIS ULCER OF LEFT LOWER LEG WITH EDEMA OF LEFT LOWER LEG (H): Primary | ICD-10-CM

## 2021-12-07 DIAGNOSIS — L97.812 VENOUS STASIS ULCER OF OTHER PART OF RIGHT LOWER LEG WITH FAT LAYER EXPOSED WITHOUT VARICOSE VEINS (H): ICD-10-CM

## 2021-12-07 DIAGNOSIS — N18.32 STAGE 3B CHRONIC KIDNEY DISEASE (H): ICD-10-CM

## 2021-12-07 DIAGNOSIS — I50.32 CHRONIC DIASTOLIC HEART FAILURE (H): ICD-10-CM

## 2021-12-07 DIAGNOSIS — L97.929 VENOUS STASIS ULCER OF LEFT LOWER LEG WITH EDEMA OF LEFT LOWER LEG (H): Primary | ICD-10-CM

## 2021-12-07 DIAGNOSIS — I87.8 VENOUS STASIS: ICD-10-CM

## 2021-12-07 DIAGNOSIS — R60.9 DEPENDENT EDEMA: ICD-10-CM

## 2021-12-07 PROCEDURE — 11042 DBRDMT SUBQ TIS 1ST 20SQCM/<: CPT | Performed by: NURSE PRACTITIONER

## 2021-12-07 PROCEDURE — 250N000009 HC RX 250: Performed by: NURSE PRACTITIONER

## 2021-12-07 RX ADMIN — LIDOCAINE HYDROCHLORIDE: 20 JELLY TOPICAL at 13:26

## 2021-12-07 ASSESSMENT — PAIN SCALES - GENERAL: PAINLEVEL: EXTREME PAIN (8)

## 2021-12-07 NOTE — LETTER
2021    [facility]  Fax: [phone] Wound Dressing Rx and Order Form  Customer Service: [phone] Order Status: New   Verbal: Vero  Patient Info:  Name: Panda Miranda  : 1955  Address: [address] [facility] [address]        Insurance Info:  INSURER: Payor: [facility] / Plan: [facility] MEDICARE ADVANTAGE / Product Type: HMO /   Policy ID#:  [id number]  : 1955    Physician Info:   Name: Xin Brush   Dept Address/Phones:   [facility], [address]  [facility] [address] [phone]  Fax: [phone]      Impression:   Encounter Diagnoses   Name Primary?     Venous stasis ulcer of left lower leg with edema of left lower leg (H) Yes     Venous stasis ulcer of other part of right lower leg with fat layer exposed without varicose veins (H)      Venous hypertension of both lower extremities      Dependent edema      Morbid obesity with BMI of 50.0-59.9, adult (H)      Stage 3b chronic kidney disease (H)      Chronic diastolic heart failure (H)      Venous stasis        Lymphedema circumferential measurements (in cm):              Wound info:    VASC Wound Left lateral ankle (Active)   Pre Size Length 4.7 21 1300   Pre Size Width 2 21 1300   Pre Size Depth 0.1 21 1300   Pre Total Sq cm 9.4 21 1300       VASC Wound Rt knee (Active)   Pre Size Length 1 21 1300   Pre Size Width 0.8 21 1300   Pre Size Depth 0.1 21 1300   Pre Total Sq cm 0.8 21 1300       VASC Wound Lt anterior ankle (Active)   Pre Size Length 0.2 21 1300   Pre Size Width 0.8 21 1300   Pre Size Depth 0.1 21 1300   Pre Total Sq cm 0.16 21 1300       VASC Wound Lt lower shin above ankle (Active)   Pre Size Length 0.8 21 1300   Pre Size Width 1 21 1300   Pre Size Depth 0.1 21 1300   Pre Total Sq cm 0.8 21 1300         Drainage: moderate  Thickness:   full  Duration of Need: 30 days  Days Supply: 30 days  Start Date: 12/7/2021  Starter Kit:ancillary   Qualifying wound/Debridement: yes/yes     Dressing Type Brand Size Number of pieces Frequency of change   Primary Silvercel  4.25''x4.25'' 10 sheets 3 times per week     Square gauze  4''x4'' 2 loafs  3 times per week     Sof form roll gauze   4''x75'' 12 rolls 3 times per week     Latex free tubular compression bandage   Size G  1 box 3 times per week     Comprilan   4'' 2 rolls 3 times per week     Paper tape   2'' 2 rolls 3 times per week        Note: If total out of pocket is more than $50.00 please contact the patient before processing order.     OK to forward to covered supplier.    Electronically Signed Physician: CATRACHITO HERR                         Date: 12/7/2021

## 2021-12-07 NOTE — PATIENT INSTRUCTIONS
"Make appt with Dr. 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    Pat Dry with non-sterile gauze    Apply Lotion to the intact skin surrounding your wound and other dry skin locations. Some good lotions include: Remedy Skin Repair Cream, Sarna, Vanicream or Cetaphil    Primary Dressing: Apply silvercel into/onto the wounds    Secondary dressing: Cover with gauze    Secure with non-sterile roll gauze (4\" x 75\" roll) and tape (1\" roll tape) as needed; avoid adhesive directly on the skin    Compression: tubular compression and short stretch bilaterally    It is not ok to get your wound wet in the bath or shower    SEEK MEDICAL CARE IF:    You have an increase in swelling, pain, or redness around the wound.    You have an increase in the amount of pus coming from the wound.    There is a bad smell coming from the wound.    The wound appears to be worsening/enlarging    You have a fever greater than 101.5 F      It is ok to continue current wound care treatment/products for the next 2-3 days until new wound care supplies are ordered and arrive. If longer than this please contact our office at [phone].      It is recommended that you do not get your ulcer wet when showering.  Listed below are several ways of keeping it dry when you shower.     1. Wrap it with Press and Seal plastic wrap.  It can be found in the stores where the plastic wraps or tin foil is kept.               2.  Some people take a bath and hang their leg/foot out of the tub.                        3  Put your leg in a plastic bag and tape it on.           4. You can purchase a shower cover for casts at " some pharmacies and through the Internet.            5. Take a Bed Bath or wash up at the sink

## 2021-12-07 NOTE — PROGRESS NOTES
Follow up Vascular Visit       Date of Service:12/07/21      Chief Complaint: BLE edema and ulcerations      Pt returns to [facility] with regards to their BLE ulcers and edema; chronic.  They arrive today with son. Pt has been lost to follow; several no shows. Last seen in September. He reports that this is due to his  having surgery and he had no way to get to appointments; however he drove himself today. They are currently using gauze; coban to the wounds. This was not ordered supposed to be using silvercel and gauze.  This is being done by the patient and his wife. They are using nothing for compression; he reports though that he is wrapping with short stretch at home. They are feeling well today. Denies fevers, chills. No shortness of breath. At his last appt we discussed making a follow up appt with Dr. Olivia with cardiology for possible holter and echo; he did not do this. We spoke about making follow up with general surgery to get his malpositioned gastric ring fixed; he did not do this. He did not make follow up with dietician to work on weight loss. He did see his nephrologist Dr. Coreas; he has stage 4 CKD; he was told to continue on bumex; low sodium diet and fluid restriction of 2 L or less per day; pt reports he is doing pretty good with this.     Allergies: No Known Allergies    Medications:   Current Outpatient Medications:      amLODIPine (NORVASC) 5 MG tablet, Take 1 tablet (5 mg) by mouth daily, Disp: 90 tablet, Rfl: 0     bumetanide (BUMEX) 1 MG tablet, TAKE 3 TABLETS BY MOUTH TWICE DAILY AT 9AM AND 6PM, Disp: 180 tablet, Rfl: 3     cholecalciferol 25 MCG (1000 UT) TABS, Take 3,000 Units by mouth, Disp: , Rfl:      colchicine (COLCYRS) 0.6 MG tablet, TAKE 1 TABLET(0.6 MG) BY MOUTH DAILY, Disp: , Rfl:      febuxostat (ULORIC) 80 MG TABS tablet, Take 1 tablet by mouth daily , Disp: , Rfl:      ferrous sulfate (FEROSUL) 325 (65 Fe) MG tablet, Take 325 mg by mouth  daily , Disp: , Rfl:      furosemide (LASIX) 20 MG tablet, , Disp: , Rfl:      lidocaine (XYLOCAINE) 2 % external gel, , Disp: , Rfl:      lisinopril (ZESTRIL) 10 MG tablet, TAKE 1 TABLET(10 MG) BY MOUTH DAILY, Disp: , Rfl:      metoprolol tartrate (LOPRESSOR) 100 MG tablet, Take 1 tablet (100 mg) by mouth 2 times daily, Disp: 180 tablet, Rfl: 0     warfarin ANTICOAGULANT (COUMADIN) 5 MG tablet, Take 5 to 7.5mg (1 to 1.5 tabs) by mouth daily OR AS DIRECTED.  Adjust dose based on INR., Disp: 100 tablet, Rfl: 1    Current Facility-Administered Medications:      lidocaine (XYLOCAINE) 2 % external gel, , Topical, Daily PRN, Gonsalo, Xin, NP, Given at 12/07/21 1326    History:   Past Medical History:   Diagnosis Date     (HFpEF) heart failure with preserved ejection fraction (H)      Arthritis      Arthropathy of wrist      Atrial fibrillation (H)      Bradycardia      Cancer (H) 2011    colon cancer; hemicolectomy     CHF (congestive heart failure) (H)      Chronic kidney disease      Gout      Hand swelling      Heart valve disease     intermittent mitral regurgitation     HTN (hypertension)      Hyperlipidemia      Morbid obesity (H)      Obesity      TASHA (obstructive sleep apnea)     CPAP     Perimembranous ventricular septal defect        Physical Exam:    /60   Pulse 80   Temp 98.9  F (37.2  C)   Resp 18   Wt (!) 461 lb 8 oz (209.3 kg)   BMI 52.65 kg/m      General:  Patient presents to clinic in no apparent distress.  Head: normocephalic atraumatic  Psychiatric:  Alert and oriented x3.   Respiratory: unlabored breathing; no cough  Integumentary:  Skin is uniformly warm, dry and pink.    Wound #1 Location: left lateral ankle  Size: 4.7L x 2W x 0.1depth.  No sinus tract present, Wound base: slough; hypergranulation  No undermining present. Wound is full thickness. There is moderate bloody drainage. Periwound: no denudement, erythema, induration, maceration or warmth.      Wound #2 Location: right knee   Size: 1x0.8 x 0.1depth.  No sinus tract present, Wound base: slough; hypergranulation  No undermining present. Wound is full thickness. There is moderate bloody drainage. Periwound: no denudement, erythema, induration, maceration or warmth.      Wound #3 Location: left ankle anterior Size: 0.2x0.8x 0.1depth.  No sinus tract present, Wound base: slough; hypergranulation  No undermining present. Wound is full thickness. There is moderate bloody drainage. Periwound: no denudement, erythema, induration, maceration or warmth.      Wound #4 Location: left shin  Size: 0.8x1 x 0.1depth.  No sinus tract present, Wound base: slough; hypergranulation  No undermining present. Wound is full thickness. There is moderate bloody drainage. Periwound: no denudement, erythema, induration, maceration or warmth.      VASC Wound Left lateral ankle (Active)   Pre Size Length 4.7 12/07/21 1300   Pre Size Width 2 12/07/21 1300   Pre Size Depth 0.1 12/07/21 1300   Pre Total Sq cm 9.4 12/07/21 1300   Number of days:        VASC Wound Rt knee (Active)   Pre Size Length 1 12/07/21 1300   Pre Size Width 0.8 12/07/21 1300   Pre Size Depth 0.1 12/07/21 1300   Pre Total Sq cm 0.8 12/07/21 1300   Number of days: 90   VASC Wound Lt anterior ankle (Active)   Pre Size Length 0.2 12/07/21 1300   Pre Size Width 0.8 12/07/21 1300   Pre Size Depth 0.1 12/07/21 1300   Pre Total Sq cm 0.16 12/07/21 1300   Number of days: 0       VASC Wound Lt lower shin above ankle (Active)   Pre Size Length 0.8 12/07/21 1300   Pre Size Width 1 12/07/21 1300   Pre Size Depth 0.1 12/07/21 1300   Pre Total Sq cm 0.8 12/07/21 1300   Number of days: 0            Circumferential volume measures:      Circumferential Measures 2/23/2021 3/29/2021 4/26/2021 4/29/2021 9/8/2021   Right just above MTP 29.2 28.8 28.8 28.2 28.8   Right Ankle 26.6 25.7 26.6 25.8 26.3   Right Widest Calf 46 45 46.5 44.6 46.1   Right Knee to Ankle 42 - - - 43   Left - just above MTP 29.2 29.8 29.5 28.6 30.1    Left Ankle 27 27.2 27 26.8 29.9   Left Widest Calf 46.8 47.4 46.2 44.8 48.8   Left Thigh Up 10cm . - - - -   Left Knee to Ankle 42 - - - 43       Labs:    I personally reviewed the following lab results today and those on care everywhere    CRP Inflammation   Date Value Ref Range Status   06/15/2021 14.0 (H) 0.0 - 8.0 mg/L Final      Sed Rate   Date Value Ref Range Status   06/15/2021 62 (H) 0 - 20 mm/h Final      Last Renal Panel:  Sodium   Date Value Ref Range Status   06/18/2021 140 133 - 144 mmol/L Final     Potassium   Date Value Ref Range Status   06/18/2021 4.0 3.4 - 5.3 mmol/L Final     Chloride   Date Value Ref Range Status   06/18/2021 103 94 - 109 mmol/L Final     Carbon Dioxide   Date Value Ref Range Status   06/18/2021 28 20 - 32 mmol/L Final     Anion Gap   Date Value Ref Range Status   06/18/2021 8 3 - 14 mmol/L Final     Glucose   Date Value Ref Range Status   06/18/2021 122 (H) 70 - 99 mg/dL Final     Urea Nitrogen   Date Value Ref Range Status   06/18/2021 42 (H) 7 - 30 mg/dL Final     Creatinine   Date Value Ref Range Status   06/18/2021 2.35 (H) 0.66 - 1.25 mg/dL Final     GFR Estimate   Date Value Ref Range Status   06/18/2021 28 (L) >60 mL/min/[1.73_m2] Final     Comment:     Non  GFR Calc  Starting 12/18/2018, serum creatinine based estimated GFR (eGFR) will be   calculated using the Chronic Kidney Disease Epidemiology Collaboration   (CKD-EPI) equation.       Calcium   Date Value Ref Range Status   06/18/2021 9.2 8.5 - 10.1 mg/dL Final     Albumin   Date Value Ref Range Status   06/15/2021 3.5 3.4 - 5.0 g/dL Final      Lab Results   Component Value Date    WBC 6.3 06/18/2021     Lab Results   Component Value Date    RBC 4.22 06/18/2021     Lab Results   Component Value Date    HGB 11.2 06/18/2021     Lab Results   Component Value Date    HCT 37.0 06/18/2021     No components found for: MCT  Lab Results   Component Value Date    MCV 88 06/18/2021     Lab Results   Component  Value Date    MCH 26.5 06/18/2021     Lab Results   Component Value Date    MCHC 30.3 06/18/2021     Lab Results   Component Value Date    RDW 15.6 06/18/2021     Lab Results   Component Value Date     06/18/2021      Lab Results   Component Value Date    A1C 5.9 06/15/2021    A1C 5.5 11/20/2019    A1C 6.0 04/16/2019    A1C 5.7 01/08/2018    A1C 5.8 12/23/2016    A1C 5.9 04/15/2016      No results found for: TSH   Lab Results   Component Value Date    VITDT 21 05/17/2021                   Impression:  Encounter Diagnoses   Name Primary?     Venous stasis ulcer of left lower leg with edema of left lower leg (H) Yes     Venous stasis ulcer of other part of right lower leg with fat layer exposed without varicose veins (H)      Venous hypertension of both lower extremities      Dependent edema      Morbid obesity with BMI of 50.0-59.9, adult (H)      Stage 3b chronic kidney disease (H)      Chronic diastolic heart failure (H)      Venous stasis                            Are any of these wounds new today: No; Location: na    Assessment/Plan:          1. Debridement: After discussion of risk factors and verbal consent was obtained 2% Lidocaine HCL jelly was applied, under clean conditions, the BLE ulceration(s) were debrided using currette. Devitalized and nonviable tissue, along with any fibrin and slough, was removed to improve granulation tissue formation, stimulate wound healing, decrease overall bacteria load, disrupt biofilm formation and decrease edge senescence.  Total excisional debridement was 11.16 sq cm from the epidermis/dermis area and into the subcutaneous tissue with a depth of 0.1 cm.   Ulcers were improved afterwards and .  Measures were unchanged after debridement.       2.  Wound treatment: wound treatment will include irrigation and dressings to promote autolytic debridement which will include:will continue with silvercel; gauze; rolled gauze; change every 1-2 days; encouraged him to  apply lotion to his skin; provided sample of rememdy skin repair cream; will order supplies Stable            3. Edema: encouraged him to follow recommendations by nephrology; low sodium diet; fluid restriction; continue on bumex; encouraged him to make appt with cardiology; provided phone number; encouraged elevation throughout the day; and will continue with tubular compression and short stretch. The compression wraps were applied today in clinic. Stable            4. Nutrition: reminded pt to make appt with dietician and surgeon for malpositioned gastric ring; educated pt that the wounds and swelling in legs is due to his obesity and any weight loss will help with healing           5. Offloading: na     Patient will follow up with me in 4 weeks for reevaluation. They were instructed to call the clinic sooner with any signs or symptoms of infection or any further questions/concerns. Answered all questions.          Xin Brush DNP, RN, CNP, CWOCN, CFCN, CLT  [facility]   [phone]        This note was electronically signed by Xin Brush NP

## 2021-12-23 ENCOUNTER — LAB (OUTPATIENT)
Dept: LAB | Facility: CLINIC | Age: 66
End: 2021-12-23
Payer: COMMERCIAL

## 2021-12-23 ENCOUNTER — ANTICOAGULATION THERAPY VISIT (OUTPATIENT)
Dept: ANTICOAGULATION | Facility: CLINIC | Age: 66
End: 2021-12-23

## 2021-12-23 DIAGNOSIS — I48.91 ATRIAL FIBRILLATION, UNSPECIFIED TYPE (H): ICD-10-CM

## 2021-12-23 DIAGNOSIS — I48.91 ATRIAL FIBRILLATION, UNSPECIFIED TYPE (H): Primary | ICD-10-CM

## 2021-12-23 LAB — INR BLD: 2.8 (ref 0.9–1.1)

## 2021-12-23 PROCEDURE — 85610 PROTHROMBIN TIME: CPT

## 2021-12-23 PROCEDURE — 36416 COLLJ CAPILLARY BLOOD SPEC: CPT

## 2021-12-23 NOTE — PROGRESS NOTES
ANTICOAGULATION MANAGEMENT     Panda Miranda 66 year old male is on warfarin with therapeutic INR result. (Goal INR 2.0-3.0)    Recent labs: (last 7 days)     12/23/21  1346   INR 2.8*       ASSESSMENT     Source(s): Chart Review, Patient/Caregiver Call and Template       Warfarin doses taken: Warfarin taken as instructed    Diet: No new diet changes identified    New illness, injury, or hospitalization: No    Medication/supplement changes: None noted    Signs or symptoms of bleeding or clotting: No    Previous INR: Therapeutic last 2(+) visits    Additional findings: None     PLAN     Recommended plan for no diet, medication or health factor changes affecting INR     Dosing Instructions: Continue your current warfarin dose with next INR in 6 weeks       Summary  As of 12/23/2021    Full warfarin instructions:  7.5 mg every Tue, Thu; 5 mg all other days   Next INR check:  2/3/2022             Telephone call with Panda who verbalizes understanding and agrees to plan    Patient offered & declined to schedule next visit    Education provided: Goal range and significance of current result and Contact [phone] with any changes, questions or concerns.     Plan made per [facility] anticoagulation protocol    Mackenzie Ross RN  Anticoagulation Clinic  12/23/2021    _______________________________________________________________________     Anticoagulation Episode Summary     Current INR goal:  2.0-3.0   TTR:  88.3 % (11.7 mo)   Target end date:  Indefinite   Send INR reminders to:  [facility]    Indications    Atrial fibrillation  unspecified type (H) [I48.91]           Comments:           Anticoagulation Care Providers     Provider Role Specialty Phone number    Ben Hamlin MD Referring Family Medicine [phone]

## 2021-12-27 ENCOUNTER — TELEPHONE (OUTPATIENT)
Dept: FAMILY MEDICINE | Facility: CLINIC | Age: 66
End: 2021-12-27
Payer: COMMERCIAL

## 2021-12-27 ENCOUNTER — NURSE TRIAGE (OUTPATIENT)
Dept: FAMILY MEDICINE | Facility: CLINIC | Age: 66
End: 2021-12-27
Payer: COMMERCIAL

## 2021-12-27 NOTE — TELEPHONE ENCOUNTER
Reason for call:  Patient reporting a symptom    Symptom or request: bad cold,runny nose,coughing , Hacking up mucus     Duration (how long have symptoms been present): 1 week    Have you been treated for this before? Yes    Additional comments: pt would like a rx called in for cough medicine  He has been given cough medicine with codeine in the past    he has appt 01/11 with      Phone Number patient can be reached at:  Work number on file:  [phone] (work)    Best Time:  Anytime     Can we leave a detailed message on this number:  YES    Call taken on 12/27/2021 at 11:05 AM by Tracy Ferrell

## 2021-12-27 NOTE — TELEPHONE ENCOUNTER
RN spoke to patient regarding his symptoms of bad cold,runny nose,coughing , Hacking up mucus.     Per protocol, patient needs to be seen now.     No appointments available today and patient declined walk in care. Patient prefers to see his PCP.     RN assisted patient to make an appointment.   Appointments in Next Year    Dec 28, 2021  3:20 PM  (Arrive by 3:00 PM)  Provider Visit with Ben Hamlin MD  [facility] [phone]             RN advised patient to call back with any questions. Patient verbalized understanding and agreed with plan.        Araseli Tong RN  [facility]      Reason for Disposition    Wheezing is present    Additional Information    Negative: Bluish (or gray) lips or face    Negative: Severe difficulty breathing (e.g., struggling for each breath, speaks in single words)    Negative: Rapid onset of cough and has hives    Negative: Coughing started suddenly after medicine, an allergic food or bee sting    Negative: Difficulty breathing after exposure to flames, smoke, or fumes    Negative: Sounds like a life-threatening emergency to the triager    Negative: Previous asthma attacks and this feels like asthma attack    Negative: Chest pain present when not coughing    Negative: Difficulty breathing    Negative: Passed out (i.e., fainted, collapsed and was not responding)    Negative: Patient sounds very sick or weak to the triager    Negative: Coughed up > 1 tablespoon (15 ml) blood (Exception: blood-tinged sputum)    Negative: Fever > 103 F (39.4 C)    Negative: Fever > 101 F (38.3 C) and over 60 years of age    Negative: Fever > 100.0 F (37.8 C) and has diabetes mellitus or a weak immune system (e.g., HIV positive, cancer chemotherapy, organ transplant, splenectomy, chronic steroids)    Negative: Fever > 100.0 F (37.8 C) and bedridden (e.g., nursing home patient, stroke, chronic illness, recovering from  "surgery)    Negative: Increasing ankle swelling    Answer Assessment - Initial Assessment Questions  1. ONSET: \"When did the cough begin?\"       A week ago    2. SEVERITY: \"How bad is the cough today?\"   Anytime can happen  But no coughing spell  Dry cough    3. RESPIRATORY DISTRESS: \"Describe your breathing.\"   No difficulty breathing  Lots of wheezing    4. FEVER: \"Do you have a fever?\" If so, ask: \"What is your temperature, how was it measured, and when did it start?\"  Slight fever 99.6    5. HEMOPTYSIS: \"Are you coughing up any blood?\" If so ask: \"How much?\" (flecks, streaks, tablespoons, etc.)  No    6. TREATMENT: \"What have you done so far to treat the cough?\" (e.g., meds, fluids, humidifier)  No    7. CARDIAC HISTORY: \"Do you have any history of heart disease?\" (e.g., heart attack, congestive heart failure)   yes    8. LUNG HISTORY: \"Do you have any history of lung disease?\"  (e.g., pulmonary embolus, asthma, emphysema)  No    9. PE RISK FACTORS: \"Do you have a history of blood clots?\" (or: recent major surgery, recent prolonged travel, bedridden)  No    10. OTHER SYMPTOMS: \"Do you have any other symptoms? (e.g., runny nose, wheezing, chest pain)  Wheezing  Patient has tiny bit of chest pain due to coughing hard  Runny nose  Sneeze out some blood    11. PREGNANCY: \"Is there any chance you are pregnant?\" \"When was your last menstrual period?\"  No    12. TRAVEL: \"Have you traveled out of the country in the last month?\" (e.g., travel history, exposures)  No    Protocols used: COUGH-A-OH      "

## 2021-12-27 NOTE — TELEPHONE ENCOUNTER
Please see nurse triage for more information.        Araseli Tong RN  [facility]

## 2021-12-28 ENCOUNTER — VIRTUAL VISIT (OUTPATIENT)
Dept: FAMILY MEDICINE | Facility: CLINIC | Age: 66
End: 2021-12-28
Payer: COMMERCIAL

## 2021-12-28 DIAGNOSIS — J06.9 UPPER RESPIRATORY TRACT INFECTION, UNSPECIFIED TYPE: Primary | ICD-10-CM

## 2021-12-28 DIAGNOSIS — J06.9 VIRAL URI WITH COUGH: Primary | ICD-10-CM

## 2021-12-28 PROCEDURE — 99215 OFFICE O/P EST HI 40 MIN: CPT | Performed by: FAMILY MEDICINE

## 2021-12-28 RX ORDER — CODEINE PHOSPHATE AND GUAIFENESIN 10; 100 MG/5ML; MG/5ML
1-2 SOLUTION ORAL EVERY 4 HOURS PRN
Qty: 150 ML | Refills: 0 | Status: ON HOLD | OUTPATIENT
Start: 2021-12-28 | End: 2022-01-30

## 2021-12-28 NOTE — PROGRESS NOTES
Panda is a 66 year old who is being evaluated via a billable telephone visit.      What phone number would you like to be contacted at? [phone]    How would you like to obtain your AVs? Mail a co  (    1. Viral URI with cough  Versus bronchitis  Check Covid  Follow-up in person  Requesting Robitussin with codeine for symptom control.  - guaiFENesin-codeine (ROBITUSSIN AC) 100-10 MG/5ML solution; Take 5-10 mLs by mouth every 4 hours as needed for cough  Dispense: 150 mL; Refill: 0  Patient is not vaccinated for Covid.  Extreme obesity puts him at high risk.  Strongly encouraged him to do this and will plan to do this when he follows up    66-year-old  2 weeks of runny nose, congestion, cough.  No real shortness of breath  No fevers or chills, no loss of taste or smell  No exposures to Covid  Not vaccinated  Not currently a smoker      Also having severe knee pain.  Was hoping to be seen so he can get any injection.      No shortness of breath on phone call today.  Speaking clearly in full sentences    Phone call duration: 70 minutes

## 2021-12-28 NOTE — TELEPHONE ENCOUNTER
I am okay calling in a cough med  I cant do that from home right now  But he should be tested for covid- unless he has been  Lmk- I can put in orders for that

## 2021-12-28 NOTE — TELEPHONE ENCOUNTER
RN attempted to call patient to relay Dr. Hamlin's message:    I am okay calling in a cough med  I cant do that from home right now  But he should be tested for covid- unless he has been  Lmk- I can put in orders for that      Patient did not answer. Will try to contact him again.          Araseli Tong RN  [facility]

## 2021-12-28 NOTE — TELEPHONE ENCOUNTER
RN spoke with patient to relay Dr. Hamlin's message:  I am okay calling in a cough med  I cant do that from home right now  But he should be tested for covid- unless he has been  Lmk- I can put in orders for that      Patient has not have COVID-19 test yet. Patient agreed to get COVID-19 test.    Patient wants to come in this afternoon.  Patient wants cortisone shot for his knee. Patient wants  to send cough medication to his preferred pharmacy at [facility].     [id number] [place] [address] AT [facility] [address]       Gave patient COVID-19 testing number :[phone].    RN will route this message to Dr. Hamlin to review and advise.        Araseli Tong RN  [facility]

## 2022-01-01 ENCOUNTER — TELEPHONE (OUTPATIENT)
Dept: ANTICOAGULATION | Facility: CLINIC | Age: 67
End: 2022-01-01
Payer: COMMERCIAL

## 2022-01-01 ENCOUNTER — TELEPHONE (OUTPATIENT)
Dept: ANTICOAGULATION | Facility: CLINIC | Age: 67
End: 2022-01-01

## 2022-01-01 ENCOUNTER — APPOINTMENT (OUTPATIENT)
Dept: PHYSICAL THERAPY | Facility: SKILLED NURSING FACILITY | Age: 67
DRG: 948 | End: 2022-01-01
Attending: INTERNAL MEDICINE
Payer: COMMERCIAL

## 2022-01-01 ENCOUNTER — ANTICOAGULATION THERAPY VISIT (OUTPATIENT)
Dept: ANTICOAGULATION | Facility: CLINIC | Age: 67
End: 2022-01-01

## 2022-01-01 ENCOUNTER — APPOINTMENT (OUTPATIENT)
Dept: PHYSICAL THERAPY | Facility: CLINIC | Age: 67
DRG: 291 | End: 2022-01-01
Attending: INTERNAL MEDICINE
Payer: COMMERCIAL

## 2022-01-01 ENCOUNTER — TELEPHONE (OUTPATIENT)
Dept: FAMILY MEDICINE | Facility: CLINIC | Age: 67
End: 2022-01-01

## 2022-01-01 ENCOUNTER — APPOINTMENT (OUTPATIENT)
Dept: PHYSICAL THERAPY | Facility: CLINIC | Age: 67
DRG: 641 | End: 2022-01-01
Payer: COMMERCIAL

## 2022-01-01 ENCOUNTER — LAB REQUISITION (OUTPATIENT)
Dept: LAB | Facility: CLINIC | Age: 67
End: 2022-01-01
Payer: COMMERCIAL

## 2022-01-01 ENCOUNTER — HOSPITAL ENCOUNTER (EMERGENCY)
Facility: CLINIC | Age: 67
Discharge: HOME | End: 2022-06-22
Admitting: NURSE PRACTITIONER
Payer: COMMERCIAL

## 2022-01-01 ENCOUNTER — APPOINTMENT (OUTPATIENT)
Dept: OCCUPATIONAL THERAPY | Facility: CLINIC | Age: 67
DRG: 291 | End: 2022-01-01
Attending: INTERNAL MEDICINE
Payer: COMMERCIAL

## 2022-01-01 ENCOUNTER — APPOINTMENT (OUTPATIENT)
Dept: OCCUPATIONAL THERAPY | Facility: SKILLED NURSING FACILITY | Age: 67
DRG: 948 | End: 2022-01-01
Attending: INTERNAL MEDICINE
Payer: COMMERCIAL

## 2022-01-01 ENCOUNTER — PATIENT OUTREACH (OUTPATIENT)
Dept: CARE COORDINATION | Facility: CLINIC | Age: 67
End: 2022-01-01
Payer: COMMERCIAL

## 2022-01-01 ENCOUNTER — APPOINTMENT (OUTPATIENT)
Dept: PHYSICAL THERAPY | Facility: CLINIC | Age: 67
DRG: 641 | End: 2022-01-01
Attending: STUDENT IN AN ORGANIZED HEALTH CARE EDUCATION/TRAINING PROGRAM
Payer: COMMERCIAL

## 2022-01-01 ENCOUNTER — PATIENT OUTREACH (OUTPATIENT)
Dept: CARE COORDINATION | Facility: CLINIC | Age: 67
End: 2022-01-01

## 2022-01-01 ENCOUNTER — LAB (OUTPATIENT)
Dept: LAB | Facility: CLINIC | Age: 67
End: 2022-01-01
Payer: COMMERCIAL

## 2022-01-01 ENCOUNTER — APPOINTMENT (OUTPATIENT)
Dept: ULTRASOUND IMAGING | Facility: CLINIC | Age: 67
DRG: 641 | End: 2022-01-01
Attending: INTERNAL MEDICINE
Payer: COMMERCIAL

## 2022-01-01 ENCOUNTER — APPOINTMENT (OUTPATIENT)
Dept: GENERAL RADIOLOGY | Facility: CLINIC | Age: 67
DRG: 291 | End: 2022-01-01
Attending: INTERNAL MEDICINE
Payer: COMMERCIAL

## 2022-01-01 ENCOUNTER — DISCHARGE SUMMARY NURSING HOME (OUTPATIENT)
Dept: GERIATRICS | Facility: CLINIC | Age: 67
End: 2022-01-01
Payer: COMMERCIAL

## 2022-01-01 ENCOUNTER — OFFICE VISIT (OUTPATIENT)
Dept: FAMILY MEDICINE | Facility: CLINIC | Age: 67
End: 2022-01-01
Payer: COMMERCIAL

## 2022-01-01 ENCOUNTER — APPOINTMENT (OUTPATIENT)
Dept: LAB | Facility: CLINIC | Age: 67
DRG: 291 | End: 2022-01-01
Attending: INTERNAL MEDICINE
Payer: COMMERCIAL

## 2022-01-01 ENCOUNTER — TRANSFERRED RECORDS (OUTPATIENT)
Dept: HEALTH INFORMATION MANAGEMENT | Facility: CLINIC | Age: 67
End: 2022-01-01

## 2022-01-01 ENCOUNTER — HOSPITAL ENCOUNTER (INPATIENT)
Facility: CLINIC | Age: 67
LOS: 20 days | Discharge: NURSING, REHAB OR LONG-TERM CARE | DRG: 641 | End: 2022-03-18
Attending: EMERGENCY MEDICINE | Admitting: INTERNAL MEDICINE
Payer: COMMERCIAL

## 2022-01-01 ENCOUNTER — APPOINTMENT (OUTPATIENT)
Dept: GENERAL RADIOLOGY | Facility: CLINIC | Age: 67
End: 2022-01-01
Attending: INTERNAL MEDICINE
Payer: COMMERCIAL

## 2022-01-01 ENCOUNTER — HOSPITAL ENCOUNTER (INPATIENT)
Facility: CLINIC | Age: 67
LOS: 32 days | Discharge: HOME HEALTH | DRG: 291 | End: 2022-05-17
Attending: INTERNAL MEDICINE | Admitting: INTERNAL MEDICINE
Payer: COMMERCIAL

## 2022-01-01 ENCOUNTER — MEDICAL CORRESPONDENCE (OUTPATIENT)
Dept: HEALTH INFORMATION MANAGEMENT | Facility: CLINIC | Age: 67
End: 2022-01-01

## 2022-01-01 ENCOUNTER — PATIENT OUTREACH (OUTPATIENT)
Dept: NURSING | Facility: CLINIC | Age: 67
End: 2022-01-01

## 2022-01-01 ENCOUNTER — TRANSITIONAL CARE UNIT VISIT (OUTPATIENT)
Dept: GERIATRICS | Facility: CLINIC | Age: 67
End: 2022-01-01
Payer: COMMERCIAL

## 2022-01-01 ENCOUNTER — APPOINTMENT (OUTPATIENT)
Dept: PHYSICAL THERAPY | Facility: CLINIC | Age: 67
DRG: 641 | End: 2022-01-01
Attending: NURSE PRACTITIONER
Payer: COMMERCIAL

## 2022-01-01 ENCOUNTER — MEDICAL CORRESPONDENCE (OUTPATIENT)
Dept: HEALTH INFORMATION MANAGEMENT | Facility: CLINIC | Age: 67
End: 2022-01-01
Payer: COMMERCIAL

## 2022-01-01 ENCOUNTER — HOSPITAL ENCOUNTER (INPATIENT)
Facility: SKILLED NURSING FACILITY | Age: 67
LOS: 28 days | Discharge: ANOTHER HOSPITAL | DRG: 948 | End: 2022-04-15
Attending: INTERNAL MEDICINE | Admitting: INTERNAL MEDICINE
Payer: COMMERCIAL

## 2022-01-01 ENCOUNTER — DOCUMENTATION ONLY (OUTPATIENT)
Dept: ANTICOAGULATION | Facility: CLINIC | Age: 67
End: 2022-01-01
Payer: COMMERCIAL

## 2022-01-01 ENCOUNTER — THERAPY VISIT (OUTPATIENT)
Dept: PHYSICAL THERAPY | Facility: CLINIC | Age: 67
End: 2022-01-01
Attending: FAMILY MEDICINE
Payer: COMMERCIAL

## 2022-01-01 ENCOUNTER — DOCUMENTATION ONLY (OUTPATIENT)
Dept: GERIATRICS | Facility: CLINIC | Age: 67
End: 2022-01-01

## 2022-01-01 ENCOUNTER — TELEPHONE (OUTPATIENT)
Dept: GERIATRICS | Facility: CLINIC | Age: 67
End: 2022-01-01

## 2022-01-01 ENCOUNTER — APPOINTMENT (OUTPATIENT)
Dept: LAB | Facility: CLINIC | Age: 67
End: 2022-01-01
Payer: COMMERCIAL

## 2022-01-01 ENCOUNTER — TELEPHONE (OUTPATIENT)
Dept: FAMILY MEDICINE | Facility: CLINIC | Age: 67
End: 2022-01-01
Payer: COMMERCIAL

## 2022-01-01 ENCOUNTER — PATIENT OUTREACH (OUTPATIENT)
Dept: NURSING | Facility: CLINIC | Age: 67
End: 2022-01-01
Payer: COMMERCIAL

## 2022-01-01 ENCOUNTER — ANTICOAGULATION THERAPY VISIT (OUTPATIENT)
Dept: ANTICOAGULATION | Facility: CLINIC | Age: 67
End: 2022-01-01
Payer: COMMERCIAL

## 2022-01-01 ENCOUNTER — HOSPITAL ENCOUNTER (INPATIENT)
Facility: SKILLED NURSING FACILITY | Age: 67
End: 2022-01-01
Payer: COMMERCIAL

## 2022-01-01 ENCOUNTER — APPOINTMENT (OUTPATIENT)
Dept: PHYSICAL THERAPY | Facility: CLINIC | Age: 67
DRG: 641 | End: 2022-01-01
Attending: INTERNAL MEDICINE
Payer: COMMERCIAL

## 2022-01-01 VITALS
BODY MASS INDEX: 36.45 KG/M2 | OXYGEN SATURATION: 100 % | WEIGHT: 315 LBS | RESPIRATION RATE: 18 BRPM | SYSTOLIC BLOOD PRESSURE: 119 MMHG | HEIGHT: 78 IN | TEMPERATURE: 97.5 F | HEART RATE: 85 BPM | DIASTOLIC BLOOD PRESSURE: 75 MMHG

## 2022-01-01 VITALS
SYSTOLIC BLOOD PRESSURE: 140 MMHG | HEIGHT: 78 IN | HEART RATE: 84 BPM | RESPIRATION RATE: 18 BRPM | BODY MASS INDEX: 36.45 KG/M2 | DIASTOLIC BLOOD PRESSURE: 74 MMHG | OXYGEN SATURATION: 98 % | WEIGHT: 315 LBS

## 2022-01-01 VITALS
DIASTOLIC BLOOD PRESSURE: 76 MMHG | SYSTOLIC BLOOD PRESSURE: 140 MMHG | OXYGEN SATURATION: 100 % | RESPIRATION RATE: 18 BRPM | BODY MASS INDEX: 36.45 KG/M2 | TEMPERATURE: 97.7 F | HEIGHT: 78 IN | HEART RATE: 95 BPM | WEIGHT: 315 LBS

## 2022-01-01 VITALS
RESPIRATION RATE: 18 BRPM | OXYGEN SATURATION: 92 % | HEIGHT: 78 IN | BODY MASS INDEX: 36.45 KG/M2 | WEIGHT: 315 LBS | DIASTOLIC BLOOD PRESSURE: 68 MMHG | TEMPERATURE: 97.6 F | HEART RATE: 80 BPM | SYSTOLIC BLOOD PRESSURE: 119 MMHG

## 2022-01-01 VITALS
BODY MASS INDEX: 47.67 KG/M2 | HEART RATE: 84 BPM | RESPIRATION RATE: 16 BRPM | TEMPERATURE: 97.3 F | SYSTOLIC BLOOD PRESSURE: 96 MMHG | DIASTOLIC BLOOD PRESSURE: 53 MMHG | WEIGHT: 315 LBS | OXYGEN SATURATION: 99 %

## 2022-01-01 VITALS
DIASTOLIC BLOOD PRESSURE: 80 MMHG | TEMPERATURE: 98.2 F | HEART RATE: 89 BPM | BODY MASS INDEX: 36.45 KG/M2 | SYSTOLIC BLOOD PRESSURE: 112 MMHG | HEIGHT: 78 IN | RESPIRATION RATE: 19 BRPM | WEIGHT: 315 LBS | OXYGEN SATURATION: 100 %

## 2022-01-01 VITALS
HEART RATE: 97 BPM | WEIGHT: 315 LBS | DIASTOLIC BLOOD PRESSURE: 70 MMHG | SYSTOLIC BLOOD PRESSURE: 106 MMHG | BODY MASS INDEX: 48.88 KG/M2 | RESPIRATION RATE: 16 BRPM

## 2022-01-01 VITALS
SYSTOLIC BLOOD PRESSURE: 137 MMHG | DIASTOLIC BLOOD PRESSURE: 82 MMHG | WEIGHT: 315 LBS | BODY MASS INDEX: 48.19 KG/M2 | HEART RATE: 113 BPM | OXYGEN SATURATION: 99 % | RESPIRATION RATE: 18 BRPM | TEMPERATURE: 97.6 F

## 2022-01-01 VITALS
DIASTOLIC BLOOD PRESSURE: 61 MMHG | OXYGEN SATURATION: 100 % | BODY MASS INDEX: 62.63 KG/M2 | SYSTOLIC BLOOD PRESSURE: 110 MMHG | WEIGHT: 315 LBS | TEMPERATURE: 97.5 F | RESPIRATION RATE: 18 BRPM | HEART RATE: 72 BPM

## 2022-01-01 DIAGNOSIS — M17.10 ARTHRITIS OF KNEE: ICD-10-CM

## 2022-01-01 DIAGNOSIS — I50.22 CHRONIC SYSTOLIC CONGESTIVE HEART FAILURE (H): ICD-10-CM

## 2022-01-01 DIAGNOSIS — M54.9 CHRONIC BILATERAL BACK PAIN, UNSPECIFIED BACK LOCATION: Primary | ICD-10-CM

## 2022-01-01 DIAGNOSIS — I48.91 ATRIAL FIBRILLATION, UNSPECIFIED TYPE (H): Primary | ICD-10-CM

## 2022-01-01 DIAGNOSIS — I48.91 ATRIAL FIBRILLATION, UNSPECIFIED TYPE (H): ICD-10-CM

## 2022-01-01 DIAGNOSIS — N25.81 SECONDARY RENAL HYPERPARATHYROIDISM (H): ICD-10-CM

## 2022-01-01 DIAGNOSIS — N18.4 CKD (CHRONIC KIDNEY DISEASE) STAGE 4, GFR 15-29 ML/MIN (H): ICD-10-CM

## 2022-01-01 DIAGNOSIS — M54.51 VERTEBROGENIC LOW BACK PAIN: ICD-10-CM

## 2022-01-01 DIAGNOSIS — I50.32 CHRONIC DIASTOLIC HEART FAILURE (H): ICD-10-CM

## 2022-01-01 DIAGNOSIS — M54.9 CHRONIC BILATERAL BACK PAIN, UNSPECIFIED BACK LOCATION: ICD-10-CM

## 2022-01-01 DIAGNOSIS — N19 RENAL FAILURE, UNSPECIFIED CHRONICITY: ICD-10-CM

## 2022-01-01 DIAGNOSIS — M10.9 GOUT, UNSPECIFIED CAUSE, UNSPECIFIED CHRONICITY, UNSPECIFIED SITE: ICD-10-CM

## 2022-01-01 DIAGNOSIS — L97.929 CHRONIC VENOUS HYPERTENSION (IDIOPATHIC) WITH ULCER OF LEFT LOWER EXTREMITY (H): ICD-10-CM

## 2022-01-01 DIAGNOSIS — I50.30 DIASTOLIC CONGESTIVE HEART FAILURE, UNSPECIFIED HF CHRONICITY (H): ICD-10-CM

## 2022-01-01 DIAGNOSIS — M48.062 SPINAL STENOSIS, LUMBAR REGION, WITH NEUROGENIC CLAUDICATION: Primary | ICD-10-CM

## 2022-01-01 DIAGNOSIS — I48.20 CHRONIC ATRIAL FIBRILLATION (H): ICD-10-CM

## 2022-01-01 DIAGNOSIS — E87.5 HYPERKALEMIA: Primary | ICD-10-CM

## 2022-01-01 DIAGNOSIS — I50.32 CHRONIC DIASTOLIC (CONGESTIVE) HEART FAILURE (H): ICD-10-CM

## 2022-01-01 DIAGNOSIS — Q21.0 VENTRICULAR SEPTAL DEFECT: ICD-10-CM

## 2022-01-01 DIAGNOSIS — D63.1 ANEMIA IN CHRONIC KIDNEY DISEASE (CODE): ICD-10-CM

## 2022-01-01 DIAGNOSIS — Z53.9 ERRONEOUS ENCOUNTER--DISREGARD: ICD-10-CM

## 2022-01-01 DIAGNOSIS — D61.818 PANCYTOPENIA (H): Primary | ICD-10-CM

## 2022-01-01 DIAGNOSIS — I87.312 CHRONIC VENOUS HYPERTENSION (IDIOPATHIC) WITH ULCER OF LEFT LOWER EXTREMITY (H): ICD-10-CM

## 2022-01-01 DIAGNOSIS — Z00.00 HEALTHCARE MAINTENANCE: ICD-10-CM

## 2022-01-01 DIAGNOSIS — E66.01 MORBID OBESITY (H): ICD-10-CM

## 2022-01-01 DIAGNOSIS — K59.00 CONSTIPATION, UNSPECIFIED CONSTIPATION TYPE: ICD-10-CM

## 2022-01-01 DIAGNOSIS — G89.29 CHRONIC BILATERAL BACK PAIN, UNSPECIFIED BACK LOCATION: ICD-10-CM

## 2022-01-01 DIAGNOSIS — R52 ACHES: Primary | ICD-10-CM

## 2022-01-01 DIAGNOSIS — I10 ESSENTIAL HYPERTENSION: ICD-10-CM

## 2022-01-01 DIAGNOSIS — M11.20 CALCIUM PYROPHOSPHATE DEPOSITION DISEASE: Primary | ICD-10-CM

## 2022-01-01 DIAGNOSIS — G89.29 CHRONIC BILATERAL BACK PAIN, UNSPECIFIED BACK LOCATION: Primary | ICD-10-CM

## 2022-01-01 DIAGNOSIS — I34.0 NON-RHEUMATIC MITRAL REGURGITATION: ICD-10-CM

## 2022-01-01 DIAGNOSIS — I48.21 PERMANENT ATRIAL FIBRILLATION (H): ICD-10-CM

## 2022-01-01 DIAGNOSIS — I10 ESSENTIAL HYPERTENSION: Primary | ICD-10-CM

## 2022-01-01 DIAGNOSIS — B37.2 YEAST INFECTION OF THE SKIN: ICD-10-CM

## 2022-01-01 DIAGNOSIS — Z76.0 ENCOUNTER FOR MEDICATION REFILL: ICD-10-CM

## 2022-01-01 DIAGNOSIS — K21.00 GASTROESOPHAGEAL REFLUX DISEASE WITH ESOPHAGITIS, UNSPECIFIED WHETHER HEMORRHAGE: ICD-10-CM

## 2022-01-01 DIAGNOSIS — M79.2 NEUROPATHIC PAIN: ICD-10-CM

## 2022-01-01 DIAGNOSIS — E87.5 HYPERKALEMIA: ICD-10-CM

## 2022-01-01 DIAGNOSIS — Z09 HOSPITAL DISCHARGE FOLLOW-UP: ICD-10-CM

## 2022-01-01 DIAGNOSIS — M17.31 POST-TRAUMATIC OSTEOARTHRITIS OF RIGHT KNEE: ICD-10-CM

## 2022-01-01 DIAGNOSIS — R52 PAIN: ICD-10-CM

## 2022-01-01 DIAGNOSIS — M54.50 ACUTE BILATERAL LOW BACK PAIN WITHOUT SCIATICA: ICD-10-CM

## 2022-01-01 DIAGNOSIS — I50.32 CHRONIC DIASTOLIC CONGESTIVE HEART FAILURE (H): ICD-10-CM

## 2022-01-01 DIAGNOSIS — M48.062 SPINAL STENOSIS, LUMBAR REGION WITH NEUROGENIC CLAUDICATION: ICD-10-CM

## 2022-01-01 DIAGNOSIS — Z11.59 NEED FOR HEPATITIS C SCREENING TEST: ICD-10-CM

## 2022-01-01 DIAGNOSIS — M53.3 SACROILIAC JOINT PAIN: ICD-10-CM

## 2022-01-01 DIAGNOSIS — M62.81 GENERALIZED MUSCLE WEAKNESS: ICD-10-CM

## 2022-01-01 DIAGNOSIS — M54.59 INTRACTABLE LOW BACK PAIN: ICD-10-CM

## 2022-01-01 DIAGNOSIS — M54.50 ACUTE BILATERAL LOW BACK PAIN WITHOUT SCIATICA: Primary | ICD-10-CM

## 2022-01-01 DIAGNOSIS — R53.1 GENERALIZED WEAKNESS: Primary | ICD-10-CM

## 2022-01-01 DIAGNOSIS — H04.123 DRY EYES: ICD-10-CM

## 2022-01-01 DIAGNOSIS — C18.7 MALIGNANT NEOPLASM OF SIGMOID COLON (H): ICD-10-CM

## 2022-01-01 DIAGNOSIS — E61.1 IRON DEFICIENCY: ICD-10-CM

## 2022-01-01 LAB
ALBUMIN SERPL-MCNC: 2.4 G/DL (ref 3.4–5)
ALBUMIN SERPL-MCNC: 2.7 G/DL (ref 3.4–5)
ALP SERPL-CCNC: 68 U/L (ref 40–150)
ALT SERPL W P-5'-P-CCNC: 16 U/L (ref 0–70)
ANION GAP SERPL CALCULATED.3IONS-SCNC: 1 MMOL/L (ref 3–14)
ANION GAP SERPL CALCULATED.3IONS-SCNC: 1 MMOL/L (ref 3–14)
ANION GAP SERPL CALCULATED.3IONS-SCNC: 10 MMOL/L (ref 3–14)
ANION GAP SERPL CALCULATED.3IONS-SCNC: 10 MMOL/L (ref 3–14)
ANION GAP SERPL CALCULATED.3IONS-SCNC: 11 MMOL/L (ref 5–18)
ANION GAP SERPL CALCULATED.3IONS-SCNC: 12 MMOL/L (ref 3–14)
ANION GAP SERPL CALCULATED.3IONS-SCNC: 12 MMOL/L (ref 3–14)
ANION GAP SERPL CALCULATED.3IONS-SCNC: 13 MMOL/L (ref 7–15)
ANION GAP SERPL CALCULATED.3IONS-SCNC: 15 MMOL/L (ref 5–18)
ANION GAP SERPL CALCULATED.3IONS-SCNC: 2 MMOL/L (ref 3–14)
ANION GAP SERPL CALCULATED.3IONS-SCNC: 3 MMOL/L (ref 3–14)
ANION GAP SERPL CALCULATED.3IONS-SCNC: 4 MMOL/L (ref 3–14)
ANION GAP SERPL CALCULATED.3IONS-SCNC: 5 MMOL/L (ref 3–14)
ANION GAP SERPL CALCULATED.3IONS-SCNC: 6 MMOL/L (ref 3–14)
ANION GAP SERPL CALCULATED.3IONS-SCNC: 7 MMOL/L (ref 3–14)
ANION GAP SERPL CALCULATED.3IONS-SCNC: 8 MMOL/L (ref 3–14)
ANION GAP SERPL CALCULATED.3IONS-SCNC: 9 MMOL/L (ref 3–14)
AST SERPL W P-5'-P-CCNC: 12 U/L (ref 0–45)
ATRIAL RATE - MUSE: 75 BPM
BASE EXCESS BLDV CALC-SCNC: -2.4 MMOL/L (ref -7.7–1.9)
BASE EXCESS BLDV CALC-SCNC: -2.9 MMOL/L (ref -7.7–1.9)
BASOPHILS # BLD AUTO: 0 10E3/UL (ref 0–0.2)
BASOPHILS NFR BLD AUTO: 0 %
BILIRUB DIRECT SERPL-MCNC: 0.2 MG/DL (ref 0–0.2)
BILIRUB SERPL-MCNC: 0.5 MG/DL (ref 0.2–1.3)
BUN SERPL-MCNC: 21 MG/DL (ref 7–30)
BUN SERPL-MCNC: 22 MG/DL (ref 7–30)
BUN SERPL-MCNC: 23 MG/DL (ref 7–30)
BUN SERPL-MCNC: 23 MG/DL (ref 7–30)
BUN SERPL-MCNC: 24 MG/DL (ref 7–30)
BUN SERPL-MCNC: 24 MG/DL (ref 7–30)
BUN SERPL-MCNC: 25 MG/DL (ref 7–30)
BUN SERPL-MCNC: 26 MG/DL (ref 7–30)
BUN SERPL-MCNC: 26 MG/DL (ref 7–30)
BUN SERPL-MCNC: 27 MG/DL (ref 7–30)
BUN SERPL-MCNC: 28 MG/DL (ref 7–30)
BUN SERPL-MCNC: 28 MG/DL (ref 7–30)
BUN SERPL-MCNC: 29 MG/DL (ref 7–30)
BUN SERPL-MCNC: 30 MG/DL (ref 7–30)
BUN SERPL-MCNC: 31 MG/DL (ref 7–30)
BUN SERPL-MCNC: 32 MG/DL (ref 7–30)
BUN SERPL-MCNC: 36 MG/DL (ref 7–30)
BUN SERPL-MCNC: 40 MG/DL (ref 7–30)
BUN SERPL-MCNC: 40 MG/DL (ref 7–30)
BUN SERPL-MCNC: 41 MG/DL (ref 8–22)
BUN SERPL-MCNC: 43 MG/DL (ref 7–30)
BUN SERPL-MCNC: 45 MG/DL (ref 7–30)
BUN SERPL-MCNC: 45 MG/DL (ref 7–30)
BUN SERPL-MCNC: 46 MG/DL (ref 7–30)
BUN SERPL-MCNC: 48 MG/DL (ref 7–30)
BUN SERPL-MCNC: 49 MG/DL (ref 7–30)
BUN SERPL-MCNC: 49.2 MG/DL (ref 8–23)
BUN SERPL-MCNC: 55 MG/DL (ref 7–30)
BUN SERPL-MCNC: 55 MG/DL (ref 7–30)
BUN SERPL-MCNC: 56 MG/DL (ref 7–30)
BUN SERPL-MCNC: 57 MG/DL (ref 7–30)
BUN SERPL-MCNC: 58 MG/DL (ref 7–30)
BUN SERPL-MCNC: 58 MG/DL (ref 8–22)
BUN SERPL-MCNC: 59 MG/DL (ref 7–30)
BUN SERPL-MCNC: 59 MG/DL (ref 7–30)
BUN SERPL-MCNC: 61 MG/DL (ref 7–30)
BUN SERPL-MCNC: 62 MG/DL (ref 7–30)
BUN SERPL-MCNC: 63 MG/DL (ref 7–30)
BUN SERPL-MCNC: 64 MG/DL (ref 7–30)
BUN SERPL-MCNC: 65 MG/DL (ref 7–30)
BUN SERPL-MCNC: 67 MG/DL (ref 7–30)
BUN SERPL-MCNC: 69 MG/DL (ref 7–30)
BUN SERPL-MCNC: 73 MG/DL (ref 7–30)
BUN SERPL-MCNC: 75 MG/DL (ref 7–30)
BUN SERPL-MCNC: 76 MG/DL (ref 7–30)
BUN SERPL-MCNC: 81 MG/DL (ref 7–30)
BUN SERPL-MCNC: 81 MG/DL (ref 7–30)
CALCIUM SERPL-MCNC: 10 MG/DL (ref 8.5–10.1)
CALCIUM SERPL-MCNC: 10 MG/DL (ref 8.5–10.1)
CALCIUM SERPL-MCNC: 10.1 MG/DL (ref 8.5–10.1)
CALCIUM SERPL-MCNC: 8.3 MG/DL (ref 8.5–10.1)
CALCIUM SERPL-MCNC: 8.4 MG/DL (ref 8.5–10.1)
CALCIUM SERPL-MCNC: 8.5 MG/DL (ref 8.5–10.1)
CALCIUM SERPL-MCNC: 8.5 MG/DL (ref 8.5–10.1)
CALCIUM SERPL-MCNC: 8.6 MG/DL (ref 8.5–10.1)
CALCIUM SERPL-MCNC: 8.7 MG/DL (ref 8.5–10.1)
CALCIUM SERPL-MCNC: 8.8 MG/DL (ref 8.5–10.1)
CALCIUM SERPL-MCNC: 8.9 MG/DL (ref 8.5–10.1)
CALCIUM SERPL-MCNC: 9 MG/DL (ref 8.5–10.1)
CALCIUM SERPL-MCNC: 9.1 MG/DL (ref 8.5–10.1)
CALCIUM SERPL-MCNC: 9.1 MG/DL (ref 8.5–10.1)
CALCIUM SERPL-MCNC: 9.2 MG/DL (ref 8.5–10.1)
CALCIUM SERPL-MCNC: 9.3 MG/DL (ref 8.5–10.1)
CALCIUM SERPL-MCNC: 9.4 MG/DL (ref 8.5–10.1)
CALCIUM SERPL-MCNC: 9.4 MG/DL (ref 8.5–10.5)
CALCIUM SERPL-MCNC: 9.5 MG/DL (ref 8.5–10.1)
CALCIUM SERPL-MCNC: 9.5 MG/DL (ref 8.5–10.5)
CALCIUM SERPL-MCNC: 9.6 MG/DL (ref 8.5–10.1)
CALCIUM SERPL-MCNC: 9.6 MG/DL (ref 8.8–10.2)
CALCIUM SERPL-MCNC: 9.7 MG/DL (ref 8.5–10.1)
CALCIUM SERPL-MCNC: 9.8 MG/DL (ref 8.5–10.1)
CALCIUM SERPL-MCNC: 9.9 MG/DL (ref 8.5–10.1)
CHLORIDE BLD-SCNC: 100 MMOL/L (ref 94–109)
CHLORIDE BLD-SCNC: 100 MMOL/L (ref 98–107)
CHLORIDE BLD-SCNC: 101 MMOL/L (ref 94–109)
CHLORIDE BLD-SCNC: 102 MMOL/L (ref 94–109)
CHLORIDE BLD-SCNC: 102 MMOL/L (ref 94–109)
CHLORIDE BLD-SCNC: 103 MMOL/L (ref 94–109)
CHLORIDE BLD-SCNC: 103 MMOL/L (ref 94–109)
CHLORIDE BLD-SCNC: 103 MMOL/L (ref 98–107)
CHLORIDE BLD-SCNC: 104 MMOL/L (ref 94–109)
CHLORIDE BLD-SCNC: 105 MMOL/L (ref 94–109)
CHLORIDE BLD-SCNC: 105 MMOL/L (ref 94–109)
CHLORIDE BLD-SCNC: 107 MMOL/L (ref 94–109)
CHLORIDE BLD-SCNC: 108 MMOL/L (ref 94–109)
CHLORIDE BLD-SCNC: 109 MMOL/L (ref 94–109)
CHLORIDE BLD-SCNC: 109 MMOL/L (ref 94–109)
CHLORIDE BLD-SCNC: 110 MMOL/L (ref 94–109)
CHLORIDE BLD-SCNC: 111 MMOL/L (ref 94–109)
CHLORIDE BLD-SCNC: 112 MMOL/L (ref 94–109)
CHLORIDE BLD-SCNC: 112 MMOL/L (ref 94–109)
CHLORIDE BLD-SCNC: 113 MMOL/L (ref 94–109)
CHLORIDE BLD-SCNC: 113 MMOL/L (ref 94–109)
CHLORIDE BLD-SCNC: 97 MMOL/L (ref 94–109)
CHLORIDE BLD-SCNC: 98 MMOL/L (ref 94–109)
CHLORIDE BLD-SCNC: 99 MMOL/L (ref 94–109)
CHLORIDE SERPL-SCNC: 100 MMOL/L (ref 98–107)
CO2 SERPL-SCNC: 20 MMOL/L (ref 20–32)
CO2 SERPL-SCNC: 21 MMOL/L (ref 20–32)
CO2 SERPL-SCNC: 22 MMOL/L (ref 20–32)
CO2 SERPL-SCNC: 23 MMOL/L (ref 20–32)
CO2 SERPL-SCNC: 23 MMOL/L (ref 22–31)
CO2 SERPL-SCNC: 24 MMOL/L (ref 20–32)
CO2 SERPL-SCNC: 24 MMOL/L (ref 22–31)
CO2 SERPL-SCNC: 25 MMOL/L (ref 20–32)
CO2 SERPL-SCNC: 26 MMOL/L (ref 20–32)
CO2 SERPL-SCNC: 27 MMOL/L (ref 20–32)
CO2 SERPL-SCNC: 28 MMOL/L (ref 20–32)
CO2 SERPL-SCNC: 29 MMOL/L (ref 20–32)
CO2 SERPL-SCNC: 30 MMOL/L (ref 20–32)
CO2 SERPL-SCNC: 31 MMOL/L (ref 20–32)
CO2 SERPL-SCNC: 32 MMOL/L (ref 20–32)
CO2 SERPL-SCNC: 32 MMOL/L (ref 20–32)
CO2 SERPL-SCNC: 33 MMOL/L (ref 20–32)
CO2 SERPL-SCNC: 35 MMOL/L (ref 20–32)
CREAT SERPL-MCNC: 1.67 MG/DL (ref 0.66–1.25)
CREAT SERPL-MCNC: 1.74 MG/DL (ref 0.66–1.25)
CREAT SERPL-MCNC: 1.75 MG/DL (ref 0.66–1.25)
CREAT SERPL-MCNC: 1.75 MG/DL (ref 0.66–1.25)
CREAT SERPL-MCNC: 1.76 MG/DL (ref 0.66–1.25)
CREAT SERPL-MCNC: 1.81 MG/DL (ref 0.66–1.25)
CREAT SERPL-MCNC: 1.83 MG/DL (ref 0.66–1.25)
CREAT SERPL-MCNC: 1.87 MG/DL (ref 0.66–1.25)
CREAT SERPL-MCNC: 1.89 MG/DL (ref 0.66–1.25)
CREAT SERPL-MCNC: 1.89 MG/DL (ref 0.66–1.25)
CREAT SERPL-MCNC: 1.9 MG/DL (ref 0.66–1.25)
CREAT SERPL-MCNC: 1.93 MG/DL (ref 0.66–1.25)
CREAT SERPL-MCNC: 1.94 MG/DL (ref 0.66–1.25)
CREAT SERPL-MCNC: 1.95 MG/DL (ref 0.66–1.25)
CREAT SERPL-MCNC: 1.97 MG/DL (ref 0.66–1.25)
CREAT SERPL-MCNC: 1.98 MG/DL (ref 0.66–1.25)
CREAT SERPL-MCNC: 2.01 MG/DL (ref 0.66–1.25)
CREAT SERPL-MCNC: 2.02 MG/DL (ref 0.66–1.25)
CREAT SERPL-MCNC: 2.04 MG/DL (ref 0.66–1.25)
CREAT SERPL-MCNC: 2.05 MG/DL (ref 0.66–1.25)
CREAT SERPL-MCNC: 2.07 MG/DL (ref 0.66–1.25)
CREAT SERPL-MCNC: 2.07 MG/DL (ref 0.66–1.25)
CREAT SERPL-MCNC: 2.08 MG/DL (ref 0.66–1.25)
CREAT SERPL-MCNC: 2.12 MG/DL (ref 0.66–1.25)
CREAT SERPL-MCNC: 2.13 MG/DL (ref 0.66–1.25)
CREAT SERPL-MCNC: 2.15 MG/DL (ref 0.66–1.25)
CREAT SERPL-MCNC: 2.18 MG/DL (ref 0.66–1.25)
CREAT SERPL-MCNC: 2.19 MG/DL (ref 0.66–1.25)
CREAT SERPL-MCNC: 2.2 MG/DL (ref 0.66–1.25)
CREAT SERPL-MCNC: 2.23 MG/DL (ref 0.66–1.25)
CREAT SERPL-MCNC: 2.23 MG/DL (ref 0.66–1.25)
CREAT SERPL-MCNC: 2.24 MG/DL (ref 0.66–1.25)
CREAT SERPL-MCNC: 2.25 MG/DL (ref 0.66–1.25)
CREAT SERPL-MCNC: 2.28 MG/DL (ref 0.66–1.25)
CREAT SERPL-MCNC: 2.28 MG/DL (ref 0.66–1.25)
CREAT SERPL-MCNC: 2.29 MG/DL (ref 0.66–1.25)
CREAT SERPL-MCNC: 2.3 MG/DL (ref 0.66–1.25)
CREAT SERPL-MCNC: 2.32 MG/DL (ref 0.66–1.25)
CREAT SERPL-MCNC: 2.33 MG/DL (ref 0.66–1.25)
CREAT SERPL-MCNC: 2.35 MG/DL (ref 0.66–1.25)
CREAT SERPL-MCNC: 2.36 MG/DL (ref 0.66–1.25)
CREAT SERPL-MCNC: 2.37 MG/DL (ref 0.66–1.25)
CREAT SERPL-MCNC: 2.38 MG/DL (ref 0.66–1.25)
CREAT SERPL-MCNC: 2.38 MG/DL (ref 0.66–1.25)
CREAT SERPL-MCNC: 2.42 MG/DL (ref 0.66–1.25)
CREAT SERPL-MCNC: 2.45 MG/DL (ref 0.66–1.25)
CREAT SERPL-MCNC: 2.45 MG/DL (ref 0.66–1.25)
CREAT SERPL-MCNC: 2.46 MG/DL (ref 0.66–1.25)
CREAT SERPL-MCNC: 2.49 MG/DL (ref 0.66–1.25)
CREAT SERPL-MCNC: 2.53 MG/DL (ref 0.66–1.25)
CREAT SERPL-MCNC: 2.54 MG/DL (ref 0.66–1.25)
CREAT SERPL-MCNC: 2.62 MG/DL (ref 0.66–1.25)
CREAT SERPL-MCNC: 2.63 MG/DL (ref 0.66–1.25)
CREAT SERPL-MCNC: 2.67 MG/DL (ref 0.66–1.25)
CREAT SERPL-MCNC: 2.7 MG/DL (ref 0.67–1.17)
CREAT SERPL-MCNC: 2.77 MG/DL (ref 0.7–1.3)
CREAT SERPL-MCNC: 3.01 MG/DL (ref 0.7–1.3)
DEPRECATED HCO3 PLAS-SCNC: 23 MMOL/L (ref 22–29)
DIASTOLIC BLOOD PRESSURE - MUSE: NORMAL MMHG
EOSINOPHIL # BLD AUTO: 0.1 10E3/UL (ref 0–0.7)
EOSINOPHIL NFR BLD AUTO: 1 %
EOSINOPHIL NFR BLD AUTO: 2 %
EOSINOPHIL NFR BLD AUTO: 4 %
ERYTHROCYTE [DISTWIDTH] IN BLOOD BY AUTOMATED COUNT: 16.3 % (ref 10–15)
ERYTHROCYTE [DISTWIDTH] IN BLOOD BY AUTOMATED COUNT: 16.4 % (ref 10–15)
ERYTHROCYTE [DISTWIDTH] IN BLOOD BY AUTOMATED COUNT: 16.5 % (ref 10–15)
ERYTHROCYTE [DISTWIDTH] IN BLOOD BY AUTOMATED COUNT: 16.6 % (ref 10–15)
ERYTHROCYTE [DISTWIDTH] IN BLOOD BY AUTOMATED COUNT: 16.9 % (ref 10–15)
ERYTHROCYTE [DISTWIDTH] IN BLOOD BY AUTOMATED COUNT: 16.9 % (ref 10–15)
ERYTHROCYTE [DISTWIDTH] IN BLOOD BY AUTOMATED COUNT: 17 % (ref 10–15)
ERYTHROCYTE [DISTWIDTH] IN BLOOD BY AUTOMATED COUNT: 17.1 % (ref 10–15)
ERYTHROCYTE [DISTWIDTH] IN BLOOD BY AUTOMATED COUNT: 17.3 % (ref 10–15)
ERYTHROCYTE [DISTWIDTH] IN BLOOD BY AUTOMATED COUNT: 17.4 % (ref 10–15)
ERYTHROCYTE [DISTWIDTH] IN BLOOD BY AUTOMATED COUNT: 17.9 % (ref 10–15)
ERYTHROCYTE [DISTWIDTH] IN BLOOD BY AUTOMATED COUNT: 18 % (ref 10–15)
ERYTHROCYTE [DISTWIDTH] IN BLOOD BY AUTOMATED COUNT: 18.2 % (ref 10–15)
ERYTHROCYTE [DISTWIDTH] IN BLOOD BY AUTOMATED COUNT: 19.6 % (ref 10–15)
FERRITIN SERPL-MCNC: 290 NG/ML (ref 26–388)
FOLATE SERPL-MCNC: 10.6 NG/ML
GFR SERPL CREATININE-BSD FRML MDRD: 22 ML/MIN/1.73M2
GFR SERPL CREATININE-BSD FRML MDRD: 24 ML/MIN/1.73M2
GFR SERPL CREATININE-BSD FRML MDRD: 25 ML/MIN/1.73M2
GFR SERPL CREATININE-BSD FRML MDRD: 26 ML/MIN/1.73M2
GFR SERPL CREATININE-BSD FRML MDRD: 27 ML/MIN/1.73M2
GFR SERPL CREATININE-BSD FRML MDRD: 27 ML/MIN/1.73M2
GFR SERPL CREATININE-BSD FRML MDRD: 28 ML/MIN/1.73M2
GFR SERPL CREATININE-BSD FRML MDRD: 29 ML/MIN/1.73M2
GFR SERPL CREATININE-BSD FRML MDRD: 30 ML/MIN/1.73M2
GFR SERPL CREATININE-BSD FRML MDRD: 31 ML/MIN/1.73M2
GFR SERPL CREATININE-BSD FRML MDRD: 32 ML/MIN/1.73M2
GFR SERPL CREATININE-BSD FRML MDRD: 33 ML/MIN/1.73M2
GFR SERPL CREATININE-BSD FRML MDRD: 34 ML/MIN/1.73M2
GFR SERPL CREATININE-BSD FRML MDRD: 35 ML/MIN/1.73M2
GFR SERPL CREATININE-BSD FRML MDRD: 36 ML/MIN/1.73M2
GFR SERPL CREATININE-BSD FRML MDRD: 36 ML/MIN/1.73M2
GFR SERPL CREATININE-BSD FRML MDRD: 37 ML/MIN/1.73M2
GFR SERPL CREATININE-BSD FRML MDRD: 38 ML/MIN/1.73M2
GFR SERPL CREATININE-BSD FRML MDRD: 38 ML/MIN/1.73M2
GFR SERPL CREATININE-BSD FRML MDRD: 39 ML/MIN/1.73M2
GFR SERPL CREATININE-BSD FRML MDRD: 40 ML/MIN/1.73M2
GFR SERPL CREATININE-BSD FRML MDRD: 41 ML/MIN/1.73M2
GFR SERPL CREATININE-BSD FRML MDRD: 42 ML/MIN/1.73M2
GFR SERPL CREATININE-BSD FRML MDRD: 43 ML/MIN/1.73M2
GFR SERPL CREATININE-BSD FRML MDRD: 45 ML/MIN/1.73M2
GLUCOSE BLD-MCNC: 100 MG/DL (ref 70–99)
GLUCOSE BLD-MCNC: 102 MG/DL (ref 70–99)
GLUCOSE BLD-MCNC: 103 MG/DL (ref 70–125)
GLUCOSE BLD-MCNC: 103 MG/DL (ref 70–99)
GLUCOSE BLD-MCNC: 104 MG/DL (ref 70–99)
GLUCOSE BLD-MCNC: 105 MG/DL (ref 70–99)
GLUCOSE BLD-MCNC: 105 MG/DL (ref 70–99)
GLUCOSE BLD-MCNC: 106 MG/DL (ref 70–99)
GLUCOSE BLD-MCNC: 106 MG/DL (ref 70–99)
GLUCOSE BLD-MCNC: 107 MG/DL (ref 70–99)
GLUCOSE BLD-MCNC: 108 MG/DL (ref 70–99)
GLUCOSE BLD-MCNC: 109 MG/DL (ref 70–99)
GLUCOSE BLD-MCNC: 110 MG/DL (ref 70–99)
GLUCOSE BLD-MCNC: 111 MG/DL (ref 70–99)
GLUCOSE BLD-MCNC: 112 MG/DL (ref 70–99)
GLUCOSE BLD-MCNC: 113 MG/DL (ref 70–99)
GLUCOSE BLD-MCNC: 114 MG/DL (ref 70–99)
GLUCOSE BLD-MCNC: 116 MG/DL (ref 70–99)
GLUCOSE BLD-MCNC: 116 MG/DL (ref 70–99)
GLUCOSE BLD-MCNC: 118 MG/DL (ref 70–99)
GLUCOSE BLD-MCNC: 136 MG/DL (ref 70–99)
GLUCOSE BLD-MCNC: 85 MG/DL (ref 70–99)
GLUCOSE BLD-MCNC: 90 MG/DL (ref 70–99)
GLUCOSE BLD-MCNC: 93 MG/DL (ref 70–99)
GLUCOSE BLD-MCNC: 95 MG/DL (ref 70–99)
GLUCOSE BLD-MCNC: 96 MG/DL (ref 70–99)
GLUCOSE BLD-MCNC: 97 MG/DL (ref 70–99)
GLUCOSE BLD-MCNC: 97 MG/DL (ref 70–99)
GLUCOSE BLD-MCNC: 98 MG/DL (ref 70–99)
GLUCOSE BLD-MCNC: 99 MG/DL (ref 70–125)
GLUCOSE BLD-MCNC: 99 MG/DL (ref 70–99)
GLUCOSE BLDC GLUCOMTR-MCNC: 102 MG/DL (ref 70–99)
GLUCOSE BLDC GLUCOMTR-MCNC: 104 MG/DL (ref 70–99)
GLUCOSE BLDC GLUCOMTR-MCNC: 109 MG/DL (ref 70–99)
GLUCOSE BLDC GLUCOMTR-MCNC: 114 MG/DL (ref 70–99)
GLUCOSE BLDC GLUCOMTR-MCNC: 114 MG/DL (ref 70–99)
GLUCOSE BLDC GLUCOMTR-MCNC: 119 MG/DL (ref 70–99)
GLUCOSE BLDC GLUCOMTR-MCNC: 124 MG/DL (ref 70–99)
GLUCOSE BLDC GLUCOMTR-MCNC: 125 MG/DL (ref 70–99)
GLUCOSE BLDC GLUCOMTR-MCNC: 142 MG/DL (ref 70–99)
GLUCOSE BLDC GLUCOMTR-MCNC: 74 MG/DL (ref 70–99)
GLUCOSE BLDC GLUCOMTR-MCNC: 80 MG/DL (ref 70–99)
GLUCOSE BLDC GLUCOMTR-MCNC: 90 MG/DL (ref 70–99)
GLUCOSE BLDC GLUCOMTR-MCNC: 92 MG/DL (ref 70–99)
GLUCOSE BLDC GLUCOMTR-MCNC: 93 MG/DL (ref 70–99)
GLUCOSE BLDC GLUCOMTR-MCNC: 94 MG/DL (ref 70–99)
GLUCOSE BLDC GLUCOMTR-MCNC: 95 MG/DL (ref 70–99)
GLUCOSE BLDC GLUCOMTR-MCNC: 96 MG/DL (ref 70–99)
GLUCOSE BLDC GLUCOMTR-MCNC: 97 MG/DL (ref 70–99)
GLUCOSE BLDC GLUCOMTR-MCNC: 98 MG/DL (ref 70–99)
GLUCOSE SERPL-MCNC: 95 MG/DL (ref 70–99)
HAPTOGLOB SERPL-MCNC: 237 MG/DL (ref 32–197)
HCO3 BLDV-SCNC: 24 MMOL/L (ref 21–28)
HCO3 BLDV-SCNC: 25 MMOL/L (ref 21–28)
HCT VFR BLD AUTO: 28.6 % (ref 40–53)
HCT VFR BLD AUTO: 28.7 % (ref 40–53)
HCT VFR BLD AUTO: 29.3 % (ref 40–53)
HCT VFR BLD AUTO: 29.9 % (ref 40–53)
HCT VFR BLD AUTO: 30.5 % (ref 40–53)
HCT VFR BLD AUTO: 30.6 % (ref 40–53)
HCT VFR BLD AUTO: 30.7 % (ref 40–53)
HCT VFR BLD AUTO: 31.9 % (ref 40–53)
HCT VFR BLD AUTO: 32.3 % (ref 40–53)
HCT VFR BLD AUTO: 33.5 % (ref 40–53)
HCT VFR BLD AUTO: 33.6 % (ref 40–53)
HCT VFR BLD AUTO: 33.7 % (ref 40–53)
HCT VFR BLD AUTO: 34 % (ref 40–53)
HCT VFR BLD AUTO: 35.9 % (ref 40–53)
HCV AB SERPL QL IA: NONREACTIVE
HGB BLD-MCNC: 10 G/DL (ref 13.3–17.7)
HGB BLD-MCNC: 10.1 G/DL (ref 13.3–17.7)
HGB BLD-MCNC: 10.2 G/DL (ref 13.3–17.7)
HGB BLD-MCNC: 10.3 G/DL (ref 13.3–17.7)
HGB BLD-MCNC: 10.4 G/DL (ref 13.3–17.7)
HGB BLD-MCNC: 10.5 G/DL (ref 13.3–17.7)
HGB BLD-MCNC: 10.6 G/DL (ref 13.3–17.7)
HGB BLD-MCNC: 11.1 G/DL (ref 13.3–17.7)
HGB BLD-MCNC: 8.7 G/DL (ref 13.3–17.7)
HGB BLD-MCNC: 8.9 G/DL (ref 13.3–17.7)
HGB BLD-MCNC: 9.1 G/DL (ref 13.3–17.7)
HGB BLD-MCNC: 9.4 G/DL (ref 13.3–17.7)
HGB BLD-MCNC: 9.6 G/DL (ref 13.3–17.7)
HOLD SPECIMEN: NORMAL
IMM GRANULOCYTES # BLD: 0 10E3/UL
IMM GRANULOCYTES # BLD: 0.1 10E3/UL
IMM GRANULOCYTES NFR BLD: 0 %
IMM GRANULOCYTES NFR BLD: 1 %
INR (EXTERNAL): 1.8 (ref 2–3)
INR (EXTERNAL): 2.3 (ref 0.9–1.1)
INR BLD: 1.4 (ref 0.9–1.1)
INR BLD: 1.5 (ref 0.9–1.1)
INR BLD: 1.5 (ref 0.9–1.1)
INR BLD: 1.6 (ref 0.9–1.1)
INR BLD: 1.6 (ref 0.9–1.1)
INR BLD: 1.7 (ref 0.9–1.1)
INR BLD: 1.8 (ref 0.9–1.1)
INR PPP: 2.08 (ref 0.85–1.15)
INR PPP: 2.11 (ref 0.85–1.15)
INR PPP: 2.13 (ref 0.86–1.14)
INR PPP: 2.15 (ref 0.85–1.15)
INR PPP: 2.19 (ref 0.85–1.15)
INR PPP: 2.19 (ref 0.85–1.15)
INR PPP: 2.19 (ref 0.86–1.14)
INR PPP: 2.21 (ref 0.85–1.15)
INR PPP: 2.21 (ref 0.85–1.15)
INR PPP: 2.21 (ref 0.86–1.14)
INR PPP: 2.29 (ref 0.85–1.15)
INR PPP: 2.31 (ref 0.86–1.14)
INR PPP: 2.36 (ref 0.85–1.15)
INR PPP: 2.36 (ref 0.85–1.15)
INR PPP: 2.37 (ref 0.85–1.15)
INR PPP: 2.4 (ref 0.85–1.15)
INR PPP: 2.47 (ref 0.86–1.14)
INR PPP: 2.49 (ref 0.85–1.15)
INR PPP: 2.52 (ref 0.86–1.14)
INR PPP: 2.54 (ref 0.86–1.14)
INR PPP: 2.54 (ref 0.86–1.14)
INR PPP: 2.55 (ref 0.86–1.14)
INR PPP: 2.56 (ref 0.85–1.15)
INR PPP: 2.56 (ref 0.86–1.14)
INR PPP: 2.58 (ref 0.86–1.14)
INR PPP: 2.59 (ref 0.85–1.15)
INR PPP: 2.6 (ref 0.85–1.15)
INR PPP: 2.6 (ref 0.86–1.14)
INR PPP: 2.6 (ref 0.86–1.14)
INR PPP: 2.64 (ref 0.85–1.15)
INR PPP: 2.64 (ref 0.86–1.14)
INR PPP: 2.65 (ref 0.85–1.15)
INR PPP: 2.65 (ref 0.86–1.14)
INR PPP: 2.66 (ref 0.85–1.15)
INR PPP: 2.66 (ref 0.85–1.15)
INR PPP: 2.67 (ref 0.86–1.14)
INR PPP: 2.69 (ref 0.85–1.15)
INR PPP: 2.7 (ref 0.85–1.15)
INR PPP: 2.71 (ref 0.85–1.15)
INR PPP: 2.72 (ref 0.85–1.15)
INR PPP: 2.72 (ref 0.86–1.14)
INR PPP: 2.72 (ref 0.86–1.14)
INR PPP: 2.73 (ref 0.85–1.15)
INR PPP: 2.73 (ref 0.85–1.15)
INR PPP: 2.74 (ref 0.85–1.15)
INR PPP: 2.75 (ref 0.85–1.15)
INR PPP: 2.75 (ref 0.86–1.14)
INR PPP: 2.76 (ref 0.85–1.15)
INR PPP: 2.77 (ref 0.85–1.15)
INR PPP: 2.78 (ref 0.86–1.14)
INR PPP: 2.78 (ref 0.86–1.14)
INR PPP: 2.79 (ref 0.86–1.14)
INR PPP: 2.82 (ref 0.85–1.15)
INR PPP: 2.84 (ref 0.86–1.14)
INR PPP: 2.86 (ref 0.85–1.15)
INR PPP: 2.87 (ref 0.85–1.15)
INR PPP: 2.87 (ref 0.85–1.15)
INR PPP: 2.87 (ref 0.86–1.14)
INR PPP: 2.91 (ref 0.85–1.15)
INR PPP: 2.95 (ref 0.85–1.15)
INR PPP: 2.96 (ref 0.85–1.15)
INR PPP: 2.97 (ref 0.86–1.14)
INR PPP: 2.99 (ref 0.85–1.15)
INR PPP: 2.99 (ref 0.85–1.15)
INR PPP: 3 (ref 0.85–1.15)
INR PPP: 3.01 (ref 0.85–1.15)
INR PPP: 3.04 (ref 0.86–1.14)
INR PPP: 3.05 (ref 0.85–1.15)
INR PPP: 3.08 (ref 0.85–1.15)
INR PPP: 3.1 (ref 0.85–1.15)
INR PPP: 3.12 (ref 0.85–1.15)
INR PPP: 3.17 (ref 0.85–1.15)
INR PPP: 3.25 (ref 0.85–1.15)
INR PPP: 3.42 (ref 0.86–1.14)
INR PPP: 3.45 (ref 0.86–1.14)
INTERPRETATION ECG - MUSE: NORMAL
IRON SATN MFR SERPL: 19 % (ref 15–46)
IRON SERPL-MCNC: 37 UG/DL (ref 35–180)
LDH SERPL L TO P-CCNC: 185 U/L (ref 85–227)
LYMPHOCYTES # BLD AUTO: 1 10E3/UL (ref 0.8–5.3)
LYMPHOCYTES # BLD AUTO: 1.1 10E3/UL (ref 0.8–5.3)
LYMPHOCYTES # BLD AUTO: 1.2 10E3/UL (ref 0.8–5.3)
LYMPHOCYTES # BLD AUTO: 1.2 10E3/UL (ref 0.8–5.3)
LYMPHOCYTES # BLD AUTO: 1.7 10E3/UL (ref 0.8–5.3)
LYMPHOCYTES NFR BLD AUTO: 19 %
LYMPHOCYTES NFR BLD AUTO: 29 %
LYMPHOCYTES NFR BLD AUTO: 32 %
LYMPHOCYTES NFR BLD AUTO: 33 %
LYMPHOCYTES NFR BLD AUTO: 33 %
MAGNESIUM SERPL-MCNC: 1.3 MG/DL (ref 1.6–2.3)
MAGNESIUM SERPL-MCNC: 1.4 MG/DL (ref 1.6–2.3)
MAGNESIUM SERPL-MCNC: 1.5 MG/DL (ref 1.6–2.3)
MAGNESIUM SERPL-MCNC: 1.8 MG/DL (ref 1.6–2.3)
MAGNESIUM SERPL-MCNC: 1.8 MG/DL (ref 1.6–2.3)
MAGNESIUM SERPL-MCNC: 1.9 MG/DL (ref 1.6–2.3)
MAGNESIUM SERPL-MCNC: 1.9 MG/DL (ref 1.6–2.3)
MAGNESIUM SERPL-MCNC: 2 MG/DL (ref 1.6–2.3)
MAGNESIUM SERPL-MCNC: 2.1 MG/DL (ref 1.6–2.3)
MAGNESIUM SERPL-MCNC: 2.2 MG/DL (ref 1.6–2.3)
MAGNESIUM SERPL-MCNC: 2.3 MG/DL (ref 1.6–2.3)
MAGNESIUM SERPL-MCNC: 2.4 MG/DL (ref 1.6–2.3)
MCH RBC QN AUTO: 25.9 PG (ref 26.5–33)
MCH RBC QN AUTO: 26.2 PG (ref 26.5–33)
MCH RBC QN AUTO: 26.3 PG (ref 26.5–33)
MCH RBC QN AUTO: 26.4 PG (ref 26.5–33)
MCH RBC QN AUTO: 26.5 PG (ref 26.5–33)
MCH RBC QN AUTO: 26.5 PG (ref 26.5–33)
MCH RBC QN AUTO: 26.6 PG (ref 26.5–33)
MCH RBC QN AUTO: 26.7 PG (ref 26.5–33)
MCH RBC QN AUTO: 26.7 PG (ref 26.5–33)
MCHC RBC AUTO-ENTMCNC: 29.6 G/DL (ref 31.5–36.5)
MCHC RBC AUTO-ENTMCNC: 29.7 G/DL (ref 31.5–36.5)
MCHC RBC AUTO-ENTMCNC: 29.8 G/DL (ref 31.5–36.5)
MCHC RBC AUTO-ENTMCNC: 30.1 G/DL (ref 31.5–36.5)
MCHC RBC AUTO-ENTMCNC: 30.1 G/DL (ref 31.5–36.5)
MCHC RBC AUTO-ENTMCNC: 30.3 G/DL (ref 31.5–36.5)
MCHC RBC AUTO-ENTMCNC: 30.4 G/DL (ref 31.5–36.5)
MCHC RBC AUTO-ENTMCNC: 30.9 G/DL (ref 31.5–36.5)
MCHC RBC AUTO-ENTMCNC: 30.9 G/DL (ref 31.5–36.5)
MCHC RBC AUTO-ENTMCNC: 31 G/DL (ref 31.5–36.5)
MCHC RBC AUTO-ENTMCNC: 31 G/DL (ref 31.5–36.5)
MCHC RBC AUTO-ENTMCNC: 31.1 G/DL (ref 31.5–36.5)
MCHC RBC AUTO-ENTMCNC: 31.4 G/DL (ref 31.5–36.5)
MCHC RBC AUTO-ENTMCNC: 31.5 G/DL (ref 31.5–36.5)
MCV RBC AUTO: 84 FL (ref 78–100)
MCV RBC AUTO: 84 FL (ref 78–100)
MCV RBC AUTO: 85 FL (ref 78–100)
MCV RBC AUTO: 85 FL (ref 78–100)
MCV RBC AUTO: 86 FL (ref 78–100)
MCV RBC AUTO: 87 FL (ref 78–100)
MCV RBC AUTO: 89 FL (ref 78–100)
MCV RBC AUTO: 90 FL (ref 78–100)
MONOCYTES # BLD AUTO: 0.3 10E3/UL (ref 0–1.3)
MONOCYTES # BLD AUTO: 0.4 10E3/UL (ref 0–1.3)
MONOCYTES # BLD AUTO: 0.7 10E3/UL (ref 0–1.3)
MONOCYTES NFR BLD AUTO: 10 %
MONOCYTES NFR BLD AUTO: 11 %
MONOCYTES NFR BLD AUTO: 11 %
MONOCYTES NFR BLD AUTO: 8 %
MONOCYTES NFR BLD AUTO: 9 %
NEUTROPHILS # BLD AUTO: 1.7 10E3/UL (ref 1.6–8.3)
NEUTROPHILS # BLD AUTO: 1.8 10E3/UL (ref 1.6–8.3)
NEUTROPHILS # BLD AUTO: 1.9 10E3/UL (ref 1.6–8.3)
NEUTROPHILS # BLD AUTO: 2.1 10E3/UL (ref 1.6–8.3)
NEUTROPHILS # BLD AUTO: 6.1 10E3/UL (ref 1.6–8.3)
NEUTROPHILS NFR BLD AUTO: 51 %
NEUTROPHILS NFR BLD AUTO: 52 %
NEUTROPHILS NFR BLD AUTO: 55 %
NEUTROPHILS NFR BLD AUTO: 57 %
NEUTROPHILS NFR BLD AUTO: 71 %
NRBC # BLD AUTO: 0 10E3/UL
NRBC BLD AUTO-RTO: 0 /100
O2/TOTAL GAS SETTING VFR VENT: 21 %
O2/TOTAL GAS SETTING VFR VENT: 21 %
P AXIS - MUSE: NORMAL DEGREES
PATH REPORT.COMMENTS IMP SPEC: NORMAL
PATH REPORT.COMMENTS IMP SPEC: NORMAL
PATH REPORT.FINAL DX SPEC: NORMAL
PATH REPORT.MICROSCOPIC SPEC OTHER STN: NORMAL
PATH REPORT.MICROSCOPIC SPEC OTHER STN: NORMAL
PATH REPORT.RELEVANT HX SPEC: NORMAL
PCO2 BLDV: 51 MM HG (ref 40–50)
PCO2 BLDV: 52 MM HG (ref 40–50)
PH BLDV: 7.28 [PH] (ref 7.32–7.43)
PH BLDV: 7.29 [PH] (ref 7.32–7.43)
PHOSPHATE SERPL-MCNC: 2.7 MG/DL (ref 2.5–4.5)
PHOSPHATE SERPL-MCNC: 2.8 MG/DL (ref 2.5–4.5)
PHOSPHATE SERPL-MCNC: 2.9 MG/DL (ref 2.5–4.5)
PHOSPHATE SERPL-MCNC: 3 MG/DL (ref 2.5–4.5)
PHOSPHATE SERPL-MCNC: 3.1 MG/DL (ref 2.5–4.5)
PHOSPHATE SERPL-MCNC: 3.2 MG/DL (ref 2.5–4.5)
PHOSPHATE SERPL-MCNC: 3.3 MG/DL (ref 2.5–4.5)
PHOSPHATE SERPL-MCNC: 3.4 MG/DL (ref 2.5–4.5)
PHOSPHATE SERPL-MCNC: 3.5 MG/DL (ref 2.5–4.5)
PLATELET # BLD AUTO: 111 10E3/UL (ref 150–450)
PLATELET # BLD AUTO: 112 10E3/UL (ref 150–450)
PLATELET # BLD AUTO: 116 10E3/UL (ref 150–450)
PLATELET # BLD AUTO: 122 10E3/UL (ref 150–450)
PLATELET # BLD AUTO: 145 10E3/UL (ref 150–450)
PLATELET # BLD AUTO: 154 10E3/UL (ref 150–450)
PLATELET # BLD AUTO: 155 10E3/UL (ref 150–450)
PLATELET # BLD AUTO: 161 10E3/UL (ref 150–450)
PLATELET # BLD AUTO: 164 10E3/UL (ref 150–450)
PLATELET # BLD AUTO: 170 10E3/UL (ref 150–450)
PLATELET # BLD AUTO: 178 10E3/UL (ref 150–450)
PLATELET # BLD AUTO: 188 10E3/UL (ref 150–450)
PLATELET # BLD AUTO: 199 10E3/UL (ref 150–450)
PLATELET # BLD AUTO: 98 10E3/UL (ref 150–450)
PO2 BLDV: 30 MM HG (ref 25–47)
PO2 BLDV: 37 MM HG (ref 25–47)
POTASSIUM BLD-SCNC: 3.3 MMOL/L (ref 3.4–5.3)
POTASSIUM BLD-SCNC: 3.3 MMOL/L (ref 3.4–5.3)
POTASSIUM BLD-SCNC: 3.4 MMOL/L (ref 3.4–5.3)
POTASSIUM BLD-SCNC: 3.5 MMOL/L (ref 3.4–5.3)
POTASSIUM BLD-SCNC: 3.6 MMOL/L (ref 3.4–5.3)
POTASSIUM BLD-SCNC: 3.7 MMOL/L (ref 3.4–5.3)
POTASSIUM BLD-SCNC: 3.8 MMOL/L (ref 3.4–5.3)
POTASSIUM BLD-SCNC: 3.9 MMOL/L (ref 3.4–5.3)
POTASSIUM BLD-SCNC: 3.9 MMOL/L (ref 3.4–5.3)
POTASSIUM BLD-SCNC: 4 MMOL/L (ref 3.4–5.3)
POTASSIUM BLD-SCNC: 4 MMOL/L (ref 3.4–5.3)
POTASSIUM BLD-SCNC: 4 MMOL/L (ref 3.5–5)
POTASSIUM BLD-SCNC: 4.1 MMOL/L (ref 3.4–5.3)
POTASSIUM BLD-SCNC: 4.1 MMOL/L (ref 3.5–5)
POTASSIUM BLD-SCNC: 4.2 MMOL/L (ref 3.4–5.3)
POTASSIUM BLD-SCNC: 4.4 MMOL/L (ref 3.4–5.3)
POTASSIUM BLD-SCNC: 4.5 MMOL/L (ref 3.4–5.3)
POTASSIUM BLD-SCNC: 4.5 MMOL/L (ref 3.4–5.3)
POTASSIUM BLD-SCNC: 4.6 MMOL/L (ref 3.4–5.3)
POTASSIUM BLD-SCNC: 4.6 MMOL/L (ref 3.4–5.3)
POTASSIUM BLD-SCNC: 4.7 MMOL/L (ref 3.4–5.3)
POTASSIUM BLD-SCNC: 4.8 MMOL/L (ref 3.4–5.3)
POTASSIUM BLD-SCNC: 4.9 MMOL/L (ref 3.4–5.3)
POTASSIUM BLD-SCNC: 4.9 MMOL/L (ref 3.4–5.3)
POTASSIUM BLD-SCNC: 5 MMOL/L (ref 3.4–5.3)
POTASSIUM BLD-SCNC: 5 MMOL/L (ref 3.4–5.3)
POTASSIUM BLD-SCNC: 5.1 MMOL/L (ref 3.4–5.3)
POTASSIUM BLD-SCNC: 5.2 MMOL/L (ref 3.4–5.3)
POTASSIUM BLD-SCNC: 5.4 MMOL/L (ref 3.4–5.3)
POTASSIUM BLD-SCNC: 5.6 MMOL/L (ref 3.4–5.3)
POTASSIUM BLD-SCNC: 5.7 MMOL/L (ref 3.4–5.3)
POTASSIUM BLD-SCNC: 5.8 MMOL/L (ref 3.4–5.3)
POTASSIUM BLD-SCNC: 6.7 MMOL/L (ref 3.4–5.3)
POTASSIUM BLD-SCNC: 6.7 MMOL/L (ref 3.4–5.3)
POTASSIUM SERPL-SCNC: 6.3 MMOL/L (ref 3.4–5.3)
PR INTERVAL - MUSE: NORMAL MS
PROT SERPL-MCNC: 6 G/DL (ref 6.8–8.8)
QRS DURATION - MUSE: 110 MS
QT - MUSE: 374 MS
QTC - MUSE: 445 MS
R AXIS - MUSE: 46 DEGREES
RBC # BLD AUTO: 3.3 10E6/UL (ref 4.4–5.9)
RBC # BLD AUTO: 3.37 10E6/UL (ref 4.4–5.9)
RBC # BLD AUTO: 3.41 10E6/UL (ref 4.4–5.9)
RBC # BLD AUTO: 3.42 10E6/UL (ref 4.4–5.9)
RBC # BLD AUTO: 3.43 10E6/UL (ref 4.4–5.9)
RBC # BLD AUTO: 3.43 10E6/UL (ref 4.4–5.9)
RBC # BLD AUTO: 3.54 10E6/UL (ref 4.4–5.9)
RBC # BLD AUTO: 3.67 10E6/UL (ref 4.4–5.9)
RBC # BLD AUTO: 3.76 10E6/UL (ref 4.4–5.9)
RBC # BLD AUTO: 3.78 10E6/UL (ref 4.4–5.9)
RBC # BLD AUTO: 3.92 10E6/UL (ref 4.4–5.9)
RBC # BLD AUTO: 3.94 10E6/UL (ref 4.4–5.9)
RBC # BLD AUTO: 4.02 10E6/UL (ref 4.4–5.9)
RBC # BLD AUTO: 4.29 10E6/UL (ref 4.4–5.9)
RETICS # AUTO: 0.06 10E6/UL (ref 0.03–0.1)
RETICS/RBC NFR AUTO: 1.7 % (ref 0.5–2)
SARS-COV-2 RNA RESP QL NAA+PROBE: NEGATIVE
SARS-COV-2 RNA RESP QL NAA+PROBE: NEGATIVE
SODIUM SERPL-SCNC: 135 MMOL/L (ref 133–144)
SODIUM SERPL-SCNC: 136 MMOL/L (ref 133–144)
SODIUM SERPL-SCNC: 136 MMOL/L (ref 136–145)
SODIUM SERPL-SCNC: 137 MMOL/L (ref 133–144)
SODIUM SERPL-SCNC: 138 MMOL/L (ref 133–144)
SODIUM SERPL-SCNC: 138 MMOL/L (ref 136–145)
SODIUM SERPL-SCNC: 138 MMOL/L (ref 136–145)
SODIUM SERPL-SCNC: 139 MMOL/L (ref 133–144)
SODIUM SERPL-SCNC: 140 MMOL/L (ref 133–144)
SODIUM SERPL-SCNC: 141 MMOL/L (ref 133–144)
SODIUM SERPL-SCNC: 141 MMOL/L (ref 133–144)
SODIUM SERPL-SCNC: 142 MMOL/L (ref 133–144)
SYSTOLIC BLOOD PRESSURE - MUSE: NORMAL MMHG
T AXIS - MUSE: 54 DEGREES
TIBC SERPL-MCNC: 193 UG/DL (ref 240–430)
TSH SERPL DL<=0.005 MIU/L-ACNC: 2.12 UIU/ML (ref 0.3–5)
VENTRICULAR RATE- MUSE: 85 BPM
VIT B12 SERPL-MCNC: 400 PG/ML (ref 193–986)
WBC # BLD AUTO: 3.3 10E3/UL (ref 4–11)
WBC # BLD AUTO: 3.4 10E3/UL (ref 4–11)
WBC # BLD AUTO: 3.5 10E3/UL (ref 4–11)
WBC # BLD AUTO: 3.8 10E3/UL (ref 4–11)
WBC # BLD AUTO: 4.7 10E3/UL (ref 4–11)
WBC # BLD AUTO: 4.9 10E3/UL (ref 4–11)
WBC # BLD AUTO: 5.1 10E3/UL (ref 4–11)
WBC # BLD AUTO: 5.5 10E3/UL (ref 4–11)
WBC # BLD AUTO: 5.6 10E3/UL (ref 4–11)
WBC # BLD AUTO: 6.8 10E3/UL (ref 4–11)
WBC # BLD AUTO: 7.7 10E3/UL (ref 4–11)
WBC # BLD AUTO: 8.6 10E3/UL (ref 4–11)

## 2022-01-01 PROCEDURE — 250N000011 HC RX IP 250 OP 636: Performed by: INTERNAL MEDICINE

## 2022-01-01 PROCEDURE — 80048 BASIC METABOLIC PNL TOTAL CA: CPT | Performed by: INTERNAL MEDICINE

## 2022-01-01 PROCEDURE — 36415 COLL VENOUS BLD VENIPUNCTURE: CPT | Performed by: INTERNAL MEDICINE

## 2022-01-01 PROCEDURE — 20610 DRAIN/INJ JOINT/BURSA W/O US: CPT | Performed by: FAMILY MEDICINE

## 2022-01-01 PROCEDURE — 250N000011 HC RX IP 250 OP 636: Performed by: PEDIATRICS

## 2022-01-01 PROCEDURE — 250N000012 HC RX MED GY IP 250 OP 636 PS 637: Performed by: INTERNAL MEDICINE

## 2022-01-01 PROCEDURE — 120N000002 HC R&B MED SURG/OB UMMC

## 2022-01-01 PROCEDURE — 250N000009 HC RX 250: Performed by: INTERNAL MEDICINE

## 2022-01-01 PROCEDURE — 250N000013 HC RX MED GY IP 250 OP 250 PS 637: Performed by: INTERNAL MEDICINE

## 2022-01-01 PROCEDURE — 120N000009 HC R&B SNF

## 2022-01-01 PROCEDURE — 97535 SELF CARE MNGMENT TRAINING: CPT | Mod: GO

## 2022-01-01 PROCEDURE — 83735 ASSAY OF MAGNESIUM: CPT | Performed by: PHYSICIAN ASSISTANT

## 2022-01-01 PROCEDURE — 84132 ASSAY OF SERUM POTASSIUM: CPT | Performed by: INTERNAL MEDICINE

## 2022-01-01 PROCEDURE — P9603 ONE-WAY ALLOW PRORATED MILES: HCPCS | Performed by: FAMILY MEDICINE

## 2022-01-01 PROCEDURE — 82310 ASSAY OF CALCIUM: CPT | Performed by: INTERNAL MEDICINE

## 2022-01-01 PROCEDURE — 99226 PR SUBSEQUENT OBSERVATION CARE,LEVEL III: CPT | Performed by: INTERNAL MEDICINE

## 2022-01-01 PROCEDURE — 85610 PROTHROMBIN TIME: CPT

## 2022-01-01 PROCEDURE — 82746 ASSAY OF FOLIC ACID SERUM: CPT | Performed by: HOSPITALIST

## 2022-01-01 PROCEDURE — 99233 SBSQ HOSP IP/OBS HIGH 50: CPT | Performed by: INTERNAL MEDICINE

## 2022-01-01 PROCEDURE — 85610 PROTHROMBIN TIME: CPT | Performed by: INTERNAL MEDICINE

## 2022-01-01 PROCEDURE — 97110 THERAPEUTIC EXERCISES: CPT | Mod: GP | Performed by: PHYSICAL THERAPIST

## 2022-01-01 PROCEDURE — 85027 COMPLETE CBC AUTOMATED: CPT | Performed by: INTERNAL MEDICINE

## 2022-01-01 PROCEDURE — 85025 COMPLETE CBC W/AUTO DIFF WBC: CPT | Performed by: INTERNAL MEDICINE

## 2022-01-01 PROCEDURE — 36415 COLL VENOUS BLD VENIPUNCTURE: CPT | Performed by: FAMILY MEDICINE

## 2022-01-01 PROCEDURE — 97116 GAIT TRAINING THERAPY: CPT | Mod: GP

## 2022-01-01 PROCEDURE — 97116 GAIT TRAINING THERAPY: CPT | Mod: GP | Performed by: REHABILITATION PRACTITIONER

## 2022-01-01 PROCEDURE — 85027 COMPLETE CBC AUTOMATED: CPT | Performed by: FAMILY MEDICINE

## 2022-01-01 PROCEDURE — 97530 THERAPEUTIC ACTIVITIES: CPT | Mod: GP

## 2022-01-01 PROCEDURE — 85610 PROTHROMBIN TIME: CPT | Performed by: HOSPITALIST

## 2022-01-01 PROCEDURE — 80069 RENAL FUNCTION PANEL: CPT | Performed by: INTERNAL MEDICINE

## 2022-01-01 PROCEDURE — 022N000001 HC SNF RUG CODE OPNP

## 2022-01-01 PROCEDURE — 84100 ASSAY OF PHOSPHORUS: CPT | Performed by: INTERNAL MEDICINE

## 2022-01-01 PROCEDURE — 250N000013 HC RX MED GY IP 250 OP 250 PS 637

## 2022-01-01 PROCEDURE — 99207 PR NOT IN PERSON INPATIENT CONSULT STATISTICAL MARKER: CPT | Performed by: STUDENT IN AN ORGANIZED HEALTH CARE EDUCATION/TRAINING PROGRAM

## 2022-01-01 PROCEDURE — G0463 HOSPITAL OUTPT CLINIC VISIT: HCPCS

## 2022-01-01 PROCEDURE — 99316 NF DSCHRG MGMT 30 MIN+: CPT | Performed by: HOSPITALIST

## 2022-01-01 PROCEDURE — 82962 GLUCOSE BLOOD TEST: CPT

## 2022-01-01 PROCEDURE — 36415 COLL VENOUS BLD VENIPUNCTURE: CPT | Performed by: HOSPITALIST

## 2022-01-01 PROCEDURE — 82728 ASSAY OF FERRITIN: CPT | Performed by: HOSPITALIST

## 2022-01-01 PROCEDURE — 250N000013 HC RX MED GY IP 250 OP 250 PS 637: Performed by: HOSPITALIST

## 2022-01-01 PROCEDURE — 97165 OT EVAL LOW COMPLEX 30 MIN: CPT | Mod: GO

## 2022-01-01 PROCEDURE — 97530 THERAPEUTIC ACTIVITIES: CPT | Mod: GO | Performed by: OCCUPATIONAL THERAPIST

## 2022-01-01 PROCEDURE — 97110 THERAPEUTIC EXERCISES: CPT | Mod: GP

## 2022-01-01 PROCEDURE — 97110 THERAPEUTIC EXERCISES: CPT | Mod: GO

## 2022-01-01 PROCEDURE — G0378 HOSPITAL OBSERVATION PER HR: HCPCS

## 2022-01-01 PROCEDURE — 97535 SELF CARE MNGMENT TRAINING: CPT | Mod: GO | Performed by: OCCUPATIONAL THERAPIST

## 2022-01-01 PROCEDURE — U0003 INFECTIOUS AGENT DETECTION BY NUCLEIC ACID (DNA OR RNA); SEVERE ACUTE RESPIRATORY SYNDROME CORONAVIRUS 2 (SARS-COV-2) (CORONAVIRUS DISEASE [COVID-19]), AMPLIFIED PROBE TECHNIQUE, MAKING USE OF HIGH THROUGHPUT TECHNOLOGIES AS DESCRIBED BY CMS-2020-01-R: HCPCS | Performed by: INTERNAL MEDICINE

## 2022-01-01 PROCEDURE — 99309 SBSQ NF CARE MODERATE MDM 30: CPT | Performed by: NURSE PRACTITIONER

## 2022-01-01 PROCEDURE — 85018 HEMOGLOBIN: CPT | Performed by: INTERNAL MEDICINE

## 2022-01-01 PROCEDURE — 99232 SBSQ HOSP IP/OBS MODERATE 35: CPT | Performed by: INTERNAL MEDICINE

## 2022-01-01 PROCEDURE — 250N000013 HC RX MED GY IP 250 OP 250 PS 637: Performed by: NURSE PRACTITIONER

## 2022-01-01 PROCEDURE — 36416 COLLJ CAPILLARY BLOOD SPEC: CPT | Performed by: FAMILY MEDICINE

## 2022-01-01 PROCEDURE — 120N000001 HC R&B MED SURG/OB

## 2022-01-01 PROCEDURE — 99223 1ST HOSP IP/OBS HIGH 75: CPT | Mod: AI | Performed by: INTERNAL MEDICINE

## 2022-01-01 PROCEDURE — 250N000011 HC RX IP 250 OP 636: Performed by: HOSPITALIST

## 2022-01-01 PROCEDURE — 97162 PT EVAL MOD COMPLEX 30 MIN: CPT | Mod: GP | Performed by: PHYSICAL THERAPIST

## 2022-01-01 PROCEDURE — 97530 THERAPEUTIC ACTIVITIES: CPT | Mod: GP | Performed by: PHYSICAL THERAPIST

## 2022-01-01 PROCEDURE — 83615 LACTATE (LD) (LDH) ENZYME: CPT | Performed by: HOSPITALIST

## 2022-01-01 PROCEDURE — 99309 SBSQ NF CARE MODERATE MDM 30: CPT | Performed by: INTERNAL MEDICINE

## 2022-01-01 PROCEDURE — 999N000040 HC STATISTIC CONSULT NO CHARGE VASC ACCESS

## 2022-01-01 PROCEDURE — 97165 OT EVAL LOW COMPLEX 30 MIN: CPT | Mod: GO | Performed by: OCCUPATIONAL THERAPIST

## 2022-01-01 PROCEDURE — 999N000190 HC STATISTIC VAT ROUNDS

## 2022-01-01 PROCEDURE — 99224 PR SUBSEQUENT OBSERVATION CARE,LEVEL I: CPT | Performed by: PHYSICIAN ASSISTANT

## 2022-01-01 PROCEDURE — 83735 ASSAY OF MAGNESIUM: CPT | Performed by: INTERNAL MEDICINE

## 2022-01-01 PROCEDURE — 999N000128 HC STATISTIC PERIPHERAL IV START W/O US GUIDANCE

## 2022-01-01 PROCEDURE — 85610 PROTHROMBIN TIME: CPT | Performed by: NURSE PRACTITIONER

## 2022-01-01 PROCEDURE — 80048 BASIC METABOLIC PNL TOTAL CA: CPT | Performed by: PHYSICIAN ASSISTANT

## 2022-01-01 PROCEDURE — 99308 SBSQ NF CARE LOW MDM 20: CPT | Performed by: INTERNAL MEDICINE

## 2022-01-01 PROCEDURE — 97140 MANUAL THERAPY 1/> REGIONS: CPT | Mod: GP | Performed by: PHYSICAL THERAPIST

## 2022-01-01 PROCEDURE — 86803 HEPATITIS C AB TEST: CPT | Performed by: FAMILY MEDICINE

## 2022-01-01 PROCEDURE — 99233 SBSQ HOSP IP/OBS HIGH 50: CPT | Performed by: HOSPITALIST

## 2022-01-01 PROCEDURE — 99316 NF DSCHRG MGMT 30 MIN+: CPT | Performed by: NURSE PRACTITIONER

## 2022-01-01 PROCEDURE — 250N000012 HC RX MED GY IP 250 OP 636 PS 637: Performed by: PHYSICIAN ASSISTANT

## 2022-01-01 PROCEDURE — 97110 THERAPEUTIC EXERCISES: CPT | Mod: GO | Performed by: OCCUPATIONAL THERAPIST

## 2022-01-01 PROCEDURE — 97530 THERAPEUTIC ACTIVITIES: CPT | Mod: GP | Performed by: REHABILITATION PRACTITIONER

## 2022-01-01 PROCEDURE — 96366 THER/PROPH/DIAG IV INF ADDON: CPT

## 2022-01-01 PROCEDURE — 999N000007 HC SITE CHECK

## 2022-01-01 PROCEDURE — 80048 BASIC METABOLIC PNL TOTAL CA: CPT

## 2022-01-01 PROCEDURE — 96375 TX/PRO/DX INJ NEW DRUG ADDON: CPT

## 2022-01-01 PROCEDURE — 250N000013 HC RX MED GY IP 250 OP 250 PS 637: Performed by: PHARMACIST

## 2022-01-01 PROCEDURE — 36416 COLLJ CAPILLARY BLOOD SPEC: CPT

## 2022-01-01 PROCEDURE — 99225 PR SUBSEQUENT OBSERVATION CARE,LEVEL II: CPT | Performed by: STUDENT IN AN ORGANIZED HEALTH CARE EDUCATION/TRAINING PROGRAM

## 2022-01-01 PROCEDURE — 999N000127 HC STATISTIC PERIPHERAL IV START W US GUIDANCE

## 2022-01-01 PROCEDURE — 120N000004 HC R&B MS OVERFLOW

## 2022-01-01 PROCEDURE — 36569 INSJ PICC 5 YR+ W/O IMAGING: CPT

## 2022-01-01 PROCEDURE — 93005 ELECTROCARDIOGRAM TRACING: CPT

## 2022-01-01 PROCEDURE — 36415 COLL VENOUS BLD VENIPUNCTURE: CPT | Performed by: PEDIATRICS

## 2022-01-01 PROCEDURE — 71046 X-RAY EXAM CHEST 2 VIEWS: CPT | Mod: 26 | Performed by: STUDENT IN AN ORGANIZED HEALTH CARE EDUCATION/TRAINING PROGRAM

## 2022-01-01 PROCEDURE — 99285 EMERGENCY DEPT VISIT HI MDM: CPT

## 2022-01-01 PROCEDURE — 99219 PR INITIAL OBSERVATION CARE,LEVEL II: CPT | Performed by: INTERNAL MEDICINE

## 2022-01-01 PROCEDURE — 99239 HOSP IP/OBS DSCHRG MGMT >30: CPT | Performed by: HOSPITALIST

## 2022-01-01 PROCEDURE — 80048 BASIC METABOLIC PNL TOTAL CA: CPT | Performed by: HOSPITALIST

## 2022-01-01 PROCEDURE — 85060 BLOOD SMEAR INTERPRETATION: CPT | Performed by: PATHOLOGY

## 2022-01-01 PROCEDURE — 84100 ASSAY OF PHOSPHORUS: CPT | Performed by: PHYSICIAN ASSISTANT

## 2022-01-01 PROCEDURE — 97140 MANUAL THERAPY 1/> REGIONS: CPT | Mod: GP

## 2022-01-01 PROCEDURE — 99207 PR NO BILLABLE SERVICE THIS VISIT: CPT | Performed by: INTERNAL MEDICINE

## 2022-01-01 PROCEDURE — 97530 THERAPEUTIC ACTIVITIES: CPT | Mod: GO

## 2022-01-01 PROCEDURE — 258N000003 HC RX IP 258 OP 636: Performed by: INTERNAL MEDICINE

## 2022-01-01 PROCEDURE — 97110 THERAPEUTIC EXERCISES: CPT | Mod: GP | Performed by: REHABILITATION PRACTITIONER

## 2022-01-01 PROCEDURE — 82310 ASSAY OF CALCIUM: CPT | Performed by: HOSPITALIST

## 2022-01-01 PROCEDURE — 99305 1ST NF CARE MODERATE MDM 35: CPT | Performed by: INTERNAL MEDICINE

## 2022-01-01 PROCEDURE — 97161 PT EVAL LOW COMPLEX 20 MIN: CPT | Mod: GP

## 2022-01-01 PROCEDURE — 250N000013 HC RX MED GY IP 250 OP 250 PS 637: Performed by: PHYSICIAN ASSISTANT

## 2022-01-01 PROCEDURE — 80053 COMPREHEN METABOLIC PANEL: CPT | Performed by: HOSPITALIST

## 2022-01-01 PROCEDURE — 84520 ASSAY OF UREA NITROGEN: CPT | Performed by: INTERNAL MEDICINE

## 2022-01-01 PROCEDURE — 90715 TDAP VACCINE 7 YRS/> IM: CPT | Performed by: FAMILY MEDICINE

## 2022-01-01 PROCEDURE — 99239 HOSP IP/OBS DSCHRG MGMT >30: CPT | Performed by: PEDIATRICS

## 2022-01-01 PROCEDURE — 80048 BASIC METABOLIC PNL TOTAL CA: CPT | Performed by: FAMILY MEDICINE

## 2022-01-01 PROCEDURE — 272N000433 HC KIT CATH IV 18 OR 20G CM, POWERGLIDE W MAX BARRIER

## 2022-01-01 PROCEDURE — 99232 SBSQ HOSP IP/OBS MODERATE 35: CPT | Performed by: HOSPITALIST

## 2022-01-01 PROCEDURE — 250N000013 HC RX MED GY IP 250 OP 250 PS 637: Performed by: PEDIATRICS

## 2022-01-01 PROCEDURE — 99225 PR SUBSEQUENT OBSERVATION CARE,LEVEL II: CPT | Performed by: INTERNAL MEDICINE

## 2022-01-01 PROCEDURE — 82803 BLOOD GASES ANY COMBINATION: CPT | Performed by: PEDIATRICS

## 2022-01-01 PROCEDURE — 99222 1ST HOSP IP/OBS MODERATE 55: CPT | Performed by: INTERNAL MEDICINE

## 2022-01-01 PROCEDURE — 83735 ASSAY OF MAGNESIUM: CPT | Performed by: HOSPITALIST

## 2022-01-01 PROCEDURE — 85018 HEMOGLOBIN: CPT | Performed by: PHYSICIAN ASSISTANT

## 2022-01-01 PROCEDURE — 5A09357 ASSISTANCE WITH RESPIRATORY VENTILATION, LESS THAN 24 CONSECUTIVE HOURS, CONTINUOUS POSITIVE AIRWAY PRESSURE: ICD-10-PCS | Performed by: PHYSICIAN ASSISTANT

## 2022-01-01 PROCEDURE — 36415 COLL VENOUS BLD VENIPUNCTURE: CPT | Performed by: PHYSICIAN ASSISTANT

## 2022-01-01 PROCEDURE — 76770 US EXAM ABDO BACK WALL COMP: CPT

## 2022-01-01 PROCEDURE — 99284 EMERGENCY DEPT VISIT MOD MDM: CPT | Performed by: NURSE PRACTITIONER

## 2022-01-01 PROCEDURE — 999N000125 HC STATISTIC PATIENT MED CONFERENCE < 30 MIN

## 2022-01-01 PROCEDURE — 85045 AUTOMATED RETICULOCYTE COUNT: CPT | Performed by: HOSPITALIST

## 2022-01-01 PROCEDURE — 250N000009 HC RX 250

## 2022-01-01 PROCEDURE — G0009 ADMIN PNEUMOCOCCAL VACCINE: HCPCS | Mod: 59 | Performed by: FAMILY MEDICINE

## 2022-01-01 PROCEDURE — 99232 SBSQ HOSP IP/OBS MODERATE 35: CPT | Performed by: PEDIATRICS

## 2022-01-01 PROCEDURE — 71046 X-RAY EXAM CHEST 2 VIEWS: CPT

## 2022-01-01 PROCEDURE — 99366 TEAM CONF W/PAT BY HC PROF: CPT

## 2022-01-01 PROCEDURE — 99310 SBSQ NF CARE HIGH MDM 45: CPT | Performed by: INTERNAL MEDICINE

## 2022-01-01 PROCEDURE — 82310 ASSAY OF CALCIUM: CPT | Performed by: PEDIATRICS

## 2022-01-01 PROCEDURE — 99231 SBSQ HOSP IP/OBS SF/LOW 25: CPT | Performed by: HOSPITALIST

## 2022-01-01 PROCEDURE — 84443 ASSAY THYROID STIM HORMONE: CPT | Performed by: FAMILY MEDICINE

## 2022-01-01 PROCEDURE — 85004 AUTOMATED DIFF WBC COUNT: CPT | Performed by: HOSPITALIST

## 2022-01-01 PROCEDURE — 0052A HC ADMIN COVID VAC PFIZER TRS-SUCR, 2ND DOSE: CPT | Performed by: PEDIATRICS

## 2022-01-01 PROCEDURE — 99207 PR APP CREDIT; MD BILLING SHARED VISIT: CPT | Performed by: PHYSICIAN ASSISTANT

## 2022-01-01 PROCEDURE — 83010 ASSAY OF HAPTOGLOBIN QUANT: CPT | Performed by: HOSPITALIST

## 2022-01-01 PROCEDURE — 87635 SARS-COV-2 COVID-19 AMP PRB: CPT | Performed by: HOSPITALIST

## 2022-01-01 PROCEDURE — 99231 SBSQ HOSP IP/OBS SF/LOW 25: CPT | Performed by: INTERNAL MEDICINE

## 2022-01-01 PROCEDURE — 82248 BILIRUBIN DIRECT: CPT | Performed by: HOSPITALIST

## 2022-01-01 PROCEDURE — 97116 GAIT TRAINING THERAPY: CPT | Mod: GP | Performed by: PHYSICAL THERAPIST

## 2022-01-01 PROCEDURE — 99283 EMERGENCY DEPT VISIT LOW MDM: CPT

## 2022-01-01 PROCEDURE — 36415 COLL VENOUS BLD VENIPUNCTURE: CPT | Performed by: NURSE PRACTITIONER

## 2022-01-01 PROCEDURE — 5A09357 ASSISTANCE WITH RESPIRATORY VENTILATION, LESS THAN 24 CONSECUTIVE HOURS, CONTINUOUS POSITIVE AIRWAY PRESSURE: ICD-10-PCS | Performed by: INTERNAL MEDICINE

## 2022-01-01 PROCEDURE — 99214 OFFICE O/P EST MOD 30 MIN: CPT | Mod: 25 | Performed by: FAMILY MEDICINE

## 2022-01-01 PROCEDURE — 85027 COMPLETE CBC AUTOMATED: CPT | Performed by: PHYSICIAN ASSISTANT

## 2022-01-01 PROCEDURE — 85025 COMPLETE CBC W/AUTO DIFF WBC: CPT | Performed by: HOSPITALIST

## 2022-01-01 PROCEDURE — 82607 VITAMIN B-12: CPT | Performed by: HOSPITALIST

## 2022-01-01 PROCEDURE — 99366 TEAM CONF W/PAT BY HC PROF: CPT | Performed by: PHYSICAL THERAPIST

## 2022-01-01 PROCEDURE — 36415 COLL VENOUS BLD VENIPUNCTURE: CPT

## 2022-01-01 PROCEDURE — 82947 ASSAY GLUCOSE BLOOD QUANT: CPT | Performed by: INTERNAL MEDICINE

## 2022-01-01 PROCEDURE — 83550 IRON BINDING TEST: CPT | Performed by: HOSPITALIST

## 2022-01-01 PROCEDURE — 91305 HC RX IP 250 OP 636: CPT | Performed by: PEDIATRICS

## 2022-01-01 PROCEDURE — 96372 THER/PROPH/DIAG INJ SC/IM: CPT

## 2022-01-01 PROCEDURE — 97750 PHYSICAL PERFORMANCE TEST: CPT | Mod: GP | Performed by: PHYSICAL THERAPIST

## 2022-01-01 PROCEDURE — 96365 THER/PROPH/DIAG IV INF INIT: CPT

## 2022-01-01 PROCEDURE — 96376 TX/PRO/DX INJ SAME DRUG ADON: CPT

## 2022-01-01 PROCEDURE — 250N000009 HC RX 250: Performed by: PHYSICIAN ASSISTANT

## 2022-01-01 PROCEDURE — 85027 COMPLETE CBC AUTOMATED: CPT

## 2022-01-01 PROCEDURE — 90472 IMMUNIZATION ADMIN EACH ADD: CPT | Performed by: FAMILY MEDICINE

## 2022-01-01 PROCEDURE — 258N000001 HC RX 258: Performed by: INTERNAL MEDICINE

## 2022-01-01 PROCEDURE — 250N000009 HC RX 250: Performed by: HOSPITALIST

## 2022-01-01 PROCEDURE — 71046 X-RAY EXAM CHEST 2 VIEWS: CPT | Mod: 26 | Performed by: RADIOLOGY

## 2022-01-01 PROCEDURE — 85610 PROTHROMBIN TIME: CPT | Performed by: FAMILY MEDICINE

## 2022-01-01 PROCEDURE — 90732 PPSV23 VACC 2 YRS+ SUBQ/IM: CPT | Performed by: FAMILY MEDICINE

## 2022-01-01 PROCEDURE — 97163 PT EVAL HIGH COMPLEX 45 MIN: CPT | Mod: GP

## 2022-01-01 RX ORDER — LIDOCAINE HYDROCHLORIDE 10 MG/ML
INJECTION, SOLUTION INFILTRATION; PERINEURAL
Status: COMPLETED
Start: 2022-01-01 | End: 2022-01-01

## 2022-01-01 RX ORDER — FENTANYL 25 UG/1
1 PATCH TRANSDERMAL
Qty: 10 PATCH | Refills: 0 | Status: SHIPPED | OUTPATIENT
Start: 2022-01-01 | End: 2022-01-01

## 2022-01-01 RX ORDER — ONDANSETRON 2 MG/ML
4 INJECTION INTRAMUSCULAR; INTRAVENOUS EVERY 6 HOURS PRN
Status: DISCONTINUED | OUTPATIENT
Start: 2022-01-01 | End: 2022-01-01 | Stop reason: HOSPADM

## 2022-01-01 RX ORDER — WARFARIN SODIUM 3 MG/1
3 TABLET ORAL
Status: DISCONTINUED | OUTPATIENT
Start: 2022-01-01 | End: 2022-01-01 | Stop reason: HOSPADM

## 2022-01-01 RX ORDER — WARFARIN SODIUM 3 MG/1
3 TABLET ORAL
Status: COMPLETED | OUTPATIENT
Start: 2022-01-01 | End: 2022-01-01

## 2022-01-01 RX ORDER — METOPROLOL TARTRATE 100 MG
TABLET ORAL
COMMUNITY
Start: 2022-01-01 | End: 2022-01-01

## 2022-01-01 RX ORDER — ONDANSETRON 4 MG/1
4 TABLET, ORALLY DISINTEGRATING ORAL EVERY 6 HOURS PRN
Status: DISCONTINUED | OUTPATIENT
Start: 2022-01-01 | End: 2022-01-01 | Stop reason: HOSPADM

## 2022-01-01 RX ORDER — FUROSEMIDE 40 MG
40 TABLET ORAL
Status: COMPLETED | OUTPATIENT
Start: 2022-01-01 | End: 2022-01-01

## 2022-01-01 RX ORDER — POTASSIUM CHLORIDE 750 MG/1
40 TABLET, EXTENDED RELEASE ORAL ONCE
Status: COMPLETED | OUTPATIENT
Start: 2022-01-01 | End: 2022-01-01

## 2022-01-01 RX ORDER — FAMOTIDINE 20 MG/1
20 TABLET, FILM COATED ORAL 2 TIMES DAILY PRN
Status: DISCONTINUED | OUTPATIENT
Start: 2022-01-01 | End: 2022-01-01 | Stop reason: HOSPADM

## 2022-01-01 RX ORDER — WARFARIN SODIUM 3 MG/1
3 TABLET ORAL ONCE
Status: COMPLETED | OUTPATIENT
Start: 2022-01-01 | End: 2022-01-01

## 2022-01-01 RX ORDER — NALOXONE HYDROCHLORIDE 0.4 MG/ML
0.2 INJECTION, SOLUTION INTRAMUSCULAR; INTRAVENOUS; SUBCUTANEOUS
Status: DISCONTINUED | OUTPATIENT
Start: 2022-01-01 | End: 2022-01-01 | Stop reason: HOSPADM

## 2022-01-01 RX ORDER — OXYCODONE HYDROCHLORIDE 5 MG/1
5 TABLET ORAL EVERY 6 HOURS PRN
Status: CANCELLED | OUTPATIENT
Start: 2022-01-01

## 2022-01-01 RX ORDER — WARFARIN SODIUM 2 MG/1
2 TABLET ORAL
Status: CANCELLED | OUTPATIENT
Start: 2022-01-01

## 2022-01-01 RX ORDER — SODIUM BICARBONATE 650 MG/1
1300 TABLET ORAL DAILY
Qty: 30 TABLET | Refills: 0 | Status: SHIPPED | OUTPATIENT
Start: 2022-01-01 | End: 2022-01-01

## 2022-01-01 RX ORDER — WARFARIN SODIUM 5 MG/1
5 TABLET ORAL
Status: COMPLETED | OUTPATIENT
Start: 2022-01-01 | End: 2022-01-01

## 2022-01-01 RX ORDER — SODIUM BICARBONATE 650 MG/1
1300 TABLET ORAL 3 TIMES DAILY
Status: DISCONTINUED | OUTPATIENT
Start: 2022-01-01 | End: 2022-01-01 | Stop reason: HOSPADM

## 2022-01-01 RX ORDER — CARBOXYMETHYLCELLULOSE SODIUM 10 MG/ML
1 GEL OPHTHALMIC
Status: DISCONTINUED | OUTPATIENT
Start: 2022-01-01 | End: 2022-01-01 | Stop reason: HOSPADM

## 2022-01-01 RX ORDER — SODIUM BICARBONATE 650 MG/1
1300 TABLET ORAL 3 TIMES DAILY
Status: DISCONTINUED | OUTPATIENT
Start: 2022-01-01 | End: 2022-01-01

## 2022-01-01 RX ORDER — CARBOXYMETHYLCELLULOSE SODIUM 5 MG/ML
1 SOLUTION/ DROPS OPHTHALMIC
Qty: 1 EACH | Refills: 0 | Status: SHIPPED | OUTPATIENT
Start: 2022-01-01 | End: 2022-01-01

## 2022-01-01 RX ORDER — NALOXONE HYDROCHLORIDE 0.4 MG/ML
0.2 INJECTION, SOLUTION INTRAMUSCULAR; INTRAVENOUS; SUBCUTANEOUS
Status: CANCELLED | OUTPATIENT
Start: 2022-01-01

## 2022-01-01 RX ORDER — WARFARIN SODIUM 2.5 MG/1
2.5 TABLET ORAL
Status: COMPLETED | OUTPATIENT
Start: 2022-01-01 | End: 2022-01-01

## 2022-01-01 RX ORDER — METHOCARBAMOL 500 MG/1
250 TABLET, FILM COATED ORAL EVERY 6 HOURS PRN
COMMUNITY
Start: 2022-01-01

## 2022-01-01 RX ORDER — BENZONATATE 100 MG/1
200 CAPSULE ORAL 3 TIMES DAILY PRN
COMMUNITY
Start: 2022-01-01

## 2022-01-01 RX ORDER — METOPROLOL SUCCINATE 50 MG/1
50 TABLET, EXTENDED RELEASE ORAL DAILY
Status: DISCONTINUED | OUTPATIENT
Start: 2022-01-01 | End: 2022-01-01

## 2022-01-01 RX ORDER — BUMETANIDE 0.25 MG/ML
1 INJECTION INTRAMUSCULAR; INTRAVENOUS EVERY 12 HOURS
Status: DISCONTINUED | OUTPATIENT
Start: 2022-01-01 | End: 2022-01-01 | Stop reason: HOSPADM

## 2022-01-01 RX ORDER — METHOCARBAMOL 500 MG/1
500 TABLET, FILM COATED ORAL 3 TIMES DAILY PRN
Status: DISCONTINUED | OUTPATIENT
Start: 2022-01-01 | End: 2022-01-01 | Stop reason: HOSPADM

## 2022-01-01 RX ORDER — POLYETHYLENE GLYCOL 3350 17 G/17G
17 POWDER, FOR SOLUTION ORAL DAILY PRN
Status: CANCELLED | OUTPATIENT
Start: 2022-01-01

## 2022-01-01 RX ORDER — METOPROLOL SUCCINATE 25 MG/1
25 TABLET, EXTENDED RELEASE ORAL DAILY
Status: ON HOLD | DISCHARGE
Start: 2022-01-01 | End: 2022-01-01

## 2022-01-01 RX ORDER — FENTANYL 25 UG/1
25 PATCH TRANSDERMAL
Status: DISCONTINUED | OUTPATIENT
Start: 2022-01-01 | End: 2022-01-01

## 2022-01-01 RX ORDER — FAMOTIDINE 20 MG/1
20 TABLET, FILM COATED ORAL 2 TIMES DAILY PRN
Qty: 60 TABLET | Refills: 0 | Status: SHIPPED | OUTPATIENT
Start: 2022-01-01 | End: 2022-01-01

## 2022-01-01 RX ORDER — BUMETANIDE 0.5 MG/1
TABLET ORAL
Qty: 900 TABLET | Refills: 1 | Status: SHIPPED | OUTPATIENT
Start: 2022-01-01 | End: 2023-01-01

## 2022-01-01 RX ORDER — FUROSEMIDE 10 MG/ML
40 INJECTION INTRAMUSCULAR; INTRAVENOUS ONCE
Status: COMPLETED | OUTPATIENT
Start: 2022-01-01 | End: 2022-01-01

## 2022-01-01 RX ORDER — WARFARIN SODIUM 2 MG/1
4 TABLET ORAL
Status: COMPLETED | OUTPATIENT
Start: 2022-01-01 | End: 2022-01-01

## 2022-01-01 RX ORDER — OXYCODONE HYDROCHLORIDE 5 MG/1
5 TABLET ORAL EVERY 6 HOURS PRN
Status: DISCONTINUED | OUTPATIENT
Start: 2022-01-01 | End: 2022-01-01 | Stop reason: HOSPADM

## 2022-01-01 RX ORDER — DOCUSATE SODIUM 100 MG/1
100 CAPSULE, LIQUID FILLED ORAL 2 TIMES DAILY PRN
Status: DISCONTINUED | OUTPATIENT
Start: 2022-01-01 | End: 2022-01-01 | Stop reason: HOSPADM

## 2022-01-01 RX ORDER — COLCHICINE 0.6 MG/1
0.3 TABLET ORAL DAILY
Qty: 15 TABLET | Refills: 0 | Status: ON HOLD | OUTPATIENT
Start: 2022-01-01 | End: 2022-01-01

## 2022-01-01 RX ORDER — BUMETANIDE 1 MG/1
1 TABLET ORAL DAILY
Status: DISCONTINUED | OUTPATIENT
Start: 2022-01-01 | End: 2022-01-01

## 2022-01-01 RX ORDER — ACETAMINOPHEN 325 MG/1
650 TABLET ORAL EVERY 6 HOURS PRN
Qty: 30 TABLET | Refills: 0 | Status: SHIPPED | OUTPATIENT
Start: 2022-01-01 | End: 2022-01-01

## 2022-01-01 RX ORDER — FEBUXOSTAT 80 MG/1
TABLET, FILM COATED ORAL
COMMUNITY
Start: 2022-01-01 | End: 2022-01-01

## 2022-01-01 RX ORDER — LISINOPRIL 10 MG/1
10 TABLET ORAL DAILY
Qty: 90 TABLET | Refills: 0 | Status: SHIPPED | OUTPATIENT
Start: 2022-01-01 | End: 2022-01-01

## 2022-01-01 RX ORDER — NALOXONE HYDROCHLORIDE 0.4 MG/ML
0.4 INJECTION, SOLUTION INTRAMUSCULAR; INTRAVENOUS; SUBCUTANEOUS
Status: CANCELLED | OUTPATIENT
Start: 2022-01-01

## 2022-01-01 RX ORDER — BISACODYL 10 MG
10 SUPPOSITORY, RECTAL RECTAL DAILY PRN
Status: DISCONTINUED | OUTPATIENT
Start: 2022-01-01 | End: 2022-01-01 | Stop reason: HOSPADM

## 2022-01-01 RX ORDER — SODIUM BICARBONATE 650 MG/1
1300 TABLET ORAL 3 TIMES DAILY
Status: CANCELLED | OUTPATIENT
Start: 2022-01-01

## 2022-01-01 RX ORDER — SODIUM BICARBONATE 650 MG/1
1300 TABLET ORAL 3 TIMES DAILY
Qty: 180 TABLET | Refills: 0 | Status: ON HOLD | OUTPATIENT
Start: 2022-01-01 | End: 2022-01-01

## 2022-01-01 RX ORDER — COLCHICINE 0.6 MG/1
0.6 TABLET ORAL DAILY
Qty: 30 TABLET | Refills: 0 | Status: SHIPPED | OUTPATIENT
Start: 2022-01-01 | End: 2022-01-01

## 2022-01-01 RX ORDER — BUMETANIDE 2 MG/1
2 TABLET ORAL
Status: DISCONTINUED | OUTPATIENT
Start: 2022-01-01 | End: 2022-01-01

## 2022-01-01 RX ORDER — POTASSIUM CHLORIDE 1.5 G/1.58G
40 POWDER, FOR SOLUTION ORAL ONCE
Status: COMPLETED | OUTPATIENT
Start: 2022-01-01 | End: 2022-01-01

## 2022-01-01 RX ORDER — METOPROLOL SUCCINATE 25 MG/1
TABLET, EXTENDED RELEASE ORAL
Qty: 45 TABLET | Refills: 3 | Status: SHIPPED | OUTPATIENT
Start: 2022-01-01

## 2022-01-01 RX ORDER — METHOCARBAMOL 500 MG/1
500 TABLET, FILM COATED ORAL 3 TIMES DAILY PRN
Status: CANCELLED | OUTPATIENT
Start: 2022-01-01

## 2022-01-01 RX ORDER — DEXTROSE MONOHYDRATE 25 G/50ML
25-50 INJECTION, SOLUTION INTRAVENOUS
Status: DISCONTINUED | OUTPATIENT
Start: 2022-01-01 | End: 2022-01-01 | Stop reason: HOSPADM

## 2022-01-01 RX ORDER — PREDNISONE 20 MG/1
20 TABLET ORAL DAILY
Status: COMPLETED | OUTPATIENT
Start: 2022-01-01 | End: 2022-01-01

## 2022-01-01 RX ORDER — NALOXONE HYDROCHLORIDE 0.4 MG/ML
0.4 INJECTION, SOLUTION INTRAMUSCULAR; INTRAVENOUS; SUBCUTANEOUS
Status: DISCONTINUED | OUTPATIENT
Start: 2022-01-01 | End: 2022-01-01 | Stop reason: HOSPADM

## 2022-01-01 RX ORDER — CARBOXYMETHYLCELLULOSE SODIUM 10 MG/ML
1 GEL OPHTHALMIC
Qty: 1 EACH | Refills: 0 | Status: SHIPPED | OUTPATIENT
Start: 2022-01-01 | End: 2022-01-01

## 2022-01-01 RX ORDER — BUMETANIDE 0.25 MG/ML
2 INJECTION INTRAMUSCULAR; INTRAVENOUS 2 TIMES DAILY
Status: DISCONTINUED | OUTPATIENT
Start: 2022-01-01 | End: 2022-01-01

## 2022-01-01 RX ORDER — PREDNISONE 20 MG/1
20 TABLET ORAL DAILY
Status: DISCONTINUED | OUTPATIENT
Start: 2022-01-01 | End: 2022-01-01

## 2022-01-01 RX ORDER — DEXTROSE MONOHYDRATE 25 G/50ML
25 INJECTION, SOLUTION INTRAVENOUS ONCE
Status: COMPLETED | OUTPATIENT
Start: 2022-01-01 | End: 2022-01-01

## 2022-01-01 RX ORDER — FENTANYL 25 UG/1
25 PATCH TRANSDERMAL
Status: DISCONTINUED | OUTPATIENT
Start: 2022-01-01 | End: 2022-01-01 | Stop reason: HOSPADM

## 2022-01-01 RX ORDER — ALLOPURINOL 300 MG/1
300 TABLET ORAL DAILY
Qty: 30 TABLET | Refills: 0 | Status: SHIPPED | OUTPATIENT
Start: 2022-01-01 | End: 2022-01-01

## 2022-01-01 RX ORDER — SODIUM BICARBONATE 650 MG/1
1300 TABLET ORAL DAILY
Status: DISCONTINUED | OUTPATIENT
Start: 2022-01-01 | End: 2022-01-01 | Stop reason: HOSPADM

## 2022-01-01 RX ORDER — COLCHICINE 0.6 MG/1
0.6 TABLET ORAL DAILY
Status: DISCONTINUED | OUTPATIENT
Start: 2022-01-01 | End: 2022-01-01 | Stop reason: HOSPADM

## 2022-01-01 RX ORDER — LIDOCAINE 40 MG/G
CREAM TOPICAL
Status: DISCONTINUED | OUTPATIENT
Start: 2022-01-01 | End: 2022-01-01

## 2022-01-01 RX ORDER — ALBUTEROL SULFATE 5 MG/ML
10 SOLUTION RESPIRATORY (INHALATION) ONCE
Status: COMPLETED | OUTPATIENT
Start: 2022-01-01 | End: 2022-01-01

## 2022-01-01 RX ORDER — DIPHENHYDRAMINE HYDROCHLORIDE 50 MG/ML
50 INJECTION INTRAMUSCULAR; INTRAVENOUS
Status: DISCONTINUED | OUTPATIENT
Start: 2022-01-01 | End: 2022-01-01 | Stop reason: HOSPADM

## 2022-01-01 RX ORDER — POLYETHYLENE GLYCOL 3350 17 G/17G
17 POWDER, FOR SOLUTION ORAL DAILY PRN
Status: DISCONTINUED | OUTPATIENT
Start: 2022-01-01 | End: 2022-01-01 | Stop reason: HOSPADM

## 2022-01-01 RX ORDER — WARFARIN SODIUM 1 MG/1
1 TABLET ORAL
Status: COMPLETED | OUTPATIENT
Start: 2022-01-01 | End: 2022-01-01

## 2022-01-01 RX ORDER — BUMETANIDE 0.5 MG/1
2.5 TABLET ORAL
Qty: 300 TABLET | Refills: 0 | Status: SHIPPED | OUTPATIENT
Start: 2022-01-01 | End: 2022-01-01

## 2022-01-01 RX ORDER — ACETAMINOPHEN 325 MG/1
650 TABLET ORAL EVERY 6 HOURS PRN
Status: DISCONTINUED | OUTPATIENT
Start: 2022-01-01 | End: 2022-01-01 | Stop reason: HOSPADM

## 2022-01-01 RX ORDER — WARFARIN SODIUM 3 MG/1
TABLET ORAL
Qty: 30 TABLET | OUTPATIENT
Start: 2022-01-01

## 2022-01-01 RX ORDER — WARFARIN SODIUM 2 MG/1
2 TABLET ORAL
Status: COMPLETED | OUTPATIENT
Start: 2022-01-01 | End: 2022-01-01

## 2022-01-01 RX ORDER — ALLOPURINOL 300 MG/1
300 TABLET ORAL DAILY
Status: DISCONTINUED | OUTPATIENT
Start: 2022-01-01 | End: 2022-01-01 | Stop reason: HOSPADM

## 2022-01-01 RX ORDER — SODIUM POLYSTYRENE SULFONATE 15 G/60ML
30 SUSPENSION ORAL; RECTAL ONCE
Status: COMPLETED | OUTPATIENT
Start: 2022-01-01 | End: 2022-01-01

## 2022-01-01 RX ORDER — CARBOXYMETHYLCELLULOSE SODIUM 5 MG/ML
1 SOLUTION/ DROPS OPHTHALMIC
Status: DISCONTINUED | OUTPATIENT
Start: 2022-01-01 | End: 2022-01-01 | Stop reason: HOSPADM

## 2022-01-01 RX ORDER — ACETAMINOPHEN 650 MG/1
650 SUPPOSITORY RECTAL EVERY 6 HOURS PRN
Status: DISCONTINUED | OUTPATIENT
Start: 2022-01-01 | End: 2022-01-01 | Stop reason: HOSPADM

## 2022-01-01 RX ORDER — WARFARIN SODIUM 3 MG/1
3 TABLET ORAL
Status: DISCONTINUED | OUTPATIENT
Start: 2022-01-01 | End: 2022-01-01

## 2022-01-01 RX ORDER — CALCIUM GLUCONATE 94 MG/ML
1 INJECTION, SOLUTION INTRAVENOUS ONCE
Status: COMPLETED | OUTPATIENT
Start: 2022-01-01 | End: 2022-01-01

## 2022-01-01 RX ORDER — FENTANYL 25 UG/1
1 PATCH TRANSDERMAL
Qty: 3 PATCH | Refills: 0 | Status: SHIPPED | OUTPATIENT
Start: 2022-01-01 | End: 2022-01-01

## 2022-01-01 RX ORDER — MAGNESIUM SULFATE HEPTAHYDRATE 40 MG/ML
2 INJECTION, SOLUTION INTRAVENOUS ONCE
Status: COMPLETED | OUTPATIENT
Start: 2022-01-01 | End: 2022-01-01

## 2022-01-01 RX ORDER — LEVOFLOXACIN 750 MG/1
750 TABLET, FILM COATED ORAL EVERY OTHER DAY
Qty: 3 TABLET | Refills: 0 | Status: CANCELLED | OUTPATIENT
Start: 2022-01-01

## 2022-01-01 RX ORDER — POLYETHYLENE GLYCOL 3350 17 G/17G
17 POWDER, FOR SOLUTION ORAL DAILY PRN
Qty: 510 G | Refills: 0 | Status: SHIPPED | OUTPATIENT
Start: 2022-01-01 | End: 2022-01-01

## 2022-01-01 RX ORDER — OXYCODONE HYDROCHLORIDE 5 MG/1
10 TABLET ORAL EVERY 4 HOURS PRN
Qty: 30 TABLET | Refills: 0 | Status: SHIPPED | OUTPATIENT
Start: 2022-01-01

## 2022-01-01 RX ORDER — SODIUM POLYSTYRENE SULFONATE 15 G/60ML
15 SUSPENSION ORAL; RECTAL ONCE
Status: COMPLETED | OUTPATIENT
Start: 2022-01-01 | End: 2022-01-01

## 2022-01-01 RX ORDER — DIPHENHYDRAMINE HCL 25 MG
50 CAPSULE ORAL
Status: DISCONTINUED | OUTPATIENT
Start: 2022-01-01 | End: 2022-01-01 | Stop reason: HOSPADM

## 2022-01-01 RX ORDER — WARFARIN SODIUM 3 MG/1
3 TABLET ORAL DAILY
Status: ON HOLD | DISCHARGE
Start: 2022-01-01 | End: 2022-01-01

## 2022-01-01 RX ORDER — PREDNISONE 10 MG/1
10 TABLET ORAL DAILY
Status: DISCONTINUED | OUTPATIENT
Start: 2022-01-01 | End: 2022-01-01

## 2022-01-01 RX ORDER — ONDANSETRON 2 MG/ML
4 INJECTION INTRAMUSCULAR; INTRAVENOUS EVERY 6 HOURS PRN
Status: CANCELLED | OUTPATIENT
Start: 2022-01-01

## 2022-01-01 RX ORDER — WARFARIN SODIUM 3 MG/1
3 TABLET ORAL DAILY
Qty: 30 TABLET | Refills: 0 | Status: SHIPPED | OUTPATIENT
Start: 2022-01-01 | End: 2022-01-01

## 2022-01-01 RX ORDER — FAMOTIDINE 20 MG/1
20 TABLET, FILM COATED ORAL 2 TIMES DAILY PRN
Status: CANCELLED | OUTPATIENT
Start: 2022-01-01

## 2022-01-01 RX ORDER — FENTANYL 25 UG/1
25 PATCH TRANSDERMAL
Status: CANCELLED | OUTPATIENT
Start: 2022-01-01

## 2022-01-01 RX ORDER — METOPROLOL SUCCINATE 25 MG/1
12.5 TABLET, EXTENDED RELEASE ORAL DAILY
Qty: 15 TABLET | Refills: 0 | Status: SHIPPED | OUTPATIENT
Start: 2022-01-01 | End: 2022-01-01

## 2022-01-01 RX ORDER — ONDANSETRON 4 MG/1
4 TABLET, ORALLY DISINTEGRATING ORAL EVERY 6 HOURS PRN
Status: CANCELLED | OUTPATIENT
Start: 2022-01-01

## 2022-01-01 RX ORDER — ALLOPURINOL 300 MG/1
300 TABLET ORAL DAILY
Status: CANCELLED | OUTPATIENT
Start: 2022-01-01

## 2022-01-01 RX ORDER — WARFARIN SODIUM 4 MG/1
4 TABLET ORAL
Status: COMPLETED | OUTPATIENT
Start: 2022-01-01 | End: 2022-01-01

## 2022-01-01 RX ORDER — BUMETANIDE 0.25 MG/ML
1 INJECTION INTRAMUSCULAR; INTRAVENOUS EVERY 12 HOURS
Status: CANCELLED | OUTPATIENT
Start: 2022-01-01

## 2022-01-01 RX ORDER — COLCHICINE 0.6 MG/1
TABLET ORAL
Qty: 30 TABLET | Refills: 0 | Status: SHIPPED | OUTPATIENT
Start: 2022-01-01 | End: 2023-01-01

## 2022-01-01 RX ORDER — METOPROLOL SUCCINATE 25 MG/1
25 TABLET, EXTENDED RELEASE ORAL DAILY
Status: DISCONTINUED | OUTPATIENT
Start: 2022-01-01 | End: 2022-01-01 | Stop reason: HOSPADM

## 2022-01-01 RX ORDER — CALCIUM GLUCONATE 20 MG/ML
1 INJECTION, SOLUTION INTRAVENOUS ONCE
Status: COMPLETED | OUTPATIENT
Start: 2022-01-01 | End: 2022-01-01

## 2022-01-01 RX ORDER — WARFARIN SODIUM 2 MG/1
2 TABLET ORAL
Status: DISCONTINUED | OUTPATIENT
Start: 2022-01-01 | End: 2022-01-01

## 2022-01-01 RX ORDER — BUMETANIDE 1 MG/1
1 TABLET ORAL ONCE
Status: COMPLETED | OUTPATIENT
Start: 2022-01-01 | End: 2022-01-01

## 2022-01-01 RX ORDER — BUMETANIDE 0.25 MG/ML
2 INJECTION INTRAMUSCULAR; INTRAVENOUS EVERY 12 HOURS
Status: DISCONTINUED | OUTPATIENT
Start: 2022-01-01 | End: 2022-01-01

## 2022-01-01 RX ORDER — BUMETANIDE 0.25 MG/ML
2 INJECTION INTRAMUSCULAR; INTRAVENOUS
Status: DISCONTINUED | OUTPATIENT
Start: 2022-01-01 | End: 2022-01-01

## 2022-01-01 RX ORDER — DOCUSATE SODIUM 100 MG/1
100 CAPSULE, LIQUID FILLED ORAL 2 TIMES DAILY PRN
Status: CANCELLED | OUTPATIENT
Start: 2022-01-01

## 2022-01-01 RX ORDER — METHYLPREDNISOLONE ACETATE 40 MG/ML
40 INJECTION, SUSPENSION INTRA-ARTICULAR; INTRALESIONAL; INTRAMUSCULAR; SOFT TISSUE ONCE
Status: COMPLETED | OUTPATIENT
Start: 2022-01-01 | End: 2022-01-01

## 2022-01-01 RX ORDER — METOPROLOL SUCCINATE 25 MG/1
25 TABLET, EXTENDED RELEASE ORAL DAILY
Status: DISCONTINUED | OUTPATIENT
Start: 2022-01-01 | End: 2022-01-01

## 2022-01-01 RX ORDER — COLCHICINE 0.6 MG/1
0.6 TABLET ORAL DAILY
Status: CANCELLED | OUTPATIENT
Start: 2022-01-01

## 2022-01-01 RX ORDER — MAGNESIUM SULFATE HEPTAHYDRATE 40 MG/ML
4 INJECTION, SOLUTION INTRAVENOUS ONCE
Status: COMPLETED | OUTPATIENT
Start: 2022-01-01 | End: 2022-01-01

## 2022-01-01 RX ORDER — METHOCARBAMOL 500 MG/1
500 TABLET, FILM COATED ORAL 3 TIMES DAILY PRN
Qty: 30 TABLET | Refills: 0 | Status: SHIPPED | OUTPATIENT
Start: 2022-01-01 | End: 2022-01-01

## 2022-01-01 RX ORDER — FEBUXOSTAT 40 MG/1
40 TABLET, FILM COATED ORAL DAILY
Status: DISCONTINUED | OUTPATIENT
Start: 2022-01-01 | End: 2022-01-01

## 2022-01-01 RX ORDER — PREDNISONE 5 MG/1
5 TABLET ORAL DAILY
Status: COMPLETED | OUTPATIENT
Start: 2022-01-01 | End: 2022-01-01

## 2022-01-01 RX ORDER — BISACODYL 10 MG
10 SUPPOSITORY, RECTAL RECTAL DAILY PRN
Status: CANCELLED | OUTPATIENT
Start: 2022-01-01

## 2022-01-01 RX ORDER — ALLOPURINOL 300 MG/1
TABLET ORAL
Qty: 90 TABLET | Refills: 3 | Status: SHIPPED | OUTPATIENT
Start: 2022-01-01

## 2022-01-01 RX ORDER — COLCHICINE 0.6 MG
0.3 TABLET ORAL DAILY
Status: DISCONTINUED | OUTPATIENT
Start: 2022-01-01 | End: 2022-01-01

## 2022-01-01 RX ORDER — WARFARIN SODIUM 5 MG/1
TABLET ORAL
Qty: 100 TABLET | Refills: 1 | Status: ON HOLD | COMMUNITY
Start: 2022-01-01 | End: 2022-01-01

## 2022-01-01 RX ORDER — BUMETANIDE 0.25 MG/ML
1 INJECTION INTRAMUSCULAR; INTRAVENOUS EVERY 12 HOURS
Status: DISCONTINUED | OUTPATIENT
Start: 2022-01-01 | End: 2022-01-01

## 2022-01-01 RX ORDER — WARFARIN SODIUM 2 MG/1
2 TABLET ORAL
Status: DISCONTINUED | OUTPATIENT
Start: 2022-01-01 | End: 2022-01-01 | Stop reason: CLARIF

## 2022-01-01 RX ORDER — FENTANYL 25 UG/1
1 PATCH TRANSDERMAL
Qty: 10 PATCH | Refills: 0 | Status: SHIPPED | OUTPATIENT
Start: 2022-01-01

## 2022-01-01 RX ORDER — BUMETANIDE 0.25 MG/ML
2 INJECTION INTRAMUSCULAR; INTRAVENOUS EVERY 8 HOURS
Status: DISCONTINUED | OUTPATIENT
Start: 2022-01-01 | End: 2022-01-01

## 2022-01-01 RX ORDER — METOPROLOL SUCCINATE 25 MG/1
25 TABLET, EXTENDED RELEASE ORAL DAILY
Status: CANCELLED | OUTPATIENT
Start: 2022-01-01

## 2022-01-01 RX ORDER — ACETAMINOPHEN 325 MG/1
650 TABLET ORAL EVERY 6 HOURS PRN
Status: CANCELLED | OUTPATIENT
Start: 2022-01-01

## 2022-01-01 RX ORDER — OXYCODONE HYDROCHLORIDE 5 MG/1
10 TABLET ORAL EVERY 4 HOURS PRN
COMMUNITY
Start: 2022-01-01 | End: 2022-01-01

## 2022-01-01 RX ORDER — NICOTINE POLACRILEX 4 MG
15-30 LOZENGE BUCCAL
Status: DISCONTINUED | OUTPATIENT
Start: 2022-01-01 | End: 2022-01-01 | Stop reason: HOSPADM

## 2022-01-01 RX ORDER — ALLOPURINOL 300 MG/1
300 TABLET ORAL DAILY
Status: ON HOLD | DISCHARGE
Start: 2022-01-01 | End: 2022-01-01

## 2022-01-01 RX ORDER — COLCHICINE 0.6 MG/1
0.6 TABLET ORAL DAILY
Status: ON HOLD | DISCHARGE
Start: 2022-01-01 | End: 2022-01-01

## 2022-01-01 RX ORDER — SODIUM CHLORIDE 9 MG/ML
INJECTION, SOLUTION INTRAVENOUS
Status: DISPENSED
Start: 2022-01-01 | End: 2022-01-01

## 2022-01-01 RX ORDER — SENNOSIDES A AND B 8.6 MG/1
1-2 TABLET, FILM COATED ORAL 2 TIMES DAILY
COMMUNITY
Start: 2022-01-01

## 2022-01-01 RX ORDER — WARFARIN SODIUM 3 MG/1
TABLET ORAL
Qty: 90 TABLET | Refills: 1 | Status: SHIPPED | OUTPATIENT
Start: 2022-01-01 | End: 2022-01-01

## 2022-01-01 RX ORDER — COLCHICINE 0.6 MG
0.3 TABLET ORAL DAILY
Status: DISCONTINUED | OUTPATIENT
Start: 2022-01-01 | End: 2022-01-01 | Stop reason: HOSPADM

## 2022-01-01 RX ORDER — LIDOCAINE 50 MG/G
1 PATCH TOPICAL EVERY 24 HOURS
Qty: 10 PATCH | Refills: 0 | Status: SHIPPED | OUTPATIENT
Start: 2022-01-01 | End: 2022-01-01

## 2022-01-01 RX ORDER — SODIUM CHLORIDE 9 MG/ML
INJECTION, SOLUTION INTRAVENOUS CONTINUOUS
Status: ACTIVE | OUTPATIENT
Start: 2022-01-01 | End: 2022-01-01

## 2022-01-01 RX ORDER — WARFARIN SODIUM 3 MG/1
TABLET ORAL
Qty: 135 TABLET | Refills: 1 | Status: SHIPPED | OUTPATIENT
Start: 2022-01-01

## 2022-01-01 RX ORDER — BUMETANIDE 2 MG/1
4 TABLET ORAL
Status: DISCONTINUED | OUTPATIENT
Start: 2022-01-01 | End: 2022-01-01

## 2022-01-01 RX ADMIN — CALCIUM GLUCONATE 1 G: 98 INJECTION, SOLUTION INTRAVENOUS at 09:41

## 2022-01-01 RX ADMIN — WARFARIN SODIUM 5 MG: 5 TABLET ORAL at 17:20

## 2022-01-01 RX ADMIN — SODIUM BICARBONATE 650 MG TABLET 1300 MG: at 08:24

## 2022-01-01 RX ADMIN — WARFARIN SODIUM 3 MG: 3 TABLET ORAL at 17:19

## 2022-01-01 RX ADMIN — SODIUM BICARBONATE 650 MG TABLET 1300 MG: at 14:09

## 2022-01-01 RX ADMIN — FENTANYL 1 PATCH: 25 PATCH TRANSDERMAL at 08:27

## 2022-01-01 RX ADMIN — SODIUM BICARBONATE 650 MG TABLET 1300 MG: at 16:04

## 2022-01-01 RX ADMIN — MICONAZOLE NITRATE: 20 POWDER TOPICAL at 08:35

## 2022-01-01 RX ADMIN — BUMETANIDE 2 MG: 0.25 INJECTION, SOLUTION INTRAMUSCULAR; INTRAVENOUS at 13:01

## 2022-01-01 RX ADMIN — COLCHICINE 0.3 MG: 0.6 TABLET, FILM COATED ORAL at 10:11

## 2022-01-01 RX ADMIN — MICONAZOLE NITRATE: 2 POWDER TOPICAL at 09:00

## 2022-01-01 RX ADMIN — SODIUM BICARBONATE 650 MG TABLET 1300 MG: at 13:27

## 2022-01-01 RX ADMIN — MAGNESIUM 64 MG (MAGNESIUM CHLORIDE) TABLET,DELAYED RELEASE 535 MG: at 08:51

## 2022-01-01 RX ADMIN — FEBUXOSTAT 40 MG: 40 TABLET, FILM COATED ORAL at 10:11

## 2022-01-01 RX ADMIN — WARFARIN SODIUM 5 MG: 5 TABLET ORAL at 18:20

## 2022-01-01 RX ADMIN — COLCHICINE 0.6 MG: 0.6 TABLET, FILM COATED ORAL at 08:10

## 2022-01-01 RX ADMIN — FENTANYL 1 PATCH: 25 PATCH TRANSDERMAL at 09:30

## 2022-01-01 RX ADMIN — SODIUM BICARBONATE 650 MG TABLET 1300 MG: at 14:48

## 2022-01-01 RX ADMIN — SODIUM BICARBONATE 650 MG TABLET 1300 MG: at 10:25

## 2022-01-01 RX ADMIN — FENTANYL 1 PATCH: 25 PATCH TRANSDERMAL at 20:17

## 2022-01-01 RX ADMIN — COLCHICINE 0.6 MG: 0.6 TABLET, FILM COATED ORAL at 09:00

## 2022-01-01 RX ADMIN — WARFARIN SODIUM 4 MG: 2 TABLET ORAL at 17:13

## 2022-01-01 RX ADMIN — COLCHICINE 0.6 MG: 0.6 TABLET, FILM COATED ORAL at 08:35

## 2022-01-01 RX ADMIN — ALLOPURINOL 300 MG: 300 TABLET ORAL at 08:54

## 2022-01-01 RX ADMIN — BUMETANIDE 2 MG: 0.25 INJECTION, SOLUTION INTRAMUSCULAR; INTRAVENOUS at 18:02

## 2022-01-01 RX ADMIN — MAGNESIUM 64 MG (MAGNESIUM CHLORIDE) TABLET,DELAYED RELEASE 1070 MG: at 10:08

## 2022-01-01 RX ADMIN — SODIUM BICARBONATE 650 MG TABLET 1300 MG: at 19:36

## 2022-01-01 RX ADMIN — METOPROLOL SUCCINATE 50 MG: 50 TABLET, EXTENDED RELEASE ORAL at 08:58

## 2022-01-01 RX ADMIN — COLCHICINE 0.3 MG: 0.6 TABLET, FILM COATED ORAL at 08:14

## 2022-01-01 RX ADMIN — BUMETANIDE 2 MG: 2 TABLET ORAL at 08:01

## 2022-01-01 RX ADMIN — ALLOPURINOL 300 MG: 300 TABLET ORAL at 13:31

## 2022-01-01 RX ADMIN — SODIUM BICARBONATE 650 MG TABLET 1300 MG: at 10:13

## 2022-01-01 RX ADMIN — WARFARIN SODIUM 2.5 MG: 2.5 TABLET ORAL at 17:20

## 2022-01-01 RX ADMIN — WARFARIN SODIUM 5 MG: 5 TABLET ORAL at 18:37

## 2022-01-01 RX ADMIN — ALLOPURINOL 300 MG: 300 TABLET ORAL at 09:12

## 2022-01-01 RX ADMIN — WARFARIN SODIUM 3 MG: 3 TABLET ORAL at 19:50

## 2022-01-01 RX ADMIN — SODIUM BICARBONATE 650 MG TABLET 1300 MG: at 08:18

## 2022-01-01 RX ADMIN — SODIUM BICARBONATE 650 MG TABLET 1300 MG: at 20:29

## 2022-01-01 RX ADMIN — SODIUM BICARBONATE 650 MG TABLET 1300 MG: at 09:33

## 2022-01-01 RX ADMIN — SODIUM BICARBONATE 650 MG TABLET 1300 MG: at 13:29

## 2022-01-01 RX ADMIN — SODIUM BICARBONATE 650 MG TABLET 1300 MG: at 09:00

## 2022-01-01 RX ADMIN — FENTANYL 1 PATCH: 25 PATCH TRANSDERMAL at 08:51

## 2022-01-01 RX ADMIN — CALCIUM GLUCONATE 1 G: 20 INJECTION, SOLUTION INTRAVENOUS at 21:17

## 2022-01-01 RX ADMIN — SODIUM BICARBONATE 650 MG TABLET 1300 MG: at 21:43

## 2022-01-01 RX ADMIN — ALLOPURINOL 300 MG: 300 TABLET ORAL at 09:03

## 2022-01-01 RX ADMIN — WARFARIN SODIUM 2.5 MG: 2.5 TABLET ORAL at 18:18

## 2022-01-01 RX ADMIN — ALLOPURINOL 300 MG: 300 TABLET ORAL at 09:35

## 2022-01-01 RX ADMIN — SODIUM BICARBONATE 650 MG TABLET 1300 MG: at 13:42

## 2022-01-01 RX ADMIN — SODIUM BICARBONATE 650 MG TABLET 1300 MG: at 16:26

## 2022-01-01 RX ADMIN — BUMETANIDE 2 MG: 0.25 INJECTION, SOLUTION INTRAMUSCULAR; INTRAVENOUS at 08:52

## 2022-01-01 RX ADMIN — MICONAZOLE NITRATE: 20 POWDER TOPICAL at 09:34

## 2022-01-01 RX ADMIN — SODIUM BICARBONATE 650 MG TABLET 1300 MG: at 09:14

## 2022-01-01 RX ADMIN — ALLOPURINOL 300 MG: 300 TABLET ORAL at 09:00

## 2022-01-01 RX ADMIN — SODIUM BICARBONATE 650 MG TABLET 1300 MG: at 07:52

## 2022-01-01 RX ADMIN — ALLOPURINOL 300 MG: 300 TABLET ORAL at 08:42

## 2022-01-01 RX ADMIN — WARFARIN SODIUM 5 MG: 5 TABLET ORAL at 19:00

## 2022-01-01 RX ADMIN — SODIUM BICARBONATE 650 MG TABLET 1300 MG: at 08:34

## 2022-01-01 RX ADMIN — ALLOPURINOL 300 MG: 300 TABLET ORAL at 08:34

## 2022-01-01 RX ADMIN — SODIUM BICARBONATE 650 MG TABLET 1300 MG: at 14:01

## 2022-01-01 RX ADMIN — SODIUM BICARBONATE 650 MG TABLET 1300 MG: at 14:51

## 2022-01-01 RX ADMIN — WARFARIN SODIUM 4 MG: 2 TABLET ORAL at 17:32

## 2022-01-01 RX ADMIN — SODIUM BICARBONATE 650 MG TABLET 1300 MG: at 14:53

## 2022-01-01 RX ADMIN — BUMETANIDE 2 MG: 0.25 INJECTION, SOLUTION INTRAMUSCULAR; INTRAVENOUS at 08:50

## 2022-01-01 RX ADMIN — METOPROLOL SUCCINATE 25 MG: 25 TABLET, EXTENDED RELEASE ORAL at 09:27

## 2022-01-01 RX ADMIN — METOPROLOL SUCCINATE 50 MG: 50 TABLET, EXTENDED RELEASE ORAL at 09:22

## 2022-01-01 RX ADMIN — MAGNESIUM 64 MG (MAGNESIUM CHLORIDE) TABLET,DELAYED RELEASE 1070 MG: at 08:28

## 2022-01-01 RX ADMIN — COLCHICINE 0.6 MG: 0.6 TABLET, FILM COATED ORAL at 08:02

## 2022-01-01 RX ADMIN — ORAL VEHICLES - SUSP 2.5 MG: SUSPENSION at 17:01

## 2022-01-01 RX ADMIN — SODIUM BICARBONATE 650 MG TABLET 1300 MG: at 19:47

## 2022-01-01 RX ADMIN — WARFARIN SODIUM 3 MG: 3 TABLET ORAL at 18:43

## 2022-01-01 RX ADMIN — SODIUM BICARBONATE 650 MG TABLET 1300 MG: at 08:05

## 2022-01-01 RX ADMIN — SODIUM BICARBONATE 650 MG TABLET 1300 MG: at 17:08

## 2022-01-01 RX ADMIN — SODIUM BICARBONATE 650 MG TABLET 1300 MG: at 07:44

## 2022-01-01 RX ADMIN — SODIUM BICARBONATE 650 MG TABLET 1300 MG: at 09:58

## 2022-01-01 RX ADMIN — SODIUM BICARBONATE 650 MG TABLET 1300 MG: at 20:33

## 2022-01-01 RX ADMIN — DEXTROSE MONOHYDRATE 25 G: 500 INJECTION PARENTERAL at 09:41

## 2022-01-01 RX ADMIN — PREDNISONE 10 MG: 5 TABLET ORAL at 08:58

## 2022-01-01 RX ADMIN — MAGNESIUM 64 MG (MAGNESIUM CHLORIDE) TABLET,DELAYED RELEASE 535 MG: at 08:10

## 2022-01-01 RX ADMIN — SODIUM BICARBONATE 650 MG TABLET 1300 MG: at 08:56

## 2022-01-01 RX ADMIN — SODIUM BICARBONATE 650 MG TABLET 1300 MG: at 14:08

## 2022-01-01 RX ADMIN — MAGNESIUM 64 MG (MAGNESIUM CHLORIDE) TABLET,DELAYED RELEASE 1070 MG: at 08:20

## 2022-01-01 RX ADMIN — COLCHICINE 0.6 MG: 0.6 TABLET, FILM COATED ORAL at 08:38

## 2022-01-01 RX ADMIN — METOPROLOL SUCCINATE 25 MG: 25 TABLET, EXTENDED RELEASE ORAL at 08:11

## 2022-01-01 RX ADMIN — PREDNISONE 10 MG: 5 TABLET ORAL at 07:52

## 2022-01-01 RX ADMIN — COLCHICINE 0.3 MG: 0.6 TABLET, FILM COATED ORAL at 08:05

## 2022-01-01 RX ADMIN — MAGNESIUM 64 MG (MAGNESIUM CHLORIDE) TABLET,DELAYED RELEASE 1070 MG: at 09:35

## 2022-01-01 RX ADMIN — SODIUM BICARBONATE 650 MG TABLET 1300 MG: at 21:31

## 2022-01-01 RX ADMIN — BUMETANIDE 2 MG: 2 TABLET ORAL at 11:51

## 2022-01-01 RX ADMIN — METOPROLOL SUCCINATE 25 MG: 25 TABLET, EXTENDED RELEASE ORAL at 08:50

## 2022-01-01 RX ADMIN — METOPROLOL SUCCINATE 25 MG: 25 TABLET, EXTENDED RELEASE ORAL at 08:42

## 2022-01-01 RX ADMIN — SODIUM BICARBONATE 650 MG TABLET 1300 MG: at 08:42

## 2022-01-01 RX ADMIN — COLCHICINE 0.6 MG: 0.6 TABLET ORAL at 09:03

## 2022-01-01 RX ADMIN — METOPROLOL SUCCINATE 25 MG: 25 TABLET, EXTENDED RELEASE ORAL at 09:46

## 2022-01-01 RX ADMIN — METOPROLOL SUCCINATE 50 MG: 50 TABLET, EXTENDED RELEASE ORAL at 09:17

## 2022-01-01 RX ADMIN — WARFARIN SODIUM 4 MG: 2 TABLET ORAL at 17:46

## 2022-01-01 RX ADMIN — BUMETANIDE 1 MG: 0.25 INJECTION, SOLUTION INTRAMUSCULAR; INTRAVENOUS at 05:38

## 2022-01-01 RX ADMIN — COLCHICINE 0.6 MG: 0.6 TABLET ORAL at 09:00

## 2022-01-01 RX ADMIN — SODIUM BICARBONATE 650 MG TABLET 1300 MG: at 19:59

## 2022-01-01 RX ADMIN — MAGNESIUM 64 MG (MAGNESIUM CHLORIDE) TABLET,DELAYED RELEASE 1070 MG: at 08:34

## 2022-01-01 RX ADMIN — SODIUM BICARBONATE 650 MG TABLET 1300 MG: at 10:17

## 2022-01-01 RX ADMIN — SODIUM BICARBONATE 650 MG TABLET 1300 MG: at 16:28

## 2022-01-01 RX ADMIN — MICONAZOLE NITRATE: 2 POWDER TOPICAL at 09:04

## 2022-01-01 RX ADMIN — SODIUM ZIRCONIUM CYCLOSILICATE 15 G: 5 POWDER, FOR SUSPENSION ORAL at 16:33

## 2022-01-01 RX ADMIN — BUMETANIDE 2 MG: 2 TABLET ORAL at 19:49

## 2022-01-01 RX ADMIN — SODIUM BICARBONATE 650 MG TABLET 1300 MG: at 08:29

## 2022-01-01 RX ADMIN — COLCHICINE 0.6 MG: 0.6 TABLET ORAL at 09:29

## 2022-01-01 RX ADMIN — ALLOPURINOL 300 MG: 300 TABLET ORAL at 08:25

## 2022-01-01 RX ADMIN — POTASSIUM CHLORIDE 40 MEQ: 750 TABLET, EXTENDED RELEASE ORAL at 08:09

## 2022-01-01 RX ADMIN — SODIUM BICARBONATE 650 MG TABLET 1300 MG: at 09:11

## 2022-01-01 RX ADMIN — MAGNESIUM 64 MG (MAGNESIUM CHLORIDE) TABLET,DELAYED RELEASE 1070 MG: at 08:16

## 2022-01-01 RX ADMIN — POLYETHYLENE GLYCOL 3350 17 G: 17 POWDER, FOR SOLUTION ORAL at 11:42

## 2022-01-01 RX ADMIN — ALLOPURINOL 300 MG: 300 TABLET ORAL at 08:58

## 2022-01-01 RX ADMIN — METOPROLOL SUCCINATE 25 MG: 25 TABLET, EXTENDED RELEASE ORAL at 09:33

## 2022-01-01 RX ADMIN — SODIUM BICARBONATE 650 MG TABLET 1300 MG: at 14:49

## 2022-01-01 RX ADMIN — ALLOPURINOL 300 MG: 300 TABLET ORAL at 09:26

## 2022-01-01 RX ADMIN — SODIUM BICARBONATE 650 MG TABLET 1300 MG: at 09:03

## 2022-01-01 RX ADMIN — COLCHICINE 0.6 MG: 0.6 TABLET, FILM COATED ORAL at 08:19

## 2022-01-01 RX ADMIN — MAGNESIUM 64 MG (MAGNESIUM CHLORIDE) TABLET,DELAYED RELEASE 1070 MG: at 08:01

## 2022-01-01 RX ADMIN — DOCUSATE SODIUM 100 MG: 100 CAPSULE, LIQUID FILLED ORAL at 21:55

## 2022-01-01 RX ADMIN — COLCHICINE 0.3 MG: 0.6 TABLET, FILM COATED ORAL at 09:07

## 2022-01-01 RX ADMIN — SODIUM BICARBONATE 650 MG TABLET 1300 MG: at 20:19

## 2022-01-01 RX ADMIN — MAGNESIUM 64 MG (MAGNESIUM CHLORIDE) TABLET,DELAYED RELEASE 1070 MG: at 08:38

## 2022-01-01 RX ADMIN — SODIUM BICARBONATE 650 MG TABLET 1300 MG: at 16:31

## 2022-01-01 RX ADMIN — SODIUM BICARBONATE 650 MG TABLET 1300 MG: at 16:40

## 2022-01-01 RX ADMIN — COLCHICINE 0.6 MG: 0.6 TABLET, FILM COATED ORAL at 08:54

## 2022-01-01 RX ADMIN — METOPROLOL SUCCINATE 25 MG: 25 TABLET, EXTENDED RELEASE ORAL at 09:19

## 2022-01-01 RX ADMIN — WARFARIN SODIUM 2.5 MG: 2.5 TABLET ORAL at 18:02

## 2022-01-01 RX ADMIN — ALLOPURINOL 300 MG: 300 TABLET ORAL at 10:08

## 2022-01-01 RX ADMIN — MICONAZOLE NITRATE: 20 POWDER TOPICAL at 09:12

## 2022-01-01 RX ADMIN — SODIUM BICARBONATE 650 MG TABLET 1300 MG: at 20:30

## 2022-01-01 RX ADMIN — SODIUM ZIRCONIUM CYCLOSILICATE 10 G: 5 POWDER, FOR SUSPENSION ORAL at 11:37

## 2022-01-01 RX ADMIN — COLCHICINE 0.3 MG: 0.6 TABLET, FILM COATED ORAL at 09:40

## 2022-01-01 RX ADMIN — FENTANYL 1 PATCH: 25 PATCH TRANSDERMAL at 08:42

## 2022-01-01 RX ADMIN — BUMETANIDE 2 MG: 0.25 INJECTION, SOLUTION INTRAMUSCULAR; INTRAVENOUS at 23:46

## 2022-01-01 RX ADMIN — SODIUM BICARBONATE 650 MG TABLET 1300 MG: at 09:41

## 2022-01-01 RX ADMIN — PREDNISONE 20 MG: 20 TABLET ORAL at 10:11

## 2022-01-01 RX ADMIN — BUMETANIDE 1 MG: 0.25 INJECTION, SOLUTION INTRAMUSCULAR; INTRAVENOUS at 08:25

## 2022-01-01 RX ADMIN — ALLOPURINOL 300 MG: 300 TABLET ORAL at 08:47

## 2022-01-01 RX ADMIN — MICONAZOLE NITRATE: 20 POWDER TOPICAL at 08:52

## 2022-01-01 RX ADMIN — SODIUM BICARBONATE 650 MG TABLET 1300 MG: at 10:07

## 2022-01-01 RX ADMIN — SODIUM BICARBONATE 650 MG TABLET 1300 MG: at 08:01

## 2022-01-01 RX ADMIN — MICONAZOLE NITRATE: 2 POWDER TOPICAL at 21:27

## 2022-01-01 RX ADMIN — MAGNESIUM 64 MG (MAGNESIUM CHLORIDE) TABLET,DELAYED RELEASE 1070 MG: at 08:51

## 2022-01-01 RX ADMIN — BUMETANIDE 1 MG: 0.25 INJECTION, SOLUTION INTRAMUSCULAR; INTRAVENOUS at 17:57

## 2022-01-01 RX ADMIN — COLCHICINE 0.6 MG: 0.6 TABLET, FILM COATED ORAL at 09:25

## 2022-01-01 RX ADMIN — BUMETANIDE 1 MG: 1 TABLET ORAL at 11:50

## 2022-01-01 RX ADMIN — METOPROLOL SUCCINATE 50 MG: 50 TABLET, EXTENDED RELEASE ORAL at 08:26

## 2022-01-01 RX ADMIN — SODIUM BICARBONATE 650 MG TABLET 1300 MG: at 09:10

## 2022-01-01 RX ADMIN — MICONAZOLE NITRATE: 2 POWDER TOPICAL at 20:26

## 2022-01-01 RX ADMIN — SODIUM BICARBONATE 650 MG TABLET 1300 MG: at 09:32

## 2022-01-01 RX ADMIN — BUMETANIDE 2 MG: 0.25 INJECTION, SOLUTION INTRAMUSCULAR; INTRAVENOUS at 12:17

## 2022-01-01 RX ADMIN — METOPROLOL SUCCINATE 25 MG: 25 TABLET, EXTENDED RELEASE ORAL at 08:08

## 2022-01-01 RX ADMIN — SODIUM BICARBONATE 650 MG TABLET 1300 MG: at 09:25

## 2022-01-01 RX ADMIN — WARFARIN SODIUM 4 MG: 2 TABLET ORAL at 18:53

## 2022-01-01 RX ADMIN — METOPROLOL SUCCINATE 25 MG: 25 TABLET, EXTENDED RELEASE ORAL at 09:00

## 2022-01-01 RX ADMIN — WARFARIN SODIUM 2 MG: 2 TABLET ORAL at 18:35

## 2022-01-01 RX ADMIN — METOPROLOL SUCCINATE 50 MG: 50 TABLET, EXTENDED RELEASE ORAL at 08:14

## 2022-01-01 RX ADMIN — SODIUM BICARBONATE 650 MG TABLET 1300 MG: at 20:17

## 2022-01-01 RX ADMIN — BUMETANIDE 2 MG: 0.25 INJECTION, SOLUTION INTRAMUSCULAR; INTRAVENOUS at 17:01

## 2022-01-01 RX ADMIN — PREDNISONE 20 MG: 20 TABLET ORAL at 09:24

## 2022-01-01 RX ADMIN — SODIUM BICARBONATE 650 MG TABLET 1300 MG: at 21:16

## 2022-01-01 RX ADMIN — COLCHICINE 0.6 MG: 0.6 TABLET, FILM COATED ORAL at 09:24

## 2022-01-01 RX ADMIN — BUMETANIDE 1 MG: 0.25 INJECTION, SOLUTION INTRAMUSCULAR; INTRAVENOUS at 18:24

## 2022-01-01 RX ADMIN — SODIUM BICARBONATE 650 MG TABLET 1300 MG: at 16:07

## 2022-01-01 RX ADMIN — METOPROLOL SUCCINATE 25 MG: 25 TABLET, EXTENDED RELEASE ORAL at 08:27

## 2022-01-01 RX ADMIN — BUMETANIDE 2 MG: 0.25 INJECTION, SOLUTION INTRAMUSCULAR; INTRAVENOUS at 20:59

## 2022-01-01 RX ADMIN — DEXTROSE MONOHYDRATE 25 G: 25 INJECTION, SOLUTION INTRAVENOUS at 21:58

## 2022-01-01 RX ADMIN — ALLOPURINOL 300 MG: 300 TABLET ORAL at 09:27

## 2022-01-01 RX ADMIN — SODIUM BICARBONATE 650 MG TABLET 1300 MG: at 20:04

## 2022-01-01 RX ADMIN — ALBUTEROL SULFATE 10 MG: 2.5 SOLUTION RESPIRATORY (INHALATION) at 00:46

## 2022-01-01 RX ADMIN — COLCHICINE 0.6 MG: 0.6 TABLET ORAL at 09:18

## 2022-01-01 RX ADMIN — SODIUM BICARBONATE 650 MG TABLET 1300 MG: at 09:23

## 2022-01-01 RX ADMIN — COLCHICINE 0.6 MG: 0.6 TABLET ORAL at 09:12

## 2022-01-01 RX ADMIN — BUMETANIDE 2 MG: 0.25 INJECTION, SOLUTION INTRAMUSCULAR; INTRAVENOUS at 00:44

## 2022-01-01 RX ADMIN — BUMETANIDE 2 MG: 0.25 INJECTION, SOLUTION INTRAMUSCULAR; INTRAVENOUS at 15:55

## 2022-01-01 RX ADMIN — COLCHICINE 0.6 MG: 0.6 TABLET, FILM COATED ORAL at 09:38

## 2022-01-01 RX ADMIN — WARFARIN SODIUM 3.5 MG: 3 TABLET ORAL at 17:17

## 2022-01-01 RX ADMIN — WARFARIN SODIUM 4 MG: 4 TABLET ORAL at 18:36

## 2022-01-01 RX ADMIN — BUMETANIDE 2 MG: 0.25 INJECTION, SOLUTION INTRAMUSCULAR; INTRAVENOUS at 09:32

## 2022-01-01 RX ADMIN — METOPROLOL SUCCINATE 50 MG: 50 TABLET, EXTENDED RELEASE ORAL at 09:00

## 2022-01-01 RX ADMIN — SODIUM BICARBONATE 650 MG TABLET 1300 MG: at 15:22

## 2022-01-01 RX ADMIN — BUMETANIDE 2 MG: 0.25 INJECTION, SOLUTION INTRAMUSCULAR; INTRAVENOUS at 16:14

## 2022-01-01 RX ADMIN — BUMETANIDE 2 MG: 0.25 INJECTION, SOLUTION INTRAMUSCULAR; INTRAVENOUS at 17:36

## 2022-01-01 RX ADMIN — BUMETANIDE 2 MG: 0.25 INJECTION, SOLUTION INTRAMUSCULAR; INTRAVENOUS at 08:27

## 2022-01-01 RX ADMIN — ALLOPURINOL 300 MG: 300 TABLET ORAL at 09:39

## 2022-01-01 RX ADMIN — METOPROLOL SUCCINATE 50 MG: 50 TABLET, EXTENDED RELEASE ORAL at 09:26

## 2022-01-01 RX ADMIN — COLCHICINE 0.6 MG: 0.6 TABLET, FILM COATED ORAL at 08:49

## 2022-01-01 RX ADMIN — COLCHICINE 0.3 MG: 0.6 TABLET, FILM COATED ORAL at 09:24

## 2022-01-01 RX ADMIN — ALLOPURINOL 300 MG: 300 TABLET ORAL at 08:05

## 2022-01-01 RX ADMIN — BUMETANIDE 3 MG: 2 TABLET ORAL at 08:50

## 2022-01-01 RX ADMIN — Medication 5 UNITS: at 09:12

## 2022-01-01 RX ADMIN — ALLOPURINOL 300 MG: 300 TABLET ORAL at 08:33

## 2022-01-01 RX ADMIN — METOPROLOL SUCCINATE 50 MG: 50 TABLET, EXTENDED RELEASE ORAL at 09:07

## 2022-01-01 RX ADMIN — ALLOPURINOL 300 MG: 300 TABLET ORAL at 07:52

## 2022-01-01 RX ADMIN — MAGNESIUM 64 MG (MAGNESIUM CHLORIDE) TABLET,DELAYED RELEASE 1070 MG: at 09:23

## 2022-01-01 RX ADMIN — OXYCODONE HYDROCHLORIDE 5 MG: 5 TABLET ORAL at 21:05

## 2022-01-01 RX ADMIN — PREDNISONE 5 MG: 5 TABLET ORAL at 09:07

## 2022-01-01 RX ADMIN — BUMETANIDE 2 MG: 0.25 INJECTION, SOLUTION INTRAMUSCULAR; INTRAVENOUS at 16:49

## 2022-01-01 RX ADMIN — BUMETANIDE 3 MG: 2 TABLET ORAL at 16:51

## 2022-01-01 RX ADMIN — SODIUM BICARBONATE 650 MG TABLET 1300 MG: at 09:22

## 2022-01-01 RX ADMIN — BUMETANIDE 2 MG: 0.25 INJECTION, SOLUTION INTRAMUSCULAR; INTRAVENOUS at 16:07

## 2022-01-01 RX ADMIN — BUMETANIDE 2 MG: 0.25 INJECTION, SOLUTION INTRAMUSCULAR; INTRAVENOUS at 17:17

## 2022-01-01 RX ADMIN — BUMETANIDE 2 MG: 0.25 INJECTION, SOLUTION INTRAMUSCULAR; INTRAVENOUS at 17:14

## 2022-01-01 RX ADMIN — COLCHICINE 0.6 MG: 0.6 TABLET, FILM COATED ORAL at 09:12

## 2022-01-01 RX ADMIN — MAGNESIUM 64 MG (MAGNESIUM CHLORIDE) TABLET,DELAYED RELEASE 1070 MG: at 09:51

## 2022-01-01 RX ADMIN — MICONAZOLE NITRATE: 2 POWDER TOPICAL at 20:24

## 2022-01-01 RX ADMIN — PREDNISONE 5 MG: 5 TABLET ORAL at 08:24

## 2022-01-01 RX ADMIN — SODIUM BICARBONATE 650 MG TABLET 1300 MG: at 08:08

## 2022-01-01 RX ADMIN — WARFARIN SODIUM 4 MG: 2 TABLET ORAL at 17:11

## 2022-01-01 RX ADMIN — WARFARIN SODIUM 4 MG: 2 TABLET ORAL at 17:14

## 2022-01-01 RX ADMIN — ALLOPURINOL 300 MG: 300 TABLET ORAL at 08:10

## 2022-01-01 RX ADMIN — SODIUM CHLORIDE: 9 INJECTION, SOLUTION INTRAVENOUS at 09:20

## 2022-01-01 RX ADMIN — METHYLPREDNISOLONE ACETATE 40 MG: 40 INJECTION, SUSPENSION INTRA-ARTICULAR; INTRALESIONAL; INTRAMUSCULAR; SOFT TISSUE at 12:02

## 2022-01-01 RX ADMIN — FUROSEMIDE 40 MG: 40 TABLET ORAL at 17:06

## 2022-01-01 RX ADMIN — MAGNESIUM 64 MG (MAGNESIUM CHLORIDE) TABLET,DELAYED RELEASE 1070 MG: at 10:18

## 2022-01-01 RX ADMIN — SODIUM BICARBONATE 650 MG TABLET 1300 MG: at 14:23

## 2022-01-01 RX ADMIN — WARFARIN SODIUM 5 MG: 5 TABLET ORAL at 17:39

## 2022-01-01 RX ADMIN — SODIUM BICARBONATE 650 MG TABLET 1300 MG: at 20:46

## 2022-01-01 RX ADMIN — METOPROLOL SUCCINATE 25 MG: 25 TABLET, EXTENDED RELEASE ORAL at 08:06

## 2022-01-01 RX ADMIN — WARFARIN SODIUM 3 MG: 3 TABLET ORAL at 19:07

## 2022-01-01 RX ADMIN — COLCHICINE 0.6 MG: 0.6 TABLET, FILM COATED ORAL at 08:01

## 2022-01-01 RX ADMIN — ALLOPURINOL 300 MG: 300 TABLET ORAL at 09:08

## 2022-01-01 RX ADMIN — MAGNESIUM SULFATE 2 G: 2 INJECTION INTRAVENOUS at 15:57

## 2022-01-01 RX ADMIN — SODIUM BICARBONATE 650 MG TABLET 1300 MG: at 15:54

## 2022-01-01 RX ADMIN — SODIUM POLYSTYRENE SULFONATE 15 G: 15 SUSPENSION ORAL; RECTAL at 10:56

## 2022-01-01 RX ADMIN — PREDNISONE 20 MG: 20 TABLET ORAL at 08:55

## 2022-01-01 RX ADMIN — SODIUM BICARBONATE 650 MG TABLET 1300 MG: at 09:42

## 2022-01-01 RX ADMIN — SODIUM BICARBONATE 650 MG TABLET 1300 MG: at 19:44

## 2022-01-01 RX ADMIN — COLCHICINE 0.3 MG: 0.6 TABLET, FILM COATED ORAL at 08:58

## 2022-01-01 RX ADMIN — FEBUXOSTAT 40 MG: 40 TABLET, FILM COATED ORAL at 09:00

## 2022-01-01 RX ADMIN — ALLOPURINOL 300 MG: 300 TABLET ORAL at 09:04

## 2022-01-01 RX ADMIN — COLCHICINE 0.6 MG: 0.6 TABLET, FILM COATED ORAL at 07:44

## 2022-01-01 RX ADMIN — SODIUM BICARBONATE 650 MG TABLET 1300 MG: at 13:55

## 2022-01-01 RX ADMIN — SODIUM BICARBONATE 650 MG TABLET 1300 MG: at 08:51

## 2022-01-01 RX ADMIN — MICONAZOLE NITRATE: 2 POWDER TOPICAL at 08:59

## 2022-01-01 RX ADMIN — FENTANYL 1 PATCH: 25 PATCH TRANSDERMAL at 09:09

## 2022-01-01 RX ADMIN — SODIUM BICARBONATE 650 MG TABLET 1300 MG: at 09:30

## 2022-01-01 RX ADMIN — SODIUM BICARBONATE 650 MG TABLET 1300 MG: at 08:17

## 2022-01-01 RX ADMIN — PREDNISONE 30 MG: 5 TABLET ORAL at 08:56

## 2022-01-01 RX ADMIN — METOPROLOL SUCCINATE 25 MG: 25 TABLET, EXTENDED RELEASE ORAL at 08:01

## 2022-01-01 RX ADMIN — BUMETANIDE 2 MG: 0.25 INJECTION, SOLUTION INTRAMUSCULAR; INTRAVENOUS at 09:52

## 2022-01-01 RX ADMIN — CALCIUM GLUCONATE 1 G: 20 INJECTION, SOLUTION INTRAVENOUS at 17:59

## 2022-01-01 RX ADMIN — SODIUM BICARBONATE 650 MG TABLET 1300 MG: at 20:26

## 2022-01-01 RX ADMIN — SODIUM BICARBONATE 650 MG TABLET 1300 MG: at 09:24

## 2022-01-01 RX ADMIN — SODIUM BICARBONATE 650 MG TABLET 1300 MG: at 19:18

## 2022-01-01 RX ADMIN — SODIUM BICARBONATE 650 MG TABLET 1300 MG: at 09:17

## 2022-01-01 RX ADMIN — COLCHICINE 0.6 MG: 0.6 TABLET ORAL at 08:29

## 2022-01-01 RX ADMIN — COLCHICINE 0.6 MG: 0.6 TABLET, FILM COATED ORAL at 09:59

## 2022-01-01 RX ADMIN — SODIUM BICARBONATE 650 MG TABLET 1300 MG: at 14:34

## 2022-01-01 RX ADMIN — LIDOCAINE HYDROCHLORIDE: 10 INJECTION, SOLUTION INFILTRATION; PERINEURAL at 10:29

## 2022-01-01 RX ADMIN — SODIUM BICARBONATE 650 MG TABLET 1300 MG: at 13:21

## 2022-01-01 RX ADMIN — SODIUM BICARBONATE 650 MG TABLET 1300 MG: at 08:10

## 2022-01-01 RX ADMIN — SODIUM BICARBONATE 650 MG TABLET 1300 MG: at 13:53

## 2022-01-01 RX ADMIN — BUMETANIDE 2 MG: 2 TABLET ORAL at 18:40

## 2022-01-01 RX ADMIN — SODIUM BICARBONATE 650 MG TABLET 1300 MG: at 19:56

## 2022-01-01 RX ADMIN — COLCHICINE 0.3 MG: 0.6 TABLET, FILM COATED ORAL at 08:24

## 2022-01-01 RX ADMIN — METOPROLOL SUCCINATE 25 MG: 25 TABLET, EXTENDED RELEASE ORAL at 09:11

## 2022-01-01 RX ADMIN — METOPROLOL SUCCINATE 50 MG: 50 TABLET, EXTENDED RELEASE ORAL at 09:39

## 2022-01-01 RX ADMIN — SODIUM BICARBONATE 650 MG TABLET 1300 MG: at 20:23

## 2022-01-01 RX ADMIN — SODIUM BICARBONATE 650 MG TABLET 1300 MG: at 08:19

## 2022-01-01 RX ADMIN — ALLOPURINOL 300 MG: 300 TABLET ORAL at 08:06

## 2022-01-01 RX ADMIN — SODIUM BICARBONATE 650 MG TABLET 1300 MG: at 08:50

## 2022-01-01 RX ADMIN — SODIUM BICARBONATE 650 MG TABLET 1300 MG: at 20:09

## 2022-01-01 RX ADMIN — SODIUM BICARBONATE 650 MG TABLET 1300 MG: at 20:45

## 2022-01-01 RX ADMIN — METOPROLOL SUCCINATE 25 MG: 25 TABLET, EXTENDED RELEASE ORAL at 08:56

## 2022-01-01 RX ADMIN — MICONAZOLE NITRATE: 20 POWDER TOPICAL at 21:44

## 2022-01-01 RX ADMIN — SODIUM ZIRCONIUM CYCLOSILICATE 10 G: 5 POWDER, FOR SUSPENSION ORAL at 10:33

## 2022-01-01 RX ADMIN — BUMETANIDE 1 MG: 0.25 INJECTION, SOLUTION INTRAMUSCULAR; INTRAVENOUS at 17:52

## 2022-01-01 RX ADMIN — ALLOPURINOL 300 MG: 300 TABLET ORAL at 09:21

## 2022-01-01 RX ADMIN — SODIUM CHLORIDE 10 UNITS: 9 INJECTION, SOLUTION INTRAVENOUS at 09:41

## 2022-01-01 RX ADMIN — METOPROLOL SUCCINATE 25 MG: 25 TABLET, EXTENDED RELEASE ORAL at 09:51

## 2022-01-01 RX ADMIN — ALLOPURINOL 300 MG: 300 TABLET ORAL at 08:01

## 2022-01-01 RX ADMIN — FERROUS SULFATE TAB 325 MG (65 MG ELEMENTAL FE) 325 MG: 325 (65 FE) TAB at 09:26

## 2022-01-01 RX ADMIN — MICONAZOLE NITRATE: 2 POWDER TOPICAL at 08:10

## 2022-01-01 RX ADMIN — COLCHICINE 0.6 MG: 0.6 TABLET, FILM COATED ORAL at 08:50

## 2022-01-01 RX ADMIN — WARFARIN SODIUM 3 MG: 3 TABLET ORAL at 18:23

## 2022-01-01 RX ADMIN — COLCHICINE 0.3 MG: 0.6 TABLET, FILM COATED ORAL at 08:56

## 2022-01-01 RX ADMIN — SODIUM BICARBONATE 650 MG TABLET 1300 MG: at 10:32

## 2022-01-01 RX ADMIN — SODIUM BICARBONATE 650 MG TABLET 1300 MG: at 08:53

## 2022-01-01 RX ADMIN — WARFARIN SODIUM 2 MG: 2 TABLET ORAL at 18:02

## 2022-01-01 RX ADMIN — LIDOCAINE HYDROCHLORIDE ANHYDROUS 1 ML: 10 INJECTION, SOLUTION INFILTRATION at 20:55

## 2022-01-01 RX ADMIN — BUMETANIDE 1 MG: 0.25 INJECTION, SOLUTION INTRAMUSCULAR; INTRAVENOUS at 17:50

## 2022-01-01 RX ADMIN — METOPROLOL SUCCINATE 50 MG: 50 TABLET, EXTENDED RELEASE ORAL at 09:11

## 2022-01-01 RX ADMIN — COLCHICINE 0.6 MG: 0.6 TABLET, FILM COATED ORAL at 08:59

## 2022-01-01 RX ADMIN — SODIUM BICARBONATE 650 MG TABLET 1300 MG: at 08:59

## 2022-01-01 RX ADMIN — METOPROLOL SUCCINATE 25 MG: 25 TABLET, EXTENDED RELEASE ORAL at 09:29

## 2022-01-01 RX ADMIN — BUMETANIDE 2 MG: 0.25 INJECTION, SOLUTION INTRAMUSCULAR; INTRAVENOUS at 09:34

## 2022-01-01 RX ADMIN — SODIUM BICARBONATE 650 MG TABLET 1300 MG: at 14:36

## 2022-01-01 RX ADMIN — PREDNISONE 20 MG: 20 TABLET ORAL at 08:26

## 2022-01-01 RX ADMIN — SODIUM BICARBONATE 650 MG TABLET 1300 MG: at 19:45

## 2022-01-01 RX ADMIN — SODIUM ZIRCONIUM CYCLOSILICATE 10 G: 5 POWDER, FOR SUSPENSION ORAL at 11:08

## 2022-01-01 RX ADMIN — ALLOPURINOL 300 MG: 300 TABLET ORAL at 09:31

## 2022-01-01 RX ADMIN — MICONAZOLE NITRATE: 2 POWDER TOPICAL at 08:08

## 2022-01-01 RX ADMIN — COLCHICINE 0.6 MG: 0.6 TABLET ORAL at 09:04

## 2022-01-01 RX ADMIN — BUMETANIDE 2 MG: 0.25 INJECTION, SOLUTION INTRAMUSCULAR; INTRAVENOUS at 17:03

## 2022-01-01 RX ADMIN — METOPROLOL SUCCINATE 25 MG: 25 TABLET, EXTENDED RELEASE ORAL at 09:25

## 2022-01-01 RX ADMIN — FENTANYL 1 PATCH: 25 PATCH TRANSDERMAL at 09:45

## 2022-01-01 RX ADMIN — MICONAZOLE NITRATE: 2 POWDER TOPICAL at 20:34

## 2022-01-01 RX ADMIN — ALLOPURINOL 300 MG: 300 TABLET ORAL at 08:50

## 2022-01-01 RX ADMIN — WARFARIN SODIUM 3 MG: 3 TABLET ORAL at 19:23

## 2022-01-01 RX ADMIN — MAGNESIUM 64 MG (MAGNESIUM CHLORIDE) TABLET,DELAYED RELEASE 1070 MG: at 10:25

## 2022-01-01 RX ADMIN — FUROSEMIDE 40 MG: 40 TABLET ORAL at 11:56

## 2022-01-01 RX ADMIN — WARFARIN SODIUM 7.5 MG: 5 TABLET ORAL at 17:41

## 2022-01-01 RX ADMIN — METOPROLOL SUCCINATE 25 MG: 25 TABLET, EXTENDED RELEASE ORAL at 08:38

## 2022-01-01 RX ADMIN — MAGNESIUM 64 MG (MAGNESIUM CHLORIDE) TABLET,DELAYED RELEASE 1070 MG: at 08:27

## 2022-01-01 RX ADMIN — BUMETANIDE 1 MG: 0.25 INJECTION, SOLUTION INTRAMUSCULAR; INTRAVENOUS at 07:39

## 2022-01-01 RX ADMIN — ALLOPURINOL 300 MG: 300 TABLET ORAL at 08:27

## 2022-01-01 RX ADMIN — WARFARIN SODIUM 3 MG: 3 TABLET ORAL at 18:26

## 2022-01-01 RX ADMIN — SODIUM BICARBONATE 650 MG TABLET 1300 MG: at 18:25

## 2022-01-01 RX ADMIN — COLCHICINE 0.3 MG: 0.6 TABLET, FILM COATED ORAL at 08:27

## 2022-01-01 RX ADMIN — WARFARIN SODIUM 5 MG: 5 TABLET ORAL at 18:18

## 2022-01-01 RX ADMIN — ALLOPURINOL 300 MG: 300 TABLET ORAL at 09:46

## 2022-01-01 RX ADMIN — ALLOPURINOL 300 MG: 300 TABLET ORAL at 08:11

## 2022-01-01 RX ADMIN — SODIUM BICARBONATE 650 MG TABLET 1300 MG: at 14:20

## 2022-01-01 RX ADMIN — COLCHICINE 0.6 MG: 0.6 TABLET, FILM COATED ORAL at 10:09

## 2022-01-01 RX ADMIN — BUMETANIDE 4 MG: 2 TABLET ORAL at 16:57

## 2022-01-01 RX ADMIN — MICONAZOLE NITRATE: 2 POWDER TOPICAL at 21:10

## 2022-01-01 RX ADMIN — MICONAZOLE NITRATE: 2 POWDER TOPICAL at 08:06

## 2022-01-01 RX ADMIN — WARFARIN SODIUM 5 MG: 5 TABLET ORAL at 17:23

## 2022-01-01 RX ADMIN — COLCHICINE 0.6 MG: 0.6 TABLET, FILM COATED ORAL at 10:18

## 2022-01-01 RX ADMIN — WARFARIN SODIUM 3 MG: 3 TABLET ORAL at 17:42

## 2022-01-01 RX ADMIN — ALLOPURINOL 300 MG: 300 TABLET ORAL at 08:14

## 2022-01-01 RX ADMIN — SODIUM BICARBONATE 650 MG TABLET 1300 MG: at 17:25

## 2022-01-01 RX ADMIN — COLCHICINE 0.6 MG: 0.6 TABLET, FILM COATED ORAL at 09:26

## 2022-01-01 RX ADMIN — BUMETANIDE 2 MG: 0.25 INJECTION, SOLUTION INTRAMUSCULAR; INTRAVENOUS at 00:49

## 2022-01-01 RX ADMIN — MICONAZOLE NITRATE: 20 POWDER TOPICAL at 08:28

## 2022-01-01 RX ADMIN — COLCHICINE 0.3 MG: 0.6 TABLET, FILM COATED ORAL at 09:11

## 2022-01-01 RX ADMIN — WARFARIN SODIUM 3 MG: 3 TABLET ORAL at 19:44

## 2022-01-01 RX ADMIN — METOPROLOL SUCCINATE 25 MG: 25 TABLET, EXTENDED RELEASE ORAL at 09:32

## 2022-01-01 RX ADMIN — PREDNISONE 20 MG: 20 TABLET ORAL at 08:32

## 2022-01-01 RX ADMIN — MICONAZOLE NITRATE: 2 POWDER TOPICAL at 21:16

## 2022-01-01 RX ADMIN — SODIUM BICARBONATE 650 MG TABLET 1300 MG: at 09:39

## 2022-01-01 RX ADMIN — COLCHICINE 0.6 MG: 0.6 TABLET, FILM COATED ORAL at 08:11

## 2022-01-01 RX ADMIN — BUMETANIDE 2 MG: 0.25 INJECTION, SOLUTION INTRAMUSCULAR; INTRAVENOUS at 08:39

## 2022-01-01 RX ADMIN — COLCHICINE 0.6 MG: 0.6 TABLET, FILM COATED ORAL at 09:10

## 2022-01-01 RX ADMIN — WARFARIN SODIUM 3 MG: 3 TABLET ORAL at 17:43

## 2022-01-01 RX ADMIN — FENTANYL 1 PATCH: 25 PATCH TRANSDERMAL at 17:28

## 2022-01-01 RX ADMIN — MICONAZOLE NITRATE: 2 POWDER TOPICAL at 09:29

## 2022-01-01 RX ADMIN — ALLOPURINOL 300 MG: 300 TABLET ORAL at 09:33

## 2022-01-01 RX ADMIN — SODIUM BICARBONATE 650 MG TABLET 1300 MG: at 13:33

## 2022-01-01 RX ADMIN — MICONAZOLE NITRATE: 20 POWDER TOPICAL at 09:50

## 2022-01-01 RX ADMIN — METOPROLOL SUCCINATE 25 MG: 25 TABLET, EXTENDED RELEASE ORAL at 09:40

## 2022-01-01 RX ADMIN — METOPROLOL SUCCINATE 25 MG: 25 TABLET, EXTENDED RELEASE ORAL at 09:04

## 2022-01-01 RX ADMIN — ALLOPURINOL 300 MG: 300 TABLET ORAL at 09:37

## 2022-01-01 RX ADMIN — METOPROLOL SUCCINATE 25 MG: 25 TABLET, EXTENDED RELEASE ORAL at 08:34

## 2022-01-01 RX ADMIN — SODIUM BICARBONATE 650 MG TABLET 1300 MG: at 20:42

## 2022-01-01 RX ADMIN — DEXTROSE 15 G: 15 GEL ORAL at 00:40

## 2022-01-01 RX ADMIN — WARFARIN SODIUM 3 MG: 3 TABLET ORAL at 18:40

## 2022-01-01 RX ADMIN — FENTANYL 1 PATCH: 25 PATCH TRANSDERMAL at 08:30

## 2022-01-01 RX ADMIN — FENTANYL 1 PATCH: 25 PATCH TRANSDERMAL at 22:05

## 2022-01-01 RX ADMIN — SODIUM BICARBONATE 650 MG TABLET 1300 MG: at 14:06

## 2022-01-01 RX ADMIN — SODIUM BICARBONATE 650 MG TABLET 1300 MG: at 14:13

## 2022-01-01 RX ADMIN — SODIUM BICARBONATE 650 MG TABLET 1300 MG: at 13:04

## 2022-01-01 RX ADMIN — BUMETANIDE 1 MG: 0.25 INJECTION, SOLUTION INTRAMUSCULAR; INTRAVENOUS at 18:36

## 2022-01-01 RX ADMIN — PREDNISONE 10 MG: 5 TABLET ORAL at 09:14

## 2022-01-01 RX ADMIN — BUMETANIDE 2 MG: 0.25 INJECTION, SOLUTION INTRAMUSCULAR; INTRAVENOUS at 00:04

## 2022-01-01 RX ADMIN — SODIUM BICARBONATE 650 MG TABLET 1300 MG: at 16:39

## 2022-01-01 RX ADMIN — SODIUM BICARBONATE 650 MG TABLET 1300 MG: at 20:59

## 2022-01-01 RX ADMIN — WARFARIN SODIUM 4 MG: 2 TABLET ORAL at 18:24

## 2022-01-01 RX ADMIN — SODIUM BICARBONATE 650 MG TABLET 1300 MG: at 15:52

## 2022-01-01 RX ADMIN — ALLOPURINOL 300 MG: 300 TABLET ORAL at 10:31

## 2022-01-01 RX ADMIN — WARFARIN SODIUM 1 MG: 1 TABLET ORAL at 17:51

## 2022-01-01 RX ADMIN — BUMETANIDE 2 MG: 0.25 INJECTION, SOLUTION INTRAMUSCULAR; INTRAVENOUS at 16:17

## 2022-01-01 RX ADMIN — SODIUM BICARBONATE 650 MG TABLET 1300 MG: at 17:04

## 2022-01-01 RX ADMIN — FEBUXOSTAT 40 MG: 40 TABLET, FILM COATED ORAL at 08:27

## 2022-01-01 RX ADMIN — FENTANYL 1 PATCH: 25 PATCH TRANSDERMAL at 16:20

## 2022-01-01 RX ADMIN — SODIUM BICARBONATE 650 MG TABLET 1300 MG: at 08:27

## 2022-01-01 RX ADMIN — METOPROLOL SUCCINATE 25 MG: 25 TABLET, EXTENDED RELEASE ORAL at 08:12

## 2022-01-01 RX ADMIN — WARFARIN SODIUM 5 MG: 5 TABLET ORAL at 17:50

## 2022-01-01 RX ADMIN — FENTANYL 1 PATCH: 25 PATCH TRANSDERMAL at 21:10

## 2022-01-01 RX ADMIN — SODIUM BICARBONATE 650 MG TABLET 1300 MG: at 13:31

## 2022-01-01 RX ADMIN — SODIUM BICARBONATE 650 MG TABLET 1300 MG: at 17:13

## 2022-01-01 RX ADMIN — COLCHICINE 0.6 MG: 0.6 TABLET, FILM COATED ORAL at 08:06

## 2022-01-01 RX ADMIN — BUMETANIDE 1 MG: 0.25 INJECTION, SOLUTION INTRAMUSCULAR; INTRAVENOUS at 05:47

## 2022-01-01 RX ADMIN — OXYCODONE HYDROCHLORIDE 5 MG: 5 TABLET ORAL at 08:19

## 2022-01-01 RX ADMIN — WARFARIN SODIUM 5 MG: 5 TABLET ORAL at 17:52

## 2022-01-01 RX ADMIN — SODIUM BICARBONATE 650 MG TABLET 1300 MG: at 19:51

## 2022-01-01 RX ADMIN — SODIUM BICARBONATE 650 MG TABLET 1300 MG: at 17:14

## 2022-01-01 RX ADMIN — SODIUM BICARBONATE 650 MG TABLET 1300 MG: at 20:34

## 2022-01-01 RX ADMIN — COLCHICINE 0.6 MG: 0.6 TABLET, FILM COATED ORAL at 09:31

## 2022-01-01 RX ADMIN — SODIUM BICARBONATE 650 MG TABLET 1300 MG: at 20:40

## 2022-01-01 RX ADMIN — SODIUM BICARBONATE 650 MG TABLET 1300 MG: at 15:50

## 2022-01-01 RX ADMIN — COLCHICINE 0.6 MG: 0.6 TABLET, FILM COATED ORAL at 08:55

## 2022-01-01 RX ADMIN — METOPROLOL SUCCINATE 50 MG: 50 TABLET, EXTENDED RELEASE ORAL at 08:33

## 2022-01-01 RX ADMIN — SODIUM BICARBONATE 650 MG TABLET 1300 MG: at 19:39

## 2022-01-01 RX ADMIN — WARFARIN SODIUM 5 MG: 5 TABLET ORAL at 18:09

## 2022-01-01 RX ADMIN — METOPROLOL SUCCINATE 50 MG: 50 TABLET, EXTENDED RELEASE ORAL at 08:24

## 2022-01-01 RX ADMIN — SODIUM BICARBONATE 650 MG TABLET 1300 MG: at 13:48

## 2022-01-01 RX ADMIN — SODIUM BICARBONATE 650 MG TABLET 1300 MG: at 20:52

## 2022-01-01 RX ADMIN — MAGNESIUM 64 MG (MAGNESIUM CHLORIDE) TABLET,DELAYED RELEASE 535 MG: at 08:18

## 2022-01-01 RX ADMIN — SODIUM BICARBONATE 650 MG TABLET 1300 MG: at 08:37

## 2022-01-01 RX ADMIN — METOPROLOL SUCCINATE 25 MG: 25 TABLET, EXTENDED RELEASE ORAL at 08:30

## 2022-01-01 RX ADMIN — MAGNESIUM 64 MG (MAGNESIUM CHLORIDE) TABLET,DELAYED RELEASE 1070 MG: at 09:17

## 2022-01-01 RX ADMIN — SODIUM BICARBONATE 650 MG TABLET 1300 MG: at 20:25

## 2022-01-01 RX ADMIN — COLCHICINE 0.6 MG: 0.6 TABLET, FILM COATED ORAL at 09:33

## 2022-01-01 RX ADMIN — Medication 12.5 MG: at 08:33

## 2022-01-01 RX ADMIN — SODIUM BICARBONATE 650 MG TABLET 1300 MG: at 09:37

## 2022-01-01 RX ADMIN — ALLOPURINOL 300 MG: 300 TABLET ORAL at 08:29

## 2022-01-01 RX ADMIN — ALBUTEROL SULFATE 10 MG: 2.5 SOLUTION RESPIRATORY (INHALATION) at 09:53

## 2022-01-01 RX ADMIN — BUMETANIDE 2 MG: 2 TABLET ORAL at 07:44

## 2022-01-01 RX ADMIN — ALLOPURINOL 300 MG: 300 TABLET ORAL at 08:35

## 2022-01-01 RX ADMIN — FENTANYL 1 PATCH: 25 PATCH TRANSDERMAL at 00:11

## 2022-01-01 RX ADMIN — ALLOPURINOL 300 MG: 300 TABLET ORAL at 09:58

## 2022-01-01 RX ADMIN — PREDNISONE 10 MG: 5 TABLET ORAL at 09:39

## 2022-01-01 RX ADMIN — WARFARIN SODIUM 3 MG: 3 TABLET ORAL at 17:01

## 2022-01-01 RX ADMIN — SODIUM BICARBONATE 650 MG TABLET 1300 MG: at 09:18

## 2022-01-01 RX ADMIN — SODIUM BICARBONATE 650 MG TABLET 1300 MG: at 14:15

## 2022-01-01 RX ADMIN — COLCHICINE 0.3 MG: 0.6 TABLET, FILM COATED ORAL at 09:17

## 2022-01-01 RX ADMIN — WARFARIN SODIUM 3 MG: 3 TABLET ORAL at 17:47

## 2022-01-01 RX ADMIN — MICONAZOLE NITRATE: 20 POWDER TOPICAL at 09:48

## 2022-01-01 RX ADMIN — ALLOPURINOL 300 MG: 300 TABLET ORAL at 08:46

## 2022-01-01 RX ADMIN — MICONAZOLE NITRATE: 2 POWDER TOPICAL at 20:38

## 2022-01-01 RX ADMIN — METOPROLOL SUCCINATE 25 MG: 25 TABLET, EXTENDED RELEASE ORAL at 09:12

## 2022-01-01 RX ADMIN — SODIUM BICARBONATE 650 MG TABLET 1300 MG: at 10:08

## 2022-01-01 RX ADMIN — SODIUM BICARBONATE 650 MG TABLET 1300 MG: at 20:03

## 2022-01-01 RX ADMIN — MAGNESIUM 64 MG (MAGNESIUM CHLORIDE) TABLET,DELAYED RELEASE 1070 MG: at 09:34

## 2022-01-01 RX ADMIN — ALLOPURINOL 300 MG: 300 TABLET ORAL at 08:53

## 2022-01-01 RX ADMIN — COLCHICINE 0.6 MG: 0.6 TABLET, FILM COATED ORAL at 08:24

## 2022-01-01 RX ADMIN — WARFARIN SODIUM 7.5 MG: 5 TABLET ORAL at 17:36

## 2022-01-01 RX ADMIN — SODIUM BICARBONATE 650 MG TABLET 1300 MG: at 21:06

## 2022-01-01 RX ADMIN — BUMETANIDE 2 MG: 0.25 INJECTION, SOLUTION INTRAMUSCULAR; INTRAVENOUS at 10:19

## 2022-01-01 RX ADMIN — SODIUM CHLORIDE 1000 ML: 9 INJECTION, SOLUTION INTRAVENOUS at 18:02

## 2022-01-01 RX ADMIN — MICONAZOLE NITRATE: 2 POWDER TOPICAL at 19:48

## 2022-01-01 RX ADMIN — ALLOPURINOL 300 MG: 300 TABLET ORAL at 09:44

## 2022-01-01 RX ADMIN — SODIUM BICARBONATE 650 MG TABLET 1300 MG: at 17:53

## 2022-01-01 RX ADMIN — SODIUM BICARBONATE 650 MG TABLET 1300 MG: at 09:08

## 2022-01-01 RX ADMIN — ALLOPURINOL 300 MG: 300 TABLET ORAL at 09:18

## 2022-01-01 RX ADMIN — BUMETANIDE 2 MG: 0.25 INJECTION, SOLUTION INTRAMUSCULAR; INTRAVENOUS at 17:00

## 2022-01-01 RX ADMIN — MAGNESIUM 64 MG (MAGNESIUM CHLORIDE) TABLET,DELAYED RELEASE 1070 MG: at 09:42

## 2022-01-01 RX ADMIN — ALLOPURINOL 300 MG: 300 TABLET ORAL at 09:38

## 2022-01-01 RX ADMIN — ALLOPURINOL 300 MG: 300 TABLET ORAL at 08:24

## 2022-01-01 RX ADMIN — SODIUM BICARBONATE 650 MG TABLET 1300 MG: at 20:11

## 2022-01-01 RX ADMIN — SODIUM POLYSTYRENE SULFONATE 30 G: 15 SUSPENSION ORAL; RECTAL at 09:42

## 2022-01-01 RX ADMIN — BUMETANIDE 2 MG: 0.25 INJECTION, SOLUTION INTRAMUSCULAR; INTRAVENOUS at 09:30

## 2022-01-01 RX ADMIN — PREDNISONE 10 MG: 5 TABLET ORAL at 09:10

## 2022-01-01 RX ADMIN — SODIUM BICARBONATE 650 MG TABLET 1300 MG: at 20:27

## 2022-01-01 RX ADMIN — BUMETANIDE 3 MG: 2 TABLET ORAL at 17:19

## 2022-01-01 RX ADMIN — METOPROLOL SUCCINATE 50 MG: 50 TABLET, EXTENDED RELEASE ORAL at 07:52

## 2022-01-01 RX ADMIN — ALLOPURINOL 300 MG: 300 TABLET ORAL at 09:14

## 2022-01-01 RX ADMIN — BUMETANIDE 2 MG: 0.25 INJECTION, SOLUTION INTRAMUSCULAR; INTRAVENOUS at 14:36

## 2022-01-01 RX ADMIN — WARFARIN SODIUM 4 MG: 2 TABLET ORAL at 18:43

## 2022-01-01 RX ADMIN — FENTANYL 1 PATCH: 25 PATCH TRANSDERMAL at 08:10

## 2022-01-01 RX ADMIN — SODIUM BICARBONATE 650 MG TABLET 1300 MG: at 21:00

## 2022-01-01 RX ADMIN — SODIUM BICARBONATE 650 MG TABLET 1300 MG: at 20:44

## 2022-01-01 RX ADMIN — ALLOPURINOL 300 MG: 300 TABLET ORAL at 09:09

## 2022-01-01 RX ADMIN — METOPROLOL SUCCINATE 50 MG: 50 TABLET, EXTENDED RELEASE ORAL at 08:06

## 2022-01-01 RX ADMIN — COLCHICINE 0.6 MG: 0.6 TABLET, FILM COATED ORAL at 09:08

## 2022-01-01 RX ADMIN — FENTANYL 1 PATCH: 25 PATCH TRANSDERMAL at 08:43

## 2022-01-01 RX ADMIN — SODIUM BICARBONATE 650 MG TABLET 1300 MG: at 20:21

## 2022-01-01 RX ADMIN — BUMETANIDE 2 MG: 0.25 INJECTION, SOLUTION INTRAMUSCULAR; INTRAVENOUS at 08:16

## 2022-01-01 RX ADMIN — SODIUM BICARBONATE 650 MG TABLET 1300 MG: at 08:11

## 2022-01-01 RX ADMIN — ALLOPURINOL 300 MG: 300 TABLET ORAL at 08:17

## 2022-01-01 RX ADMIN — BUMETANIDE 2 MG: 0.25 INJECTION, SOLUTION INTRAMUSCULAR; INTRAVENOUS at 10:06

## 2022-01-01 RX ADMIN — METOPROLOL SUCCINATE 25 MG: 25 TABLET, EXTENDED RELEASE ORAL at 09:03

## 2022-01-01 RX ADMIN — MICONAZOLE NITRATE: 2 POWDER TOPICAL at 09:41

## 2022-01-01 RX ADMIN — MAGNESIUM 64 MG (MAGNESIUM CHLORIDE) TABLET,DELAYED RELEASE 535 MG: at 08:24

## 2022-01-01 RX ADMIN — FENTANYL 1 PATCH: 25 PATCH TRANSDERMAL at 18:15

## 2022-01-01 RX ADMIN — ALLOPURINOL 300 MG: 300 TABLET ORAL at 09:07

## 2022-01-01 RX ADMIN — WARFARIN SODIUM 3.5 MG: 3 TABLET ORAL at 17:25

## 2022-01-01 RX ADMIN — COLCHICINE 0.6 MG: 0.6 TABLET, FILM COATED ORAL at 10:32

## 2022-01-01 RX ADMIN — COLCHICINE 0.6 MG: 0.6 TABLET, FILM COATED ORAL at 09:51

## 2022-01-01 RX ADMIN — MAGNESIUM 64 MG (MAGNESIUM CHLORIDE) TABLET,DELAYED RELEASE 1070 MG: at 08:18

## 2022-01-01 RX ADMIN — SODIUM ZIRCONIUM CYCLOSILICATE 15 G: 5 POWDER, FOR SUSPENSION ORAL at 11:50

## 2022-01-01 RX ADMIN — SODIUM BICARBONATE 650 MG TABLET 1300 MG: at 20:35

## 2022-01-01 RX ADMIN — ALLOPURINOL 300 MG: 300 TABLET ORAL at 09:10

## 2022-01-01 RX ADMIN — SODIUM BICARBONATE 650 MG TABLET 1300 MG: at 16:51

## 2022-01-01 RX ADMIN — WARFARIN SODIUM 5 MG: 5 TABLET ORAL at 18:12

## 2022-01-01 RX ADMIN — SODIUM BICARBONATE 650 MG TABLET 1300 MG: at 09:45

## 2022-01-01 RX ADMIN — SODIUM BICARBONATE 650 MG TABLET 1300 MG: at 16:14

## 2022-01-01 RX ADMIN — ALLOPURINOL 300 MG: 300 TABLET ORAL at 09:25

## 2022-01-01 RX ADMIN — COLCHICINE 0.6 MG: 0.6 TABLET, FILM COATED ORAL at 08:08

## 2022-01-01 RX ADMIN — MICONAZOLE NITRATE: 2 POWDER TOPICAL at 09:27

## 2022-01-01 RX ADMIN — SODIUM BICARBONATE 650 MG TABLET 1300 MG: at 08:14

## 2022-01-01 RX ADMIN — SODIUM BICARBONATE 650 MG TABLET 1300 MG: at 09:28

## 2022-01-01 RX ADMIN — BUMETANIDE 3 MG: 2 TABLET ORAL at 08:33

## 2022-01-01 RX ADMIN — METOPROLOL SUCCINATE 25 MG: 25 TABLET, EXTENDED RELEASE ORAL at 10:25

## 2022-01-01 RX ADMIN — BUMETANIDE 1 MG: 0.25 INJECTION, SOLUTION INTRAMUSCULAR; INTRAVENOUS at 07:00

## 2022-01-01 RX ADMIN — SODIUM BICARBONATE 650 MG TABLET 1300 MG: at 16:10

## 2022-01-01 RX ADMIN — MAGNESIUM 64 MG (MAGNESIUM CHLORIDE) TABLET,DELAYED RELEASE 1070 MG: at 09:33

## 2022-01-01 RX ADMIN — SODIUM BICARBONATE 650 MG TABLET 1300 MG: at 20:14

## 2022-01-01 RX ADMIN — SODIUM BICARBONATE 650 MG TABLET 1300 MG: at 09:31

## 2022-01-01 RX ADMIN — ALLOPURINOL 300 MG: 300 TABLET ORAL at 08:02

## 2022-01-01 RX ADMIN — SODIUM BICARBONATE 650 MG TABLET 1300 MG: at 20:08

## 2022-01-01 RX ADMIN — MAGNESIUM 64 MG (MAGNESIUM CHLORIDE) TABLET,DELAYED RELEASE 1070 MG: at 09:32

## 2022-01-01 RX ADMIN — WARFARIN SODIUM 5 MG: 5 TABLET ORAL at 17:49

## 2022-01-01 RX ADMIN — ALLOPURINOL 300 MG: 300 TABLET ORAL at 10:07

## 2022-01-01 RX ADMIN — WARFARIN SODIUM 4 MG: 4 TABLET ORAL at 17:56

## 2022-01-01 RX ADMIN — COLCHICINE 0.6 MG: 0.6 TABLET, FILM COATED ORAL at 08:17

## 2022-01-01 RX ADMIN — ALLOPURINOL 300 MG: 300 TABLET ORAL at 10:25

## 2022-01-01 RX ADMIN — SODIUM BICARBONATE 650 MG TABLET 1300 MG: at 19:49

## 2022-01-01 RX ADMIN — COLCHICINE 0.3 MG: 0.6 TABLET, FILM COATED ORAL at 09:21

## 2022-01-01 RX ADMIN — SODIUM BICARBONATE 650 MG TABLET 1300 MG: at 08:38

## 2022-01-01 RX ADMIN — WARFARIN SODIUM 3 MG: 3 TABLET ORAL at 17:36

## 2022-01-01 RX ADMIN — COLCHICINE 0.6 MG: 0.6 TABLET, FILM COATED ORAL at 09:29

## 2022-01-01 RX ADMIN — COLCHICINE 0.6 MG: 0.6 TABLET, FILM COATED ORAL at 08:14

## 2022-01-01 RX ADMIN — ALLOPURINOL 300 MG: 300 TABLET ORAL at 08:12

## 2022-01-01 RX ADMIN — MAGNESIUM 64 MG (MAGNESIUM CHLORIDE) TABLET,DELAYED RELEASE 535 MG: at 09:12

## 2022-01-01 RX ADMIN — ALLOPURINOL 300 MG: 300 TABLET ORAL at 08:38

## 2022-01-01 RX ADMIN — BUMETANIDE 3 MG: 2 TABLET ORAL at 17:20

## 2022-01-01 RX ADMIN — WARFARIN SODIUM 2 MG: 2 TABLET ORAL at 18:14

## 2022-01-01 RX ADMIN — SODIUM BICARBONATE 650 MG TABLET 1300 MG: at 15:01

## 2022-01-01 RX ADMIN — COLCHICINE 0.6 MG: 0.6 TABLET, FILM COATED ORAL at 09:45

## 2022-01-01 RX ADMIN — FENTANYL 1 PATCH: 25 PATCH TRANSDERMAL at 16:35

## 2022-01-01 RX ADMIN — FENTANYL 1 PATCH: 25 PATCH TRANSDERMAL at 09:26

## 2022-01-01 RX ADMIN — COLCHICINE 0.6 MG: 0.6 TABLET, FILM COATED ORAL at 08:34

## 2022-01-01 RX ADMIN — SODIUM BICARBONATE 650 MG TABLET 1300 MG: at 08:54

## 2022-01-01 RX ADMIN — MICONAZOLE NITRATE: 2 POWDER TOPICAL at 20:14

## 2022-01-01 RX ADMIN — WARFARIN SODIUM 3 MG: 3 TABLET ORAL at 17:55

## 2022-01-01 RX ADMIN — COLCHICINE 0.3 MG: 0.6 TABLET, FILM COATED ORAL at 08:33

## 2022-01-01 RX ADMIN — COLCHICINE 0.6 MG: 0.6 TABLET, FILM COATED ORAL at 09:32

## 2022-01-01 RX ADMIN — BUMETANIDE 2 MG: 0.25 INJECTION, SOLUTION INTRAMUSCULAR; INTRAVENOUS at 10:07

## 2022-01-01 RX ADMIN — SODIUM BICARBONATE 650 MG TABLET 1300 MG: at 09:26

## 2022-01-01 RX ADMIN — METOPROLOL SUCCINATE 25 MG: 25 TABLET, EXTENDED RELEASE ORAL at 08:59

## 2022-01-01 RX ADMIN — METOPROLOL SUCCINATE 50 MG: 50 TABLET, EXTENDED RELEASE ORAL at 09:24

## 2022-01-01 RX ADMIN — COLCHICINE 0.6 MG: 0.6 TABLET, FILM COATED ORAL at 09:34

## 2022-01-01 RX ADMIN — SODIUM BICARBONATE 650 MG TABLET 1300 MG: at 09:12

## 2022-01-01 RX ADMIN — BUMETANIDE 1 MG: 0.25 INJECTION, SOLUTION INTRAMUSCULAR; INTRAVENOUS at 18:34

## 2022-01-01 RX ADMIN — BUMETANIDE 2 MG: 0.25 INJECTION, SOLUTION INTRAMUSCULAR; INTRAVENOUS at 00:59

## 2022-01-01 RX ADMIN — BUMETANIDE 2 MG: 0.25 INJECTION, SOLUTION INTRAMUSCULAR; INTRAVENOUS at 17:18

## 2022-01-01 RX ADMIN — MAGNESIUM 64 MG (MAGNESIUM CHLORIDE) TABLET,DELAYED RELEASE 1070 MG: at 07:44

## 2022-01-01 RX ADMIN — WARFARIN SODIUM 3 MG: 3 TABLET ORAL at 18:18

## 2022-01-01 RX ADMIN — WARFARIN SODIUM 7.5 MG: 5 TABLET ORAL at 18:31

## 2022-01-01 RX ADMIN — BUMETANIDE 4 MG: 2 TABLET ORAL at 08:55

## 2022-01-01 RX ADMIN — SODIUM BICARBONATE 650 MG TABLET 1300 MG: at 20:01

## 2022-01-01 RX ADMIN — MICONAZOLE NITRATE: 2 POWDER TOPICAL at 20:37

## 2022-01-01 RX ADMIN — POTASSIUM CHLORIDE 40 MEQ: 750 TABLET, EXTENDED RELEASE ORAL at 10:17

## 2022-01-01 RX ADMIN — MAGNESIUM 64 MG (MAGNESIUM CHLORIDE) TABLET,DELAYED RELEASE 535 MG: at 08:34

## 2022-01-01 RX ADMIN — MICONAZOLE NITRATE: 2 POWDER TOPICAL at 08:11

## 2022-01-01 RX ADMIN — SODIUM BICARBONATE 650 MG TABLET 1300 MG: at 20:37

## 2022-01-01 RX ADMIN — BUMETANIDE 2 MG: 0.25 INJECTION, SOLUTION INTRAMUSCULAR; INTRAVENOUS at 17:33

## 2022-01-01 RX ADMIN — ALLOPURINOL 300 MG: 300 TABLET ORAL at 09:29

## 2022-01-01 RX ADMIN — MAGNESIUM 64 MG (MAGNESIUM CHLORIDE) TABLET,DELAYED RELEASE 1070 MG: at 08:50

## 2022-01-01 RX ADMIN — WARFARIN SODIUM 4 MG: 2 TABLET ORAL at 18:49

## 2022-01-01 RX ADMIN — SODIUM BICARBONATE 650 MG TABLET 1300 MG: at 08:12

## 2022-01-01 RX ADMIN — WARFARIN SODIUM 4 MG: 2 TABLET ORAL at 17:05

## 2022-01-01 RX ADMIN — FENTANYL 1 PATCH: 25 PATCH TRANSDERMAL at 08:31

## 2022-01-01 RX ADMIN — MAGNESIUM 64 MG (MAGNESIUM CHLORIDE) TABLET,DELAYED RELEASE 1070 MG: at 09:46

## 2022-01-01 RX ADMIN — BUMETANIDE 2 MG: 0.25 INJECTION, SOLUTION INTRAMUSCULAR; INTRAVENOUS at 15:44

## 2022-01-01 RX ADMIN — WARFARIN SODIUM 5 MG: 5 TABLET ORAL at 17:53

## 2022-01-01 RX ADMIN — SODIUM BICARBONATE 650 MG TABLET 1300 MG: at 09:27

## 2022-01-01 RX ADMIN — METOPROLOL SUCCINATE 25 MG: 25 TABLET, EXTENDED RELEASE ORAL at 08:52

## 2022-01-01 RX ADMIN — FERROUS SULFATE TAB 325 MG (65 MG ELEMENTAL FE) 325 MG: 325 (65 FE) TAB at 08:56

## 2022-01-01 RX ADMIN — Medication 3.5 MG: at 17:14

## 2022-01-01 RX ADMIN — ALLOPURINOL 300 MG: 300 TABLET ORAL at 07:45

## 2022-01-01 RX ADMIN — MICONAZOLE NITRATE: 20 POWDER TOPICAL at 14:27

## 2022-01-01 RX ADMIN — ALLOPURINOL 300 MG: 300 TABLET ORAL at 09:32

## 2022-01-01 RX ADMIN — ALLOPURINOL 300 MG: 300 TABLET ORAL at 09:17

## 2022-01-01 RX ADMIN — ALLOPURINOL 300 MG: 300 TABLET ORAL at 08:15

## 2022-01-01 RX ADMIN — FENTANYL 1 PATCH: 25 PATCH TRANSDERMAL at 16:29

## 2022-01-01 RX ADMIN — MICONAZOLE NITRATE: 20 POWDER TOPICAL at 10:22

## 2022-01-01 RX ADMIN — COLCHICINE 0.3 MG: 0.6 TABLET, FILM COATED ORAL at 08:54

## 2022-01-01 RX ADMIN — BUMETANIDE 2 MG: 0.25 INJECTION, SOLUTION INTRAMUSCULAR; INTRAVENOUS at 16:33

## 2022-01-01 RX ADMIN — WARFARIN SODIUM 3 MG: 2 TABLET ORAL at 18:40

## 2022-01-01 RX ADMIN — METOPROLOL SUCCINATE 25 MG: 25 TABLET, EXTENDED RELEASE ORAL at 08:35

## 2022-01-01 RX ADMIN — FEBUXOSTAT 40 MG: 40 TABLET, FILM COATED ORAL at 11:28

## 2022-01-01 RX ADMIN — BUMETANIDE 4 MG: 2 TABLET ORAL at 08:21

## 2022-01-01 RX ADMIN — COLCHICINE 0.6 MG: 0.6 TABLET, FILM COATED ORAL at 09:35

## 2022-01-01 RX ADMIN — MICONAZOLE NITRATE: 2 POWDER TOPICAL at 22:24

## 2022-01-01 RX ADMIN — MICONAZOLE NITRATE: 2 POWDER TOPICAL at 20:46

## 2022-01-01 RX ADMIN — SODIUM BICARBONATE 650 MG TABLET 1300 MG: at 20:10

## 2022-01-01 RX ADMIN — FUROSEMIDE 40 MG: 10 INJECTION, SOLUTION INTRAVENOUS at 23:01

## 2022-01-01 RX ADMIN — METOPROLOL SUCCINATE 25 MG: 25 TABLET, EXTENDED RELEASE ORAL at 08:10

## 2022-01-01 RX ADMIN — METOPROLOL SUCCINATE 25 MG: 25 TABLET, EXTENDED RELEASE ORAL at 08:15

## 2022-01-01 RX ADMIN — MICONAZOLE NITRATE: 20 POWDER TOPICAL at 09:00

## 2022-01-01 RX ADMIN — BUMETANIDE 2 MG: 0.25 INJECTION, SOLUTION INTRAMUSCULAR; INTRAVENOUS at 14:07

## 2022-01-01 RX ADMIN — BUMETANIDE 2 MG: 0.25 INJECTION, SOLUTION INTRAMUSCULAR; INTRAVENOUS at 10:30

## 2022-01-01 RX ADMIN — WARFARIN SODIUM 3 MG: 3 TABLET ORAL at 18:32

## 2022-01-01 RX ADMIN — SODIUM BICARBONATE 650 MG TABLET 1300 MG: at 08:32

## 2022-01-01 RX ADMIN — PREDNISONE 30 MG: 5 TABLET ORAL at 09:26

## 2022-01-01 RX ADMIN — METOPROLOL SUCCINATE 25 MG: 25 TABLET, EXTENDED RELEASE ORAL at 09:44

## 2022-01-01 RX ADMIN — BUMETANIDE 2.5 MG: 0.5 TABLET ORAL at 09:10

## 2022-01-01 RX ADMIN — WARFARIN SODIUM 3 MG: 3 TABLET ORAL at 17:29

## 2022-01-01 RX ADMIN — SODIUM BICARBONATE 650 MG TABLET 1300 MG: at 14:37

## 2022-01-01 RX ADMIN — COLCHICINE 0.6 MG: 0.6 TABLET, FILM COATED ORAL at 08:30

## 2022-01-01 RX ADMIN — WARFARIN SODIUM 5 MG: 5 TABLET ORAL at 17:46

## 2022-01-01 RX ADMIN — WARFARIN SODIUM 3 MG: 3 TABLET ORAL at 19:18

## 2022-01-01 RX ADMIN — BUMETANIDE 2 MG: 2 TABLET ORAL at 20:17

## 2022-01-01 RX ADMIN — SODIUM BICARBONATE 650 MG TABLET 1300 MG: at 20:28

## 2022-01-01 RX ADMIN — SODIUM BICARBONATE 650 MG TABLET 1300 MG: at 21:09

## 2022-01-01 RX ADMIN — SODIUM BICARBONATE 650 MG TABLET 1300 MG: at 14:42

## 2022-01-01 RX ADMIN — WARFARIN SODIUM 2 MG: 2 TABLET ORAL at 17:19

## 2022-01-01 RX ADMIN — SODIUM BICARBONATE 650 MG TABLET 1300 MG: at 14:58

## 2022-01-01 RX ADMIN — SODIUM BICARBONATE 650 MG TABLET 1300 MG: at 09:38

## 2022-01-01 RX ADMIN — POLYETHYLENE GLYCOL 3350 17 G: 17 POWDER, FOR SOLUTION ORAL at 08:52

## 2022-01-01 RX ADMIN — COLCHICINE 0.6 MG: 0.6 TABLET, FILM COATED ORAL at 09:46

## 2022-01-01 RX ADMIN — FENTANYL 1 PATCH: 25 PATCH TRANSDERMAL at 08:05

## 2022-01-01 RX ADMIN — COLCHICINE 0.6 MG: 0.6 TABLET ORAL at 08:12

## 2022-01-01 RX ADMIN — BUMETANIDE 2 MG: 2 TABLET ORAL at 09:44

## 2022-01-01 RX ADMIN — MICONAZOLE NITRATE: 20 POWDER TOPICAL at 09:44

## 2022-01-01 RX ADMIN — Medication 5 UNITS: at 10:51

## 2022-01-01 RX ADMIN — WARFARIN SODIUM 2.5 MG: 2.5 TABLET ORAL at 17:42

## 2022-01-01 RX ADMIN — WARFARIN SODIUM 5 MG: 5 TABLET ORAL at 17:48

## 2022-01-01 RX ADMIN — ALLOPURINOL 300 MG: 300 TABLET ORAL at 08:59

## 2022-01-01 RX ADMIN — BUMETANIDE 1 MG: 0.25 INJECTION, SOLUTION INTRAMUSCULAR; INTRAVENOUS at 06:25

## 2022-01-01 RX ADMIN — WARFARIN SODIUM 3 MG: 3 TABLET ORAL at 19:57

## 2022-01-01 RX ADMIN — BUMETANIDE 2 MG: 0.25 INJECTION, SOLUTION INTRAMUSCULAR; INTRAVENOUS at 16:47

## 2022-01-01 RX ADMIN — Medication 12.5 MG: at 09:11

## 2022-01-01 RX ADMIN — SODIUM BICARBONATE 650 MG TABLET 1300 MG: at 13:59

## 2022-01-01 RX ADMIN — SODIUM BICARBONATE 650 MG TABLET 1300 MG: at 08:31

## 2022-01-01 RX ADMIN — SODIUM ZIRCONIUM CYCLOSILICATE 10 G: 5 POWDER, FOR SUSPENSION ORAL at 00:36

## 2022-01-01 RX ADMIN — COLCHICINE 0.6 MG: 0.6 TABLET, FILM COATED ORAL at 09:18

## 2022-01-01 RX ADMIN — MAGNESIUM SULFATE HEPTAHYDRATE 4 G: 40 INJECTION, SOLUTION INTRAVENOUS at 18:25

## 2022-01-01 RX ADMIN — METOPROLOL SUCCINATE 50 MG: 50 TABLET, EXTENDED RELEASE ORAL at 10:11

## 2022-01-01 RX ADMIN — MICONAZOLE NITRATE: 20 POWDER TOPICAL at 19:49

## 2022-01-01 RX ADMIN — MICONAZOLE NITRATE: 20 POWDER TOPICAL at 10:10

## 2022-01-01 RX ADMIN — MAGNESIUM 64 MG (MAGNESIUM CHLORIDE) TABLET,DELAYED RELEASE 1070 MG: at 08:42

## 2022-01-01 RX ADMIN — BUMETANIDE 2 MG: 0.25 INJECTION, SOLUTION INTRAMUSCULAR; INTRAVENOUS at 08:36

## 2022-01-01 RX ADMIN — METOPROLOL SUCCINATE 25 MG: 25 TABLET, EXTENDED RELEASE ORAL at 09:38

## 2022-01-01 RX ADMIN — SODIUM BICARBONATE 650 MG TABLET 1300 MG: at 09:51

## 2022-01-01 RX ADMIN — METOPROLOL SUCCINATE 25 MG: 25 TABLET, EXTENDED RELEASE ORAL at 10:50

## 2022-01-01 RX ADMIN — SODIUM BICARBONATE 650 MG TABLET 1300 MG: at 14:03

## 2022-01-01 RX ADMIN — SODIUM BICARBONATE 650 MG TABLET 1300 MG: at 20:57

## 2022-01-01 RX ADMIN — WARFARIN SODIUM 2 MG: 2 TABLET ORAL at 17:08

## 2022-01-01 RX ADMIN — SODIUM BICARBONATE 650 MG TABLET 1300 MG: at 20:38

## 2022-01-01 RX ADMIN — BUMETANIDE 2 MG: 2 TABLET ORAL at 08:24

## 2022-01-01 RX ADMIN — BUMETANIDE 1 MG: 0.25 INJECTION, SOLUTION INTRAMUSCULAR; INTRAVENOUS at 17:29

## 2022-01-01 RX ADMIN — COLCHICINE 0.6 MG: 0.6 TABLET, FILM COATED ORAL at 11:55

## 2022-01-01 RX ADMIN — MICONAZOLE NITRATE: 2 POWDER TOPICAL at 20:19

## 2022-01-01 RX ADMIN — MICONAZOLE NITRATE: 20 POWDER TOPICAL at 11:56

## 2022-01-01 RX ADMIN — SODIUM BICARBONATE 650 MG TABLET 1300 MG: at 13:57

## 2022-01-01 RX ADMIN — WARFARIN SODIUM 5 MG: 5 TABLET ORAL at 18:10

## 2022-01-01 RX ADMIN — SODIUM BICARBONATE 650 MG TABLET 1300 MG: at 08:47

## 2022-01-01 RX ADMIN — WARFARIN SODIUM 1 MG: 1 TABLET ORAL at 18:47

## 2022-01-01 RX ADMIN — SODIUM BICARBONATE 650 MG TABLET 1300 MG: at 09:44

## 2022-01-01 RX ADMIN — ALLOPURINOL 300 MG: 300 TABLET ORAL at 08:39

## 2022-01-01 RX ADMIN — COLCHICINE 0.6 MG: 0.6 TABLET, FILM COATED ORAL at 09:44

## 2022-01-01 RX ADMIN — METOPROLOL SUCCINATE 25 MG: 25 TABLET, EXTENDED RELEASE ORAL at 09:31

## 2022-01-01 RX ADMIN — METOPROLOL SUCCINATE 25 MG: 25 TABLET, EXTENDED RELEASE ORAL at 08:21

## 2022-01-01 RX ADMIN — FENTANYL 1 PATCH: 25 PATCH TRANSDERMAL at 17:53

## 2022-01-01 RX ADMIN — FENTANYL 1 PATCH: 25 PATCH TRANSDERMAL at 08:39

## 2022-01-01 RX ADMIN — SODIUM ZIRCONIUM CYCLOSILICATE 10 G: 5 POWDER, FOR SUSPENSION ORAL at 11:27

## 2022-01-01 RX ADMIN — COLCHICINE 0.6 MG: 0.6 TABLET, FILM COATED ORAL at 09:40

## 2022-01-01 RX ADMIN — COLCHICINE 0.6 MG: 0.6 TABLET, FILM COATED ORAL at 08:28

## 2022-01-01 RX ADMIN — WARFARIN SODIUM 3 MG: 3 TABLET ORAL at 17:13

## 2022-01-01 RX ADMIN — DEXTROSE MONOHYDRATE 300 ML: 100 INJECTION, SOLUTION INTRAVENOUS at 09:41

## 2022-01-01 RX ADMIN — MICONAZOLE NITRATE: 20 POWDER TOPICAL at 08:50

## 2022-01-01 RX ADMIN — WARFARIN SODIUM 4 MG: 2 TABLET ORAL at 18:25

## 2022-01-01 RX ADMIN — BUMETANIDE 2 MG: 2 TABLET ORAL at 16:04

## 2022-01-01 RX ADMIN — BUMETANIDE 2 MG: 0.25 INJECTION, SOLUTION INTRAMUSCULAR; INTRAVENOUS at 01:01

## 2022-01-01 RX ADMIN — ALLOPURINOL 300 MG: 300 TABLET ORAL at 08:30

## 2022-01-01 RX ADMIN — WARFARIN SODIUM 3 MG: 3 TABLET ORAL at 18:50

## 2022-01-01 RX ADMIN — SODIUM BICARBONATE 650 MG TABLET 1300 MG: at 17:17

## 2022-01-01 RX ADMIN — BUMETANIDE 2 MG: 0.25 INJECTION, SOLUTION INTRAMUSCULAR; INTRAVENOUS at 08:02

## 2022-01-01 RX ADMIN — SODIUM BICARBONATE 650 MG TABLET 1300 MG: at 21:26

## 2022-01-01 RX ADMIN — PREDNISONE 20 MG: 20 TABLET ORAL at 08:14

## 2022-01-01 RX ADMIN — SODIUM BICARBONATE 650 MG TABLET 1300 MG: at 08:13

## 2022-01-01 RX ADMIN — BUMETANIDE 2 MG: 2 TABLET ORAL at 16:09

## 2022-01-01 RX ADMIN — MICONAZOLE NITRATE: 2 POWDER TOPICAL at 09:26

## 2022-01-01 RX ADMIN — POTASSIUM CHLORIDE 40 MEQ: 1.5 FOR SOLUTION ORAL at 17:21

## 2022-01-01 RX ADMIN — BUMETANIDE 4 MG: 2 TABLET ORAL at 17:00

## 2022-01-01 RX ADMIN — METOPROLOL SUCCINATE 25 MG: 25 TABLET, EXTENDED RELEASE ORAL at 09:08

## 2022-01-01 RX ADMIN — ALLOPURINOL 300 MG: 300 TABLET ORAL at 10:16

## 2022-01-01 RX ADMIN — SODIUM BICARBONATE 650 MG TABLET 1300 MG: at 20:24

## 2022-01-01 RX ADMIN — COLCHICINE 0.3 MG: 0.6 TABLET, FILM COATED ORAL at 07:52

## 2022-01-01 RX ADMIN — POLYETHYLENE GLYCOL 3350 17 G: 17 POWDER, FOR SOLUTION ORAL at 21:55

## 2022-01-01 RX ADMIN — BUMETANIDE 2 MG: 0.25 INJECTION, SOLUTION INTRAMUSCULAR; INTRAVENOUS at 18:43

## 2022-01-01 RX ADMIN — SODIUM BICARBONATE 650 MG TABLET 1300 MG: at 08:58

## 2022-01-01 RX ADMIN — COLCHICINE 0.6 MG: 0.6 TABLET ORAL at 09:11

## 2022-01-01 RX ADMIN — WARFARIN SODIUM 5 MG: 5 TABLET ORAL at 17:04

## 2022-01-01 RX ADMIN — SODIUM BICARBONATE 650 MG TABLET 1300 MG: at 19:37

## 2022-01-01 RX ADMIN — SODIUM BICARBONATE 650 MG TABLET 1300 MG: at 13:35

## 2022-01-01 RX ADMIN — COLCHICINE 0.6 MG: 0.6 TABLET ORAL at 08:01

## 2022-01-01 RX ADMIN — BUMETANIDE 2.5 MG: 0.5 TABLET ORAL at 16:28

## 2022-01-01 RX ADMIN — MAGNESIUM 64 MG (MAGNESIUM CHLORIDE) TABLET,DELAYED RELEASE 1070 MG: at 08:56

## 2022-01-01 RX ADMIN — MICONAZOLE NITRATE: 2 POWDER TOPICAL at 08:35

## 2022-01-01 RX ADMIN — WARFARIN SODIUM 3 MG: 3 TABLET ORAL at 18:10

## 2022-01-01 RX ADMIN — MICONAZOLE NITRATE: 20 POWDER TOPICAL at 20:27

## 2022-01-01 RX ADMIN — BUMETANIDE 2 MG: 2 TABLET ORAL at 16:20

## 2022-01-01 RX ADMIN — MICONAZOLE NITRATE: 2 POWDER TOPICAL at 09:38

## 2022-01-01 RX ADMIN — ALLOPURINOL 300 MG: 300 TABLET ORAL at 08:18

## 2022-01-01 RX ADMIN — SODIUM BICARBONATE 650 MG TABLET 1300 MG: at 08:02

## 2022-01-01 RX ADMIN — PREDNISONE 20 MG: 20 TABLET ORAL at 09:22

## 2022-01-01 RX ADMIN — BUMETANIDE 3 MG: 2 TABLET ORAL at 08:18

## 2022-01-01 RX ADMIN — SODIUM BICARBONATE 650 MG TABLET 1300 MG: at 21:05

## 2022-01-01 RX ADMIN — SODIUM BICARBONATE 650 MG TABLET 1300 MG: at 14:59

## 2022-01-01 RX ADMIN — DEXTROSE MONOHYDRATE 300 ML: 100 INJECTION, SOLUTION INTRAVENOUS at 23:40

## 2022-01-01 RX ADMIN — BUMETANIDE 2 MG: 0.25 INJECTION, SOLUTION INTRAMUSCULAR; INTRAVENOUS at 09:24

## 2022-01-01 RX ADMIN — BUMETANIDE 1 MG: 0.25 INJECTION, SOLUTION INTRAMUSCULAR; INTRAVENOUS at 17:16

## 2022-01-01 RX ADMIN — WARFARIN SODIUM 2.5 MG: 2.5 TABLET ORAL at 18:46

## 2022-01-01 RX ADMIN — SODIUM BICARBONATE 650 MG TABLET 1300 MG: at 09:07

## 2022-01-01 RX ADMIN — MAGNESIUM 64 MG (MAGNESIUM CHLORIDE) TABLET,DELAYED RELEASE 1070 MG: at 08:00

## 2022-01-01 RX ADMIN — SODIUM BICARBONATE 650 MG TABLET 1300 MG: at 08:00

## 2022-01-01 RX ADMIN — SODIUM BICARBONATE 650 MG TABLET 1300 MG: at 20:22

## 2022-01-01 RX ADMIN — SODIUM CHLORIDE 10 UNITS: 9 INJECTION, SOLUTION INTRAVENOUS at 23:00

## 2022-01-01 RX ADMIN — COLCHICINE 0.6 MG: 0.6 TABLET, FILM COATED ORAL at 08:52

## 2022-01-01 RX ADMIN — ALLOPURINOL 300 MG: 300 TABLET ORAL at 08:08

## 2022-01-01 RX ADMIN — BNT162B2 30 MCG: 0.23 INJECTION, SUSPENSION INTRAMUSCULAR at 19:04

## 2022-01-01 RX ADMIN — FENTANYL 1 PATCH: 25 PATCH TRANSDERMAL at 09:48

## 2022-01-01 RX ADMIN — COLCHICINE 0.6 MG: 0.6 TABLET, FILM COATED ORAL at 10:26

## 2022-01-01 RX ADMIN — WARFARIN SODIUM 5 MG: 5 TABLET ORAL at 17:16

## 2022-01-01 RX ADMIN — BUMETANIDE 2 MG: 0.25 INJECTION, SOLUTION INTRAMUSCULAR; INTRAVENOUS at 08:17

## 2022-01-01 RX ADMIN — BUMETANIDE 2 MG: 0.25 INJECTION, SOLUTION INTRAMUSCULAR; INTRAVENOUS at 09:23

## 2022-01-01 RX ADMIN — SODIUM BICARBONATE 650 MG TABLET 1300 MG: at 13:41

## 2022-01-01 RX ADMIN — SODIUM BICARBONATE 650 MG TABLET 1300 MG: at 09:04

## 2022-01-01 RX ADMIN — BUMETANIDE 1 MG: 0.25 INJECTION, SOLUTION INTRAMUSCULAR; INTRAVENOUS at 05:15

## 2022-01-01 RX ADMIN — WARFARIN SODIUM 3 MG: 3 TABLET ORAL at 18:25

## 2022-01-01 RX ADMIN — SODIUM BICARBONATE 650 MG TABLET 1300 MG: at 19:53

## 2022-01-01 RX ADMIN — MAGNESIUM 64 MG (MAGNESIUM CHLORIDE) TABLET,DELAYED RELEASE 535 MG: at 09:31

## 2022-01-01 RX ADMIN — SODIUM BICARBONATE 650 MG TABLET 1300 MG: at 17:20

## 2022-01-01 RX ADMIN — MICONAZOLE NITRATE: 20 POWDER TOPICAL at 20:17

## 2022-01-01 RX ADMIN — Medication 12.5 MG: at 08:24

## 2022-01-01 RX ADMIN — FENTANYL 1 PATCH: 25 PATCH TRANSDERMAL at 10:26

## 2022-01-01 RX ADMIN — WARFARIN SODIUM 5 MG: 5 TABLET ORAL at 17:37

## 2022-01-01 RX ADMIN — METOPROLOL SUCCINATE 50 MG: 50 TABLET, EXTENDED RELEASE ORAL at 08:57

## 2022-01-01 RX ADMIN — WARFARIN SODIUM 4 MG: 2 TABLET ORAL at 17:06

## 2022-01-01 RX ADMIN — FENTANYL 1 PATCH: 25 PATCH TRANSDERMAL at 16:12

## 2022-01-01 RX ADMIN — ALLOPURINOL 300 MG: 300 TABLET ORAL at 09:40

## 2022-01-01 RX ADMIN — MICONAZOLE NITRATE: 2 POWDER TOPICAL at 08:50

## 2022-01-01 RX ADMIN — MICONAZOLE NITRATE: 2 POWDER TOPICAL at 08:39

## 2022-01-01 RX ADMIN — SODIUM BICARBONATE 650 MG TABLET 1300 MG: at 16:11

## 2022-01-01 RX ADMIN — BUMETANIDE 2 MG: 0.25 INJECTION, SOLUTION INTRAMUSCULAR; INTRAVENOUS at 18:01

## 2022-01-01 RX ADMIN — WARFARIN SODIUM 4 MG: 4 TABLET ORAL at 17:58

## 2022-01-01 RX ADMIN — WARFARIN SODIUM 2 MG: 2 TABLET ORAL at 18:21

## 2022-01-01 RX ADMIN — SODIUM BICARBONATE 650 MG TABLET 1300 MG: at 20:16

## 2022-01-01 RX ADMIN — METOPROLOL SUCCINATE 25 MG: 25 TABLET, EXTENDED RELEASE ORAL at 09:39

## 2022-01-01 RX ADMIN — WARFARIN SODIUM 2.5 MG: 2.5 TABLET ORAL at 17:27

## 2022-01-01 RX ADMIN — ALLOPURINOL 300 MG: 300 TABLET ORAL at 09:24

## 2022-01-01 RX ADMIN — SODIUM BICARBONATE 650 MG TABLET 1300 MG: at 16:17

## 2022-01-01 RX ADMIN — METOPROLOL SUCCINATE 50 MG: 50 TABLET, EXTENDED RELEASE ORAL at 09:14

## 2022-01-01 RX ADMIN — SODIUM BICARBONATE 650 MG TABLET 1300 MG: at 21:57

## 2022-01-01 RX ADMIN — FEBUXOSTAT 40 MG: 40 TABLET, FILM COATED ORAL at 08:55

## 2022-01-01 RX ADMIN — SODIUM BICARBONATE 650 MG TABLET 1300 MG: at 20:15

## 2022-01-01 RX ADMIN — BUMETANIDE 1 MG: 0.25 INJECTION, SOLUTION INTRAMUSCULAR; INTRAVENOUS at 05:48

## 2022-01-01 RX ADMIN — SODIUM BICARBONATE 650 MG TABLET 1300 MG: at 15:55

## 2022-01-01 RX ADMIN — ALLOPURINOL 300 MG: 300 TABLET ORAL at 09:51

## 2022-01-01 RX ADMIN — SODIUM BICARBONATE 650 MG TABLET 1300 MG: at 08:55

## 2022-01-01 RX ADMIN — SODIUM BICARBONATE 650 MG TABLET 1300 MG: at 15:53

## 2022-01-01 RX ADMIN — COLCHICINE 0.3 MG: 0.6 TABLET, FILM COATED ORAL at 09:14

## 2022-01-01 RX ADMIN — ALLOPURINOL 300 MG: 300 TABLET ORAL at 09:30

## 2022-01-01 RX ADMIN — WARFARIN SODIUM 7.5 MG: 5 TABLET ORAL at 17:13

## 2022-01-01 RX ADMIN — ALLOPURINOL 300 MG: 300 TABLET ORAL at 09:22

## 2022-01-01 RX ADMIN — MICONAZOLE NITRATE: 2 POWDER TOPICAL at 20:09

## 2022-01-01 RX ADMIN — METOPROLOL SUCCINATE 25 MG: 25 TABLET, EXTENDED RELEASE ORAL at 10:33

## 2022-01-01 RX ADMIN — ALLOPURINOL 300 MG: 300 TABLET ORAL at 08:52

## 2022-01-01 RX ADMIN — ALLOPURINOL 300 MG: 300 TABLET ORAL at 08:19

## 2022-01-01 RX ADMIN — SODIUM BICARBONATE 650 MG TABLET 1300 MG: at 16:48

## 2022-01-01 SDOH — HEALTH STABILITY: PHYSICAL HEALTH: ON AVERAGE, HOW MANY DAYS PER WEEK DO YOU ENGAGE IN MODERATE TO STRENUOUS EXERCISE (LIKE A BRISK WALK)?: 2 DAYS

## 2022-01-01 SDOH — ECONOMIC STABILITY: TRANSPORTATION INSECURITY
IN THE PAST 12 MONTHS, HAS THE LACK OF TRANSPORTATION KEPT YOU FROM MEDICAL APPOINTMENTS OR FROM GETTING MEDICATIONS?: NO

## 2022-01-01 SDOH — ECONOMIC STABILITY: TRANSPORTATION INSECURITY
IN THE PAST 12 MONTHS, HAS LACK OF TRANSPORTATION KEPT YOU FROM MEETINGS, WORK, OR FROM GETTING THINGS NEEDED FOR DAILY LIVING?: NO

## 2022-01-01 SDOH — ECONOMIC STABILITY: INCOME INSECURITY: IN THE LAST 12 MONTHS, WAS THERE A TIME WHEN YOU WERE NOT ABLE TO PAY THE MORTGAGE OR RENT ON TIME?: NO

## 2022-01-01 SDOH — ECONOMIC STABILITY: FOOD INSECURITY: WITHIN THE PAST 12 MONTHS, THE FOOD YOU BOUGHT JUST DIDN'T LAST AND YOU DIDN'T HAVE MONEY TO GET MORE.: NEVER TRUE

## 2022-01-01 SDOH — HEALTH STABILITY: PHYSICAL HEALTH: ON AVERAGE, HOW MANY MINUTES DO YOU ENGAGE IN EXERCISE AT THIS LEVEL?: 0 MIN

## 2022-01-01 SDOH — ECONOMIC STABILITY: FOOD INSECURITY: WITHIN THE PAST 12 MONTHS, YOU WORRIED THAT YOUR FOOD WOULD RUN OUT BEFORE YOU GOT MONEY TO BUY MORE.: NEVER TRUE

## 2022-01-01 ASSESSMENT — ACTIVITIES OF DAILY LIVING (ADL)
ADLS_ACUITY_SCORE: 19
ADLS_ACUITY_SCORE: 44
ADLS_ACUITY_SCORE: 45
ADLS_ACUITY_SCORE: 14
ADLS_ACUITY_SCORE: 19
ADLS_ACUITY_SCORE: 15
ADLS_ACUITY_SCORE: 17
ADLS_ACUITY_SCORE: 19
ADLS_ACUITY_SCORE: 18
ADLS_ACUITY_SCORE: 44
ADLS_ACUITY_SCORE: 15
ADLS_ACUITY_SCORE: 12
ADLS_ACUITY_SCORE: 16
ADLS_ACUITY_SCORE: 17
ADLS_ACUITY_SCORE: 17
ADLS_ACUITY_SCORE: 15
ADLS_ACUITY_SCORE: 15
ADLS_ACUITY_SCORE: 23
ADLS_ACUITY_SCORE: 18
ADLS_ACUITY_SCORE: 17
ADLS_ACUITY_SCORE: 15
ADLS_ACUITY_SCORE: 15
ADLS_ACUITY_SCORE: 44
ADLS_ACUITY_SCORE: 12
ADLS_ACUITY_SCORE: 16
ADLS_ACUITY_SCORE: 18
ADLS_ACUITY_SCORE: 16
ADLS_ACUITY_SCORE: 19
ADLS_ACUITY_SCORE: 17
ADLS_ACUITY_SCORE: 13
ADLS_ACUITY_SCORE: 15
ADLS_ACUITY_SCORE: 12
ADLS_ACUITY_SCORE: 19
ADLS_ACUITY_SCORE: 17
ADLS_ACUITY_SCORE: 12
ADLS_ACUITY_SCORE: 18
ADLS_ACUITY_SCORE: 19
ADLS_ACUITY_SCORE: 14
ADLS_ACUITY_SCORE: 12
ADLS_ACUITY_SCORE: 18
ADLS_ACUITY_SCORE: 15
ADLS_ACUITY_SCORE: 19
ADLS_ACUITY_SCORE: 19
ADLS_ACUITY_SCORE: 12
ADLS_ACUITY_SCORE: 18
ADLS_ACUITY_SCORE: 17
ADLS_ACUITY_SCORE: 12
ADLS_ACUITY_SCORE: 16
ADLS_ACUITY_SCORE: 16
ADLS_ACUITY_SCORE: 19
ADLS_ACUITY_SCORE: 15
ADLS_ACUITY_SCORE: 15
ADLS_ACUITY_SCORE: 16
ADLS_ACUITY_SCORE: 45
DOING_ERRANDS_INDEPENDENTLY_DIFFICULTY: NO
ADLS_ACUITY_SCORE: 15
ADLS_ACUITY_SCORE: 14
ADLS_ACUITY_SCORE: 15
ADLS_ACUITY_SCORE: 18
ADLS_ACUITY_SCORE: 15
ADLS_ACUITY_SCORE: 15
ADLS_ACUITY_SCORE: 19
ADLS_ACUITY_SCORE: 16
ADLS_ACUITY_SCORE: 16
ADLS_ACUITY_SCORE: 15
ADLS_ACUITY_SCORE: 45
ADLS_ACUITY_SCORE: 15
ADLS_ACUITY_SCORE: 16
ADLS_ACUITY_SCORE: 17
ADLS_ACUITY_SCORE: 44
ADLS_ACUITY_SCORE: 17
ADLS_ACUITY_SCORE: 19
ADLS_ACUITY_SCORE: 15
ADLS_ACUITY_SCORE: 12
ADLS_ACUITY_SCORE: 17
ADLS_ACUITY_SCORE: 16
ADLS_ACUITY_SCORE: 17
ADLS_ACUITY_SCORE: 44
ADLS_ACUITY_SCORE: 19
ADLS_ACUITY_SCORE: 18
ADLS_ACUITY_SCORE: 19
ADLS_ACUITY_SCORE: 19
ADLS_ACUITY_SCORE: 18
ADLS_ACUITY_SCORE: 44
WEAR_GLASSES_OR_BLIND: NO
ADLS_ACUITY_SCORE: 19
ADLS_ACUITY_SCORE: 16
ADLS_ACUITY_SCORE: 19
ADLS_ACUITY_SCORE: 12
ADLS_ACUITY_SCORE: 12
ADLS_ACUITY_SCORE: 17
ADLS_ACUITY_SCORE: 17
ADLS_ACUITY_SCORE: 15
ADLS_ACUITY_SCORE: 12
ADLS_ACUITY_SCORE: 12
ADLS_ACUITY_SCORE: 18
ADLS_ACUITY_SCORE: 14
ADLS_ACUITY_SCORE: 13
ADLS_ACUITY_SCORE: 15
ADLS_ACUITY_SCORE: 19
ADLS_ACUITY_SCORE: 17
ADLS_ACUITY_SCORE: 19
ADLS_ACUITY_SCORE: 12
ADLS_ACUITY_SCORE: 44
ADLS_ACUITY_SCORE: 16
ADLS_ACUITY_SCORE: 45
ADLS_ACUITY_SCORE: 14
ADLS_ACUITY_SCORE: 15
ADLS_ACUITY_SCORE: 16
ADLS_ACUITY_SCORE: 19
ADLS_ACUITY_SCORE: 15
ADLS_ACUITY_SCORE: 17
ADLS_ACUITY_SCORE: 19
ADLS_ACUITY_SCORE: 19
ADLS_ACUITY_SCORE: 16
ADLS_ACUITY_SCORE: 13
ADLS_ACUITY_SCORE: 14
ADLS_ACUITY_SCORE: 12
ADLS_ACUITY_SCORE: 12
ADLS_ACUITY_SCORE: 17
ADLS_ACUITY_SCORE: 10
ADLS_ACUITY_SCORE: 12
ADLS_ACUITY_SCORE: 17
ADLS_ACUITY_SCORE: 13
ADLS_ACUITY_SCORE: 15
ADLS_ACUITY_SCORE: 44
ADLS_ACUITY_SCORE: 19
ADLS_ACUITY_SCORE: 19
ADLS_ACUITY_SCORE: 18
ADLS_ACUITY_SCORE: 12
ADLS_ACUITY_SCORE: 19
ADLS_ACUITY_SCORE: 16
ADLS_ACUITY_SCORE: 16
ADLS_ACUITY_SCORE: 15
ADLS_ACUITY_SCORE: 15
ADLS_ACUITY_SCORE: 17
ADLS_ACUITY_SCORE: 17
ADLS_ACUITY_SCORE: 15
ADLS_ACUITY_SCORE: 44
ADLS_ACUITY_SCORE: 19
ADLS_ACUITY_SCORE: 15
ADLS_ACUITY_SCORE: 15
ADLS_ACUITY_SCORE: 16
ADLS_ACUITY_SCORE: 15
ADLS_ACUITY_SCORE: 14
ADLS_ACUITY_SCORE: 15
ADLS_ACUITY_SCORE: 15
ADLS_ACUITY_SCORE: 13
ADLS_ACUITY_SCORE: 18
ADLS_ACUITY_SCORE: 15
ADLS_ACUITY_SCORE: 18
ADLS_ACUITY_SCORE: 16
ADLS_ACUITY_SCORE: 17
ADLS_ACUITY_SCORE: 15
ADLS_ACUITY_SCORE: 16
ADLS_ACUITY_SCORE: 17
ADLS_ACUITY_SCORE: 10
ADLS_ACUITY_SCORE: 15
ADLS_ACUITY_SCORE: 12
ADLS_ACUITY_SCORE: 44
ADLS_ACUITY_SCORE: 16
ADLS_ACUITY_SCORE: 19
ADLS_ACUITY_SCORE: 19
ADLS_ACUITY_SCORE: 15
ADLS_ACUITY_SCORE: 15
ADLS_ACUITY_SCORE: 45
ADLS_ACUITY_SCORE: 19
ADLS_ACUITY_SCORE: 15
ADLS_ACUITY_SCORE: 19
ADLS_ACUITY_SCORE: 18
ADLS_ACUITY_SCORE: 17
ADLS_ACUITY_SCORE: 16
ADLS_ACUITY_SCORE: 16
ADLS_ACUITY_SCORE: 17
ADLS_ACUITY_SCORE: 13
ADLS_ACUITY_SCORE: 15
ADLS_ACUITY_SCORE: 16
ADLS_ACUITY_SCORE: 15
ADLS_ACUITY_SCORE: 15
ADLS_ACUITY_SCORE: 14
ADLS_ACUITY_SCORE: 18
ADLS_ACUITY_SCORE: 10
ADLS_ACUITY_SCORE: 17
ADLS_ACUITY_SCORE: 14
ADLS_ACUITY_SCORE: 15
ADLS_ACUITY_SCORE: 14
ADLS_ACUITY_SCORE: 13
ADLS_ACUITY_SCORE: 15
ADLS_ACUITY_SCORE: 17
TOILETING_ASSISTANCE: TOILETING DIFFICULTY, ASSISTANCE 1 PERSON
ADLS_ACUITY_SCORE: 17
ADLS_ACUITY_SCORE: 13
ADLS_ACUITY_SCORE: 19
ADLS_ACUITY_SCORE: 15
ADLS_ACUITY_SCORE: 16
ADLS_ACUITY_SCORE: 16
ADLS_ACUITY_SCORE: 15
ADLS_ACUITY_SCORE: 19
ADLS_ACUITY_SCORE: 15
ADLS_ACUITY_SCORE: 44
ADLS_ACUITY_SCORE: 17
ADLS_ACUITY_SCORE: 16
ADLS_ACUITY_SCORE: 18
ADLS_ACUITY_SCORE: 16
ADLS_ACUITY_SCORE: 17
ADLS_ACUITY_SCORE: 16
ADLS_ACUITY_SCORE: 17
ADLS_ACUITY_SCORE: 14
ADLS_ACUITY_SCORE: 14
ADLS_ACUITY_SCORE: 19
ADLS_ACUITY_SCORE: 14
ADLS_ACUITY_SCORE: 15
ADLS_ACUITY_SCORE: 12
ADLS_ACUITY_SCORE: 18
ADLS_ACUITY_SCORE: 11
ADLS_ACUITY_SCORE: 17
ADLS_ACUITY_SCORE: 15
ADLS_ACUITY_SCORE: 12
ADLS_ACUITY_SCORE: 44
ADLS_ACUITY_SCORE: 13
ADLS_ACUITY_SCORE: 16
ADLS_ACUITY_SCORE: 18
ADLS_ACUITY_SCORE: 19
ADLS_ACUITY_SCORE: 17
ADLS_ACUITY_SCORE: 12
ADLS_ACUITY_SCORE: 17
ADLS_ACUITY_SCORE: 17
ADLS_ACUITY_SCORE: 16
ADLS_ACUITY_SCORE: 12
ADLS_ACUITY_SCORE: 14
ADLS_ACUITY_SCORE: 16
ADLS_ACUITY_SCORE: 17
ADLS_ACUITY_SCORE: 19
ADLS_ACUITY_SCORE: 45
ADLS_ACUITY_SCORE: 18
DRESS: 1-->ASSISTANCE (EQUIPMENT/PERSON) NEEDED
ADLS_ACUITY_SCORE: 15
ADLS_ACUITY_SCORE: 17
ADLS_ACUITY_SCORE: 15
ADLS_ACUITY_SCORE: 18
DEPENDENT_IADLS:: CLEANING;COOKING;LAUNDRY;SHOPPING;MEDICATION MANAGEMENT;MEAL PREPARATION;TRANSPORTATION
ADLS_ACUITY_SCORE: 17
ADLS_ACUITY_SCORE: 18
ADLS_ACUITY_SCORE: 12
ADLS_ACUITY_SCORE: 15
ADLS_ACUITY_SCORE: 19
ADLS_ACUITY_SCORE: 18
ADLS_ACUITY_SCORE: 15
ADLS_ACUITY_SCORE: 18
ADLS_ACUITY_SCORE: 45
ADLS_ACUITY_SCORE: 19
ADLS_ACUITY_SCORE: 18
ADLS_ACUITY_SCORE: 19
ADLS_ACUITY_SCORE: 19
ADLS_ACUITY_SCORE: 17
ADLS_ACUITY_SCORE: 18
ADLS_ACUITY_SCORE: 10
ADLS_ACUITY_SCORE: 13
ADLS_ACUITY_SCORE: 14
ADLS_ACUITY_SCORE: 15
ADLS_ACUITY_SCORE: 16
ADLS_ACUITY_SCORE: 17
ADLS_ACUITY_SCORE: 19
ADLS_ACUITY_SCORE: 16
ADLS_ACUITY_SCORE: 15
ADLS_ACUITY_SCORE: 45
ADLS_ACUITY_SCORE: 17
ADLS_ACUITY_SCORE: 21
ADLS_ACUITY_SCORE: 44
ADLS_ACUITY_SCORE: 16
ADLS_ACUITY_SCORE: 14
ADLS_ACUITY_SCORE: 15
ADLS_ACUITY_SCORE: 12
ADLS_ACUITY_SCORE: 15
ADLS_ACUITY_SCORE: 18
ADLS_ACUITY_SCORE: 10
ADLS_ACUITY_SCORE: 44
ADLS_ACUITY_SCORE: 17
ADLS_ACUITY_SCORE: 18
ADLS_ACUITY_SCORE: 16
ADLS_ACUITY_SCORE: 19
ADLS_ACUITY_SCORE: 12
ADLS_ACUITY_SCORE: 17
ADLS_ACUITY_SCORE: 13
ADLS_ACUITY_SCORE: 19
ADLS_ACUITY_SCORE: 18
ADLS_ACUITY_SCORE: 17
ADLS_ACUITY_SCORE: 19
ADLS_ACUITY_SCORE: 15
ADLS_ACUITY_SCORE: 18
ADLS_ACUITY_SCORE: 19
ADLS_ACUITY_SCORE: 17
ADLS_ACUITY_SCORE: 18
ADLS_ACUITY_SCORE: 17
ADLS_ACUITY_SCORE: 16
ADLS_ACUITY_SCORE: 16
ADLS_ACUITY_SCORE: 15
ADLS_ACUITY_SCORE: 16
ADLS_ACUITY_SCORE: 45
ADLS_ACUITY_SCORE: 23
ADLS_ACUITY_SCORE: 15
ADLS_ACUITY_SCORE: 15
ADLS_ACUITY_SCORE: 17
ADLS_ACUITY_SCORE: 15
ADLS_ACUITY_SCORE: 15
DRESSING/BATHING: BATHING DIFFICULTY, ASSISTANCE 1 PERSON;DRESSING DIFFICULTY, ASSISTANCE 1 PERSON
ADLS_ACUITY_SCORE: 19
ADLS_ACUITY_SCORE: 15
ADLS_ACUITY_SCORE: 16
ADLS_ACUITY_SCORE: 15
ADLS_ACUITY_SCORE: 15
ADLS_ACUITY_SCORE: 19
ADLS_ACUITY_SCORE: 13
ADLS_ACUITY_SCORE: 17
ADLS_ACUITY_SCORE: 19
ADLS_ACUITY_SCORE: 17
ADLS_ACUITY_SCORE: 23
ADLS_ACUITY_SCORE: 45
ADLS_ACUITY_SCORE: 14
ADLS_ACUITY_SCORE: 13
ADLS_ACUITY_SCORE: 13
ADLS_ACUITY_SCORE: 45
ADLS_ACUITY_SCORE: 13
ADLS_ACUITY_SCORE: 15
ADLS_ACUITY_SCORE: 14
ADLS_ACUITY_SCORE: 19
ADLS_ACUITY_SCORE: 19
ADLS_ACUITY_SCORE: 17
ADLS_ACUITY_SCORE: 12
ADLS_ACUITY_SCORE: 15
ADLS_ACUITY_SCORE: 17
ADLS_ACUITY_SCORE: 17
DRESS: 1-->ASSISTANCE (EQUIPMENT/PERSON) NEEDED
ADLS_ACUITY_SCORE: 12
ADLS_ACUITY_SCORE: 15
ADLS_ACUITY_SCORE: 15
ADLS_ACUITY_SCORE: 19
ADLS_ACUITY_SCORE: 13
ADLS_ACUITY_SCORE: 16
ADLS_ACUITY_SCORE: 16
ADLS_ACUITY_SCORE: 14
ADLS_ACUITY_SCORE: 16
ADLS_ACUITY_SCORE: 15
ADLS_ACUITY_SCORE: 44
ADLS_ACUITY_SCORE: 19
ADLS_ACUITY_SCORE: 19
ADLS_ACUITY_SCORE: 14
ADLS_ACUITY_SCORE: 15
ADLS_ACUITY_SCORE: 45
ADLS_ACUITY_SCORE: 16
ADLS_ACUITY_SCORE: 44
ADLS_ACUITY_SCORE: 18
ADLS_ACUITY_SCORE: 11
ADLS_ACUITY_SCORE: 16
ADLS_ACUITY_SCORE: 17
ADLS_ACUITY_SCORE: 17
ADLS_ACUITY_SCORE: 15
ADLS_ACUITY_SCORE: 17
ADLS_ACUITY_SCORE: 16
ADLS_ACUITY_SCORE: 17
ADLS_ACUITY_SCORE: 16
ADLS_ACUITY_SCORE: 17
ADLS_ACUITY_SCORE: 18
ADLS_ACUITY_SCORE: 16
ADLS_ACUITY_SCORE: 16
ADLS_ACUITY_SCORE: 18
ADLS_ACUITY_SCORE: 12
ADLS_ACUITY_SCORE: 44
ADLS_ACUITY_SCORE: 15
ADLS_ACUITY_SCORE: 12
ADLS_ACUITY_SCORE: 13
ADLS_ACUITY_SCORE: 17
ADLS_ACUITY_SCORE: 19
ADLS_ACUITY_SCORE: 14
ADLS_ACUITY_SCORE: 17
ADLS_ACUITY_SCORE: 45
ADLS_ACUITY_SCORE: 18
ADLS_ACUITY_SCORE: 45
ADLS_ACUITY_SCORE: 12
ADLS_ACUITY_SCORE: 17
ADLS_ACUITY_SCORE: 13
ADLS_ACUITY_SCORE: 15
ADLS_ACUITY_SCORE: 15
ADLS_ACUITY_SCORE: 17
ADLS_ACUITY_SCORE: 17
ADLS_ACUITY_SCORE: 12
ADLS_ACUITY_SCORE: 17
ADLS_ACUITY_SCORE: 23
ADLS_ACUITY_SCORE: 18
ADLS_ACUITY_SCORE: 15
ADLS_ACUITY_SCORE: 19
ADLS_ACUITY_SCORE: 18
ADLS_ACUITY_SCORE: 19
ADLS_ACUITY_SCORE: 15
ADLS_ACUITY_SCORE: 12
ADLS_ACUITY_SCORE: 17
ADLS_ACUITY_SCORE: 12
ADLS_ACUITY_SCORE: 16
ADLS_ACUITY_SCORE: 17
ADLS_ACUITY_SCORE: 19
ADLS_ACUITY_SCORE: 17
ADLS_ACUITY_SCORE: 12
ADLS_ACUITY_SCORE: 21
ADLS_ACUITY_SCORE: 19
ADLS_ACUITY_SCORE: 19
ADLS_ACUITY_SCORE: 13
ADLS_ACUITY_SCORE: 17
ADLS_ACUITY_SCORE: 15
ADLS_ACUITY_SCORE: 18
ADLS_ACUITY_SCORE: 12
ADLS_ACUITY_SCORE: 17
ADLS_ACUITY_SCORE: 13
ADLS_ACUITY_SCORE: 15
ADLS_ACUITY_SCORE: 44
ADLS_ACUITY_SCORE: 45
ADLS_ACUITY_SCORE: 19
ADLS_ACUITY_SCORE: 14
ADLS_ACUITY_SCORE: 12
ADLS_ACUITY_SCORE: 23
ADLS_ACUITY_SCORE: 13
ADLS_ACUITY_SCORE: 16
ADLS_ACUITY_SCORE: 17
ADLS_ACUITY_SCORE: 18
ADLS_ACUITY_SCORE: 23
ADLS_ACUITY_SCORE: 13
ADLS_ACUITY_SCORE: 12
ADLS_ACUITY_SCORE: 15
ADLS_ACUITY_SCORE: 17
ADLS_ACUITY_SCORE: 16
ADLS_ACUITY_SCORE: 15
ADLS_ACUITY_SCORE: 13
ADLS_ACUITY_SCORE: 12
ADLS_ACUITY_SCORE: 14
ADLS_ACUITY_SCORE: 19
ADLS_ACUITY_SCORE: 19
ADLS_ACUITY_SCORE: 13
ADLS_ACUITY_SCORE: 19
ADLS_ACUITY_SCORE: 17
ADLS_ACUITY_SCORE: 18
ADLS_ACUITY_SCORE: 16
ADLS_ACUITY_SCORE: 17
ADLS_ACUITY_SCORE: 15
ADLS_ACUITY_SCORE: 19
ADLS_ACUITY_SCORE: 19
ADLS_ACUITY_SCORE: 15
ADLS_ACUITY_SCORE: 15
ADLS_ACUITY_SCORE: 16
ADLS_ACUITY_SCORE: 16
ADLS_ACUITY_SCORE: 17
ADLS_ACUITY_SCORE: 17
ADLS_ACUITY_SCORE: 21
ADLS_ACUITY_SCORE: 19
ADLS_ACUITY_SCORE: 16
ADLS_ACUITY_SCORE: 19
ADLS_ACUITY_SCORE: 15
ADLS_ACUITY_SCORE: 15
ADLS_ACUITY_SCORE: 44
ADLS_ACUITY_SCORE: 15
ADLS_ACUITY_SCORE: 19
ADLS_ACUITY_SCORE: 19
ADLS_ACUITY_SCORE: 16
ADLS_ACUITY_SCORE: 18
ADLS_ACUITY_SCORE: 12
ADLS_ACUITY_SCORE: 14
ADLS_ACUITY_SCORE: 17
ADLS_ACUITY_SCORE: 18
ADLS_ACUITY_SCORE: 13
ADLS_ACUITY_SCORE: 13
ADLS_ACUITY_SCORE: 19
ADLS_ACUITY_SCORE: 19
ADLS_ACUITY_SCORE: 15
ADLS_ACUITY_SCORE: 14
ADLS_ACUITY_SCORE: 10
ADLS_ACUITY_SCORE: 14
ADLS_ACUITY_SCORE: 14
ADLS_ACUITY_SCORE: 15
ADLS_ACUITY_SCORE: 16
ADLS_ACUITY_SCORE: 17
ADLS_ACUITY_SCORE: 14
ADLS_ACUITY_SCORE: 19
ADLS_ACUITY_SCORE: 44
ADLS_ACUITY_SCORE: 16
ADLS_ACUITY_SCORE: 16
ADLS_ACUITY_SCORE: 12
ADLS_ACUITY_SCORE: 15
ADLS_ACUITY_SCORE: 19
ADLS_ACUITY_SCORE: 17
ADLS_ACUITY_SCORE: 16
ADLS_ACUITY_SCORE: 16
ADLS_ACUITY_SCORE: 17
ADLS_ACUITY_SCORE: 17
ADLS_ACUITY_SCORE: 15
EQUIPMENT_CURRENTLY_USED_AT_HOME: CANE, STRAIGHT
ADLS_ACUITY_SCORE: 14
ADLS_ACUITY_SCORE: 14
ADLS_ACUITY_SCORE: 13
ADLS_ACUITY_SCORE: 17
ADLS_ACUITY_SCORE: 12
ADLS_ACUITY_SCORE: 10
ADLS_ACUITY_SCORE: 17
ADLS_ACUITY_SCORE: 12
ADLS_ACUITY_SCORE: 15
ADLS_ACUITY_SCORE: 17
ADLS_ACUITY_SCORE: 19
ADLS_ACUITY_SCORE: 14
ADLS_ACUITY_SCORE: 10
ADLS_ACUITY_SCORE: 14
ADLS_ACUITY_SCORE: 15
ADLS_ACUITY_SCORE: 12
ADLS_ACUITY_SCORE: 19
ADLS_ACUITY_SCORE: 17
ADLS_ACUITY_SCORE: 13
ADLS_ACUITY_SCORE: 19
ADLS_ACUITY_SCORE: 17
ADLS_ACUITY_SCORE: 15
ADLS_ACUITY_SCORE: 44
ADLS_ACUITY_SCORE: 44
ADLS_ACUITY_SCORE: 17
ADLS_ACUITY_SCORE: 17
ADLS_ACUITY_SCORE: 15
ADLS_ACUITY_SCORE: 13
ADLS_ACUITY_SCORE: 16
ADLS_ACUITY_SCORE: 12
ADLS_ACUITY_SCORE: 16
ADLS_ACUITY_SCORE: 17
ADLS_ACUITY_SCORE: 17
ADLS_ACUITY_SCORE: 19
ADLS_ACUITY_SCORE: 44
ADLS_ACUITY_SCORE: 45
ADLS_ACUITY_SCORE: 19
ADLS_ACUITY_SCORE: 16
ADLS_ACUITY_SCORE: 44
ADLS_ACUITY_SCORE: 16
ADLS_ACUITY_SCORE: 14
ADLS_ACUITY_SCORE: 11
ADLS_ACUITY_SCORE: 19
ADLS_ACUITY_SCORE: 14
ADLS_ACUITY_SCORE: 10
ADLS_ACUITY_SCORE: 19
ADLS_ACUITY_SCORE: 12
ADLS_ACUITY_SCORE: 18
ADLS_ACUITY_SCORE: 14
ADLS_ACUITY_SCORE: 16
ADLS_ACUITY_SCORE: 15
ADLS_ACUITY_SCORE: 19
ADLS_ACUITY_SCORE: 45
TRANSFERRING: 1-->ASSISTANCE (EQUIPMENT/PERSON) NEEDED (NOT DEVELOPMENTALLY APPROPRIATE)
ADLS_ACUITY_SCORE: 17
ADLS_ACUITY_SCORE: 17
ADLS_ACUITY_SCORE: 14
ADLS_ACUITY_SCORE: 15
ADLS_ACUITY_SCORE: 16
ADLS_ACUITY_SCORE: 17
ADLS_ACUITY_SCORE: 19
ADLS_ACUITY_SCORE: 18
ADLS_ACUITY_SCORE: 17
ADLS_ACUITY_SCORE: 15
ADLS_ACUITY_SCORE: 17
ADLS_ACUITY_SCORE: 15
ADLS_ACUITY_SCORE: 13
ADLS_ACUITY_SCORE: 19
ADLS_ACUITY_SCORE: 17
ADLS_ACUITY_SCORE: 15
ADLS_ACUITY_SCORE: 19
ADLS_ACUITY_SCORE: 17
ADLS_ACUITY_SCORE: 18
ADLS_ACUITY_SCORE: 19
ADLS_ACUITY_SCORE: 44
ADLS_ACUITY_SCORE: 15
ADLS_ACUITY_SCORE: 44
ADLS_ACUITY_SCORE: 15
ADLS_ACUITY_SCORE: 17
ADLS_ACUITY_SCORE: 15
ADLS_ACUITY_SCORE: 12
ADLS_ACUITY_SCORE: 14
ADLS_ACUITY_SCORE: 15
ADLS_ACUITY_SCORE: 17
ADLS_ACUITY_SCORE: 19
ADLS_ACUITY_SCORE: 12
ADLS_ACUITY_SCORE: 12
ADLS_ACUITY_SCORE: 17
ADLS_ACUITY_SCORE: 17
ADLS_ACUITY_SCORE: 19
ADLS_ACUITY_SCORE: 19
ADLS_ACUITY_SCORE: 16
ADLS_ACUITY_SCORE: 19
ADLS_ACUITY_SCORE: 16
ADLS_ACUITY_SCORE: 13
ADLS_ACUITY_SCORE: 18
ADLS_ACUITY_SCORE: 19
ADLS_ACUITY_SCORE: 17
ADLS_ACUITY_SCORE: 16
ADLS_ACUITY_SCORE: 44
ADLS_ACUITY_SCORE: 12
ADLS_ACUITY_SCORE: 23
ADLS_ACUITY_SCORE: 15
ADLS_ACUITY_SCORE: 15
ADLS_ACUITY_SCORE: 16
ADLS_ACUITY_SCORE: 18
ADLS_ACUITY_SCORE: 15
ADLS_ACUITY_SCORE: 15
ADLS_ACUITY_SCORE: 13
ADLS_ACUITY_SCORE: 16
ADLS_ACUITY_SCORE: 44
ADLS_ACUITY_SCORE: 12
ADLS_ACUITY_SCORE: 10
ADLS_ACUITY_SCORE: 45
ADLS_ACUITY_SCORE: 17
ADLS_ACUITY_SCORE: 15
ADLS_ACUITY_SCORE: 10
ADLS_ACUITY_SCORE: 45
ADLS_ACUITY_SCORE: 14
ADLS_ACUITY_SCORE: 14
ADLS_ACUITY_SCORE: 11
ADLS_ACUITY_SCORE: 17
ADLS_ACUITY_SCORE: 18
ADLS_ACUITY_SCORE: 15
ADLS_ACUITY_SCORE: 17
ADLS_ACUITY_SCORE: 15
ADLS_ACUITY_SCORE: 15
ADLS_ACUITY_SCORE: 19
ADLS_ACUITY_SCORE: 14
ADLS_ACUITY_SCORE: 16
ADLS_ACUITY_SCORE: 44
ADLS_ACUITY_SCORE: 45
ADLS_ACUITY_SCORE: 14
ADLS_ACUITY_SCORE: 13
ADLS_ACUITY_SCORE: 15
ADLS_ACUITY_SCORE: 16
ADLS_ACUITY_SCORE: 15
ADLS_ACUITY_SCORE: 19
ADLS_ACUITY_SCORE: 15
ADLS_ACUITY_SCORE: 19
ADLS_ACUITY_SCORE: 15
ADLS_ACUITY_SCORE: 19
ADLS_ACUITY_SCORE: 12
ADLS_ACUITY_SCORE: 44
ADLS_ACUITY_SCORE: 15
ADLS_ACUITY_SCORE: 13
ADLS_ACUITY_SCORE: 44
ADLS_ACUITY_SCORE: 16
ADLS_ACUITY_SCORE: 12
ADLS_ACUITY_SCORE: 12
ADLS_ACUITY_SCORE: 19
ADLS_ACUITY_SCORE: 14
ADLS_ACUITY_SCORE: 17
ADLS_ACUITY_SCORE: 12
ADLS_ACUITY_SCORE: 19
ADLS_ACUITY_SCORE: 12
ADLS_ACUITY_SCORE: 17
ADLS_ACUITY_SCORE: 15
ADLS_ACUITY_SCORE: 17
ADLS_ACUITY_SCORE: 15
ADLS_ACUITY_SCORE: 14
ADLS_ACUITY_SCORE: 12
ADLS_ACUITY_SCORE: 14
ADLS_ACUITY_SCORE: 16
ADLS_ACUITY_SCORE: 14
ADLS_ACUITY_SCORE: 14
ADLS_ACUITY_SCORE: 18
ADLS_ACUITY_SCORE: 12
ADLS_ACUITY_SCORE: 17
ADLS_ACUITY_SCORE: 10
ADLS_ACUITY_SCORE: 16
ADLS_ACUITY_SCORE: 17
ADLS_ACUITY_SCORE: 16
ADLS_ACUITY_SCORE: 18
ADLS_ACUITY_SCORE: 14
ADLS_ACUITY_SCORE: 44
ADLS_ACUITY_SCORE: 13
ADLS_ACUITY_SCORE: 13
ADLS_ACUITY_SCORE: 15
ADLS_ACUITY_SCORE: 11
ADLS_ACUITY_SCORE: 13
ADLS_ACUITY_SCORE: 18
ADLS_ACUITY_SCORE: 16
ADLS_ACUITY_SCORE: 14
ADLS_ACUITY_SCORE: 15
ADLS_ACUITY_SCORE: 10
ADLS_ACUITY_SCORE: 18
ADLS_ACUITY_SCORE: 16
ADLS_ACUITY_SCORE: 12
ADLS_ACUITY_SCORE: 15
ADLS_ACUITY_SCORE: 18
ADLS_ACUITY_SCORE: 13
ADLS_ACUITY_SCORE: 45
ADLS_ACUITY_SCORE: 17
ADLS_ACUITY_SCORE: 19
ADLS_ACUITY_SCORE: 14
ADLS_ACUITY_SCORE: 12
ADLS_ACUITY_SCORE: 23
ADLS_ACUITY_SCORE: 10
ADLS_ACUITY_SCORE: 10
ADLS_ACUITY_SCORE: 11
ADLS_ACUITY_SCORE: 15
ADLS_ACUITY_SCORE: 16
ADLS_ACUITY_SCORE: 44
ADLS_ACUITY_SCORE: 45
ADLS_ACUITY_SCORE: 17
ADLS_ACUITY_SCORE: 14
ADLS_ACUITY_SCORE: 19
ADLS_ACUITY_SCORE: 45
ADLS_ACUITY_SCORE: 12
ADLS_ACUITY_SCORE: 12
ADLS_ACUITY_SCORE: 44
ADLS_ACUITY_SCORE: 17
ADLS_ACUITY_SCORE: 18
ADLS_ACUITY_SCORE: 19
ADLS_ACUITY_SCORE: 13
ADLS_ACUITY_SCORE: 13
ADLS_ACUITY_SCORE: 15
ADLS_ACUITY_SCORE: 14
ADLS_ACUITY_SCORE: 15
ADLS_ACUITY_SCORE: 18
ADLS_ACUITY_SCORE: 19
ADLS_ACUITY_SCORE: 17
ADLS_ACUITY_SCORE: 17
ADLS_ACUITY_SCORE: 18
ADLS_ACUITY_SCORE: 14
ADLS_ACUITY_SCORE: 15
ADLS_ACUITY_SCORE: 13
ADLS_ACUITY_SCORE: 14
ADLS_ACUITY_SCORE: 16
ADLS_ACUITY_SCORE: 15
ADLS_ACUITY_SCORE: 12
ADLS_ACUITY_SCORE: 15
ADLS_ACUITY_SCORE: 17
ADLS_ACUITY_SCORE: 14
ADLS_ACUITY_SCORE: 12
ADLS_ACUITY_SCORE: 17
ADLS_ACUITY_SCORE: 13
ADLS_ACUITY_SCORE: 16
ADLS_ACUITY_SCORE: 17
ADLS_ACUITY_SCORE: 14
ADLS_ACUITY_SCORE: 16
ADLS_ACUITY_SCORE: 18
ADLS_ACUITY_SCORE: 19
ADLS_ACUITY_SCORE: 14
ADLS_ACUITY_SCORE: 12
ADLS_ACUITY_SCORE: 12
ADLS_ACUITY_SCORE: 14
ADLS_ACUITY_SCORE: 44
ADLS_ACUITY_SCORE: 17
ADLS_ACUITY_SCORE: 14
ADLS_ACUITY_SCORE: 17
ADLS_ACUITY_SCORE: 14
ADLS_ACUITY_SCORE: 10
ADLS_ACUITY_SCORE: 17
ADLS_ACUITY_SCORE: 19
ADLS_ACUITY_SCORE: 14
ADLS_ACUITY_SCORE: 12
ADLS_ACUITY_SCORE: 15
ADLS_ACUITY_SCORE: 16
ADLS_ACUITY_SCORE: 16
ADLS_ACUITY_SCORE: 19
ADLS_ACUITY_SCORE: 45
ADLS_ACUITY_SCORE: 15
ADLS_ACUITY_SCORE: 16
ADLS_ACUITY_SCORE: 12
ADLS_ACUITY_SCORE: 15
ADLS_ACUITY_SCORE: 18
ADLS_ACUITY_SCORE: 17
ADLS_ACUITY_SCORE: 16
ADLS_ACUITY_SCORE: 17
ADLS_ACUITY_SCORE: 19
ADLS_ACUITY_SCORE: 13
ADLS_ACUITY_SCORE: 10
ADLS_ACUITY_SCORE: 19
ADLS_ACUITY_SCORE: 17
ADLS_ACUITY_SCORE: 13
ADLS_ACUITY_SCORE: 18
ADLS_ACUITY_SCORE: 19
ADLS_ACUITY_SCORE: 17
ADLS_ACUITY_SCORE: 16
ADLS_ACUITY_SCORE: 10
ADLS_ACUITY_SCORE: 17
ADLS_ACUITY_SCORE: 13
ADLS_ACUITY_SCORE: 14
ADLS_ACUITY_SCORE: 15
ADLS_ACUITY_SCORE: 12
ADLS_ACUITY_SCORE: 17
ADLS_ACUITY_SCORE: 15
ADLS_ACUITY_SCORE: 19
ADLS_ACUITY_SCORE: 19
ADLS_ACUITY_SCORE: 15
ADLS_ACUITY_SCORE: 13
ADLS_ACUITY_SCORE: 12
ADLS_ACUITY_SCORE: 14
ADLS_ACUITY_SCORE: 15
ADLS_ACUITY_SCORE: 15
ADLS_ACUITY_SCORE: 17
ADLS_ACUITY_SCORE: 19
ADLS_ACUITY_SCORE: 18
ADLS_ACUITY_SCORE: 17
ADLS_ACUITY_SCORE: 15
ADLS_ACUITY_SCORE: 19
ADLS_ACUITY_SCORE: 15
ADLS_ACUITY_SCORE: 17
ADLS_ACUITY_SCORE: 11
ADLS_ACUITY_SCORE: 15
ADLS_ACUITY_SCORE: 12
ADLS_ACUITY_SCORE: 17
ADLS_ACUITY_SCORE: 17
ADLS_ACUITY_SCORE: 16
ADLS_ACUITY_SCORE: 19
ADLS_ACUITY_SCORE: 44
DOING_ERRANDS_INDEPENDENTLY_DIFFICULTY: NO
ADLS_ACUITY_SCORE: 17
ADLS_ACUITY_SCORE: 15
ADLS_ACUITY_SCORE: 17
ADLS_ACUITY_SCORE: 15
ADLS_ACUITY_SCORE: 12
ADLS_ACUITY_SCORE: 18
ADLS_ACUITY_SCORE: 15
ADLS_ACUITY_SCORE: 12
ADLS_ACUITY_SCORE: 19
ADLS_ACUITY_SCORE: 14
ADLS_ACUITY_SCORE: 18
ADLS_ACUITY_SCORE: 19
ADLS_ACUITY_SCORE: 18
ADLS_ACUITY_SCORE: 18
ADLS_ACUITY_SCORE: 15
ADLS_ACUITY_SCORE: 18
ADLS_ACUITY_SCORE: 19
ADLS_ACUITY_SCORE: 18
ADLS_ACUITY_SCORE: 19
ADLS_ACUITY_SCORE: 18
ADLS_ACUITY_SCORE: 16
ADLS_ACUITY_SCORE: 17
ADLS_ACUITY_SCORE: 44
ADLS_ACUITY_SCORE: 14
ADLS_ACUITY_SCORE: 18
ADLS_ACUITY_SCORE: 45
ADLS_ACUITY_SCORE: 15
ADLS_ACUITY_SCORE: 15
ADLS_ACUITY_SCORE: 17
ADLS_ACUITY_SCORE: 44
WALKING_OR_CLIMBING_STAIRS_DIFFICULTY: YES
ADLS_ACUITY_SCORE: 15
ADLS_ACUITY_SCORE: 13
ADLS_ACUITY_SCORE: 17
ADLS_ACUITY_SCORE: 19
WHICH_OF_THE_ABOVE_FUNCTIONAL_RISKS_HAD_A_RECENT_ONSET_OR_CHANGE?: AMBULATION;TRANSFERRING;TOILETING;BATHING;DRESSING
ADLS_ACUITY_SCORE: 19
ADLS_ACUITY_SCORE: 15
ADLS_ACUITY_SCORE: 19
ADLS_ACUITY_SCORE: 45
ADLS_ACUITY_SCORE: 18
ADLS_ACUITY_SCORE: 19
ADLS_ACUITY_SCORE: 17
ADLS_ACUITY_SCORE: 13
ADLS_ACUITY_SCORE: 14
ADLS_ACUITY_SCORE: 15
ADLS_ACUITY_SCORE: 17
ADLS_ACUITY_SCORE: 15
ADLS_ACUITY_SCORE: 13
ADLS_ACUITY_SCORE: 15
ADLS_ACUITY_SCORE: 19
DEPENDENT_IADLS:: CLEANING;COOKING;LAUNDRY;SHOPPING;MEDICATION MANAGEMENT;MEAL PREPARATION;TRANSPORTATION
ADLS_ACUITY_SCORE: 10
ADLS_ACUITY_SCORE: 15
ADLS_ACUITY_SCORE: 16
ADLS_ACUITY_SCORE: 15
ADLS_ACUITY_SCORE: 10
ADLS_ACUITY_SCORE: 12
ADLS_ACUITY_SCORE: 15
ADLS_ACUITY_SCORE: 16
ADLS_ACUITY_SCORE: 17
ADLS_ACUITY_SCORE: 15
ADLS_ACUITY_SCORE: 12
ADLS_ACUITY_SCORE: 16
ADLS_ACUITY_SCORE: 15
ADLS_ACUITY_SCORE: 19
ADLS_ACUITY_SCORE: 12
ADLS_ACUITY_SCORE: 15
ADLS_ACUITY_SCORE: 15
ADLS_ACUITY_SCORE: 17
ADLS_ACUITY_SCORE: 19
ADLS_ACUITY_SCORE: 15
ADLS_ACUITY_SCORE: 14
ADLS_ACUITY_SCORE: 19
ADLS_ACUITY_SCORE: 12
ADLS_ACUITY_SCORE: 12
ADLS_ACUITY_SCORE: 18
ADLS_ACUITY_SCORE: 19
ADLS_ACUITY_SCORE: 12
ADLS_ACUITY_SCORE: 17
ADLS_ACUITY_SCORE: 44
ADLS_ACUITY_SCORE: 19
ADLS_ACUITY_SCORE: 15
ADLS_ACUITY_SCORE: 16
ADLS_ACUITY_SCORE: 15
ADLS_ACUITY_SCORE: 17
ADLS_ACUITY_SCORE: 11
ADLS_ACUITY_SCORE: 18
ADLS_ACUITY_SCORE: 13
ADLS_ACUITY_SCORE: 15
ADLS_ACUITY_SCORE: 17
ADLS_ACUITY_SCORE: 17
ADLS_ACUITY_SCORE: 13
ADLS_ACUITY_SCORE: 44
ADLS_ACUITY_SCORE: 17
ADLS_ACUITY_SCORE: 17
ADLS_ACUITY_SCORE: 12
ADLS_ACUITY_SCORE: 19
ADLS_ACUITY_SCORE: 17
ADLS_ACUITY_SCORE: 45
ADLS_ACUITY_SCORE: 18
ADLS_ACUITY_SCORE: 16
ADLS_ACUITY_SCORE: 21
ADLS_ACUITY_SCORE: 14
ADLS_ACUITY_SCORE: 19
ADLS_ACUITY_SCORE: 17
ADLS_ACUITY_SCORE: 13
ADLS_ACUITY_SCORE: 16
ADLS_ACUITY_SCORE: 15
ADLS_ACUITY_SCORE: 16
ADLS_ACUITY_SCORE: 44
ADLS_ACUITY_SCORE: 15
ADLS_ACUITY_SCORE: 16
ADLS_ACUITY_SCORE: 17
ADLS_ACUITY_SCORE: 18
ADLS_ACUITY_SCORE: 12
ADLS_ACUITY_SCORE: 19
ADLS_ACUITY_SCORE: 14
ADLS_ACUITY_SCORE: 14
ADLS_ACUITY_SCORE: 17
ADLS_ACUITY_SCORE: 14
ADLS_ACUITY_SCORE: 21
ADLS_ACUITY_SCORE: 16
ADLS_ACUITY_SCORE: 17
ADLS_ACUITY_SCORE: 16
ADLS_ACUITY_SCORE: 15
ADLS_ACUITY_SCORE: 13
ADLS_ACUITY_SCORE: 45
ADLS_ACUITY_SCORE: 13
ADLS_ACUITY_SCORE: 18
ADLS_ACUITY_SCORE: 12
ADLS_ACUITY_SCORE: 13
ADLS_ACUITY_SCORE: 12
ADLS_ACUITY_SCORE: 17
ADLS_ACUITY_SCORE: 15
ADLS_ACUITY_SCORE: 19
ADLS_ACUITY_SCORE: 15
ADLS_ACUITY_SCORE: 17
ADLS_ACUITY_SCORE: 15
ADLS_ACUITY_SCORE: 18
ADLS_ACUITY_SCORE: 15
ADLS_ACUITY_SCORE: 17
ADLS_ACUITY_SCORE: 19
ADLS_ACUITY_SCORE: 19
ADLS_ACUITY_SCORE: 17
ADLS_ACUITY_SCORE: 15
ADLS_ACUITY_SCORE: 16
ADLS_ACUITY_SCORE: 12
ADLS_ACUITY_SCORE: 19
ADLS_ACUITY_SCORE: 18
ADLS_ACUITY_SCORE: 13
TOILETING_ISSUES: YES
ADLS_ACUITY_SCORE: 15
ADLS_ACUITY_SCORE: 15
ADLS_ACUITY_SCORE: 16
ADLS_ACUITY_SCORE: 16
ADLS_ACUITY_SCORE: 15
ADLS_ACUITY_SCORE: 23
ADLS_ACUITY_SCORE: 45
ADLS_ACUITY_SCORE: 45
ADLS_ACUITY_SCORE: 19
ADLS_ACUITY_SCORE: 16
ADLS_ACUITY_SCORE: 15
ADLS_ACUITY_SCORE: 12
ADLS_ACUITY_SCORE: 17
ADLS_ACUITY_SCORE: 13
ADLS_ACUITY_SCORE: 14
ADLS_ACUITY_SCORE: 16
ADLS_ACUITY_SCORE: 12
ADLS_ACUITY_SCORE: 18
ADLS_ACUITY_SCORE: 17
ADLS_ACUITY_SCORE: 15
ADLS_ACUITY_SCORE: 19
ADLS_ACUITY_SCORE: 15
ADLS_ACUITY_SCORE: 17
ADLS_ACUITY_SCORE: 12
ADLS_ACUITY_SCORE: 16
ADLS_ACUITY_SCORE: 17
ADLS_ACUITY_SCORE: 44
ADLS_ACUITY_SCORE: 15
ADLS_ACUITY_SCORE: 16
ADLS_ACUITY_SCORE: 17
ADLS_ACUITY_SCORE: 17
ADLS_ACUITY_SCORE: 19
ADLS_ACUITY_SCORE: 12
ADLS_ACUITY_SCORE: 19
ADLS_ACUITY_SCORE: 15
ADLS_ACUITY_SCORE: 19
ADLS_ACUITY_SCORE: 12
ADLS_ACUITY_SCORE: 23
ADLS_ACUITY_SCORE: 17
ADLS_ACUITY_SCORE: 15
ADLS_ACUITY_SCORE: 45
ADLS_ACUITY_SCORE: 16
ADLS_ACUITY_SCORE: 45
ADLS_ACUITY_SCORE: 15
ADLS_ACUITY_SCORE: 14
ADLS_ACUITY_SCORE: 17
ADLS_ACUITY_SCORE: 16
ADLS_ACUITY_SCORE: 16
ADLS_ACUITY_SCORE: 44
ADLS_ACUITY_SCORE: 17
ADLS_ACUITY_SCORE: 12
ADLS_ACUITY_SCORE: 12
ADLS_ACUITY_SCORE: 17
ADLS_ACUITY_SCORE: 15
ADLS_ACUITY_SCORE: 19
ADLS_ACUITY_SCORE: 17
ADLS_ACUITY_SCORE: 18
ADLS_ACUITY_SCORE: 13
ADLS_ACUITY_SCORE: 17
ADLS_ACUITY_SCORE: 16
ADLS_ACUITY_SCORE: 18
ADLS_ACUITY_SCORE: 17
ADLS_ACUITY_SCORE: 13
ADLS_ACUITY_SCORE: 15
ADLS_ACUITY_SCORE: 17
ADLS_ACUITY_SCORE: 16
ADLS_ACUITY_SCORE: 17
ADLS_ACUITY_SCORE: 19
ADLS_ACUITY_SCORE: 19
ADLS_ACUITY_SCORE: 15
ADLS_ACUITY_SCORE: 15
ADLS_ACUITY_SCORE: 16
ADLS_ACUITY_SCORE: 19
ADLS_ACUITY_SCORE: 15
ADLS_ACUITY_SCORE: 17
ADLS_ACUITY_SCORE: 19
ADLS_ACUITY_SCORE: 13
ADLS_ACUITY_SCORE: 16
ADLS_ACUITY_SCORE: 44
ADLS_ACUITY_SCORE: 12
ADLS_ACUITY_SCORE: 17
ADLS_ACUITY_SCORE: 15
ADLS_ACUITY_SCORE: 17
ADLS_ACUITY_SCORE: 14
ADLS_ACUITY_SCORE: 16
ADLS_ACUITY_SCORE: 16
ADLS_ACUITY_SCORE: 19
ADLS_ACUITY_SCORE: 15
ADLS_ACUITY_SCORE: 19
ADLS_ACUITY_SCORE: 14
ADLS_ACUITY_SCORE: 12
ADLS_ACUITY_SCORE: 17
ADLS_ACUITY_SCORE: 23
ADLS_ACUITY_SCORE: 19
ADLS_ACUITY_SCORE: 17
ADLS_ACUITY_SCORE: 15
ADLS_ACUITY_SCORE: 17
ADLS_ACUITY_SCORE: 17
ADLS_ACUITY_SCORE: 45
ADLS_ACUITY_SCORE: 17
ADLS_ACUITY_SCORE: 15
ADLS_ACUITY_SCORE: 17
ADLS_ACUITY_SCORE: 15
ADLS_ACUITY_SCORE: 44
ADLS_ACUITY_SCORE: 19
ADLS_ACUITY_SCORE: 14
ADLS_ACUITY_SCORE: 15
ADLS_ACUITY_SCORE: 45
ADLS_ACUITY_SCORE: 12
ADLS_ACUITY_SCORE: 12
ADLS_ACUITY_SCORE: 13
ADLS_ACUITY_SCORE: 12
ADLS_ACUITY_SCORE: 15
ADLS_ACUITY_SCORE: 17
ADLS_ACUITY_SCORE: 19
ADLS_ACUITY_SCORE: 17
ADLS_ACUITY_SCORE: 15
ADLS_ACUITY_SCORE: 18
ADLS_ACUITY_SCORE: 44
ADLS_ACUITY_SCORE: 19
ADLS_ACUITY_SCORE: 16
ADLS_ACUITY_SCORE: 17
BATHING: 1-->ASSISTANCE NEEDED
ADLS_ACUITY_SCORE: 14
ADLS_ACUITY_SCORE: 13
ADLS_ACUITY_SCORE: 16
ADLS_ACUITY_SCORE: 17
ADLS_ACUITY_SCORE: 16
ADLS_ACUITY_SCORE: 15
ADLS_ACUITY_SCORE: 14
ADLS_ACUITY_SCORE: 18
ADLS_ACUITY_SCORE: 16
ADLS_ACUITY_SCORE: 12
ADLS_ACUITY_SCORE: 17
ADLS_ACUITY_SCORE: 16
ADLS_ACUITY_SCORE: 17
ADLS_ACUITY_SCORE: 16
ADLS_ACUITY_SCORE: 16
ADLS_ACUITY_SCORE: 17
ADLS_ACUITY_SCORE: 15
ADLS_ACUITY_SCORE: 44
ADLS_ACUITY_SCORE: 15
ADLS_ACUITY_SCORE: 16
ADLS_ACUITY_SCORE: 17
ADLS_ACUITY_SCORE: 19
ADLS_ACUITY_SCORE: 13
ADLS_ACUITY_SCORE: 14
ADLS_ACUITY_SCORE: 45
ADLS_ACUITY_SCORE: 16
ADLS_ACUITY_SCORE: 19
ADLS_ACUITY_SCORE: 19
ADLS_ACUITY_SCORE: 15
ADLS_ACUITY_SCORE: 16
ADLS_ACUITY_SCORE: 17
ADLS_ACUITY_SCORE: 44
ADLS_ACUITY_SCORE: 17
ADLS_ACUITY_SCORE: 18
ADLS_ACUITY_SCORE: 14
ADLS_ACUITY_SCORE: 18
ADLS_ACUITY_SCORE: 17
ADLS_ACUITY_SCORE: 44
ADLS_ACUITY_SCORE: 17
ADLS_ACUITY_SCORE: 44
ADLS_ACUITY_SCORE: 17
DRESSING/BATHING_DIFFICULTY: YES
ADLS_ACUITY_SCORE: 45
ADLS_ACUITY_SCORE: 12
ADLS_ACUITY_SCORE: 15
ADLS_ACUITY_SCORE: 13
ADLS_ACUITY_SCORE: 16
ADLS_ACUITY_SCORE: 17
ADLS_ACUITY_SCORE: 15
ADLS_ACUITY_SCORE: 15
ADLS_ACUITY_SCORE: 17
ADLS_ACUITY_SCORE: 12
ADLS_ACUITY_SCORE: 15
ADLS_ACUITY_SCORE: 16
ADLS_ACUITY_SCORE: 13
ADLS_ACUITY_SCORE: 17
ADLS_ACUITY_SCORE: 13
ADLS_ACUITY_SCORE: 19
ADLS_ACUITY_SCORE: 19
ADLS_ACUITY_SCORE: 15
ADLS_ACUITY_SCORE: 17
ADLS_ACUITY_SCORE: 15
ADLS_ACUITY_SCORE: 19
ADLS_ACUITY_SCORE: 17
ADLS_ACUITY_SCORE: 15
ADLS_ACUITY_SCORE: 12
ADLS_ACUITY_SCORE: 13
ADLS_ACUITY_SCORE: 18
WALKING_OR_CLIMBING_STAIRS: AMBULATION DIFFICULTY, REQUIRES EQUIPMENT;STAIR CLIMBING DIFFICULTY, REQUIRES EQUIPMENT;TRANSFERRING DIFFICULTY, REQUIRES EQUIPMENT
ADLS_ACUITY_SCORE: 12
ADLS_ACUITY_SCORE: 17
ADLS_ACUITY_SCORE: 45
ADLS_ACUITY_SCORE: 18
ADLS_ACUITY_SCORE: 15
ADLS_ACUITY_SCORE: 16
ADLS_ACUITY_SCORE: 18
ADLS_ACUITY_SCORE: 18
ADLS_ACUITY_SCORE: 17
ADLS_ACUITY_SCORE: 14
ADLS_ACUITY_SCORE: 17
ADLS_ACUITY_SCORE: 16
CHANGE_IN_FUNCTIONAL_STATUS_SINCE_ONSET_OF_CURRENT_ILLNESS/INJURY: YES
ADLS_ACUITY_SCORE: 19
ADLS_ACUITY_SCORE: 17
ADLS_ACUITY_SCORE: 13
ADLS_ACUITY_SCORE: 12
ADLS_ACUITY_SCORE: 15
ADLS_ACUITY_SCORE: 14
ADLS_ACUITY_SCORE: 16
ADLS_ACUITY_SCORE: 17
ADLS_ACUITY_SCORE: 17
ADLS_ACUITY_SCORE: 15
ADLS_ACUITY_SCORE: 13
ADLS_ACUITY_SCORE: 17
ADLS_ACUITY_SCORE: 15
ADLS_ACUITY_SCORE: 16
ADLS_ACUITY_SCORE: 18
ADLS_ACUITY_SCORE: 12
ADLS_ACUITY_SCORE: 12
ADLS_ACUITY_SCORE: 19
ADLS_ACUITY_SCORE: 13
ADLS_ACUITY_SCORE: 16
ADLS_ACUITY_SCORE: 44
ADLS_ACUITY_SCORE: 19
ADLS_ACUITY_SCORE: 18
ADLS_ACUITY_SCORE: 14
ADLS_ACUITY_SCORE: 15
ADLS_ACUITY_SCORE: 16
ADLS_ACUITY_SCORE: 15
ADLS_ACUITY_SCORE: 17
ADLS_ACUITY_SCORE: 19
ADLS_ACUITY_SCORE: 16
ADLS_ACUITY_SCORE: 17
ADLS_ACUITY_SCORE: 17
ADLS_ACUITY_SCORE: 15
ADLS_ACUITY_SCORE: 16
ADLS_ACUITY_SCORE: 44
ADLS_ACUITY_SCORE: 13
ADLS_ACUITY_SCORE: 19
ADLS_ACUITY_SCORE: 17
ADLS_ACUITY_SCORE: 21
ADLS_ACUITY_SCORE: 19
ADLS_ACUITY_SCORE: 18
ADLS_ACUITY_SCORE: 16
ADLS_ACUITY_SCORE: 16
ADLS_ACUITY_SCORE: 12
ADLS_ACUITY_SCORE: 18
ADLS_ACUITY_SCORE: 13
ADLS_ACUITY_SCORE: 13
ADLS_ACUITY_SCORE: 14
ADLS_ACUITY_SCORE: 19
ADLS_ACUITY_SCORE: 16
ADLS_ACUITY_SCORE: 19
ADLS_ACUITY_SCORE: 13
DIFFICULTY_EATING/SWALLOWING: NO
ADLS_ACUITY_SCORE: 18
ADLS_ACUITY_SCORE: 19
ADLS_ACUITY_SCORE: 15
ADLS_ACUITY_SCORE: 44
ADLS_ACUITY_SCORE: 19
ADLS_ACUITY_SCORE: 14
ADLS_ACUITY_SCORE: 18
ADLS_ACUITY_SCORE: 15
ADLS_ACUITY_SCORE: 12
ADLS_ACUITY_SCORE: 15
ADLS_ACUITY_SCORE: 12
ADLS_ACUITY_SCORE: 21
ADLS_ACUITY_SCORE: 18
ADLS_ACUITY_SCORE: 21
ADLS_ACUITY_SCORE: 17
ADLS_ACUITY_SCORE: 17
ADLS_ACUITY_SCORE: 14
ADLS_ACUITY_SCORE: 17
ADLS_ACUITY_SCORE: 13
ADLS_ACUITY_SCORE: 44
ADLS_ACUITY_SCORE: 15
ADLS_ACUITY_SCORE: 19
ADLS_ACUITY_SCORE: 14
ADLS_ACUITY_SCORE: 44
ADLS_ACUITY_SCORE: 16
ADLS_ACUITY_SCORE: 15
ADLS_ACUITY_SCORE: 12
ADLS_ACUITY_SCORE: 17
ADLS_ACUITY_SCORE: 15
ADLS_ACUITY_SCORE: 23
ADLS_ACUITY_SCORE: 18
ADLS_ACUITY_SCORE: 15
ADLS_ACUITY_SCORE: 15
ADLS_ACUITY_SCORE: 16
ADLS_ACUITY_SCORE: 15
ADLS_ACUITY_SCORE: 15
CHANGE_IN_FUNCTIONAL_STATUS_SINCE_ONSET_OF_CURRENT_ILLNESS/INJURY: YES
ADLS_ACUITY_SCORE: 15
ADLS_ACUITY_SCORE: 12
ADLS_ACUITY_SCORE: 13
ADLS_ACUITY_SCORE: 12
ADLS_ACUITY_SCORE: 13
ADLS_ACUITY_SCORE: 18
ADLS_ACUITY_SCORE: 14
ADLS_ACUITY_SCORE: 44
ADLS_ACUITY_SCORE: 17
ADLS_ACUITY_SCORE: 19
ADLS_ACUITY_SCORE: 10
ADLS_ACUITY_SCORE: 13
ADLS_ACUITY_SCORE: 15
ADLS_ACUITY_SCORE: 17
ADLS_ACUITY_SCORE: 16
ADLS_ACUITY_SCORE: 19
ADLS_ACUITY_SCORE: 45
ADLS_ACUITY_SCORE: 19
ADLS_ACUITY_SCORE: 19
ADLS_ACUITY_SCORE: 13
ADLS_ACUITY_SCORE: 17
ADLS_ACUITY_SCORE: 13
ADLS_ACUITY_SCORE: 14
ADLS_ACUITY_SCORE: 17
ADLS_ACUITY_SCORE: 15
ADLS_ACUITY_SCORE: 45
ADLS_ACUITY_SCORE: 19
ADLS_ACUITY_SCORE: 45
ADLS_ACUITY_SCORE: 11
ADLS_ACUITY_SCORE: 15
ADLS_ACUITY_SCORE: 16
ADLS_ACUITY_SCORE: 15
ADLS_ACUITY_SCORE: 16
ADLS_ACUITY_SCORE: 16
ADLS_ACUITY_SCORE: 19
ADLS_ACUITY_SCORE: 17
ADLS_ACUITY_SCORE: 15
ADLS_ACUITY_SCORE: 16
ADLS_ACUITY_SCORE: 19
ADLS_ACUITY_SCORE: 16
ADLS_ACUITY_SCORE: 16
ADLS_ACUITY_SCORE: 19
TRANSFERRING: 1-->ASSISTANCE (EQUIPMENT/PERSON) NEEDED
ADLS_ACUITY_SCORE: 14
ADLS_ACUITY_SCORE: 17
ADLS_ACUITY_SCORE: 15
ADLS_ACUITY_SCORE: 18
ADLS_ACUITY_SCORE: 17
ADLS_ACUITY_SCORE: 15
ADLS_ACUITY_SCORE: 17
ADLS_ACUITY_SCORE: 19
ADLS_ACUITY_SCORE: 15
ADLS_ACUITY_SCORE: 12
ADLS_ACUITY_SCORE: 13
ADLS_ACUITY_SCORE: 17
ADLS_ACUITY_SCORE: 13
ADLS_ACUITY_SCORE: 19
ADLS_ACUITY_SCORE: 19
ADLS_ACUITY_SCORE: 16
ADLS_ACUITY_SCORE: 17
ADLS_ACUITY_SCORE: 17
ADLS_ACUITY_SCORE: 18
ADLS_ACUITY_SCORE: 17
ADLS_ACUITY_SCORE: 16
ADLS_ACUITY_SCORE: 15
ADLS_ACUITY_SCORE: 17
ADLS_ACUITY_SCORE: 15
ADLS_ACUITY_SCORE: 19
ADLS_ACUITY_SCORE: 16
ADLS_ACUITY_SCORE: 12
ADLS_ACUITY_SCORE: 18
ADLS_ACUITY_SCORE: 12
ADLS_ACUITY_SCORE: 19
ADLS_ACUITY_SCORE: 16
ADLS_ACUITY_SCORE: 17
ADLS_ACUITY_SCORE: 44
ADLS_ACUITY_SCORE: 17
ADLS_ACUITY_SCORE: 13
ADLS_ACUITY_SCORE: 17
ADLS_ACUITY_SCORE: 19
ADLS_ACUITY_SCORE: 19
ADLS_ACUITY_SCORE: 12
ADLS_ACUITY_SCORE: 12
ADLS_ACUITY_SCORE: 11
ADLS_ACUITY_SCORE: 17
ADLS_ACUITY_SCORE: 44
ADLS_ACUITY_SCORE: 44
ADLS_ACUITY_SCORE: 14
ADLS_ACUITY_SCORE: 17
ADLS_ACUITY_SCORE: 19
ADLS_ACUITY_SCORE: 15
ADLS_ACUITY_SCORE: 18
ADLS_ACUITY_SCORE: 19
ADLS_ACUITY_SCORE: 14
ADLS_ACUITY_SCORE: 15
ADLS_ACUITY_SCORE: 15
ADLS_ACUITY_SCORE: 13
ADLS_ACUITY_SCORE: 18
ADLS_ACUITY_SCORE: 15
ADLS_ACUITY_SCORE: 17
ADLS_ACUITY_SCORE: 19
ADLS_ACUITY_SCORE: 15
ADLS_ACUITY_SCORE: 17
ADLS_ACUITY_SCORE: 18
ADLS_ACUITY_SCORE: 15
ADLS_ACUITY_SCORE: 17
ADLS_ACUITY_SCORE: 17
ADLS_ACUITY_SCORE: 19
ADLS_ACUITY_SCORE: 17
ADLS_ACUITY_SCORE: 12
ADLS_ACUITY_SCORE: 14
ADLS_ACUITY_SCORE: 18
ADLS_ACUITY_SCORE: 18
ADLS_ACUITY_SCORE: 15
ADLS_ACUITY_SCORE: 17
ADLS_ACUITY_SCORE: 18
ADLS_ACUITY_SCORE: 12
ADLS_ACUITY_SCORE: 13
ADLS_ACUITY_SCORE: 18
ADLS_ACUITY_SCORE: 18
ADLS_ACUITY_SCORE: 21
ADLS_ACUITY_SCORE: 19
ADLS_ACUITY_SCORE: 16
ADLS_ACUITY_SCORE: 16
ADLS_ACUITY_SCORE: 19
ADLS_ACUITY_SCORE: 13
ADLS_ACUITY_SCORE: 15
ADLS_ACUITY_SCORE: 15
ADLS_ACUITY_SCORE: 13
ADLS_ACUITY_SCORE: 12
ADLS_ACUITY_SCORE: 18
ADLS_ACUITY_SCORE: 12
ADLS_ACUITY_SCORE: 18
ADLS_ACUITY_SCORE: 17
ADLS_ACUITY_SCORE: 16
ADLS_ACUITY_SCORE: 13
ADLS_ACUITY_SCORE: 10
ADLS_ACUITY_SCORE: 14
ADLS_ACUITY_SCORE: 15
ADLS_ACUITY_SCORE: 15
ADLS_ACUITY_SCORE: 17
ADLS_ACUITY_SCORE: 15
ADLS_ACUITY_SCORE: 15
ADLS_ACUITY_SCORE: 17
ADLS_ACUITY_SCORE: 17
ADLS_ACUITY_SCORE: 16
ADLS_ACUITY_SCORE: 17
ADLS_ACUITY_SCORE: 19
ADLS_ACUITY_SCORE: 18
ADLS_ACUITY_SCORE: 15
ADLS_ACUITY_SCORE: 17
ADLS_ACUITY_SCORE: 18
ADLS_ACUITY_SCORE: 15
ADLS_ACUITY_SCORE: 13
ADLS_ACUITY_SCORE: 16
ADLS_ACUITY_SCORE: 16
ADLS_ACUITY_SCORE: 13
ADLS_ACUITY_SCORE: 13
ADLS_ACUITY_SCORE: 12
ADLS_ACUITY_SCORE: 13
ADLS_ACUITY_SCORE: 17
ADLS_ACUITY_SCORE: 18
ADLS_ACUITY_SCORE: 15
ADLS_ACUITY_SCORE: 18
ADLS_ACUITY_SCORE: 23
ADLS_ACUITY_SCORE: 14
ADLS_ACUITY_SCORE: 16
ADLS_ACUITY_SCORE: 19
ADLS_ACUITY_SCORE: 17
ADLS_ACUITY_SCORE: 16
ADLS_ACUITY_SCORE: 17
ADLS_ACUITY_SCORE: 17
ADLS_ACUITY_SCORE: 15
ADLS_ACUITY_SCORE: 16
ADLS_ACUITY_SCORE: 10
ADLS_ACUITY_SCORE: 16
ADLS_ACUITY_SCORE: 23
ADLS_ACUITY_SCORE: 12
ADLS_ACUITY_SCORE: 15
ADLS_ACUITY_SCORE: 16
ADLS_ACUITY_SCORE: 14
ADLS_ACUITY_SCORE: 13
ADLS_ACUITY_SCORE: 15
ADLS_ACUITY_SCORE: 12
ADLS_ACUITY_SCORE: 18
ADLS_ACUITY_SCORE: 18
ADLS_ACUITY_SCORE: 15
ADLS_ACUITY_SCORE: 17
ADLS_ACUITY_SCORE: 15
ADLS_ACUITY_SCORE: 17
ADLS_ACUITY_SCORE: 19
DRESS: 1-->ASSISTANCE (EQUIPMENT/PERSON) NEEDED (NOT DEVELOPMENTALLY APPROPRIATE)
ADLS_ACUITY_SCORE: 15
ADLS_ACUITY_SCORE: 17
ADLS_ACUITY_SCORE: 15
ADLS_ACUITY_SCORE: 17
ADLS_ACUITY_SCORE: 18
ADLS_ACUITY_SCORE: 15
ADLS_ACUITY_SCORE: 17
ADLS_ACUITY_SCORE: 17
ADLS_ACUITY_SCORE: 18
ADLS_ACUITY_SCORE: 19
ADLS_ACUITY_SCORE: 14
ADLS_ACUITY_SCORE: 18
ADLS_ACUITY_SCORE: 15
ADLS_ACUITY_SCORE: 16
DRESS: 1-->ASSISTANCE (EQUIPMENT/PERSON) NEEDED (NOT DEVELOPMENTALLY APPROPRIATE)
ADLS_ACUITY_SCORE: 44
ADLS_ACUITY_SCORE: 12
ADLS_ACUITY_SCORE: 16
ADLS_ACUITY_SCORE: 17
ADLS_ACUITY_SCORE: 23
ADLS_ACUITY_SCORE: 19
ADLS_ACUITY_SCORE: 13
ADLS_ACUITY_SCORE: 14
ADLS_ACUITY_SCORE: 19
ADLS_ACUITY_SCORE: 19
ADLS_ACUITY_SCORE: 13
ADLS_ACUITY_SCORE: 18
ADLS_ACUITY_SCORE: 15
ADLS_ACUITY_SCORE: 12
PREVIOUS_RESPONSIBILITIES: MEAL PREP;HOUSEKEEPING
ADLS_ACUITY_SCORE: 17
ADLS_ACUITY_SCORE: 16
ADLS_ACUITY_SCORE: 18
ADLS_ACUITY_SCORE: 18
ADLS_ACUITY_SCORE: 16
ADLS_ACUITY_SCORE: 12
ADLS_ACUITY_SCORE: 13
ADLS_ACUITY_SCORE: 19
ADLS_ACUITY_SCORE: 12
ADLS_ACUITY_SCORE: 15
ADLS_ACUITY_SCORE: 13
ADLS_ACUITY_SCORE: 14
ADLS_ACUITY_SCORE: 44
ADLS_ACUITY_SCORE: 19
ADLS_ACUITY_SCORE: 15
ADLS_ACUITY_SCORE: 12
ADLS_ACUITY_SCORE: 44
ADLS_ACUITY_SCORE: 15
ADLS_ACUITY_SCORE: 18
ADLS_ACUITY_SCORE: 45
ADLS_ACUITY_SCORE: 10
ADLS_ACUITY_SCORE: 19
ADLS_ACUITY_SCORE: 15
ADLS_ACUITY_SCORE: 13
ADLS_ACUITY_SCORE: 44
ADLS_ACUITY_SCORE: 16
ADLS_ACUITY_SCORE: 19
ADLS_ACUITY_SCORE: 19
ADLS_ACUITY_SCORE: 12
ADLS_ACUITY_SCORE: 16
ADLS_ACUITY_SCORE: 15
ADLS_ACUITY_SCORE: 23
ADLS_ACUITY_SCORE: 16
ADLS_ACUITY_SCORE: 17
ADLS_ACUITY_SCORE: 15
ADLS_ACUITY_SCORE: 16
ADLS_ACUITY_SCORE: 18
DEPENDENT_IADLS:: INDEPENDENT
ADLS_ACUITY_SCORE: 17
ADLS_ACUITY_SCORE: 13
ADLS_ACUITY_SCORE: 15
ADLS_ACUITY_SCORE: 19
ADLS_ACUITY_SCORE: 18
ADLS_ACUITY_SCORE: 17
ADLS_ACUITY_SCORE: 15
ADLS_ACUITY_SCORE: 13
ADLS_ACUITY_SCORE: 19
ADLS_ACUITY_SCORE: 17
ADLS_ACUITY_SCORE: 19
ADLS_ACUITY_SCORE: 21
ADLS_ACUITY_SCORE: 17
ADLS_ACUITY_SCORE: 17
ADLS_ACUITY_SCORE: 15
ADLS_ACUITY_SCORE: 19
ADLS_ACUITY_SCORE: 19
ADLS_ACUITY_SCORE: 15
ADLS_ACUITY_SCORE: 18
ADLS_ACUITY_SCORE: 13
ADLS_ACUITY_SCORE: 16
ADLS_ACUITY_SCORE: 15
ADLS_ACUITY_SCORE: 15
ADLS_ACUITY_SCORE: 14
ADLS_ACUITY_SCORE: 12
ADLS_ACUITY_SCORE: 19
ADLS_ACUITY_SCORE: 17
ADLS_ACUITY_SCORE: 15
ADLS_ACUITY_SCORE: 19
ADLS_ACUITY_SCORE: 23
ADLS_ACUITY_SCORE: 13
ADLS_ACUITY_SCORE: 13
ADLS_ACUITY_SCORE: 15
ADLS_ACUITY_SCORE: 16
ADLS_ACUITY_SCORE: 16
ADLS_ACUITY_SCORE: 12
ADLS_ACUITY_SCORE: 16
ADLS_ACUITY_SCORE: 17
ADLS_ACUITY_SCORE: 17
ADLS_ACUITY_SCORE: 16
ADLS_ACUITY_SCORE: 16
ADLS_ACUITY_SCORE: 19
ADLS_ACUITY_SCORE: 14
ADLS_ACUITY_SCORE: 17
ADLS_ACUITY_SCORE: 15
ADLS_ACUITY_SCORE: 17
ADLS_ACUITY_SCORE: 15
WHICH_OF_THE_ABOVE_FUNCTIONAL_RISKS_HAD_A_RECENT_ONSET_OR_CHANGE?: AMBULATION;TRANSFERRING;TOILETING;BATHING;DRESSING
ADLS_ACUITY_SCORE: 15
ADLS_ACUITY_SCORE: 19
ADLS_ACUITY_SCORE: 15
ADLS_ACUITY_SCORE: 15
ADLS_ACUITY_SCORE: 16
ADLS_ACUITY_SCORE: 15
ADLS_ACUITY_SCORE: 15
ADLS_ACUITY_SCORE: 19
ADLS_ACUITY_SCORE: 16
ADLS_ACUITY_SCORE: 10
ADLS_ACUITY_SCORE: 15
ADLS_ACUITY_SCORE: 19
ADLS_ACUITY_SCORE: 15
ADLS_ACUITY_SCORE: 16
ADLS_ACUITY_SCORE: 18
ADLS_ACUITY_SCORE: 15
ADLS_ACUITY_SCORE: 18
ADLS_ACUITY_SCORE: 16
ADLS_ACUITY_SCORE: 44
ADLS_ACUITY_SCORE: 17
ADLS_ACUITY_SCORE: 16
ADLS_ACUITY_SCORE: 17
ADLS_ACUITY_SCORE: 17
ADLS_ACUITY_SCORE: 21
ADLS_ACUITY_SCORE: 18
ADLS_ACUITY_SCORE: 16
ADLS_ACUITY_SCORE: 15
ADLS_ACUITY_SCORE: 15
ADLS_ACUITY_SCORE: 17
ADLS_ACUITY_SCORE: 45
ADLS_ACUITY_SCORE: 15
ADLS_ACUITY_SCORE: 15
ADLS_ACUITY_SCORE: 21
ADLS_ACUITY_SCORE: 14
DRESSING/BATHING_DIFFICULTY: YES
ADLS_ACUITY_SCORE: 15
ADLS_ACUITY_SCORE: 19
ADLS_ACUITY_SCORE: 13
ADLS_ACUITY_SCORE: 18
ADLS_ACUITY_SCORE: 16
ADLS_ACUITY_SCORE: 17
ADLS_ACUITY_SCORE: 12
ADLS_ACUITY_SCORE: 10
ADLS_ACUITY_SCORE: 13
ADLS_ACUITY_SCORE: 44
ADLS_ACUITY_SCORE: 19
ADLS_ACUITY_SCORE: 19
ADLS_ACUITY_SCORE: 17
ADLS_ACUITY_SCORE: 15
ADLS_ACUITY_SCORE: 19
ADLS_ACUITY_SCORE: 12
ADLS_ACUITY_SCORE: 19
ADLS_ACUITY_SCORE: 16
ADLS_ACUITY_SCORE: 19
ADLS_ACUITY_SCORE: 15
ADLS_ACUITY_SCORE: 18
ADLS_ACUITY_SCORE: 19
ADLS_ACUITY_SCORE: 17
ADLS_ACUITY_SCORE: 17
ADLS_ACUITY_SCORE: 14
ADLS_ACUITY_SCORE: 21
ADLS_ACUITY_SCORE: 17
ADLS_ACUITY_SCORE: 17
ADLS_ACUITY_SCORE: 15
ADLS_ACUITY_SCORE: 19
ADLS_ACUITY_SCORE: 15
ADLS_ACUITY_SCORE: 14
ADLS_ACUITY_SCORE: 17
ADLS_ACUITY_SCORE: 17
ADLS_ACUITY_SCORE: 19
ADLS_ACUITY_SCORE: 44
ADLS_ACUITY_SCORE: 13
ADLS_ACUITY_SCORE: 12
ADLS_ACUITY_SCORE: 17
ADLS_ACUITY_SCORE: 10
ADLS_ACUITY_SCORE: 19
ADLS_ACUITY_SCORE: 17
ADLS_ACUITY_SCORE: 16
ADLS_ACUITY_SCORE: 18
TOILETING: 1-->ASSISTANCE (EQUIPMENT/PERSON) NEEDED
ADLS_ACUITY_SCORE: 19
ADLS_ACUITY_SCORE: 16
ADLS_ACUITY_SCORE: 14
ADLS_ACUITY_SCORE: 18
ADLS_ACUITY_SCORE: 18
ADLS_ACUITY_SCORE: 10
ADLS_ACUITY_SCORE: 19
TOILETING: 1-->ASSISTANCE (EQUIPMENT/PERSON) NEEDED (NOT DEVELOPMENTALLY APPROPRIATE)
ADLS_ACUITY_SCORE: 13
ADLS_ACUITY_SCORE: 17
ADLS_ACUITY_SCORE: 15
ADLS_ACUITY_SCORE: 14
ADLS_ACUITY_SCORE: 17
ADLS_ACUITY_SCORE: 12
ADLS_ACUITY_SCORE: 17
ADLS_ACUITY_SCORE: 12
ADLS_ACUITY_SCORE: 15
ADLS_ACUITY_SCORE: 16
ADLS_ACUITY_SCORE: 17
ADLS_ACUITY_SCORE: 15
ADLS_ACUITY_SCORE: 15
ADLS_ACUITY_SCORE: 17
ADLS_ACUITY_SCORE: 18
ADLS_ACUITY_SCORE: 12
ADLS_ACUITY_SCORE: 16
ADLS_ACUITY_SCORE: 17
BATHING: 1-->ASSISTANCE NEEDED
ADLS_ACUITY_SCORE: 15
ADLS_ACUITY_SCORE: 17
ADLS_ACUITY_SCORE: 19
ADLS_ACUITY_SCORE: 15
ADLS_ACUITY_SCORE: 12
ADLS_ACUITY_SCORE: 19
ADLS_ACUITY_SCORE: 19
ADLS_ACUITY_SCORE: 15
ADLS_ACUITY_SCORE: 15
ADLS_ACUITY_SCORE: 16
ADLS_ACUITY_SCORE: 17
ADLS_ACUITY_SCORE: 12
ADLS_ACUITY_SCORE: 11
ADLS_ACUITY_SCORE: 12
ADLS_ACUITY_SCORE: 17
ADLS_ACUITY_SCORE: 19
ADLS_ACUITY_SCORE: 10
ADLS_ACUITY_SCORE: 15
ADLS_ACUITY_SCORE: 14
ADLS_ACUITY_SCORE: 19
ADLS_ACUITY_SCORE: 19
ADLS_ACUITY_SCORE: 17
ADLS_ACUITY_SCORE: 19
ADLS_ACUITY_SCORE: 19
ADLS_ACUITY_SCORE: 17
ADLS_ACUITY_SCORE: 14
ADLS_ACUITY_SCORE: 18
ADLS_ACUITY_SCORE: 15
ADLS_ACUITY_SCORE: 18
ADLS_ACUITY_SCORE: 17
ADLS_ACUITY_SCORE: 17
ADLS_ACUITY_SCORE: 44
ADLS_ACUITY_SCORE: 15
ADLS_ACUITY_SCORE: 19
ADLS_ACUITY_SCORE: 17
ADLS_ACUITY_SCORE: 19
ADLS_ACUITY_SCORE: 44
ADLS_ACUITY_SCORE: 12
ADLS_ACUITY_SCORE: 17
ADLS_ACUITY_SCORE: 15
ADLS_ACUITY_SCORE: 15
ADLS_ACUITY_SCORE: 18
ADLS_ACUITY_SCORE: 19
ADLS_ACUITY_SCORE: 15
ADLS_ACUITY_SCORE: 16
ADLS_ACUITY_SCORE: 17
ADLS_ACUITY_SCORE: 13
ADLS_ACUITY_SCORE: 16
ADLS_ACUITY_SCORE: 15
ADLS_ACUITY_SCORE: 17
ADLS_ACUITY_SCORE: 17
ADLS_ACUITY_SCORE: 13
ADLS_ACUITY_SCORE: 17
ADLS_ACUITY_SCORE: 16
ADLS_ACUITY_SCORE: 16
ADLS_ACUITY_SCORE: 14
ADLS_ACUITY_SCORE: 19
ADLS_ACUITY_SCORE: 17
ADLS_ACUITY_SCORE: 19
ADLS_ACUITY_SCORE: 15
ADLS_ACUITY_SCORE: 45
ADLS_ACUITY_SCORE: 15
ADLS_ACUITY_SCORE: 15
ADLS_ACUITY_SCORE: 16
ADLS_ACUITY_SCORE: 17
ADLS_ACUITY_SCORE: 15
ADLS_ACUITY_SCORE: 16
ADLS_ACUITY_SCORE: 17
ADLS_ACUITY_SCORE: 16
ADLS_ACUITY_SCORE: 17
ADLS_ACUITY_SCORE: 19
ADLS_ACUITY_SCORE: 15
ADLS_ACUITY_SCORE: 19
FALL_HISTORY_WITHIN_LAST_SIX_MONTHS: NO
ADLS_ACUITY_SCORE: 16
ADLS_ACUITY_SCORE: 13
ADLS_ACUITY_SCORE: 15
ADLS_ACUITY_SCORE: 12
CONCENTRATING,_REMEMBERING_OR_MAKING_DECISIONS_DIFFICULTY: NO
ADLS_ACUITY_SCORE: 16
ADLS_ACUITY_SCORE: 16
ADLS_ACUITY_SCORE: 12
ADLS_ACUITY_SCORE: 19
ADLS_ACUITY_SCORE: 14
ADLS_ACUITY_SCORE: 13
ADLS_ACUITY_SCORE: 19
ADLS_ACUITY_SCORE: 12
ADLS_ACUITY_SCORE: 19
ADLS_ACUITY_SCORE: 13
ADLS_ACUITY_SCORE: 17
ADLS_ACUITY_SCORE: 15
ADLS_ACUITY_SCORE: 19
ADLS_ACUITY_SCORE: 17
ADLS_ACUITY_SCORE: 17
ADLS_ACUITY_SCORE: 44
ADLS_ACUITY_SCORE: 18
ADLS_ACUITY_SCORE: 15
ADLS_ACUITY_SCORE: 16
ADLS_ACUITY_SCORE: 15
ADLS_ACUITY_SCORE: 19
ADLS_ACUITY_SCORE: 19
ADLS_ACUITY_SCORE: 45
ADLS_ACUITY_SCORE: 16
ADLS_ACUITY_SCORE: 19
ADLS_ACUITY_SCORE: 18
ADLS_ACUITY_SCORE: 19
ADLS_ACUITY_SCORE: 16
ADLS_ACUITY_SCORE: 17
ADLS_ACUITY_SCORE: 19
ADLS_ACUITY_SCORE: 15
ADLS_ACUITY_SCORE: 15
ADLS_ACUITY_SCORE: 18
ADLS_ACUITY_SCORE: 17
ADLS_ACUITY_SCORE: 12
ADLS_ACUITY_SCORE: 15
ADLS_ACUITY_SCORE: 16
ADLS_ACUITY_SCORE: 15
ADLS_ACUITY_SCORE: 23
ADLS_ACUITY_SCORE: 15
ADLS_ACUITY_SCORE: 17
ADLS_ACUITY_SCORE: 13
ADLS_ACUITY_SCORE: 15
ADLS_ACUITY_SCORE: 19
ADLS_ACUITY_SCORE: 16
ADLS_ACUITY_SCORE: 44
ADLS_ACUITY_SCORE: 15
ADLS_ACUITY_SCORE: 15
ADLS_ACUITY_SCORE: 19
ADLS_ACUITY_SCORE: 16
ADLS_ACUITY_SCORE: 19
ADLS_ACUITY_SCORE: 16
ADLS_ACUITY_SCORE: 14
ADLS_ACUITY_SCORE: 17
ADLS_ACUITY_SCORE: 13
ADLS_ACUITY_SCORE: 16
ADLS_ACUITY_SCORE: 12
ADLS_ACUITY_SCORE: 15
ADLS_ACUITY_SCORE: 17
ADLS_ACUITY_SCORE: 16
ADLS_ACUITY_SCORE: 12
ADLS_ACUITY_SCORE: 12
ADLS_ACUITY_SCORE: 13
ADLS_ACUITY_SCORE: 15
ADLS_ACUITY_SCORE: 44
ADLS_ACUITY_SCORE: 13
ADLS_ACUITY_SCORE: 17
ADLS_ACUITY_SCORE: 14
ADLS_ACUITY_SCORE: 19
ADLS_ACUITY_SCORE: 16
ADLS_ACUITY_SCORE: 15
ADLS_ACUITY_SCORE: 44
ADLS_ACUITY_SCORE: 16
ADLS_ACUITY_SCORE: 16
ADLS_ACUITY_SCORE: 10
ADLS_ACUITY_SCORE: 18
ADLS_ACUITY_SCORE: 18
ADLS_ACUITY_SCORE: 17
ADLS_ACUITY_SCORE: 13
ADLS_ACUITY_SCORE: 15
ADLS_ACUITY_SCORE: 15
ADLS_ACUITY_SCORE: 12
ADLS_ACUITY_SCORE: 17
ADLS_ACUITY_SCORE: 17
ADLS_ACUITY_SCORE: 12
ADLS_ACUITY_SCORE: 17
ADLS_ACUITY_SCORE: 19
ADLS_ACUITY_SCORE: 15
ADLS_ACUITY_SCORE: 19
ADLS_ACUITY_SCORE: 15
ADLS_ACUITY_SCORE: 15
ADLS_ACUITY_SCORE: 44
ADLS_ACUITY_SCORE: 17
ADLS_ACUITY_SCORE: 18
ADLS_ACUITY_SCORE: 18
ADLS_ACUITY_SCORE: 12
ADLS_ACUITY_SCORE: 44
ADLS_ACUITY_SCORE: 15
ADLS_ACUITY_SCORE: 17
ADLS_ACUITY_SCORE: 15
ADLS_ACUITY_SCORE: 15
ADLS_ACUITY_SCORE: 14
ADLS_ACUITY_SCORE: 15
ADLS_ACUITY_SCORE: 44
ADLS_ACUITY_SCORE: 15
ADLS_ACUITY_SCORE: 17
ADLS_ACUITY_SCORE: 18
ADLS_ACUITY_SCORE: 12
ADLS_ACUITY_SCORE: 16
ADLS_ACUITY_SCORE: 18
ADLS_ACUITY_SCORE: 16
ADLS_ACUITY_SCORE: 17
ADLS_ACUITY_SCORE: 13
ADLS_ACUITY_SCORE: 18
ADLS_ACUITY_SCORE: 17
ADLS_ACUITY_SCORE: 15
ADLS_ACUITY_SCORE: 15
ADLS_ACUITY_SCORE: 13
ADLS_ACUITY_SCORE: 19

## 2022-01-01 ASSESSMENT — ENCOUNTER SYMPTOMS
NUMBNESS: 0
FREQUENCY: 0
VOMITING: 0
WEAKNESS: 0
FEVER: 0
BACK PAIN: 1
NAUSEA: 0
SORE THROAT: 0
WHEEZING: 0
CHILLS: 0
ABDOMINAL PAIN: 0
DYSURIA: 0
SHORTNESS OF BREATH: 0
COUGH: 0
WEAKNESS: 1
HEADACHES: 0
PALPITATIONS: 0

## 2022-01-01 ASSESSMENT — LIFESTYLE VARIABLES
AUDIT-C TOTAL SCORE: 4
HOW OFTEN DO YOU HAVE A DRINK CONTAINING ALCOHOL: 4 OR MORE TIMES A WEEK
HOW OFTEN DO YOU HAVE SIX OR MORE DRINKS ON ONE OCCASION: NEVER
HOW MANY STANDARD DRINKS CONTAINING ALCOHOL DO YOU HAVE ON A TYPICAL DAY: 1 OR 2
SKIP TO QUESTIONS 9-10: 1

## 2022-01-01 ASSESSMENT — SOCIAL DETERMINANTS OF HEALTH (SDOH)
HOW HARD IS IT FOR YOU TO PAY FOR THE VERY BASICS LIKE FOOD, HOUSING, MEDICAL CARE, AND HEATING?: NOT VERY HARD
IN A TYPICAL WEEK, HOW MANY TIMES DO YOU TALK ON THE PHONE WITH FAMILY, FRIENDS, OR NEIGHBORS?: MORE THAN THREE TIMES A WEEK
HOW OFTEN DO YOU ATTEND CHURCH OR RELIGIOUS SERVICES?: MORE THAN 4 TIMES PER YEAR
HOW OFTEN DO YOU ATTENT MEETINGS OF THE CLUB OR ORGANIZATION YOU BELONG TO?: NEVER
HOW OFTEN DO YOU GET TOGETHER WITH FRIENDS OR RELATIVES?: MORE THAN THREE TIMES A WEEK
DO YOU BELONG TO ANY CLUBS OR ORGANIZATIONS SUCH AS CHURCH GROUPS UNIONS, FRATERNAL OR ATHLETIC GROUPS, OR SCHOOL GROUPS?: NO
WITHIN THE LAST YEAR, HAVE YOU BEEN AFRAID OF YOUR PARTNER OR EX-PARTNER?: NO

## 2022-01-05 PROBLEM — I50.33 ACUTE ON CHRONIC DIASTOLIC CONGESTIVE HEART FAILURE (H): Status: ACTIVE | Noted: 2021-06-15

## 2022-01-05 PROBLEM — C18.7 MALIGNANT NEOPLASM OF SIGMOID COLON (H): Status: ACTIVE | Noted: 2019-04-16

## 2022-01-05 PROBLEM — I87.312 CHRONIC VENOUS HYPERTENSION (IDIOPATHIC) WITH ULCER OF LEFT LOWER EXTREMITY (H): Status: ACTIVE | Noted: 2021-01-26

## 2022-01-05 PROBLEM — I87.8 VENOUS STASIS: Status: ACTIVE | Noted: 2019-04-16

## 2022-01-05 PROBLEM — Z98.84 STATUS POST BARIATRIC SURGERY: Status: ACTIVE | Noted: 2021-05-17

## 2022-01-05 PROBLEM — I48.91 ATRIAL FIBRILLATION, UNSPECIFIED TYPE (H): Status: ACTIVE | Noted: 2021-11-15

## 2022-01-05 PROBLEM — I34.0 NON-RHEUMATIC MITRAL REGURGITATION: Status: ACTIVE | Noted: 2017-01-04

## 2022-01-05 PROBLEM — E66.01 MORBID OBESITY (H): Status: ACTIVE | Noted: 2021-05-17

## 2022-01-05 PROBLEM — I50.33 ACUTE ON CHRONIC DIASTOLIC CONGESTIVE HEART FAILURE (H): Status: RESOLVED | Noted: 2021-06-15 | Resolved: 2022-01-05

## 2022-01-05 PROBLEM — L97.929 CHRONIC VENOUS HYPERTENSION (IDIOPATHIC) WITH ULCER OF LEFT LOWER EXTREMITY (H): Status: ACTIVE | Noted: 2021-01-26

## 2022-01-05 PROBLEM — N39.41 URGE INCONTINENCE OF URINE: Status: ACTIVE | Noted: 2019-11-20

## 2022-01-10 ENCOUNTER — LAB (OUTPATIENT)
Dept: FAMILY MEDICINE | Facility: CLINIC | Age: 67
End: 2022-01-10
Attending: FAMILY MEDICINE
Payer: COMMERCIAL

## 2022-01-10 DIAGNOSIS — J06.9 UPPER RESPIRATORY TRACT INFECTION, UNSPECIFIED TYPE: ICD-10-CM

## 2022-01-10 PROCEDURE — 99207 PR NO CHARGE LOS: CPT

## 2022-01-11 ENCOUNTER — OFFICE VISIT (OUTPATIENT)
Dept: FAMILY MEDICINE | Facility: CLINIC | Age: 67
End: 2022-01-11
Payer: COMMERCIAL

## 2022-01-11 VITALS
SYSTOLIC BLOOD PRESSURE: 106 MMHG | HEART RATE: 71 BPM | OXYGEN SATURATION: 98 % | RESPIRATION RATE: 16 BRPM | DIASTOLIC BLOOD PRESSURE: 71 MMHG

## 2022-01-11 DIAGNOSIS — I10 ESSENTIAL HYPERTENSION: ICD-10-CM

## 2022-01-11 DIAGNOSIS — Z00.00 HEALTHCARE MAINTENANCE: ICD-10-CM

## 2022-01-11 DIAGNOSIS — I83.029 VENOUS STASIS ULCERS OF BOTH LOWER EXTREMITIES (H): ICD-10-CM

## 2022-01-11 DIAGNOSIS — L97.929 VENOUS STASIS ULCERS OF BOTH LOWER EXTREMITIES (H): ICD-10-CM

## 2022-01-11 DIAGNOSIS — L97.919 VENOUS STASIS ULCERS OF BOTH LOWER EXTREMITIES (H): ICD-10-CM

## 2022-01-11 DIAGNOSIS — E66.01 MORBID OBESITY (H): ICD-10-CM

## 2022-01-11 DIAGNOSIS — I48.91 ATRIAL FIBRILLATION, UNSPECIFIED TYPE (H): ICD-10-CM

## 2022-01-11 DIAGNOSIS — I83.019 VENOUS STASIS ULCERS OF BOTH LOWER EXTREMITIES (H): ICD-10-CM

## 2022-01-11 DIAGNOSIS — M17.10 ARTHRITIS OF KNEE: Primary | ICD-10-CM

## 2022-01-11 DIAGNOSIS — R73.9 HYPERGLYCEMIA: ICD-10-CM

## 2022-01-11 DIAGNOSIS — N18.31 STAGE 3A CHRONIC KIDNEY DISEASE (H): ICD-10-CM

## 2022-01-11 LAB
ALT SERPL W P-5'-P-CCNC: <9 U/L (ref 0–45)
ANION GAP SERPL CALCULATED.3IONS-SCNC: 15 MMOL/L (ref 5–18)
BUN SERPL-MCNC: 37 MG/DL (ref 8–22)
CALCIUM SERPL-MCNC: 9 MG/DL (ref 8.5–10.5)
CHLORIDE BLD-SCNC: 103 MMOL/L (ref 98–107)
CHOLEST SERPL-MCNC: 138 MG/DL
CO2 SERPL-SCNC: 21 MMOL/L (ref 22–31)
CREAT SERPL-MCNC: 2.9 MG/DL (ref 0.7–1.3)
CREAT UR-MCNC: 70 MG/DL
FASTING STATUS PATIENT QL REPORTED: NO
GFR SERPL CREATININE-BSD FRML MDRD: 23 ML/MIN/1.73M2
GLUCOSE BLD-MCNC: 78 MG/DL (ref 70–125)
HBA1C MFR BLD: 6 % (ref 0–5.6)
HDLC SERPL-MCNC: 36 MG/DL
LDLC SERPL CALC-MCNC: 80 MG/DL
MICROALBUMIN UR-MCNC: 5.73 MG/DL (ref 0–1.99)
MICROALBUMIN/CREAT UR: 81.9 MG/G CR
POTASSIUM BLD-SCNC: 4.7 MMOL/L (ref 3.5–5)
SODIUM SERPL-SCNC: 139 MMOL/L (ref 136–145)
TRIGL SERPL-MCNC: 110 MG/DL

## 2022-01-11 PROCEDURE — 99214 OFFICE O/P EST MOD 30 MIN: CPT | Mod: 25 | Performed by: FAMILY MEDICINE

## 2022-01-11 PROCEDURE — 0001A PR COVID VAC PFIZER DIL RECON 30 MCG/0.3 ML IM: CPT | Performed by: FAMILY MEDICINE

## 2022-01-11 PROCEDURE — 90662 IIV NO PRSV INCREASED AG IM: CPT | Performed by: FAMILY MEDICINE

## 2022-01-11 PROCEDURE — 20610 DRAIN/INJ JOINT/BURSA W/O US: CPT | Performed by: FAMILY MEDICINE

## 2022-01-11 PROCEDURE — 91300 PR COVID VAC PFIZER DIL RECON 30 MCG/0.3 ML IM: CPT | Performed by: FAMILY MEDICINE

## 2022-01-11 PROCEDURE — 84460 ALANINE AMINO (ALT) (SGPT): CPT | Performed by: FAMILY MEDICINE

## 2022-01-11 PROCEDURE — 36415 COLL VENOUS BLD VENIPUNCTURE: CPT | Performed by: FAMILY MEDICINE

## 2022-01-11 PROCEDURE — 80048 BASIC METABOLIC PNL TOTAL CA: CPT | Performed by: FAMILY MEDICINE

## 2022-01-11 PROCEDURE — 83036 HEMOGLOBIN GLYCOSYLATED A1C: CPT | Performed by: FAMILY MEDICINE

## 2022-01-11 PROCEDURE — G0008 ADMIN INFLUENZA VIRUS VAC: HCPCS | Mod: 59 | Performed by: FAMILY MEDICINE

## 2022-01-11 PROCEDURE — 82043 UR ALBUMIN QUANTITATIVE: CPT | Performed by: FAMILY MEDICINE

## 2022-01-11 PROCEDURE — 80061 LIPID PANEL: CPT | Performed by: FAMILY MEDICINE

## 2022-01-11 RX ORDER — METHYLPREDNISOLONE ACETATE 80 MG/ML
80 INJECTION, SUSPENSION INTRA-ARTICULAR; INTRALESIONAL; INTRAMUSCULAR; SOFT TISSUE ONCE
Status: COMPLETED | OUTPATIENT
Start: 2022-01-11 | End: 2022-01-11

## 2022-01-11 RX ADMIN — METHYLPREDNISOLONE ACETATE 80 MG: 80 INJECTION, SUSPENSION INTRA-ARTICULAR; INTRALESIONAL; INTRAMUSCULAR; SOFT TISSUE at 16:09

## 2022-01-11 NOTE — LETTER
January 12, 2022      Panda Miranda  [address]  [facility] [address]        Dear ,    We are writing to inform you of your test results.    Matt, your kidney test is slightly worse so I want to make sure that you have an appointment to see your kidney doctor as you suggested you did.  Otherwise things are stable.  Slight uptick on your average blood sugar test, A1c, still in the prediabetes rather than full diabetes range.    Resulted Orders   Basic metabolic panel   Result Value Ref Range    Sodium 139 136 - 145 mmol/L    Potassium 4.7 3.5 - 5.0 mmol/L    Chloride 103 98 - 107 mmol/L    Carbon Dioxide (CO2) 21 (L) 22 - 31 mmol/L    Anion Gap 15 5 - 18 mmol/L    Urea Nitrogen 37 (H) 8 - 22 mg/dL    Creatinine 2.90 (H) 0.70 - 1.30 mg/dL    Calcium 9.0 8.5 - 10.5 mg/dL    Glucose 78 70 - 125 mg/dL    GFR Estimate 23 (L) >60 mL/min/1.73m2      Comment:      Effective December 21, 2021 eGFRcr in adults is calculated using the 2021 CKD-EPI creatinine equation which includes age and gender (Farzana et al., NEJ, DOI: 10.1056/YJNWhh5428388)   ALT   Result Value Ref Range    ALT <9 0 - 45 U/L   Lipid Profile   Result Value Ref Range    Cholesterol 138 <=199 mg/dL    Triglycerides 110 <=149 mg/dL    Direct Measure HDL 36 (L) >=40 mg/dL      Comment:      HDL Cholesterol Reference Range:     0-2 years:   No reference ranges established for patients under 2 years old  at [facility] for lipid analytes.    2-8 years:  Greater than 45 mg/dL     18 years and older:   Female: Greater than or equal to 50 mg/dL   Male:   Greater than or equal to 40 mg/dL    LDL Cholesterol Calculated 80 <=129 mg/dL    Patient Fasting > 8hrs? No    Hemoglobin A1c   Result Value Ref Range    Hemoglobin A1C 6.0 (H) 0.0 - 5.6 %      Comment:      Normal <5.7%   Prediabetes 5.7-6.4%    Diabetes 6.5% or higher     Note: Adopted from ADA consensus guidelines.   Albumin Random Urine Quantitative with Creat Ratio   Result  Value Ref Range    Microalbumin Urine mg/dL 5.73 (H) 0.00 - 1.99 mg/dL    Creatinine Urine mg/dL 70 mg/dL    Microalbumin Urine mg/g Cr 81.9 (H) <=19.9 mg/g Cr    Narrative    Microalbumin, Random Urine   <2.0 mg/dL . . . . . . . . Normal   3.0-30.0 mg/dL . . . . . . Microalbuminuria   >30.0 mg/dL . . . . . .  . Clinical Proteinuria     Microalbumin/Creatinine Ratio, Random Urine   <20 mg/g . . . . .. . . . Normal    mg/g . . . . . . . Microalbuminuria   >300 mg/g . . . . . . . . Clinical Proteinuria       If you have any questions or concerns, please call the clinic at the number listed above.       Sincerely,      Ben Hamlin MD

## 2022-01-12 NOTE — PROGRESS NOTES
Assessment/ Plan  Arthritis of knee  Right knee, increased pain  History of complex arthritis, post trauma    Knee Injection Procedure Note    Pre-operative Diagnosis: right,  Knee osteoarthritis    Post-operative Diagnosis: same    Indications: pain      Procedure Details   Verbal consent was obtained for procedure.  Area was prepped with alcohol.  Landmarks were palpated and 27-gauge 1-1/4 needle advanced from medial approach under the patella into the joint space.  Medications then injected in typical fashion.  Medications injected:  Depo-medrol 80/ 1 cc  Lidocaine 2 cc    Complications:  No-complications but needed to inject 3 times as there was obstruction of needle/syringe.; patient tolerated the procedure well.    Healthcare maintenance  COVID #1 and flu shot given    Venous stasis ulcers of both lower extremities (H)  Patient indicates that he has arranged follow-up with wound care clinic, recently missed appointment.    Hyperglycemia  Check A1c    Morbid obesity (H)  Recommend efforts for diet and exercise.  Remote history of gastric bypass, more recently followed up with bariatrics    Essential hypertension  Well-controlled    Stage 3 chronic kidney disease (H)  Indicates he has an appointment at the end of this month for follow-up with nephrology, check labs    Atrial fibrillation, unspecified type (H)  Rate control, anticoagulation.     Subjective  CC:  chief complaint  HPI:  66-year-old, chief complaint today is severe right knee pain.  Pain mostly in the medial, posterior aspects.  No increase in swelling, always has some chronic swelling.  History of football injury in high school.  Also has been diagnosed with gout/pseudogout.    Getting over upper respiratory infection.  2 weeks, now improving.  Runny nose, slight cough.  Everything getting better.  No fever, no chills at any time.  PFSH:  Patient Active Problem List   Diagnosis     Morbid obesity (H)     Arthritis of knee     Calcium pyrophosphate  deposition disease     Chronic venous hypertension (idiopathic) with ulcer of left lower extremity (H)     Diastolic congestive heart failure (H)     Essential hypertension     Malignant neoplasm of sigmoid colon (H)     Non-rheumatic mitral regurgitation     Stage 3 chronic kidney disease (H)     Urge incontinence of urine     Venous stasis     Vitamin D deficiency     Ventricular septal defect     Status post bariatric surgery     Atrial fibrillation, unspecified type (H)     Healthcare maintenance     Venous stasis ulcers of both lower extremities (H)     Hyperglycemia     amLODIPine (NORVASC) 5 MG tablet, Take 1 tablet (5 mg) by mouth daily  bumetanide (BUMEX) 1 MG tablet, TAKE 3 TABLETS BY MOUTH TWICE DAILY AT 9AM AND 6PM  cholecalciferol 25 MCG (1000 UT) TABS, Take 3,000 Units by mouth  colchicine (COLCYRS) 0.6 MG tablet, TAKE 1 TABLET(0.6 MG) BY MOUTH DAILY  febuxostat (ULORIC) 80 MG TABS tablet, Take 1 tablet by mouth daily   ferrous sulfate (FEROSUL) 325 (65 Fe) MG tablet, Take 325 mg by mouth daily   furosemide (LASIX) 20 MG tablet,   guaiFENesin-codeine (ROBITUSSIN AC) 100-10 MG/5ML solution, Take 5-10 mLs by mouth every 4 hours as needed for cough  lidocaine (XYLOCAINE) 2 % external gel,   lisinopril (ZESTRIL) 10 MG tablet, TAKE 1 TABLET(10 MG) BY MOUTH DAILY  metoprolol tartrate (LOPRESSOR) 100 MG tablet, Take 1 tablet (100 mg) by mouth 2 times daily  warfarin ANTICOAGULANT (COUMADIN) 5 MG tablet, Take 5 to 7.5mg (1 to 1.5 tabs) by mouth daily OR AS DIRECTED.  Adjust dose based on INR.    lidocaine (XYLOCAINE) 2 % external gel         History   Smoking Status     Never Smoker   Smokeless Tobacco     Never Used     Social History     Social History Narrative     Not on file     Patient Care Team:  Ben Hamlin MD as PCP - General (Family Medicine)  Ben Hamlin MD as Assigned PCP  Cynthia Argueta MD as Assigned Heart and Vascular Provider  Xin Brush NP as Assigned Surgical Provider  ASHANTI  As  above      Objective  Physical Exam  Vitals:    01/11/22 1424   BP: 106/71   BP Location: Left arm   Patient Position: Sitting   Cuff Size: Adult Large   Pulse: 71   Resp: 16   SpO2: 98%     There is no height or weight on file to calculate BMI.  Gen- alert, oriented/ appropriately responsive  HEENT- normal cephalic, atraumatic.   Chest- Normal inspiration and expiration.    Clear to ascultation.    No chest wall deformity or scar.  CV- Heart regular rate and rhythm  normal tones, no murmurs   No gallops or rubs.  Ext- appear well perfused, no edema  Skin- warm and dry,   no visualized rash  Severe venous stasis right lower extremity.  Diagnostics:   Not      Please note: Voice recognition software was used in this dictation.  It may therefore contain typographical errors.

## 2022-01-12 NOTE — ASSESSMENT & PLAN NOTE
Right knee, increased pain  History of complex arthritis, post trauma    Knee Injection Procedure Note    Pre-operative Diagnosis: right,  Knee osteoarthritis    Post-operative Diagnosis: same    Indications: pain      Procedure Details   Verbal consent was obtained for procedure.  Area was prepped with alcohol.  Landmarks were palpated and 27-gauge 1-1/4 needle advanced from medial approach under the patella into the joint space.  Medications then injected in typical fashion.  Medications injected:  Depo-medrol 80/ 1 cc  Lidocaine 2 cc    Complications:  No-complications but needed to inject 3 times as there was obstruction of needle/syringe.; patient tolerated the procedure well.

## 2022-01-12 NOTE — ASSESSMENT & PLAN NOTE
Indicates he has an appointment at the end of this month for follow-up with nephrology, check labs

## 2022-01-12 NOTE — ASSESSMENT & PLAN NOTE
Recommend efforts for diet and exercise.  Remote history of gastric bypass, more recently followed up with bariatrics

## 2022-01-12 NOTE — ASSESSMENT & PLAN NOTE
Patient indicates that he has arranged follow-up with wound care clinic, recently missed appointment.

## 2022-01-14 ENCOUNTER — HOSPITAL ENCOUNTER (OUTPATIENT)
Dept: CARDIOLOGY | Facility: HOSPITAL | Age: 67
End: 2022-01-14
Attending: INTERNAL MEDICINE
Payer: COMMERCIAL

## 2022-01-14 DIAGNOSIS — R06.09 DOE (DYSPNEA ON EXERTION): ICD-10-CM

## 2022-01-14 DIAGNOSIS — I48.20 CHRONIC ATRIAL FIBRILLATION (H): ICD-10-CM

## 2022-01-14 DIAGNOSIS — I34.0 NONRHEUMATIC MITRAL VALVE REGURGITATION: ICD-10-CM

## 2022-01-14 LAB — LVEF ECHO: NORMAL

## 2022-01-14 PROCEDURE — 93306 TTE W/DOPPLER COMPLETE: CPT | Mod: 26 | Performed by: INTERNAL MEDICINE

## 2022-01-14 PROCEDURE — 93226 XTRNL ECG REC<48 HR SCAN A/R: CPT

## 2022-01-14 PROCEDURE — 93306 TTE W/DOPPLER COMPLETE: CPT

## 2022-01-17 PROCEDURE — 93227 XTRNL ECG REC<48 HR R&I: CPT | Performed by: INTERNAL MEDICINE

## 2022-01-27 ENCOUNTER — HOSPITAL ENCOUNTER (INPATIENT)
Facility: CLINIC | Age: 67
LOS: 3 days | Discharge: HOME | DRG: 871 | End: 2022-01-30
Attending: EMERGENCY MEDICINE | Admitting: EMERGENCY MEDICINE
Payer: COMMERCIAL

## 2022-01-27 DIAGNOSIS — N12 PYELITIS GLANDULARIS: ICD-10-CM

## 2022-01-27 DIAGNOSIS — I48.91 ATRIAL FIBRILLATION, UNSPECIFIED TYPE (H): ICD-10-CM

## 2022-01-27 DIAGNOSIS — N39.0 URINARY TRACT INFECTION: ICD-10-CM

## 2022-01-27 DIAGNOSIS — M62.838 SPASM OF MUSCLE: ICD-10-CM

## 2022-01-27 DIAGNOSIS — I48.0 PAROXYSMAL A-FIB (H): Primary | ICD-10-CM

## 2022-01-27 DIAGNOSIS — M54.9 BACK PAIN, UNSPECIFIED BACK LOCATION, UNSPECIFIED BACK PAIN LATERALITY, UNSPECIFIED CHRONICITY: ICD-10-CM

## 2022-01-27 DIAGNOSIS — N39.0 COMPLICATED UTI (URINARY TRACT INFECTION): ICD-10-CM

## 2022-01-27 DIAGNOSIS — N18.30 STAGE 3 CHRONIC KIDNEY DISEASE, UNSPECIFIED WHETHER STAGE 3A OR 3B CKD (H): ICD-10-CM

## 2022-01-27 DIAGNOSIS — M54.9 BACK PAIN: ICD-10-CM

## 2022-01-27 DIAGNOSIS — I48.0 PAROXYSMAL ATRIAL FIBRILLATION (H): ICD-10-CM

## 2022-01-27 DIAGNOSIS — C18.7 MALIGNANT NEOPLASM OF SIGMOID COLON (H): ICD-10-CM

## 2022-01-27 DIAGNOSIS — E87.5 HYPERKALEMIA: ICD-10-CM

## 2022-01-27 DIAGNOSIS — Z79.01 WARFARIN ANTICOAGULATION: ICD-10-CM

## 2022-01-27 DIAGNOSIS — U07.1 LAB TEST POSITIVE FOR DETECTION OF COVID-19 VIRUS: ICD-10-CM

## 2022-01-27 DIAGNOSIS — I48.91 ATRIAL FIBRILLATION (H): ICD-10-CM

## 2022-01-27 PROBLEM — D57.00 SICKLE CELL CRISIS (H): Status: ACTIVE | Noted: 2022-01-27

## 2022-01-27 LAB
ALBUMIN SERPL-MCNC: 3.2 G/DL (ref 3.4–5)
ALBUMIN UR-MCNC: NEGATIVE MG/DL
ALP SERPL-CCNC: 68 U/L (ref 40–150)
ALT SERPL W P-5'-P-CCNC: 16 U/L (ref 0–70)
ANION GAP SERPL CALCULATED.3IONS-SCNC: 6 MMOL/L (ref 3–14)
APPEARANCE UR: CLEAR
AST SERPL W P-5'-P-CCNC: 17 U/L (ref 0–45)
BACTERIA #/AREA URNS HPF: ABNORMAL /HPF
BASOPHILS # BLD AUTO: 0 10E3/UL (ref 0–0.2)
BASOPHILS NFR BLD AUTO: 0 %
BILIRUB SERPL-MCNC: 0.7 MG/DL (ref 0.2–1.3)
BILIRUB UR QL STRIP: NEGATIVE
BUN SERPL-MCNC: 61 MG/DL (ref 7–30)
CALCIUM SERPL-MCNC: 8.8 MG/DL (ref 8.5–10.1)
CHLORIDE BLD-SCNC: 112 MMOL/L (ref 94–109)
CO2 SERPL-SCNC: 22 MMOL/L (ref 20–32)
COLOR UR AUTO: ABNORMAL
CREAT SERPL-MCNC: 2.69 MG/DL (ref 0.66–1.25)
EOSINOPHIL # BLD AUTO: 0.2 10E3/UL (ref 0–0.7)
EOSINOPHIL NFR BLD AUTO: 3 %
ERYTHROCYTE [DISTWIDTH] IN BLOOD BY AUTOMATED COUNT: 22.3 % (ref 10–15)
GFR SERPL CREATININE-BSD FRML MDRD: 25 ML/MIN/1.73M2
GLUCOSE BLD-MCNC: 93 MG/DL (ref 70–99)
GLUCOSE UR STRIP-MCNC: NEGATIVE MG/DL
HCT VFR BLD AUTO: 35 % (ref 40–53)
HGB BLD-MCNC: 10.7 G/DL (ref 13.3–17.7)
HGB UR QL STRIP: NEGATIVE
HYALINE CASTS: 11 /LPF
IMM GRANULOCYTES # BLD: 0.1 10E3/UL
IMM GRANULOCYTES NFR BLD: 1 %
KETONES UR STRIP-MCNC: NEGATIVE MG/DL
LACTATE SERPL-SCNC: 1.5 MMOL/L (ref 0.7–2)
LEUKOCYTE ESTERASE UR QL STRIP: ABNORMAL
LYMPHOCYTES # BLD AUTO: 1.6 10E3/UL (ref 0.8–5.3)
LYMPHOCYTES NFR BLD AUTO: 24 %
MCH RBC QN AUTO: 26.7 PG (ref 26.5–33)
MCHC RBC AUTO-ENTMCNC: 30.6 G/DL (ref 31.5–36.5)
MCV RBC AUTO: 87 FL (ref 78–100)
MONOCYTES # BLD AUTO: 0.6 10E3/UL (ref 0–1.3)
MONOCYTES NFR BLD AUTO: 10 %
MUCOUS THREADS #/AREA URNS LPF: PRESENT /LPF
NEUTROPHILS # BLD AUTO: 4 10E3/UL (ref 1.6–8.3)
NEUTROPHILS NFR BLD AUTO: 62 %
NITRATE UR QL: POSITIVE
NRBC # BLD AUTO: 0 10E3/UL
NRBC BLD AUTO-RTO: 0 /100
PH UR STRIP: 5 [PH] (ref 5–7)
PLAT MORPH BLD: ABNORMAL
PLATELET # BLD AUTO: 184 10E3/UL (ref 150–450)
POTASSIUM BLD-SCNC: 6.1 MMOL/L (ref 3.4–5.3)
PROT SERPL-MCNC: 8.1 G/DL (ref 6.8–8.8)
RBC # BLD AUTO: 4.01 10E6/UL (ref 4.4–5.9)
RBC AGGLUT BLD QL: PRESENT
RBC MORPH BLD: ABNORMAL
RBC URINE: 0 /HPF
SARS-COV-2 RNA RESP QL NAA+PROBE: POSITIVE
SODIUM SERPL-SCNC: 140 MMOL/L (ref 133–144)
SP GR UR STRIP: 1.01 (ref 1–1.03)
SQUAMOUS EPITHELIAL: <1 /HPF
TRANSITIONAL EPI: 3 /HPF
UROBILINOGEN UR STRIP-MCNC: NORMAL MG/DL
WBC # BLD AUTO: 6.4 10E3/UL (ref 4–11)
WBC URINE: <1 /HPF

## 2022-01-27 PROCEDURE — 99285 EMERGENCY DEPT VISIT HI MDM: CPT | Performed by: EMERGENCY MEDICINE

## 2022-01-27 PROCEDURE — C9803 HOPD COVID-19 SPEC COLLECT: HCPCS | Performed by: EMERGENCY MEDICINE

## 2022-01-27 PROCEDURE — 250N000011 HC RX IP 250 OP 636: Performed by: STUDENT IN AN ORGANIZED HEALTH CARE EDUCATION/TRAINING PROGRAM

## 2022-01-27 PROCEDURE — 36415 COLL VENOUS BLD VENIPUNCTURE: CPT | Performed by: STUDENT IN AN ORGANIZED HEALTH CARE EDUCATION/TRAINING PROGRAM

## 2022-01-27 PROCEDURE — 96375 TX/PRO/DX INJ NEW DRUG ADDON: CPT | Performed by: EMERGENCY MEDICINE

## 2022-01-27 PROCEDURE — 120N000001 HC R&B MED SURG/OB

## 2022-01-27 PROCEDURE — 258N000001 HC RX 258: Performed by: STUDENT IN AN ORGANIZED HEALTH CARE EDUCATION/TRAINING PROGRAM

## 2022-01-27 PROCEDURE — U0003 INFECTIOUS AGENT DETECTION BY NUCLEIC ACID (DNA OR RNA); SEVERE ACUTE RESPIRATORY SYNDROME CORONAVIRUS 2 (SARS-COV-2) (CORONAVIRUS DISEASE [COVID-19]), AMPLIFIED PROBE TECHNIQUE, MAKING USE OF HIGH THROUGHPUT TECHNOLOGIES AS DESCRIBED BY CMS-2020-01-R: HCPCS | Performed by: STUDENT IN AN ORGANIZED HEALTH CARE EDUCATION/TRAINING PROGRAM

## 2022-01-27 PROCEDURE — 85025 COMPLETE CBC W/AUTO DIFF WBC: CPT | Performed by: STUDENT IN AN ORGANIZED HEALTH CARE EDUCATION/TRAINING PROGRAM

## 2022-01-27 PROCEDURE — 99285 EMERGENCY DEPT VISIT HI MDM: CPT | Mod: 25 | Performed by: EMERGENCY MEDICINE

## 2022-01-27 PROCEDURE — 96361 HYDRATE IV INFUSION ADD-ON: CPT | Performed by: EMERGENCY MEDICINE

## 2022-01-27 PROCEDURE — 258N000003 HC RX IP 258 OP 636: Performed by: STUDENT IN AN ORGANIZED HEALTH CARE EDUCATION/TRAINING PROGRAM

## 2022-01-27 PROCEDURE — 258N000003 HC RX IP 258 OP 636: Performed by: EMERGENCY MEDICINE

## 2022-01-27 PROCEDURE — 93005 ELECTROCARDIOGRAM TRACING: CPT | Performed by: EMERGENCY MEDICINE

## 2022-01-27 PROCEDURE — 96366 THER/PROPH/DIAG IV INF ADDON: CPT | Performed by: EMERGENCY MEDICINE

## 2022-01-27 PROCEDURE — 87086 URINE CULTURE/COLONY COUNT: CPT | Performed by: STUDENT IN AN ORGANIZED HEALTH CARE EDUCATION/TRAINING PROGRAM

## 2022-01-27 PROCEDURE — 96365 THER/PROPH/DIAG IV INF INIT: CPT | Performed by: EMERGENCY MEDICINE

## 2022-01-27 PROCEDURE — 81001 URINALYSIS AUTO W/SCOPE: CPT | Performed by: STUDENT IN AN ORGANIZED HEALTH CARE EDUCATION/TRAINING PROGRAM

## 2022-01-27 PROCEDURE — 83605 ASSAY OF LACTIC ACID: CPT | Performed by: STUDENT IN AN ORGANIZED HEALTH CARE EDUCATION/TRAINING PROGRAM

## 2022-01-27 PROCEDURE — 80053 COMPREHEN METABOLIC PANEL: CPT | Performed by: STUDENT IN AN ORGANIZED HEALTH CARE EDUCATION/TRAINING PROGRAM

## 2022-01-27 PROCEDURE — 250N000013 HC RX MED GY IP 250 OP 250 PS 637: Performed by: STUDENT IN AN ORGANIZED HEALTH CARE EDUCATION/TRAINING PROGRAM

## 2022-01-27 RX ORDER — NICOTINE POLACRILEX 4 MG
15-30 LOZENGE BUCCAL
Status: DISCONTINUED | OUTPATIENT
Start: 2022-01-27 | End: 2022-01-30 | Stop reason: HOSPADM

## 2022-01-27 RX ORDER — CYCLOBENZAPRINE HCL 5 MG
10 TABLET ORAL ONCE
Status: COMPLETED | OUTPATIENT
Start: 2022-01-27 | End: 2022-01-27

## 2022-01-27 RX ORDER — CEFTRIAXONE 1 G/1
1 INJECTION, POWDER, FOR SOLUTION INTRAMUSCULAR; INTRAVENOUS ONCE
Status: COMPLETED | OUTPATIENT
Start: 2022-01-27 | End: 2022-01-28

## 2022-01-27 RX ORDER — DEXTROSE MONOHYDRATE 25 G/50ML
25 INJECTION, SOLUTION INTRAVENOUS ONCE
Status: COMPLETED | OUTPATIENT
Start: 2022-01-27 | End: 2022-01-27

## 2022-01-27 RX ORDER — DEXTROSE MONOHYDRATE 25 G/50ML
25-50 INJECTION, SOLUTION INTRAVENOUS
Status: DISCONTINUED | OUTPATIENT
Start: 2022-01-27 | End: 2022-01-30 | Stop reason: HOSPADM

## 2022-01-27 RX ADMIN — CEFTRIAXONE SODIUM 1 G: 1 INJECTION, POWDER, FOR SOLUTION INTRAMUSCULAR; INTRAVENOUS at 23:56

## 2022-01-27 RX ADMIN — CYCLOBENZAPRINE HYDROCHLORIDE 10 MG: 5 TABLET, FILM COATED ORAL at 22:37

## 2022-01-27 RX ADMIN — SODIUM CHLORIDE, POTASSIUM CHLORIDE, SODIUM LACTATE AND CALCIUM CHLORIDE 500 ML: 600; 310; 30; 20 INJECTION, SOLUTION INTRAVENOUS at 21:10

## 2022-01-27 RX ADMIN — SODIUM CHLORIDE, POTASSIUM CHLORIDE, SODIUM LACTATE AND CALCIUM CHLORIDE 1000 ML: 600; 310; 30; 20 INJECTION, SOLUTION INTRAVENOUS at 20:54

## 2022-01-27 RX ADMIN — HUMAN INSULIN 10 UNITS: 100 INJECTION, SOLUTION SUBCUTANEOUS at 23:49

## 2022-01-27 RX ADMIN — DEXTROSE MONOHYDRATE 25 G: 25 INJECTION, SOLUTION INTRAVENOUS at 23:48

## 2022-01-27 ASSESSMENT — ACTIVITIES OF DAILY LIVING (ADL)
ADLS_ACUITY_SCORE: 6
ADLS_ACUITY_SCORE: 6

## 2022-01-28 ENCOUNTER — APPOINTMENT (OUTPATIENT)
Dept: PHYSICAL THERAPY | Facility: CLINIC | Age: 67
DRG: 871 | End: 2022-01-28
Payer: COMMERCIAL

## 2022-01-28 ENCOUNTER — APPOINTMENT (OUTPATIENT)
Dept: GENERAL RADIOLOGY | Facility: CLINIC | Age: 67
DRG: 871 | End: 2022-01-28
Attending: NURSE PRACTITIONER
Payer: COMMERCIAL

## 2022-01-28 PROBLEM — M54.9 BACK PAIN: Status: ACTIVE | Noted: 2022-01-28

## 2022-01-28 PROBLEM — E87.5 HYPERKALEMIA: Status: ACTIVE | Noted: 2022-01-28

## 2022-01-28 PROBLEM — N39.0 URINARY TRACT INFECTION: Status: ACTIVE | Noted: 2022-01-28

## 2022-01-28 LAB
ALBUMIN SERPL-MCNC: 2.8 G/DL (ref 3.4–5)
ALP SERPL-CCNC: 62 U/L (ref 40–150)
ALT SERPL W P-5'-P-CCNC: 15 U/L (ref 0–70)
ANION GAP SERPL CALCULATED.3IONS-SCNC: 3 MMOL/L (ref 3–14)
ANION GAP SERPL CALCULATED.3IONS-SCNC: 5 MMOL/L (ref 3–14)
AST SERPL W P-5'-P-CCNC: 16 U/L (ref 0–45)
BASOPHILS # BLD AUTO: 0 10E3/UL (ref 0–0.2)
BASOPHILS NFR BLD AUTO: 0 %
BILIRUB SERPL-MCNC: 0.6 MG/DL (ref 0.2–1.3)
BUN SERPL-MCNC: 54 MG/DL (ref 7–30)
BUN SERPL-MCNC: 55 MG/DL (ref 7–30)
CALCIUM SERPL-MCNC: 8.5 MG/DL (ref 8.5–10.1)
CALCIUM SERPL-MCNC: 8.8 MG/DL (ref 8.5–10.1)
CHLORIDE BLD-SCNC: 114 MMOL/L (ref 94–109)
CHLORIDE BLD-SCNC: 114 MMOL/L (ref 94–109)
CO2 SERPL-SCNC: 22 MMOL/L (ref 20–32)
CO2 SERPL-SCNC: 24 MMOL/L (ref 20–32)
CREAT SERPL-MCNC: 2.51 MG/DL (ref 0.66–1.25)
CREAT SERPL-MCNC: 2.62 MG/DL (ref 0.66–1.25)
CRP SERPL-MCNC: 19 MG/L (ref 0–8)
CRP SERPL-MCNC: 22 MG/L (ref 0–8)
EOSINOPHIL # BLD AUTO: 0.1 10E3/UL (ref 0–0.7)
EOSINOPHIL NFR BLD AUTO: 3 %
ERYTHROCYTE [DISTWIDTH] IN BLOOD BY AUTOMATED COUNT: 15.2 % (ref 10–15)
GFR SERPL CREATININE-BSD FRML MDRD: 26 ML/MIN/1.73M2
GFR SERPL CREATININE-BSD FRML MDRD: 28 ML/MIN/1.73M2
GLUCOSE BLD-MCNC: 90 MG/DL (ref 70–99)
GLUCOSE BLD-MCNC: 92 MG/DL (ref 70–99)
GLUCOSE BLDC GLUCOMTR-MCNC: 114 MG/DL (ref 70–99)
GLUCOSE BLDC GLUCOMTR-MCNC: 124 MG/DL (ref 70–99)
GLUCOSE BLDC GLUCOMTR-MCNC: 140 MG/DL (ref 70–99)
GLUCOSE BLDC GLUCOMTR-MCNC: 41 MG/DL (ref 70–99)
GLUCOSE BLDC GLUCOMTR-MCNC: 92 MG/DL (ref 70–99)
GLUCOSE BLDC GLUCOMTR-MCNC: 95 MG/DL (ref 70–99)
GLUCOSE BLDC GLUCOMTR-MCNC: 96 MG/DL (ref 70–99)
GLUCOSE BLDC GLUCOMTR-MCNC: 98 MG/DL (ref 70–99)
GLUCOSE BLDC GLUCOMTR-MCNC: 99 MG/DL (ref 70–99)
GLUCOSE BLDC GLUCOMTR-MCNC: 99 MG/DL (ref 70–99)
HCT VFR BLD AUTO: 33.3 % (ref 40–53)
HGB BLD-MCNC: 10.3 G/DL (ref 13.3–17.7)
HOLD SPECIMEN: NORMAL
HOLD SPECIMEN: NORMAL
IMM GRANULOCYTES # BLD: 0.1 10E3/UL
IMM GRANULOCYTES NFR BLD: 1 %
INR PPP: 2.16 (ref 0.85–1.15)
LYMPHOCYTES # BLD AUTO: 1.2 10E3/UL (ref 0.8–5.3)
LYMPHOCYTES NFR BLD AUTO: 25 %
MCH RBC QN AUTO: 26.8 PG (ref 26.5–33)
MCHC RBC AUTO-ENTMCNC: 30.9 G/DL (ref 31.5–36.5)
MCV RBC AUTO: 87 FL (ref 78–100)
MONOCYTES # BLD AUTO: 0.4 10E3/UL (ref 0–1.3)
MONOCYTES NFR BLD AUTO: 8 %
NEUTROPHILS # BLD AUTO: 3 10E3/UL (ref 1.6–8.3)
NEUTROPHILS NFR BLD AUTO: 63 %
NRBC # BLD AUTO: 0 10E3/UL
NRBC BLD AUTO-RTO: 0 /100
PLATELET # BLD AUTO: 178 10E3/UL (ref 150–450)
POTASSIUM BLD-SCNC: 4.7 MMOL/L (ref 3.4–5.3)
POTASSIUM BLD-SCNC: 5.1 MMOL/L (ref 3.4–5.3)
POTASSIUM BLD-SCNC: 5.2 MMOL/L (ref 3.4–5.3)
POTASSIUM BLD-SCNC: 5.4 MMOL/L (ref 3.4–5.3)
PROCALCITONIN SERPL-MCNC: 0.05 NG/ML
PROT SERPL-MCNC: 7.7 G/DL (ref 6.8–8.8)
RBC # BLD AUTO: 3.85 10E6/UL (ref 4.4–5.9)
SODIUM SERPL-SCNC: 139 MMOL/L (ref 133–144)
SODIUM SERPL-SCNC: 143 MMOL/L (ref 133–144)
WBC # BLD AUTO: 4.7 10E3/UL (ref 4–11)

## 2022-01-28 PROCEDURE — 120N000002 HC R&B MED SURG/OB UMMC

## 2022-01-28 PROCEDURE — 84132 ASSAY OF SERUM POTASSIUM: CPT | Performed by: STUDENT IN AN ORGANIZED HEALTH CARE EDUCATION/TRAINING PROGRAM

## 2022-01-28 PROCEDURE — 99220 PR INITIAL OBSERVATION CARE,LEVEL III: CPT | Performed by: EMERGENCY MEDICINE

## 2022-01-28 PROCEDURE — 250N000013 HC RX MED GY IP 250 OP 250 PS 637: Performed by: NURSE PRACTITIONER

## 2022-01-28 PROCEDURE — 36415 COLL VENOUS BLD VENIPUNCTURE: CPT | Performed by: NURSE PRACTITIONER

## 2022-01-28 PROCEDURE — 71045 X-RAY EXAM CHEST 1 VIEW: CPT

## 2022-01-28 PROCEDURE — G0378 HOSPITAL OBSERVATION PER HR: HCPCS

## 2022-01-28 PROCEDURE — 250N000011 HC RX IP 250 OP 636: Performed by: PHYSICIAN ASSISTANT

## 2022-01-28 PROCEDURE — 87040 BLOOD CULTURE FOR BACTERIA: CPT | Performed by: STUDENT IN AN ORGANIZED HEALTH CARE EDUCATION/TRAINING PROGRAM

## 2022-01-28 PROCEDURE — 85610 PROTHROMBIN TIME: CPT | Performed by: EMERGENCY MEDICINE

## 2022-01-28 PROCEDURE — 999N000127 HC STATISTIC PERIPHERAL IV START W US GUIDANCE

## 2022-01-28 PROCEDURE — 82962 GLUCOSE BLOOD TEST: CPT

## 2022-01-28 PROCEDURE — 86140 C-REACTIVE PROTEIN: CPT | Performed by: PHYSICIAN ASSISTANT

## 2022-01-28 PROCEDURE — 258N000001 HC RX 258: Performed by: STUDENT IN AN ORGANIZED HEALTH CARE EDUCATION/TRAINING PROGRAM

## 2022-01-28 PROCEDURE — 258N000001 HC RX 258: Performed by: EMERGENCY MEDICINE

## 2022-01-28 PROCEDURE — 96361 HYDRATE IV INFUSION ADD-ON: CPT | Performed by: EMERGENCY MEDICINE

## 2022-01-28 PROCEDURE — G0463 HOSPITAL OUTPT CLINIC VISIT: HCPCS

## 2022-01-28 PROCEDURE — 97530 THERAPEUTIC ACTIVITIES: CPT | Mod: GP

## 2022-01-28 PROCEDURE — 36415 COLL VENOUS BLD VENIPUNCTURE: CPT | Performed by: STUDENT IN AN ORGANIZED HEALTH CARE EDUCATION/TRAINING PROGRAM

## 2022-01-28 PROCEDURE — 36415 COLL VENOUS BLD VENIPUNCTURE: CPT | Performed by: PHYSICIAN ASSISTANT

## 2022-01-28 PROCEDURE — 85014 HEMATOCRIT: CPT | Performed by: PHYSICIAN ASSISTANT

## 2022-01-28 PROCEDURE — 36415 COLL VENOUS BLD VENIPUNCTURE: CPT | Performed by: EMERGENCY MEDICINE

## 2022-01-28 PROCEDURE — 84132 ASSAY OF SERUM POTASSIUM: CPT | Performed by: NURSE PRACTITIONER

## 2022-01-28 PROCEDURE — 250N000013 HC RX MED GY IP 250 OP 250 PS 637: Performed by: PHYSICIAN ASSISTANT

## 2022-01-28 PROCEDURE — 71045 X-RAY EXAM CHEST 1 VIEW: CPT | Mod: 26 | Performed by: RADIOLOGY

## 2022-01-28 PROCEDURE — 97161 PT EVAL LOW COMPLEX 20 MIN: CPT | Mod: GP

## 2022-01-28 PROCEDURE — 84132 ASSAY OF SERUM POTASSIUM: CPT | Performed by: PHYSICIAN ASSISTANT

## 2022-01-28 PROCEDURE — 258N000003 HC RX IP 258 OP 636: Performed by: EMERGENCY MEDICINE

## 2022-01-28 PROCEDURE — 84145 PROCALCITONIN (PCT): CPT | Performed by: PHYSICIAN ASSISTANT

## 2022-01-28 RX ORDER — FEBUXOSTAT 40 MG/1
80 TABLET, FILM COATED ORAL DAILY
Status: DISCONTINUED | OUTPATIENT
Start: 2022-01-28 | End: 2022-01-28

## 2022-01-28 RX ORDER — OXYCODONE HCL 5 MG/5 ML
2.5-5 SOLUTION, ORAL ORAL EVERY 4 HOURS PRN
Status: DISCONTINUED | OUTPATIENT
Start: 2022-01-28 | End: 2022-01-29

## 2022-01-28 RX ORDER — NALOXONE HYDROCHLORIDE 0.4 MG/ML
0.4 INJECTION, SOLUTION INTRAMUSCULAR; INTRAVENOUS; SUBCUTANEOUS
Status: DISCONTINUED | OUTPATIENT
Start: 2022-01-28 | End: 2022-01-30 | Stop reason: HOSPADM

## 2022-01-28 RX ORDER — CEFTRIAXONE 1 G/1
1 INJECTION, POWDER, FOR SOLUTION INTRAMUSCULAR; INTRAVENOUS EVERY 12 HOURS
Status: DISCONTINUED | OUTPATIENT
Start: 2022-01-28 | End: 2022-01-30 | Stop reason: HOSPADM

## 2022-01-28 RX ORDER — ACETAMINOPHEN 325 MG/1
975 TABLET ORAL EVERY 6 HOURS
Status: DISCONTINUED | OUTPATIENT
Start: 2022-01-28 | End: 2022-01-30 | Stop reason: HOSPADM

## 2022-01-28 RX ORDER — COLCHICINE 0.6 MG/1
0.6 TABLET ORAL DAILY
Status: DISCONTINUED | OUTPATIENT
Start: 2022-01-28 | End: 2022-01-30 | Stop reason: HOSPADM

## 2022-01-28 RX ORDER — NALOXONE HYDROCHLORIDE 0.4 MG/ML
0.2 INJECTION, SOLUTION INTRAMUSCULAR; INTRAVENOUS; SUBCUTANEOUS
Status: DISCONTINUED | OUTPATIENT
Start: 2022-01-28 | End: 2022-01-30 | Stop reason: HOSPADM

## 2022-01-28 RX ORDER — FERROUS SULFATE 325(65) MG
325 TABLET ORAL DAILY
Status: DISCONTINUED | OUTPATIENT
Start: 2022-01-28 | End: 2022-01-30 | Stop reason: HOSPADM

## 2022-01-28 RX ORDER — AMLODIPINE BESYLATE 5 MG/1
5 TABLET ORAL DAILY
Status: DISCONTINUED | OUTPATIENT
Start: 2022-01-28 | End: 2022-01-30 | Stop reason: HOSPADM

## 2022-01-28 RX ORDER — WARFARIN SODIUM 5 MG/1
5 TABLET ORAL
Status: COMPLETED | OUTPATIENT
Start: 2022-01-28 | End: 2022-01-28

## 2022-01-28 RX ORDER — ONDANSETRON 4 MG/1
4 TABLET, ORALLY DISINTEGRATING ORAL EVERY 6 HOURS PRN
Status: DISCONTINUED | OUTPATIENT
Start: 2022-01-28 | End: 2022-01-30 | Stop reason: HOSPADM

## 2022-01-28 RX ORDER — ONDANSETRON 2 MG/ML
4 INJECTION INTRAMUSCULAR; INTRAVENOUS EVERY 6 HOURS PRN
Status: DISCONTINUED | OUTPATIENT
Start: 2022-01-28 | End: 2022-01-30 | Stop reason: HOSPADM

## 2022-01-28 RX ORDER — OXYCODONE HCL 5 MG/5 ML
5-10 SOLUTION, ORAL ORAL EVERY 4 HOURS PRN
Status: DISCONTINUED | OUTPATIENT
Start: 2022-01-28 | End: 2022-01-28

## 2022-01-28 RX ORDER — CYCLOBENZAPRINE HCL 5 MG
10 TABLET ORAL 3 TIMES DAILY
Status: DISCONTINUED | OUTPATIENT
Start: 2022-01-28 | End: 2022-01-30 | Stop reason: HOSPADM

## 2022-01-28 RX ORDER — ACETAMINOPHEN 325 MG/1
975 TABLET ORAL EVERY 6 HOURS PRN
Status: DISCONTINUED | OUTPATIENT
Start: 2022-01-28 | End: 2022-01-28

## 2022-01-28 RX ORDER — LIDOCAINE 4 G/G
3 PATCH TOPICAL
Status: DISCONTINUED | OUTPATIENT
Start: 2022-01-28 | End: 2022-01-30 | Stop reason: HOSPADM

## 2022-01-28 RX ORDER — LISINOPRIL 5 MG/1
10 TABLET ORAL DAILY
Status: DISCONTINUED | OUTPATIENT
Start: 2022-01-28 | End: 2022-01-30 | Stop reason: HOSPADM

## 2022-01-28 RX ORDER — METOPROLOL TARTRATE 50 MG
100 TABLET ORAL 2 TIMES DAILY
Status: DISCONTINUED | OUTPATIENT
Start: 2022-01-28 | End: 2022-01-30 | Stop reason: HOSPADM

## 2022-01-28 RX ADMIN — CYCLOBENZAPRINE HYDROCHLORIDE 10 MG: 5 TABLET, FILM COATED ORAL at 20:01

## 2022-01-28 RX ADMIN — ACETAMINOPHEN 975 MG: 325 TABLET, FILM COATED ORAL at 11:41

## 2022-01-28 RX ADMIN — BUMETANIDE 3 MG: 2 TABLET ORAL at 11:41

## 2022-01-28 RX ADMIN — ACETAMINOPHEN 975 MG: 325 TABLET, FILM COATED ORAL at 21:53

## 2022-01-28 RX ADMIN — LIDOCAINE 3 PATCH: 246 PATCH TOPICAL at 11:42

## 2022-01-28 RX ADMIN — CYCLOBENZAPRINE HYDROCHLORIDE 10 MG: 5 TABLET, FILM COATED ORAL at 15:39

## 2022-01-28 RX ADMIN — SODIUM CHLORIDE, POTASSIUM CHLORIDE, SODIUM LACTATE AND CALCIUM CHLORIDE 1000 ML: 600; 310; 30; 20 INJECTION, SOLUTION INTRAVENOUS at 04:50

## 2022-01-28 RX ADMIN — CYCLOBENZAPRINE HYDROCHLORIDE 10 MG: 5 TABLET, FILM COATED ORAL at 08:31

## 2022-01-28 RX ADMIN — CEFTRIAXONE SODIUM 1 G: 1 INJECTION, POWDER, FOR SOLUTION INTRAMUSCULAR; INTRAVENOUS at 11:37

## 2022-01-28 RX ADMIN — DEXTROSE MONOHYDRATE 300 ML: 100 INJECTION, SOLUTION INTRAVENOUS at 00:34

## 2022-01-28 RX ADMIN — OXYCODONE HYDROCHLORIDE 5 MG: 5 SOLUTION ORAL at 12:50

## 2022-01-28 RX ADMIN — CEFTRIAXONE SODIUM 1 G: 1 INJECTION, POWDER, FOR SOLUTION INTRAMUSCULAR; INTRAVENOUS at 22:47

## 2022-01-28 RX ADMIN — ACETAMINOPHEN 975 MG: 325 TABLET, FILM COATED ORAL at 15:39

## 2022-01-28 RX ADMIN — BUMETANIDE 3 MG: 2 TABLET ORAL at 18:30

## 2022-01-28 RX ADMIN — DEXTROSE MONOHYDRATE 25 ML: 25 INJECTION, SOLUTION INTRAVENOUS at 07:33

## 2022-01-28 RX ADMIN — FERROUS SULFATE TAB 325 MG (65 MG ELEMENTAL FE) 325 MG: 325 (65 FE) TAB at 11:40

## 2022-01-28 RX ADMIN — WARFARIN SODIUM 5 MG: 5 TABLET ORAL at 17:55

## 2022-01-28 ASSESSMENT — ENCOUNTER SYMPTOMS
FATIGUE: 0
DIARRHEA: 0
LIGHT-HEADEDNESS: 0
DYSURIA: 0
CONFUSION: 0
HEMATURIA: 0
DIZZINESS: 0
SHORTNESS OF BREATH: 0
ABDOMINAL PAIN: 0
ABDOMINAL DISTENTION: 0
SORE THROAT: 0
CHEST TIGHTNESS: 0
BACK PAIN: 1
CHILLS: 0
NAUSEA: 0
SPEECH DIFFICULTY: 0
CONSTIPATION: 0
VOMITING: 0
FEVER: 0
COUGH: 1

## 2022-01-28 ASSESSMENT — ACTIVITIES OF DAILY LIVING (ADL): ADLS_ACUITY_SCORE: 6

## 2022-01-28 NOTE — PROGRESS NOTES
VSS on RA. C/o lower back pain, given oxy 5 mg x1, scheduled tylenol and flexeril and applied lidocaine patches. Up Ax1-2 with cane. Moved to hospital bed. Urinating adequately, using urinal at bedside. On tele monitoring.     Report given to 5A RN.

## 2022-01-28 NOTE — H&P
[facility]    History and Physical - ED Observation Service         Date of Admission:  1/27/2022    Assessment & Plan      Panda Miranda is a 66 year old male admitted on 1/27/2022. He has a history of HFpEF, mild-mod mitral regurgitation, permanent atrial fibrillation (on Warfarin), perimembranous VSD, HTN, CKD 3, TASHA (CPAP), gout, morbid obesity s/p VBG ring (5/21) presenting with complaints of back pain.     ##. Pyelonephritis: Dysuria, frequency, urgency. No hematuria. No fever, chills. In ED, HR 70's-80's, BP 80's-100's/60's-70's, RR 22, SaO2 % on RA, Temp 98.4  F . UA with positive nitrite, trace LE, few bacteria, 1 WBC, 11 hyaline cast. UCx sent and pending. Normal lactic acid 1.5. No leukocytosis. In ED the patient was given 1.5L LR and rocephin 1gm IV x 1.   - Encouraged to drink to thirst  - Continue Rocephin 12h  - Tylenol prn  - Follow UCx  - Add CRP, procalcitonin  - Repeat CBC in AM    ##. Muscle Spasms: Patient has had 3 days of worsening back pain that wakes him up from sleep and is aggravated by walking up stairs with left leg. Pain has not improved with home Tylenol 3 and spasm resolves spontaneously. Patient denies any preceding injury and has had similar episodes of back pain that resolved with flexeril - last in 2019. Prior to arrival, patient received 100 mcg fentanyl and 2.5 mg versed per EMS.   - Lidoderm patch  - Continue flexeril 10mg TID and change to prn when improved  - PT consult    ##. Hyperkalemia  ##. CKD 3  Baseline Cr 2.1-2.9. Currently Cr 2.69. K 6.1. In the ED the patient was given D50% 25ml, 10 units regular insulin, D10% 300ml. Repeat K 5.1  - Telemetry  - Continue diuresis  - Hold Nephrotoxins - cochicine, lisinopril  - Repeat BMP in AM    ##. Positive Covid: upper respiratory infection with cough producing bloody phlegm, shortness of breath and fever about 3 weeks ago. He was evaluated by outpatient provider for concerns  "of viral URI, but, per patient and chart review, no testing or treatment provided. Symptoms have since resolved. Covid 19 PCR positive. Receive Covid 19 Vaccine #1 2 weeks ago. Due for dose #2 on 2/2/22  - Continue PTA Warfarin for DVT prophylaxis    ##. Diarrhea: x 2-3 weeks since having URI symptoms like 2/2 Covid  - Send stool studies    --- CHRONIC ISSUES ---  ##. HFpEF  ##. Mild-Moderate Mitral Regurgitation  ##. Small, perimembranous VSD  -485lbs. Primary cardiologist at [facility]  -Continue PTA Bumex  -Strict I/O  - Daily weights  - BID BMP     ##. Permanent Atrial Fibrillation: Currently in afib, HR 70-90's. CHADSVASC 3, on chronic Coumadin  - Continue PTA Metoprolol on hold while aggressively diuresing, consider resuming tomorrow  - Pharmacy to dose Warfarin with goal INR 2-3  - Daily INR     ##. HTN: -Hold PTA Lisinopril 2/2 CKD/hyperkalemia  - Continue PTA Norvasc     ##. Chronic Venous Stasis  ##. Chronic, non healing ulcers  Chronic ulcers BLE  - WOCN consult prn     ##. Morbid Obesity: Patient historically 880 pounds, lost 400 pounds prior to bariatric surgery. S/p vertical banded gastroplasty 5/21. Working with bariatric surgery for possible VBG ring removal     ##. Gout: - Continue PTA Uloric  -Hold Colchicine 2/2 SHANTE     ##. TASHA: - Continue CPAP at night    ##. IRA: - Continue PTA Iron        # Confirmed COVID-19 infection    # Acute Hypoxic Respiratory Failure secondary to COVID-19 infection     Symptom Onset Date used to determine duration of isolation.  If unsure, enter \"unknown\"   Date of 1st Positive Test unknown   Vaccination Status Partially Vaccinated       - COVID-19 special precautions, continuous pulse-ox  - Oxygen: continue current support with none; titrate to keep SpO2 between 90-96%  - Labs: ---------------------------LABS RECOMMENDED DAILY--------------------------------------- Daily COVID labs ordered x4 days (CBC, BMP, D-dimer, CRP).  Continue to trend after 4 days as needed.   - " "Imaging: no additional imaging needed at this time  - Breathing treatments: no inhalers needed; avoid nebulizers in favor of MDIs   - IV fluids: not indicated at this time  - Antibiotics: indicated due to concern of bacterial superinfection; Ceftriaxone ordered   - COVID-Focused Medications: No COVID-specific therapies are appropriate at this time.  - DVT Prophylaxis: at high risk of thrombotic complications due to COVID-19 (DDimer = N/A ).          - Already on therapeutic anticoagulation with warfarin        - consider anticoag on discharge for 30 days & until return to normal mobility     Diet:   renal diet  DVT Prophylaxis: Warfarin  Cheema Catheter: Not present  Central Lines: None  Cardiac Monitoring: None  Code Status:   full code    Clinically Significant Risk Factors Present on Admission        # Hyperkalemia: K = 6.1 mmol/L (Ref range: 3.4 - 5.3 mmol/L) on admission, will monitor as appropriate      # Coagulation Defect: home medication list includes an anticoagulant medication        Disposition Plan   Expected Discharge:    Anticipated discharge location:  Awaiting care coordination huddle  Delays:          The patient's care was discussed with the Attending Physician, Dr. Brewer.    SALO Alegria  Hospitalist Service  [facility]  Securely message with the Vocera Web Console (learn more here)  Text page via [facility] Paging/Directory         ______________________________________________________________________    Chief Complaint   Back Spasms    History is obtained from the patient    History of Present Illness   Per ED Obs: \"Panda Miranda is a 66 year old male with history of HFpEF, Afib on Coumadin (INR goal), CKD, morbid obesity s/p gastroplasty band, HTN and gout presenting with complaint of back pain. Patient has had 3 days of worsening back pain that wakes him up from sleep and is aggravated by walking up stairs with left leg. Pain has " "not improved with home Tylenol 3 and spasm resolves spontaneously. Patient denies any preceding injury and has had similar episodes of back pain that resolved with flexeril - last in 2019. Prior to arrival, patient received 100 mcg fentanyl and 2.5 mg versed per EMS.      Of note, patient did have upper respiratory infection with cough producing bloody phlegm, shortness of breath and fever. He was evaluated by outpatient provider for concerns of viral URI, but, per patient and chart review, no testing or treatment provided. Symptoms have since resolved.\"    In the ED, HR 70's-80's, BP 80's-100's/60's-70's, RR 22, SaO2 % on RA, Temp 98.4  F . Labs show CMP with K 6.1, Cl 112, BUN 61, Cr 2.69, GFR 25, albumin 3.2 otherwise normal. CBC with H/H 10.7/35.0, RDW 22.3 otherwise unremarkable. Normal lactic acid 1.5. Covid 19 PCR positive. In the ED the patient was given flexeril 10mg po x 1. For hyperkalemia received D50% 25ml, 10 units regular insulin, D10% 300ml. He was also given 1.5L LR and rocephin 1gm IV x 1. Patient is being admitted to the Observation Unit for continued management.     Review of Systems    All other ROS negative except those mentioned in above note.      Past Medical History    I have reviewed this patient's medical history and updated it with pertinent information if needed.   Past Medical History:   Diagnosis Date     (HFpEF) heart failure with preserved ejection fraction (H)      Arthritis      Arthropathy of wrist      Atrial fibrillation (H)      Bradycardia      Cancer (H) 2011    colon cancer; hemicolectomy     CHF (congestive heart failure) (H)      Chronic kidney disease      Gout      Hand swelling      Heart valve disease     intermittent mitral regurgitation     HTN (hypertension)      Hyperlipidemia      Morbid obesity (H)      Obesity      TASHA (obstructive sleep apnea)     CPAP     Perimembranous ventricular septal defect        Past Surgical History   I have reviewed this " patient's surgical history and updated it with pertinent information if needed.  Past Surgical History:   Procedure Laterality Date     COLON SURGERY       COLONOSCOPY N/A 12/12/2019    Procedure: COLONOSCOPY;  Surgeon: Flaco Magaña MD;  Location: [facility] [place];  Service: General     GASTROPLASTY VERTICAL BANDED      Dr. Arizmendi [facility] 1996 Initial weight 800++ Lost to 440- (was 320# at lowest)     KNEE SURGERY       ZZC PART REMOVAL COLON W ANASTOMOSIS      Description: Partial Colectomy;  Recorded: 12/02/2011;  Comments: Dr Magaña       Social History   I have reviewed this patient's social history and updated it with pertinent information if needed.  Social History     Tobacco Use     Smoking status: Never Smoker     Smokeless tobacco: Never Used   Substance Use Topics     Alcohol use: Yes     Alcohol/week: 0.8 standard drinks     Comment: Alcoholic Drinks/day: occasional     Drug use: No       Family History   I have reviewed this patient's family history and updated it with pertinent information if needed.  Family History   Problem Relation Age of Onset     Diabetes Type 2  Other      Heart Failure Other      Heart Disease Mother      Hypertension Mother      Diabetes Sister        Prior to Admission Medications   Prior to Admission Medications   Prescriptions Last Dose Informant Patient Reported? Taking?   amLODIPine (NORVASC) 5 MG tablet   No No   Sig: Take 1 tablet (5 mg) by mouth daily   bumetanide (BUMEX) 1 MG tablet   No No   Sig: TAKE 3 TABLETS BY MOUTH TWICE DAILY AT 9AM AND 6PM   cholecalciferol 25 MCG (1000 UT) TABS   Yes No   Sig: Take 3,000 Units by mouth   colchicine (COLCYRS) 0.6 MG tablet  Other Yes No   Sig: TAKE 1 TABLET(0.6 MG) BY MOUTH DAILY   febuxostat (ULORIC) 80 MG TABS tablet  Other Yes No   Sig: Take 1 tablet by mouth daily    ferrous sulfate (FEROSUL) 325 (65 Fe) MG tablet  Other Yes No   Sig: Take 325 mg by mouth daily    furosemide (LASIX) 20 MG tablet   Yes No    guaiFENesin-codeine (ROBITUSSIN AC) 100-10 MG/5ML solution   No No   Sig: Take 5-10 mLs by mouth every 4 hours as needed for cough   lidocaine (XYLOCAINE) 2 % external gel   Yes No   lisinopril (ZESTRIL) 10 MG tablet  Other Yes No   Sig: TAKE 1 TABLET(10 MG) BY MOUTH DAILY   metoprolol tartrate (LOPRESSOR) 100 MG tablet   No No   Sig: Take 1 tablet (100 mg) by mouth 2 times daily   warfarin ANTICOAGULANT (COUMADIN) 5 MG tablet   No No   Sig: Take 5 to 7.5mg (1 to 1.5 tabs) by mouth daily OR AS DIRECTED.  Adjust dose based on INR.      Facility-Administered Medications Last Administration Doses Remaining   lidocaine (XYLOCAINE) 2 % external gel 12/7/2021  1:26 PM         Allergies   No Known Allergies    Physical Exam   Vital Signs: Temp: 98.4  F (36.9  C) Temp src: Oral BP: 101/72 Pulse: 83   Resp: 22 SpO2: 98 % O2 Device: None (Room air)    Weight: 0 lbs 0 oz    Constitutional: morbidly obese, awake, alert, cooperative, no apparent distress, and appears stated age  Eyes: Lids and lashes normal, pupils equal, round and reactive to light, extra ocular muscles intact, sclera clear, conjunctiva normal  ENT: Normocephalic, without obvious abnormality, atraumatic, sinuses nontender on palpation, external ears without lesions, oral pharynx with moist mucous membranes, tonsils without erythema or exudates, gums normal and good dentition.  Hematologic / Lymphatic: no cervical lymphadenopathy  Respiratory: No increased work of breathing, good air exchange, clear to auscultation bilaterally, no crackles or wheezing  Cardiovascular: Normal apical impulse, regular rate and rhythm, normal S1 and S2, no S3 or S4, and no murmur noted  GI: No scars, normal bowel sounds, soft, non-distended, non-tender, no masses palpated, no hepatosplenomegally  Skin: no bruising or bleeding  Musculoskeletal: There is no redness, warmth, or swelling of the joints.  TTP lower back. Full range of motion noted.  Motor strength is 5 out of 5 all  extremities bilaterally.  Tone is normal.   Ext: BLE edema.   Neurologic: Awake, alert, oriented to name, place and time.  Cranial nerves II-XII are grossly intact.  Motor is 5 out of 5 bilaterally.  Cerebellar finger to nose, heel to shin intact.  Sensory is intact.  Babinski down going, Romberg negative, and gait is normal.  Neuropsychiatric: General: normal, calm and normal eye contact      Data   Data reviewed today: I reviewed all medications, new labs and imaging results over the last 24 hours. I personally reviewed   Recent Labs   Lab 01/28/22  0034 01/28/22  0023 01/27/22 2156   WBC  --   --  6.4   HGB  --   --  10.7*   MCV  --   --  87   PLT  --   --  184   NA  --   --  140   POTASSIUM  --  5.1 6.1*   CHLORIDE  --   --  112*   CO2  --   --  22   BUN  --   --  61*   CR  --   --  2.69*   ANIONGAP  --   --  6   JOHN  --   --  8.8   *  --  93   ALBUMIN  --   --  3.2*   PROTTOTAL  --   --  8.1   BILITOTAL  --   --  0.7   ALKPHOS  --   --  68   ALT  --   --  16   AST  --   --  17     Most Recent 3 CBC's:Recent Labs   Lab Test 01/27/22 2156 06/18/21  0608 06/17/21  0745   WBC 6.4 6.3 5.6   HGB 10.7* 11.2* 11.8*   MCV 87 88 86    147* 143*     Most Recent 3 BMP's:Recent Labs   Lab Test 01/28/22  0034 01/28/22  0023 01/27/22 2156 01/11/22  1519 06/18/21  0608   NA  --   --  140 139 140   POTASSIUM  --  5.1 6.1* 4.7 4.0   CHLORIDE  --   --  112* 103 103   CO2  --   --  22 21* 28   BUN  --   --  61* 37* 42*   CR  --   --  2.69* 2.90* 2.35*   ANIONGAP  --   --  6 15 8   JOHN  --   --  8.8 9.0 9.2   *  --  93 78 122*     Most Recent 2 LFT's:Recent Labs   Lab Test 01/27/22 2156 01/11/22 1519 06/15/21  2156   AST 17  --  9   ALT 16 <9 14   ALKPHOS 68  --  76   BILITOTAL 0.7  --  0.8     Most Recent 3 Creatinines:Recent Labs   Lab Test 01/27/22 2156 01/11/22  1519 06/18/21  0608   CR 2.69* 2.90* 2.35*     Most Recent 3 Hemoglobins:Recent Labs   Lab Test 01/27/22 2156 06/18/21  0608 06/17/21  0745    HGB 10.7* 11.2* 11.8*     Most Recent 6 glucoses:Recent Labs   Lab Test 01/28/22  0034 01/27/22 2156 01/11/22  1519 06/18/21  0608 06/17/21 2001 06/17/21  0745   * 93 78 122* 116* 98     Most Recent Urinalysis:Recent Labs   Lab Test 01/27/22 2119 11/20/19  1501   COLOR Light Yellow Yellow   APPEARANCE Clear Clear   URINEGLC Negative Negative   URINEBILI Negative Negative   URINEKETONE Negative Negative   SG 1.010 1.010   UBLD Negative Negative   URINEPH 5.0 5.5   PROTEIN Negative Negative   UROBILINOGEN  --  0.2 E.U./dL   NITRITE Positive* Negative   LEUKEST Trace* Trace*   RBCU 0 0-2   WBCU <1 5-10*     Most Recent Anemia Panel:Recent Labs   Lab Test 01/27/22  2156 06/15/21  1428 05/17/21  1103   WBC 6.4   < > 6.3   HGB 10.7*   < > 10.5*   HCT 35.0*   < > 33.7*   MCV 87   < > 86      < > 123*   OLIVER  --   --  297   B12  --   --  348    < > = values in this interval not displayed.     6.4    \    10.7 (L)    /    184   N 62    L N/A    140    112 (H)    61 (H) /   ------------------------------------ 124 (H)   ALT 16   AST 17   AP 68   ALB 3.2 (L)   Ca 8.8  5.1    22    2.69 (H) \    % RETIC N/A    LDH N/A  Troponin N/A    BNP N/A    CK N/A  INR N/A   PTT N/A    D-dimer N/A    Fibrinogen N/A    Antithrombin N/A  Ferritin N/A  CRP N/A    IL-6 N/A  No results found for this or any previous visit (from the past 24 hour(s)).

## 2022-01-28 NOTE — ED PROVIDER NOTES
ED Provider Note  [facility]      History     Chief Complaint   Patient presents with     Spasms     HPI  Panda Miranda is a 66 year old male with history of HFpEF, Afib on Coumadin (INR goal), CKD, morbid obesity s/p gastroplasty band, HTN and gout presenting with complaint of back pain. Patient has had 3 days of worsening back pain that wakes him up from sleep and is aggravated by walking up stairs with left leg. Pain has not improved with home Tylenol 3 and spasm resolves spontaneously. Patient denies any preceding injury and has had similar episodes of back pain that resolved with flexeril - last in 2019. Prior to arrival, patient received 100 mcg fentanyl and 2.5 mg versed per EMS.     Of note, patient did have upper respiratory infection with cough producing bloody phlegm, shortness of breath and fever. He was evaluated by outpatient provider for concerns of viral URI, but, per patient and chart review, no testing or treatment provided. Symptoms have since resolved.    Past Medical History  Past Medical History:   Diagnosis Date     (HFpEF) heart failure with preserved ejection fraction (H)      Arthritis      Arthropathy of wrist      Atrial fibrillation (H)      Bradycardia      Cancer (H) 2011    colon cancer; hemicolectomy     CHF (congestive heart failure) (H)      Chronic kidney disease      Gout      Hand swelling      Heart valve disease     intermittent mitral regurgitation     HTN (hypertension)      Hyperlipidemia      Morbid obesity (H)      Obesity      TASHA (obstructive sleep apnea)     CPAP     Perimembranous ventricular septal defect      Past Surgical History:   Procedure Laterality Date     COLON SURGERY       COLONOSCOPY N/A 12/12/2019    Procedure: COLONOSCOPY;  Surgeon: Flaco Magaña MD;  Location: [facility];  Service: General     GASTROPLASTY VERTICAL BANDED      Dr. Arizmendi  of [place] 1996 Initial weight 800++ Lost to 440- (was 320# at lowest)      KNEE SURGERY       ZZC PART REMOVAL COLON W ANASTOMOSIS      Description: Partial Colectomy;  Recorded: 12/02/2011;  Comments: Dr Magaña     amLODIPine (NORVASC) 5 MG tablet  bumetanide (BUMEX) 1 MG tablet  cholecalciferol 25 MCG (1000 UT) TABS  colchicine (COLCYRS) 0.6 MG tablet  febuxostat (ULORIC) 80 MG TABS tablet  ferrous sulfate (FEROSUL) 325 (65 Fe) MG tablet  furosemide (LASIX) 20 MG tablet  guaiFENesin-codeine (ROBITUSSIN AC) 100-10 MG/5ML solution  lidocaine (XYLOCAINE) 2 % external gel  lisinopril (ZESTRIL) 10 MG tablet  metoprolol tartrate (LOPRESSOR) 100 MG tablet  warfarin ANTICOAGULANT (COUMADIN) 5 MG tablet      No Known Allergies  Family History  Family History   Problem Relation Age of Onset     Diabetes Type 2  Other      Heart Failure Other      Heart Disease Mother      Hypertension Mother      Diabetes Sister      Social History   Social History     Tobacco Use     Smoking status: Never Smoker     Smokeless tobacco: Never Used   Substance Use Topics     Alcohol use: Yes     Alcohol/week: 0.8 standard drinks     Comment: Alcoholic Drinks/day: occasional     Drug use: No      Past medical history, past surgical history, medications, allergies, family history, and social history were reviewed with the patient. No additional pertinent items.       Review of Systems   Constitutional: Negative for chills, fatigue and fever.   HENT: Negative for sore throat.    Eyes: Negative for visual disturbance.   Respiratory: Positive for cough. Negative for chest tightness and shortness of breath.    Cardiovascular: Negative for chest pain and leg swelling.   Gastrointestinal: Negative for abdominal distention, abdominal pain, constipation, diarrhea, nausea and vomiting.   Genitourinary: Negative for dysuria and hematuria.   Musculoskeletal: Positive for back pain.   Skin: Negative for rash.   Neurological: Negative for dizziness, speech difficulty and light-headedness.   Psychiatric/Behavioral: Negative  for confusion.     Physical Exam   BP: (!) 85/66 (Pt. laying flight. Pt does not appear to be symptomatic at this time. MD notified )  Pulse: 71  Temp: 98.4  F (36.9  C)  Resp: 22  SpO2: 100 % /72   Pulse 83   Temp 98.4  F (36.9  C) (Oral)   Resp 22   SpO2 98%   Physical Exam  Constitutional: cooperative, no apparent distress  HENT: anicteric sclera, conjugate gaze   Respiratory: non-labored respirations on room air, bilateral lung sounds clear anteriorly, no crackles or wheezing, no cough  Cardiovascular: RRR, no murmur  GI: soft, obese, non-tender to palpation, previous surgical scar visualized  Skin: warm and dry, no rashes or lesions  Neurologic: Cranial nerves II-XII are intact, moving all extremities equally and independently, 5/5 strength in lower extremities on plantarflexion/dorsiflexion/hip extension  MSK: Back pain with active knee flexion of left leg, no pain with passive flexion of left leg, no pain with active/passive flexion of right leg, no pain with palpation of spine, tenderness at left paraspinal muscles around lumbar spine    ED Course      Procedures        EKG pending at time of note    Results for orders placed or performed during the hospital encounter of 01/27/22   Comprehensive metabolic panel     Status: Abnormal   Result Value Ref Range    Sodium 140 133 - 144 mmol/L    Potassium 6.1 (HH) 3.4 - 5.3 mmol/L    Chloride 112 (H) 94 - 109 mmol/L    Carbon Dioxide (CO2) 22 20 - 32 mmol/L    Anion Gap 6 3 - 14 mmol/L    Urea Nitrogen 61 (H) 7 - 30 mg/dL    Creatinine 2.69 (H) 0.66 - 1.25 mg/dL    Calcium 8.8 8.5 - 10.1 mg/dL    Glucose 93 70 - 99 mg/dL    Alkaline Phosphatase 68 40 - 150 U/L    AST 17 0 - 45 U/L    ALT 16 0 - 70 U/L    Protein Total 8.1 6.8 - 8.8 g/dL    Albumin 3.2 (L) 3.4 - 5.0 g/dL    Bilirubin Total 0.7 0.2 - 1.3 mg/dL    GFR Estimate 25 (L) >60 mL/min/1.73m2   Lactic acid whole blood     Status: Normal   Result Value Ref Range    Lactic Acid 1.5 0.7 - 2.0 mmol/L    CBC with platelets and differential     Status: Abnormal   Result Value Ref Range    WBC Count 6.4 4.0 - 11.0 10e3/uL    RBC Count 4.01 (L) 4.40 - 5.90 10e6/uL    Hemoglobin 10.7 (L) 13.3 - 17.7 g/dL    Hematocrit 35.0 (L) 40.0 - 53.0 %    MCV 87 78 - 100 fL    MCH 26.7 26.5 - 33.0 pg    MCHC 30.6 (L) 31.5 - 36.5 g/dL    RDW 22.3 (H) 10.0 - 15.0 %    Platelet Count 184 150 - 450 10e3/uL    % Neutrophils 62 %    % Lymphocytes 24 %    % Monocytes 10 %    % Eosinophils 3 %    % Basophils 0 %    % Immature Granulocytes 1 %    NRBCs per 100 WBC 0 <1 /100    Absolute Neutrophils 4.0 1.6 - 8.3 10e3/uL    Absolute Lymphocytes 1.6 0.8 - 5.3 10e3/uL    Absolute Monocytes 0.6 0.0 - 1.3 10e3/uL    Absolute Eosinophils 0.2 0.0 - 0.7 10e3/uL    Absolute Basophils 0.0 0.0 - 0.2 10e3/uL    Absolute Immature Granulocytes 0.1 <=0.4 10e3/uL    Absolute NRBCs 0.0 10e3/uL   UA reflex to Microscopic     Status: Abnormal   Result Value Ref Range    Color Urine Light Yellow Colorless, Straw, Light Yellow, Yellow    Appearance Urine Clear Clear    Glucose Urine Negative Negative mg/dL    Bilirubin Urine Negative Negative    Ketones Urine Negative Negative mg/dL    Specific Gravity Urine 1.010 1.003 - 1.035    Blood Urine Negative Negative    pH Urine 5.0 5.0 - 7.0    Protein Albumin Urine Negative Negative mg/dL    Urobilinogen Urine Normal Normal, 2.0 mg/dL    Nitrite Urine Positive (A) Negative    Leukocyte Esterase Urine Trace (A) Negative    Bacteria Urine Few (A) None Seen /HPF    RBC Urine 0 <=2 /HPF    WBC Urine <1 <=5 /HPF    Squamous Epithelials Urine <1 <=1 /HPF    Mucus Urine Present (A) None Seen /LPF    Transitional Epithelials Urine 3 (H) <=1 /HPF    Hyaline Casts Urine 11 (H) <=2 /LPF   Asymptomatic COVID-19 Virus (Coronavirus) by PCR Nasopharyngeal     Status: Abnormal    Specimen: Nasopharyngeal; Swab   Result Value Ref Range    SARS CoV2 PCR Positive (A) Negative, Testing sent to reference lab. Results will be returned  via unsolicited result    Narrative    Testing was performed using the Xpert Xpress SARS-CoV-2 Assay on the  Cepheid Gene-Xpert Instrument Systems. Additional information about  this Emergency Use Authorization (EUA) assay can be found via the Lab  Guide. This test should be ordered for the detection of SARS-CoV-2 in  individuals who meet SARS-CoV-2 clinical and/or epidemiological  criteria. Test performance is unknown in asymptomatic patients. This  test is for in vitro diagnostic use under the FDA EUA for  laboratories certified under CLIA to perform high complexity testing.  This test has not been FDA cleared or approved. A negative result  does not rule out the presence of PCR inhibitors in the specimen or  target RNA in concentration below the limit of detection for the  assay. The possibility of a false negative should be considered if  the patient's recent exposure or clinical presentation suggests  COVID-19. This test was validated by the [facility]. This laboratory is certified under  the Clinical Laboratory Improvement Amendments of 1988 (CLIA-88) as  qualified to perform high complexity laboratory testing.     RBC and Platelet Morphology     Status: Abnormal   Result Value Ref Range    Platelet Assessment  Automated Count Confirmed. Platelet morphology is normal.     Automated Count Confirmed. Platelet morphology is normal.    RBC agglutination Present (A) None Seen    RBC Morphology Confirmed RBC Indices    CBC with platelets differential     Status: Abnormal    Narrative    The following orders were created for panel order CBC with platelets differential.  Procedure                               Abnormality         Status                     ---------                               -----------         ------                     CBC with platelets and d...[[id number]]  Abnormal            Final result               RBC and Platelet Morphology[[id number]]   Abnormal            Final result                 Please view results for these tests on the individual orders.     Medications   glucose gel 15-30 g (has no administration in time range)     Or   dextrose 50 % injection 25-50 mL (has no administration in time range)     Or   glucagon injection 1 mg (has no administration in time range)   dextrose 10% BOLUS (has no administration in time range)   dextrose 50 % injection 25 g (has no administration in time range)   insulin regular 1 unit/mL injection 10 Units (has no administration in time range)   cefTRIAXone (ROCEPHIN) 1 g vial to attach to  mL bag for ADULTS or NS 50 mL bag for PEDS (has no administration in time range)   cyclobenzaprine (FLEXERIL) tablet 10 mg (10 mg Oral Given 1/27/22 2237)   lactated ringers BOLUS 500 mL (0 mLs Intravenous Stopped 1/27/22 2303)        Assessments & Plan (with Medical Decision Making)   IMPRESSION: 66 year old male with history of HFpEF, Afib on Coumadin (INR goal), CKD, morbid obesity s/p gastroplasty band, HTN and gout presenting with complaint of back pain.     Clinically, patient appears non-toxic and vitally stable with exam notable for left lumbar paraspinal muscle pain.    Ddx for back pain includes pyelonephritis vs cystitis in setting of pre-renal or intra-renal SHANTE on CKD. Given history and clinical presentation, back pain is likely MSK etiology considering location and previous history. Low suspicion for fractures at this time.     PLAN:   - Risks/benefits of pursuing imaging reviewed and accepted.   - LR hydration  - Urinalysis with culture pending  - COVID and Influenza swab  - Flexeril for back pain    RESULTS:  - Labs: CMP notable for hyperkalemia to 6.1 and Cr around previous baseline, CBC without leukocytosis but chronic anemia noted  - Urine: UA with leuk esterase and bacteria, culture pending    INTERVENTIONS:   - LR hydration  - IV Ceftriaxone  - Urinalysis with culture pending  - Flexeril for back  pain    RE-EVALUATION:  - Patient pain and vitals have improved during ED stay    DISCUSSIONS:  - w/ Observation: Pending conversation for possible obs admission  - w/ Patient: I have reviewed the available findings, plan, need for close follow up, strict return/safety instructions with the patient.     DISPOSITION/PLANNING:  - IMPRESSION: UTI with MSK back pain  - DISPOSITION: Observation/Inpatient admission    I have reviewed the nursing notes. I have reviewed the findings, diagnosis, plan and need for follow up with the patient.    Final diagnoses:   Back pain   Urinary tract infection   Hyperkalemia       Patient care was discussed with attending physician, Ulises Parham MD.    Jose Contreras MD PGY-1  [facility] EMERGENCY DEPARTMENT  1/27/2022     Jose Contreras MD  Resident  01/28/22 0005    --    ED Attending Physician Attestation    I Ulises Parham DO, cared for this patient with the Resident. I have performed a history and physical examination of the patient and discussed management with the resident. I reviewed the resident's documentation above and agree with the documented findings and plan of care.    Summary of HPI, PE, ED Course   Patient is a 66 year old male evaluated in the emergency department for back pain/spasms. Exam notable for L paraspinal tenderness, o/w well appearing. ED course notable for improvement w/ IV pain meds and flexeril. After the completion of care in the emergency department, the patient was admitted to observation.    Critical Care & Procedures  Not applicable.    Medical Decision Making  The medical record was reviewed and interpreted.  Current labs reviewed and interpreted.     Patient presented for low back pain/spasms.  Pain resolved with IV pain meds/muscle relaxers.  On exam has some left sided paraspinal tenderness.  No midline tenderness.  No red flags to suggest spinal cord pathology.  Patient overall appears well and nontoxic.  Incidentally, patient found  to have a nitrite positive urinary tract infection.  He does admit to some urinary frequency.  Started on IV ceftriaxone.  Is also Covid positive.  No chest pain or shortness of breath.  No hypoxia on room air.  Additionally, patient has potassium of 6.1.  Was shifted in the ED.  EKG without significant changes.  We will continue on cardiac monitoring.  Plan for observation admission overnight.      Ulises Parham,   Emergency Medicine        Ulises Parham DO  01/28/22 0105

## 2022-01-28 NOTE — PLAN OF CARE
[facility]      OUTPATIENT PHYSICAL THERAPY EVALUATION  PLAN OF TREATMENT FOR OUTPATIENT REHABILITATION  (COMPLETE FOR INITIAL CLAIMS ONLY)  Patient's Last Name, First Name, M.I.  YOB: 1955  Panda Miranda                        Provider's Name  [facility] Medical Record No.  [id number]                               Onset Date:  01/27/22   Start of Care Date:  01/27/22      Type:     _X_PT   ___OT   ___SLP Medical Diagnosis:  pyelonephritis                        PT Diagnosis:  impaired functional mobility   Visits from SOC:  1   _________________________________________________________________________________  Plan of Treatment/Functional Goals    Planned Interventions: balance training,bed mobility training,gait training,ROM (range of motion),stair training,strengthening,stretching,transfer training     Goals: See Physical Therapy Goals on Care Plan in [employer] electronic health record.    Therapy Frequency: 5x/week  Predicted Duration of Therapy Intervention: 2 weeks  _________________________________________________________________________________    I CERTIFY THE NEED FOR THESE SERVICES FURNISHED UNDER        THIS PLAN OF TREATMENT AND WHILE UNDER MY CARE     (Physician co-signature of this document indicates review and certification of the therapy plan).              Certification date from: 01/28/22, Certification date to: 02/07/22    Referring Physician: Mary Ortez PA            Initial Assessment        See Physical Therapy evaluation dated 01/27/22 in Epic electronic health record.

## 2022-01-28 NOTE — PROGRESS NOTES
[facility]    Medicine Progress Note - Hospitalist Service    Date of Admission:  1/27/2022    Assessment & Plan        Panda Miranda is a 66 year old male admitted on 1/27/2022. He has a history of HFpEF, mild-mod mitral regurgitation, permanent atrial fibrillation (on Warfarin), perimembranous VSD, HTN, CKD 3, TASHA (CPAP), gout, morbid obesity s/p VBG ring (5/21) presenting with complaints of back pain.      ##. UTI: Dysuria, frequency, urgency. No hematuria. No fever, chills. HR 66 /66 RR 12 SaO2 % on RA, Temp 98.4  F . UA with positive nitrite, trace LE, few bacteria, 1 WBC, 11 hyaline cast. UCx sent and pending. Normal lactic acid 1.5. No leukocytosis. procal 0.05 Repeat CBC WBC: 4.7, Hgb 10.3, Plt 178  - Encouraged to drink to thirst  - Continue Rocephin 12h  - Tylenol prn  - Follow UCx  - Follow BCx  - Repeat CBC in AM    ##. Hypotension: -Hold PTA Lisinopril 2/2 CKD/hyperkalemia. Hold PTA Norvasc and Metoprolol. Previous clinic visits: /60  - Continue to monitor BP  - BC pending     ##. Muscle Spasms:  ## Acute on chronic back pain Patient presenting with acute on chronic onset back pain. No evidence for cauda equina, spinal infection, bony injury, other significant pathology. The patient did not have any urinary incontinence, bowel incontinence, saddle anesthesia, fever, or weight loss that would warrant imaging.Reports every 3 years, his back pain flares up. Patient cannot pin point what has caused this flare up. Previously with flare ups, had taken Tylenol 3 and Flexeril with improvement of back pain. Last ED visit for back pain was in 2019. PT consulted and at this time recommend patient discharge home when medically stable although patient at high risk for deconditioning and may require TCU if hospitalized for prolonged period. PT to continue to follow and update as appropriate.    - Scheduled Tylenol  - prn Oxycodone   - Lidoderm  patch  - Continue flexeril 10mg TID and change to prn when improved  - PT will see patient tomorrow.      ##. Hyperkalemia - resolved   ##. CKD 3  Baseline Cr 2.1-2.9. Currently Cr 2.62. K 6.1. In the ED the patient was given D50% 25ml, 10 units regular insulin, D10% 300ml for hyperkalemia. Repeat K 5.2   - Telemetry  - Hold Nephrotoxins - cochicine, lisinopril  - Repeat BMP in AM     ##. Asymptomatic Positive Covid: Denies any symptoms currently.3 weeks ago,upper respiratory infection with cough producing bloody phlegm, shortness of breath and fever about 3 weeks ago. He was evaluated by outpatient provider for concerns of viral URI, but, per patient and chart review, no testing or treatment provided. Symptoms have since resolved. Covid 19 PCR positive.  Receive Covid 19 Vaccine #1- 2 weeks ago. Due for dose #2 on 2/2/22. Chest xray shows: Cardiomegaly with mild perihilar interstitial opacities, favor mild pulmonary edema.  - Continue PTA Warfarin for DVT prophylaxis     ##. Diarrhea: x 2-3 weeks   - Enteric precautions  - Send stool studies     --- CHRONIC ISSUES ---  ##. HFpEF  ##. Mild-Moderate Mitral Regurgitation  ##. Small, perimembranous VSD Last BNP 9K in June 2021.   -485lbs. Primary cardiologist at [facility]  - Continue PTA Bumex  - Strict I/O  - Daily weights  - BID BMP     ##. Permanent Atrial Fibrillation: Currently in afib, HR 66. CHADSVASC 3, on chronic Coumadin. INR 2.16  - Hold PTA metoprolol until BP improve.   - Pharmacy to dose Warfarin with goal INR 2-3  - Daily INR    ##. Chronic Venous Stasis  ##. Chronic, non healing ulcers - Patient did not want to be assess by WOCN today. Reports he has Wound care every other day. Recs placed per WOCN nurse per chart review.   Chronic ulcers BLE  Left leg wounds: Every other day   Cleanse with wound cleanser and pat dry  Cover open wound with single layer of adaptic and mepilex  Can use lymphedema wraps over this dressing     ##. Morbid Obesity: Patient  "historically 880 pounds, lost 400 pounds prior to bariatric surgery. S/p vertical banded gastroplasty 5/21. Working with bariatric surgery for possible VBG ring removal     ##. Gout: - Continue PTA Uloric  -Hold Colchicine 2/2 SHANTE     ##. TASHA: - Continue CPAP at night     ##. IRA: - Continue PTA Iron     ## Right knee pain:   Received Steroid injection pm 1/11/22        # Confirmed COVID-19 infection    # Acute Hypoxic Respiratory Failure secondary to COVID-19 infection     Symptom Onset Date used to determine duration of isolation.  If unsure, enter \"unknown\"   Date of 1st Positive Test unknown   Vaccination Status Partially Vaccinated         - COVID-19 special precautions, continuous pulse-ox  - Oxygen: continue current support with none; titrate to keep SpO2 between 90-96%  - Labs: ---------------------------LABS RECOMMENDED DAILY--------------------------------------- Daily COVID labs ordered x4 days (CBC, BMP, D-dimer, CRP).  Continue to trend after 4 days as needed.   - Imaging: Chest xray:Cardiomegaly with mild perihilar interstitial opacities, favor mild  pulmonary edema.  - Breathing treatments: no inhalers needed; avoid nebulizers in favor of MDIs   - IV fluids: not indicated at this time  - Antibiotics: indicated due to concern of bacterial superinfection; Ceftriaxone ordered   - COVID-Focused Medications: No COVID-specific therapies are appropriate at this time.  - DVT Prophylaxis: at high risk of thrombotic complications due to COVID-19 (DDimer = N/A ).          - Already on therapeutic anticoagulation with warfarin        - consider anticoag on discharge for 30 days & until return to normal mobility        Diet:   renal diet  DVT Prophylaxis: Warfarin  Cheema Catheter: Not present  Central Lines: None  Cardiac Monitoring: None  Code Status:   full code        Disposition Plan   Expected Discharge: 2-3 days  Anticipated discharge location: Home vs TCU.   Delays: Pain not improved         The patient's " care was discussed with the Attending Physician, Dr. Dawson, Bedside Nurse, Care Coordinator/ and Patient.    AYAZ [facility]  Hospitalist Service  [facility]  Securely message with the Vocera Web Console (learn more here)  Text page via [facility] Paging/Directory   _____________________________________________________________________    Interval History   Admitted overnight     Data reviewed today: I reviewed all medications, new labs and imaging results over the last 24 hours.    Physical Exam   Vital Signs: Temp: 98.4  F (36.9  C) Temp src: Oral BP: 94/81 Pulse: 76   Resp: 22 SpO2: 100 % O2 Device: None (Room air)    Weight: 0 lbs 0 oz  Constitutional: morbidly obese, awake, alert, cooperative, no apparent distress, and appears stated age  Eyes: Lids and lashes normal, pupils equal, round and reactive to light, extra ocular muscles intact, sclera clear, conjunctiva normal  ENT: Normocephalic, without obvious abnormality, atraumatic, sinuses nontender on palpation, external ears without lesions, oral pharynx with moist mucous membranes, tonsils without erythema or exudates, gums normal and good dentition.  Hematologic / Lymphatic: no cervical lymphadenopathy  Respiratory: No increased work of breathing, good air exchange, clear to auscultation bilaterally, no crackles or wheezing  Cardiovascular: Normal apical impulse, regular rate and rhythm, normal S1 and S2, no S3 or S4, and no murmur noted  GI: No scars, normal bowel sounds, soft, non-distended, non-tender, no masses palpated, no hepatosplenomegally  Skin: no bruising or bleeding  Musculoskeletal: There is no redness, warmth, or swelling of the joints.  TTP lower back. Full range of motion noted.  Motor strength is 5 out of 5 all extremities bilaterally.  Tone is normal.   Ext: BLE edema.   Neurologic: Awake, alert, oriented to name, place and time.  Cranial nerves II-XII are grossly intact.   Motor is 5 out of 5 bilaterally.  Cerebellar finger to nose, heel to shin intact.  Sensory is intact.  Babinski down going, Romberg negative, and gait is normal.  Neuropsychiatric: General: normal, calm and normal eye contact       Data   Recent Labs   Lab 01/28/22  0826 01/28/22  0629 01/28/22  0458 01/28/22  0356 01/28/22  0034 01/28/22  0023 01/27/22  2156   WBC  --   --   --  4.7  --   --  6.4   HGB  --   --   --  10.3*  --   --  10.7*   MCV  --   --   --  87  --   --  87   PLT  --   --   --  178  --   --  184   INR  --  2.16*  --   --   --   --   --    NA  --  143  --   --   --   --  140   POTASSIUM  --  5.2  --  4.7  --  5.1 6.1*   CHLORIDE  --  114*  --   --   --   --  112*   CO2  --  24  --   --   --   --  22   BUN  --  55*  --   --   --   --  61*   CR  --  2.62*  --   --   --   --  2.69*   ANIONGAP  --  5  --   --   --   --  6   JOHN  --  8.8  --   --   --   --  8.8   GLC 95 92 98  --    < >  --  93   ALBUMIN  --  2.8*  --   --   --   --  3.2*   PROTTOTAL  --  7.7  --   --   --   --  8.1   BILITOTAL  --  0.6  --   --   --   --  0.7   ALKPHOS  --  62  --   --   --   --  68   ALT  --  15  --   --   --   --  16   AST  --  16  --   --   --   --  17    < > = values in this interval not displayed.     Recent Results (from the past 24 hour(s))   XR Chest 1 View    Narrative    EXAM: XR CHEST 1 VIEW  1/28/2022 9:11 AM     HISTORY:  COVID       COMPARISON:  Chest x-ray 6/15/2021    FINDINGS: AP radiograph of the chest. Enlargement of the  cardiomediastinal silhouette. Prominence of the main pulmonary artery  segment. No pneumothorax or significant pleural effusion. Mild  perihilar interstitial opacities. No focal airspace opacity.  Visualized upper abdomen is unremarkable. No acute osseous modality.      Impression    IMPRESSION:  Cardiomegaly with mild perihilar interstitial opacities, favor mild  pulmonary edema. Cannot entirely exclude infection given the patient's  history of COVID-19 infection. Prominent main  pulmonary artery segment  suggestive of pulmonary hypertension.    I have personally reviewed the examination and initial interpretation  and I agree with the findings.    BRISSA ROSALES MD         SYSTEM ID:  [id number]     Medications     Warfarin Therapy Reminder         acetaminophen  975 mg Oral Q6H     [Held by provider] amLODIPine  5 mg Oral Daily     bumetanide  3 mg Oral BID     cefTRIAXone  1 g Intravenous Q12H     [Held by provider] colchicine  0.6 mg Oral Daily     cyclobenzaprine  10 mg Oral TID     febuxostat  80 mg Oral Daily     ferrous sulfate  325 mg Oral Daily     lidocaine  3 patch Transdermal Q24H     lidocaine   Transdermal Q8H     [Held by provider] lisinopril  10 mg Oral Daily     [Held by provider] metoprolol tartrate  100 mg Oral BID

## 2022-01-28 NOTE — PROGRESS NOTES
"ED PT Eval     01/28/22 1100   Quick Adds   Quick Adds Certification   Type of Visit Initial PT Evaluation   Living Environment   People in home spouse   Current Living Arrangements house   Home Accessibility stairs to enter home;stairs within home   Number of Stairs, Main Entrance 5   Number of Stairs, Within Home, Primary other (see comments)  (16 up to bedroom, 14 down to basement)   Transportation Anticipated car, drives self   Living Environment Comments Lives in home with spouse. Patient is retired .    Self-Care   Usual Activity Tolerance moderate   Current Activity Tolerance fair   Regular Exercise No   Equipment Currently Used at Home cane, straight   Activity/Exercise/Self-Care Comment Patient states that he typically has back pain in the AM that loosens up with seated A/P rocking. Patient mod I with SPC for mobility. Patient does endorse baseline difficulty ambulating specifically with stairs.   Disability/Function   Hearing Difficulty or Deaf no   Wear Glasses or Blind no   Concentrating, Remembering or Making Decisions Difficulty no   Difficulty Communicating no   Difficulty Eating/Swallowing no   Walking or Climbing Stairs Difficulty yes   Walking or Climbing Stairs ambulation difficulty, requires equipment;stair climbing difficulty, requires equipment   Dressing/Bathing Difficulty no   Doing Errands Independently Difficulty (such as shopping) no   Fall history within last six months no   Change in Functional Status Since Onset of Current Illness/Injury yes   General Information   Onset of Illness/Injury or Date of Surgery 01/27/22   Referring Physician Mary Ortez PA   Patient/Family Therapy Goals Statement (PT) To return home   Pertinent History of Current Problem (include personal factors and/or comorbidities that impact the POC) Per EMR \" Panda Miranda is a 66 year old male admitted on 1/27/2022. He has a history of HFpEF, mild-mod mitral regurgitation, permanent atrial " "fibrillation (on Warfarin), perimembranous VSD, HTN, CKD 3, TASHA (CPAP), gout, morbid obesity s/p VBG ring (5/21) presenting with complaints of back pain.\"   Existing Precautions/Restrictions fall   General Observations activity: up ad linnea   Cognition   Affect/Mental Status (Cognition) WFL   Follows Commands (Cognition) WFL   Range of Motion (ROM)   ROM Quick Adds ROM WFL   Strength   Manual Muscle Testing Quick Adds Strength WFL   Bed Mobility   Bed Mobility supine-sit;sit-supine   Supine-Sit Borden (Bed Mobility) modified independence   Sit-Supine Borden (Bed Mobility) modified independence   Transfers   Transfers sit-stand transfer   Sit-Stand Transfer   Sit-Stand Borden (Transfers) moderate assist (50% patient effort)   Assistive Device (Sit-Stand Transfers) cane, straight   Gait/Stairs (Locomotion)   Borden Level (Gait) modified independence   Assistive Device (Gait) cane, straight   Comment (Gait/Stairs) performed lateral shuffling mod I in room   Balance   Balance Comments IND sitting EOB, mod I standing EOB   Sensory Examination   Sensory Perception Comments unaware if wearing socks or not   Muscle Tone   Muscle Tone Comments reports of lumbar spasms   Clinical Impression   Criteria for Skilled Therapeutic Intervention yes, treatment indicated   PT Diagnosis (PT) impaired functional mobility   Influenced by the following impairments decreased activity tolerance, back pain   Functional limitations due to impairments bed mobility, transfers, gait, stairs   Clinical Presentation Stable/Uncomplicated   Clinical Presentation Rationale clinical judgement   Clinical Decision Making (Complexity) low complexity   Therapy Frequency (PT) 5x/week   Predicted Duration of Therapy Intervention (days/wks) 2 weeks   Planned Therapy Interventions (PT) balance training;bed mobility training;gait training;ROM (range of motion);stair training;strengthening;stretching;transfer training   Risk & Benefits of " therapy have been explained evaluation/treatment results reviewed;care plan/treatment goals reviewed;risks/benefits reviewed;current/potential barriers reviewed;participants voiced agreement with care plan;patient;participants included   PT Discharge Planning    PT Discharge Recommendation (DC Rec) home with assist;home with outpatient physical therapy   PT Rationale for DC Rec Patient with greatly impaired mobility at this time but this is likely due to prolonged immobility in bed. Throughout session mobility gradually improved following pattern that it typically does at home for patient. At this time recommend patient discharge home when medically stable although patient at high risk for deconditioning and may require TCU if hospitalized for prolonged period. PT to continue to follow and update as appropriate.    PT Brief overview of current status  Ax1 for mobility due to patient comfort although patient mod I for bed mobility and transfers   Therapy Certification   Start of care date 01/27/22   Certification date from 01/28/22   Certification date to 02/07/22   Medical Diagnosis pyelonephritis   Total Evaluation Time   Total Evaluation Time (Minutes) 15       Jordan Ramires, PT, DPT  Pager #[phone]

## 2022-01-28 NOTE — CONSULTS
[facility] Nurse Inpatient Wound Assessment   Reason for consultation: Evaluate and treat leg wounds    Assessment  Leg wounds due to Venous Ulcer per chart review  Status: initial assessment    Treatment Plan  Left leg wounds: Every other day   Cleanse with wound cleanser and pat dry  Cover open wound with single layer of adaptic and mepilex  Can use lymphedema wraps over this dressing     Orders Written  Recommended provider order: Lymphedema consult  WO Nurse follow-up plan:weekly   Nursing to notify the Provider(s) and re-consult the WO Nurse if wound(s) deteriorates or new skin concern.    Patient History  According to provider note(s):  Panda iMranda is a 66 year old male admitted on 1/27/2022. He has a history of HFpEF, mild-mod mitral regurgitation, permanent atrial fibrillation (on Warfarin), perimembranous VSD, HTN, CKD 3, TASHA (CPAP), gout, morbid obesity s/p VBG ring (5/21) presenting with complaints of back pain.     Objective Data  Containment of urine/stool: Continent of bladder and Continent of bowel    Active Diet Order  Orders Placed This Encounter      Combination Diet Regular Diet; Renal Diet (non-dialysis), 2 gm K Diet      Output:   I/O last 3 completed shifts:  In: 47.5 [I.V.:37.5; IV Piggyback:10]  Out: -     Risk Assessment:                                                Labs:   Recent Labs   Lab 01/28/22  0629 01/28/22  0356   ALBUMIN 2.8*  --    HGB  --  10.3*   INR 2.16*  --    WBC  --  4.7   CRP 19.0*  --        Physical Exam  Areas of skin assessed: focused legs    Wound Location:  Legs  Date of last photo none at this time  Wound History: Pt known to [facility] service from previous admit 6/2021. He describes one open wound on left leg. Lymph wraps in place and pt declined removing them at this time as we don't carry these wraps. He changes his wraps every other day. Pt agreed to wound care and lyphedema to see. Can return to home plan at discharge.      Interventions  Visual inspection and  assessment completed   Wound Care Rationale Promote moist wound healing without tissue dehydration   Wound Care: completed by RN  Supplies: discussed with RN and discussed with patient  Current off-loading measures: Pillows  Current support surface: Standard  Atmos Air mattress  Education provided to: plan of care and wound progress  Discussed plan of care with Patient and Nurse    Michelle Meléndez RN CWOCN

## 2022-01-28 NOTE — PHARMACY-ANTICOAGULATION SERVICE
Clinical Pharmacy - Warfarin Dosing Consult     Pharmacy has been consulted to manage this patient s warfarin therapy.  Indication: Atrial Fibrillation  Therapy Goal: INR 2-3  Provider/Team: Ben Hamlin MD  [facility]: [facility]  Warfarin Prior to Admission: Yes  Warfarin PTA Regimen: 7.5 mg every Tue, Thu; 5 mg all other days  Recent documented change in oral intake/nutrition: No    INR   Date Value Ref Range Status   01/28/2022 2.16 (H) 0.85 - 1.15 Final   12/23/2021 2.8 (H) 0.9 - 1.1 Final       Recommend warfarin 5 mg today per home dosing regimen. Confirmed with patient that his last dose of warfarin was 7.5 mg yesterday. Pharmacy will monitor Panda Miranda daily and order warfarin doses to achieve specified goal.      Please contact pharmacy as soon as possible if the warfarin needs to be held for a procedure or if the warfarin goals change.      Yanna Lua, KpD

## 2022-01-28 NOTE — ED TRIAGE NOTES
Pt arrived to ED complaining of back spasm that began arcadio;ier this week . Pt. States the pain wakes him up out of his sleep . Pt states that [painhas worsened throughput the week . Pt was given a total of  100  mcg of fentanyl prior to  arrival on EMS . Pt also received 2.5 mg of versed . Pt continues to scream about pain patient does not appear to be comfortable at this time . Pt is grunting ans states he is not able to move,. Pt denies any trauma/ falling . Pt is breathing at respirations of 22 .

## 2022-01-29 ENCOUNTER — APPOINTMENT (OUTPATIENT)
Dept: OCCUPATIONAL THERAPY | Facility: CLINIC | Age: 67
DRG: 871 | End: 2022-01-29
Attending: FAMILY MEDICINE
Payer: COMMERCIAL

## 2022-01-29 ENCOUNTER — APPOINTMENT (OUTPATIENT)
Dept: CARDIOLOGY | Facility: CLINIC | Age: 67
DRG: 871 | End: 2022-01-29
Payer: COMMERCIAL

## 2022-01-29 PROBLEM — I95.9 HYPOTENSION: Status: ACTIVE | Noted: 2022-01-29

## 2022-01-29 LAB
ALBUMIN SERPL-MCNC: 2.7 G/DL (ref 3.4–5)
ALP SERPL-CCNC: 72 U/L (ref 40–150)
ALT SERPL W P-5'-P-CCNC: 13 U/L (ref 0–70)
ANION GAP SERPL CALCULATED.3IONS-SCNC: 5 MMOL/L (ref 3–14)
AST SERPL W P-5'-P-CCNC: 23 U/L (ref 0–45)
BACTERIA UR CULT: ABNORMAL
BASOPHILS # BLD AUTO: 0 10E3/UL (ref 0–0.2)
BASOPHILS NFR BLD AUTO: 1 %
BILIRUB SERPL-MCNC: 0.4 MG/DL (ref 0.2–1.3)
BUN SERPL-MCNC: 54 MG/DL (ref 7–30)
CA-I BLD-MCNC: 4.4 MG/DL (ref 4.4–5.2)
CALCIUM SERPL-MCNC: 8.6 MG/DL (ref 8.5–10.1)
CHLORIDE BLD-SCNC: 112 MMOL/L (ref 94–109)
CO2 SERPL-SCNC: 26 MMOL/L (ref 20–32)
CREAT SERPL-MCNC: 2.57 MG/DL (ref 0.66–1.25)
EOSINOPHIL # BLD AUTO: 0.2 10E3/UL (ref 0–0.7)
EOSINOPHIL NFR BLD AUTO: 4 %
ERYTHROCYTE [DISTWIDTH] IN BLOOD BY AUTOMATED COUNT: 22.4 % (ref 10–15)
GFR SERPL CREATININE-BSD FRML MDRD: 27 ML/MIN/1.73M2
GLUCOSE BLD-MCNC: 96 MG/DL (ref 70–99)
HCT VFR BLD AUTO: 19.3 % (ref 40–53)
HGB BLD-MCNC: 10.4 G/DL (ref 13.3–17.7)
IMM GRANULOCYTES # BLD: 0 10E3/UL
IMM GRANULOCYTES NFR BLD: 1 %
INR PPP: 2.32 (ref 0.85–1.15)
LVEF ECHO: NORMAL
LYMPHOCYTES # BLD AUTO: 1.4 10E3/UL (ref 0.8–5.3)
LYMPHOCYTES NFR BLD AUTO: 33 %
MAGNESIUM SERPL-MCNC: 1.8 MG/DL (ref 1.6–2.3)
MCH RBC QN AUTO: 52.3 PG (ref 26.5–33)
MCHC RBC AUTO-ENTMCNC: 53.9 G/DL (ref 31.5–36.5)
MCV RBC AUTO: 97 FL (ref 78–100)
MONOCYTES # BLD AUTO: 0.5 10E3/UL (ref 0–1.3)
MONOCYTES NFR BLD AUTO: 11 %
NEUTROPHILS # BLD AUTO: 2.1 10E3/UL (ref 1.6–8.3)
NEUTROPHILS NFR BLD AUTO: 50 %
NRBC # BLD AUTO: 0 10E3/UL
NRBC BLD AUTO-RTO: 0 /100
NT-PROBNP SERPL-MCNC: 8307 PG/ML (ref 0–900)
PHOSPHATE SERPL-MCNC: 4.1 MG/DL (ref 2.5–4.5)
PLAT MORPH BLD: NORMAL
PLATELET # BLD AUTO: 158 10E3/UL (ref 150–450)
POTASSIUM BLD-SCNC: 5.2 MMOL/L (ref 3.4–5.3)
PROCALCITONIN SERPL-MCNC: <0.05 NG/ML
PROT SERPL-MCNC: 7.2 G/DL (ref 6.8–8.8)
RBC # BLD AUTO: 1.99 10E6/UL (ref 4.4–5.9)
RBC MORPH BLD: NORMAL
SODIUM SERPL-SCNC: 143 MMOL/L (ref 133–144)
WBC # BLD AUTO: 4.1 10E3/UL (ref 4–11)

## 2022-01-29 PROCEDURE — 85025 COMPLETE CBC W/AUTO DIFF WBC: CPT | Performed by: PHYSICIAN ASSISTANT

## 2022-01-29 PROCEDURE — P9041 ALBUMIN (HUMAN),5%, 50ML: HCPCS

## 2022-01-29 PROCEDURE — 999N000127 HC STATISTIC PERIPHERAL IV START W US GUIDANCE

## 2022-01-29 PROCEDURE — 255N000002 HC RX 255 OP 636: Performed by: INTERNAL MEDICINE

## 2022-01-29 PROCEDURE — 83880 ASSAY OF NATRIURETIC PEPTIDE: CPT | Performed by: PHYSICIAN ASSISTANT

## 2022-01-29 PROCEDURE — 99221 1ST HOSP IP/OBS SF/LOW 40: CPT | Mod: 25 | Performed by: INTERNAL MEDICINE

## 2022-01-29 PROCEDURE — 93325 DOPPLER ECHO COLOR FLOW MAPG: CPT

## 2022-01-29 PROCEDURE — 93321 DOPPLER ECHO F-UP/LMTD STD: CPT | Mod: 26 | Performed by: INTERNAL MEDICINE

## 2022-01-29 PROCEDURE — 93308 TTE F-UP OR LMTD: CPT | Mod: 26 | Performed by: INTERNAL MEDICINE

## 2022-01-29 PROCEDURE — 99233 SBSQ HOSP IP/OBS HIGH 50: CPT | Mod: GC | Performed by: INTERNAL MEDICINE

## 2022-01-29 PROCEDURE — 84100 ASSAY OF PHOSPHORUS: CPT | Performed by: PHYSICIAN ASSISTANT

## 2022-01-29 PROCEDURE — 250N000013 HC RX MED GY IP 250 OP 250 PS 637: Performed by: PHYSICIAN ASSISTANT

## 2022-01-29 PROCEDURE — 85610 PROTHROMBIN TIME: CPT | Performed by: EMERGENCY MEDICINE

## 2022-01-29 PROCEDURE — 250N000013 HC RX MED GY IP 250 OP 250 PS 637: Performed by: NURSE PRACTITIONER

## 2022-01-29 PROCEDURE — 97165 OT EVAL LOW COMPLEX 30 MIN: CPT | Mod: GO | Performed by: OCCUPATIONAL THERAPIST

## 2022-01-29 PROCEDURE — G0378 HOSPITAL OBSERVATION PER HR: HCPCS

## 2022-01-29 PROCEDURE — 83735 ASSAY OF MAGNESIUM: CPT | Performed by: PHYSICIAN ASSISTANT

## 2022-01-29 PROCEDURE — 97140 MANUAL THERAPY 1/> REGIONS: CPT | Mod: GO | Performed by: OCCUPATIONAL THERAPIST

## 2022-01-29 PROCEDURE — 82040 ASSAY OF SERUM ALBUMIN: CPT | Performed by: PHYSICIAN ASSISTANT

## 2022-01-29 PROCEDURE — C8924 2D TTE W OR W/O FOL W/CON,FU: HCPCS

## 2022-01-29 PROCEDURE — 80053 COMPREHEN METABOLIC PANEL: CPT | Performed by: PHYSICIAN ASSISTANT

## 2022-01-29 PROCEDURE — 250N000011 HC RX IP 250 OP 636

## 2022-01-29 PROCEDURE — 84145 PROCALCITONIN (PCT): CPT | Performed by: PHYSICIAN ASSISTANT

## 2022-01-29 PROCEDURE — 36415 COLL VENOUS BLD VENIPUNCTURE: CPT | Performed by: EMERGENCY MEDICINE

## 2022-01-29 PROCEDURE — 250N000011 HC RX IP 250 OP 636: Performed by: PHYSICIAN ASSISTANT

## 2022-01-29 PROCEDURE — 82330 ASSAY OF CALCIUM: CPT | Performed by: PHYSICIAN ASSISTANT

## 2022-01-29 PROCEDURE — 120N000002 HC R&B MED SURG/OB UMMC

## 2022-01-29 PROCEDURE — 93325 DOPPLER ECHO COLOR FLOW MAPG: CPT | Mod: 26 | Performed by: INTERNAL MEDICINE

## 2022-01-29 RX ORDER — WARFARIN SODIUM 5 MG/1
5 TABLET ORAL
Status: COMPLETED | OUTPATIENT
Start: 2022-01-29 | End: 2022-01-29

## 2022-01-29 RX ORDER — ALBUMIN, HUMAN INJ 5% 5 %
12.5 SOLUTION INTRAVENOUS ONCE
Status: COMPLETED | OUTPATIENT
Start: 2022-01-29 | End: 2022-01-29

## 2022-01-29 RX ADMIN — HUMAN ALBUMIN MICROSPHERES AND PERFLUTREN 6 ML: 10; .22 INJECTION, SOLUTION INTRAVENOUS at 13:18

## 2022-01-29 RX ADMIN — CEFTRIAXONE SODIUM 1 G: 1 INJECTION, POWDER, FOR SOLUTION INTRAMUSCULAR; INTRAVENOUS at 21:38

## 2022-01-29 RX ADMIN — CYCLOBENZAPRINE HYDROCHLORIDE 10 MG: 5 TABLET, FILM COATED ORAL at 08:21

## 2022-01-29 RX ADMIN — CEFTRIAXONE SODIUM 1 G: 1 INJECTION, POWDER, FOR SOLUTION INTRAMUSCULAR; INTRAVENOUS at 11:09

## 2022-01-29 RX ADMIN — CYCLOBENZAPRINE HYDROCHLORIDE 10 MG: 5 TABLET, FILM COATED ORAL at 15:05

## 2022-01-29 RX ADMIN — ACETAMINOPHEN 975 MG: 325 TABLET, FILM COATED ORAL at 15:05

## 2022-01-29 RX ADMIN — ACETAMINOPHEN 975 MG: 325 TABLET, FILM COATED ORAL at 19:40

## 2022-01-29 RX ADMIN — ALBUMIN (HUMAN) 12.5 G: 12.5 INJECTION, SOLUTION INTRAVENOUS at 20:41

## 2022-01-29 RX ADMIN — FERROUS SULFATE TAB 325 MG (65 MG ELEMENTAL FE) 325 MG: 325 (65 FE) TAB at 08:21

## 2022-01-29 RX ADMIN — WARFARIN SODIUM 5 MG: 5 TABLET ORAL at 19:15

## 2022-01-29 RX ADMIN — BUMETANIDE 3 MG: 2 TABLET ORAL at 19:15

## 2022-01-29 RX ADMIN — ACETAMINOPHEN 975 MG: 325 TABLET, FILM COATED ORAL at 08:21

## 2022-01-29 RX ADMIN — BUMETANIDE 3 MG: 2 TABLET ORAL at 11:09

## 2022-01-29 RX ADMIN — CYCLOBENZAPRINE HYDROCHLORIDE 10 MG: 5 TABLET, FILM COATED ORAL at 19:40

## 2022-01-29 ASSESSMENT — ACTIVITIES OF DAILY LIVING (ADL)
ADLS_ACUITY_SCORE: 12
ADLS_ACUITY_SCORE: 10
ADLS_ACUITY_SCORE: 10
ADLS_ACUITY_SCORE: 12
ADLS_ACUITY_SCORE: 12
ADLS_ACUITY_SCORE: 13
ADLS_ACUITY_SCORE: 13
ADLS_ACUITY_SCORE: 10

## 2022-01-29 NOTE — PLAN OF CARE
Admission dx:  Hyperkalemia [E87.5]  Sickle cell crisis (H) [D57.00]  Back pain [M54.9]  Urinary tract infection [N39.0]    [id number]  Vitals: VSS. Afebrile.   Pain/PRN: Low back pain/spasms improving-declined AM tylenol.  Respiratory: MACKEY. Diminished LS. Stable RA.   Neuros: A&O x4. Calm, pleasant, cooperative. Able to make needs known. Baseline neuropathy. PERRLA.   GI/: Voiding urgency. Regular/renal (non-dialysis)/2g K diet-good appetite, denies n/v. Reports 2+ episodes loose stool 1/26-no BM since. No abdominal discomfort or cramping-no stool sample needed. Bladder scan PVR: <100 mL.   Cardiac: Tele: a-fib with RVR. Intermittent tachycardia. Soft BP.   Skin/Wounds: Peeling, flaking BLE. WOC following, lymph wraps.   Lines: Right PIV saline locked.   Activity: Independent/min assist x 1 bed mobility-encourage self repositioning. Not OOB this shift.     Plan: Discharge home vs. TCU. Discontinue c-diff orders.

## 2022-01-29 NOTE — CONSULTS
"Cardiology Consult         Date of Service (when I saw the patient): -------NOTE: \"Date of Service\" should now be entered     ASSESSMENT:     #HFpEF  #Hypovolemia   #Afib  Patient BP now stable off his anti-HTN and diuretics. Hypotension likely from dehydration 2/2 to poor PO intake, diarrhea and complicated UTI. Patient appears euvolemic at bedside. Can stagger reinitation of medications as listed below.    RECOMMEND:  - Restart PO Metop 50mg BID today  - Restart PO  Lisinopril at 10mg tomorrow  - Restart Amlodipine at 5mg on 1/31  - Continue Coumadin per primary team     Staffed with Dr. Mercado,    Jennifer Knox MD  Cardiology Fellow  PGY6        Jennifer Knox    REASON FOR CONSULT: HFpEF    History of Present Illness   Panda Miranda is a 66 year old male who presents with PMHx of HFpEF, mild-moderate MR CKD stage 3, perimembranous VSD. Morbid obesity s/p VBG ring hyperkalemia, UTI who presents with hypotension. Cardiology consulted for concern for overdiuresis.    Patient was admitted for complaints of back pain, UTI and symptomatic COVID. . Patient was treated with fluids and stated on antibx for complicated UTI vs pyelonephritis. During patient's hospital stay developed hypotension that required cessation of his home GDMT. His Bps went as low as 79/67. Once off his medications, his BP recovered to 110/80. Cardiology consulted due to concern for hypovolemia     At bedside, patient denied chest pain, SOB, lightheadedness, dizziness.     Home Medications:  Amlodipine 5mg qday(Held)  Lisinopril 10mg qday(Held)  Metop tartate 100mg qday(Held)    Past Medical History    I have reviewed this patient's medical history and updated it with pertinent information if needed.   Past Medical History:   Diagnosis Date     (HFpEF) heart failure with preserved ejection fraction (H)      Arthritis      Arthropathy of wrist      Atrial fibrillation (H)      Bradycardia      Cancer (H) 2011    colon cancer; hemicolectomy     " CHF (congestive heart failure) (H)      Chronic kidney disease      Gout      Hand swelling      Heart valve disease     intermittent mitral regurgitation     HTN (hypertension)      Hyperlipidemia      Morbid obesity (H)      Obesity      TASHA (obstructive sleep apnea)     CPAP     Perimembranous ventricular septal defect        Past Surgical History   I have reviewed this patient's surgical history and updated it with pertinent information if needed.  Past Surgical History:   Procedure Laterality Date     COLON SURGERY       COLONOSCOPY N/A 12/12/2019    Procedure: COLONOSCOPY;  Surgeon: Flaco Magaña MD;  Location: [facility];  Service: General     GASTROPLASTY VERTICAL BANDED      Dr. Arizmendi [facility] 1996 Initial weight 800++ Lost to 440- (was 320# at lowest)     KNEE SURGERY       ZZC PART REMOVAL COLON W ANASTOMOSIS      Description: Partial Colectomy;  Recorded: 12/02/2011;  Comments: Dr Magaña       Prior to Admission Medications   Prior to Admission Medications   Prescriptions Last Dose Informant Patient Reported? Taking?   amLODIPine (NORVASC) 5 MG tablet 1/27/2022 at Unknown time  No Yes   Sig: Take 1 tablet (5 mg) by mouth daily   bumetanide (BUMEX) 1 MG tablet 1/28/2022 at Unknown time  No Yes   Sig: TAKE 3 TABLETS BY MOUTH TWICE DAILY AT 9AM AND 6PM   cholecalciferol 25 MCG (1000 UT) TABS 1/27/2022 at Unknown time  Yes Yes   Sig: Take 3,000 Units by mouth   colchicine (COLCYRS) 0.6 MG tablet 1/27/2022 at Unknown time Other Yes Yes   Sig: TAKE 1 TABLET(0.6 MG) BY MOUTH DAILY   febuxostat (ULORIC) 80 MG TABS tablet 1/27/2022 at Unknown time Other Yes Yes   Sig: Take 1 tablet by mouth daily    ferrous sulfate (FEROSUL) 325 (65 Fe) MG tablet 1/27/2022 at Unknown time Other Yes Yes   Sig: Take 325 mg by mouth daily    guaiFENesin-codeine (ROBITUSSIN AC) 100-10 MG/5ML solution  at prn  No Yes   Sig: Take 5-10 mLs by mouth every 4 hours as needed for cough   lidocaine (XYLOCAINE) 2 % external  gel   Yes No   lisinopril (ZESTRIL) 10 MG tablet 1/27/2022 at Unknown time Other Yes Yes   Sig: TAKE 1 TABLET(10 MG) BY MOUTH DAILY   metoprolol tartrate (LOPRESSOR) 100 MG tablet 1/27/2022 at Unknown time  No Yes   Sig: Take 1 tablet (100 mg) by mouth 2 times daily   warfarin ANTICOAGULANT (COUMADIN) 5 MG tablet 1/27/2022 at Unknown time  No Yes   Sig: Take 5 to 7.5mg (1 to 1.5 tabs) by mouth daily OR AS DIRECTED.  Adjust dose based on INR.      Facility-Administered Medications Last Administration Doses Remaining   lidocaine (XYLOCAINE) 2 % external gel 12/7/2021  1:26 PM         Allergies   No Known Allergies    Social History   I have reviewed this patient's social history and updated it with pertinent information if needed. Panda Miranda  reports that he has never smoked. He has never used smokeless tobacco. He reports current alcohol use of about 0.8 standard drinks of alcohol per week. He reports that he does not use drugs.    Family History   I have reviewed this patient's family history and updated it with pertinent information if needed.   Family History   Problem Relation Age of Onset     Diabetes Type 2  Other      Heart Failure Other      Heart Disease Mother      Hypertension Mother      Diabetes Sister        Review of Systems   The 10 point Review of Systems is negative other than noted in the HPI or here.     Physical Exam   Temp: 97.7  F (36.5  C) Temp src: Oral BP: 109/41 Pulse: 102   Resp: 18 SpO2: 96 % O2 Device: None (Room air)    Vital Signs with Ranges  Temp:  [96  F (35.6  C)-97.7  F (36.5  C)] 97.7  F (36.5  C)  Pulse:  [] 102  Resp:  [14-18] 18  BP: ()/(41-67) 109/41  SpO2:  [93 %-96 %] 96 %  439 lbs 2.5 oz    GEN: NAD, pleasant  HEENT: no icterus  CV: RRR, normal s1/s2, no murmurs/rubs/s3/s4, no heave. JVP 6cm.   CHEST: CTAB  ABD: soft, NT/ND, NABS  : no flank/suprapubic tenderness  NEURO: AA&Ox3, fluent/appropriate, motor grossly nonfocal  PSYCH: cooperative, affect  appropriate    Data   Data reviewed today:  I personally reviewed no images or EKG's today.  Recent Labs   Lab 01/29/22  0654 01/28/22  2116 01/28/22  1756 01/28/22  0730 01/28/22  0629 01/28/22  0458 01/28/22  0356 01/28/22  0023 01/27/22  2156   WBC 4.1  --   --   --   --   --  4.7  --  6.4   HGB 10.4*  --   --   --   --   --  10.3*  --  10.7*   MCV 97  --   --   --   --   --  87  --  87     --   --   --   --   --  178  --  184   INR 2.32*  --   --   --  2.16*  --   --   --   --      --  139  --  143  --   --   --  140   POTASSIUM 5.2  --  5.4*  --  5.2  --  4.7   < > 6.1*   CHLORIDE 112*  --  114*  --  114*  --   --   --  112*   CO2 26  --  22  --  24  --   --   --  22   BUN 54*  --  54*  --  55*  --   --   --  61*   CR 2.57*  --  2.51*  --  2.62*  --   --   --  2.69*   ANIONGAP 5  --  3  --  5  --   --   --  6   JOHN 8.6  --  8.5  --  8.8  --   --   --  8.8   GLC 96 99 90   < > 92   < >  --    < > 93   ALBUMIN 2.7*  --   --   --  2.8*  --   --   --  3.2*   PROTTOTAL 7.2  --   --   --  7.7  --   --   --  8.1   BILITOTAL 0.4  --   --   --  0.6  --   --   --  0.7   ALKPHOS 72  --   --   --  62  --   --   --  68   ALT 13  --   --   --  15  --   --   --  16   AST 23  --   --   --  16  --   --   --  17    < > = values in this interval not displayed.       No results found for this or any previous visit (from the past 24 hour(s)).

## 2022-01-29 NOTE — PROGRESS NOTES
Observation goals:    -diagnostic tests and consults completed and resulted: met  -vital signs normal or at patient baseline: soft-low Bps but this is baseline since pt has been here. Other VS WNL.  -tolerating oral intake to maintain hydration: met  -adequate pain control on oral analgesics: met  -tolerating oral antibiotics or has plans for home infusion setup: IV rocephin ordered. No plans for home infusions.   -infection is improving : met  -returns to baseline functional status: not met   -safe disposition plan has been identified: met  -stable labs: met

## 2022-01-29 NOTE — PROGRESS NOTES
Observation Goals  - Diagnostic tests and consults completed and met: met  - VS normal or at baseline: partially met-occasional soft Bps, otherwise WNL.  - Tolerating PO intake to maintain hydration: met  - Adequate pain control on PO analgesics: met  - Tolerating PO abx or has plan for home infusion setup: not met-receiving IV rocephin q 12h  - Infection is improving: met  - Returns to baseline function status: met  - Stable labs: partially met (most recent K result 5.4)

## 2022-01-29 NOTE — PROGRESS NOTES
Observation Goals    - Diagnostic tests and consults completed and met: met  - VS normal or at baseline: met -occasional soft BPs, otherwise WNL.  - Tolerating PO intake to maintain hydration: met  - Adequate pain control on PO analgesics: met  - Tolerating PO abx or has plan for home infusion setup: not met - receiving IV rocephin q 12h, no plan as BCx pending  - Infection is improving: met  - Returns to baseline function status: met  - Stable labs: met

## 2022-01-29 NOTE — PROGRESS NOTES
01/29/22 1400   Quick Adds   Type of Visit Initial Occupational Therapy Evaluation  (Lymphedema evaluation)   Living Environment   People in home spouse   Current Living Arrangements house   Self-Care   Usual Activity Tolerance moderate   Current Activity Tolerance moderate   Disability/Function   Hearing Difficulty or Deaf no   Wear Glasses or Blind no   Concentrating, Remembering or Making Decisions Difficulty no   Difficulty Communicating no   Difficulty Eating/Swallowing no   Walking or Climbing Stairs Difficulty no   Dressing/Bathing Difficulty no   Toileting issues other (see comments)   Cognitive Status Examination   Orientation Status orientation to person, place and time   Sensory   Sensory Comments light touch intact. Reports numbness in toes at baseline.    Pain Assessment   Patient Currently in Pain   (patient reports pain in LEs when compression wraps removed. )   Edema General Information   Onset of Edema   (chronic)   Affected Body Part(s) Right LE;Left LE   Etiology Comments LE wounds, chronic lymphedema, decreased mobility, cardiac disease.    Edema Precaution Comments LE wounds   General Comments/Previous Edema Treatment/Edema Equipment patient reports using LE compression wraps at baseline.    Edema Examination/Assessment   Skin Condition Pitting;Dryness;Intact   Skin Condition Comments Wound dressings in place at B LEs. Significant skin dryness throughout. Firm 1+/2+ pitting edema distally.    Skin Integrity scattered wounds distally.   Pitting Assessment 1+/2+   Edema Assessment Comments GCB applied to B LEs from MTPs to knee creases.    Range of Motion Comprehensive   General Range of Motion no range of motion deficits identified   Strength Comprehensive (MMT)   Comment, General Manual Muscle Testing (MMT) Assessment generalized weakness throughout.    Bed Mobility   Comment (Bed Mobility) IND   Transfers   Transfer Comments SBA   Clinical Impression   Criteria for Skilled Therapeutic  Interventions Met (OT) yes   Edema: Patient Presentation Edema   Influenced by the following impairments decreased functional mobility   Assessment of Occupational Performance 1-3 Performance Deficits   Edema: Planned Interventions Gradient compression bandaging;Fit for compression garment;Edema exercises;Precautions to prevent infection/exacerbation;Education   Intervention Comments GCB to B LEs   Clinical Decision Making Complexity (OT) low complexity   Therapy Frequency (OT) 5x/week   Predicted Duration of Therapy 1 week   Risk & Benefits of therapy have been explained evaluation/treatment results reviewed;care plan/treatment goals reviewed   OT Discharge Planning    OT Discharge Recommendation (DC Rec) Home with assist   OT Rationale for DC Rec Recommend OP lymphedema to address continued LE compression needs.

## 2022-01-29 NOTE — PROGRESS NOTES
Resident/Fellow Attestation   I, Zahira Yanez, was present with the medical/KEO student who participated in the service and in the documentation of the note.  I have verified the history and personally performed the physical exam and medical decision making.  I agree with the assessment and plan of care as documented in the note.        Zahira Yanez MD  PGY1  Date of Service (when I saw the patient): 01/29/22    [facility]    Progress Note - Medicine Service, [facility] TEAM 2       Date of Admission:  1/27/2022    Assessment & Plan            Panda Miranda is a 66 year old male with a history of HFpEF, mild-mod mitral regurgitation, permanent atrial fibrillation (on warfarin), perimembranous VSD, HTN, CKD 3, TASHA (on CPAP), gout, morbid obesity s/p VBG ring (5/21), and recent COVID-19 infection (positive 1/27/22) admitted on 1/27/22 for evaluation of back pain. He was started on IV fluids and IV ceftriaxone for UTI vs pyelonephritis. Patient subsequently developed hypotension with concern for hypovolemia. Blood pressure has since been stable.    #Sepsis, likely d/t complicated UTI vs pyelonephritis  #Hypotension resolving   Sepsis vs dehydration from diarrhea, vomiting, and poor PO intake. In ED 1/27: HR 66, /66, temp 98.4  F. In ED after admission, patient became increasingly hypotensive with BP dipping to 70s-80s/50s-60s. Urine culture positive for E.coli 1/27. Patient was started on IV ceftriaxone Q12 hours. Anti-HTN medications were held, and BP stabilized to 90s-100s/60s-70s which is similar to his baseline. WBC, lactic acid, and procalcitonin wnl 1/29.    - Cardiology consult 1/29   > staggered restart of PTA anti-HTN meds: metoprolol 50mg BID 1/29, lisinopril 10mg 1/30, amlodipine 5mg 1/31  -  5% albumin  - if patient becomes hypotensive, 1000cc bolus lactated ringers  - continue IV ceftriaxone, plan to switch to PO in AM  - Tylenol PRN  - repeat CBC,  CRP in AM    # Muscle spasms  # Acute on chronic back pain  Patient presented in ED on 1/27 with acute on chronic back pain. Reports that he has had similar flares for years. No red flag symptoms. Has improved with Flexeril and Tylenol.  - scheduled Tylenol  - continue flexeril 10mg TID  - PT consult, appreciate recs    # Atrial fibrillation  Currently in atrial fibrillation. On chronic warfarin. INR within therapeutic range.  - restart metoprolol 1/30  - pharmacy to dose warfarin with goal INR 2-3  - INR in AM    # Stage 3 CKD  # Hyperkalemia, resolved  Potassium in ED 1/27 was 6.1. In the ED the patient was given D50% 25ml, 10 units regular insulin, and D10% 300ml for hyperkalemia. Potassium downtrended and is wnl. 1/29 creatinine 2.57 which is within patient's baseline.  - holding colchicine   - repeat BMP in AM    # COVID-19 (+) 1/27/22, resolving  Denies any current symptoms. Had URI prior to presentation in addition to diarrhea. No further diarrhea since admission.   - continue PTA warfarin for DVT prophylaxis    ##. HFpEF  ##. Mild-Moderate Mitral Regurgitation  ##. Small, perimembranous VSD   1/29/22 BNP 8307. Last BNP 9028 in 1/2021.  - continue PTA Bumex  - strict I/O  - monitor weight  - BMP in AM    # Gout  - holding colchicine       Diet: Combination Diet Regular Diet; Renal Diet (non-dialysis), 2 gm K Diet    DVT Prophylaxis: warfarin  Cheema Catheter: Not present  Fluids: None  Central Lines: None  Cardiac Monitoring: ACTIVE order. Indication: Chest pain/ ACS rule out (24 hours)  Code Status: Full Code      Disposition Plan   Expected Discharge: 1-2 days     Anticipated discharge location: home  Delays: hypotension       The patient's care was discussed with the attending, Dr. Taurus Costello.    Ashley Portillo, MS3  Medicine Service, [facility] TEAM [address] [place] [address]  Securely message with the Vocera Web Console (learn more here)  Text page via [employer]  Paging/Directory   Please see signed in provider for up to date coverage information      Clinically Significant Risk Factors Present on Admission            # Coagulation Defect: home medication list includes an anticoagulant medication        ______________________________________________________________________    Interval History   Panda Miranda is a 66 year old male with a history of HFpEF, mild-mod mitral regurgitation, permanent atrial fibrillation (on Warfarin), perimembranous VSD, HTN, CKD 3, TASHA (CPAP), gout, morbid obesity s/p VBG ring (5/21), and recent COVID-19 infection (positive 1/27/22) admitted on 1/27/22 for evaluation of back pain. He was started on IV fluids and IV ceftriaxone for UTI vs pyelonephritis. Patient subsequently developed hypotension with concern for hypovolemia. Blood pressure has since been stable.    Back pain has been improving with Flexeril and Tylenol. Patient reports that this episode of back pain was similar to episodes of back pain that he has had intermittently throughout the past few years; it starts as a sharp/cramping back pain. Denies any history of trauma. He does not recall UTI symptoms prior to presentation, so he is not sure when they started. Reports that his dysuria has largely improved and denies any hematuria, increased frequency, nocturia, or difficulty emptying his bladder. Patient has had intermittent UTIs in the past, but they have been infrequent and over the course of many years.    Patient's blood pressure is improving. He denies any lightheadedness or dizziness. He had 1 episode of vomiting yesterday which he attributes to the food he ate; he has otherwise had no nausea or vomiting and reports doing well with PO intake. Prior to hospital admission, patient had symptomatic COVID-19. He has had intermittent dry coughs but otherwise feels as though he has recovered. He also had frequent diarrhea, but this has since resolved. Patient has not had a bowel  movement today which he attributes to constipation from his pain medications; he reports straining today but nothing coming out.      Data reviewed today: I reviewed all medications, new labs and imaging results over the last 24 hours.     Physical Exam   Vital Signs: Temp: 97.3  F (36.3  C) Temp src: Oral BP: 96/76 Pulse: 82   Resp: 16 SpO2: 91 % O2 Device: None (Room air)    Weight: 433 lbs 1.6 oz  General: Alert and oriented. NAD.  Cardiac: Regularly irregular rate and rhythm.  Pulm: Lungs clear to ausculation bilaterally.  Abd: Soft, nondistended, nontender. No masses.  Back: Tender to palpation in lower back. No skin changes. No bony abnormalities.   Neuro: No apparent focal deficits.  Psych: Appropriate mood and affect.    Data   Recent Labs   Lab 01/29/22  0654 01/28/22  2116 01/28/22  1756 01/28/22  0730 01/28/22  0629 01/28/22  0458 01/28/22  0356 01/28/22  0023 01/27/22  2156   WBC 4.1  --   --   --   --   --  4.7  --  6.4   HGB 10.4*  --   --   --   --   --  10.3*  --  10.7*   MCV 97  --   --   --   --   --  87  --  87     --   --   --   --   --  178  --  184   INR 2.32*  --   --   --  2.16*  --   --   --   --      --  139  --  143  --   --   --  140   POTASSIUM 5.2  --  5.4*  --  5.2  --  4.7   < > 6.1*   CHLORIDE 112*  --  114*  --  114*  --   --   --  112*   CO2 26  --  22  --  24  --   --   --  22   BUN 54*  --  54*  --  55*  --   --   --  61*   CR 2.57*  --  2.51*  --  2.62*  --   --   --  2.69*   ANIONGAP 5  --  3  --  5  --   --   --  6   JOHN 8.6  --  8.5  --  8.8  --   --   --  8.8   GLC 96 99 90   < > 92   < >  --    < > 93   ALBUMIN 2.7*  --   --   --  2.8*  --   --   --  3.2*   PROTTOTAL 7.2  --   --   --  7.7  --   --   --  8.1   BILITOTAL 0.4  --   --   --  0.6  --   --   --  0.7   ALKPHOS 72  --   --   --  62  --   --   --  68   ALT 13  --   --   --  15  --   --   --  16   AST 23  --   --   --  16  --   --   --  17    < > = values in this interval not displayed.     No results  found for this or any previous visit (from the past 24 hour(s)).     Internal Medicine Staff Addendum  Date of Service: 1/29/2022  I have seen and examined Panda Miranda with the resident team, reviewed the data and discussed the plan of care with the patient and the care team on P&FC Rounds.  I agree with the above documentation     I discussed pt's care with bedside RN, case management/social work today.  I personally reviewed labs, medications and past 24 hr notes.  Assessment/Plan/Diagnoses: plan/dx as above, which contains my edits and reflects our joint medical decision-making.     Taurus Costello MD  Internal Medicine/Pediatrics Hospitalist & Staff Physician   of Internal Medicine and Pediatrics  [facility]  Pager: [phone]

## 2022-01-29 NOTE — PROGRESS NOTES
Observation Goals    - Diagnostic tests and consults completed and met: Not met, awaiting Echo  - VS normal or at baseline: met -occasional soft BPs, otherwise WNL.  - Tolerating PO intake to maintain hydration: met  - Adequate pain control on PO analgesics: met  - Tolerating PO abx or has plan for home infusion setup: not met - receiving IV rocephin q 12h, no plan as BCx pending  - Infection is improving: met  - Returns to baseline function status: met  - Stable labs: met

## 2022-01-29 NOTE — PROGRESS NOTES
[facility]    Medicine Progress Note - ED Observation Service    Date of Admission:  1/27/2022    Assessment & Plan        Panda Miranda is a 66 year old male admitted on 1/27/2022. He has a history of HFpEF, mild-mod mitral regurgitation, permanent atrial fibrillation (on Warfarin), perimembranous VSD, HTN, CKD 3, TASHA (CPAP), gout, morbid obesity s/p VBG ring (5/21) presenting with complaints of back pain.      ##. UTI: Dysuria, frequency, urgency. No hematuria. No fever, chills. In ED, HR 66 /66,Temp 98.4  F . Today has been afebrile, BP some 80's/50's-60's, with 9PM 117/67. UA with positive nitrite, trace LE, few bacteria, 1 WBC, 11 hyaline cast. UCx sent and pending. Normal lactic acid 1.5. No leukocytosis. Procal 0.05. CRP 22 on admission and 19 in AM.  Blood culture sent d/t soft BP's.   - Encouraged to drink to thirst  - Continue Rocephin 12h  - Tylenol prn  - Follow UCx  - Follow BCx  - Check PVR's   - Repeat CBC, CRP, procalcitonin in AM    ##. Hypotension: BP's 80's/50's. Previous clinic visits: /60.  -Hold PTA Lisinopril 2/2 CKD/hyperkalemia. Hold PTA Norvasc and Metoprolol also d/t soft BP's.  Blood culture sent.  - Continue to monitor BP  - Follow up Blood Cx     ##. Muscle Spasms  ## Acute on chronic back pain   Patient presenting with acute on chronic onset back pain. No evidence for cauda equina, spinal infection, bony injury, other significant pathology. The patient did not have any urinary incontinence, bowel incontinence, saddle anesthesia, fever, or weight loss that would warrant imaging.Reports every 3 years, his back pain flares up. Patient cannot pin point what has caused this flare up. Previously with flare ups, had taken Tylenol 3 and Flexeril with improvement of back pain. Last ED visit for back pain was in 2019. PT consulted and at this time recommend patient discharge home when medically stable although patient at high risk for  deconditioning and may require TCU if hospitalized for prolonged period. PT to continue to follow and update as appropriate. Patient reports less spasms with moving.   - Scheduled Tylenol 975mg q6h  - Oxycodone prn  - Lidoderm patch  - Continue Flexeril 10mg TID and change to prn when improved. Consider trial of Zanaflex or Robaxin  - PT will see patient tomorrow.      ##. Hyperkalemia - resolved   ##. CKD 3  Baseline Cr 2.1-2.9. Currently Cr 2.62. K 6.1. In the ED the patient was given D50% 25ml, 10 units regular insulin, D10% 300ml for hyperkalemia. Repeat K 5.2 and then 5.4 later.  - Telemetry  - Hold Nephrotoxins - cochicine, lisinopril  - Repeat BMP in AM  - Consider Nephrology consult if worsening Cr and recurrent hyperkalemia as well as HTN management     ##. Asymptomatic Positive Covid: Denies any symptoms currently.3 weeks ago,upper respiratory infection with cough producing bloody phlegm, shortness of breath and fever about 3 weeks ago. He was evaluated by outpatient provider for concerns of viral URI, but, per patient and chart review, no testing or treatment provided. Symptoms have since resolved. Covid 19 PCR positive.  Receive Covid 19 Vaccine #1- 2 weeks ago. Due for dose #2 on 2/2/22. Chest xray shows: Cardiomegaly with mild perihilar interstitial opacities, favor mild pulmonary edema.  - Continue PTA Warfarin for DVT prophylaxis     ##. Diarrhea: x 2-3 weeks   - Enteric precautions  - Send stool studies     --- CHRONIC ISSUES ---  ##. HFpEF  ##. Mild-Moderate Mitral Regurgitation  ##. Small, perimembranous VSD   Last BNP 9K in June 2021. -485lbs. [place] [facility] [place]   Wt Readings from Last 10 Encounters:   12/07/21 (!) 209.3 kg (461 lb 8 oz)   09/08/21 (!) 206.9 kg (456 lb 3.2 oz)   06/17/21 (!) 209.7 kg (462 lb 4.9 oz)   06/09/21 (!) 220 kg (485 lb)   06/01/21 (!) 220.1 kg (485 lb 3.2 oz)   05/17/21 (!) 219.2 kg (483 lb 3.2 oz)   03/29/21 (!) 216.8 kg (478 lb)   02/23/21 (!) 216 kg  "(476 lb 1.6 oz)   01/26/21 (!) 216.8 kg (478 lb)   01/05/21 (!) 220 kg (485 lb)     - Continue PTA Bumex  - Strict I/O  - Daily weights  - BID BMP     ##. Permanent Atrial Fibrillation: Currently in afib, HR 66. CHADSVASC 3, on chronic Coumadin. INR 2.16  - Hold PTA metoprolol until BP improve.   - Pharmacy to dose Warfarin with goal INR 2-3  - Daily INR    ##. Chronic Venous Stasis  ##. Chronic, non healing ulcers - Patient did not want to be assess by WO today. Reports he has Wound care every other day. Recs placed per WOCN nurse per chart review.   Chronic ulcers BLE  Left leg wounds: Every other day   Cleanse with wound cleanser and pat dry  Cover open wound with single layer of adaptic and mepilex  Can use lymphedema wraps over this dressing     ##. Morbid Obesity: Patient historically 880 pounds, lost 400 pounds prior to bariatric surgery. S/p vertical banded gastroplasty 5/21. Working with bariatric surgery for possible VBG ring removal     ##. Gout: - Discontinued PTA Uloric per patients request as he states he is not taking it at home  -Hold Colchicine 2/2 SHANTE     ##. TASHA: - Continue CPAP at night     ##. IRA: - Continue PTA Iron     ## Right knee pain: Received Steroid injection pm 1/11/22        # Confirmed COVID-19 infection    # Acute Hypoxic Respiratory Failure secondary to COVID-19 infection     Symptom Onset Date used to determine duration of isolation.  If unsure, enter \"unknown\"   Date of 1st Positive Test unknown   Vaccination Status Partially Vaccinated         - COVID-19 special precautions, continuous pulse-ox  - Oxygen: continue current support with none; titrate to keep SpO2 between 90-96%  - Labs: ---------------------------LABS RECOMMENDED DAILY--------------------------------------- Daily COVID labs ordered x4 days (CBC, BMP, D-dimer, CRP).  Continue to trend after 4 days as needed.   - Imaging: Chest xray:Cardiomegaly with mild perihilar interstitial opacities, favor mild  pulmonary " edema.  - Breathing treatments: no inhalers needed; avoid nebulizers in favor of MDIs   - IV fluids: not indicated at this time  - Antibiotics: indicated due to concern of bacterial superinfection; Ceftriaxone ordered   - COVID-Focused Medications: No COVID-specific therapies are appropriate at this time.  - DVT Prophylaxis: at high risk of thrombotic complications due to COVID-19 (DDimer = N/A ).          - Already on therapeutic anticoagulation with warfarin        - consider anticoag on discharge for 30 days & until return to normal mobility        Diet:   renal diet  DVT Prophylaxis: Warfarin  Cheema Catheter: Not present  Central Lines: None  Cardiac Monitoring: None  Code Status:   full code        Disposition Plan   Expected Discharge: 2-3 days  Anticipated discharge location: Home vs TCU.   Delays: Pain not improved         The patient's care was discussed with the Attending Physician, Dr. Dawson, Bedside Nurse, Care Coordinator/ and Patient.    SAOL Alegria  Hospitalist Service  [facility]  Securely message with the Vocera Web Console (learn more here)  Text page via Instant[employer] Paging/Directory   _____________________________________________________________________    Interval History   Admitted overnight     Data reviewed today: I reviewed all medications, new labs and imaging results over the last 24 hours.    Physical Exam   Vital Signs: Temp: 97.3  F (36.3  C) Temp src: Axillary BP: 117/67 Pulse: 74   Resp: 14 SpO2: 93 % O2 Device: None (Room air)    Weight: 0 lbs 0 oz  Constitutional: morbidly obese, awake, alert, cooperative, no apparent distress, and appears stated age  Eyes: Lids and lashes normal, pupils equal, round and reactive to light, extra ocular muscles intact, sclera clear, conjunctiva normal  ENT: Normocephalic, without obvious abnormality, atraumatic, sinuses nontender on palpation, external ears without lesions, oral pharynx  with moist mucous membranes, tonsils without erythema or exudates, gums normal and good dentition.  Hematologic / Lymphatic: no cervical lymphadenopathy  Respiratory: No increased work of breathing, good air exchange, clear to auscultation bilaterally, no crackles or wheezing  Cardiovascular: Normal apical impulse, regular rate and rhythm, normal S1 and S2, no S3 or S4, and no murmur noted  GI: No scars, normal bowel sounds, soft, non-distended, non-tender, no masses palpated, no hepatosplenomegally  Skin: no bruising or bleeding  Musculoskeletal: There is no redness, warmth, or swelling of the joints.  TTP lower back. Full range of motion noted.  Motor strength is 5 out of 5 all extremities bilaterally.  Tone is normal.   Ext: BLE edema.   Neurologic: Awake, alert, oriented to name, place and time.  Cranial nerves II-XII are grossly intact.  Motor is 5 out of 5 bilaterally.  Cerebellar finger to nose, heel to shin intact.  Sensory is intact.  Babinski down going, Romberg negative, and gait is normal.  Neuropsychiatric: General: normal, calm and normal eye contact       Data   Recent Labs   Lab 01/28/22  2116 01/28/22  1756 01/28/22  1747 01/28/22  0730 01/28/22  0629 01/28/22  0458 01/28/22  0356 01/28/22  0023 01/27/22  2156   WBC  --   --   --   --   --   --  4.7  --  6.4   HGB  --   --   --   --   --   --  10.3*  --  10.7*   MCV  --   --   --   --   --   --  87  --  87   PLT  --   --   --   --   --   --  178  --  184   INR  --   --   --   --  2.16*  --   --   --   --    NA  --  139  --   --  143  --   --   --  140   POTASSIUM  --  5.4*  --   --  5.2  --  4.7   < > 6.1*   CHLORIDE  --  114*  --   --  114*  --   --   --  112*   CO2  --  22  --   --  24  --   --   --  22   BUN  --  54*  --   --  55*  --   --   --  61*   CR  --  2.51*  --   --  2.62*  --   --   --  2.69*   ANIONGAP  --  3  --   --  5  --   --   --  6   JOHN  --  8.5  --   --  8.8  --   --   --  8.8   GLC 99 90 96   < > 92   < >  --    < > 93    ALBUMIN  --   --   --   --  2.8*  --   --   --  3.2*   PROTTOTAL  --   --   --   --  7.7  --   --   --  8.1   BILITOTAL  --   --   --   --  0.6  --   --   --  0.7   ALKPHOS  --   --   --   --  62  --   --   --  68   ALT  --   --   --   --  15  --   --   --  16   AST  --   --   --   --  16  --   --   --  17    < > = values in this interval not displayed.     Recent Results (from the past 24 hour(s))   XR Chest 1 View    Narrative    EXAM: XR CHEST 1 VIEW  1/28/2022 9:11 AM     HISTORY:  COVID       COMPARISON:  Chest x-ray 6/15/2021    FINDINGS: AP radiograph of the chest. Enlargement of the  cardiomediastinal silhouette. Prominence of the main pulmonary artery  segment. No pneumothorax or significant pleural effusion. Mild  perihilar interstitial opacities. No focal airspace opacity.  Visualized upper abdomen is unremarkable. No acute osseous modality.      Impression    IMPRESSION:  Cardiomegaly with mild perihilar interstitial opacities, favor mild  pulmonary edema. Cannot entirely exclude infection given the patient's  history of COVID-19 infection. Prominent main pulmonary artery segment  suggestive of pulmonary hypertension.    I have personally reviewed the examination and initial interpretation  and I agree with the findings.    BRISSA ROSALES MD         SYSTEM ID:  [id number]     Medications     Warfarin Therapy Reminder         acetaminophen  975 mg Oral Q6H     [Held by provider] amLODIPine  5 mg Oral Daily     bumetanide  3 mg Oral BID     cefTRIAXone  1 g Intravenous Q12H     [Held by provider] colchicine  0.6 mg Oral Daily     cyclobenzaprine  10 mg Oral TID     ferrous sulfate  325 mg Oral Daily     lidocaine  3 patch Transdermal Q24H     lidocaine   Transdermal Q8H     [Held by provider] lisinopril  10 mg Oral Daily     [Held by provider] metoprolol tartrate  100 mg Oral BID

## 2022-01-29 NOTE — PLAN OF CARE
Problem: Adult Inpatient Plan of Care  Goal: Plan of Care Review  Outcome: Improving  Flowsheets (Taken 1/29/2022 0950)  Plan of Care Reviewed With: patient  Progress: improving     Time: 7641-3610    Admission/Diagnosis:  Hyperkalemia [E87.5]  Back pain [M54.9]  Urinary tract infection [N39.0]    /41 (BP Location: Left arm)   Pulse 102   Temp 97.7  F (36.5  C) (Oral)   Resp 18   Wt (!) 199.2 kg (439 lb 2.5 oz)   SpO2 96%   BMI 50.11 kg/m      Shift Updates: Pt A&Ox4, VSS on RA ex soft BPs and intermittent mild tachycardia, denies nausea. C/o intermittent spasm in L ankle d/t venous ulcer, on scheduled Tylenol. IV Rocephin continues, BCx pending. K down trending, 5.2 today. Discontinued C.diff/Enteric panel d/t no BM >3 days; passing flatus. Cardiology consulted for diuresis/fluid management and Echo completed, results pending. Lymphedema consulted for wrap management.     Plan: Expected Discharge: 2-3 days. Anticipated discharge location: Home vs TCU.     Continue to monitor and with POC.

## 2022-01-29 NOTE — CONSULTS
Care Management Initial Consult      General Information  Assessment completed with: chart review (due to covid status tried to call patient on his hospital phone, no answer, will try to callback another day)  Type of CM/SW Visit: Offer D/C Planning  Primary Care Provider verified and updated as needed: Yes   PCP:   Ben Hamlin MD       Primary Location:   [facility]     Readmission within the last 30 days: no previous admission in last 30 days   Reason for Consult: discharge planning  Advance Care Planning: no scanned documents on file           Communication Assessment  Patient's communication style:   spoken language (English)    Hearing Difficulty or Deaf: no   Wear Glasses or Blind: no      Cognitive  Cognitive/Neuro/Behavioral: WDL                        Living Environment:   People in home: spouse     Current living Arrangements: house      Home Accessibility stairs to enter home  stairs within home   Number of Stairs, Main Entrance 5   Number of Stairs, Within Home, Primary 16 up to bedroom,   14 down to basement   Able to return to prior arrangements: yes       Family/Social Support:  Care provided by: self,spouse  Marital Status:   Description of Support System: Supportive,Involved         Current Resources:   Patient receiving home care services: reported to therapist he has wound care 3x per week - unknown agency  Skilled Home Care Services: wound care 3x per week  Community Resources:  none  Equipment currently used at home: cane, straight  Supplies currently used at home: Wound Care Supplies    Indication: Atrial Fibrillation  Therapy Goal: INR 2-3  Provider/Team: Ben Hamlin MD  OP Anticoag Clinic: [facility]  Warfarin Prior to Admission: Yes  Warfarin PTA Regimen: 7.5 mg every Tue, Thu; 5 mg all other days      Employment/Financial:  Employment Status: retired          Functional Status:  Prior to admission patient needed assistance:  Per therapist  note - patient states that he typically has back pain in the AM that loosens up with seated A/P rocking. Patient mod I with SPC for mobility. Patient does endorse baseline difficulty ambulating specifically with stairs.       Additional Information:  PT recommendations me with assist; home with outpatient physical therapy     Patient with greatly impaired mobility at this time but this is likely due to prolonged immobility in bed. Throughout session mobility gradually improved following pattern that it typically does at home for patient. At this time recommend patient discharge home when medically stable although patient at high risk for deconditioning and may require TCU if hospitalized for prolonged period. PT to continue to follow and update as appropriate       Care Management Staff will continue to follow for discharge planning and care progression needs.   Penny Bullock RN, BSN, PHN  Care Coordinator  [facility]  [address]  Desk phone & confidential voicemail: [phone]  Pager: [phone]    To contact the Nurse Care Coordinator Team on weekends & holiday's:  [place]    Pager [phone]  [facility] (Sunday Only) Pager [phone]    To contact the Social Work Team on weekends & holiday's:  4A, 4C, 4E, 5A, 5B  Pager [phone]  6A, 6B, 6C, 6D   Pager [phone]  7A, 7B, 7C, 7D, 5C  Pager [phone]  [facility] (Saturday Only) Pager [phone]

## 2022-01-29 NOTE — PLAN OF CARE
Time: 8423-3541    Reason for admit: Hyperkalemia [E87.5]  Sickle cell crisis (H) [D57.00]  Back pain [M54.9]  Urinary tract infection [N39.0]  Vitals: Soft Bps- baseline for pt this hospital stay.   Activity: In bed all shift.   Neuro: A&O x 4.  Hand grasps equal and strong.  Able to follow instructions and speech is clear.   Mood/behavior: Pleasant and cooperative.  Lines/drains: PIV SL.   Cardiac: On tele- reading a-fib  Respiratory: Productive cough this shift.  Pt is COVID +.  Secretions are clear.  Diminished lung sounds throughout. Maintaining sats on room air.   Diet: regular/renal.  GI/: Voids spontaneously and without difficulty.  Bowel sounds active x 4.  Last BM was yesterday.   Skin: Pt reports wound on L leg.  States dressing is only to be changed tomorrow.  WOC consult in. Dry, cracked feet. Large vertical scar on abdomen.  Edema noted to both ankles and feet.   Pain: c/o back spasms.  At rest, pain is 2/10.  When back starts to spasm it shoots up to 10/10 per pt report.     New this shift: admitted to obs on 5A.    Plan:  Discharge when pt meets obs goals.

## 2022-01-29 NOTE — UTILIZATION REVIEW
Admission Status; Secondary Review Determination     Admission Date: 1/27/2022  8:02 PM       Under the authority of the Utilization Management Committee, the utilization review process indicated a secondary review on the above patient.  The review outcome is based on review of the medical records, discussions with staff, and applying clinical experience noted on the date of the review.        (x)      Inpatient Status Appropriate - This patient's medical care is consistent with medical management for inpatient care and reasonable inpatient medical practice.       RATIONALE FOR DETERMINATION      Brief clinical presentation, information copied from the chart, abbreviated and edited for relevant content:     Panda Miranda is a 66 year old male admitted on 1/27/2022. He has a history of HFpEF, mild-mod mitral regurgitation, permanent atrial fibrillation (on Warfarin), perimembranous VSD, HTN, CKD 3, TASHA (CPAP), gout, morbid obesity s/p VBG ring (5/21) presented with complaints of back pain.  Admitted with UTI with  Dysuria, frequency, urgency. UA with positive nitrite, trace LE, few bacteria, 1 WBC, 11 hyaline cast. UCx sent. Procal 0.05. CRP 22 on admission and 19 in AM.  Blood culture sent d/t soft BP's. Plan to Continue Rocephin 12h, Follow UCx  And BCx. Also with Hypotension: BP's 80's/50's. Previous clinic visits: /60. Holding BP medications and following. Patient also with acute on chronic onset back pain. Treating symptomatically.   Also with Asymptomatic Positive Covid:       At the time of admission with the information available to the attending physician, more than 2 nights hospital complex care was anticipated. Also, there was a risk of adverse outcome if patient was treated outpatient or observation. High intensity of services anticipated. Inpatient admission appropriate based on Medicare guidelines.       The information on this document is developed by the utilization review team in order for  the business office to ensure compliance.  This only denotes the appropriateness of proper admission status and does not reflect the quality of care rendered.         The definitions of Inpatient Status and Observation Status used in making the determination above are those provided in the CMS Coverage Manual, Chapter 1 and Chapter 6, section 70.4.      Sincerely,      Bhavana Solis MD   Utilization Review/ Case Management  [facility].

## 2022-01-30 ENCOUNTER — APPOINTMENT (OUTPATIENT)
Dept: OCCUPATIONAL THERAPY | Facility: CLINIC | Age: 67
DRG: 871 | End: 2022-01-30
Payer: COMMERCIAL

## 2022-01-30 VITALS
WEIGHT: 315 LBS | HEART RATE: 92 BPM | RESPIRATION RATE: 16 BRPM | BODY MASS INDEX: 49.41 KG/M2 | DIASTOLIC BLOOD PRESSURE: 54 MMHG | TEMPERATURE: 98.5 F | SYSTOLIC BLOOD PRESSURE: 105 MMHG | OXYGEN SATURATION: 95 %

## 2022-01-30 LAB
ANION GAP SERPL CALCULATED.3IONS-SCNC: 4 MMOL/L (ref 3–14)
ATRIAL RATE - MUSE: 60 BPM
BASOPHILS # BLD AUTO: 0 10E3/UL (ref 0–0.2)
BASOPHILS NFR BLD AUTO: 1 %
BUN SERPL-MCNC: 49 MG/DL (ref 7–30)
CALCIUM SERPL-MCNC: 8.6 MG/DL (ref 8.5–10.1)
CHLORIDE BLD-SCNC: 112 MMOL/L (ref 94–109)
CO2 SERPL-SCNC: 25 MMOL/L (ref 20–32)
CREAT SERPL-MCNC: 2.65 MG/DL (ref 0.66–1.25)
CRP SERPL-MCNC: 18 MG/L (ref 0–8)
DIASTOLIC BLOOD PRESSURE - MUSE: NORMAL MMHG
ELLIPTOCYTES BLD QL SMEAR: SLIGHT
EOSINOPHIL # BLD AUTO: 0.3 10E3/UL (ref 0–0.7)
EOSINOPHIL NFR BLD AUTO: 4 %
ERYTHROCYTE [DISTWIDTH] IN BLOOD BY AUTOMATED COUNT: 15.3 % (ref 10–15)
ERYTHROCYTE [DISTWIDTH] IN BLOOD BY AUTOMATED COUNT: 18.8 % (ref 10–15)
GFR SERPL CREATININE-BSD FRML MDRD: 26 ML/MIN/1.73M2
GLUCOSE BLD-MCNC: 90 MG/DL (ref 70–99)
HCT VFR BLD AUTO: 29.8 % (ref 40–53)
HCT VFR BLD AUTO: 33.8 % (ref 40–53)
HGB BLD-MCNC: 10.1 G/DL (ref 13.3–17.7)
HGB BLD-MCNC: 10.4 G/DL (ref 13.3–17.7)
IMM GRANULOCYTES # BLD: 0 10E3/UL
IMM GRANULOCYTES NFR BLD: 1 %
INR PPP: 2.52 (ref 0.85–1.15)
INTERPRETATION ECG - MUSE: NORMAL
LACTATE SERPL-SCNC: 0.9 MMOL/L (ref 0.7–2)
LYMPHOCYTES # BLD AUTO: 2.2 10E3/UL (ref 0.8–5.3)
LYMPHOCYTES NFR BLD AUTO: 33 %
MCH RBC QN AUTO: 26.5 PG (ref 26.5–33)
MCH RBC QN AUTO: 31.9 PG (ref 26.5–33)
MCHC RBC AUTO-ENTMCNC: 29.9 G/DL (ref 31.5–36.5)
MCHC RBC AUTO-ENTMCNC: 34.9 G/DL (ref 31.5–36.5)
MCV RBC AUTO: 89 FL (ref 78–100)
MCV RBC AUTO: 91 FL (ref 78–100)
MONOCYTES # BLD AUTO: 0.7 10E3/UL (ref 0–1.3)
MONOCYTES NFR BLD AUTO: 11 %
NEUTROPHILS # BLD AUTO: 3.3 10E3/UL (ref 1.6–8.3)
NEUTROPHILS NFR BLD AUTO: 50 %
NRBC # BLD AUTO: 0 10E3/UL
NRBC BLD AUTO-RTO: 0 /100
NT-PROBNP SERPL-MCNC: 5790 PG/ML (ref 0–900)
P AXIS - MUSE: NORMAL DEGREES
PLAT MORPH BLD: ABNORMAL
PLATELET # BLD AUTO: 159 10E3/UL (ref 150–450)
PLATELET # BLD AUTO: 160 10E3/UL (ref 150–450)
POTASSIUM BLD-SCNC: 5.1 MMOL/L (ref 3.4–5.3)
PR INTERVAL - MUSE: NORMAL MS
QRS DURATION - MUSE: 114 MS
QT - MUSE: 402 MS
QTC - MUSE: 475 MS
R AXIS - MUSE: 4 DEGREES
RBC # BLD AUTO: 3.26 10E6/UL (ref 4.4–5.9)
RBC # BLD AUTO: 3.81 10E6/UL (ref 4.4–5.9)
RBC MORPH BLD: ABNORMAL
SODIUM SERPL-SCNC: 141 MMOL/L (ref 133–144)
SYSTOLIC BLOOD PRESSURE - MUSE: NORMAL MMHG
T AXIS - MUSE: 17 DEGREES
VENTRICULAR RATE- MUSE: 84 BPM
WBC # BLD AUTO: 5.1 10E3/UL (ref 4–11)
WBC # BLD AUTO: 6.5 10E3/UL (ref 4–11)

## 2022-01-30 PROCEDURE — 250N000011 HC RX IP 250 OP 636: Performed by: PHYSICIAN ASSISTANT

## 2022-01-30 PROCEDURE — 36415 COLL VENOUS BLD VENIPUNCTURE: CPT | Performed by: STUDENT IN AN ORGANIZED HEALTH CARE EDUCATION/TRAINING PROGRAM

## 2022-01-30 PROCEDURE — 83880 ASSAY OF NATRIURETIC PEPTIDE: CPT | Performed by: STUDENT IN AN ORGANIZED HEALTH CARE EDUCATION/TRAINING PROGRAM

## 2022-01-30 PROCEDURE — 97140 MANUAL THERAPY 1/> REGIONS: CPT | Mod: GO | Performed by: OCCUPATIONAL THERAPIST

## 2022-01-30 PROCEDURE — 250N000013 HC RX MED GY IP 250 OP 250 PS 637: Performed by: NURSE PRACTITIONER

## 2022-01-30 PROCEDURE — 83605 ASSAY OF LACTIC ACID: CPT | Performed by: STUDENT IN AN ORGANIZED HEALTH CARE EDUCATION/TRAINING PROGRAM

## 2022-01-30 PROCEDURE — 82310 ASSAY OF CALCIUM: CPT

## 2022-01-30 PROCEDURE — 85027 COMPLETE CBC AUTOMATED: CPT | Performed by: STUDENT IN AN ORGANIZED HEALTH CARE EDUCATION/TRAINING PROGRAM

## 2022-01-30 PROCEDURE — 85610 PROTHROMBIN TIME: CPT | Performed by: EMERGENCY MEDICINE

## 2022-01-30 PROCEDURE — 86140 C-REACTIVE PROTEIN: CPT

## 2022-01-30 PROCEDURE — 36415 COLL VENOUS BLD VENIPUNCTURE: CPT

## 2022-01-30 PROCEDURE — 99239 HOSP IP/OBS DSCHRG MGMT >30: CPT | Mod: GC | Performed by: INTERNAL MEDICINE

## 2022-01-30 PROCEDURE — 85027 COMPLETE CBC AUTOMATED: CPT

## 2022-01-30 PROCEDURE — 250N000013 HC RX MED GY IP 250 OP 250 PS 637: Performed by: PHYSICIAN ASSISTANT

## 2022-01-30 RX ORDER — WARFARIN SODIUM 5 MG/1
TABLET ORAL
Qty: 100 TABLET | Refills: 1 | Status: ON HOLD
Start: 2022-01-30 | End: 2022-02-17

## 2022-01-30 RX ORDER — LEVOFLOXACIN 750 MG/1
750 TABLET, FILM COATED ORAL EVERY OTHER DAY
Qty: 3 TABLET | Refills: 0 | Status: SHIPPED | OUTPATIENT
Start: 2022-01-30 | End: 2022-02-10

## 2022-01-30 RX ORDER — WARFARIN SODIUM 5 MG/1
5 TABLET ORAL
Status: DISCONTINUED | OUTPATIENT
Start: 2022-01-30 | End: 2022-01-30 | Stop reason: HOSPADM

## 2022-01-30 RX ADMIN — CEFTRIAXONE SODIUM 1 G: 1 INJECTION, POWDER, FOR SOLUTION INTRAMUSCULAR; INTRAVENOUS at 10:49

## 2022-01-30 RX ADMIN — FERROUS SULFATE TAB 325 MG (65 MG ELEMENTAL FE) 325 MG: 325 (65 FE) TAB at 08:20

## 2022-01-30 RX ADMIN — CYCLOBENZAPRINE HYDROCHLORIDE 10 MG: 5 TABLET, FILM COATED ORAL at 08:20

## 2022-01-30 RX ADMIN — ACETAMINOPHEN 975 MG: 325 TABLET, FILM COATED ORAL at 08:20

## 2022-01-30 ASSESSMENT — ACTIVITIES OF DAILY LIVING (ADL)
ADLS_ACUITY_SCORE: 10
ADLS_ACUITY_SCORE: 13
ADLS_ACUITY_SCORE: 10
ADLS_ACUITY_SCORE: 10
ADLS_ACUITY_SCORE: 13
ADLS_ACUITY_SCORE: 10
ADLS_ACUITY_SCORE: 13

## 2022-01-30 NOTE — DISCHARGE SUMMARY
[facility]  Discharge Summary - Medicine & Pediatrics       Date of Admission:  1/27/2022  Date of Discharge:  1/30/2022  Discharging Provider: Beau   Discharge Service: Medicine Service, [facility] TEAM 2    Discharge Diagnoses     #Sepsis, likely d/t complicated UTI vs pyelonephritis  #Hypotension resolving   #Acute on chronic back pain, resolved   #Afib  # Stage 3 CKD  # Hyperkalemia, resolved  # COVID-19 (+) 1/27/22, resolving  ##. HFpEF, 45-50%  ##. Acute on chronic pulm edema  ##. Mild-Moderate Mitral Regurgitation  ##. Small, perimembranous VSD       Follow-ups Needed After Discharge   Follow-up Appointments     Adult [facility] Follow-up and recommended labs and tests      Follow up with primary care provider, Ben Hamlin, within 7 days to   evaluate medication change.  The following labs/tests are recommended:   BNP, CMP. Patient's metoprolol, amlodipine, lisinopril and bumex held for   hypotension. Please slowly resume as tolerable. .      Appointments on [place] and/or [facility] (with [facility] or [facility]   provider or service). Call [phone] if you haven't heard regarding   these appointments within 7 days of discharge.         Additional important follow-up instructions/to-do's for PCP:  Please follow up in 3-5 days to determine restart of antiHTN medications  Please recheck INR in setting of levofloxacin use     Unresulted Labs Ordered in the Past 30 Days of this Admission     Date and Time Order Name Status Description    1/28/2022 11:37 AM Blood Culture Arm, Right Preliminary     1/28/2022 11:37 AM Blood Culture Arm, Left Preliminary       These results will be followed up by PCP    Discharge Disposition   Discharged to home  Condition at discharge: Good    Hospital Course   Panda Miranda was admitted on 1/27/2022 for acute on chronic back pain awaking him from his sleep, he described as muscle spasms. Tested positive for COVID-19 on admit, in the  setting of recent URI that treated outpt. Exam was negative for focal, bony tenderness. CXR negative  Was found to have sepsis 2/2 complicated UTI, and was started on IV ceftriaxone with improvement of his lower back pain. His stay was complicated by asymptomatic hypotension likely due to sepsis, recent history of diarrhea, and poor PO intake in the setting of acute illness. Home antiHTN medications were held, and his hypotension improved following fluid resuscitation and IV albumin. Transitioned to PO abx prior to discharge The following problems were addressed during his hospitalization:    #Sepsis, likely d/t complicated UTI vs pyelonephritis  #Hypotension resolving  Sepsis vs dehydration from diarrhea, vomiting, and poor PO intake. In ED 1/27: HR 66, /66, temp 98.4  F. In ED after admission, patient became increasingly hypotensive with BP dipping to 70s-80s/50s-60s. Urine culture positive for E.coli 1/27. WBC, lactic acid, and procalcitonin wnl 1/29.      Patient was started on IV ceftriaxone Q12 hours. Anti-HTN medications were held, IV fluids administered, and 5% IV albumin administered. MAPs recovered. Repeat lactate 1/30 prior to discharge normal.     -Levofloxacin 750mg q48 hours 1/30 - 2/3   -Hold PTA bumex on discharge until PCP follow up   -Hold PTA metoprolol on discharge until  PCP follow up   -Hold PTA lisinopril on discharge until PCP follow up   -Hold PTA amlodipine on discharge until PCP follow up       # Atrial fibrillation  Currently in atrial fibrillation. On chronic warfarin. INR within therapeutic range, and rate controlled. Metoprolol held d/t low pressures.     -Hold PTA metoprolol on discharge until PCP follow up   -Follow up with INR clinic Tuesday 2/2 for INR recheck in the setting of levofloxacin administration    #Acute on chronic back pain, resolved  Patient presented in ED on 1/27 with acute on chronic back pain. Reports that he has had similar flares for years. No red flag  symptoms. Has improved with flexeril, tylenol, and abx. Likely d/t acute complicated UTI.     # Stage 3 CKD  # Hyperkalemia, resolved  Potassium in ED 1/27 was 6.1. In the ED the patient was given D50% 25ml, 10 units regular insulin, and D10% 300ml for hyperkalemia. Potassium downtrended and is wnl. 1/29 creatinine 2.57 which is within patient's baseline.    ##. HFpEF, EF 45-50%   ##. Mild-Moderate Mitral Regurgitation  ##. Small, perimembranous VSD  ## Acute on Chronic pulm edema  Initially concerning for heart failure exacerbation.  Echocardiogram completed inpatient, EF 40-50%. No evidence of hypervolemia. BNP 5,790 on discharge.    # COVID-19 (+) 1/27/22, resolving  Denies any current symptoms. Had URI prior to presentation in addition to diarrhea. No further diarrhea since admission.      #Gout  No acute flare inpatient. Reports not taking PTA medications for gout at this time. Continued to hold.     Consultations This Hospital Stay   PHARMACY TO DOSE WARFARIN  PHYSICAL THERAPY ADULT IP CONSULT  CARE MANAGEMENT / SOCIAL WORK IP CONSULT  WOUND OSTOMY CONTINENCE NURSE  IP CONSULT  VASCULAR ACCESS CARE ADULT IP CONSULT  CARDIOLOGY HEART FAILURE (HF) IP CONSULT  LYMPHEDEMA THERAPY IP CONSULT  VASCULAR ACCESS CARE ADULT IP CONSULT  VASCULAR ACCESS CARE ADULT IP CONSULT    Code Status   Full Code       The patient was discussed with Dr. Trang Yanez MD  [facility] [address] [address]  Phone: [phone]  ______________________________________________________________________    Physical Exam   Vital Signs: Temp: 98.5  F (36.9  C) Temp src: Oral BP: 105/54 Pulse: 92   Resp: 16 SpO2: 95 % O2 Device: None (Room air)    Weight: 433 lbs 1.6 oz    Physical Exam  Constitutional:       General: He is not in acute distress.  HENT:      Head: Normocephalic.      Mouth/Throat:      Mouth: Mucous membranes are moist.   Eyes:      General: No scleral icterus.      Conjunctiva/sclera: Conjunctivae normal.   Cardiovascular:      Rate and Rhythm: Normal rate. Rhythm irregular.      Pulses: Normal pulses.   Pulmonary:      Effort: Pulmonary effort is normal. No respiratory distress.      Breath sounds: No wheezing.   Abdominal:      General: There is no distension.      Palpations: Abdomen is soft. There is no mass.      Tenderness: There is no abdominal tenderness.   Musculoskeletal:      Right lower leg: Edema present.      Left lower leg: Edema present.   Skin:     General: Skin is warm.      Capillary Refill: Capillary refill takes less than 2 seconds.      Coloration: Skin is not jaundiced.   Neurological:      General: No focal deficit present.      Mental Status: He is alert and oriented to person, place, and time.           Primary Care Physician   Ben Hamlin    Discharge Orders      INR     Reason for your hospital stay    Panda Miranda is a 66 year old male with a history of HFpEF, mild-mod mitral regurgitation, permanent atrial fibrillation (on warfarin), perimembranous VSD, HTN, CKD 3, TASHA (on CPAP), gout, morbid obesity s/p VBG ring (5/21), and recent COVID-19 infection (positive 1/27/22) admitted on 1/27/22 for evaluation of back pain. He was started on IV fluids and IV ceftriaxone for UTI vs pyelonephritis. Patient subsequently developed hypotension with concern for hypovolemia. Blood pressure has since been stable.     Activity    Your activity upon discharge: activity as tolerated     Adult [facility] Follow-up and recommended labs and tests    Follow up with primary care provider, Ben Hamlin, within 7 days to evaluate medication change.  The following labs/tests are recommended: BNP, CMP. Patient's metoprolol, amlodipine, lisinopril and bumex held for hypotension. Please slowly resume as tolerable. .      Appointments on [place] and/or [facility] (with [facility] or [facility] provider or service). Call [phone] if you haven't heard regarding these  appointments within 7 days of discharge.     Diet    Follow this diet upon discharge: Regular       Significant Results and Procedures   Results for orders placed or performed during the hospital encounter of 01/27/22   XR Chest 1 View    Narrative    EXAM: XR CHEST 1 VIEW  1/28/2022 9:11 AM     HISTORY:  COVID       COMPARISON:  Chest x-ray 6/15/2021    FINDINGS: AP radiograph of the chest. Enlargement of the  cardiomediastinal silhouette. Prominence of the main pulmonary artery  segment. No pneumothorax or significant pleural effusion. Mild  perihilar interstitial opacities. No focal airspace opacity.  Visualized upper abdomen is unremarkable. No acute osseous modality.      Impression    IMPRESSION:  Cardiomegaly with mild perihilar interstitial opacities, favor mild  pulmonary edema. Cannot entirely exclude infection given the patient's  history of COVID-19 infection. Prominent main pulmonary artery segment  suggestive of pulmonary hypertension.    I have personally reviewed the examination and initial interpretation  and I agree with the findings.    BRISSA ROSALES MD         SYSTEM ID:  [id number]       Discharge Medications   Current Discharge Medication List      START taking these medications    Details   levofloxacin (LEVAQUIN) 750 MG tablet Take 1 tablet (750 mg) by mouth every other day  Qty: 3 tablet, Refills: 0    Associated Diagnoses: Complicated UTI (urinary tract infection)         CONTINUE these medications which have CHANGED    Details   warfarin ANTICOAGULANT (COUMADIN) 5 MG tablet Take 5 to 7.5mg (1 to 1.5 tabs) by mouth daily OR AS DIRECTED.  Adjust dose based on INR.  Qty: 100 tablet, Refills: 1    Comments: Take only 5mg of warfarin while on your new antibiotic levofloxacin, and recheck INR in clinic by Tuesday 2/1/2022  Associated Diagnoses: Warfarin anticoagulation         CONTINUE these medications which have NOT CHANGED    Details   cholecalciferol 25 MCG (1000 UT) TABS Take 3,000 Units by  mouth      ferrous sulfate (FEROSUL) 325 (65 Fe) MG tablet Take 325 mg by mouth daily          STOP taking these medications       amLODIPine (NORVASC) 5 MG tablet Comments:   Reason for Stopping:         bumetanide (BUMEX) 1 MG tablet Comments:   Reason for Stopping:         colchicine (COLCYRS) 0.6 MG tablet Comments:   Reason for Stopping:         febuxostat (ULORIC) 80 MG TABS tablet Comments:   Reason for Stopping:         guaiFENesin-codeine (ROBITUSSIN AC) 100-10 MG/5ML solution Comments:   Reason for Stopping:         lidocaine (XYLOCAINE) 2 % external gel Comments:   Reason for Stopping:         lisinopril (ZESTRIL) 10 MG tablet Comments:   Reason for Stopping:         metoprolol tartrate (LOPRESSOR) 100 MG tablet Comments:   Reason for Stopping:             Allergies   No Known Allergies     Internal Medicine Staff Addendum  Date of Service: 1.30/2022  I have seen and examined Panda Miranda with the resident team, reviewed the data and discussed the plan of care with the patient and the care team on P&FC Rounds.  I agree with the above documentation.  I discussed pt's care with bedside RN, case management/social work today.  I personally reviewed imaging, labs, medications and past 24 hr notes.    45 minutes spent in discharge, including >50% in counseling and coordination of care, medication review and plan of care recommended on follow up. Questions were answered.   The patient's PCP was contacted electronically at the time of discharge, so as to bridge from hospital to outpatient care.   It was our pleasure to care for Panda Miranda during Panda Miranda's hospitalization. Please do not hesitate to contact me should there be questions regarding the hospital course or discharge plan.      Taurus Costello MD  Internal Medicine/Pediatrics Hospitalist & Staff Physician   of Internal Medicine and Pediatrics  [facility]  Pager: [phone]

## 2022-01-30 NOTE — PLAN OF CARE
A&O VSS on RA. BP improved 94/53 with Albumin infusion. 2g sodium Renal diet, good appetite. Voids in BSC. Tele, chronic Afib. Rocephin for UTI. Calls to make need known.

## 2022-01-30 NOTE — PROGRESS NOTES
Pt understood discharge orders/instructions : Yes.  Pt's belongings : Pt took.  Discharge medications : Pt will pick it up at his own pharmacy ( faxed).  Lines : PIV was removed.  How is pt getting home/transportion : Pt's brother.  Discharge papers : Given to the pt.

## 2022-01-30 NOTE — PLAN OF CARE
Time: 4600-9963    A&Ox4, VSS on RA overnight, BP has remained stable, 94/41 this AM, pt slept all night, refused scheduled tylenol at 0200, pt does not complain of any pain, continues on renal diet w/ 2g sodium restriction. Voids in the bedside urinal. Calls appropriately.     Plan: continue to monitor per POC

## 2022-01-30 NOTE — DISCHARGE SUMMARY
Observation Unit Transfer Summary     Patient ID:  Panda Miranda  MRN: [id number]  66 year old  YOB: 1955    Observation Admit Date: 1/27/2022    ED Admitting Attending: Taurus Costello MD    Transfer Date and Time: January 29, 2022 at 7:36 PM     Transferring Observation Provider: SALO Alegria    Admission Diagnoses:     1. Back pain    2. Urinary tract infection    3. Hyperkalemia        Transfer Diagnoses:    Hypotension      Emergency Department and Observation Course:   Panda Miranda is a 66 year old male with history of HFpEF, Afib on Coumadin, CKD, morbid obesity s/p gastroplasty band, HTN and gout presenting with complaint of back pain. Patient had worsening symptoms over 3 days PTA, waking him up from sleep, aggravated by walking, not improved with home Tylenol #3. Felt like spasms- intense and resolves spontaneously. No trauma or injury; has had similar past episode, 2019, resolved with flexeril. + Dysuria, frequency, urgency. No hematuria, fever, chills. BIBA; received 100 mcg fentanyl and 2.5 mg versed per EMS. In ED, HR 70's-80's, BP 80's-100's/60's-70's, RR 22, SaO2 % on RA, Temp 98.4  F . Labs show CMP with K 6.1, Cl 112, BUN 61, Cr 2.69, GFR 25, albumin 3.2 otherwise normal. CBC with H/H 10.7/35.0, RDW 22.3 otherwise unremarkable. Normal lactic acid 1.5. Covid 19 PCR positive. In ED the patient was given flexeril 10mg po x 1. For hyperkalemia received D50% 25ml, 10 units regular insulin, D10% 300ml. He was also given 1.5L LR and rocephin 1gm IV x 1.     Patient admitted to the Observation Unit for continued management.      ##. Pyelonephritis: Procal 0.05. CRP 22 on admission and 19 in AM.  Blood culture sent d/t soft BP's.   - Encouraged to drink to thirst  - Continue Rocephin 1gm 12h  - Tylenol prn  - Follow UCx  - Follow BCx  - Check PVR's   - Trend CBC, CRP, procalcitonin in AM     ##. Hypotension: BP's 80's/50's. Previous clinic visits: /60. Hold PTA  Lisinopril 2/2 CKD/hyperkalemia. Held PTA Norvasc and Metoprolol also d/t soft BP's.  Blood culture sent. Hypotension seems to be a problem of the past and Cardiology had made some med changes.  - Continue to monitor BP  - Follow up Blood Cx  - Check BNP  - Echocardiogram  - Cardiology consult     ##. Muscle Spasms  ## Acute on chronic back pain   No evidence for cauda equina, spinal infection, bony injury, other significant pathology. The patient did not have any urinary incontinence, bowel incontinence, saddle anesthesia, fever, or weight loss that would warrant imaging. PT consulted and at this time recommend patient discharge home when medically stable although patient at high risk for deconditioning and may require TCU if hospitalized for prolonged period. PT to continue to follow and update as appropriate. Patient reports less spasms with moving.   - Scheduled Tylenol 975mg q6h  - Oxycodone prn  - Lidoderm patch  - Continue Flexeril 10mg TID and change to prn when improved. Consider trial of Zanaflex or Robaxin  - PT will see patient tomorrow.      ##. Hyperkalemia - resolved   ##. CKD 3  Baseline Cr 2.1-2.9. Repeat K 5.2 and then 5.4 later.  - Telemetry  - Hold Nephrotoxins - cochicine, lisinopril  - Trend BMP in AM  - Consider Nephrology consult if worsening Cr and recurrent hyperkalemia as well as HTN management     ##. Asymptomatic Positive Covid: Denies any symptoms currently. 3 weeks ago,upper respiratory infection with cough producing bloody phlegm, shortness of breath and fever about 3 weeks ago. He was evaluated by outpatient provider for concerns of viral URI, but, per patient and chart review, no testing or treatment provided. Symptoms have since resolved. Covid 19 PCR positive.  Receive Covid 19 Vaccine #1- 2 weeks ago. Due for dose #2 on 2/2/22. Chest xray shows: Cardiomegaly with mild perihilar interstitial opacities, favor mild pulmonary edema.  - Continue PTA Warfarin for DVT  prophylaxis     ##. Diarrhea: x 2-3 weeks   - Enteric precautions  - Send stool studies     --- CHRONIC ISSUES ---  ##. HFpEF  ##. Mild-Moderate Mitral Regurgitation  ##. Small, perimembranous VSD   Last BNP 9K in June 2021. -485lbs. Primary cardiologist at [facility] [place] [facility].       Wt Readings from Last 10 Encounters:   12/07/21 (!) 209.3 kg (461 lb 8 oz)   09/08/21 (!) 206.9 kg (456 lb 3.2 oz)   06/17/21 (!) 209.7 kg (462 lb 4.9 oz)   06/09/21 (!) 220 kg (485 lb)   06/01/21 (!) 220.1 kg (485 lb 3.2 oz)   05/17/21 (!) 219.2 kg (483 lb 3.2 oz)   03/29/21 (!) 216.8 kg (478 lb)   02/23/21 (!) 216 kg (476 lb 1.6 oz)   01/26/21 (!) 216.8 kg (478 lb)   01/05/21 (!) 220 kg (485 lb)      - Continue PTA Bumex  - Strict I/O  - Daily weights  - BID BMP     ##. Permanent Atrial Fibrillation: Currently in afib, HR 66. CHADSVASC 3, on chronic Coumadin. INR 2.16  - Hold PTA metoprolol until BP improve.   - Pharmacy to dose Warfarin with goal INR 2-3  - Daily INR     ##. Chronic Venous Stasis  ##. Chronic, non healing ulcers - Patient did not want to be assess by WOCN today. Reports he has Wound care every other day. Recs placed per WOCN nurse per chart review.   Chronic ulcers BLE  Left leg wounds: Every other day   Cleanse with wound cleanser and pat dry  Cover open wound with single layer of adaptic and mepilex  Can use lymphedema wraps over this dressing     ##. Morbid Obesity: Patient historically 880 pounds, lost 400 pounds prior to bariatric surgery. S/p vertical banded gastroplasty 5/21. Working with bariatric surgery for possible VBG ring removal     ##. Gout: - Discontinued PTA Uloric per patients request as he states he is not taking it at home  -Hold Colchicine 2/2 SHANTE     ##. TASHA: - Continue CPAP at night     ##. IRA: - Continue PTA Iron      ## Right knee pain: Received Steroid injection pm 1/11/22      At this time the patient has failed observation management due to persistent hypotension in spite of holding  antihypertensives and will be transferred to inpatient status. UR recommended transfer to inpatient as well. Accepted by Dr. Taurus Costello.     Consults: cardiology and PT    DATA:    Transfer Exam:    BP (!) 88/48 (BP Location: Left arm)   Pulse 76   Temp 97.5  F (36.4  C) (Oral)   Resp 16   Wt (!) 196.5 kg (433 lb 1.6 oz)   SpO2 92%   BMI 49.41 kg/m    Constitutional: morbidly obese, awake, alert, cooperative, no apparent distress, and appears stated age  Eyes: Lids and lashes normal, pupils equal, round and reactive to light, extra ocular muscles intact, sclera clear, conjunctiva normal  ENT: Normocephalic, without obvious abnormality, atraumatic, sinuses nontender on palpation, external ears without lesions, oral pharynx with moist mucous membranes, tonsils without erythema or exudates, gums normal and good dentition.  Hematologic / Lymphatic: no cervical lymphadenopathy  Respiratory: No increased work of breathing, good air exchange, clear to auscultation bilaterally, no crackles or wheezing  Cardiovascular: Normal apical impulse, regular rate and rhythm, normal S1 and S2, no S3 or S4, and no murmur noted  GI: No scars, normal bowel sounds, soft, non-distended, non-tender, no masses palpated, no hepatosplenomegally  Skin: no bruising or bleeding  Musculoskeletal:  TTP lower back. Full range of motion noted.  Motor strength is 5 out of 5 all extremities bilaterally.  Tone is normal.   Ext: BLE edema.   Neurologic: Awake, alert, oriented to name, place and time.  Cranial nerves II-XII are grossly intact.  Motor is 5 out of 5 bilaterally.    Neuropsychiatric: General: normal, calm and normal eye contact    Current Medications:    No current outpatient medications on file.       Medications Prior to Admission:    Facility-Administered Medications Prior to Admission   Medication Dose Route Frequency Provider Last Rate Last Admin     lidocaine (XYLOCAINE) 2 % external gel   Topical Daily PRN Xin Brush, REYES    Given at 12/07/21 1326     Medications Prior to Admission   Medication Sig Dispense Refill Last Dose     amLODIPine (NORVASC) 5 MG tablet Take 1 tablet (5 mg) by mouth daily 90 tablet 0 1/27/2022 at Unknown time     bumetanide (BUMEX) 1 MG tablet TAKE 3 TABLETS BY MOUTH TWICE DAILY AT 9AM AND 6PM 180 tablet 3 1/28/2022 at Unknown time     cholecalciferol 25 MCG (1000 UT) TABS Take 3,000 Units by mouth   1/27/2022 at Unknown time     colchicine (COLCYRS) 0.6 MG tablet TAKE 1 TABLET(0.6 MG) BY MOUTH DAILY   1/27/2022 at Unknown time     febuxostat (ULORIC) 80 MG TABS tablet Take 1 tablet by mouth daily    1/27/2022 at Unknown time     ferrous sulfate (FEROSUL) 325 (65 Fe) MG tablet Take 325 mg by mouth daily    1/27/2022 at Unknown time     guaiFENesin-codeine (ROBITUSSIN AC) 100-10 MG/5ML solution Take 5-10 mLs by mouth every 4 hours as needed for cough 150 mL 0  at prn     lisinopril (ZESTRIL) 10 MG tablet TAKE 1 TABLET(10 MG) BY MOUTH DAILY   1/27/2022 at Unknown time     metoprolol tartrate (LOPRESSOR) 100 MG tablet Take 1 tablet (100 mg) by mouth 2 times daily 180 tablet 0 1/27/2022 at Unknown time     warfarin ANTICOAGULANT (COUMADIN) 5 MG tablet Take 5 to 7.5mg (1 to 1.5 tabs) by mouth daily OR AS DIRECTED.  Adjust dose based on INR. 100 tablet 1 1/27/2022 at Unknown time     lidocaine (XYLOCAINE) 2 % external gel           Significant Diagnostic Studies:     Results for orders placed or performed during the hospital encounter of 01/27/22   XR Chest 1 View     Status: None    Narrative    EXAM: XR CHEST 1 VIEW  1/28/2022 9:11 AM     HISTORY:  COVID       COMPARISON:  Chest x-ray 6/15/2021    FINDINGS: AP radiograph of the chest. Enlargement of the  cardiomediastinal silhouette. Prominence of the main pulmonary artery  segment. No pneumothorax or significant pleural effusion. Mild  perihilar interstitial opacities. No focal airspace opacity.  Visualized upper abdomen is unremarkable. No acute osseous modality.       Impression    IMPRESSION:  Cardiomegaly with mild perihilar interstitial opacities, favor mild  pulmonary edema. Cannot entirely exclude infection given the patient's  history of COVID-19 infection. Prominent main pulmonary artery segment  suggestive of pulmonary hypertension.    I have personally reviewed the examination and initial interpretation  and I agree with the findings.    BRISSA ROSALES MD         SYSTEM ID:  [id number]   Comprehensive metabolic panel     Status: Abnormal   Result Value Ref Range    Sodium 140 133 - 144 mmol/L    Potassium 6.1 (HH) 3.4 - 5.3 mmol/L    Chloride 112 (H) 94 - 109 mmol/L    Carbon Dioxide (CO2) 22 20 - 32 mmol/L    Anion Gap 6 3 - 14 mmol/L    Urea Nitrogen 61 (H) 7 - 30 mg/dL    Creatinine 2.69 (H) 0.66 - 1.25 mg/dL    Calcium 8.8 8.5 - 10.1 mg/dL    Glucose 93 70 - 99 mg/dL    Alkaline Phosphatase 68 40 - 150 U/L    AST 17 0 - 45 U/L    ALT 16 0 - 70 U/L    Protein Total 8.1 6.8 - 8.8 g/dL    Albumin 3.2 (L) 3.4 - 5.0 g/dL    Bilirubin Total 0.7 0.2 - 1.3 mg/dL    GFR Estimate 25 (L) >60 mL/min/1.73m2   Lactic acid whole blood     Status: Normal   Result Value Ref Range    Lactic Acid 1.5 0.7 - 2.0 mmol/L   CBC with platelets and differential     Status: Abnormal   Result Value Ref Range    WBC Count 6.4 4.0 - 11.0 10e3/uL    RBC Count 4.01 (L) 4.40 - 5.90 10e6/uL    Hemoglobin 10.7 (L) 13.3 - 17.7 g/dL    Hematocrit 35.0 (L) 40.0 - 53.0 %    MCV 87 78 - 100 fL    MCH 26.7 26.5 - 33.0 pg    MCHC 30.6 (L) 31.5 - 36.5 g/dL    RDW 22.3 (H) 10.0 - 15.0 %    Platelet Count 184 150 - 450 10e3/uL    % Neutrophils 62 %    % Lymphocytes 24 %    % Monocytes 10 %    % Eosinophils 3 %    % Basophils 0 %    % Immature Granulocytes 1 %    NRBCs per 100 WBC 0 <1 /100    Absolute Neutrophils 4.0 1.6 - 8.3 10e3/uL    Absolute Lymphocytes 1.6 0.8 - 5.3 10e3/uL    Absolute Monocytes 0.6 0.0 - 1.3 10e3/uL    Absolute Eosinophils 0.2 0.0 - 0.7 10e3/uL    Absolute Basophils 0.0 0.0 - 0.2  10e3/uL    Absolute Immature Granulocytes 0.1 <=0.4 10e3/uL    Absolute NRBCs 0.0 10e3/uL   UA reflex to Microscopic     Status: Abnormal   Result Value Ref Range    Color Urine Light Yellow Colorless, Straw, Light Yellow, Yellow    Appearance Urine Clear Clear    Glucose Urine Negative Negative mg/dL    Bilirubin Urine Negative Negative    Ketones Urine Negative Negative mg/dL    Specific Gravity Urine 1.010 1.003 - 1.035    Blood Urine Negative Negative    pH Urine 5.0 5.0 - 7.0    Protein Albumin Urine Negative Negative mg/dL    Urobilinogen Urine Normal Normal, 2.0 mg/dL    Nitrite Urine Positive (A) Negative    Leukocyte Esterase Urine Trace (A) Negative    Bacteria Urine Few (A) None Seen /HPF    RBC Urine 0 <=2 /HPF    WBC Urine <1 <=5 /HPF    Squamous Epithelials Urine <1 <=1 /HPF    Mucus Urine Present (A) None Seen /LPF    Transitional Epithelials Urine 3 (H) <=1 /HPF    Hyaline Casts Urine 11 (H) <=2 /LPF   Asymptomatic COVID-19 Virus (Coronavirus) by PCR Nasopharyngeal     Status: Abnormal    Specimen: Nasopharyngeal; Swab   Result Value Ref Range    SARS CoV2 PCR Positive (A) Negative, Testing sent to reference lab. Results will be returned via unsolicited result    Narrative    Testing was performed using the Xpert Xpress SARS-CoV-2 Assay on the  Cepheid Gene-Xpert Instrument Systems. Additional information about  this Emergency Use Authorization (EUA) assay can be found via the Lab  Guide. This test should be ordered for the detection of SARS-CoV-2 in  individuals who meet SARS-CoV-2 clinical and/or epidemiological  criteria. Test performance is unknown in asymptomatic patients. This  test is for in vitro diagnostic use under the FDA EUA for  laboratories certified under CLIA to perform high complexity testing.  This test has not been FDA cleared or approved. A negative result  does not rule out the presence of PCR inhibitors in the specimen or  target RNA in concentration below the limit of detection  for the  assay. The possibility of a false negative should be considered if  the patient's recent exposure or clinical presentation suggests  COVID-19. This test was validated by the [facility]. This laboratory is certified under  the Clinical Laboratory Improvement Amendments of 1988 (CLIA-88) as  qualified to perform high complexity laboratory testing.     RBC and Platelet Morphology     Status: Abnormal   Result Value Ref Range    Platelet Assessment  Automated Count Confirmed. Platelet morphology is normal.     Automated Count Confirmed. Platelet morphology is normal.    RBC agglutination Present (A) None Seen    RBC Morphology Confirmed RBC Indices    Potassium     Status: Normal   Result Value Ref Range    Potassium 5.1 3.4 - 5.3 mmol/L   Glucose by meter     Status: Abnormal   Result Value Ref Range    GLUCOSE BY METER POCT 124 (H) 70 - 99 mg/dL   Extra Purple Top Tube     Status: None   Result Value Ref Range    Hold Specimen JIC    Potassium     Status: Normal   Result Value Ref Range    Potassium 4.7 3.4 - 5.3 mmol/L   Glucose by meter     Status: Normal   Result Value Ref Range    GLUCOSE BY METER POCT 99 70 - 99 mg/dL   Glucose by meter     Status: Abnormal   Result Value Ref Range    GLUCOSE BY METER POCT 140 (H) 70 - 99 mg/dL   CRP inflammation     Status: Abnormal   Result Value Ref Range    CRP Inflammation 22.0 (H) 0.0 - 8.0 mg/L   Procalcitonin     Status: Abnormal   Result Value Ref Range    Procalcitonin 0.05 (H) <0.05 ng/mL   Glucose by meter     Status: Abnormal   Result Value Ref Range    GLUCOSE BY METER POCT 114 (H) 70 - 99 mg/dL   CBC with platelets and differential     Status: Abnormal   Result Value Ref Range    WBC Count 4.7 4.0 - 11.0 10e3/uL    RBC Count 3.85 (L) 4.40 - 5.90 10e6/uL    Hemoglobin 10.3 (L) 13.3 - 17.7 g/dL    Hematocrit 33.3 (L) 40.0 - 53.0 %    MCV 87 78 - 100 fL    MCH 26.8 26.5 - 33.0 pg    MCHC 30.9 (L) 31.5 - 36.5  g/dL    RDW 15.2 (H) 10.0 - 15.0 %    Platelet Count 178 150 - 450 10e3/uL    % Neutrophils 63 %    % Lymphocytes 25 %    % Monocytes 8 %    % Eosinophils 3 %    % Basophils 0 %    % Immature Granulocytes 1 %    NRBCs per 100 WBC 0 <1 /100    Absolute Neutrophils 3.0 1.6 - 8.3 10e3/uL    Absolute Lymphocytes 1.2 0.8 - 5.3 10e3/uL    Absolute Monocytes 0.4 0.0 - 1.3 10e3/uL    Absolute Eosinophils 0.1 0.0 - 0.7 10e3/uL    Absolute Basophils 0.0 0.0 - 0.2 10e3/uL    Absolute Immature Granulocytes 0.1 <=0.4 10e3/uL    Absolute NRBCs 0.0 10e3/uL   INR     Status: Abnormal   Result Value Ref Range    INR 2.16 (H) 0.85 - 1.15   Comprehensive metabolic panel     Status: Abnormal   Result Value Ref Range    Sodium 143 133 - 144 mmol/L    Potassium 5.2 3.4 - 5.3 mmol/L    Chloride 114 (H) 94 - 109 mmol/L    Carbon Dioxide (CO2) 24 20 - 32 mmol/L    Anion Gap 5 3 - 14 mmol/L    Urea Nitrogen 55 (H) 7 - 30 mg/dL    Creatinine 2.62 (H) 0.66 - 1.25 mg/dL    Calcium 8.8 8.5 - 10.1 mg/dL    Glucose 92 70 - 99 mg/dL    Alkaline Phosphatase 62 40 - 150 U/L    AST 16 0 - 45 U/L    ALT 15 0 - 70 U/L    Protein Total 7.7 6.8 - 8.8 g/dL    Albumin 2.8 (L) 3.4 - 5.0 g/dL    Bilirubin Total 0.6 0.2 - 1.3 mg/dL    GFR Estimate 26 (L) >60 mL/min/1.73m2   CRP inflammation     Status: Abnormal   Result Value Ref Range    CRP Inflammation 19.0 (H) 0.0 - 8.0 mg/L   Glucose by meter     Status: Normal   Result Value Ref Range    GLUCOSE BY METER POCT 98 70 - 99 mg/dL   Extra Purple Top Tube     Status: None   Result Value Ref Range    Hold Specimen JIC    Glucose by meter     Status: Normal   Result Value Ref Range    GLUCOSE BY METER POCT 95 70 - 99 mg/dL   Glucose by meter     Status: Abnormal   Result Value Ref Range    GLUCOSE BY METER POCT 41 (LL) 70 - 99 mg/dL   Glucose by meter     Status: Normal   Result Value Ref Range    GLUCOSE BY METER POCT 92 70 - 99 mg/dL   Basic metabolic panel     Status: Abnormal   Result Value Ref Range     Sodium 139 133 - 144 mmol/L    Potassium 5.4 (H) 3.4 - 5.3 mmol/L    Chloride 114 (H) 94 - 109 mmol/L    Carbon Dioxide (CO2) 22 20 - 32 mmol/L    Anion Gap 3 3 - 14 mmol/L    Urea Nitrogen 54 (H) 7 - 30 mg/dL    Creatinine 2.51 (H) 0.66 - 1.25 mg/dL    Calcium 8.5 8.5 - 10.1 mg/dL    Glucose 90 70 - 99 mg/dL    GFR Estimate 28 (L) >60 mL/min/1.73m2   Glucose by meter     Status: Normal   Result Value Ref Range    GLUCOSE BY METER POCT 96 70 - 99 mg/dL   Glucose by meter     Status: Normal   Result Value Ref Range    GLUCOSE BY METER POCT 99 70 - 99 mg/dL   Comprehensive metabolic panel     Status: Abnormal   Result Value Ref Range    Sodium 143 133 - 144 mmol/L    Potassium 5.2 3.4 - 5.3 mmol/L    Chloride 112 (H) 94 - 109 mmol/L    Carbon Dioxide (CO2) 26 20 - 32 mmol/L    Anion Gap 5 3 - 14 mmol/L    Urea Nitrogen 54 (H) 7 - 30 mg/dL    Creatinine 2.57 (H) 0.66 - 1.25 mg/dL    Calcium 8.6 8.5 - 10.1 mg/dL    Glucose 96 70 - 99 mg/dL    Alkaline Phosphatase 72 40 - 150 U/L    AST 23 0 - 45 U/L    ALT 13 0 - 70 U/L    Protein Total 7.2 6.8 - 8.8 g/dL    Albumin 2.7 (L) 3.4 - 5.0 g/dL    Bilirubin Total 0.4 0.2 - 1.3 mg/dL    GFR Estimate 27 (L) >60 mL/min/1.73m2   Ionized Calcium     Status: Normal   Result Value Ref Range    Calcium Ionized 4.4 4.4 - 5.2 mg/dL   Magnesium     Status: Normal   Result Value Ref Range    Magnesium 1.8 1.6 - 2.3 mg/dL   Phosphorus     Status: Normal   Result Value Ref Range    Phosphorus 4.1 2.5 - 4.5 mg/dL   Procalcitonin     Status: Normal   Result Value Ref Range    Procalcitonin <0.05 <0.05 ng/mL   INR     Status: Abnormal   Result Value Ref Range    INR 2.32 (H) 0.85 - 1.15   CBC with platelets and differential     Status: Abnormal   Result Value Ref Range    WBC Count 4.1 4.0 - 11.0 10e3/uL    RBC Count 1.99 (L) 4.40 - 5.90 10e6/uL    Hemoglobin 10.4 (L) 13.3 - 17.7 g/dL    Hematocrit 19.3 (L) 40.0 - 53.0 %    MCV 97 78 - 100 fL    MCH 52.3 (H) 26.5 - 33.0 pg    MCHC 53.9 (H)  31.5 - 36.5 g/dL    RDW 22.4 (H) 10.0 - 15.0 %    Platelet Count 158 150 - 450 10e3/uL    % Neutrophils 50 %    % Lymphocytes 33 %    % Monocytes 11 %    % Eosinophils 4 %    % Basophils 1 %    % Immature Granulocytes 1 %    NRBCs per 100 WBC 0 <1 /100    Absolute Neutrophils 2.1 1.6 - 8.3 10e3/uL    Absolute Lymphocytes 1.4 0.8 - 5.3 10e3/uL    Absolute Monocytes 0.5 0.0 - 1.3 10e3/uL    Absolute Eosinophils 0.2 0.0 - 0.7 10e3/uL    Absolute Basophils 0.0 0.0 - 0.2 10e3/uL    Absolute Immature Granulocytes 0.0 <=0.4 10e3/uL    Absolute NRBCs 0.0 10e3/uL   RBC and Platelet Morphology     Status: None   Result Value Ref Range    Platelet Assessment  Automated Count Confirmed. Platelet morphology is normal.     Automated Count Confirmed. Platelet morphology is normal.    RBC Morphology Confirmed RBC Indices    Nt probnp inpatient     Status: Abnormal   Result Value Ref Range    N terminal Pro BNP Inpatient 8,307 (H) 0 - 900 pg/mL   EKG 12 lead     Status: None (Preliminary result)   Result Value Ref Range    Systolic Blood Pressure  mmHg    Diastolic Blood Pressure  mmHg    Ventricular Rate 84 BPM    Atrial Rate 60 BPM    RI Interval  ms    QRS Duration 114 ms     ms    QTc 475 ms    P Axis  degrees    R AXIS 4 degrees    T Axis 17 degrees    Interpretation ECG Atrial fibrillation  Abnormal ECG      Care Management / Social Work IP Consult     Status: None ()    Hui    Penny Bullock     1/29/2022  1:55 PM  Care Management Initial Consult      General Information  Assessment completed with: chart review (due to covid status   tried to call patient on his hospital phone, no answer, will try   to callback another day)  Type of CM/SW Visit: Offer D/C Planning  Primary Care Provider verified and updated as needed: Yes   PCP:   Ben Hamlin MD       Primary Location:   [facility]     Readmission within the last 30 days: no previous admission in   last 30 days   Reason for Consult:  discharge planning  Advance Care Planning: no scanned documents on file           Communication Assessment  Patient's communication style:   spoken language (English)    Hearing Difficulty or Deaf: no   Wear Glasses or Blind: no      Cognitive  Cognitive/Neuro/Behavioral: WDL                        Living Environment:   People in home: spouse     Current living Arrangements: house      Home Accessibility stairs to enter home  stairs within home   Number of Stairs, Main Entrance 5   Number of Stairs, Within Home, Primary 16 up to bedroom,   14 down to basement   Able to return to prior arrangements: yes       Family/Social Support:  Care provided by: self,spouse  Marital Status:   Description of Support System: Supportive,Involved         Current Resources:   Patient receiving home care services: reported to therapist he   has wound care 3x per week - unknown agency  Skilled Home Care Services: wound care 3x per week  Community Resources:  none  Equipment currently used at home: cane, straight  Supplies currently used at home: Wound Care Supplies    Indication: Atrial Fibrillation  Therapy Goal: INR 2-3  Provider/Team: Ben Hamlin MD  [facility]: [facility]  Warfarin Prior to Admission: Yes  Warfarin PTA Regimen: 7.5 mg every Tue, Thu; 5 mg all other days      Employment/Financial:  Employment Status: retired          Functional Status:  Prior to admission patient needed assistance:  Per therapist note   - patient states that he typically has back pain in the AM that   loosens up with seated A/P rocking. Patient mod I with SPC for   mobility. Patient does endorse baseline difficulty ambulating   specifically with stairs.       Additional Information:  PT recommendations me with assist; home with outpatient physical   therapy     Patient with greatly impaired mobility at this time but this is   likely due to prolonged immobility in bed. Throughout session   mobility gradually improved  "following pattern that it typically   does at home for patient. At this time recommend patient   discharge home when medically stable although patient at high   risk for deconditioning and may require TCU if hospitalized for   prolonged period. PT to continue to follow and update as   appropriate       Care Management Staff will continue to follow for discharge   planning and care progression needs.   Penny Bullock RN, BSN, PHN  Care Coordinator  [facility]  [address]  Desk phone & confidential voicemail: [phone]  Pager:   [phone]    To contact the Nurse Care Coordinator Team on weekends &   holiday's:  [place]    Pager [phone]  [facility] (Sunday Only) Pager [phone]    To contact the Social Work Team on weekends & holiday's:  4A, 4C, 4E, 5A, 5B  Pager [phone]  6A, 6B, 6C, 6D   Pager [phone]  7A, 7B, 7C, 7D, 5C  Pager [phone]  [facility] (Saturday Only) Pager [phone]     Cardiology Heart Failure (HF) IP Consult: Patient to be seen: Routine within 24 hrs; Call back #: [id number]; h/o diastolic HF, MR with soft BP's on admission requiring all antihypertensives held. overdiuresis? please evaluate. recommendations greatly ...     Status: None ()    Jennifer Pack MD     1/29/2022  2:19 PM  Cardiology Consult         Date of Service (when I saw the patient): -------NOTE: \"Date of   Service\" should now be entered     ASSESSMENT:     #HFpEF  #Hypovolemia   #Afib  Patient BP now stable off his anti-HTN and diuretics. Hypotension   likely from dehydration 2/2 to poor PO intake, diarrhea and   complicated UTI. Patient appears euvolemic at bedside. Can   stagger reinitation of medications as listed below.    RECOMMEND:  - Restart PO Metop 50mg BID today  - Restart PO  Lisinopril at 10mg tomorrow  - Restart Amlodipine at 5mg on 1/31  - Continue Coumadin per primary team     Staffed with Jennifer Headley " MD Lisette  Cardiology Fellow  [id number]        Jennifer Knox    REASON FOR CONSULT: HFpEF    History of Present Illness   Panda Miranda is a 66 year old male who presents with PMHx of   HFpEF, mild-moderate MR CKD stage 3, perimembranous VSD. Morbid   obesity s/p VBG ring hyperkalemia, UTI who presents with   hypotension. Cardiology consulted for concern for overdiuresis.    Patient was admitted for complaints of back pain, UTI and   symptomatic COVID. . Patient was treated with fluids and stated   on antibx for complicated UTI vs pyelonephritis. During patient's   hospital stay developed hypotension that required cessation of   his home GDMT. His Bps went as low as 79/67. Once off his   medications, his BP recovered to 110/80. Cardiology consulted due   to concern for hypovolemia     At bedside, patient denied chest pain, SOB, lightheadedness,   dizziness.     Home Medications:  Amlodipine 5mg qday(Held)  Lisinopril 10mg qday(Held)  Metop tartate 100mg qday(Held)    Past Medical History    I have reviewed this patient's medical history and updated it   with pertinent information if needed.   Past Medical History:   Diagnosis Date     (HFpEF) heart failure with preserved ejection fraction (H)      Arthritis      Arthropathy of wrist      Atrial fibrillation (H)      Bradycardia      Cancer (H) 2011    colon cancer; hemicolectomy     CHF (congestive heart failure) (H)      Chronic kidney disease      Gout      Hand swelling      Heart valve disease     intermittent mitral regurgitation     HTN (hypertension)      Hyperlipidemia      Morbid obesity (H)      Obesity      TASHA (obstructive sleep apnea)     CPAP     Perimembranous ventricular septal defect        Past Surgical History   I have reviewed this patient's surgical history and updated it   with pertinent information if needed.  Past Surgical History:   Procedure Laterality Date     COLON SURGERY       COLONOSCOPY N/A 12/12/2019    Procedure: COLONOSCOPY;   Surgeon: Flaco Magaña MD;    Location: [facility];  Service: General     GASTROPLASTY VERTICAL BANDED      Dr. Arizmendi [facility] 1996 Initial weight 800++ Lost to 440- (was   320# at lowest)     KNEE SURGERY       ZZC PART REMOVAL COLON W ANASTOMOSIS      Description: Partial Colectomy;  Recorded: 12/02/2011;    Comments: Dr Magaña       Prior to Admission Medications   Prior to Admission Medications   Prescriptions Last Dose Informant Patient Reported? Taking?   amLODIPine (NORVASC) 5 MG tablet 1/27/2022 at Unknown time  No   Yes   Sig: Take 1 tablet (5 mg) by mouth daily   bumetanide (BUMEX) 1 MG tablet 1/28/2022 at Unknown time  No Yes   Sig: TAKE 3 TABLETS BY MOUTH TWICE DAILY AT 9AM AND 6PM   cholecalciferol 25 MCG (1000 UT) TABS 1/27/2022 at Unknown time    Yes Yes   Sig: Take 3,000 Units by mouth   colchicine (COLCYRS) 0.6 MG tablet 1/27/2022 at Unknown time   Other Yes Yes   Sig: TAKE 1 TABLET(0.6 MG) BY MOUTH DAILY   febuxostat (ULORIC) 80 MG TABS tablet 1/27/2022 at Unknown time   Other Yes Yes   Sig: Take 1 tablet by mouth daily    ferrous sulfate (FEROSUL) 325 (65 Fe) MG tablet 1/27/2022 at   Unknown time Other Yes Yes   Sig: Take 325 mg by mouth daily    guaiFENesin-codeine (ROBITUSSIN AC) 100-10 MG/5ML solution  at   prn  No Yes   Sig: Take 5-10 mLs by mouth every 4 hours as needed for cough   lidocaine (XYLOCAINE) 2 % external gel   Yes No   lisinopril (ZESTRIL) 10 MG tablet 1/27/2022 at Unknown time Other   Yes Yes   Sig: TAKE 1 TABLET(10 MG) BY MOUTH DAILY   metoprolol tartrate (LOPRESSOR) 100 MG tablet 1/27/2022 at   Unknown time  No Yes   Sig: Take 1 tablet (100 mg) by mouth 2 times daily   warfarin ANTICOAGULANT (COUMADIN) 5 MG tablet 1/27/2022 at   Unknown time  No Yes   Sig: Take 5 to 7.5mg (1 to 1.5 tabs) by mouth daily OR AS   DIRECTED.  Adjust dose based on INR.      Facility-Administered Medications Last Administration Doses   Remaining   lidocaine (XYLOCAINE) 2 % external  gel 12/7/2021  1:26 PM         Allergies   No Known Allergies    Social History   I have reviewed this patient's social history and updated it with   pertinent information if needed. Panda Miranda  reports that   he has never smoked. He has never used smokeless tobacco. He   reports current alcohol use of about 0.8 standard drinks of   alcohol per week. He reports that he does not use drugs.    Family History   I have reviewed this patient's family history and updated it with   pertinent information if needed.   Family History   Problem Relation Age of Onset     Diabetes Type 2  Other      Heart Failure Other      Heart Disease Mother      Hypertension Mother      Diabetes Sister        Review of Systems   The 10 point Review of Systems is negative other than noted in   the HPI or here.     Physical Exam   Temp: 97.7  F (36.5  C) Temp src: Oral BP: 109/41 Pulse: 102     Resp: 18 SpO2: 96 % O2 Device: None (Room air)    Vital Signs with Ranges  Temp:  [96  F (35.6  C)-97.7  F (36.5  C)] 97.7  F (36.5  C)  Pulse:  [] 102  Resp:  [14-18] 18  BP: ()/(41-67) 109/41  SpO2:  [93 %-96 %] 96 %  439 lbs 2.5 oz    GEN: NAD, pleasant  HEENT: no icterus  CV: RRR, normal s1/s2, no murmurs/rubs/s3/s4, no heave. JVP 6cm.   CHEST: CTAB  ABD: soft, NT/ND, NABS  : no flank/suprapubic tenderness  NEURO: AA&Ox3, fluent/appropriate, motor grossly nonfocal  PSYCH: cooperative, affect appropriate    Data   Data reviewed today:  I personally reviewed no images or EKG's   today.  Recent Labs   Lab 01/29/22  0654 01/28/22  2116 01/28/22  1756 01/28/22  0730 01/28/22  0629 01/28/22  0458 01/28/22  0356 01/28/22  0023 01/27/22  2156   WBC 4.1  --   --   --   --   --  4.7  --  6.4   HGB 10.4*  --   --   --   --   --  10.3*  --  10.7*   MCV 97  --   --   --   --   --  87  --  87     --   --   --   --   --  178  --  184   INR 2.32*  --   --   --  2.16*  --   --   --   --      --  139  --  143  --   --   --  140    POTASSIUM 5.2  --  5.4*  --  5.2  --  4.7   < > 6.1*   CHLORIDE 112*  --  114*  --  114*  --   --   --  112*   CO2 26  --  22  --  24  --   --   --  22   BUN 54*  --  54*  --  55*  --   --   --  61*   CR 2.57*  --  2.51*  --  2.62*  --   --   --  2.69*   ANIONGAP 5  --  3  --  5  --   --   --  6   JOHN 8.6  --  8.5  --  8.8  --   --   --  8.8   GLC 96 99 90   < > 92   < >  --    < > 93   ALBUMIN 2.7*  --   --   --  2.8*  --   --   --  3.2*   PROTTOTAL 7.2  --   --   --  7.7  --   --   --  8.1   BILITOTAL 0.4  --   --   --  0.6  --   --   --  0.7   ALKPHOS 72  --   --   --  62  --   --   --  68   ALT 13  --   --   --  15  --   --   --  16   AST 23  --   --   --  16  --   --   --  17    < > = values in this interval not displayed.       No results found for this or any previous visit (from the past 24   hour(s)).   Urine Culture     Status: Abnormal (Preliminary result)    Specimen: Urine, Midstream   Result Value Ref Range    Culture >100,000 CFU/mL Escherichia coli (A)    Blood Culture Arm, Left     Status: Normal (Preliminary result)    Specimen: Arm, Left; Blood   Result Value Ref Range    Culture No growth after 1 day    Blood Culture Arm, Right     Status: Normal (Preliminary result)    Specimen: Arm, Right; Blood   Result Value Ref Range    Culture No growth after 1 day    CBC with platelets differential     Status: Abnormal    Narrative    The following orders were created for panel order CBC with platelets differential.  Procedure                               Abnormality         Status                     ---------                               -----------         ------                     CBC with platelets and d...[[id number]]  Abnormal            Final result               RBC and Platelet Morphology[[id number]]  Abnormal            Final result                 Please view results for these tests on the individual orders.   Extra Tube     Status: None    Narrative    The following orders were created for  panel order Extra Tube.  Procedure                               Abnormality         Status                     ---------                               -----------         ------                     Extra Purple Top Tube[[id number]]                            Final result                 Please view results for these tests on the individual orders.   CBC with Platelets & Differential     Status: Abnormal    Narrative    The following orders were created for panel order CBC with Platelets & Differential.  Procedure                               Abnormality         Status                     ---------                               -----------         ------                     CBC with platelets and d...[[id number]]  Abnormal            Final result                 Please view results for these tests on the individual orders.   Extra Tube ([place] Draw)     Status: None    Narrative    The following orders were created for panel order Extra Tube ([place] Draw).  Procedure                               Abnormality         Status                     ---------                               -----------         ------                     Extra Purple Top Tube[[id number]]                            Final result                 Please view results for these tests on the individual orders.   CBC with Platelets & Differential     Status: Abnormal    Narrative    The following orders were created for panel order CBC with Platelets & Differential.  Procedure                               Abnormality         Status                     ---------                               -----------         ------                     CBC with platelets and d...[[id number]]  Abnormal            Final result               RBC and Platelet Morphology[[id number]]                      Final result                 Please view results for these tests on the individual orders.       Signed:  Mary Ortez PA-C   January 29, 2022 at 7:36  PM      --    ED Attending Physician Attestation    I Jose Antunez MD, cared for this patient with the Advanced Practice Provider (KEO). I have performed a history and physical examination of the patient independent of the KEO. I reviewed the KEO's documentation above and agree with the documented findings and plan of care. I personally provided a substantive portion of the care for this patient.  I personally performed the entire medical decision making and I agree with the assessment and plan as written by the KEO.      Key management decisions made by me: Patient was admitted on 1/27/2022.  He has had continued episodes of hypotension with systolic readings in the 80s.  Cultures are thus far negative.  Patient continues to be on IV antibiotics.  His BNP continues to be elevated and his creatinine is in the 2.5 range making his volume status more difficult to manage.  He will continue to require hospitalization to further delineate the cause of his hypotension and will be transitioned to inpatient.  I spoke to Dr. Gilman.    Summary of HPI, PE, ED Course   Patient is a 66 year old male evaluated in the emergency department for back pain. Exam notable for hypotension, morbid obesity, lymphedema, and tenderness in his lower back region.. ED course notable for persistent hypotension. After the completion of care in the emergency department, the patient was admitted to inpatient.    Critical Care & Procedures  Not applicable.    Medical Decision Making  The medical record was reviewed and interpreted.  Current labs reviewed and interpreted.  Previous labs reviewed and interpreted.      Jose Antunez MD  Emergency Medicine

## 2022-01-31 ENCOUNTER — TELEPHONE (OUTPATIENT)
Dept: ANTICOAGULATION | Facility: CLINIC | Age: 67
End: 2022-01-31
Payer: COMMERCIAL

## 2022-01-31 ENCOUNTER — TELEPHONE (OUTPATIENT)
Dept: VASCULAR SURGERY | Facility: CLINIC | Age: 67
End: 2022-01-31
Payer: COMMERCIAL

## 2022-01-31 ENCOUNTER — PATIENT OUTREACH (OUTPATIENT)
Dept: CARE COORDINATION | Facility: CLINIC | Age: 67
End: 2022-01-31
Payer: COMMERCIAL

## 2022-01-31 DIAGNOSIS — Z71.89 OTHER SPECIFIED COUNSELING: ICD-10-CM

## 2022-01-31 DIAGNOSIS — I48.91 ATRIAL FIBRILLATION, UNSPECIFIED TYPE (H): Primary | ICD-10-CM

## 2022-01-31 NOTE — TELEPHONE ENCOUNTER
----- Message from Vero Flores sent at 1/31/2022  8:50 AM CST -----  Regarding: reschdule covid positive  Hi,      Panda tested covid positive 1/27/22 so he will need to be rescheduled. Thank you        Vero

## 2022-01-31 NOTE — PLAN OF CARE
Physical Therapy Discharge Summary    Reason for therapy discharge:    Discharged to home with outpatient therapy.    Progress towards therapy goal(s). See goals on Care Plan in [facility] electronic health record for goal details.  Goals partially met.  Barriers to achieving goals:   discharge from facility.    Therapy recommendation(s):    Continued therapy is recommended.  Rationale/Recommendations:  OP PT.

## 2022-01-31 NOTE — TELEPHONE ENCOUNTER
I called both number and I was unable to leave a message, mailbox was not set up.  Patients apt needs to be rescheduled.

## 2022-01-31 NOTE — TELEPHONE ENCOUNTER
ANTICOAGULATION  MANAGEMENT: Discharge Review    Panda Miranda chart reviewed for anticoagulation continuity of care    Hospital Admission on 1/27-30 for sepsis. Covid positive 1/27    Discharge disposition: Home    Results:    Recent labs: (last 7 days)     01/28/22  0629 01/29/22  0654 01/30/22  0609   INR 2.16* 2.32* 2.52*     Anticoagulation inpatient management:     home regimen continued    Anticoagulation discharge instructions:     Warfarin dosing: home regimen continued   Bridging: No   INR goal change: No      Medication changes affecting anticoagulation: No    Additional factors affecting anticoagulation: No    Plan     No adjustment to anticoagulation plan needed    unable to contact pt. inr should be done along with hospital follow up not yet scheduled within 7 days    Anticoagulation Calendar updated    Patricia Acosta RN

## 2022-02-01 NOTE — PROGRESS NOTES
Clinic Care Coordination Contact  [facility]/Vo[employer]       Clinical Data: Care Coordinator Outreach  Outreach attempted x 2.  No answer/ Unable to reach.  Plan:Care Coordinator will do no further outreaches at this time.        KHALIF Machuca  [phone]  [facility]

## 2022-02-02 LAB
BACTERIA BLD CULT: NO GROWTH
BACTERIA BLD CULT: NO GROWTH

## 2022-02-03 ENCOUNTER — APPOINTMENT (OUTPATIENT)
Dept: CT IMAGING | Facility: CLINIC | Age: 67
End: 2022-02-03
Attending: EMERGENCY MEDICINE
Payer: COMMERCIAL

## 2022-02-03 ENCOUNTER — HOSPITAL ENCOUNTER (EMERGENCY)
Facility: CLINIC | Age: 67
Discharge: HOME | End: 2022-02-03
Attending: EMERGENCY MEDICINE | Admitting: EMERGENCY MEDICINE
Payer: COMMERCIAL

## 2022-02-03 ENCOUNTER — TELEPHONE (OUTPATIENT)
Dept: FAMILY MEDICINE | Facility: CLINIC | Age: 67
End: 2022-02-03

## 2022-02-03 VITALS
HEART RATE: 74 BPM | OXYGEN SATURATION: 77 % | RESPIRATION RATE: 18 BRPM | DIASTOLIC BLOOD PRESSURE: 60 MMHG | TEMPERATURE: 98.3 F | SYSTOLIC BLOOD PRESSURE: 75 MMHG

## 2022-02-03 DIAGNOSIS — N39.0 COMPLICATED UTI (URINARY TRACT INFECTION): ICD-10-CM

## 2022-02-03 DIAGNOSIS — M54.50 LUMBAR BACK PAIN: ICD-10-CM

## 2022-02-03 LAB
ALBUMIN SERPL-MCNC: 3.4 G/DL (ref 3.4–5)
ALBUMIN UR-MCNC: 30 MG/DL
ALP SERPL-CCNC: 76 U/L (ref 40–150)
ALT SERPL W P-5'-P-CCNC: 20 U/L (ref 0–70)
ANION GAP SERPL CALCULATED.3IONS-SCNC: 6 MMOL/L (ref 3–14)
APPEARANCE UR: CLEAR
AST SERPL W P-5'-P-CCNC: 33 U/L (ref 0–45)
BASOPHILS # BLD AUTO: 0 10E3/UL (ref 0–0.2)
BASOPHILS NFR BLD AUTO: 0 %
BILIRUB SERPL-MCNC: 0.6 MG/DL (ref 0.2–1.3)
BILIRUB UR QL STRIP: NEGATIVE
BUN SERPL-MCNC: 36 MG/DL (ref 7–30)
CALCIUM SERPL-MCNC: 9.2 MG/DL (ref 8.5–10.1)
CHLORIDE BLD-SCNC: 110 MMOL/L (ref 94–109)
CO2 SERPL-SCNC: 22 MMOL/L (ref 20–32)
COLOR UR AUTO: ABNORMAL
CREAT SERPL-MCNC: 2.79 MG/DL (ref 0.66–1.25)
EOSINOPHIL # BLD AUTO: 0.2 10E3/UL (ref 0–0.7)
EOSINOPHIL NFR BLD AUTO: 3 %
ERYTHROCYTE [DISTWIDTH] IN BLOOD BY AUTOMATED COUNT: 16 % (ref 10–15)
GFR SERPL CREATININE-BSD FRML MDRD: 24 ML/MIN/1.73M2
GLUCOSE BLD-MCNC: 97 MG/DL (ref 70–99)
GLUCOSE UR STRIP-MCNC: NEGATIVE MG/DL
HCT VFR BLD AUTO: 28.5 % (ref 40–53)
HGB BLD-MCNC: 10.2 G/DL (ref 13.3–17.7)
HGB UR QL STRIP: NEGATIVE
IMM GRANULOCYTES # BLD: 0 10E3/UL
IMM GRANULOCYTES NFR BLD: 1 %
KETONES UR STRIP-MCNC: NEGATIVE MG/DL
LEUKOCYTE ESTERASE UR QL STRIP: NEGATIVE
LYMPHOCYTES # BLD AUTO: 1.2 10E3/UL (ref 0.8–5.3)
LYMPHOCYTES NFR BLD AUTO: 20 %
MCH RBC QN AUTO: 32.8 PG (ref 26.5–33)
MCHC RBC AUTO-ENTMCNC: 35.8 G/DL (ref 31.5–36.5)
MCV RBC AUTO: 92 FL (ref 78–100)
MONOCYTES # BLD AUTO: 0.8 10E3/UL (ref 0–1.3)
MONOCYTES NFR BLD AUTO: 13 %
MUCOUS THREADS #/AREA URNS LPF: PRESENT /LPF
NEUTROPHILS # BLD AUTO: 4.1 10E3/UL (ref 1.6–8.3)
NEUTROPHILS NFR BLD AUTO: 63 %
NITRATE UR QL: NEGATIVE
NRBC # BLD AUTO: 0 10E3/UL
NRBC BLD AUTO-RTO: 0 /100
PH UR STRIP: 5.5 [PH] (ref 5–7)
PLATELET # BLD AUTO: 130 10E3/UL (ref 150–450)
POTASSIUM BLD-SCNC: 5.4 MMOL/L (ref 3.4–5.3)
PROT SERPL-MCNC: 8.5 G/DL (ref 6.8–8.8)
RBC # BLD AUTO: 3.11 10E6/UL (ref 4.4–5.9)
RBC URINE: 0 /HPF
SODIUM SERPL-SCNC: 138 MMOL/L (ref 133–144)
SP GR UR STRIP: 1.02 (ref 1–1.03)
SQUAMOUS EPITHELIAL: 1 /HPF
TRANSITIONAL EPI: 1 /HPF
UROBILINOGEN UR STRIP-MCNC: NORMAL MG/DL
WBC # BLD AUTO: 6.3 10E3/UL (ref 4–11)
WBC URINE: 0 /HPF

## 2022-02-03 PROCEDURE — 99284 EMERGENCY DEPT VISIT MOD MDM: CPT | Performed by: EMERGENCY MEDICINE

## 2022-02-03 PROCEDURE — 72131 CT LUMBAR SPINE W/O DYE: CPT

## 2022-02-03 PROCEDURE — 250N000011 HC RX IP 250 OP 636: Performed by: EMERGENCY MEDICINE

## 2022-02-03 PROCEDURE — 36415 COLL VENOUS BLD VENIPUNCTURE: CPT | Performed by: EMERGENCY MEDICINE

## 2022-02-03 PROCEDURE — 81001 URINALYSIS AUTO W/SCOPE: CPT | Performed by: EMERGENCY MEDICINE

## 2022-02-03 PROCEDURE — 96375 TX/PRO/DX INJ NEW DRUG ADDON: CPT

## 2022-02-03 PROCEDURE — 80053 COMPREHEN METABOLIC PANEL: CPT | Performed by: EMERGENCY MEDICINE

## 2022-02-03 PROCEDURE — 72131 CT LUMBAR SPINE W/O DYE: CPT | Mod: 26 | Performed by: RADIOLOGY

## 2022-02-03 PROCEDURE — 96374 THER/PROPH/DIAG INJ IV PUSH: CPT

## 2022-02-03 PROCEDURE — 99285 EMERGENCY DEPT VISIT HI MDM: CPT | Mod: 25

## 2022-02-03 PROCEDURE — 85025 COMPLETE CBC W/AUTO DIFF WBC: CPT | Performed by: EMERGENCY MEDICINE

## 2022-02-03 PROCEDURE — 250N000013 HC RX MED GY IP 250 OP 250 PS 637: Performed by: EMERGENCY MEDICINE

## 2022-02-03 RX ORDER — OXYCODONE HYDROCHLORIDE 5 MG/1
5 TABLET ORAL EVERY 6 HOURS PRN
Qty: 12 TABLET | Refills: 0 | Status: SHIPPED | OUTPATIENT
Start: 2022-02-03 | End: 2022-02-06

## 2022-02-03 RX ORDER — OXYCODONE HYDROCHLORIDE 5 MG/1
5 TABLET ORAL ONCE
Status: COMPLETED | OUTPATIENT
Start: 2022-02-03 | End: 2022-02-03

## 2022-02-03 RX ORDER — METHOCARBAMOL 500 MG/1
1000 TABLET, FILM COATED ORAL 3 TIMES DAILY
Qty: 30 TABLET | Refills: 0 | Status: SHIPPED | OUTPATIENT
Start: 2022-02-03 | End: 2022-02-08

## 2022-02-03 RX ORDER — DIAZEPAM 10 MG/2ML
5 INJECTION, SOLUTION INTRAMUSCULAR; INTRAVENOUS ONCE
Status: DISCONTINUED | OUTPATIENT
Start: 2022-02-03 | End: 2022-02-03 | Stop reason: HOSPADM

## 2022-02-03 RX ORDER — HYDROMORPHONE HYDROCHLORIDE 1 MG/ML
0.5 INJECTION, SOLUTION INTRAMUSCULAR; INTRAVENOUS; SUBCUTANEOUS
Status: DISCONTINUED | OUTPATIENT
Start: 2022-02-03 | End: 2022-02-03 | Stop reason: HOSPADM

## 2022-02-03 RX ADMIN — OXYCODONE HYDROCHLORIDE 5 MG: 5 TABLET ORAL at 18:55

## 2022-02-03 RX ADMIN — HYDROMORPHONE HYDROCHLORIDE 0.5 MG: 1 INJECTION, SOLUTION INTRAMUSCULAR; INTRAVENOUS; SUBCUTANEOUS at 15:59

## 2022-02-03 ASSESSMENT — ENCOUNTER SYMPTOMS
FEVER: 0
DIZZINESS: 0
BACK PAIN: 1
LIGHT-HEADEDNESS: 0
WEAKNESS: 0

## 2022-02-03 NOTE — ED PROVIDER NOTES
[place] EMERGENCY DEPARTMENT ([facility])  2/03/22  History     Chief Complaint   Patient presents with     Back Pain     muscle spasms     The history is provided by the patient and medical records.     Panda Miranda is a morbidly obese 66-year-old male who is recently Covid positive as of 6 days ago and presents to the emergency department with persistent low back pain.  Patient states that he was hospitalized recently for his back pain and urinary tract infection.  The patient was seen initially in the observation unit but developed some hypotension down into the 80s and because of possible pyelonephritis the patient was transferred for to a medicine service where he was eventually sent home 3 days ago.  The patient states that since that time he still has had some low lumbar back pain.  Patient states that today his back spasms became so bad that he could not hardly walk.  He presents here to the ER for evaluation of his back spasms that stay in his low lumbar back with no radicular component, no new focal neurologic symptoms, and no fevers.    Past Medical History:   Diagnosis Date     (HFpEF) heart failure with preserved ejection fraction (H)      Arthritis      Arthropathy of wrist      Atrial fibrillation (H)      Bradycardia      Cancer (H) 2011    colon cancer; hemicolectomy     CHF (congestive heart failure) (H)      Chronic kidney disease      Gout      Hand swelling      Heart valve disease     intermittent mitral regurgitation     HTN (hypertension)      Hyperlipidemia      Morbid obesity (H)      Obesity      TASHA (obstructive sleep apnea)     CPAP     Perimembranous ventricular septal defect      Current Outpatient Medications   Medication Sig Dispense Refill     cholecalciferol 25 MCG (1000 UT) TABS Take 3,000 Units by mouth       ferrous sulfate (FEROSUL) 325 (65 Fe) MG tablet Take 325 mg by mouth daily        levofloxacin (LEVAQUIN) 750 MG tablet Take 1 tablet (750 mg) by mouth  every other day 3 tablet 0     warfarin ANTICOAGULANT (COUMADIN) 5 MG tablet Take 5 to 7.5mg (1 to 1.5 tabs) by mouth daily OR AS DIRECTED.  Adjust dose based on INR. 100 tablet 1     No Known Allergies     Social History     Socioeconomic History     Marital status:      Spouse name: Not on file     Number of children: Not on file     Years of education: Not on file     Highest education level: Not on file   Occupational History     Not on file   Tobacco Use     Smoking status: Never Smoker     Smokeless tobacco: Never Used   Substance and Sexual Activity     Alcohol use: Yes     Alcohol/week: 0.8 standard drinks     Comment: Alcoholic Drinks/day: occasional     Drug use: No     Sexual activity: Not on file   Other Topics Concern     Not on file   Social History Narrative     Not on file     Social Determinants of Health     Financial Resource Strain: Not on file   Food Insecurity: Not on file   Transportation Needs: Not on file   Physical Activity: Not on file   Stress: Not on file   Social Connections: Not on file   Intimate Partner Violence: Not on file   Housing Stability: Not on file     Past Surgical History:   Procedure Laterality Date     COLON SURGERY       COLONOSCOPY N/A 12/12/2019    Procedure: COLONOSCOPY;  Surgeon: Flaco Magaña MD;  Location: [facility];  Service: General     GASTROPLASTY VERTICAL BANDED      Dr. Arizmendi [facility] 1996 Initial weight 800++ Lost to 440- (was 320# at lowest)     KNEE SURGERY       ZZC PART REMOVAL COLON W ANASTOMOSIS      Description: Partial Colectomy;  Recorded: 12/02/2011;  Comments: Dr Magaña     Family History   Problem Relation Age of Onset     Diabetes Type 2  Other      Heart Failure Other      Heart Disease Mother      Hypertension Mother      Diabetes Sister          I have reviewed the Medications, Allergies, Past Medical and Surgical History, and Social History in the Epic system.    Review of Systems   Constitutional: Negative for  fever.   Musculoskeletal: Positive for back pain.   Neurological: Negative for dizziness, weakness and light-headedness.       Physical Exam   BP: 99/61  Pulse: 99  Temp: 98.3  F (36.8  C)  Resp: 18  SpO2: 98 %      Physical Exam  Vitals and nursing note reviewed.   Constitutional:       Appearance: He is obese.      Comments: Laying supine on a cart   HENT:      Head: Atraumatic.   Eyes:      Extraocular Movements: Extraocular movements intact.      Pupils: Pupils are equal, round, and reactive to light.   Cardiovascular:      Rate and Rhythm: Regular rhythm.   Pulmonary:      Breath sounds: Normal breath sounds.   Abdominal:      Palpations: Abdomen is soft.      Tenderness: There is no abdominal tenderness.   Musculoskeletal:         General: No tenderness.      Comments:  in the L4-5 region   Neurological:      General: No focal deficit present.      Mental Status: He is alert and oriented to person, place, and time.      Motor: No weakness.   Psychiatric:         Mood and Affect: Mood normal.         ED Course     At 2:33 PM the patient was seen and examined by Donnie Matamoros MD in Room ED11.     Procedures         Medications   sodium chloride (PF) 0.9% PF flush 3 mL (has no administration in time range)   sodium chloride (PF) 0.9% PF flush 3 mL (has no administration in time range)   HYDROmorphone (PF) (DILAUDID) injection 0.5 mg (0.5 mg Intravenous Given 2/3/22 4639)   diazepam (VALIUM) injection 5 mg (0 mg Intravenous Hold 2/3/22 1601)   oxyCODONE (ROXICODONE) tablet 5 mg (has no administration in time range)      Results for orders placed or performed during the hospital encounter of 02/03/22 (from the past 24 hour(s))   Comprehensive metabolic panel   Result Value Ref Range    Sodium 138 133 - 144 mmol/L    Potassium 5.4 (H) 3.4 - 5.3 mmol/L    Chloride 110 (H) 94 - 109 mmol/L    Carbon Dioxide (CO2) 22 20 - 32 mmol/L    Anion Gap 6 3 - 14 mmol/L    Urea Nitrogen 36 (H) 7 - 30 mg/dL     Creatinine 2.79 (H) 0.66 - 1.25 mg/dL    Calcium 9.2 8.5 - 10.1 mg/dL    Glucose 97 70 - 99 mg/dL    Alkaline Phosphatase 76 40 - 150 U/L    AST 33 0 - 45 U/L    ALT 20 0 - 70 U/L    Protein Total 8.5 6.8 - 8.8 g/dL    Albumin 3.4 3.4 - 5.0 g/dL    Bilirubin Total 0.6 0.2 - 1.3 mg/dL    GFR Estimate 24 (L) >60 mL/min/1.73m2   Lumbar spine CT w/o contrast    Narrative    CT LUMBAR SPINE W/O CONTRAST 2/3/2022 3:56 PM    History: Low back pain, > 6 wks.    Comparison: None.    Technique: Using multidetector thin collimation helical acquisition  technique, axial, coronal and sagittal CT images through the lumbar  spine were obtained without intravenous contrast. There is some motion  artifact affecting quality of images.    Findings: There are 5 lumbar type vertebrae. There are multilevel  degenerative Schmorl's node-like endplate indentations. Anterior  bridging osteophytes in the lower thoracic and lumbar vertebrae.  Regarding alignment, the lumbar spine alignment appears preserved.  There is multilevel disc space narrowing at. Findings on a level by  level basis are as follows:    L1-L2: Posterior disc osteophyte complex and facet arthropathy causing  mild spinal canal stenosis and moderate bilateral neuroforaminal  stenosis.    L2-L3: Posterior disc osteophyte complex and facet arthropathy causing  mild bilateral neuroforaminal stenosis. No significant stenosis of the  right spinal canal.    L3-L4: Calcified disc bulge and posterior osteophytes with  superimposed facet arthropathy. Ligamentum flavum hypertrophy and  calcification causing moderate left and mild to moderate right  neuroforaminal stenosis. Mild spinal canal stenosis.    L4-L5: Disc bulge osteophyte formation and facet arthropathy causing  moderate bilateral neuroforaminal stenosis. Mild spinal canal  stenosis.    L5-S1: Disc bulge osteophyte formation and bilateral facet  hypertrophy. Moderate bilateral neural foraminal stenosis. No spinal  canal  stenosis.    The visualized adjacent paraspinous tissues are grossly within normal  limits.      Impression    Impression:  1. No fracture or subluxation.  2. Multilevel anterior bridging osteophytes. Bilateral chronic  sacroiliitis findings. 3. Multilevel neural foraminal stenosis  asabove..       I have personally reviewed the examination and initial interpretation  and I agree with the findings.    MORGAN JONES MD         SYSTEM ID:  [id number]   CBC with platelets differential    Narrative    The following orders were created for panel order CBC with platelets differential.  Procedure                               Abnormality         Status                     ---------                               -----------         ------                     CBC with platelets and d...[[id number]]  Abnormal            Final result                 Please view results for these tests on the individual orders.   CBC with platelets and differential   Result Value Ref Range    WBC Count 6.3 4.0 - 11.0 10e3/uL    RBC Count 3.11 (L) 4.40 - 5.90 10e6/uL    Hemoglobin 10.2 (L) 13.3 - 17.7 g/dL    Hematocrit 28.5 (L) 40.0 - 53.0 %    MCV 92 78 - 100 fL    MCH 32.8 26.5 - 33.0 pg    MCHC 35.8 31.5 - 36.5 g/dL    RDW 16.0 (H) 10.0 - 15.0 %    Platelet Count 130 (L) 150 - 450 10e3/uL    % Neutrophils 63 %    % Lymphocytes 20 %    % Monocytes 13 %    % Eosinophils 3 %    % Basophils 0 %    % Immature Granulocytes 1 %    NRBCs per 100 WBC 0 <1 /100    Absolute Neutrophils 4.1 1.6 - 8.3 10e3/uL    Absolute Lymphocytes 1.2 0.8 - 5.3 10e3/uL    Absolute Monocytes 0.8 0.0 - 1.3 10e3/uL    Absolute Eosinophils 0.2 0.0 - 0.7 10e3/uL    Absolute Basophils 0.0 0.0 - 0.2 10e3/uL    Absolute Immature Granulocytes 0.0 <=0.4 10e3/uL    Absolute NRBCs 0.0 10e3/uL   UA with Microscopic reflex to Culture    Specimen: Urine, Clean Catch   Result Value Ref Range    Color Urine Light Yellow Colorless, Straw, Light Yellow, Yellow    Appearance Urine Clear  Clear    Glucose Urine Negative Negative mg/dL    Bilirubin Urine Negative Negative    Ketones Urine Negative Negative mg/dL    Specific Gravity Urine 1.019 1.003 - 1.035    Blood Urine Negative Negative    pH Urine 5.5 5.0 - 7.0    Protein Albumin Urine 30  (A) Negative mg/dL    Urobilinogen Urine Normal Normal, 2.0 mg/dL    Nitrite Urine Negative Negative    Leukocyte Esterase Urine Negative Negative    Mucus Urine Present (A) None Seen /LPF    RBC Urine 0 <=2 /HPF    WBC Urine 0 <=5 /HPF    Squamous Epithelials Urine 1 <=1 /HPF    Transitional Epithelials Urine 1 <=1 /HPF    Narrative    Urine Culture not indicated         Assessments & Plan (with Medical Decision Making)     I have reviewed the nursing notes.    Patient improved with medications above and was able to get up and ambulate.  At this time the patient will be discharged home on the medications below    I have reviewed the findings, diagnosis, plan and need for follow up with the patient.    New Prescriptions    METHOCARBAMOL (ROBAXIN) 500 MG TABLET    Take 2 tablets (1,000 mg) by mouth 3 times daily for 5 days    OXYCODONE (ROXICODONE) 5 MG TABLET    Take 1 tablet (5 mg) by mouth every 6 hours as needed for pain       Final diagnoses:   Lumbar back pain     No lifting more than 20 pounds for 1 week.    No work until 2/7/2022.  Work note given    Please make an appointment to follow up with Your Primary Care Provider in one week for recheck.      I, Delio Spear am serving as a trained medical scribe to document services personally performed by Donnie Matamoros MD, based on the provider's statements to me.      IDonnie MD, was physically present and have reviewed and verified the accuracy of this note documented by Delio Spear.     Donnie Matmaoros MD  2/3/2022   [facility] EMERGENCY DEPARTMENT     Donnie Matamoros MD  02/03/22 0415

## 2022-02-03 NOTE — TELEPHONE ENCOUNTER
Reason for Call:  Medication or medication refill:    Do you use a [facility]?  Name of the pharmacy and phone number for the current request: [facility] pharmacy on file    Name of the medication requested: levofloxacin (LEVAQUIN) 750 MG tablet, Lidocaine patches    Other request: Patient called to request provider to send RX to pharmacy for medications listed above. He was in the hospital last week and was taken off all his medication. He is requesting if provider can call him to discuss this. Please send RX to pharmacy if appropriate.    Can we leave a detailed message on this number? YES    Phone number patient can be reached at: Home number on file [phone] (home)    Best Time: any    Call taken on 2/3/2022 at 8:04 AM by Fernando Dalton

## 2022-02-03 NOTE — ED NOTES
Bed: [facility]  Expected date: 2/3/22  Expected time: 1:53 PM  Means of arrival: Ambulance  Comments:  Jose Daniel 89    65 y/o Male    Back pain    Triaged:  YELLOW

## 2022-02-03 NOTE — LETTER
February 3, 2022      To Whom It May Concern:      Panda Miranda was seen in our Emergency Department today, 02/03/22.  I expect his condition to improve over the next week.  He may not return to work/school until 2/7/22 and then will be unable to lift more than 20 pounds for 1 week.    Sincerely,        Donnie Matamoros MD, MD

## 2022-02-03 NOTE — TELEPHONE ENCOUNTER
RN spoke to pt regarding message below to gain more clarity on what pt is trying to request.    Per pt, the Hospital discontinued almost every medication since he has been hospitalized except for his blood thinner medication.    RN did chart review and per ED discharge note:  [facility] Follow-up and recommended labs and tests     Follow up with primary care provider, Ben Hamlin, within 7 days to evaluate medication change.  The following labs/tests are recommended: BNP, CMP. Patient's metoprolol, amlodipine, lisinopril and bumex held for hypotension. Please slowly resume as tolerable. .       RN educated pt that they may have discontinued his BP medications due to low BP.    Pt is requesting more of levofloxacin. RN educated pt that it looks like the provider who ordered it, purposely only order 3 tablets and it was to be taken every other day for UTI.     Pt also requesting for Lidocaine patches for his back. Pt states PCP has ordered these patches for pt before. RN did not see it current on pt med list.    Pt Pharmacy:   [id number] [address] AT [facility] OF  [address]    RN assisted pt in making ED F/U visit for:  Next 5 appointments (look out 90 days)    Feb 09, 2022  2:20 PM  Return Visit with Cynthia Argueta MD  [facility] ([facility] [address]  [facility] [address] [phone]        Routing to PCP to review and advise on requested medication from pt.    KALPESH Casey, RN   [facility]

## 2022-02-04 ENCOUNTER — TELEPHONE (OUTPATIENT)
Dept: ANTICOAGULATION | Facility: CLINIC | Age: 67
End: 2022-02-04
Payer: COMMERCIAL

## 2022-02-04 DIAGNOSIS — I48.91 ATRIAL FIBRILLATION, UNSPECIFIED TYPE (H): Primary | ICD-10-CM

## 2022-02-04 NOTE — DISCHARGE INSTRUCTIONS
No lifting more than 20 pounds for 1 week.    No work until 2/7/2022.    Please make an appointment to follow up with Your Primary Care Provider in one week for recheck.

## 2022-02-04 NOTE — TELEPHONE ENCOUNTER
.acANTICOAGULATION  MANAGEMENT: Discharge Review    Panda Miranda chart reviewed for anticoagulation continuity of care    ED on 2/3 for back pain.    Discharge disposition: Home    Results:    Recent labs: (last 7 days)     01/29/22  0654 01/30/22  0609   INR 2.32* 2.52*     Anticoagulation inpatient management:     not applicable     Anticoagulation discharge instructions:     Warfarin dosing: home regimen continued   Bridging: No   INR goal change: No      Medication changes affecting anticoagulation: Yes: levaquin    Additional factors affecting anticoagulation: No    Plan     No adjustment to anticoagulation plan needed  Agree with discharge plan for follow up on in one week. Can check with heartcare visit 2/9    Patient not contacted no vm    Anticoagulation Calendar updated    Patricia Acosta RN

## 2022-02-04 NOTE — ED NOTES
RN ambulated pt in room. Pt was able to get up from bed independently but c/o continuing back pain. Able to walk to the end of the bed but wanted to sit back down and did not proceed with ambulation. MD updated. Ordered and administered medication in return. Pt attempting to call for a ride currently.

## 2022-02-07 ENCOUNTER — NURSE TRIAGE (OUTPATIENT)
Dept: NURSING | Facility: CLINIC | Age: 67
End: 2022-02-07
Payer: COMMERCIAL

## 2022-02-07 ENCOUNTER — TELEPHONE (OUTPATIENT)
Dept: FAMILY MEDICINE | Facility: CLINIC | Age: 67
End: 2022-02-07
Payer: COMMERCIAL

## 2022-02-07 NOTE — TELEPHONE ENCOUNTER
Reason for Call: Request for an order or referral:    Order or referral being requested: Referral for Homecare services    Date needed: as soon as possible    Has the patient been seen by the PCP for this problem? YES    Additional comments: Patient's spouse, Angelica, (C2C on file) called to ask if provider can put in a referral for homecare services or for PCA services. Patient has been in and out of hospital, and he is bed bound. He has been in bed since Friday and can not get up on his own. He has been using bed pan and has not showered, Angelica can not lift and help him up. She does not know what else to do. Please put in referral and she would like a call from RN. She can be reached at [phone].    Phone number Patient can be reached at:  Home number on file [phone] (home)    Best Time:  any    Can we leave a detailed message on this number?  YES    Call taken on 2/7/2022 at 10:24 AM by Fernando Dalton

## 2022-02-07 NOTE — TELEPHONE ENCOUNTER
RN attempted to call patient and patient's wife regarding their request of referral for home care services.    Patient and patient's wife did not answer. Unable to leave message. RN will try to call them later.        Araseli Tong RN  [facility]

## 2022-02-08 DIAGNOSIS — Z76.0 ENCOUNTER FOR MEDICATION REFILL: ICD-10-CM

## 2022-02-08 DIAGNOSIS — M10.9 GOUT, UNSPECIFIED CAUSE, UNSPECIFIED CHRONICITY, UNSPECIFIED SITE: Primary | ICD-10-CM

## 2022-02-08 DIAGNOSIS — M11.20 CALCIUM PYROPHOSPHATE DEPOSITION DISEASE: Primary | ICD-10-CM

## 2022-02-08 DIAGNOSIS — M17.10 ARTHRITIS OF KNEE: ICD-10-CM

## 2022-02-08 DIAGNOSIS — M10.9 GOUT, UNSPECIFIED CAUSE, UNSPECIFIED CHRONICITY, UNSPECIFIED SITE: ICD-10-CM

## 2022-02-08 RX ORDER — FEBUXOSTAT 80 MG/1
80 TABLET, FILM COATED ORAL DAILY
Qty: 90 TABLET | Refills: 3 | Status: ON HOLD | OUTPATIENT
Start: 2022-02-08 | End: 2022-02-17

## 2022-02-08 RX ORDER — FENTANYL 25 UG/1
1 PATCH TRANSDERMAL
Qty: 5 PATCH | Refills: 0 | OUTPATIENT
Start: 2022-02-08

## 2022-02-08 RX ORDER — FENTANYL 25 UG/1
1 PATCH TRANSDERMAL
Qty: 5 PATCH | Refills: 0 | Status: ON HOLD | OUTPATIENT
Start: 2022-02-08 | End: 2022-01-01

## 2022-02-08 RX ORDER — COLCHICINE 0.6 MG/1
TABLET ORAL
Qty: 3 TABLET | Refills: 3 | OUTPATIENT
Start: 2022-02-08

## 2022-02-08 RX ORDER — COLCHICINE 0.6 MG/1
TABLET ORAL
Qty: 3 TABLET | Refills: 3 | Status: SHIPPED | OUTPATIENT
Start: 2022-02-08 | End: 2022-02-11

## 2022-02-08 NOTE — TELEPHONE ENCOUNTER
RN made 2nd attempt to contact patient's wives , but no answer. Left message on patient's wifes voice mail to call clinic back.    See message below. Requesting PCA services.  Patient seen on ER on 2/3/2022 for severe back pain, UTI, hypotension down into the 80s and because of possible pyelonephritis. Patient could hardly walk due to back pain  Patient is 433 #    CT LUMBAR SPINE W/O CONTRAST 2/3/2022 3:56 PM  Impression:  1. No fracture or subluxation.  2. Multilevel anterior bridging osteophytes. Bilateral chronic  sacroiliitis findings. 3. Multilevel neural foraminal stenosis  There are multilevel  degenerative Schmorl's node-like endplate indentations. Anterior  bridging osteophytes in the lower thoracic and lumbar vertebrae      Katherine Wong RN  [facility]

## 2022-02-08 NOTE — TELEPHONE ENCOUNTER
I think I know what he means by pain patches.  I think I prescribed him a number of years ago but have not recently.    I am happy to refill Uloric.  I would be open to temporary pain medication, like Tylenol 3.    If he thinks he needs long-term pain medication like fentanyl patches, I would need to see him and talk about this.  I also would like to avoid that if possible-since it means that he is on those forever.    Contact him and clarify what he means.  I will renew the Uloric

## 2022-02-08 NOTE — TELEPHONE ENCOUNTER
Reason for Call:  Other prescription    Detailed comments: pt is calling regarding his request for pain patches he is  also looking for the Uloric refill manjinder hasnt received it yet  His gout is on both hands,elbow and ankles    Phone Number Patient can be reached at: Cell number on file:    Telephone Information:   Mobile [phone]       Best Time:anytime  Can we leave a detailed message on this number? YES    Call taken on 2/8/2022 at 8:28 AM by Tracy Ferrell

## 2022-02-08 NOTE — TELEPHONE ENCOUNTER
Pt called in ask refill for new medication.  Inform the Pt first he has to contact the PCP.  Pt verbalized understand, no other concern at this time.    Rashard Mack Nurse Advisor 2/7/2022 7:44 PM

## 2022-02-08 NOTE — TELEPHONE ENCOUNTER
RN is requesting prescription for FENTANYL Patches and colchicine.  Per pt, his back is locked, cannot walk and also having a gout attack. He needs fentanyl patches.     RN relayed that PCP refilled Uloric medication. Pt asking about Colchicine medication as well. RN did not see med under current med list.    RN made appt for:     Feb 10, 2022 11:00 AM  (Arrive by 10:40 AM)  Provider Visit with Ben Hamlin MD  [facility] ([facility] ) [address] [place] [address] [phone]        Pt would like to have a conversation about nursing home as well visit made.    Routing to PCP to review and advise.    KALPESH Casey, RN   [facility]

## 2022-02-08 NOTE — TELEPHONE ENCOUNTER
RN attempt to call pt back regarding Dr. Hamlin's message below. No answer. Unable to leave detailed VM as phone just keeps on ringing.    Will attempt to reach pt again another time.      KALPESH Casey, RN   [facility]

## 2022-02-09 NOTE — TELEPHONE ENCOUNTER
See message below.  Patient called back on 2/8/2022. See message below  Patient has an appointment scheduled on 2/10 to discuss NH request.    No further action needed.    Katherine Wong RN  [facility]      RN is requesting prescription for FENTANYL Patches and colchicine.  Per pt, his back is locked, cannot walk and also having a gout attack. He needs fentanyl patches.      RN relayed that PCP refilled Uloric medication. Pt asking about Colchicine medication as well. RN did not see med under current med list.     RN made appt for:           Feb 10, 2022 11:00 AM  (Arrive by 10:40 AM)  Provider Visit with Ben Hamlin MD  [facility] ([facility] ) [address] [place] [address] [phone]          Pt would like to have a conversation about nursing home as well visit made.     Routing to PCP to review and advise.     KALPESH Casey, RN              [facility]

## 2022-02-10 ENCOUNTER — APPOINTMENT (OUTPATIENT)
Dept: RADIOLOGY | Facility: HOSPITAL | Age: 67
End: 2022-02-10
Attending: EMERGENCY MEDICINE
Payer: COMMERCIAL

## 2022-02-10 ENCOUNTER — VIRTUAL VISIT (OUTPATIENT)
Dept: FAMILY MEDICINE | Facility: CLINIC | Age: 67
End: 2022-02-10
Payer: COMMERCIAL

## 2022-02-10 ENCOUNTER — HOSPITAL ENCOUNTER (EMERGENCY)
Facility: HOSPITAL | Age: 67
Discharge: ANOTHER HOSPITAL | End: 2022-02-11
Attending: EMERGENCY MEDICINE | Admitting: HOSPITALIST
Payer: COMMERCIAL

## 2022-02-10 DIAGNOSIS — K21.00 GASTROESOPHAGEAL REFLUX DISEASE WITH ESOPHAGITIS, UNSPECIFIED WHETHER HEMORRHAGE: ICD-10-CM

## 2022-02-10 DIAGNOSIS — E66.01 MORBID OBESITY (H): Primary | ICD-10-CM

## 2022-02-10 DIAGNOSIS — N18.4 ANEMIA OF CHRONIC RENAL FAILURE, STAGE 4 (SEVERE) (H): ICD-10-CM

## 2022-02-10 DIAGNOSIS — D63.1 ANEMIA OF CHRONIC RENAL FAILURE, STAGE 4 (SEVERE) (H): ICD-10-CM

## 2022-02-10 DIAGNOSIS — I48.91 ATRIAL FIBRILLATION, UNSPECIFIED TYPE (H): ICD-10-CM

## 2022-02-10 DIAGNOSIS — M10.9 GOUT, UNSPECIFIED CAUSE, UNSPECIFIED CHRONICITY, UNSPECIFIED SITE: ICD-10-CM

## 2022-02-10 DIAGNOSIS — G47.00 INSOMNIA, UNSPECIFIED TYPE: ICD-10-CM

## 2022-02-10 DIAGNOSIS — I50.30 DIASTOLIC CONGESTIVE HEART FAILURE, UNSPECIFIED HF CHRONICITY (H): ICD-10-CM

## 2022-02-10 DIAGNOSIS — R52 PAIN: Primary | ICD-10-CM

## 2022-02-10 DIAGNOSIS — R60.9 EDEMA, UNSPECIFIED TYPE: ICD-10-CM

## 2022-02-10 DIAGNOSIS — M54.59 INTRACTABLE LOW BACK PAIN: ICD-10-CM

## 2022-02-10 LAB
ALBUMIN SERPL-MCNC: 2.6 G/DL (ref 3.5–5)
ALP SERPL-CCNC: 76 U/L (ref 45–120)
ALT SERPL W P-5'-P-CCNC: <9 U/L (ref 0–45)
ANION GAP SERPL CALCULATED.3IONS-SCNC: 13 MMOL/L (ref 5–18)
AST SERPL W P-5'-P-CCNC: 27 U/L (ref 0–40)
BASOPHILS # BLD AUTO: 0 10E3/UL (ref 0–0.2)
BASOPHILS NFR BLD AUTO: 0 %
BILIRUB SERPL-MCNC: 1.1 MG/DL (ref 0–1)
BNP SERPL-MCNC: 231 PG/ML (ref 0–60)
BUN SERPL-MCNC: 44 MG/DL (ref 8–22)
C REACTIVE PROTEIN LHE: 31.7 MG/DL (ref 0–0.8)
CALCIUM SERPL-MCNC: 9.1 MG/DL (ref 8.5–10.5)
CHLORIDE BLD-SCNC: 109 MMOL/L (ref 98–107)
CO2 SERPL-SCNC: 16 MMOL/L (ref 22–31)
CREAT SERPL-MCNC: 2.95 MG/DL (ref 0.7–1.3)
EOSINOPHIL # BLD AUTO: 0.2 10E3/UL (ref 0–0.7)
EOSINOPHIL NFR BLD AUTO: 3 %
ERYTHROCYTE [DISTWIDTH] IN BLOOD BY AUTOMATED COUNT: 15.9 % (ref 10–15)
ERYTHROCYTE [SEDIMENTATION RATE] IN BLOOD BY WESTERGREN METHOD: 92 MM/HR (ref 0–15)
GFR SERPL CREATININE-BSD FRML MDRD: 23 ML/MIN/1.73M2
GLUCOSE BLD-MCNC: 84 MG/DL (ref 70–125)
HCT VFR BLD AUTO: 35 % (ref 40–53)
HGB BLD-MCNC: 10.5 G/DL (ref 13.3–17.7)
IMM GRANULOCYTES # BLD: 0.1 10E3/UL
IMM GRANULOCYTES NFR BLD: 1 %
INR PPP: 2.16 (ref 0.9–1.15)
LACTATE SERPL-SCNC: 1.6 MMOL/L (ref 0.7–2)
LYMPHOCYTES # BLD AUTO: 1.4 10E3/UL (ref 0.8–5.3)
LYMPHOCYTES NFR BLD AUTO: 16 %
MAGNESIUM SERPL-MCNC: 1.8 MG/DL (ref 1.8–2.6)
MCH RBC QN AUTO: 26.3 PG (ref 26.5–33)
MCHC RBC AUTO-ENTMCNC: 30 G/DL (ref 31.5–36.5)
MCV RBC AUTO: 88 FL (ref 78–100)
MONOCYTES # BLD AUTO: 1.4 10E3/UL (ref 0–1.3)
MONOCYTES NFR BLD AUTO: 16 %
NEUTROPHILS # BLD AUTO: 5.8 10E3/UL (ref 1.6–8.3)
NEUTROPHILS NFR BLD AUTO: 64 %
NRBC # BLD AUTO: 0 10E3/UL
NRBC BLD AUTO-RTO: 0 /100
PLATELET # BLD AUTO: 135 10E3/UL (ref 150–450)
POTASSIUM BLD-SCNC: 5.4 MMOL/L (ref 3.5–5)
PROT SERPL-MCNC: 7.7 G/DL (ref 6–8)
RBC # BLD AUTO: 3.99 10E6/UL (ref 4.4–5.9)
SODIUM SERPL-SCNC: 138 MMOL/L (ref 136–145)
TROPONIN I SERPL-MCNC: 0.04 NG/ML (ref 0–0.29)
URATE SERPL-MCNC: 7 MG/DL (ref 3–8)
WBC # BLD AUTO: 8.9 10E3/UL (ref 4–11)

## 2022-02-10 PROCEDURE — 85610 PROTHROMBIN TIME: CPT | Performed by: EMERGENCY MEDICINE

## 2022-02-10 PROCEDURE — 83605 ASSAY OF LACTIC ACID: CPT | Performed by: EMERGENCY MEDICINE

## 2022-02-10 PROCEDURE — 85652 RBC SED RATE AUTOMATED: CPT | Performed by: EMERGENCY MEDICINE

## 2022-02-10 PROCEDURE — 80053 COMPREHEN METABOLIC PANEL: CPT | Performed by: EMERGENCY MEDICINE

## 2022-02-10 PROCEDURE — 71045 X-RAY EXAM CHEST 1 VIEW: CPT

## 2022-02-10 PROCEDURE — 85004 AUTOMATED DIFF WBC COUNT: CPT | Performed by: EMERGENCY MEDICINE

## 2022-02-10 PROCEDURE — 120N000001 HC R&B MED SURG/OB

## 2022-02-10 PROCEDURE — 36415 COLL VENOUS BLD VENIPUNCTURE: CPT | Performed by: EMERGENCY MEDICINE

## 2022-02-10 PROCEDURE — 250N000013 HC RX MED GY IP 250 OP 250 PS 637: Performed by: EMERGENCY MEDICINE

## 2022-02-10 PROCEDURE — 83880 ASSAY OF NATRIURETIC PEPTIDE: CPT | Performed by: EMERGENCY MEDICINE

## 2022-02-10 PROCEDURE — 93005 ELECTROCARDIOGRAM TRACING: CPT | Performed by: EMERGENCY MEDICINE

## 2022-02-10 PROCEDURE — 83735 ASSAY OF MAGNESIUM: CPT | Performed by: EMERGENCY MEDICINE

## 2022-02-10 PROCEDURE — C9803 HOPD COVID-19 SPEC COLLECT: HCPCS

## 2022-02-10 PROCEDURE — 84550 ASSAY OF BLOOD/URIC ACID: CPT | Performed by: EMERGENCY MEDICINE

## 2022-02-10 PROCEDURE — 99214 OFFICE O/P EST MOD 30 MIN: CPT | Mod: TEL | Performed by: FAMILY MEDICINE

## 2022-02-10 PROCEDURE — 99285 EMERGENCY DEPT VISIT HI MDM: CPT | Mod: 25

## 2022-02-10 PROCEDURE — 84484 ASSAY OF TROPONIN QUANT: CPT | Performed by: EMERGENCY MEDICINE

## 2022-02-10 PROCEDURE — 96374 THER/PROPH/DIAG INJ IV PUSH: CPT

## 2022-02-10 PROCEDURE — 99223 1ST HOSP IP/OBS HIGH 75: CPT | Performed by: HOSPITALIST

## 2022-02-10 PROCEDURE — 86140 C-REACTIVE PROTEIN: CPT | Performed by: EMERGENCY MEDICINE

## 2022-02-10 RX ORDER — BISACODYL 10 MG
10 SUPPOSITORY, RECTAL RECTAL DAILY PRN
Status: DISCONTINUED | OUTPATIENT
Start: 2022-02-10 | End: 2022-02-11 | Stop reason: HOSPADM

## 2022-02-10 RX ORDER — ONDANSETRON 2 MG/ML
4 INJECTION INTRAMUSCULAR; INTRAVENOUS EVERY 6 HOURS PRN
Status: DISCONTINUED | OUTPATIENT
Start: 2022-02-10 | End: 2022-02-11 | Stop reason: HOSPADM

## 2022-02-10 RX ORDER — FAMOTIDINE 20 MG/1
20 TABLET, FILM COATED ORAL 2 TIMES DAILY PRN
Status: DISCONTINUED | OUTPATIENT
Start: 2022-02-10 | End: 2022-02-11 | Stop reason: HOSPADM

## 2022-02-10 RX ORDER — ONDANSETRON 4 MG/1
4 TABLET, ORALLY DISINTEGRATING ORAL EVERY 6 HOURS PRN
Status: DISCONTINUED | OUTPATIENT
Start: 2022-02-10 | End: 2022-02-11 | Stop reason: HOSPADM

## 2022-02-10 RX ORDER — DOCUSATE SODIUM 100 MG/1
100 CAPSULE, LIQUID FILLED ORAL 2 TIMES DAILY
Status: DISCONTINUED | OUTPATIENT
Start: 2022-02-11 | End: 2022-02-11 | Stop reason: HOSPADM

## 2022-02-10 RX ORDER — METHOCARBAMOL 500 MG/1
500 TABLET, FILM COATED ORAL 3 TIMES DAILY PRN
Status: DISCONTINUED | OUTPATIENT
Start: 2022-02-10 | End: 2022-02-11 | Stop reason: HOSPADM

## 2022-02-10 RX ORDER — COLCHICINE 0.6 MG/1
0.6 TABLET ORAL DAILY
Status: DISCONTINUED | OUTPATIENT
Start: 2022-02-11 | End: 2022-02-11 | Stop reason: HOSPADM

## 2022-02-10 RX ORDER — LIDOCAINE 40 MG/G
CREAM TOPICAL
Status: DISCONTINUED | OUTPATIENT
Start: 2022-02-10 | End: 2022-02-11 | Stop reason: HOSPADM

## 2022-02-10 RX ORDER — ACETAMINOPHEN 650 MG/1
650 SUPPOSITORY RECTAL EVERY 6 HOURS PRN
Status: DISCONTINUED | OUTPATIENT
Start: 2022-02-10 | End: 2022-02-11 | Stop reason: HOSPADM

## 2022-02-10 RX ORDER — COLCHICINE 0.6 MG/1
0.6 TABLET ORAL DAILY
Qty: 90 TABLET | Refills: 0 | Status: SHIPPED | OUTPATIENT
Start: 2022-02-10 | End: 2022-02-10

## 2022-02-10 RX ORDER — FENTANYL 25 UG/1
25 PATCH TRANSDERMAL
Status: DISCONTINUED | OUTPATIENT
Start: 2022-02-11 | End: 2022-02-11 | Stop reason: HOSPADM

## 2022-02-10 RX ORDER — OXYCODONE AND ACETAMINOPHEN 5; 325 MG/1; MG/1
2 TABLET ORAL ONCE
Status: COMPLETED | OUTPATIENT
Start: 2022-02-10 | End: 2022-02-10

## 2022-02-10 RX ORDER — COLCHICINE 0.6 MG/1
0.6 TABLET ORAL DAILY
Qty: 90 TABLET | Refills: 3 | Status: ON HOLD | OUTPATIENT
Start: 2022-02-10 | End: 2022-02-17

## 2022-02-10 RX ORDER — KETOROLAC TROMETHAMINE 30 MG/ML
15 INJECTION, SOLUTION INTRAMUSCULAR; INTRAVENOUS ONCE
Status: DISCONTINUED | OUTPATIENT
Start: 2022-02-10 | End: 2022-02-10

## 2022-02-10 RX ORDER — ACETAMINOPHEN 325 MG/1
650 TABLET ORAL EVERY 6 HOURS PRN
Status: DISCONTINUED | OUTPATIENT
Start: 2022-02-10 | End: 2022-02-11 | Stop reason: HOSPADM

## 2022-02-10 RX ORDER — METHOCARBAMOL 500 MG/1
500 TABLET, FILM COATED ORAL 3 TIMES DAILY PRN
Status: ON HOLD | COMMUNITY
End: 2022-01-01

## 2022-02-10 RX ORDER — FERROUS SULFATE 325(65) MG
325 TABLET ORAL DAILY
Status: DISCONTINUED | OUTPATIENT
Start: 2022-02-11 | End: 2022-02-11 | Stop reason: HOSPADM

## 2022-02-10 RX ORDER — FEBUXOSTAT 40 MG/1
80 TABLET, FILM COATED ORAL DAILY
Status: DISCONTINUED | OUTPATIENT
Start: 2022-02-11 | End: 2022-02-11 | Stop reason: HOSPADM

## 2022-02-10 RX ORDER — CALCIUM CARBONATE 500 MG/1
1000 TABLET, CHEWABLE ORAL 4 TIMES DAILY PRN
Status: DISCONTINUED | OUTPATIENT
Start: 2022-02-10 | End: 2022-02-11 | Stop reason: HOSPADM

## 2022-02-10 RX ORDER — PREDNISONE 20 MG/1
40 TABLET ORAL DAILY
Status: DISCONTINUED | OUTPATIENT
Start: 2022-02-10 | End: 2022-02-11 | Stop reason: HOSPADM

## 2022-02-10 RX ORDER — OXYCODONE HYDROCHLORIDE 5 MG/1
5-10 TABLET ORAL EVERY 6 HOURS PRN
Status: DISCONTINUED | OUTPATIENT
Start: 2022-02-10 | End: 2022-02-11 | Stop reason: HOSPADM

## 2022-02-10 RX ADMIN — OXYCODONE HYDROCHLORIDE AND ACETAMINOPHEN 2 TABLET: 5; 325 TABLET ORAL at 17:10

## 2022-02-10 ASSESSMENT — ACTIVITIES OF DAILY LIVING (ADL)
ADLS_ACUITY_SCORE: 12
ADLS_ACUITY_SCORE: 12

## 2022-02-10 ASSESSMENT — ENCOUNTER SYMPTOMS
ROS GI COMMENTS: NEGATIVE FOR BOWEL INCONTINENCE
BACK PAIN: 1

## 2022-02-10 ASSESSMENT — MIFFLIN-ST. JEOR: SCORE: 2885.27

## 2022-02-10 NOTE — PROGRESS NOTES
Panda is a 66 year old who is being evaluated via a billable telephone visit.      What phone number would you like to be contacted at? [phone]  How would you like to obtain your AVS? Mail a copy      Subjective     HPI   Patient calls in some great distress.  Unable to care for himself at home.  Unable to move.  Knee has locked up.  Thinks it may be related to gout  Fingers or swelling  No increased shortness of breath  But cannot move around to use the bathroom/even get on the BiPAP.  Wife often helps him but she has been able to do this.  Extreme pain in knee because of gout he thinks.  Recovering from COVID-19  History of diastolic heart failure.  Weight has been going down.    Hospitalized at [facility] with acute back pain, sepsis, Covid, pulmonary edema 1/27th through 1/30.  Has not done well upon discharge     Unable to care for himself.  Contributing factors:  1. Gout, unspecified cause, unspecified chronicity, unspecified site    - colchicine (COLCYRS) 0.6 MG tablet; Take 1 tablet (0.6 mg) by mouth daily  Dispense: 90 tablet; Refill: 3    2. Morbid obesity (H)      3. Edema, unspecified type      4. Diastolic congestive heart failure, unspecified HF chronicity (H)    l do not see other options other than going back to the hospital.  Could try to get him into TCU via waiver but this would take at least a few days and patient needs help now.  Phone call duration: 10 minutes

## 2022-02-10 NOTE — TELEPHONE ENCOUNTER
"Routing refill request to provider for review/approval because:  Labs not current:  Multiple  Provider please review- requested is different from active med list    Last Written Prescription Date:  2/8/22  Last Fill Quantity: 3,  # refills: 3   Last office visit provider:  1/11/22 Boubacar     Requested Prescriptions   Pending Prescriptions Disp Refills     colchicine (COLCYRS) 0.6 MG tablet 90 tablet 0     Sig: Take 1 tablet (0.6 mg) by mouth daily       Gout Agents Protocol Failed - 2/8/2022 12:44 PM        Failed - Has Uric Acid on file in past 12 months and value is less than 6     Recent Labs   Lab Test 01/26/21  1649   URIC 6.5     If level is 6mg/dL or greater, ok to refill one time and refer to provider.           Failed - Medication is active on med list        Failed - Normal serum creatinine on file in the past 12 months     Recent Labs   Lab Test 02/03/22  1554   CR 2.79*       Ok to refill medication if creatinine is low          Passed - CBC on file in past 12 months     Recent Labs   Lab Test 02/03/22  1707   WBC 6.3   RBC 3.11*   HGB 10.2*   HCT 28.5*   *                 Passed - ALT on file in past 12 months     Recent Labs   Lab Test 02/03/22  1554   ALT 20             Passed - Recent (12 mo) or future (30 days) visit within the authorizing provider's specialty     Patient has had an office visit with the authorizing provider or a provider within the authorizing providers department within the previous 12 mos or has a future within next 30 days. See \"Patient Info\" tab in inbasket, or \"Choose Columns\" in Meds & Orders section of the refill encounter.              Passed - Patient is age 18 or older             Margaux Navarro RN 02/10/22 9:11 AM  "

## 2022-02-10 NOTE — ED PROVIDER NOTES
"ED Provider In Triage [facility]  Encounter Date: Feb 10, 2022    Chief Complaint   Patient presents with     Arthritis     Back Pain       Brief HPI:   Panda Miranda is a 66 year old male presenting to the Emergency Department with a chief complaint of severe \"gout attack\". Recently hospitalized at [facility] for urosepsis. Had multiple gout medicines, uloric and allopurinal , stopped due to hypotension. Now with severe R knee and L ankle pain. Other joints also painful but less so. Denies fevers. Almost bed ridden. Needs placement as wife unable to provide necessary cares.Given fentanyl IM by EMS      Brief Physical Exam:  There were no vitals taken for this visit.  General: Non-toxic appearing  HEENT: Atraumatic  Resp: No respiratory distress  Abdomen: Non-peritoneal  Neuro: Alert, oriented, answers questions appropriately  Psych: Behavior appropriate  Extremeties: R knee effusion/warmth/tenderness. Marked R ankle tenderness. Hands diffusely tender with degenerative changes. No warmth/erythema    Plan Initiated in Triage:  Orders Placed This Encounter     XR Chest Port 1 View     Troponin I     Magnesium     Comprehensive metabolic panel     B-Type Natriuretic Peptide ([facility] Only)     Uric acid     CRP inflammation     Erythrocyte sedimentation rate auto     Lactic acid whole blood     ketorolac (TORADOL) injection 15 mg       PIT Dispo:   Main ED    Noam Sherwood MD on 2/10/2022 at 1:06 PM    Patient was evaluated by the Physician in Triage due to a limitation of available rooms in the Emergency Department. A plan of care was discussed based on the information obtained on the initial evaluation and patient was consuled to return back to the Emergency Department lobby after this initial evalutaiton until results were obtained or a room became available in the Emergency Department. Patient was counseled not to leave prior to receiving the results of their workup.      Noam Sherwood " MD BIANCA  02/10/22 [id number]

## 2022-02-10 NOTE — ED TRIAGE NOTES
Pt called medics for increasing gout and back pain causing decreased mobility.  Pt becoming bedridden.  Wants TCU placement. Unable to care for himself.

## 2022-02-11 ENCOUNTER — HOSPITAL ENCOUNTER (INPATIENT)
Facility: CLINIC | Age: 67
LOS: 9 days | Discharge: HOME HEALTH | DRG: 641 | End: 2022-02-20
Attending: NURSE PRACTITIONER | Admitting: FAMILY MEDICINE
Payer: COMMERCIAL

## 2022-02-11 ENCOUNTER — APPOINTMENT (OUTPATIENT)
Dept: OCCUPATIONAL THERAPY | Facility: HOSPITAL | Age: 67
End: 2022-02-11
Attending: HOSPITALIST
Payer: COMMERCIAL

## 2022-02-11 VITALS
DIASTOLIC BLOOD PRESSURE: 58 MMHG | TEMPERATURE: 98.3 F | OXYGEN SATURATION: 97 % | SYSTOLIC BLOOD PRESSURE: 123 MMHG | HEIGHT: 78 IN | BODY MASS INDEX: 36.45 KG/M2 | RESPIRATION RATE: 17 BRPM | HEART RATE: 109 BPM | WEIGHT: 315 LBS

## 2022-02-11 DIAGNOSIS — E66.01 MORBID OBESITY (H): ICD-10-CM

## 2022-02-11 DIAGNOSIS — M11.20 CALCIUM PYROPHOSPHATE DEPOSITION DISEASE: ICD-10-CM

## 2022-02-11 DIAGNOSIS — E87.5 HYPERKALEMIA: ICD-10-CM

## 2022-02-11 DIAGNOSIS — I48.20 CHRONIC ATRIAL FIBRILLATION (H): Primary | ICD-10-CM

## 2022-02-11 DIAGNOSIS — M10.9 GOUT, UNSPECIFIED CAUSE, UNSPECIFIED CHRONICITY, UNSPECIFIED SITE: ICD-10-CM

## 2022-02-11 DIAGNOSIS — M62.81 MUSCLE WEAKNESS (GENERALIZED): ICD-10-CM

## 2022-02-11 DIAGNOSIS — M10.9 POLYARTICULAR GOUT: ICD-10-CM

## 2022-02-11 DIAGNOSIS — M54.59 INTRACTABLE LOW BACK PAIN: ICD-10-CM

## 2022-02-11 DIAGNOSIS — N18.32 STAGE 3B CHRONIC KIDNEY DISEASE (H): ICD-10-CM

## 2022-02-11 DIAGNOSIS — R52 PAIN: ICD-10-CM

## 2022-02-11 DIAGNOSIS — N17.9 ACUTE KIDNEY INJURY (H): ICD-10-CM

## 2022-02-11 DIAGNOSIS — Z79.01 WARFARIN ANTICOAGULATION: ICD-10-CM

## 2022-02-11 DIAGNOSIS — N18.4 CKD (CHRONIC KIDNEY DISEASE) STAGE 4, GFR 15-29 ML/MIN (H): ICD-10-CM

## 2022-02-11 PROBLEM — I48.91 ATRIAL FIBRILLATION WITH RVR (H): Status: ACTIVE | Noted: 2022-02-11

## 2022-02-11 PROBLEM — S81.809A OPEN WOUND OF LOWER LEG: Status: ACTIVE | Noted: 2022-02-11

## 2022-02-11 PROBLEM — I50.22 CHRONIC SYSTOLIC CONGESTIVE HEART FAILURE (H): Status: ACTIVE | Noted: 2022-02-11

## 2022-02-11 PROBLEM — G47.33 OSA (OBSTRUCTIVE SLEEP APNEA): Status: ACTIVE | Noted: 2022-02-11

## 2022-02-11 PROBLEM — U07.1 INFECTION DUE TO 2019 NOVEL CORONAVIRUS: Status: ACTIVE | Noted: 2022-02-11

## 2022-02-11 LAB
ANION GAP SERPL CALCULATED.3IONS-SCNC: 9 MMOL/L (ref 5–18)
ATRIAL RATE - MUSE: 61 BPM
BUN SERPL-MCNC: 44 MG/DL (ref 8–22)
CALCIUM SERPL-MCNC: 8.8 MG/DL (ref 8.5–10.5)
CHLORIDE BLD-SCNC: 109 MMOL/L (ref 98–107)
CO2 SERPL-SCNC: 18 MMOL/L (ref 22–31)
CREAT SERPL-MCNC: 2.68 MG/DL (ref 0.7–1.3)
DIASTOLIC BLOOD PRESSURE - MUSE: NORMAL MMHG
GFR SERPL CREATININE-BSD FRML MDRD: 25 ML/MIN/1.73M2
GLUCOSE BLD-MCNC: 120 MG/DL (ref 70–125)
HOLD SPECIMEN: NORMAL
INR PPP: 2.6 (ref 0.9–1.15)
INTERPRETATION ECG - MUSE: NORMAL
P AXIS - MUSE: NORMAL DEGREES
POTASSIUM BLD-SCNC: 5.5 MMOL/L (ref 3.5–5)
PR INTERVAL - MUSE: NORMAL MS
QRS DURATION - MUSE: 102 MS
QT - MUSE: 348 MS
QTC - MUSE: 489 MS
R AXIS - MUSE: 38 DEGREES
SODIUM SERPL-SCNC: 136 MMOL/L (ref 136–145)
SYSTOLIC BLOOD PRESSURE - MUSE: NORMAL MMHG
T AXIS - MUSE: 5 DEGREES
VENTRICULAR RATE- MUSE: 119 BPM

## 2022-02-11 PROCEDURE — 99207 PR CDG-CODE CATEGORY CHANGED: CPT | Performed by: NURSE PRACTITIONER

## 2022-02-11 PROCEDURE — 85610 PROTHROMBIN TIME: CPT | Performed by: HOSPITALIST

## 2022-02-11 PROCEDURE — 97602 WOUND(S) CARE NON-SELECTIVE: CPT

## 2022-02-11 PROCEDURE — 80048 BASIC METABOLIC PNL TOTAL CA: CPT | Performed by: HOSPITALIST

## 2022-02-11 PROCEDURE — 250N000013 HC RX MED GY IP 250 OP 250 PS 637: Performed by: INTERNAL MEDICINE

## 2022-02-11 PROCEDURE — 97535 SELF CARE MNGMENT TRAINING: CPT | Mod: GO

## 2022-02-11 PROCEDURE — 99239 HOSP IP/OBS DSCHRG MGMT >30: CPT | Performed by: INTERNAL MEDICINE

## 2022-02-11 PROCEDURE — 120N000001 HC R&B MED SURG/OB

## 2022-02-11 PROCEDURE — G0463 HOSPITAL OUTPT CLINIC VISIT: HCPCS | Mod: 25

## 2022-02-11 PROCEDURE — 250N000013 HC RX MED GY IP 250 OP 250 PS 637: Performed by: HOSPITALIST

## 2022-02-11 PROCEDURE — 250N000009 HC RX 250: Performed by: INTERNAL MEDICINE

## 2022-02-11 PROCEDURE — 250N000013 HC RX MED GY IP 250 OP 250 PS 637: Performed by: NURSE PRACTITIONER

## 2022-02-11 PROCEDURE — 36415 COLL VENOUS BLD VENIPUNCTURE: CPT | Performed by: HOSPITALIST

## 2022-02-11 PROCEDURE — 250N000012 HC RX MED GY IP 250 OP 636 PS 637: Performed by: HOSPITALIST

## 2022-02-11 PROCEDURE — 99220 PR INITIAL OBSERVATION CARE,LEVEL III: CPT | Performed by: NURSE PRACTITIONER

## 2022-02-11 PROCEDURE — 97165 OT EVAL LOW COMPLEX 30 MIN: CPT | Mod: GO

## 2022-02-11 RX ORDER — FERROUS SULFATE 325(65) MG
325 TABLET ORAL DAILY
Status: DISCONTINUED | OUTPATIENT
Start: 2022-02-12 | End: 2022-01-01 | Stop reason: HOSPADM

## 2022-02-11 RX ORDER — FEBUXOSTAT 40 MG/1
80 TABLET, FILM COATED ORAL DAILY
Status: DISCONTINUED | OUTPATIENT
Start: 2022-02-12 | End: 2022-02-11

## 2022-02-11 RX ORDER — METOPROLOL TARTRATE 25 MG/1
25 TABLET, FILM COATED ORAL 2 TIMES DAILY
Status: COMPLETED | OUTPATIENT
Start: 2022-02-11 | End: 2022-02-13

## 2022-02-11 RX ORDER — METHOCARBAMOL 500 MG/1
500 TABLET, FILM COATED ORAL 3 TIMES DAILY PRN
Status: DISCONTINUED | OUTPATIENT
Start: 2022-02-11 | End: 2022-01-01 | Stop reason: HOSPADM

## 2022-02-11 RX ORDER — FENTANYL 25 UG/1
25 PATCH TRANSDERMAL
Status: DISCONTINUED | OUTPATIENT
Start: 2022-02-14 | End: 2022-02-17

## 2022-02-11 RX ORDER — OXYCODONE HYDROCHLORIDE 5 MG/1
5 TABLET ORAL EVERY 6 HOURS PRN
Status: DISCONTINUED | OUTPATIENT
Start: 2022-02-11 | End: 2022-01-01 | Stop reason: HOSPADM

## 2022-02-11 RX ORDER — LIDOCAINE 40 MG/G
CREAM TOPICAL
Status: DISCONTINUED | OUTPATIENT
Start: 2022-02-11 | End: 2022-01-01 | Stop reason: HOSPADM

## 2022-02-11 RX ORDER — OXYCODONE HYDROCHLORIDE 5 MG/1
5 TABLET ORAL EVERY 6 HOURS PRN
Refills: 0 | Status: ON HOLD
Start: 2022-02-11 | End: 2022-01-01

## 2022-02-11 RX ORDER — PREDNISONE 20 MG/1
40 TABLET ORAL DAILY
Status: ON HOLD
Start: 2022-02-12 | End: 2022-02-17

## 2022-02-11 RX ORDER — WARFARIN SODIUM 5 MG/1
5 TABLET ORAL
Status: COMPLETED | OUTPATIENT
Start: 2022-02-11 | End: 2022-02-11

## 2022-02-11 RX ORDER — ONDANSETRON 4 MG/1
4 TABLET, ORALLY DISINTEGRATING ORAL EVERY 6 HOURS PRN
Status: DISCONTINUED | OUTPATIENT
Start: 2022-02-11 | End: 2022-01-01 | Stop reason: HOSPADM

## 2022-02-11 RX ORDER — COLCHICINE 0.6 MG/1
0.6 TABLET ORAL DAILY
Status: DISCONTINUED | OUTPATIENT
Start: 2022-02-12 | End: 2022-02-12

## 2022-02-11 RX ORDER — WARFARIN SODIUM 5 MG/1
5 TABLET ORAL
Status: DISCONTINUED | OUTPATIENT
Start: 2022-02-11 | End: 2022-02-11 | Stop reason: HOSPADM

## 2022-02-11 RX ORDER — FEBUXOSTAT 80 MG/1
80 TABLET, FILM COATED ORAL DAILY
Status: DISCONTINUED | OUTPATIENT
Start: 2022-02-12 | End: 2022-02-12

## 2022-02-11 RX ORDER — NALOXONE HYDROCHLORIDE 0.4 MG/ML
0.4 INJECTION, SOLUTION INTRAMUSCULAR; INTRAVENOUS; SUBCUTANEOUS
Status: DISCONTINUED | OUTPATIENT
Start: 2022-02-11 | End: 2022-01-01 | Stop reason: HOSPADM

## 2022-02-11 RX ORDER — METOPROLOL TARTRATE 1 MG/ML
5 INJECTION, SOLUTION INTRAVENOUS ONCE
Status: COMPLETED | OUTPATIENT
Start: 2022-02-11 | End: 2022-02-11

## 2022-02-11 RX ORDER — FAMOTIDINE 20 MG/1
20 TABLET, FILM COATED ORAL 2 TIMES DAILY PRN
Status: ON HOLD
Start: 2022-02-11 | End: 2022-01-01

## 2022-02-11 RX ORDER — ACETAMINOPHEN 325 MG/1
650 TABLET ORAL EVERY 6 HOURS PRN
Status: DISCONTINUED | OUTPATIENT
Start: 2022-02-11 | End: 2022-01-01 | Stop reason: HOSPADM

## 2022-02-11 RX ORDER — PREDNISONE 20 MG/1
40 TABLET ORAL DAILY
Status: DISCONTINUED | OUTPATIENT
Start: 2022-02-12 | End: 2022-02-13

## 2022-02-11 RX ORDER — ONDANSETRON 2 MG/ML
4 INJECTION INTRAMUSCULAR; INTRAVENOUS EVERY 6 HOURS PRN
Status: DISCONTINUED | OUTPATIENT
Start: 2022-02-11 | End: 2022-01-01 | Stop reason: HOSPADM

## 2022-02-11 RX ORDER — NALOXONE HYDROCHLORIDE 0.4 MG/ML
0.2 INJECTION, SOLUTION INTRAMUSCULAR; INTRAVENOUS; SUBCUTANEOUS
Status: DISCONTINUED | OUTPATIENT
Start: 2022-02-11 | End: 2022-01-01 | Stop reason: HOSPADM

## 2022-02-11 RX ORDER — ACETAMINOPHEN 325 MG/1
650 TABLET ORAL EVERY 6 HOURS PRN
Status: ON HOLD
Start: 2022-02-11 | End: 2022-01-01

## 2022-02-11 RX ORDER — METOPROLOL TARTRATE 25 MG/1
25 TABLET, FILM COATED ORAL 2 TIMES DAILY
Status: ON HOLD
Start: 2022-02-11 | End: 2022-02-17

## 2022-02-11 RX ORDER — FAMOTIDINE 20 MG/1
20 TABLET, FILM COATED ORAL 2 TIMES DAILY PRN
Status: DISCONTINUED | OUTPATIENT
Start: 2022-02-11 | End: 2022-01-01 | Stop reason: HOSPADM

## 2022-02-11 RX ORDER — DIPHENHYDRAMINE HCL 25 MG
25 CAPSULE ORAL EVERY 6 HOURS PRN
Status: DISCONTINUED | OUTPATIENT
Start: 2022-02-11 | End: 2022-02-14

## 2022-02-11 RX ADMIN — FERROUS SULFATE TAB 325 MG (65 MG ELEMENTAL FE) 325 MG: 325 (65 FE) TAB at 09:41

## 2022-02-11 RX ADMIN — FEBUXOSTAT 80 MG: 40 TABLET ORAL at 09:41

## 2022-02-11 RX ADMIN — COLCHICINE 0.6 MG: 0.6 TABLET, FILM COATED ORAL at 09:41

## 2022-02-11 RX ADMIN — WARFARIN SODIUM 5 MG: 5 TABLET ORAL at 18:54

## 2022-02-11 RX ADMIN — FENTANYL 1 PATCH: 25 PATCH TRANSDERMAL at 05:52

## 2022-02-11 RX ADMIN — METOPROLOL TARTRATE 5 MG: 5 INJECTION INTRAVENOUS at 13:13

## 2022-02-11 RX ADMIN — METOPROLOL TARTRATE 25 MG: 25 TABLET, FILM COATED ORAL at 21:29

## 2022-02-11 RX ADMIN — PREDNISONE 40 MG: 20 TABLET ORAL at 04:17

## 2022-02-11 RX ADMIN — DOCUSATE SODIUM 100 MG: 100 CAPSULE, LIQUID FILLED ORAL at 09:41

## 2022-02-11 RX ADMIN — DIPHENHYDRAMINE HYDROCHLORIDE 25 MG: 25 CAPSULE ORAL at 21:29

## 2022-02-11 RX ADMIN — PREDNISONE 40 MG: 20 TABLET ORAL at 08:33

## 2022-02-11 ASSESSMENT — ACTIVITIES OF DAILY LIVING (ADL)
CONCENTRATING,_REMEMBERING_OR_MAKING_DECISIONS_DIFFICULTY: NO
ADLS_ACUITY_SCORE: 18
ADLS_ACUITY_SCORE: 18
ADLS_ACUITY_SCORE: 17
DRESSING/BATHING: BATHING DIFFICULTY, ASSISTANCE 1 PERSON;DRESSING DIFFICULTY, ASSISTANCE 1 PERSON
ADLS_ACUITY_SCORE: 18
DOING_ERRANDS_INDEPENDENTLY_DIFFICULTY: YES
ADLS_ACUITY_SCORE: 12
ADLS_ACUITY_SCORE: 16
ADLS_ACUITY_SCORE: 12
ADLS_ACUITY_SCORE: 18
EQUIPMENT_CURRENTLY_USED_AT_HOME: CANE, STRAIGHT
ADLS_ACUITY_SCORE: 16
WALKING_OR_CLIMBING_STAIRS_DIFFICULTY: YES
ADLS_ACUITY_SCORE: 18
ADLS_ACUITY_SCORE: 16
WEAR_GLASSES_OR_BLIND: NO
ADLS_ACUITY_SCORE: 14
FALL_HISTORY_WITHIN_LAST_SIX_MONTHS: NO
ADLS_ACUITY_SCORE: 16
DIFFICULTY_EATING/SWALLOWING: OTHER (SEE COMMENTS)
ADLS_ACUITY_SCORE: 12
ADLS_ACUITY_SCORE: 18
ADLS_ACUITY_SCORE: 18
ADLS_ACUITY_SCORE: 16
WALKING_OR_CLIMBING_STAIRS: OTHER (SEE COMMENTS)
ADLS_ACUITY_SCORE: 17
PATIENT_/_FAMILY_COMMUNICATION_STYLE: SPOKEN LANGUAGE (ENGLISH OR BILINGUAL)
TOILETING_ASSISTANCE: TOILETING DIFFICULTY, ASSISTANCE 1 PERSON
ADLS_ACUITY_SCORE: 18
ADLS_ACUITY_SCORE: 16
ADLS_ACUITY_SCORE: 18
WHICH_OF_THE_ABOVE_FUNCTIONAL_RISKS_HAD_A_RECENT_ONSET_OR_CHANGE?: AMBULATION;TRANSFERRING;TOILETING;BATHING;DRESSING
HEARING_DIFFICULTY_OR_DEAF: NO
TOILETING_ISSUES: YES
DRESSING/BATHING_DIFFICULTY: YES
ADLS_ACUITY_SCORE: 12
DIFFICULTY_COMMUNICATING: NO

## 2022-02-11 NOTE — H&P
[facility]    History and Physical - Hospitalist Service       Date of Admission:  2/11/2022    Assessment & Plan      Panda Miranda is a 66 year old male Panda Miranda is a 66 year old male who presented to the ED at [facility] on 2/10/2022 with generalized weakness and lower back pain. Patient has a medical history significant for morbid obesity with BMI of 49, CHF, CKD 3, A. fib, gout, complex recent medical history hospitalized at [facility] 1/27-1/30 for pyelonephritis, CHF, and COVID-19.  [facility] did not have any available beds and he was transferred to [facility] for further management.     Principal Problem:      Polyarticular gout    Muscle weakness (generalized)    Back pain  Assessment: Patient presented with lower back pain and generalized weakness. He has not been able to get out of bed at home and has been using a bowl as a bedpan. He states he was is in a lot of pain from polyarticular gout flare and so he has not been out of bed,. Recently taken off of colchicine due to CKD.  He had stopped taking Uloric on his own.  Now patient with gout flare in hands, right leg. Has some chronic pain in lower back managed with fentanyl patch. Patient notes pain is improved since he presented to ED initially.   Plan:   - Admit to inpatient status  - Continue treatment of gout with resumption of colchicine and Uloric as well as prednisone 40 mg daily which was started at [facility]  - Continue symptomatic treatment with fentanyl patch and as needed Percocet   - PT evaluation requested-at baseline patient able to ambulate with cane  - SW consultation requested for possible TCU placement    Active Problems:        Atrial fibrillation with RVR (H)  Assessment: Patient with history of atrial fibrillation and is on warfarin chronically. Patient in RVR with HR up to 130-140's at [facility]. Of note, patient had been on metoprolol 25 mg BID which was discontinued at time of discharge from prior hospitalization at  [facility] due to hypotension. Suspect this was due to sepsis from pyelonephritis. Blood pressure has been stable. Patient received 1 dose of IV Toprol at [facility] and heart rate improved to 90's to low 100's.   Plan:   - Agree with restarting metoprolol 25 mg BID  - Continue to monitor on telemetry  - Appreciate pharmacy assistance with dosing warfarin      Stage 3 chronic kidney disease (H)    Hyperkalemia  Assessment: Baseline creatinine around 2.6. Was 2.95 at time of initial presentation. Recheck today was 2.68. Patient with chronic hyperkalemia and is typically over 5. Potassium today at 5.5  Plan:   - Continue renal diet  - Continue to monitor       Infection due to 2019 novel coronavirus-Recent history January 2022  Assessment: Symptoms started early January per chart review.  Never needed any supplemental oxygen.  Cough has resolved. He is considered Covid recovered  Plan:   - No further acute intervention indicated       Chronic systolic congestive heart failure (H)  Assessment: Echocardiogram done January, 2022 showed EF of 45-50%. Also has known small perimembranous VSD with left to right shunting.   Plan:  - Metoprolol 25mg BID restarted on transfer mainly for A. Fib RVR.  Convert to Toprol XL formation when able.  - Daily weights  - Strict I & O's      Open wound of lower leg-left lateral ankle, right shin  Assessment: Patient with approximate 2.5 cm wound to left lateral ankle which appears to be healing without signs of infection. 3 cm x 1 cm wound noted to right shin again without signs of infection. Known venous insufficiency and chronic venous stasis.   Plan:   - Wound care orders carried forward from [facility]  - WOC consult requested       TASHA (obstructive sleep apnea)  -Continue home CPAP     Diet:   Renal diet  DVT Prophylaxis: Warfarin  Cheema Catheter: Not present  Central Lines: None  Cardiac Monitoring: None  Code Status:   FULL     Clinically Significant Risk Factors Present on Admission        #  "Hyperkalemia: K = 5.5 mmol/L (Ref range: 3.5 - 5.0 mmol/L) on admission, will monitor as appropriate       # Coagulation Defect: home medication list includes an anticoagulant medication    # Severe Obesity: Estimated body mass index is 49.4 kg/m  as calculated from the following:    Height as of 2/10/22: 1.994 m (6' 6.5\").    Weight as of 2/10/22: 196.4 kg (433 lb).      Disposition Plan   Expected Discharge: 02/14/2022   Anticipated discharge location:  Awaiting care coordination huddle  Delays: Ongoing weakness and lower back pain     The patient's care was discussed with the Attending Physician, Dr. Elizabeth, Patient and [facility] Team.    Sanchez Duran NP  Hospitalist Service  [facility]  Securely message with the Vocera Web Console (learn more here)  Text page via [employer] Paging/Directory         ______________________________________________________________________    Chief Complaint   Generalized weakness, lower back and gout pain    History is obtained from the patient    History of Present Illness   Panda Miranda is a 66 year old male Panda Miranda is a 66 year old male who presented to the ED at [facility] on 2/10/2022 with generalized weakness and lower back pain. Patient has a medical history significant for morbid obesity with BMI of 49, CHF, CKD 3, A. fib, gout, complex recent medical history hospitalized at [facility] 1/27-1/30 for pyelonephritis, CHF, and COVID-19. Patient noted since being discharged 1/30, he was told to stop his gout medications. Since then he he has had multiple painful, red, swollen joints.  Currently with his right leg, bilateral hands.  Also having pain in his low back and his chest at times.  Due to pain, has been mostly in the bed and now he is getting quite weak and cannot get out of bed if he tried.  He has been using a bowl as a bedpan.  He notified his primary doctor of issues and was told to come in for likely TCU placement.  He denies any fever or " chills.  Denies any shortness of breath. [facility] did not have any available beds and he was transferred to [facility] for further management.     Review of Systems    The 10 point Review of Systems is negative other than noted in the HPI or here.     Past Medical History    I have reviewed this patient's medical history and updated it with pertinent information if needed.   Past Medical History:   Diagnosis Date     (HFpEF) heart failure with preserved ejection fraction (H)      Arthritis      Arthropathy of wrist      Atrial fibrillation (H)      Bradycardia      Cancer (H) 2011    colon cancer; hemicolectomy     CHF (congestive heart failure) (H)      Chronic kidney disease      Gout      Hand swelling      Heart valve disease     intermittent mitral regurgitation     HTN (hypertension)      Hyperlipidemia      Morbid obesity (H)      Obesity      TASHA (obstructive sleep apnea)     CPAP     Perimembranous ventricular septal defect        Past Surgical History   I have reviewed this patient's surgical history and updated it with pertinent information if needed.  Past Surgical History:   Procedure Laterality Date     COLON SURGERY       COLONOSCOPY N/A 12/12/2019    Procedure: COLONOSCOPY;  Surgeon: Flaco Magaña MD;  Location: [facility] [place];  Service: General     GASTROPLASTY VERTICAL BANDED      Dr. [facility] 1996 Initial weight 800++ Lost to 440- (was 320# at lowest)     KNEE SURGERY       ZZC PART REMOVAL COLON W ANASTOMOSIS      Description: Partial Colectomy;  Recorded: 12/02/2011;  Comments: Dr Magaña       Social History   I have reviewed this patient's social history and updated it with pertinent information if needed.  Social History     Tobacco Use     Smoking status: Never Smoker     Smokeless tobacco: Never Used   Substance Use Topics     Alcohol use: Yes     Alcohol/week: 0.8 standard drinks     Comment: Alcoholic Drinks/day: occasional     Drug use: No       Family History   I have  reviewed this patient's family history and updated it with pertinent information if needed.  Family History   Problem Relation Age of Onset     Diabetes Type 2  Other      Heart Failure Other      Heart Disease Mother      Hypertension Mother      Diabetes Sister        Prior to Admission Medications   Prior to Admission Medications   Prescriptions Last Dose Informant Patient Reported? Taking?   acetaminophen (TYLENOL) 325 MG tablet 2/11/2022 at 0500  No Yes   Sig: Take 2 tablets (650 mg) by mouth every 6 hours as needed for mild pain or other (and adjunct with moderate or severe pain or per patient request)   colchicine (COLCYRS) 0.6 MG tablet 2/11/2022 at 0941  No Yes   Sig: Take 1 tablet (0.6 mg) by mouth daily   famotidine (PEPCID) 20 MG tablet More than a month at Unknown time  No Yes   Sig: Take 1 tablet (20 mg) by mouth 2 times daily as needed (heartburn)   febuxostat (ULORIC) 80 MG TABS tablet 2/11/2022 at 0941  No Yes   Sig: Take 1 tablet (80 mg) by mouth daily   fentaNYL (DURAGESIC) 25 mcg/hr 72 hr patch 2/11/2022 at 1130  No Yes   Sig: Place 1 patch onto the skin every 72 hours for 15 days remove old patch.   ferrous sulfate (FEROSUL) 325 (65 Fe) MG tablet 2/11/2022 at 0941  No Yes   Sig: Take 1 tablet (325 mg) by mouth daily   melatonin 1 MG TABS tablet   No No   Sig: Take 1 tablet (1 mg) by mouth nightly as needed for sleep   methocarbamol (ROBAXIN) 500 MG tablet 2/11/2022 at 1517  Yes Yes   Sig: Take 500 mg by mouth 3 times daily as needed for muscle spasms   metoprolol tartrate (LOPRESSOR) 25 MG tablet 2/11/2022 at 1313  No Yes   Sig: Take 1 tablet (25 mg) by mouth 2 times daily   oxyCODONE (ROXICODONE) 5 MG tablet 2/10/2022  No Yes   Sig: Take 1 tablet (5 mg) by mouth every 6 hours as needed for moderate to severe pain   predniSONE (DELTASONE) 20 MG tablet 2/11/2022 at 0833  No Yes   Sig: Take 2 tablets (40 mg) by mouth daily   warfarin ANTICOAGULANT (COUMADIN) 5 MG tablet   No No   Sig: Take 5 to  7.5mg (1 to 1.5 tabs) by mouth daily OR AS DIRECTED.  Adjust dose based on INR.   Patient taking differently: Take 5 to 7.5mg (1 to 1.5 tabs) by mouth daily OR AS DIRECTED.  Adjust dose based on INR.  7.5 mg Tues/Thurs, 5 mg ROW      Facility-Administered Medications: None     Allergies   No Known Allergies    Physical Exam   Vital Signs: Temp: 98.4  F (36.9  C) Temp src: Oral BP: 104/61 Pulse: 98   Resp: 20 SpO2: 100 % O2 Device: None (Room air)    Weight: 0 lbs 0 oz    Constitutional: awake, alert, cooperative, no apparent distress, and appears stated age  Eyes: Lids and lashes normal, pupils equal, round and reactive to light, extra ocular muscles intact, sclera clear, conjunctiva normal  ENT: normocepalic, without obvious abnormality, atramatic  Respiratory: No increased work of breathing, good air exchange, clear to auscultation bilaterally, no crackles or wheezing  Cardiovascular: irregularly irregular rhythm  GI: normal bowel sounds, non-distended and non-tender  Skin: chronic wound noted to left lateral ankle approximately 2.5 cm in diameter; 3 cm x 1 cm wound noted to right shin; chronic venous stasis changes noted to BLE  Musculoskeletal: no lower extremity pitting edema present  Neurologic: Awake, alert, oriented to name, place and time.   Neuropsychiatric: Normal mood and affect    Data   Data reviewed today: I reviewed all medications, new labs and imaging results over the last 24 hours.     Recent Labs   Lab 02/11/22  0823 02/10/22  1708   WBC  --  8.9   HGB  --  10.5*   MCV  --  88   PLT  --  135*   INR 2.60* 2.16*    138   POTASSIUM 5.5* 5.4*   CHLORIDE 109* 109*   CO2 18* 16*   BUN 44* 44*   CR 2.68* 2.95*   ANIONGAP 9 13   JOHN 8.8 9.1    84   ALBUMIN  --  2.6*   PROTTOTAL  --  7.7   BILITOTAL  --  1.1*   ALKPHOS  --  76   ALT  --  <9   AST  --  27     Recent Results (from the past 24 hour(s))   XR Chest Port 1 View    Narrative    EXAM: XR CHEST PORT 1 VIEW  LOCATION: [facility]  DATE/TIME: 2/10/2022 6:36 PM    INDICATION: chest pain  COMPARISON: 01/28/2022      Impression    IMPRESSION: Cardiomegaly is unchanged. Prominent perihilar vessels as seen previously suggesting mild congestion. No infiltrates or pleural effusions.

## 2022-02-11 NOTE — H&P
[facility]    History and Physical - Hospitalist Service       Date of Admission:  2/10/2022  Panda Miranda,  1955, MRN [id number]  PCP: Ben Hamlin    Assessment & Plan      Panda Miranda is a 66 year old male admitted on 2/10/2022. He has morbid obesity with BMI of 49, CHF, CKD 3, A. fib, gout, complex recent medical history hospitalized at [facility] - for pyelonephritis, CHF, and COVID-19, here for generalized weakness and low back pain.    #Generalized weakness  Has not been able to get out of bed at home and has been using a bowl as a bedpan  Laboratory workup appears fairly unremarkable, appears similar to when he was discharged from [facility] 2 weeks ago  He states he was is in a lot of pain from polyarticular gout flare and so he has not been out of bed, now I think he is getting run down  Treat gout flare.  PT and OT to assess.  May need transitional care    #Polyarticular gout flare  Recently taken off of colchicine due to CKD.  He had stopped taking Uloric on his own.  Now patient with gout flare in hands, right leg  CRP 18-->31  Okay to resume home colchicine and Uloric.  With his CKD, these are probably at maximum doses already  Start prednisone burst    #SHANTE on CKD 3 (4?)  Baseline creatinine around 2.6, here 2.95, so mildly increased  Unsure if the weights we are able to get here in this range are accurate however if accurate he might be about 40 pounds below his dry weight (478-->433 lb), recently was at the [facility] with hypotension and sepsis and diuretic doses were adjusted, he is not taking any diuretic at all right now according to med rec  Oral liquids ad linnea.  Recheck creatinine in the morning    #Hyperkalemia  K 5.4 actually unchanged from his follow-up visit on 2/3  Low sodium diet    #Recent COVID19  Symptoms started early January per chart review.  Never needed any supplemental oxygen.  Cough has resolved.  He is considered Covid  "recovered    #Chronic systolic heart failure  Most recent LVEF 45 to 50%  He had to stop his beta-blocker, diuretic etc. recently due to hypotension.    #Small perimembranous VSD  Cardiology saw him during most recent hospitalization at the   Does have some left-to-right shunting noted on echo    #Permanent atrial fibrillation  Pharmacy to dose warfarin  No longer takes metoprolol.  A little bit tachycardic.  Try restarting a low-dose of metoprolol    #Chronic leg wounds  Due to venous stasis  WOC and lymphedema therapy to eval    #TASHA, home CPAP    #Chronic pain syndrome, home fentanyl patch.  As above with component of acute pain and okay to take oxycodone for this right now       Diet: Combination Diet Low Saturated Fat Na <2400mg Diet  DVT Prophylaxis: Moderate risk. Coumadin    Cheema Catheter: Not present  Central Lines: None  Code Status: Full Code    Clinically Significant Risk Factors Present on Admission        # Hyperkalemia: K = 5.4 mmol/L (Ref range: 3.5 - 5.0 mmol/L) on admission, will monitor as appropriate       # Coagulation Defect: home medication list includes an anticoagulant medication    # Severe Obesity: Estimated body mass index is 49.4 kg/m  as calculated from the following:    Height as of this encounter: 1.994 m (6' 6.5\").    Weight as of this encounter: 196.4 kg (433 lb).      Disposition Plan   Expected Discharge: 02/12/2022   Anticipated discharge location: TCU       The patient's care was discussed with the Patient.    Patricia Saravia MD  [facility]  Text page via [employer] Paging/Directory      ______________________________________________________________________    Chief Complaint   Panda Miranda is a 66 year old male who presents for generalized weakness and body pain    History of Present Illness   Patient reports since getting out of the hospital at the [facility], he was told to stop all of his gout meds.  Since then, he has had multiple painful, red, " swollen joints.  Currently with his right leg, bilateral hands.  Also having pain in his low back and his chest at times.  Due to pain, has been mostly in the bed and now he is getting quite weak and cannot get out of bed if he tried.  He has been using a bowl as a bedpan.  He notified his primary doctor of issues and was told to come in for likely TCU placement.  He denies any fever or chills.  Cough has been present with COVID-19 but has resolved now several days.  Denies any nausea or vomiting.  Denies any shortness of breath.  Currently wearing an oxygen nasal cannula and he is uncertain why states he does not need this.  Has been voiding and stooling fine.  Felt that Percocet that he received in the ER was quite helpful for his gout pain and would like to continue receiving this    Review of Systems    The 10 point Review of Systems is negative other than noted in the HPI or here.    Past Medical History    I have reviewed this patient's medical history and updated it with pertinent information if needed.   Past Medical History:   Diagnosis Date     (HFpEF) heart failure with preserved ejection fraction (H)      Arthritis      Arthropathy of wrist      Atrial fibrillation (H)      Bradycardia      Cancer (H) 2011    colon cancer; hemicolectomy     CHF (congestive heart failure) (H)      Chronic kidney disease      Gout      Hand swelling      Heart valve disease     intermittent mitral regurgitation     HTN (hypertension)      Hyperlipidemia      Morbid obesity (H)      Obesity      TASHA (obstructive sleep apnea)     CPAP     Perimembranous ventricular septal defect        Past Surgical History   I have reviewed this patient's surgical history and updated it with pertinent information if needed.  Past Surgical History:   Procedure Laterality Date     COLON SURGERY       COLONOSCOPY N/A 12/12/2019    Procedure: COLONOSCOPY;  Surgeon: Flaco Magaña MD;  Location: [facility] OR;  Service: General      GASTROPLASTY VERTICAL BANDED      Dr. Arizmendi [facility]  Initial weight 800++ Lost to 440- (was 320# at lowest)     KNEE SURGERY       ZZC PART REMOVAL COLON W ANASTOMOSIS      Description: Partial Colectomy;  Recorded: 2011;  Comments: Dr Magaña       Social History   I have reviewed this patient's social history and updated it with pertinent information if needed.  Social History     Tobacco Use     Smoking status: Never Smoker     Smokeless tobacco: Never Used   Substance Use Topics     Alcohol use: Yes     Alcohol/week: 0.8 standard drinks     Comment: Alcoholic Drinks/day: occasional     Drug use: No       Family History   I have reviewed this patient's family history and updated it with pertinent information if needed.  Family History   Problem Relation Age of Onset     Diabetes Type 2  Other      Heart Failure Other      Heart Disease Mother      Hypertension Mother      Diabetes Sister        Prior to Admission Medications   Prior to Admission Medications   Prescriptions Last Dose Informant Patient Reported? Taking?   cholecalciferol 25 MCG (1000 UT) TABS   Yes No   Sig: Take 3,000 Units by mouth   colchicine (COLCYRS) 0.6 MG tablet   No No   Sig: Take 1 tablet (0.6 mg) by mouth daily   colchicine (COLCYRS) 0.6 MG tablet   No No   Si tabs initially, and one additional pill one hour later   colchicine (COLCYRS) 0.6 MG tablet   No No   Sig: Take 1 tablet (0.6 mg) by mouth daily   febuxostat (ULORIC) 80 MG TABS tablet   No No   Sig: Take 1 tablet (80 mg) by mouth daily   fentaNYL (DURAGESIC) 25 mcg/hr 72 hr patch   No No   Sig: Place 1 patch onto the skin every 72 hours for 15 days remove old patch.   ferrous sulfate (FEROSUL) 325 (65 Fe) MG tablet   No No   Sig: Take 1 tablet (325 mg) by mouth daily   levofloxacin (LEVAQUIN) 750 MG tablet   No No   Sig: Take 1 tablet (750 mg) by mouth every other day   warfarin ANTICOAGULANT (COUMADIN) 5 MG tablet   No No   Sig: Take 5 to 7.5mg (1 to 1.5 tabs)  by mouth daily OR AS DIRECTED.  Adjust dose based on INR.      Facility-Administered Medications: None     Allergies   No Known Allergies    Physical Exam   Vital Signs: Temp: 98.2  F (36.8  C) Temp src: Oral BP: (!) 144/94 Pulse: 113   Resp: 20 SpO2: 99 % O2 Device: None (Room air)    Weight: 433 lbs 0 oz    General: in no apparent distress, non-toxic and alert obese male lying in hospital bed oriented x3  HEENT: Head normocephalic atraumatic, oral mucosa moist. Sclerae anicteric  CV: Regular rhythm, normal rate, no murmurs  Resp: No wheezes, no rales or rhonchi, no focal consolidations  GI: Belly soft, nondistended, nontender, bowel sounds present  Skin: No rashes or lesions  Extremities: In lymphedema wraps bilaterally  Psych: Normal affect, mood euthymic  Neuro: CNII-XII grossly intact, moving all 4 extremities    Data   Data reviewed today: I reviewed all medications, new labs and imaging results over the last 24 hours.  EKG personally reviewed shows: A fib RVR ratw 119bpm    Recent Results (from the past 12 hour(s))   Troponin I    Collection Time: 02/10/22  5:08 PM   Result Value Ref Range    Troponin I 0.04 0.00 - 0.29 ng/mL   Magnesium    Collection Time: 02/10/22  5:08 PM   Result Value Ref Range    Magnesium 1.8 1.8 - 2.6 mg/dL   Comprehensive metabolic panel    Collection Time: 02/10/22  5:08 PM   Result Value Ref Range    Sodium 138 136 - 145 mmol/L    Potassium 5.4 (H) 3.5 - 5.0 mmol/L    Chloride 109 (H) 98 - 107 mmol/L    Carbon Dioxide (CO2) 16 (L) 22 - 31 mmol/L    Anion Gap 13 5 - 18 mmol/L    Urea Nitrogen 44 (H) 8 - 22 mg/dL    Creatinine 2.95 (H) 0.70 - 1.30 mg/dL    Calcium 9.1 8.5 - 10.5 mg/dL    Glucose 84 70 - 125 mg/dL    Alkaline Phosphatase 76 45 - 120 U/L    AST 27 0 - 40 U/L    ALT <9 0 - 45 U/L    Protein Total 7.7 6.0 - 8.0 g/dL    Albumin 2.6 (L) 3.5 - 5.0 g/dL    Bilirubin Total 1.1 (H) 0.0 - 1.0 mg/dL    GFR Estimate 23 (L) >60 mL/min/1.73m2   B-Type Natriuretic Peptide ([facility]  Only)    Collection Time: 02/10/22  5:08 PM   Result Value Ref Range     (H) 0 - 60 pg/mL   Uric acid    Collection Time: 02/10/22  5:08 PM   Result Value Ref Range    Uric Acid 7.0 3.0 - 8.0 mg/dL   CRP inflammation    Collection Time: 02/10/22  5:08 PM   Result Value Ref Range    CRP 31.7 (H) 0.0-<0.8 mg/dL   Erythrocyte sedimentation rate auto    Collection Time: 02/10/22  5:08 PM   Result Value Ref Range    Erythrocyte Sedimentation Rate 92 (H) 0 - 15 mm/hr   Lactic acid whole blood    Collection Time: 02/10/22  5:08 PM   Result Value Ref Range    Lactic Acid 1.6 0.7 - 2.0 mmol/L   INR    Collection Time: 02/10/22  5:08 PM   Result Value Ref Range    INR 2.16 (H) 0.90 - 1.15   CBC with platelets and differential    Collection Time: 02/10/22  5:08 PM   Result Value Ref Range    WBC Count 8.9 4.0 - 11.0 10e3/uL    RBC Count 3.99 (L) 4.40 - 5.90 10e6/uL    Hemoglobin 10.5 (L) 13.3 - 17.7 g/dL    Hematocrit 35.0 (L) 40.0 - 53.0 %    MCV 88 78 - 100 fL    MCH 26.3 (L) 26.5 - 33.0 pg    MCHC 30.0 (L) 31.5 - 36.5 g/dL    RDW 15.9 (H) 10.0 - 15.0 %    Platelet Count 135 (L) 150 - 450 10e3/uL    % Neutrophils 64 %    % Lymphocytes 16 %    % Monocytes 16 %    % Eosinophils 3 %    % Basophils 0 %    % Immature Granulocytes 1 %    NRBCs per 100 WBC 0 <1 /100    Absolute Neutrophils 5.8 1.6 - 8.3 10e3/uL    Absolute Lymphocytes 1.4 0.8 - 5.3 10e3/uL    Absolute Monocytes 1.4 (H) 0.0 - 1.3 10e3/uL    Absolute Eosinophils 0.2 0.0 - 0.7 10e3/uL    Absolute Basophils 0.0 0.0 - 0.2 10e3/uL    Absolute Immature Granulocytes 0.1 <=0.4 10e3/uL    Absolute NRBCs 0.0 10e3/uL

## 2022-02-11 NOTE — DISCHARGE SUMMARY
[facility] MEDICINE  DISCHARGE SUMMARY     Primary Care Physician: Ben Hamlin  Admission Date: 2/10/2022   Discharge Provider: Fei Jones DO, DO Discharge Date: 2/11/2022   Diet:   Active Diet and Nourishment Order   Procedures     Combination Diet Renal Diet (non-dialysis)     Diet       Code Status: Full Code   Activity: DCACTIVITY: Activity as tolerated        Condition at Discharge: Stable     REASON FOR PRESENTATION(See Admission Note for Details)   Refer to h&p    PRINCIPAL & ACTIVE DISCHARGE DIAGNOSES     Active Problems:    Intractable low back pain    Anemia of chronic renal failure, stage 4 (severe) (H)      PENDING LABS     Unresulted Labs Ordered in the Past 30 Days of this Admission     No orders found for last 31 day(s).            PROCEDURES ( this hospitalization only)          RECOMMENDATIONS TO OUTPATIENT PROVIDER FOR F/U VISIT     Follow-up Appointments     Follow Up and recommended labs and tests      TBD                 DISPOSITION     [facility]    SUMMARY OF HOSPITAL COURSE:    66 year old male admitted on 2/10/2022. He has morbid obesity with BMI of 49, CHF, CKD 3, A. fib, gout, complex recent medical history hospitalized at [facility] 1/27-1/30 for pyelonephritis, CHF, and COVID-19, here for generalized weakness and low back pain.     #Generalized weakness:  Has not been able to get out of bed at home and has been using a bowl as a bedpan.  Laboratory workup similar to when he was discharged from [facility] 2 weeks ago.  Presumed acute on chronic deconditioning, morbid obesity, polyarticular gout flare related.       #Polyarticular gout flare  Colchicine renally dosed. Uloric. Prednisone 40mg every day started on admission. Would avoid quick taper with prednisone as rebound flare not an uncommon with quick steroid taper.       #SHANTE on CKD III/IV  Baseline creatinine around 2.6, admitted with Cr 2.95, down to 2.6 on transfer     #Chronic  Hyperkalemia  -renal diet     #Recovered COVID19     #Chronic systolic heart failure  Most recent LVEF 45 to 50%  Metoprolol 25mg BID restarted on transfer mainly for A. Fib RVR.  Convert to Toprol XL formation when able.     #Small perimembranous VSD  Refer to [facility] cardiology notes during prior hospitalization for details.     #Permanent atrial fibrillation with RVR:  Received IV Metoprolol 5mg x1 the morning of transfer and improved nicely.    -Warfarin.  INR 2.6 on transfer.  -restarted Metoprolol 25mg BID on Transfer (had not resumed following prior hospitalization at [facility] when it was discontinued due to hypotension which likely was from sepsis related to pyelonephritis which is not an issue now and not hypotensive.  Did not receive metoprolol 25mg dose at [facility] so would administer when arrived at [facility].     #Chronic leg wounds 2/2 chronic venous insufficiency and chronic venous stasis  -WOC and lymphedema therapy recommended     #TASHA   -CPAP     #Chronic pain syndrome  -chronic fentanyl patch 25mcg q72    Discharge Medications with Med changes:     Current Discharge Medication List      START taking these medications    Details   acetaminophen (TYLENOL) 325 MG tablet Take 2 tablets (650 mg) by mouth every 6 hours as needed for mild pain or other (and adjunct with moderate or severe pain or per patient request)    Associated Diagnoses: Pain      famotidine (PEPCID) 20 MG tablet Take 1 tablet (20 mg) by mouth 2 times daily as needed (heartburn)    Associated Diagnoses: Gastroesophageal reflux disease with esophagitis, unspecified whether hemorrhage      oxyCODONE (ROXICODONE) 5 MG tablet Take 1 tablet (5 mg) by mouth every 6 hours as needed for moderate to severe pain  Refills: 0    Associated Diagnoses: Pain      predniSONE (DELTASONE) 20 MG tablet Take 2 tablets (40 mg) by mouth daily    Associated Diagnoses: Pain         CONTINUE these medications which have NOT CHANGED    Details   colchicine  (COLCYRS) 0.6 MG tablet Take 1 tablet (0.6 mg) by mouth daily  Qty: 90 tablet, Refills: 3    Associated Diagnoses: Gout, unspecified cause, unspecified chronicity, unspecified site      febuxostat (ULORIC) 80 MG TABS tablet Take 1 tablet (80 mg) by mouth daily  Qty: 90 tablet, Refills: 3    Associated Diagnoses: Encounter for medication refill; Gout, unspecified cause, unspecified chronicity, unspecified site      fentaNYL (DURAGESIC) 25 mcg/hr 72 hr patch Place 1 patch onto the skin every 72 hours for 15 days remove old patch.  Qty: 5 patch, Refills: 0    Associated Diagnoses: Gout, unspecified cause, unspecified chronicity, unspecified site; Arthritis of knee      ferrous sulfate (FEROSUL) 325 (65 Fe) MG tablet Take 1 tablet (325 mg) by mouth daily  Qty: 90 tablet, Refills: 3    Associated Diagnoses: Iron deficiency; Encounter for medication refill      melatonin 1 MG TABS tablet Take 1 tablet (1 mg) by mouth nightly as needed for sleep    Associated Diagnoses: Insomnia, unspecified type      methocarbamol (ROBAXIN) 500 MG tablet Take 500 mg by mouth 3 times daily as needed for muscle spasms      warfarin ANTICOAGULANT (COUMADIN) 5 MG tablet Take 5 to 7.5mg (1 to 1.5 tabs) by mouth daily OR AS DIRECTED.  Adjust dose based on INR.  Qty: 100 tablet, Refills: 1    Comments: Take only 5mg of warfarin while on your new antibiotic levofloxacin, and recheck INR in clinic by Tuesday 2/1/2022  Associated Diagnoses: Warfarin anticoagulation         STOP taking these medications       acetaminophen-codeine (TYLENOL #3) 300-30 MG tablet Comments:   Reason for Stopping:         cholecalciferol 25 MCG (1000 UT) TABS Comments:   Reason for Stopping:                     Rationale for medication changes:              Consults       PHARMACY TO DOSE WARFARIN  OCCUPATIONAL THERAPY ADULT IP CONSULT  PHYSICAL THERAPY ADULT IP CONSULT  WOUND OSTOMY CONTINENCE NURSE  IP CONSULT  LYMPHEDEMA THERAPY IP CONSULT    Immunizations given this  encounter     Most Recent Immunizations   Administered Date(s) Administered     COVID-19,PF,Pfizer (12+ Yrs) 01/11/2022     DT (PEDS <7y) 01/03/2005     Flu, Unspecified 09/05/2012     Influenza Quad, Recombinant, pf(RIV4) (Flublok) 11/20/2019     Influenza Vaccine IM > 6 months Valent IIV4 (Alfuria,Fluzone) 12/06/2013     Influenza Vaccine, 6+MO IM (QUADRIVALENT W/PRESERVATIVES) 01/08/2018     Influenza, Quad, High Dose, Pf, 65yr+ (Fluzone HD) 01/11/2022     Pneumo Conj 13-V (2010&after) 01/16/2020     Td,adult,historic,unspecified 01/03/2005     Tdap (Adacel,Boostrix) 06/22/2011     Zoster vaccine, live 01/08/2018           Anticoagulation Information      Recent INR results:   Recent Labs   Lab 02/11/22  0823 02/10/22  1708   INR 2.60* 2.16*         SIGNIFICANT IMAGING FINDINGS     Results for orders placed or performed during the hospital encounter of 02/10/22   XR Chest Port 1 View    Impression    IMPRESSION: Cardiomegaly is unchanged. Prominent perihilar vessels as seen previously suggesting mild congestion. No infiltrates or pleural effusions.       SIGNIFICANT LABORATORY FINDINGS         Discharge Orders        Reason for your hospital stay    Polyarticular gout flare     Intake and output    Every shift     Daily weights    Call Provider for weight gain of more than 2 pounds per day or 5 pounds per week.     Activity - Up ad linnea     Activity - Up with nursing assistance     Follow Up and recommended labs and tests    TBD     Full Code     Fall precautions     Diet    Follow this diet upon discharge: Orders Placed This Encounter      Combination Diet Renal Diet (non-dialysis)       Examination   Physical Exam   Temp:  [98.2  F (36.8  C)-98.3  F (36.8  C)] 98.3  F (36.8  C)  Pulse:  [113-128] 128  Resp:  [20-22] 22  BP: (105-145)/(55-94) 128/58  SpO2:  [95 %-100 %] 95 %  Wt Readings from Last 1 Encounters:   02/10/22 (!) 196.4 kg (433 lb)   gen nad  cv nml rate, irreg, irreg on transfer. Chronic leg edema w/  chronic venous insufficiency  Lungs ctab  abd bs+, nttp  Neuro nonfocal    Total unit/floor time 31minutes.  Time consisted of examination of patient, reviewing the record, lab results, imaging results, completing documentation.  Coordination of care 20minutes discussing  with nursing, care management teams, and Physicians involved directly in the care of this patient.  Counseling time 11minutes consisted of discharge planning.     COLIN Renee accepted transfer. Provider-Provider performed.      Fei Jones DO, DO  [facility]    CC:Ben Hamlin

## 2022-02-11 NOTE — PHARMACY-ADMISSION MEDICATION HISTORY
Pharmacy Note - Admission Medication History    Pertinent Provider Information:   -recent prescription for fentanyl 25 mcg patches. Pt states he placed one yesterday but it fell off.  ______________________________________________________________________    Prior To Admission (PTA) med list completed and updated in EMR.       PTA Med List   Medication Sig Last Dose     acetaminophen-codeine (TYLENOL #3) 300-30 MG tablet Take 1 tablet by mouth every 6 hours as needed for severe pain Past Week     cholecalciferol 25 MCG (1000 UT) TABS Take 1,000 Units by mouth  2/10/2022     colchicine (COLCYRS) 0.6 MG tablet Take 1 tablet (0.6 mg) by mouth daily 2/10/2022     febuxostat (ULORIC) 80 MG TABS tablet Take 1 tablet (80 mg) by mouth daily 2/10/2022     fentaNYL (DURAGESIC) 25 mcg/hr 72 hr patch Place 1 patch onto the skin every 72 hours for 15 days remove old patch. 2/9/2022 at pt states patch fell off     ferrous sulfate (FEROSUL) 325 (65 Fe) MG tablet Take 1 tablet (325 mg) by mouth daily 2/10/2022     methocarbamol (ROBAXIN) 500 MG tablet Take 500 mg by mouth 3 times daily as needed for muscle spasms Past Week     warfarin ANTICOAGULANT (COUMADIN) 5 MG tablet Take 5 to 7.5mg (1 to 1.5 tabs) by mouth daily OR AS DIRECTED.  Adjust dose based on INR. (Patient taking differently: Take 5 to 7.5mg (1 to 1.5 tabs) by mouth daily OR AS DIRECTED.  Adjust dose based on INR.  7.5 mg Tues/Thurs, 5 mg ROW) 2/10/2022 at 7.5 mg       Information source(s): Patient and CareEverywhere/Andréspts  Method of interview communication: in-person    Summary of Changes to PTA Med List  New: -  Discontinued: -  Changed: -    Patient was asked about OTC/herbal products specifically.  PTA med list reflects this.    In the past week, patient estimated taking medication this percent of the time:  greater than 90%.    Allergies were reviewed, assessed, and updated with the patient.      Patient does not use any multi-dose medications prior to  admission.    The information provided in this note is only as accurate as the sources available at the time of the update(s).    Thank you for the opportunity to participate in the care of this patient.    Mari Dillon, PharmD, BCPS 02/10/22 9:26 PM

## 2022-02-11 NOTE — PHARMACY-ANTICOAGULATION SERVICE
Clinical Pharmacy - Warfarin Dosing Consult     Pharmacy has been consulted to manage this patient s warfarin therapy.  Indication: Atrial Fibrillation  Therapy Goal: INR 2-3  Provider/Team: Dr. Ben Hamlin  [facility]: [facility]  Warfarin Prior to Admission: Yes  Warfarin PTA Regimen: 7.5 mg Tue/Thur & 5 mg ROW  Recent documented change in oral intake/nutrition: Unknown    INR   Date Value Ref Range Status   02/11/2022 2.60 (H) 0.90 - 1.15 Final   02/10/2022 2.16 (H) 0.90 - 1.15 Final       Recommend warfarin 5 mg today to continue home regimen - INR therapeutic.  Pharmacy will monitor Panda Miranda daily and order warfarin doses to achieve specified goal.      Please contact pharmacy as soon as possible if the warfarin needs to be held for a procedure or if the warfarin goals change.      Thank you,   Leia Gallegos [facility] on 2/11/2022 at 4:45 PM

## 2022-02-11 NOTE — UTILIZATION REVIEW
Admission Status; Secondary Review Determination       Under the authority of the Utilization Management Committee, the utilization review process indicated a secondary review on the above patient. The review outcome is based on review of the medical records, discussions with staff, and applying clinical experience noted on the date of the review.     (x) Inpatient Status Appropriate - This patient's medical care is consistent with medical management for inpatient care and reasonable inpatient medical practice.     RATIONALE FOR DETERMINATION   At the time of admission with the information available to the attending physician more than 2 nights Hospital complex care was anticipated, based on patient risk of adverse outcome if treated as outpatient and complex care required. Inpatient admission is appropriate based on the Medicare guidelines.     SUMMARY:  66 year old male admitted on 2/10/2022. He has morbid obesity with BMI of 49, CHF, CKD 3, A. fib, gout, complex recent medical history hospitalized at [facility] 1/27-1/30 for pyelonephritis, CHF, and COVID-19, here for generalized weakness and joint/back pain.  He was felt to have polyarticular gout and started on prednisone and resumed on colchicine which had recently been stopped.  During his stay he now developed acute atrial fibrillation with rapid ventricular response.  Given the new cardiac issues he is felt to need ongoing hospital care.  He is being transferred to another hospital as his current facility has no open beds and he has remained boarded in the ED.     The information on this document is developed by the utilization review team in order for the business office to ensure compliance. This only denotes the appropriateness of proper admission status and does not reflect the quality of care rendered.   The definitions of Inpatient Status and Observation Status used in making the determination above are those provided in the CMS Coverage Manual, Chapter 1  and Chapter 6, section 70.4.     Sincerely,     Endy Brock DO, [facility]  Utilization Review  Physician Advisor

## 2022-02-11 NOTE — PHARMACY-ANTICOAGULATION SERVICE
Clinical Pharmacy - Warfarin Dosing Consult     Pharmacy has been consulted to manage this patient s warfarin therapy.  Indication: Atrial Fibrillation  Therapy Goal: INR 2-3  Provider/Team: Patricia Saravia MD  Warfarin Prior to Admission: Yes  Warfarin PTA Regimen: 7.5 mg every Tue, Thu; 5 mg all other days  Significant drug interactions: Prednisone  Dose Comments: INR within the therapeutic range. Will continue with PTA dose today    INR   Date Value Ref Range Status   02/11/2022 2.60 (H) 0.90 - 1.15 Final   02/10/2022 2.16 (H) 0.90 - 1.15 Final       Recommend warfarin 5 mg today.  Pharmacy will monitor Panda Miranda daily and order warfarin doses to achieve specified goal.      Please contact pharmacy as soon as possible if the warfarin needs to be held for a procedure or if the warfarin goals change.

## 2022-02-11 NOTE — DISCHARGE INSTRUCTIONS
BLE wound care - every 3 days  Cleanse legs and wounds with water, gently dry.  Apply skin repair cream to intact skin.  Mepilex 3 x 3 dressing to R shin wound.  Silvercel (cut to fit shape of wound) and Mepilex 4 x 4 to L lateral ankle wound.    BLE lymphedema therapy with each dressing changes - change every 3 days.

## 2022-02-11 NOTE — ED PROVIDER NOTES
EMERGENCY DEPARTMENT ENCOUNTER      NAME: Panda Miranda  AGE: 66 year old male  YOB: 1955  MRN: [id number]  EVALUATION DATE & TIME: No admission date for patient encounter.    PCP: Ben Hamlin    ED PROVIDER: Nasir López M.D.      Chief Complaint   Patient presents with     Arthritis     Back Pain         FINAL IMPRESSION:  1.  Intractable low back pain.  2.  Inability to care for self.  3.  Inability to stand or ambulate.  4.  Chronic renal failure.    ED COURSE & MEDICAL DECISION MAKIN:43 PM I met with the patient to gather history and to perform my initial exam. We discussed plans for the ED course, including diagnostic testing and treatment. PPE worn: cloth mask.  Patient with multiple lab work and studies ordered from triage.  Chest x-ray showing no change in baseline cardiomegaly and congestion.  Chronic anemia with hemoglobin 10.5.  Platelets borderline at 135.  Elevated ESR 92 and C-reactive protein at 31.7.  BNP mildly up at 231.  Troponin negative.  Chemistries with potassium 5.4, bicarbonate 16, elevated BUN/creatinine 44 and 2.95 with history of chronic renal failure.  INR therapeutic at 2.16.  EKG with chronic atrial fibrillation.  Patient feeling somewhat better after Toradol and Percocet.  Plan admit patient to MedSurg inpatient versus observation.  Will page and discussed this with the hospitalist.  8:08 PM.  Results communicated to the patient.  Plan admit patient to MedSur telemetry inpatient.  Hospitalist in agreement.      Pertinent Labs & Imaging studies reviewed. (See chart for details)  66 year old male presents to the Emergency Department for evaluation of intractable low back pain.    At the conclusion of the encounter I discussed the results of all of the tests and the disposition. The questions were answered. The patient or family acknowledged understanding and was agreeable with the care plan.              MEDICATIONS GIVEN IN THE EMERGENCY:  Medications    ketorolac (TORADOL) injection 15 mg (has no administration in time range)   oxyCODONE-acetaminophen (PERCOCET) 5-325 MG per tablet 2 tablet (2 tablets Oral Given 2/10/22 1710)       NEW PRESCRIPTIONS STARTED AT TODAY'S ER VISIT  New Prescriptions    COLCHICINE (COLCYRS) 0.6 MG TABLET    Take 1 tablet (0.6 mg) by mouth daily          =================================================================    HPI    Patient information was obtained from: Patient    Use of : N/A       Panda Miranda is a 66 year old male with a pertinent history of CHF, hypotension, atrial fibrillation, osteoarthritis, pseudogout, CKD, sigmoid colon cancer, hyperparathyroidism, TASHA, who presents to this ED via EMS for evaluation of back pain.    Patient reports a history of chronic back pain and chronic pseudogout. He states that pain from both of these has increased and notes he has been bedridden over the past 3-4 days. Patient reports that his wife has placed a bed pan for him but states that he not incontinent of bowel or bladder. He reports that he knows when he is wetting the bed, however he is unable to get himself out of bed. He does not identify any waxing or waning symptoms otherwise, exacerbating or alleviating features,associated symptoms except as mentioned. He denies any other pain related complaints. No other complaints at this time.    REVIEW OF SYSTEMS   Review of Systems   Gastrointestinal:        Negative for bowel incontinence   Genitourinary:        Negative for urinary incontinence   Musculoskeletal: Positive for back pain (chronic).        Positive for pain in joints (chronic)   All other systems reviewed and are negative.     PAST MEDICAL HISTORY:  Past Medical History:   Diagnosis Date     (HFpEF) heart failure with preserved ejection fraction (H)      Arthritis      Arthropathy of wrist      Atrial fibrillation (H)      Bradycardia      Cancer (H) 2011    colon cancer; hemicolectomy     CHF (congestive  heart failure) (H)      Chronic kidney disease      Gout      Hand swelling      Heart valve disease     intermittent mitral regurgitation     HTN (hypertension)      Hyperlipidemia      Morbid obesity (H)      Obesity      TASHA (obstructive sleep apnea)     CPAP     Perimembranous ventricular septal defect        PAST SURGICAL HISTORY:  Past Surgical History:   Procedure Laterality Date     COLON SURGERY       COLONOSCOPY N/A 12/12/2019    Procedure: COLONOSCOPY;  Surgeon: Flaco Magaña MD;  Location: [facility];  Service: General     GASTROPLASTY VERTICAL BANDED      Dr. Arizmendi [facility] 1996 Initial weight 800++ Lost to 440- (was 320# at lowest)     KNEE SURGERY       ZZC PART REMOVAL COLON W ANASTOMOSIS      Description: Partial Colectomy;  Recorded: 12/02/2011;  Comments: Dr Magaña           CURRENT MEDICATIONS:    cholecalciferol 25 MCG (1000 UT) TABS  colchicine (COLCYRS) 0.6 MG tablet  colchicine (COLCYRS) 0.6 MG tablet  colchicine (COLCYRS) 0.6 MG tablet  febuxostat (ULORIC) 80 MG TABS tablet  fentaNYL (DURAGESIC) 25 mcg/hr 72 hr patch  ferrous sulfate (FEROSUL) 325 (65 Fe) MG tablet  levofloxacin (LEVAQUIN) 750 MG tablet  warfarin ANTICOAGULANT (COUMADIN) 5 MG tablet        ALLERGIES:  No Known Allergies    FAMILY HISTORY:  Family History   Problem Relation Age of Onset     Diabetes Type 2  Other      Heart Failure Other      Heart Disease Mother      Hypertension Mother      Diabetes Sister        SOCIAL HISTORY:   Social History     Socioeconomic History     Marital status:      Spouse name: None     Number of children: None     Years of education: None     Highest education level: None   Occupational History     None   Tobacco Use     Smoking status: Never Smoker     Smokeless tobacco: Never Used   Substance and Sexual Activity     Alcohol use: Yes     Alcohol/week: 0.8 standard drinks     Comment: Alcoholic Drinks/day: occasional     Drug use: No     Sexual activity: None   Other  "Topics Concern     None   Social History Narrative     None     Social Determinants of Health     Financial Resource Strain: Not on file   Food Insecurity: Not on file   Transportation Needs: Not on file   Physical Activity: Not on file   Stress: Not on file   Social Connections: Not on file   Intimate Partner Violence: Not on file   Housing Stability: Not on file       VITALS:  BP (!) 144/94   Pulse 113   Temp 98.2  F (36.8  C) (Oral)   Resp 20   Ht 1.994 m (6' 6.5\")   Wt (!) 196.4 kg (433 lb)   SpO2 99%   BMI 49.40 kg/m      PHYSICAL EXAM    Vital Signs:  BP (!) 144/94   Pulse 113   Temp 98.2  F (36.8  C) (Oral)   Resp 20   Ht 1.994 m (6' 6.5\")   Wt (!) 196.4 kg (433 lb)   SpO2 99%   BMI 49.40 kg/m    General:  On entering the room  is in no apparent distress.    Neck:  Neck supple with full range of motion and nontender.    Back:  Back and spine are nontender.  No costovertebral angle tenderness.    HEENT:  Oropharynx clear with moist mucous membranes.  HEENT unremarkable.    Pulmonary:  Chest clear to auscultation without rhonchi rales or wheezing.    Cardiovascular:  Cardiac regular rate and rhythm without murmurs rubs or gallops.    Abdomen:  Abdomen soft nontender.  There is no rebound or guarding.    Muskuloskeletal:  he moves all 4 without any difficulty and has normal neurovascular exams.  Extremities without clubbing, cyanosis, or edema.  Legs and calves are nontender.    Neuro:  he is alert and oriented ×3 and moves all extremities symmetrically.    Psych:  Normal affect.    Skin:  Unremarkable and warm and dry.       LAB:  All pertinent labs reviewed and interpreted.  Labs Ordered and Resulted from Time of ED Arrival to Time of ED Departure   COMPREHENSIVE METABOLIC PANEL - Abnormal       Result Value    Sodium 138      Potassium 5.4 (*)     Chloride 109 (*)     Carbon Dioxide (CO2) 16 (*)     Anion Gap 13      Urea Nitrogen 44 (*)     Creatinine 2.95 (*)     Calcium 9.1      Glucose 84   "    Alkaline Phosphatase 76      AST 27      ALT <9      Protein Total 7.7      Albumin 2.6 (*)     Bilirubin Total 1.1 (*)     GFR Estimate 23 (*)    B-TYPE NATRIURETIC PEPTIDE ( EAST ONLY) - Abnormal     (*)    CRP INFLAMMATION - Abnormal    CRP 31.7 (*)    ERYTHROCYTE SEDIMENTATION RATE AUTO - Abnormal    Erythrocyte Sedimentation Rate 92 (*)    INR - Abnormal    INR 2.16 (*)    CBC WITH PLATELETS AND DIFFERENTIAL - Abnormal    WBC Count 8.9      RBC Count 3.99 (*)     Hemoglobin 10.5 (*)     Hematocrit 35.0 (*)     MCV 88      MCH 26.3 (*)     MCHC 30.0 (*)     RDW 15.9 (*)     Platelet Count 135 (*)     % Neutrophils 64      % Lymphocytes 16      % Monocytes 16      % Eosinophils 3      % Basophils 0      % Immature Granulocytes 1      NRBCs per 100 WBC 0      Absolute Neutrophils 5.8      Absolute Lymphocytes 1.4      Absolute Monocytes 1.4 (*)     Absolute Eosinophils 0.2      Absolute Basophils 0.0      Absolute Immature Granulocytes 0.1      Absolute NRBCs 0.0     TROPONIN I - Normal    Troponin I 0.04     MAGNESIUM - Normal    Magnesium 1.8     URIC ACID - Normal    Uric Acid 7.0     LACTIC ACID WHOLE BLOOD - Normal    Lactic Acid 1.6     COVID-19 VIRUS (CORONAVIRUS) BY PCR       RADIOLOGY:  Reviewed all pertinent imaging. Please see official radiology report.  XR Chest Port 1 View   Final Result   IMPRESSION: Cardiomegaly is unchanged. Prominent perihilar vessels as seen previously suggesting mild congestion. No infiltrates or pleural effusions.                 EKG:    Chronic atrial fibrillation.  Slightly fast at 119.  Currently 113.  RVR, PVCs.  Very similar to previous EKG but slightly faster of January 28, 2022.    I have independently reviewed and interpreted the EKG(s) documented above.    PROCEDURES:         I, Ginna Beltran, am serving as a scribe to document services personally performed by Dr. López based on my observation and the provider's statements to me. I, Nasir López MD attest  that Ginna Beltran is acting in a scribe capacity, has observed my performance of the services and has documented them in accordance with my direction.    Nasir López M.D.  Emergency Medicine  [facility] EMERGENCY DEPARTMENT  [address] [place] [id number]  [phone]  Dept: [phone]     Nasir López MD  02/10/22 1957       Nasir López MD  02/10/22 2009       Nasir López MD  02/10/22 2009

## 2022-02-11 NOTE — PROGRESS NOTES
02/11/22 0915   Quick Adds   Type of Visit Initial Occupational Therapy Evaluation   Living Environment   People in home spouse   Current Living Arrangements house   Home Accessibility stairs to enter home;stairs within home   Number of Stairs, Main Entrance 8   Number of Stairs, Within Home, Primary greater than 10 stairs   Transportation Anticipated family or friend will provide   Living Environment Comments 2 story home + basement, pt reports he was able to move thru all levels of the house prior to 1 week ago   Self-Care   Usual Activity Tolerance moderate   Current Activity Tolerance poor   Disability/Function   Hearing Difficulty or Deaf no   General Information   Onset of Illness/Injury or Date of Surgery 02/10/22   Referring Physician Downs   Patient/Family Therapy Goal Statement (OT) i want to get stronger, I think i'll need a TCU   Additional Occupational Profile Info/Pertinent History of Current Problem low complexity   Cognitive Status Examination   Orientation Status orientation to person, place and time   Affect/Mental Status (Cognitive) WFL   Follows Commands WFL   Pain Assessment   Patient Currently in Pain Yes, see Vital Sign flowsheet   Integumentary/Edema   Integumentary/Edema Comments pt's B LE wrapped   Range of Motion Comprehensive   General Range of Motion bilateral upper extremity ROM WFL   Strength Comprehensive (MMT)   General Manual Muscle Testing (MMT) Assessment upper extremity strength deficits identified   Comment, General Manual Muscle Testing (MMT) Assessment gout/ arthritis limiting strength   Transfers   Transfer Comments pt refused d/t pain in knee and back   Clinical Impression   Criteria for Skilled Therapeutic Interventions Met (OT) yes;skilled treatment is necessary   OT Diagnosis impaired ADLs   OT Problem List-Impairments impacting ADL problems related to;activity tolerance impaired;balance;strength;pain;coordination   ADL comments/analysis pt demonstrates difficulty with  FMC in B hands (unable to open packages on  meal tray), back pain limits I with mobility and self cares   Assessment of Occupational Performance 3-5 Performance Deficits   Identified Performance Deficits dressing, toileting, functional transfers   Planned Therapy Interventions (OT) ADL retraining;IADL retraining;balance training;bed mobility training;fine motor coordination training;strengthening;motor coordination training;transfer training;progressive activity/exercise   Clinical Decision Making Complexity (OT) low complexity   Therapy Frequency (OT) Daily   Predicted Duration of Therapy 1 week   Anticipated Equipment Needs Upon Discharge (OT) bariatric equipment   Risk & Benefits of therapy have been explained evaluation/treatment results reviewed;care plan/treatment goals reviewed;risks/benefits reviewed;participants included;patient;participants voiced agreement with care plan   Comment-Clinical Impression Pt seen for OT assessment with impairments noted in coordination, pt limitied by pain, decreased balance, and decreased stamina affecting I with self cares.  At this time, pt requires a significant amount of assist with basic self cares that family is unable to provide.  Currently recommending TCU.  OT will continue to follow in the hospital .    OT Discharge Planning    OT Discharge Recommendation (DC Rec) Transitional Care Facility   OT Rationale for DC Rec significantly below baseline with ADL performance.  In MD note, pt has been using a modified bed pan at home due to severe R knee pain.  Anticipate pt will need more assist than spouse can provide

## 2022-02-11 NOTE — CONSULTS
Wound Ostomy  WOC Assessment       Allergies:  No Known Allergies    Diagnosis:   Patient Active Problem List    Diagnosis Date Noted     Intractable low back pain 02/10/2022     Priority: Medium     Anemia of chronic renal failure, stage 4 (severe) (H) 02/10/2022     Priority: Medium     Hypotension 01/29/2022     Priority: Medium     Hyperkalemia 01/28/2022     Priority: Medium     Back pain 01/28/2022     Priority: Medium     Urinary tract infection 01/28/2022     Priority: Medium     Sickle cell crisis (H) 01/27/2022     Priority: Medium     Healthcare maintenance 01/11/2022     Priority: Medium     Venous stasis ulcers of both lower extremities (H) 01/11/2022     Priority: Medium     Hyperglycemia 01/11/2022     Priority: Medium     Atrial fibrillation, unspecified type (H) 11/15/2021     Priority: Medium     Morbid obesity (H) 05/17/2021     Priority: Medium     Status post bariatric surgery 05/17/2021     Priority: Medium     Open VBG 1996 Dr Arizmendi  Highest wt 880 lbs  Wt at surgery ~480 lbs  Lost to 250 lbs and now regain to 483 lbs as of 5/17/21       Chronic venous hypertension (idiopathic) with ulcer of left lower extremity (H) 01/26/2021     Priority: Medium     Last Assessment & Plan:   Formatting of this note might be different from the original.  Despite compression, treatment with DuoDERM, ulcer left lower extremity has worsened.  Does not appear infected, referral to wound care       Urge incontinence of urine 11/20/2019     Priority: Medium     Last Assessment & Plan:   Formatting of this note might be different from the original.  Quite severe, patient not describing preceding slow flow.  Check UA, referral to urology.  Complicated by treatment of CHF.       Arthritis of knee 05/03/2019     Priority: Medium     Last Assessment & Plan:   Formatting of this note might be different from the original.  Acute inflammatory arthritis left lower extremity, suspect gout or pseudogout, rule out joint  infection    Knee Injection Procedure Note    Pre-operative Diagnosis: left,  Knee osteoarthritis    Post-operative Diagnosis: same    Indications: pain    Procedure Details   Verbal consent was obtained for procedure.  Area was prepped with Betadine landmarks were palpated and 27-gauge 1-1/4 needle advanced from medial approach, 4 cc of's of lidocaine injecte  19-gauge needle then advanced into the joint space d aspirated about 20 cc of straw-colored slightly cloudy fluid.  Then medications injected in typical fashion.  Medications injected:  Depo-medrol 80 mg/1 cc  Lidocaine 1 cc    Complications:  None; patient tolerated the procedure well.       Malignant neoplasm of sigmoid colon (H) 04/16/2019     Priority: Medium     Formatting of this note might be different from the original.  Status post anterior resection with anastomosis sigmoid colon, Dr. Magaña 5/12/2009.    Last Assessment & Plan:   Formatting of this note might be different from the original.  Needs annual screening colonoscopy.  Referred in May, patient canceled appointment.  Referred again       Venous stasis 04/16/2019     Priority: Medium     Last Assessment & Plan:   Formatting of this note might be different from the original.  Referred last time to wound care.  Patient never went to this appointment.  Wounds healed, currently minimal problem.       Non-rheumatic mitral regurgitation 01/04/2017     Priority: Medium     Last Assessment & Plan:   Formatting of this note might be different from the original.  For cardiology follow-up, referral made.  Some weight gain, lower extremity edema worsened.  Prescribed Bumex 3 mg twice daily, only taking daily.  Encouraged twice daily dosing but second dose early afternoon       Vitamin D deficiency 03/27/2015     Priority: Medium     Calcium pyrophosphate deposition disease 02/27/2015     Priority: Medium     Diastolic congestive heart failure (H) 02/27/2015     Priority: Medium     Formatting of this  note might be different from the original.  Created by Conversion    Replacement Utility updated for latest IMO load    Last Assessment & Plan:   Formatting of this note might be different from the original.  As discussed, increase Bumex to recommended dose of 3 twice daily, follow-up with Dr. Argueta       Essential hypertension 02/27/2015     Priority: Medium     Formatting of this note might be different from the original.  Created by Conversion    Replacement Utility updated for latest IMO load    Last Assessment & Plan:   Formatting of this note might be different from the original.  Well-controlled on metoprolol 150, amlodipine 5 and lisinopril 10       Stage 3 chronic kidney disease (H) 02/27/2015     Priority: Medium     Formatting of this note might be different from the original.  Created by Conversion      Last Assessment & Plan:   Formatting of this note might be different from the original.  Referral for follow-up with nephrology.       Ventricular septal defect 02/27/2015     Priority: Medium     Formatting of this note might be different from the original.  Created by Conversion       Labs:  Recent Labs   Lab Test 02/10/22  1708   CRP 31.7*   HGB 10.5*   ALBUMIN 2.6*       Sandoval:  Sandoval Score: 15    Specialty Bed:   Standard Med Surg mattress in ED toom    Wound culture obtained: No    Edema:  No - good control using compression therapy  Patient will need to continue with compression therpay - added to discharge orders along with wound care orders.  If patient does not discharge today as planned, then will need compression therapy ordered while in hospital. Note left for hospitalist.  Anatomic Site/Laterality: RLE    Reason for ongoing care:   Wound assessment and plan of care    Encounter Type:  Initial Wound Type:   Denudement:  Foreign body present? No    Tissue Damage:   Breakdown of skin    Related trauma: In patient with venous stasis and chronic ulcerations    Assessment:    Length: 3  cm    Width: 1 cm    Tunneling/Undermining: No    Wound Bed: 100% Granular    Exudate: Yes Serous Scant    Periwound Skin: Dry/Flaky    Treatment Plan: Silicone foam          Anatomic Site/Laterality:   Left lateral ankle    Reason for ongoing care:   Wound assessment and plan of care    Encounter Type:  Initial Wound Type:   Denudement:  Foreign body present? No    Tissue Damage:   Breakdown of skin    Related trauma: As above    Assessment:    Length: 4 cm    Width: 3 cm    Tunneling/Undermining: No    Wound Bed: 40% Granular and 60% Slough    Exudate: Yes Serosanguineous Moderate    Periwound Skin: Dry/Flaky    Treatment Plan: Silicone foam and Silver dressing: Silvercel           Nursing care provided was wound assessment and provided for patient's comfort per his request after assessment completed.    Discussed plan of care with patient.    Outcomes and treatment recommendations are to promote skin integrity, contain exudate, promote wound healing and promote debridement autolytic.    Actions taken by WOC RN: 10 minutes of education, WOC Discharge recommendations entered and Requested MD order LE therapy if not discharged today.    Planned Follow Up: Weekly.    Plan for next visit: Reassess wound(s) and Reassess skin integrity.

## 2022-02-11 NOTE — PROGRESS NOTES
Patient pleasant and cooperative. Reporting 7/10 gout pain, has fentanyl patch on right shoulder, declines further intervention. A&Ox4. Uses urinal at the bedside, ate 100% of breakfast. Continues on telemetry monitoring d/t a-fib and tachycardia.   Patient will transfer to TCU. Gave report to Geri at [facility]. Updated charge nurse, will call for transport.   Patient left unit with all belongings at 1330 via EMS en route to [facility].

## 2022-02-11 NOTE — PLAN OF CARE
Problem: Adult Inpatient Plan of Care  Goal: Optimal Comfort and Wellbeing  Outcome: Improving     Problem: Pain Acute  Goal: Acceptable Pain Control and Functional Ability  Outcome: Improving  Intervention: Develop Pain Management Plan  Recent Flowsheet Documentation  Taken 2/11/2022 0338 by Graciela Colindres RN  Pain Management Interventions: medication (see MAR)     Pain to R knee and back overnight. Scheduled prednisone and fentanyl patch given; effective. On RA. Alert and oriented x4, able to make needs known appropriately. Afib on tele.

## 2022-02-12 ENCOUNTER — APPOINTMENT (OUTPATIENT)
Dept: PHYSICAL THERAPY | Facility: CLINIC | Age: 67
DRG: 641 | End: 2022-02-12
Attending: NURSE PRACTITIONER
Payer: COMMERCIAL

## 2022-02-12 PROBLEM — M10.9 GOUT ATTACK: Status: ACTIVE | Noted: 2022-02-12

## 2022-02-12 LAB
ANION GAP SERPL CALCULATED.3IONS-SCNC: 6 MMOL/L (ref 3–14)
BUN SERPL-MCNC: 60 MG/DL (ref 7–30)
CALCIUM SERPL-MCNC: 9.5 MG/DL (ref 8.5–10.1)
CHLORIDE BLD-SCNC: 110 MMOL/L (ref 94–109)
CO2 SERPL-SCNC: 21 MMOL/L (ref 20–32)
CREAT SERPL-MCNC: 2.52 MG/DL (ref 0.66–1.25)
ERYTHROCYTE [DISTWIDTH] IN BLOOD BY AUTOMATED COUNT: 15.7 % (ref 10–15)
GFR SERPL CREATININE-BSD FRML MDRD: 27 ML/MIN/1.73M2
GLUCOSE BLD-MCNC: 114 MG/DL (ref 70–99)
HCT VFR BLD AUTO: 30.7 % (ref 40–53)
HGB BLD-MCNC: 9.7 G/DL (ref 13.3–17.7)
INR PPP: 2.45 (ref 0.85–1.15)
MCH RBC QN AUTO: 26.9 PG (ref 26.5–33)
MCHC RBC AUTO-ENTMCNC: 31.6 G/DL (ref 31.5–36.5)
MCV RBC AUTO: 85 FL (ref 78–100)
PLATELET # BLD AUTO: 253 10E3/UL (ref 150–450)
POTASSIUM BLD-SCNC: 5.1 MMOL/L (ref 3.4–5.3)
RBC # BLD AUTO: 3.61 10E6/UL (ref 4.4–5.9)
SODIUM SERPL-SCNC: 137 MMOL/L (ref 133–144)
WBC # BLD AUTO: 9.2 10E3/UL (ref 4–11)

## 2022-02-12 PROCEDURE — 250N000013 HC RX MED GY IP 250 OP 250 PS 637: Performed by: INTERNAL MEDICINE

## 2022-02-12 PROCEDURE — 99225 PR SUBSEQUENT OBSERVATION CARE,LEVEL II: CPT | Performed by: INTERNAL MEDICINE

## 2022-02-12 PROCEDURE — 85610 PROTHROMBIN TIME: CPT | Performed by: NURSE PRACTITIONER

## 2022-02-12 PROCEDURE — 99207 PR CDG-CODE CATEGORY CHANGED: CPT | Performed by: INTERNAL MEDICINE

## 2022-02-12 PROCEDURE — 36415 COLL VENOUS BLD VENIPUNCTURE: CPT | Performed by: NURSE PRACTITIONER

## 2022-02-12 PROCEDURE — 97162 PT EVAL MOD COMPLEX 30 MIN: CPT | Mod: GP | Performed by: PHYSICAL THERAPIST

## 2022-02-12 PROCEDURE — 85027 COMPLETE CBC AUTOMATED: CPT | Performed by: NURSE PRACTITIONER

## 2022-02-12 PROCEDURE — G0378 HOSPITAL OBSERVATION PER HR: HCPCS

## 2022-02-12 PROCEDURE — 250N000013 HC RX MED GY IP 250 OP 250 PS 637: Performed by: NURSE PRACTITIONER

## 2022-02-12 PROCEDURE — 120N000001 HC R&B MED SURG/OB

## 2022-02-12 PROCEDURE — 80048 BASIC METABOLIC PNL TOTAL CA: CPT | Performed by: NURSE PRACTITIONER

## 2022-02-12 PROCEDURE — 250N000012 HC RX MED GY IP 250 OP 636 PS 637: Performed by: NURSE PRACTITIONER

## 2022-02-12 RX ORDER — WARFARIN SODIUM 5 MG/1
5 TABLET ORAL
Status: COMPLETED | OUTPATIENT
Start: 2022-02-12 | End: 2022-02-12

## 2022-02-12 RX ORDER — FEBUXOSTAT 40 MG/1
40 TABLET, FILM COATED ORAL DAILY
Status: DISCONTINUED | OUTPATIENT
Start: 2022-02-13 | End: 2022-01-01 | Stop reason: HOSPADM

## 2022-02-12 RX ORDER — FEBUXOSTAT 40 MG/1
40 TABLET, FILM COATED ORAL DAILY
Status: DISCONTINUED | OUTPATIENT
Start: 2022-02-13 | End: 2022-02-12

## 2022-02-12 RX ADMIN — METOPROLOL TARTRATE 25 MG: 25 TABLET, FILM COATED ORAL at 09:26

## 2022-02-12 RX ADMIN — WARFARIN SODIUM 5 MG: 5 TABLET ORAL at 19:41

## 2022-02-12 RX ADMIN — FERROUS SULFATE TAB 325 MG (65 MG ELEMENTAL FE) 325 MG: 325 (65 FE) TAB at 09:26

## 2022-02-12 RX ADMIN — FEBUXOSTAT 80 MG: 80 TABLET, FILM COATED ORAL at 12:32

## 2022-02-12 RX ADMIN — METOPROLOL TARTRATE 25 MG: 25 TABLET, FILM COATED ORAL at 20:29

## 2022-02-12 RX ADMIN — PREDNISONE 40 MG: 20 TABLET ORAL at 09:26

## 2022-02-12 RX ADMIN — DIPHENHYDRAMINE HYDROCHLORIDE 25 MG: 25 CAPSULE ORAL at 09:26

## 2022-02-12 ASSESSMENT — ACTIVITIES OF DAILY LIVING (ADL)
ADLS_ACUITY_SCORE: 14
DEPENDENT_IADLS:: CLEANING;COOKING;LAUNDRY;SHOPPING;MEAL PREPARATION;TRANSPORTATION
ADLS_ACUITY_SCORE: 14
ADLS_ACUITY_SCORE: 13
ADLS_ACUITY_SCORE: 14

## 2022-02-12 NOTE — PROGRESS NOTES
S-(situation): Patient changed to obsercvation status    B-(background): Patient status change due to observation by utilization review    A-(assessment): See Nurse Note

## 2022-02-12 NOTE — PROGRESS NOTES
02/12/22 1115   Quick Adds   Type of Visit Initial PT Evaluation       Present no   Living Environment   Current Living Arrangements house  (lives with wife - helps with bathroom, cooking, son w/needs)   Home Accessibility stairs to enter home   Number of Stairs, Main Entrance 5   Stair Railings, Main Entrance railings on both sides of stairs   Number of Stairs, Within Home, Primary greater than 10 stairs  (16 to get to bedroom, railing on L ascending)   Living Environment Comments Has a ramp for son and has railins   Self-Care   Regular Exercise No   Activity/Exercise/Self-Care Comment cannot exercise because of his knee so moves around as best he can and has been losing weight   Disability/Function   Hearing Difficulty or Deaf no   Wear Glasses or Blind no   Concentrating, Remembering or Making Decisions Difficulty no   Difficulty Communicating no   Difficulty Eating/Swallowing no   Walking or Climbing Stairs Difficulty yes   Walking or Climbing Stairs stair climbing difficulty, requires equipment  (uses cane and railing one step at a time)   Dressing/Bathing Difficulty no   Toileting Management troubles with cleaning properly  (takes shower after toileting in am)   Doing Errands Independently Difficulty (such as shopping) no   Errands Management help from someone to get groceries   Fall history within last six months no   Change in Functional Status Since Onset of Current Illness/Injury yes   General Information   Onset of Illness/Injury or Date of Surgery 02/10/22   Referring Physician Fei Jones DO   Patient/Family Therapy Goals Statement (PT) eliminate back pain, lose as much weight as he can,    Pertinent History of Current Problem (include personal factors and/or comorbidities that impact the POC) 65 yo male transferred from [facility] to [facility] due to lack of available beds. He went to [facility] ED on 2- due to weakness and low back pain. He has a BMI of 49, CHF, CKD  "3, A fib, gout, and was hospitalized from 1- - 1- for pylonephritis. Also history if CHF and Covid 19. Reports he has neuropathy but has not had this assessed. Had injection in R knee last month and this helped with walking.    Existing Precautions/Restrictions fall   Weight-Bearing Status - LUE full weight-bearing   Weight-Bearing Status - RUE full weight-bearing   Weight-Bearing Status - LLE full weight-bearing   Weight-Bearing Status - RLE full weight-bearing   General Observations Laying in bed. bleeding of lower legs.    Cognition   Orientation Status (Cognition) oriented x 3   Affect/Mental Status (Cognition) WNL   Follows Commands (Cognition) WNL   Pain Assessment   Patient Currently in Pain Yes, see Vital Sign flowsheet  (6/10 - back and fingers)   Integumentary/Edema   Integumentary/Edema Comments bleeding minimally from lowermlegs \"always bleeding\".    Range of Motion (ROM)   ROM Comment Decreased knee AROM and finger ROM for fisting   Strength Comprehensive (MMT)   Comment, General Manual Muscle Testing (MMT) Assessment L UE: 5-/5 biceps, triceps, flexion and extension of shoulder, 4-/5 shoulder abduction; R: generally weaker compared to the L at 3-/5 to 4-/5 except biceps 4/5. Unble to complete SLR R supine and able to compet 1 SLR for hip flexion L   Clinical Impression   Criteria for Skilled Therapeutic Intervention yes, treatment indicated   PT Diagnosis (PT) General weakness of the R>L arm and leg, back pain   Influenced by the following impairments Weight, comorbities of as noted above, drainage from lower legs (nurse plans to rewrap) and decreased general strength and endurance   Functional limitations due to impairments walking, standing, bed mobility, steps   Clinical Presentation Evolving/Changing   Clinical Presentation Rationale clinical judgement 3+ comorbidities and physical weaknesses   Clinical Decision Making (Complexity) moderate complexity   Therapy Frequency (PT) Daily "   Predicted Duration of Therapy Intervention (days/wks) 2-3 days   Planned Therapy Interventions (PT) bed mobility training;lumbar stabilization;neuromuscular re-education;patient/family education;ROM (range of motion);stair training;stretching;strengthening;progressive activity/exercise;transfer training   Anticipated Equipment Needs at Discharge (PT)   (non at this time. He did feel he could use a PCA)   Risk & Benefits of therapy have been explained evaluation/treatment results reviewed;care plan/treatment goals reviewed;risks/benefits reviewed;current/potential barriers reviewed;participants voiced agreement with care plan;patient   Clinical Impression Comments 67 yo male wo lives in [facility]. He has multiple comorbidities and is generally weak and painful following gout flare up and general weakness. He lives in a home that required him to negotiate 10+ steps to  bed room. He usually uses a cane and railing taking one step at a time. He feels very tired today and pain increased with SLRs (low back pain). He agreed to sitting on edge of bed and will attempt standing->walking 12-. He would  prefer to use a cane vs walker however at this time walker would be safer to begin with.    PT Discharge Planning    PT Discharge Recommendation (DC Rec) Transitional Care Facility  (public transportation with WC capability at this time)   PT Rationale for DC Rec Pt nomally is able to function at home stilll needing cares for cleaning afer toileting and while able to get up 10+steps to bedroom with cane he admits it is a struggle. He has been weak for a period of time and is just getting over a gout flare up in knees and hands/fingers. Skilled intervention for monitoring of vitals, gradual progression into endurance and strengthening, walking program level and steps.    PT Brief overview of current status  see above   Skin WDL   Skin Color other (see comments)  (scaley lower extremities with dry skin on bed and bleeding)    Skin Moisture dry,flaky   Skin Integrity/Characteristics peeling/scaling   Coping Strategies   Trust Relationship/Rapport care explained;choices provided;questions answered;empathic listening provided;questions encouraged;reassurance provided;thoughts/feelings acknowledged   Diversional Activities television

## 2022-02-12 NOTE — PROGRESS NOTES
[facility]    Hospitalist Progress Note    Date of Service (when I saw the patient): 02/12/2022    Assessment & Plan     Panda Miranda is a 66 year old male Panda Miranda is a 66 year old male who presented to the ED at [facility] on 2/10/2022 with generalized weakness and lower back pain. Patient has a medical history significant for morbid obesity with BMI of 49, CHF, CKD 3, A. fib, gout, complex recent medical history hospitalized at [facility] 1/27-1/30 for pyelonephritis, CHF, and COVID-19.   Principal Problem:       Polyarticular gout with acute flare    Muscle weakness (generalized)    Back pain  Assessment: Patient presented with lower back pain and generalized weakness. He has not been able to get out of bed at home and has been using a bowl as a bedpan. He states he was is in a lot of pain from polyarticular gout flare and so he has not been out of bed,. Recently taken off of colchicine due to CKD.  He had stopped taking Uloric on his own.  Now patient with gout flare in hands, right leg. Has some chronic pain in lower back managed with fentanyl patch. Patient notes pain is improved since he presented to ED initially.   Plan:   - Continue treatment of gout with prednisone 40 mg daily  -Due to CKD and renal function will need to stop colchicine due to toxicity risk, mild toxicity is a known risk  -Due to CKD and renal function will need to decrease Uloric to 40 mg daily  - Continue symptomatic treatment with fentanyl patch and as needed Percocet   - PT evaluation requested-at baseline patient able to ambulate with cane  - SW consultation requested for possible TCU placement     Active Problems:         Chronic atrial fibrillation with RVR (H)  Assessment: Patient with history of atrial fibrillation and is on warfarin chronically. Patient in RVR with HR up to 130-140's at [facility]. Of note, patient had been on metoprolol 25 mg BID which was discontinued at time of discharge from prior  hospitalization at [facility] due to hypotension. Suspect this was due to sepsis from pyelonephritis. Blood pressure has been stable. Patient received 1 dose of IV Toprol at [facility] and heart rate improved to 90's to low 100's.   Plan:   - Agree with restarting metoprolol 25 mg BID.  Consider changing metoprolol in the next 24 hours if stable  - Continue to monitor on telemetry  - Appreciate pharmacy assistance with dosing warfarin       Stage 3 chronic kidney disease (H)    Hyperkalemia  Assessment: Baseline creatinine around 2.6. Was 2.95 at time of initial presentation. Recheck today was 2.68. Patient with chronic hyperkalemia and is typically over 5. Potassium today at 5.5  Plan:   - Continue renal diet  - Continue to monitor   -A.m. basic metabolic panel  -Avoid nephrotoxins and renally adjust medications as above          Chronic systolic congestive heart failure (H)  Assessment: Echocardiogram done January, 2022 showed EF of 45-50%. Also has known small perimembranous VSD with left to right shunting.   Plan:  - Metoprolol 25mg BID restarted on transfer mainly for A. Fib RVR.  Convert to Toprol XL formation when able.  - Daily weights  - Strict I & O's       Open wound of lower leg-left lateral ankle, right shin, chronic venous stasis  Assessment: Patient with approximate 2.5 cm wound to left lateral ankle which appears to be healing without signs of infection. 3 cm x 1 cm wound noted to right shin again without signs of infection. Known venous insufficiency and chronic venous stasis.   Plan:   - Wound care orders carried forward from [facility]  - WO consult requested        TASHA (obstructive sleep apnea)  -Continue home CPAP           Disposition Plan   Expected discharge in 2 days to Home versus TCU once pain and function has improved.  Disposition will depend on patient's ability to manage at home.     Entered: Noam Calderón 02/12/2022, 10:05 AM         Noam Calderón MD  Hospitalist  Pager [phone]  Text  Page    Interval History   Patient states overall he is feels much better than what he presented with decreased pain and swelling in right knee and hands.  Continues to have some weeping of his lower extremities.      -Data reviewed today: I reviewed all new labs and imaging results over the last 24 hours. I personally reviewed no images or EKG's today.    Medications reviewed.     Physical Exam   Temp: 97.8  F (36.6  C) Temp src: Oral BP: 101/60 Pulse: 73   Resp: 18 SpO2: 100 % O2 Device: None (Room air)    There were no vitals filed for this visit.  Vital Signs with Ranges  Temp:  [97.2  F (36.2  C)-98.4  F (36.9  C)] 97.8  F (36.6  C)  Pulse:  [] 73  Resp:  [17-20] 18  BP: (101-123)/(58-61) 101/60  SpO2:  [97 %-100 %] 100 %  I/O last 3 completed shifts:  In: -   Out: 550 [Urine:250; Emesis/NG output:300]    Constitutional: Awake, alert, cooperative, no apparent distress     Respiratory: Clear to auscultation bilaterally, no crackles or wheezing   Cardiovascular:  IRR , normal S1 and S2, and no murmur noted   Abdomen: Normal bowel sounds, soft, non-distended, non-tender, no hepatosplenomegaly    Skin: No rashes, no cyanosis, dry to touch   Neuro: Alert and oriented x3,    Extremities:  Chronic venous stasis of lower extremities bilaterally with hyperpigmentation and dryness.  There is also some very superficial weeping in part due to patient scratching.   Other(s):        All other systems: Negative       Medications     - MEDICATION INSTRUCTIONS -       Warfarin Therapy Reminder         [START ON 2/13/2022] febuxostat  40 mg Oral Daily     [START ON 2/14/2022] fentaNYL  25 mcg Transdermal Q72H     fentaNYL   Transdermal Q8H     ferrous sulfate  325 mg Oral Daily     metoprolol tartrate  25 mg Oral BID     predniSONE  40 mg Oral Daily     sodium chloride (PF)  3 mL Intracatheter Q8H     warfarin ANTICOAGULANT  5 mg Oral ONCE at 18:00       Data   Recent Labs   Lab 02/12/22  0615 02/11/22  0823 02/10/22  9835    WBC 9.2  --  8.9   HGB 9.7*  --  10.5*   MCV 85  --  88     --  135*   INR 2.45* 2.60* 2.16*    136 138   POTASSIUM 5.1 5.5* 5.4*   CHLORIDE 110* 109* 109*   CO2 21 18* 16*   BUN 60* 44* 44*   CR 2.52* 2.68* 2.95*   ANIONGAP 6 9 13   JOHN 9.5 8.8 9.1   * 120 84   ALBUMIN  --   --  2.6*   PROTTOTAL  --   --  7.7   BILITOTAL  --   --  1.1*   ALKPHOS  --   --  76   ALT  --   --  <9   AST  --   --  27       Imaging:  No results found for this or any previous visit (from the past 24 hour(s)).

## 2022-02-12 NOTE — PLAN OF CARE
S-(situation): shift note    B-(background): Gout - increased weakness.      A-(assessment): Pt is A&O. VSS.  On tele - a-fib.  Has multiple sores on legs.  Some covered with dressings.  Pt states has been itching and benadryl was ordered.  States was effective.  Has patch on for pain, has chronic pain.  Has become increasingly weaker.  Using room lift to move him.\    R-(recommendations): Will cont to monitor - possible placement needed.

## 2022-02-12 NOTE — PROGRESS NOTES
"CLINICAL NUTRITION SERVICES - ASSESSMENT NOTE     Nutrition Prescription    RECOMMENDATIONS FOR MDs/PROVIDERS TO ORDER:  None     Malnutrition Status:    Pt does not meet criteria     Recommendations already ordered by Registered Dietitian (RD):  None at this time     Future/Additional Recommendations:  Monitor weights, labs and po intakes      REASON FOR ASSESSMENT  Panda Miranda is a/an 66 year old male assessed by the dietitian for Admission Nutrition Risk Screen for positive    Patient is a 66 year old male Panda Miranda is a 66 year old male who presented to the ED at [facility] on 2/10/2022 with generalized weakness and lower back pain. Patient has a medical history significant for morbid obesity with BMI of 49, CHF, CKD 3, A. fib, gout, complex recent medical history hospitalized at [facility] 1/27-1/30 for pyelonephritis, CHF, and COVID-19.  [facility] did not have any available beds and he was transferred to [facility] for further management.   - Very weak, has not been out of bed for a week.     NUTRITION HISTORY  Spoke with pt over the phone this morning. PT reports that he has been trying to loose weight by cutting back on beer, snack foods, fast food and eating more vegetables.  - Open wound of lower leg-left lateral ankle, right shin- WOC consulted   - 300 ml of emesis after eating dinner, pt states that this happens sometimes when he eats too fast    CURRENT NUTRITION ORDERS  Diet: Renal   Intake/Tolerance: 100% good appetite     LABS  Recent Labs   Lab Test 02/12/22  0615 02/11/22  0823    136   POTASSIUM 5.1 5.5*   CHLORIDE 110* 109*   CO2 21 18*   ANIONGAP 6 9   * 120   BUN 60* 44*   CR 2.52* 2.68*   JOHN 9.5 8.8     MEDICATIONS  Medications reviewed    ANTHROPOMETRICS  Height: 6'6\"   Most Recent Weight:       as of 2/10/2022 196.4 kg (433 lb) Abnormal    IBW: 97.3 kg  BMI: Obesity Grade III BMI >40  Weight History:   Wt Readings from Last 10 Encounters:   02/10/22 (!) 196.4 kg (433 lb)   01/29/22 " (!) 196.5 kg (433 lb 1.6 oz)   12/07/21 (!) 209.3 kg (461 lb 8 oz)   09/08/21 (!) 206.9 kg (456 lb 3.2 oz)   06/17/21 (!) 209.7 kg (462 lb 4.9 oz)   06/09/21 (!) 220 kg (485 lb)   06/01/21 (!) 220.1 kg (485 lb 3.2 oz)   05/17/21 (!) 219.2 kg (483 lb 3.2 oz)   03/29/21 (!) 216.8 kg (478 lb)   02/23/21 (!) 216 kg (476 lb 1.6 oz)   Pt with a 28lb wt loss (6%) wt loss over the past 2 months     Dosing Weight: 97 kg    ASSESSED NUTRITION NEEDS  Estimated Energy Needs: 0872-3202 kcals/day (20 - 25 kcals/kg)  Justification: Obese  Estimated Protein Needs:  grams protein/day (1 - 1.2 grams of pro/kg)  Justification: wound healing   Estimated Fluid Needs: fluids per MD     PHYSICAL FINDINGS  .Unable to determine due to RD off-site and unable to conduct NFPA    MALNUTRITION  % Intake: No decreased intake noted  % Weight Loss: Weight loss does not meet criteria  Subcutaneous Fat Loss: Unable to assess  Muscle Loss: Unable to assess  Fluid Accumulation/Edema: Unable to assess  Malnutrition Diagnosis: Patient does not meet two of the established criteria necessary for diagnosing malnutrition    NUTRITION DIAGNOSIS  No nutrition diagnosis at this time related to     INTERVENTIONS  Implementation  Nutrition education for nutrition relationship to health/disease   -Continue diet as ordered     Goals  Patient to consume % of nutritionally adequate meal trays TID, or the equivalent with supplements/snacks.     Monitoring/Evaluation  Progress toward goals will be monitored and evaluated per protocol.    Colette Elizabeth RD, LD   Clinical Dietitian    Weekend Pager: [phone]

## 2022-02-12 NOTE — PLAN OF CARE
Pt AO x4. Lung sounds clear. Bowel sounds normoactive. With complaints of 3/10 right knee and lower back pain. Pt states his pain has been more manageable.   Complained of skin itchiness and noted patient peeling his skin on shins. Noted multiple, bleeding, tiny open areas. Non compliant with verbal instructions not to pick on skin. Wound dressing done, Kerlix wrapped on BLE. Noted still for wound consult. PRN Benadryl somewhat effective.     Uses bedside urinal. On renal diet. Referred to charge nurse for possible need for bariatric bed. Afebrile.

## 2022-02-12 NOTE — PROGRESS NOTES
02/12/22 1115   Quick Adds   Type of Visit Initial PT Evaluation       Present no   Living Environment   Current Living Arrangements house  (lives with wife - helps with bathroom, cooking, son w/needs)   Home Accessibility stairs to enter home   Number of Stairs, Main Entrance 5   Stair Railings, Main Entrance railings on both sides of stairs   Number of Stairs, Within Home, Primary greater than 10 stairs  (16 to get to bedroom, railing on L ascending)   Living Environment Comments Has a ramp for son and has railins   Self-Care   Regular Exercise No   Activity/Exercise/Self-Care Comment cannot exercise because of his knee so moves around as best he can and has been losing weight   Disability/Function   Hearing Difficulty or Deaf no   Wear Glasses or Blind no   Concentrating, Remembering or Making Decisions Difficulty no   Difficulty Communicating no   Difficulty Eating/Swallowing no   Walking or Climbing Stairs Difficulty yes   Walking or Climbing Stairs stair climbing difficulty, requires equipment  (uses cane and railing one step at a time)   Dressing/Bathing Difficulty no   Toileting Management troubles with cleaning properly  (takes shower after toileting in am)   Doing Errands Independently Difficulty (such as shopping) no   Errands Management help from someone to get groceries   Fall history within last six months no   Change in Functional Status Since Onset of Current Illness/Injury yes   General Information   Onset of Illness/Injury or Date of Surgery 02/10/22   Referring Physician Fei Jones DO   Patient/Family Therapy Goals Statement (PT) eliminate back pain, lose as much weight as he can,    Pertinent History of Current Problem (include personal factors and/or comorbidities that impact the POC) 67 yo male transferred from [facility] to [facility] due to lack of available beds. He went to [facility] ED on 2- due to weakness and low back pain. He has a BMI of 49, CHF, CKD  "3, A fib, gout, and was hospitalized from 1- - 1- for pylonephritis. Also history if CHF and Covid 19. Reports he has neuropathy but has not had this assessed. Had injection in R knee last month and this helped with walking.    Existing Precautions/Restrictions fall   Weight-Bearing Status - LUE full weight-bearing   Weight-Bearing Status - RUE full weight-bearing   Weight-Bearing Status - LLE full weight-bearing   Weight-Bearing Status - RLE full weight-bearing   General Observations Laying in bed. bleeding of lower legs.    Cognition   Orientation Status (Cognition) oriented x 3   Affect/Mental Status (Cognition) WNL   Follows Commands (Cognition) WNL   Pain Assessment   Patient Currently in Pain Yes, see Vital Sign flowsheet  (6/10 - back and fingers)   Integumentary/Edema   Integumentary/Edema Comments bleeding minimally from lowermlegs \"always bleeding\".    Range of Motion (ROM)   ROM Comment Decreased knee AROM and finger ROM for fisting   Strength Comprehensive (MMT)   Comment, General Manual Muscle Testing (MMT) Assessment L UE: 5-/5 biceps, triceps, flexion and extension of shoulder, 4-/5 shoulder abduction; R: generally weaker compared to the L at 3-/5 to 4-/5 except biceps 4/5. Unble to complete SLR R supine and able to compet 1 SLR for hip flexion L   Clinical Impression   Criteria for Skilled Therapeutic Intervention yes, treatment indicated   PT Diagnosis (PT) General weakness of the R>L arm and leg, back pain   Influenced by the following impairments Weight, comorbities of as noted above, drainage from lower legs (nurse plans to rewrap) and decreased general strength and endurance   Functional limitations due to impairments walking, standing, bed mobility, steps   Clinical Presentation Evolving/Changing   Clinical Presentation Rationale clinical judgement 3+ comorbidities and physical weaknesses   Clinical Decision Making (Complexity) moderate complexity   Therapy Frequency (PT) Daily "   Predicted Duration of Therapy Intervention (days/wks) 2-3 days   Planned Therapy Interventions (PT) bed mobility training;lumbar stabilization;neuromuscular re-education;patient/family education;ROM (range of motion);stair training;stretching;strengthening;progressive activity/exercise;transfer training   Anticipated Equipment Needs at Discharge (PT)   (non at this time. He did feel he could use a PCA)   Risk & Benefits of therapy have been explained evaluation/treatment results reviewed;care plan/treatment goals reviewed;risks/benefits reviewed;current/potential barriers reviewed;participants voiced agreement with care plan;patient   Clinical Impression Comments 65 yo male wo lives in [facility]. He has multiple comorbidities and is generally weak and painful following gout flare up and general weakness. He lives in a home that required him to negotiate 10+ steps to  bed room. He usually uses a cane and railing taking one step at a time. He feels very tired today and pain increased with SLRs (low back pain). He agreed to sitting on edge of bed and will attempt standing->walking 12-. He would  prefer to use a cane vs walker however at this time walker would be safer to begin with.    PT Discharge Planning    PT Discharge Recommendation (DC Rec) Transitional Care Facility  (public transportation with WC capability at this time)   PT Rationale for DC Rec Pt nomally is able to function at home stilll needing cares for cleaning afer toileting and while able to get up 10+steps to bedroom with cane he admits it is a struggle. He has been weak for a period of time and is just getting over a gout flare up in knees and hands/fingers. Skilled intervention for monitoring of vitals, gradual progression into endurance and strengthening, walking program level and steps.    PT Brief overview of current status  see above   Total Evaluation Time   Total Evaluation Time (Minutes) 35   Skin WDL   Skin Color other (see  comments)  (scaley lower extremities with dry skin on bed and bleeding)   Skin Moisture dry,flaky   Skin Integrity/Characteristics peeling/scaling   Coping Strategies   Trust Relationship/Rapport care explained;choices provided;questions answered;empathic listening provided;questions encouraged;reassurance provided;thoughts/feelings acknowledged   Diversional Activities television

## 2022-02-12 NOTE — PHARMACY-ANTICOAGULATION SERVICE
Clinical Pharmacy - Warfarin Dosing Consult     Pharmacy has been consulted to manage this patient s warfarin therapy.  Indication: Atrial Fibrillation  Therapy Goal: INR 2-3  Provider/Team: Dr. Ben Hamlin  [facility] Clinic: [facility]  Warfarin Prior to Admission: Yes  Warfarin PTA Regimen: 7.5 mg Tue/Thur & 5 mg ROW  Recent documented change in oral intake/nutrition: Unknown    INR   Date Value Ref Range Status   02/12/2022 2.45 (H) 0.85 - 1.15 Final   02/11/2022 2.60 (H) 0.90 - 1.15 Final       Recommend warfarin 5 mg today.  Pharmacy will monitor Panda Miranda daily and order warfarin doses to achieve specified goal.      Please contact pharmacy as soon as possible if the warfarin needs to be held for a procedure or if the warfarin goals change.

## 2022-02-12 NOTE — PLAN OF CARE
Pt is A/O x4. VSS. Very weak, has not been out of bed for a week. Has multiple scattered opened areas from scratching. Open wounds on right knee and left ankle, no drainage, covered with dressing. Lung sounds clear, no SOB. On tele, afib with tachycardia. Pain 2/10 because of gout flare up, fentanyl patch in place. 300 ml of emesis after eating dinner, pt states that this happens sometimes when he eats too fast. Uses urinal at bedside with assistance, no BM since 2/10. IV saline locked. Will continue to monitor.

## 2022-02-12 NOTE — UTILIZATION REVIEW
"[facility]     Admission Status; Secondary Review Determination     Admission Date: 2/11/2022  3:17 PM      Under the authority of the Utilization Management Committee, the utilization review process indicated a secondary review on the above patient.  The review outcome is based on review of the medical records, discussions with staff, and applying clinical experience noted on the date of the review.          (x) Observation Status Appropriate - This patient does not meet hospital inpatient criteria and is placed in observation status. If this patient's primary payer is Medicare and was admitted as an inpatient, Condition Code 44 should be used and patient status changed to \"observation\".     RATIONALE FOR DETERMINATION   66-year-old male with a history of atrial fibrillation, stage III CKD, systolic CHF, sleep apnea, and chronic leg wounds with venous stasis was admitted yesterday with generalized weakness and low back pain.  He has not been able to get out of bed at home and has fairly severe pain due to polyarticular gout flare.  This is being managed currently with prednisone.  He had stopped taking Uloric and colchicine prior to admission.  He also was on a fentanyl patch.  He will be working with physical therapy and is anticipated to require transitional care facility on discharge.  He also has atrial fibrillation with mild episodic tachycardia requiring titration of beta-blockers orally.  Heart rate is currently well controlled and there is no concern for decompensated CHF or acute coronary syndrome.  At the time of this review the patient does not meet medical necessity for inpatient hospitalization and observation status is recommended.    The severity of illness, intensity of service provided, expected LOS and risk for adverse outcome make the care appropriate for further observation; however, doesn't meet criteria for hospital inpatient admission.  Dr Calderón notified of this " determination.        The information on this document is developed by the utilization review team in order for the business office to ensure compliance.  This only denotes the appropriateness of proper admission status and does not reflect the quality of care rendered.         The definitions of Inpatient Status and Observation Status used in making the determination above are those provided in the CMS Coverage Manual, Chapter 1 and Chapter 6, section 70.4.      Sincerely,     Fei Thompson DO MPH   Physician Advisor  Utilization Review  [facility]

## 2022-02-12 NOTE — CONSULTS
Care Management Initial Consult    General Information  Assessment completed with: Sheila  Type of CM/SW Visit: Offer D/C Planning    Primary Care Provider verified and updated as needed: Yes   Readmission within the last 30 days: YES   [facility] 01/27--01/30       Reason for Consult: discharge planning    Communication Assessment  Patient's communication style: spoken language (English or Bilingual)    Hearing Difficulty or Deaf: no   Wear Glasses or Blind: no    Cognitive  Cognitive/Neuro/Behavioral: WDL                      Living Environment:   People in home: spouse-Angelica,    (Adult son-disabled --receives 24 hr nursing care within the home)     Current living Arrangements: house      Able to return to prior arrangements: no  Living Arrangement Comments:  3 level split- home    Family/Social Support:  Care provided by: self, spouse  Provides care for: no one  Marital Status:   Wife- Angelica, 3 adult children: 1-lives close, 1-lives out of state, 1-lives in their home but is disabled         Description of Support System: Supportive,Involved    Support Assessment: Lacks adequate physical care    Current Resources:   Patient receiving home care services: No     Community Resources:  [facility]  Equipment currently used at home: cane, straight,wheelchair, manual  Supplies currently used at home: Wound Care Supplies    Employment/Financial:  Employment Status: retired     Employment/ Comments:  [employer]/-20 years, [employer], [employer]  Financial Concerns: No concerns identified   Finance Comments: States he has 4 pensions    Functional Status:  Prior to admission patient needed assistance:   Dependent ADLs:: Ambulation-cane,Ambulation-walker  Dependent IADLs:: Cleaning,Cooking,Laundry,Shopping,Meal Preparation,Transportation  Assesssment of Functional Status: Needs placement in a SNF/TCF for rehabilitation    Mental Health Status:  Mental Health  "Status: No Current Concerns       Chemical Dependency Status:  Chemical Dependency Status: No Current Concerns             Values/Beliefs:  Spiritual, Cultural Beliefs, Confucianist Practices, Values that affect care: no               Additional Information:  Care Transitions has been consulted for discharge planning.  SW met with pt this afternoon to introduce self/role, perform assessment, and discuss resources.    Pt stated he is interested in TCU placement given his current mobility status and recommendations from Therapy Dept.     States his home is a 3 level split-10 stairs to get up to his bedroom, 14 stairs to get down to his basement. His wife is able to provide limited assistance. States when he is at his baseline, he is able to ambulate with a cane. Still drives to doctor appointments, however has required extra assistance with arranging a wheelchair if he is needing to go any length of distance.     TCU facilities discussed, agency choice provided. Pt stated he has been to a TCU in the past. Would prefer a location closer to [place] if possible but is open to whomever has a bed available and \"good therapy.\" Pt was very pleasant during conversation and states he is motivated to get strong to return home again with his family.     CM discussed need to research accepting facilities who can accommodate Pt's [employer] insurance, Bariatric needs -currently 430 lbs, and vaccination status.   Pt states he is recently covid recovered-Dx 01/27/22.  Has received 1 dose of Pfizer. Scheduled to receive 2nd dose in Feb.     Pending TCU referrals    [facility]- global referral made for a TCU bed with [facility] Admission team (phone: [phone]/Fax: [phone])  - serves as a referral for 11 TCU facilities.  2/12 contacted weekend Admissions [facility] # [phone]. No weekend beds. Requested referral faxed and to follow up on Monday 2/14    [facility]- Global referral to [facility] " Patient Transition Liaison, (phone: [phone]/Fax: [phone]) - serves as referral for 15 TCU facilites in the [facility] area. 2/12--Currently assessing    --[facility] (Phone: [phone] Fax: [phone])-left      --[facility] (Main Phone: [phone] Admissions phone: [phone] Fax: [phone])-left      --[facility] # [phone] Fax: [phone]    --[facility] (Main Phone: [phone] Admission Phone: [phone] Fax: [phone])-[facility]    --[facility] (Main Phone: [phone] Admissions phone: [phone] Fax: [phone])    --[facility] (Phone: [phone] Admissions: [phone] Fax: [phone])    --[facility] (Phone: [phone] Fax: [phone])    --[facility] Admissions Phone: [phone] Main Phone: [phone] Fax: [phone]    --[facility] (Admission phone: [phone] Main Phone: [phone] Fax: [phone])    --[facility] (Admissions Phone: [phone] Main Phone: [phone] Fax: [phone])    -- [facility] # [phone] Fax: [phone]    --[facility] # [phone] Fax: [phone]    --[facility] # [phone] Fax: [phone]        PLAN: Global TCU referrals pending. CM to update once a bed has been secured.       Ml Sorensen [facility]  [phone]

## 2022-02-12 NOTE — PLAN OF CARE
Occupational Therapy Discharge Summary    Reason for therapy discharge:    Discharged to transitional care facility.    Progress towards therapy goal(s). See goals on Care Plan in [facility] electronic health record for goal details.  Goals partially met.  Barriers to achieving goals:   discharge from facility.    Therapy recommendation(s):    Continued therapy is recommended.  Rationale/Recommendations:  To improve overall safety/independence.

## 2022-02-12 NOTE — PROGRESS NOTES
S-(situation): Patient tall and unable to turn in standard bed for cares due to his size    B-(background): Pain control, gout flare, unable to care for self at home due to mobility issues and weakness    A-(assessment): skin issues with ulcers on legs,lymphedema    R-(recommendations): Bariatric Sizewise bed ordered with air mattress, even though was switched from in-patient status to observation today to be able to deliver safe cares (uses bedpan and need to have the patient able to turn easier in the bed) as well as protection for skin issues while here.    Sizewise Confirmation # [id number]

## 2022-02-13 ENCOUNTER — APPOINTMENT (OUTPATIENT)
Dept: PHYSICAL THERAPY | Facility: CLINIC | Age: 67
DRG: 641 | End: 2022-02-13
Attending: NURSE PRACTITIONER
Payer: COMMERCIAL

## 2022-02-13 LAB
ANION GAP SERPL CALCULATED.3IONS-SCNC: 8 MMOL/L (ref 3–14)
BUN SERPL-MCNC: 72 MG/DL (ref 7–30)
CALCIUM SERPL-MCNC: 9.5 MG/DL (ref 8.5–10.1)
CHLORIDE BLD-SCNC: 107 MMOL/L (ref 94–109)
CO2 SERPL-SCNC: 19 MMOL/L (ref 20–32)
CREAT SERPL-MCNC: 2.58 MG/DL (ref 0.66–1.25)
GFR SERPL CREATININE-BSD FRML MDRD: 27 ML/MIN/1.73M2
GLUCOSE BLD-MCNC: 120 MG/DL (ref 70–99)
INR PPP: 2.46 (ref 0.85–1.15)
POTASSIUM BLD-SCNC: 5 MMOL/L (ref 3.4–5.3)
SODIUM SERPL-SCNC: 134 MMOL/L (ref 133–144)

## 2022-02-13 PROCEDURE — G0378 HOSPITAL OBSERVATION PER HR: HCPCS

## 2022-02-13 PROCEDURE — 120N000001 HC R&B MED SURG/OB

## 2022-02-13 PROCEDURE — 250N000013 HC RX MED GY IP 250 OP 250 PS 637: Performed by: INTERNAL MEDICINE

## 2022-02-13 PROCEDURE — 36415 COLL VENOUS BLD VENIPUNCTURE: CPT | Performed by: INTERNAL MEDICINE

## 2022-02-13 PROCEDURE — 99225 PR SUBSEQUENT OBSERVATION CARE,LEVEL II: CPT | Performed by: INTERNAL MEDICINE

## 2022-02-13 PROCEDURE — 80048 BASIC METABOLIC PNL TOTAL CA: CPT | Performed by: INTERNAL MEDICINE

## 2022-02-13 PROCEDURE — 85610 PROTHROMBIN TIME: CPT | Performed by: NURSE PRACTITIONER

## 2022-02-13 PROCEDURE — 97530 THERAPEUTIC ACTIVITIES: CPT | Mod: GP | Performed by: PHYSICAL THERAPIST

## 2022-02-13 PROCEDURE — 250N000012 HC RX MED GY IP 250 OP 636 PS 637: Performed by: NURSE PRACTITIONER

## 2022-02-13 PROCEDURE — 250N000013 HC RX MED GY IP 250 OP 250 PS 637: Performed by: NURSE PRACTITIONER

## 2022-02-13 PROCEDURE — 99207 PR CDG-CODE CATEGORY CHANGED: CPT | Performed by: INTERNAL MEDICINE

## 2022-02-13 RX ORDER — METOPROLOL SUCCINATE 50 MG/1
50 TABLET, EXTENDED RELEASE ORAL DAILY
Status: DISCONTINUED | OUTPATIENT
Start: 2022-02-14 | End: 2022-01-01 | Stop reason: HOSPADM

## 2022-02-13 RX ORDER — WARFARIN SODIUM 5 MG/1
5 TABLET ORAL
Status: COMPLETED | OUTPATIENT
Start: 2022-02-13 | End: 2022-02-13

## 2022-02-13 RX ADMIN — FERROUS SULFATE TAB 325 MG (65 MG ELEMENTAL FE) 325 MG: 325 (65 FE) TAB at 08:56

## 2022-02-13 RX ADMIN — FEBUXOSTAT 40 MG: 40 TABLET, FILM COATED ORAL at 09:08

## 2022-02-13 RX ADMIN — DIPHENHYDRAMINE HYDROCHLORIDE 25 MG: 25 CAPSULE ORAL at 21:58

## 2022-02-13 RX ADMIN — WARFARIN SODIUM 5 MG: 5 TABLET ORAL at 18:18

## 2022-02-13 RX ADMIN — PREDNISONE 40 MG: 20 TABLET ORAL at 08:56

## 2022-02-13 RX ADMIN — METOPROLOL TARTRATE 25 MG: 25 TABLET, FILM COATED ORAL at 08:55

## 2022-02-13 RX ADMIN — METOPROLOL TARTRATE 25 MG: 25 TABLET, FILM COATED ORAL at 21:58

## 2022-02-13 RX ADMIN — MICONAZOLE NITRATE: 20 POWDER TOPICAL at 21:59

## 2022-02-13 NOTE — PHARMACY-ANTICOAGULATION SERVICE
Clinical Pharmacy - Warfarin Dosing Consult     Pharmacy has been consulted to manage this patient s warfarin therapy.  Indication: Atrial Fibrillation  Therapy Goal: INR 2-3  Provider/Team: Dr. Ben Hamlin  [facility] Clinic: [facility]  Warfarin Prior to Admission: Yes  Warfarin PTA Regimen: 7.5 mg Tue/Thur & 5 mg ROW  Recent documented change in oral intake/nutrition: Unknown    INR   Date Value Ref Range Status   02/13/2022 2.46 (H) 0.85 - 1.15 Final   02/12/2022 2.45 (H) 0.85 - 1.15 Final       Recommend warfarin 5 mg today.  Pharmacy will monitor Panda Miranda daily and order warfarin doses to achieve specified goal.      Please contact pharmacy as soon as possible if the warfarin needs to be held for a procedure or if the warfarin goals change.

## 2022-02-13 NOTE — PROGRESS NOTES
"PRIMARY DIAGNOSIS: \"GENERIC\" NURSING  OUTPATIENT/OBSERVATION GOALS TO BE MET BEFORE DISCHARGE:  1. ADLs back to baseline: Yes    2. Activity and level of assistance:Bedrest - bed lift with assist of 2    Pain status: Back to his baseline pain level - has chronic pain    3. Return to near baseline physical activity: No     Discharge Planner Nurse   Safe discharge environment identified: No  Barriers to discharge: Yes       Entered by: Sabina Toussaint 02/13/2022 3:44 AM     Please review provider order for any additional goals.   Nurse to notify provider when observation goals have been met and patient is ready for discharge.  "

## 2022-02-13 NOTE — UTILIZATION REVIEW
Concurrent stay review; Secondary Review Determination    Under the authority of the Utilization Management Committee, the utilization review process indicated a secondary review on the above patient. The review outcome is based on review of the medical records, discussions with staff, and applying clinical experience noted on the date of the review.    (x) Observation Status Appropriate - Concurrent stay review        RATIONALE FOR DETERMINATION: 66-year-old male admitted on 2/10/2022 with complaints of polyarticular arthritis and low back pain and too weak to get out of bed.  Patient has morbid obesity weighing 433 pounds, and recent hospitalization the last 4 days in January for sepsis secondary to urinary tract infection with associated hypotension and acute back pain along with acute heart failure.  Patient found to have a CRP level of 32 with mild worsening azotemia and a sed rate of 92 with normal white blood count.  Due to patient's weakness, back pain and morbid obesity patient was unsafe for discharge requiring oral prednisone for polyarticular gout flare awaiting disposition.    Patient is clinically improving and there is no clear indication to change patient's status to inpatient. The severity of illness, intensity of service provided, expected LOS and risk for adverse outcome make the care appropriate for observation.    This document was produced using voice recognition software    The information on this document is developed by the utilization review team in order for the business office to ensure compliance. This only denotes the appropriateness of proper admission status and does not reflect the quality of care rendered.    The definitions of Inpatient Status and Observation Status used in making the determination above are those provided in the CMS Coverage Manual, Chapter 1 and Chapter 6, section 70.4.    Sincerely,    Nate Wild MD  Utilization Review  Physician Advisor  [facility]  Services.

## 2022-02-13 NOTE — PROGRESS NOTES
"PRIMARY DIAGNOSIS: \"GENERIC\" NURSING  OUTPATIENT/OBSERVATION GOALS TO BE MET BEFORE DISCHARGE:  1. ADLs back to baseline: No    2. Activity and level of assistance: Up with maximum assistance. Consider SW and/or PT evaluation.     3. Pain status: 3/10    4. Return to near baseline physical activity: No     Discharge Planner Nurse   Safe discharge environment identified: Yes  Barriers to discharge: No       Entered by: Penny Sorensen 02/13/2022 5:42 PM     Please review provider order for any additional goals.   Nurse to notify provider when observation goals have been met and patient is ready for discharge.  "

## 2022-02-13 NOTE — PLAN OF CARE
"PRIMARY DIAGNOSIS: \"GENERIC\" NURSING  OUTPATIENT/OBSERVATION GOALS TO BE MET BEFORE DISCHARGE:  ADLs back to baseline: No    Activity and level of assistance: Up with maximum assistance. Consider SW and/or PT evaluation.     Pain status: Chronic back pain 3/10    Return to near baseline physical activity: No     Discharge Planner Nurse   Safe discharge environment identified: Yes  Barriers to discharge: No       Entered by: Penny Sorensen 02/13/2022 11:09 AM     Please review provider order for any additional goals.   Nurse to notify provider when observation goals have been met and patient is ready for discharge.  "

## 2022-02-13 NOTE — PROGRESS NOTES
"PRIMARY DIAGNOSIS: \"GENERIC\" NURSING  OUTPATIENT/OBSERVATION GOALS TO BE MET BEFORE DISCHARGE:  1. ADLs back to baseline: Yes    2. Activity and level of assistance:Bedrest - bed lift with assist of 2    Pain status: Back to his baseline pain level - has chronic pain    3. Return to near baseline physical activity: No     Discharge Planner Nurse   Safe discharge environment identified: No  Barriers to discharge: Yes       Entered by: Sabina Toussaint 02/13/2022 12:43 AM     Please review provider order for any additional goals.   Nurse to notify provider when observation goals have been met and patient is ready for discharge.  "

## 2022-02-13 NOTE — PLAN OF CARE
"PRIMARY DIAGNOSIS: \"GENERIC\" NURSING  OUTPATIENT/OBSERVATION GOALS TO BE MET BEFORE DISCHARGE:  1. ADLs back to baseline: No    2. Activity and level of assistance: Up with maximum assistance. Consider SW and/or PT evaluation.     3. Pain status: Improved-controlled with oral pain medications.    4. Return to near baseline physical activity: No     Discharge Planner Nurse   Safe discharge environment identified: No  Barriers to discharge: Yes       Entered by: Penny Sorensen 02/12/2022 6:25 PM     Please review provider order for any additional goals.   Nurse to notify provider when observation goals have been met and patient is ready for discharge.  "

## 2022-02-13 NOTE — PROGRESS NOTES
"PRIMARY DIAGNOSIS: \"GENERIC\" NURSING  OUTPATIENT/OBSERVATION GOALS TO BE MET BEFORE DISCHARGE:  1. ADLs back to baseline: Yes    2. Activity and level of assistance:Bedrest - bed lift with assist of 2    Pain status: Back to his baseline pain level - has chronic pain    3. Return to near baseline physical activity: No     Discharge Planner Nurse   Safe discharge environment identified: No  Barriers to discharge: Yes       Entered by: Sabina Toussaint 02/13/2022 6:48 AM     Please review provider order for any additional goals.   Nurse to notify provider when observation goals have been met and patient is ready for discharge.    Pt slept well most of shift.  VSS.  Afebrile.  Has chronic pain in back.  Complains of gout pain.  Denied need for pain med.  "

## 2022-02-13 NOTE — PROGRESS NOTES
[facility]    Hospitalist Progress Note    Date of Service (when I saw the patient): 02/13/2022    Assessment & Plan     Panda Miranda is a 66 year old male Panda Miranda is a 66 year old male who presented to the ED at [facility] on 2/10/2022 with generalized weakness and lower back pain. Patient has a medical history significant for morbid obesity with BMI of 49, CHF, CKD 3, A. fib, gout, complex recent medical history hospitalized at [facility] 1/27-1/30 for pyelonephritis, CHF, and COVID-19.   Principal Problem:       Polyarticular gout with acute flare    Muscle weakness (generalized)    Back pain  Assessment: Patient presented with lower back pain and generalized weakness. He has not been able to get out of bed at home and has been using a bowl as a bedpan. He states he was is in a lot of pain from polyarticular gout flare and so he has not been out of bed,. Recently taken off of colchicine due to CKD.  He had stopped taking Uloric on his own.  Now patient with gout flare in hands, right leg. Has some chronic pain in lower back managed with fentanyl patch. Patient notes pain is improved since he presented to ED initially.   Plan:   - Continue treatment of gout with prednisone 40 mg daily  -Due to CKD and renal function will need to stop colchicine due to toxicity risk, mild toxicity is a known risk  -Due to CKD and renal function will need to decrease Uloric to 40 mg daily  - Continue symptomatic treatment with fentanyl patch and as needed Percocet   - PT evaluation requested-at baseline patient able to ambulate with cane  - SW consultation requested for possible TCU placement  -decrease prednisone to 30 mg in am and taper     Active Problems:         Chronic atrial fibrillation with RVR (H)  Assessment: Patient with history of atrial fibrillation and is on warfarin chronically. Patient in RVR with HR up to 130-140's at [facility]. Of note, patient had been on metoprolol 25 mg BID which  Patient examined and ROS reviewed. A case of ESRD examined during HD. Patient is tolerating HD well. Blood flow through access is adequate. Advised to continue UF. was discontinued at time of discharge from prior hospitalization at [facility] due to hypotension. Suspect this was due to sepsis from pyelonephritis. Blood pressure has been stable. Patient received 1 dose of IV Toprol at [facility] and heart rate improved to 90's to low 100's.   Plan:   - Agree with restarting metoprolol 25 mg BID.  Change toToprol in am  - Continue to monitor on telemetry  - Appreciate pharmacy assistance with dosing warfarin       Stage 3 chronic kidney disease (H)    Hyperkalemia  Assessment: Baseline creatinine around 2.6. Was 2.95 at time of initial presentation. Recheck today was 2.58, stable.  Patient with chronic hyperkalemia and is typically over 5. Potassium today at 5  Plan:   - Continue renal diet  - Continue to monitor   -A.m. basic metabolic panel  -Avoid nephrotoxins and renally adjust medications as above          Chronic systolic congestive heart failure (H)  Assessment: Echocardiogram done January, 2022 showed EF of 45-50%. Also has known small perimembranous VSD with left to right shunting.   Plan:  - Metoprolol 25mg BID restarted on transfer mainly for A. Fib RVR.  Convert to Toprol XL formation when able.  - Daily weights  - Strict I & O's       Open wound of lower leg-left lateral ankle, right shin, chronic venous stasis  Assessment: Patient with approximate 2.5 cm wound to left lateral ankle which appears to be healing without signs of infection. 3 cm x 1 cm wound noted to right shin again without signs of infection. Known venous insufficiency and chronic venous stasis.   Plan:   - Wound care orders carried forward from [facility]  - WO consult requested        TASHA (obstructive sleep apnea)  -Continue home CPAP         Disposition Plan   Expected discharge in 1 days to Home versus TCU once pain and function has improved.  Disposition will depend on patient's ability to manage at home.     Entered: Noam Calderón 02/13/2022, 10:33 AM         Noam Calderón MD  Hospitalist  Pager  56 yo man, from [facility] at [facility], with history of ESRD on HD (Tu, Th, Sa), HTN, idiopathic hypotension (on midodrine), DM2, BPH, and schizophrenia presents following a fall at [place] with complaint of R sided rib pain and head pain, admitted with posttraumatic subarachnoid hemorrhage. 56 yo man, from [place] [facility] at [facility], with history of ESRD on HD (Tu, Th, Sa), HTN, idiopathic hypotension (on midodrine), DM2, BPH, and schizophrenia presents following a fall at [place] with complaint of R sided rib pain and head pain, admitted with posttraumatic subarachnoid hemorrhage. 56 yo man, from [place] [facility] at [facility], with history of ESRD on HD (Tu, Th, Sa), HTN, idiopathic hypotension (on midodrine), DM2, BPH, and schizophrenia presents following a fall at [place] with complaint of R sided rib pain and head pain, admitted with posttraumatic subarachnoid hemorrhage. 56 yo man, from [place] [facility], with history of ESRD on HD (Tu, Th, Sa), HTN, idiopathic hypotension (on midodrine), DM2, BPH, and schizophrenia presents following a fall at [place] with complaint of R sided rib pain and head pain, admitted with posttraumatic subarachnoid hemorrhage. [phone]  Text Page    Interval History   Patient states overall he is feels much better than what he presented with decreased pain and swelling in right knee and hands.  Continues to have some weeping of his lower extremities.  Fevers or chills.  Still needing some assist to get up      -Data reviewed today: I reviewed all new labs and imaging results over the last 24 hours. I personally reviewed no images or EKG's today.    Medications reviewed.     Physical Exam   Temp: 97  F (36.1  C) Temp src: Oral BP: 119/69 Pulse: 82   Resp: 18 SpO2: 99 % O2 Device: None (Room air)    There were no vitals filed for this visit.  Vital Signs with Ranges  Temp:  [97  F (36.1  C)] 97  F (36.1  C)  Pulse:  [68-83] 82  Resp:  [18-20] 18  BP: ()/(60-69) 119/69  SpO2:  [97 %-99 %] 99 %  I/O last 3 completed shifts:  In: 540 [P.O.:540]  Out: 200 [Urine:200]    Constitutional: Awake, alert, cooperative, no apparent distress     Respiratory: Clear to auscultation bilaterally, no crackles or wheezing   Cardiovascular:  IRR , normal S1 and S2, and no murmur noted   Abdomen: Normal bowel sounds, soft, non-distended, non-tender   Skin: No rashes, no cyanosis, dry to touch   Neuro: Alert and oriented x3,    Extremities:  Chronic venous stasis of lower extremities bilaterally with hyperpigmentation and dryness.  There is also some very superficial weeping in part due to patient scratching.   Other(s): Hands and knees without redness or effusions       All other systems: Negative       Medications     - MEDICATION INSTRUCTIONS -       Warfarin Therapy Reminder         febuxostat  40 mg Oral Daily     [START ON 2/14/2022] fentaNYL  25 mcg Transdermal Q72H     fentaNYL   Transdermal Q8H     ferrous sulfate  325 mg Oral Daily     metoprolol tartrate  25 mg Oral BID     predniSONE  40 mg Oral Daily     sodium chloride (PF)  3 mL Intracatheter Q8H     warfarin ANTICOAGULANT  5 mg Oral ONCE at 18:00       Data   Recent Labs   Lab  58 yo man, from [place] [facility] at [facility], with history of ESRD on HD (Tu, Th, Sa), HTN, idiopathic hypotension (on midodrine), DM2, BPH, and schizophrenia presents following a fall at [place] with complaint of R sided rib pain and head pain, admitted with posttraumatic subarachnoid hemorrhage. 58 yo man, from [place] [facility] at [facility], with history of ESRD on HD (Tu, Th, Sa), HTN, idiopathic hypotension (on midodrine), DM2, BPH, and schizophrenia presents following a fall at [place] with complaint of R sided rib pain and head pain, admitted with posttraumatic subarachnoid hemorrhage. Nephrology consulted for dialysis 02/13/22  0519 02/12/22  0615 02/11/22  0823 02/10/22  1708   WBC  --  9.2  --  8.9   HGB  --  9.7*  --  10.5*   MCV  --  85  --  88   PLT  --  253  --  135*   INR 2.46* 2.45* 2.60* 2.16*    137 136 138   POTASSIUM 5.0 5.1 5.5* 5.4*   CHLORIDE 107 110* 109* 109*   CO2 19* 21 18* 16*   BUN 72* 60* 44* 44*   CR 2.58* 2.52* 2.68* 2.95*   ANIONGAP 8 6 9 13   JOHN 9.5 9.5 8.8 9.1   * 114* 120 84   ALBUMIN  --   --   --  2.6*   PROTTOTAL  --   --   --  7.7   BILITOTAL  --   --   --  1.1*   ALKPHOS  --   --   --  76   ALT  --   --   --  <9   AST  --   --   --  27       Imaging:  No results found for this or any previous visit (from the past 24 hour(s)).

## 2022-02-14 ENCOUNTER — APPOINTMENT (OUTPATIENT)
Dept: PHYSICAL THERAPY | Facility: CLINIC | Age: 67
DRG: 641 | End: 2022-02-14
Attending: NURSE PRACTITIONER
Payer: COMMERCIAL

## 2022-02-14 LAB
ANION GAP SERPL CALCULATED.3IONS-SCNC: 4 MMOL/L (ref 3–14)
BUN SERPL-MCNC: 84 MG/DL (ref 7–30)
CALCIUM SERPL-MCNC: 9.4 MG/DL (ref 8.5–10.1)
CHLORIDE BLD-SCNC: 109 MMOL/L (ref 94–109)
CO2 SERPL-SCNC: 22 MMOL/L (ref 20–32)
CREAT SERPL-MCNC: 2.47 MG/DL (ref 0.66–1.25)
GFR SERPL CREATININE-BSD FRML MDRD: 28 ML/MIN/1.73M2
GLUCOSE BLD-MCNC: 106 MG/DL (ref 70–99)
INR PPP: 2.73 (ref 0.85–1.15)
POTASSIUM BLD-SCNC: 5.5 MMOL/L (ref 3.4–5.3)
SODIUM SERPL-SCNC: 135 MMOL/L (ref 133–144)

## 2022-02-14 PROCEDURE — 250N000013 HC RX MED GY IP 250 OP 250 PS 637: Performed by: INTERNAL MEDICINE

## 2022-02-14 PROCEDURE — 97110 THERAPEUTIC EXERCISES: CPT | Mod: GP | Performed by: PHYSICAL THERAPIST

## 2022-02-14 PROCEDURE — 99207 PR CDG-CODE CATEGORY CHANGED: CPT | Performed by: NURSE PRACTITIONER

## 2022-02-14 PROCEDURE — 99226 PR SUBSEQUENT OBSERVATION CARE,LEVEL III: CPT | Performed by: NURSE PRACTITIONER

## 2022-02-14 PROCEDURE — 97530 THERAPEUTIC ACTIVITIES: CPT | Mod: GP | Performed by: PHYSICAL THERAPIST

## 2022-02-14 PROCEDURE — 250N000013 HC RX MED GY IP 250 OP 250 PS 637: Performed by: NURSE PRACTITIONER

## 2022-02-14 PROCEDURE — G0378 HOSPITAL OBSERVATION PER HR: HCPCS

## 2022-02-14 PROCEDURE — 85610 PROTHROMBIN TIME: CPT | Performed by: NURSE PRACTITIONER

## 2022-02-14 PROCEDURE — 82310 ASSAY OF CALCIUM: CPT | Performed by: INTERNAL MEDICINE

## 2022-02-14 PROCEDURE — 120N000001 HC R&B MED SURG/OB

## 2022-02-14 PROCEDURE — 250N000013 HC RX MED GY IP 250 OP 250 PS 637: Performed by: FAMILY MEDICINE

## 2022-02-14 PROCEDURE — 250N000012 HC RX MED GY IP 250 OP 636 PS 637: Performed by: INTERNAL MEDICINE

## 2022-02-14 PROCEDURE — 36415 COLL VENOUS BLD VENIPUNCTURE: CPT | Performed by: INTERNAL MEDICINE

## 2022-02-14 RX ORDER — WARFARIN SODIUM 2 MG/1
4 TABLET ORAL
Status: COMPLETED | OUTPATIENT
Start: 2022-02-14 | End: 2022-02-14

## 2022-02-14 RX ORDER — DIPHENHYDRAMINE HCL 25 MG
50 CAPSULE ORAL EVERY 6 HOURS PRN
Status: DISCONTINUED | OUTPATIENT
Start: 2022-02-14 | End: 2022-01-01 | Stop reason: HOSPADM

## 2022-02-14 RX ADMIN — FEBUXOSTAT 40 MG: 40 TABLET, FILM COATED ORAL at 10:09

## 2022-02-14 RX ADMIN — WARFARIN SODIUM 4 MG: 2 TABLET ORAL at 18:23

## 2022-02-14 RX ADMIN — MICONAZOLE NITRATE: 20 POWDER TOPICAL at 22:17

## 2022-02-14 RX ADMIN — MICONAZOLE NITRATE: 20 POWDER TOPICAL at 10:12

## 2022-02-14 RX ADMIN — FENTANYL 1 PATCH: 25 PATCH, EXTENDED RELEASE TRANSDERMAL at 11:33

## 2022-02-14 RX ADMIN — PREDNISONE 30 MG: 5 TABLET ORAL at 10:09

## 2022-02-14 RX ADMIN — DIPHENHYDRAMINE HYDROCHLORIDE 50 MG: 25 CAPSULE ORAL at 10:09

## 2022-02-14 RX ADMIN — FERROUS SULFATE TAB 325 MG (65 MG ELEMENTAL FE) 325 MG: 325 (65 FE) TAB at 10:09

## 2022-02-14 RX ADMIN — METOPROLOL SUCCINATE 50 MG: 50 TABLET, EXTENDED RELEASE ORAL at 10:09

## 2022-02-14 RX ADMIN — DIPHENHYDRAMINE HYDROCHLORIDE 50 MG: 25 CAPSULE ORAL at 18:42

## 2022-02-14 NOTE — PROGRESS NOTES
"PRIMARY DIAGNOSIS: \"GENERIC\" NURSING  OUTPATIENT/OBSERVATION GOALS TO BE MET BEFORE DISCHARGE:  1. ADLs back to baseline: no      2. Activity and level of assistance: Up with maximum assistance. Consider SW and/or PT evaluation.     3. Pain status: Improved-controlled with oral pain medications.    4. Return to near baseline physical activity: No     Discharge Planner Nurse   Safe discharge environment identified: No  Barriers to discharge: Yes, awaiting TCU       Entered by: Meri Morin 02/14/2022 2:03 PM     Please review provider order for any additional goals.   Nurse to notify provider when observation goals have been met and patient is ready for discharge.  "

## 2022-02-14 NOTE — PHARMACY-ANTICOAGULATION SERVICE
Clinical Pharmacy - Warfarin Dosing Consult     Pharmacy has been consulted to manage this patient s warfarin therapy.  Indication: Atrial Fibrillation  Therapy Goal: INR 2-3  Provider/Team: Dr. Ben Hamlin  [facility]: [facility]  Warfarin Prior to Admission: Yes  Warfarin PTA Regimen: 7.5 mg Tue/Thur & 5 mg ROW  Recent documented change in oral intake/nutrition: Unknown    INR   Date Value Ref Range Status   02/14/2022 2.73 (H) 0.85 - 1.15 Final   02/13/2022 2.46 (H) 0.85 - 1.15 Final       Recommend warfarin 4 mg today. 20% dose decrease from home regimen with INR trending upward slightly. Pharmacy will monitor Panda Miranda daily and order warfarin doses to achieve specified goal.      Please contact pharmacy as soon as possible if the warfarin needs to be held for a procedure or if the warfarin goals change.

## 2022-02-14 NOTE — PROGRESS NOTES
"PRIMARY DIAGNOSIS: \"GENERIC\" NURSING  OUTPATIENT/OBSERVATION GOALS TO BE MET BEFORE DISCHARGE:  1. ADLs back to baseline: No    2. Activity and level of assistance: Up with 2 assist and room lift    3. Pain status: Chronic pain - back and shoulder pain from gout;    4. Return to near baseline physical activity: No     Discharge Planner Nurse   Safe discharge environment identified: Yes  Barriers to discharge: No       Entered by: Sabina Toussaint 02/14/2022 2:51 AM     Please review provider order for any additional goals.   Nurse to notify provider when observation goals have been met and patient is ready for discharge.    Pt denies need for pain med, has a fentanyl patch on back for his back pain.  "

## 2022-02-14 NOTE — PROGRESS NOTES
"PRIMARY DIAGNOSIS: \"GENERIC\" NURSING  OUTPATIENT/OBSERVATION GOALS TO BE MET BEFORE DISCHARGE:  1. ADLs back to baseline: No    2. Activity and level of assistance: Up with 2 assist and room lift    3. Pain status: Chronic pain - back and shoulder pain from gout;    4. Return to near baseline physical activity: No     Discharge Planner Nurse   Safe discharge environment identified: Yes  Barriers to discharge: No       Entered by: Sabina Toussaint 02/14/2022 7:33 AM     Please review provider order for any additional goals.   Nurse to notify provider when observation goals have been met and patient is ready for discharge.    Pt denies need for pain med, has a fentanyl patch on back for his back pain.  "

## 2022-02-14 NOTE — PROGRESS NOTES
"PRIMARY DIAGNOSIS: \"GENERIC\" NURSING  OUTPATIENT/OBSERVATION GOALS TO BE MET BEFORE DISCHARGE:  1. ADLs back to baseline: No    2. Activity and level of assistance: Up with 2 assist and room lift    3. Pain status: Chronic pain - back and shoulder pain from gout;    4. Return to near baseline physical activity: No     Discharge Planner Nurse   Safe discharge environment identified: Yes  Barriers to discharge: No       Entered by: Sabina Toussaint 02/13/2022 11:34 PM     Please review provider order for any additional goals.   Nurse to notify provider when observation goals have been met and patient is ready for discharge.  "

## 2022-02-14 NOTE — PROGRESS NOTES
[facility]    Medicine Progress Note - Hospitalist Service    Date of Admission:  2/11/2022    Assessment & Plan          Panda Miranda is a 66 year old male Panda Miranda is a 66 year old male who presented to the ED at [facility] on 2/10/2022 with generalized weakness and lower back pain. Patient has a medical history significant for morbid obesity with BMI of 49, CHF, CKD 3, A. fib, gout, complex recent medical history hospitalized at [facility] 1/27-1/30 for pyelonephritis, CHF, and COVID-19.  [facility] did not have any available beds and he was transferred to [facility] for further management.      Principal Problem:       Polyarticular gout    Muscle weakness (generalized)    Back pain  Assessment: Patient presented with lower back pain and generalized weakness. He has not been able to get out of bed at home and has been using a bowl as a bedpan. He states he was is in a lot of pain from polyarticular gout flare and so he has not been out of bed,. Recently taken off of colchicine due to CKD.  He had stopped taking Uloric on his own.  Now patient with gout flare in hands, right leg. Has some chronic pain in lower back managed with fentanyl patch. Patient notes pain is improved since he presented to ED initially. 2/14-Patient notes ongoing chronic pain in lower back which is at baseline. Notes gout pain is much improved. Continues to be weak  Plan:   - Continue treatment of gout with prednisone taper currently at 30 mg daily  - Due to CKD and renal function will need to stop colchicine due to toxicity risk, mild toxicity is a known risk  - Due to CKD and renal function will need to decrease Uloric to 40 mg daily  - Continue symptomatic treatment with fentanyl patch and as needed Percocet   - PT recommending TCU  - SW following for discharging planning     Active Problems:         Chronic atrial fibrillation with RVR (H)  Assessment: Patient with history of atrial fibrillation and is on  warfarin chronically. Patient in RVR with HR up to 130-140's at [facility]. Of note, patient had been on metoprolol 25 mg BID which was discontinued at time of discharge from prior hospitalization at [facility] due to hypotension. Suspect this was due to sepsis from pyelonephritis. Blood pressure has been stable. Patient received 1 dose of IV Toprol at [facility] and heart rate improved to 90's to low 100's. 2/14-Heart rate remains stable.   Plan:   - Continue Toprol XL 50 mg daily   - Will stop telemetry as patient has been stable for >48 hours  - Appreciate pharmacy assistance with dosing warfarin       Stage 3 chronic kidney disease (H)    Hyperkalemia  Assessment: Baseline creatinine around 2.6. Was 2.95 at time of initial presentation. Recheck today was 2.68. Patient with chronic hyperkalemia and is typically over 5. 2/14-Potassium today at 5.5  Plan:   - Continue renal diet but per patient and RD request will liberalize diet to regular diet with 2 g potassium and sodium restriction  - Continue to monitor        Infection due to 2019 novel coronavirus-Recent history January 2022  Assessment: Symptoms started early January per chart review.  Never needed any supplemental oxygen.  Cough has resolved. He is considered Covid recovered  Plan:   - No further acute intervention indicated        Chronic systolic congestive heart failure (H)  Assessment: Echocardiogram done January, 2022 showed EF of 45-50%. Also has known small perimembranous VSD with left to right shunting.   Plan:  - Continue Toprol XL as above   - Daily weights  - Strict I & O's       Open wound of lower leg-left lateral ankle, right shin  Assessment: Patient with approximate 2.5 cm wound to left lateral ankle which appears to be healing without signs of infection. 3 cm x 1 cm wound noted to right shin again without signs of infection. Known venous insufficiency and chronic venous stasis.   Plan:   - Wound care orders carried forward from [facility]  - WOC consult  "requested        TASHA (obstructive sleep apnea)  -Continue home CPAP       Diet: Combination Diet 2 gm K Diet; 2 gm NA Diet  Room Service    DVT Prophylaxis: Warfarin  Cheema Catheter: Not present  Central Lines: None  Cardiac Monitoring: None  Code Status: Full Code      Disposition Plan   Expected Discharge: 02/15/2022     Anticipated discharge location: inpatient rehabilitation facility    Delays:   TCU when available          The patient's care was discussed with the Attending Physician, Dr. Jamil, Care Coordinator/ and Patient.    Sanchez Duran NP  Hospitalist Service  [facility]  Securely message with the Vocera Web Console (learn more here)  Text page via [employer] Paging/Directory         Clinically Significant Risk Factors Present on Admission        # Hyperkalemia: K = 5.5 mmol/L (Ref range: 3.4 - 5.3 mmol/L) on admission, will monitor as appropriate       # Coagulation Defect: home medication list includes an anticoagulant medication    # Severe Obesity: Estimated body mass index is 49.4 kg/m  as calculated from the following:    Height as of 2/10/22: 1.994 m (6' 6.5\").    Weight as of 2/10/22: 196.4 kg (433 lb).      ______________________________________________________________________    Interval History   Patient notes chronic pain in lower back is tolerable. Notes decrease in gout related pain. Denies shortness of breath and patient is maintaining oxygen saturations above 90% on room air.  Denies nausea and is tolerating oral intake. He is afebrile and hemodynamically stable.     Data reviewed today: I reviewed all medications, new labs and imaging results over the last 24 hours.     Physical Exam   Vital Signs: Temp: 97.6  F (36.4  C) Temp src: Oral BP: 109/74 Pulse: 77   Resp: 18 SpO2: 98 % O2 Device: None (Room air)    Weight: 0 lbs 0 oz  Constitutional: awake, alert, cooperative, no apparent distress, and appears stated age  Respiratory: No increased work " of breathing, good air exchange, clear to auscultation bilaterally, no crackles or wheezing  Cardiovascular: irregularly irregular rhythm  GI: normal bowel sounds, non-distended and non-tender  Skin: chronic wound noted to left lateral ankle approximately 2.5 cm in diameter; 3 cm x 1 cm wound noted to right shin; chronic venous stasis changes noted to BLE  Musculoskeletal: no lower extremity pitting edema present  Neurologic: Awake, alert, oriented to name, place and time.   Neuropsychiatric: Normal mood and affect    Data   Recent Labs   Lab 02/14/22  0603 02/13/22  0519 02/12/22  0615 02/11/22  0823 02/10/22  1708   WBC  --   --  9.2  --  8.9   HGB  --   --  9.7*  --  10.5*   MCV  --   --  85  --  88   PLT  --   --  253  --  135*   INR 2.73* 2.46* 2.45*   < > 2.16*    134 137   < > 138   POTASSIUM 5.5* 5.0 5.1   < > 5.4*   CHLORIDE 109 107 110*   < > 109*   CO2 22 19* 21   < > 16*   BUN 84* 72* 60*   < > 44*   CR 2.47* 2.58* 2.52*   < > 2.95*   ANIONGAP 4 8 6   < > 13   JOHN 9.4 9.5 9.5   < > 9.1   * 120* 114*   < > 84   ALBUMIN  --   --   --   --  2.6*   PROTTOTAL  --   --   --   --  7.7   BILITOTAL  --   --   --   --  1.1*   ALKPHOS  --   --   --   --  76   ALT  --   --   --   --  <9   AST  --   --   --   --  27    < > = values in this interval not displayed.     No results found for this or any previous visit (from the past 24 hour(s)).  Medications     - MEDICATION INSTRUCTIONS -       Warfarin Therapy Reminder         febuxostat  40 mg Oral Daily     fentaNYL  25 mcg Transdermal Q72H     fentaNYL   Transdermal Q8H     ferrous sulfate  325 mg Oral Daily     metoprolol succinate ER  50 mg Oral Daily     miconazole   Topical BID     predniSONE  30 mg Oral Daily     sodium chloride (PF)  3 mL Intracatheter Q8H     warfarin ANTICOAGULANT  4 mg Oral ONCE at 18:00

## 2022-02-14 NOTE — PROGRESS NOTES
Care Management Follow Up    Length of Stay (days): 1    Expected Discharge Date: 02/14/2022     Concerns to be Addressed: discharge planning  3 level home-several stairs within the home.    Patient plan of care discussed at interdisciplinary rounds: Yes    Anticipated Discharge Disposition: Transitional Care  Disposition Comments: TCU Referrals pending    Anticipated Discharge Services: None    Anticipated Discharge DME:      Patient/family educated on Medicare website which has current facility and service quality ratings: yes    Education Provided on the Discharge Plan:      Patient/Family in Agreement with the Plan: yes    Referrals Placed by CM/SW: Internal Clinic Care Coordination,Post Acute Facilities    Private pay costs discussed: Not applicable    Additional Information:  The following facilities have declined due to patient exceeding bariatric weight limit.    -     [facility] (TCU) Details  Fax          [address] [phone]        -   [facility] - Referral Only (SNF) Details  Fax In Basket          [address] [phone]        -   [facility] (SNF) Details  Fax In Basket          [address] [address]          1525 -  Declined at   [facility] (SNF) Details  Fax            [address] [address]       Phone: [phone]       Fax: [phone]        Additional referrals sent to the following facilities that accept 400+ weight capacity.    [facility] [place] (Main Phone: [phone] Admissons Phone: [phone] Fax: [phone])     [facility] ( Main: [phone] Admissions: [phone] Fax: [phone])    [facility] (Admissions Phone: [phone] Main Phone: [phone] Fax: [phone])    [facility] (Admissions phone: [phone] Main  Phone: [phone] Fax: [phone])    [facility] (Phone: [phone] Admissions: [phone] or [phone] Fax: [phone])    [facility] (Main phone: [phone] Fax: [phone])    [facility] (main phone: [phone] Admissions: [phone] Fax: [phone]).     [facility] (Phone: [phone] Fax [phone]; [facility] [phone] Fax [phone])    [facility] (Main phone: [phone] Fax: [phone])    [facility] (Admissions phone: [phone] Main phone: [phone] Fax: [phone])    [facility] (Phone:[phone] Fax:[phone])    Email message left with [facility] admissions for [facility] regarding referral. - UPDATE:  [facility] no longer accepts bariatric patients over 400 #.   [facility] is a case by case basis, depending on staffing and acuity of other residents.    Voicemail left for Cara, [facility] admissions for all [facility] regarding referral.    MAGALIE Us  [facility] [phone]/ [place] [phone]  Care Management

## 2022-02-15 ENCOUNTER — HOSPITAL ENCOUNTER (INPATIENT)
Dept: WOUND CARE | Facility: CLINIC | Age: 67
DRG: 641 | End: 2022-02-15
Attending: FAMILY MEDICINE | Admitting: INTERNAL MEDICINE
Payer: COMMERCIAL

## 2022-02-15 ENCOUNTER — APPOINTMENT (OUTPATIENT)
Dept: PHYSICAL THERAPY | Facility: CLINIC | Age: 67
DRG: 641 | End: 2022-02-15
Attending: NURSE PRACTITIONER
Payer: COMMERCIAL

## 2022-02-15 LAB
ANION GAP SERPL CALCULATED.3IONS-SCNC: 6 MMOL/L (ref 3–14)
BUN SERPL-MCNC: 89 MG/DL (ref 7–30)
CALCIUM SERPL-MCNC: 8.7 MG/DL (ref 8.5–10.1)
CHLORIDE BLD-SCNC: 108 MMOL/L (ref 94–109)
CO2 SERPL-SCNC: 20 MMOL/L (ref 20–32)
CREAT SERPL-MCNC: 2.45 MG/DL (ref 0.66–1.25)
GFR SERPL CREATININE-BSD FRML MDRD: 28 ML/MIN/1.73M2
GLUCOSE BLD-MCNC: 116 MG/DL (ref 70–99)
HOLD SPECIMEN: NORMAL
INR PPP: 2.16 (ref 0.85–1.15)
POTASSIUM BLD-SCNC: 5.5 MMOL/L (ref 3.4–5.3)
SODIUM SERPL-SCNC: 134 MMOL/L (ref 133–144)

## 2022-02-15 PROCEDURE — 250N000012 HC RX MED GY IP 250 OP 636 PS 637: Performed by: INTERNAL MEDICINE

## 2022-02-15 PROCEDURE — 120N000001 HC R&B MED SURG/OB

## 2022-02-15 PROCEDURE — 80048 BASIC METABOLIC PNL TOTAL CA: CPT | Performed by: NURSE PRACTITIONER

## 2022-02-15 PROCEDURE — 250N000013 HC RX MED GY IP 250 OP 250 PS 637: Performed by: NURSE PRACTITIONER

## 2022-02-15 PROCEDURE — 250N000013 HC RX MED GY IP 250 OP 250 PS 637: Performed by: FAMILY MEDICINE

## 2022-02-15 PROCEDURE — G0463 HOSPITAL OUTPT CLINIC VISIT: HCPCS

## 2022-02-15 PROCEDURE — 85610 PROTHROMBIN TIME: CPT | Performed by: NURSE PRACTITIONER

## 2022-02-15 PROCEDURE — G0378 HOSPITAL OBSERVATION PER HR: HCPCS

## 2022-02-15 PROCEDURE — 36415 COLL VENOUS BLD VENIPUNCTURE: CPT | Performed by: NURSE PRACTITIONER

## 2022-02-15 PROCEDURE — 250N000013 HC RX MED GY IP 250 OP 250 PS 637: Performed by: INTERNAL MEDICINE

## 2022-02-15 PROCEDURE — 97530 THERAPEUTIC ACTIVITIES: CPT | Mod: GP | Performed by: PHYSICAL THERAPIST

## 2022-02-15 PROCEDURE — 99226 PR SUBSEQUENT OBSERVATION CARE,LEVEL III: CPT | Performed by: NURSE PRACTITIONER

## 2022-02-15 PROCEDURE — 99207 PR CDG-CODE CATEGORY CHANGED: CPT | Performed by: NURSE PRACTITIONER

## 2022-02-15 PROCEDURE — 97140 MANUAL THERAPY 1/> REGIONS: CPT | Mod: GP | Performed by: PHYSICAL THERAPIST

## 2022-02-15 RX ADMIN — WARFARIN SODIUM 7.5 MG: 5 TABLET ORAL at 18:30

## 2022-02-15 RX ADMIN — FERROUS SULFATE TAB 325 MG (65 MG ELEMENTAL FE) 325 MG: 325 (65 FE) TAB at 08:14

## 2022-02-15 RX ADMIN — MICONAZOLE NITRATE: 20 POWDER TOPICAL at 08:13

## 2022-02-15 RX ADMIN — FEBUXOSTAT 40 MG: 40 TABLET, FILM COATED ORAL at 09:12

## 2022-02-15 RX ADMIN — DIPHENHYDRAMINE HYDROCHLORIDE 50 MG: 25 CAPSULE ORAL at 16:39

## 2022-02-15 RX ADMIN — DIPHENHYDRAMINE HYDROCHLORIDE 50 MG: 25 CAPSULE ORAL at 04:45

## 2022-02-15 RX ADMIN — METOPROLOL SUCCINATE 50 MG: 50 TABLET, EXTENDED RELEASE ORAL at 08:13

## 2022-02-15 RX ADMIN — PREDNISONE 30 MG: 5 TABLET ORAL at 08:14

## 2022-02-15 NOTE — PHARMACY-ANTICOAGULATION SERVICE
Clinical Pharmacy - Warfarin Dosing Consult     Pharmacy has been consulted to manage this patient s warfarin therapy.  Indication: Atrial Fibrillation  Therapy Goal: INR 2-3  Provider/Team: Dr. Ben Hamlin  [facility]: [facility]  Warfarin Prior to Admission: Yes  Warfarin PTA Regimen: 7.5 mg Tue/Thur & 5 mg ROW  Recent documented change in oral intake/nutrition: Unknown    INR   Date Value Ref Range Status   02/15/2022 2.16 (H) 0.85 - 1.15 Final   02/14/2022 2.73 (H) 0.85 - 1.15 Final       Recommend warfarin 7.5 mg today to continue home dose.  Pharmacy will monitor Panda Miranda daily and order warfarin doses to achieve specified goal.      Please contact pharmacy as soon as possible if the warfarin needs to be held for a procedure or if the warfarin goals change.      Thank you,   Leia Gallegos [facility] on 2/15/2022 at 9:20 AM

## 2022-02-15 NOTE — PROGRESS NOTES
"PRIMARY DIAGNOSIS: \"GENERIC\" NURSING  OUTPATIENT/OBSERVATION GOALS TO BE MET BEFORE DISCHARGE:  1. ADLs back to baseline: No    2. Activity and level of assistance: Up with standby assistance.    3. Pain status: Pain free.    4. Return to near baseline physical activity: No.  Using walker here.  Baseline uses cane.  PT following.       Discharge Planner Nurse   Safe discharge environment identified: No  Barriers to discharge: Yes, TCU referrals pending         Entered by: Jaime Pipkin 02/15/2022 3:34 PM     Please review provider order for any additional goals.   Nurse to notify provider when observation goals have been met and patient is ready for discharge.    WOC RN here today and dressings changed.    "

## 2022-02-15 NOTE — PROGRESS NOTES
No reports of pain this shift, Pt reporting being itchy all over, benadryl given x1. Pt using urinal at bedside with some incontinence. Will continue to monitor.

## 2022-02-15 NOTE — PROGRESS NOTES
[facility]    Medicine Progress Note - Hospitalist Service    Date of Admission:  2/11/2022    Assessment & Plan          Panda Miranda is a 66 year old male Panda Miranda is a 66 year old male who presented to the ED at [facility] on 2/10/2022 with generalized weakness and lower back pain. Patient has a medical history significant for morbid obesity with BMI of 49, CHF, CKD 3, A. fib, gout, complex recent medical history hospitalized at [facility] 1/27-1/30 for pyelonephritis, CHF, and COVID-19.  [facility] did not have any available beds and he was transferred to [facility] for further management.      Principal Problem:       Polyarticular gout    Muscle weakness (generalized)    Back pain  Assessment: Patient presented with lower back pain and generalized weakness. He has not been able to get out of bed at home and has been using a bowl as a bedpan. He states he was is in a lot of pain from polyarticular gout flare and so he has not been out of bed,. Recently taken off of colchicine due to CKD.  He had stopped taking Uloric on his own.  Now patient with gout flare in hands, right leg. Has some chronic pain in lower back managed with fentanyl patch. Patient notes pain is improved since he presented to ED initially. 2/15-Patient notes ongoing chronic pain in lower back which is at baseline. Notes gout pain is much improved.   Plan:   - Continue treatment of gout with prednisone taper currently at 30 mg daily  - Due to CKD and renal function will need to stop colchicine due to toxicity risk, mild toxicity is a known risk  - Due to CKD and renal function will need to decrease Uloric to 40 mg daily  - Continue symptomatic treatment with fentanyl patch and as needed Percocet   - PT recommending TCU versus home with assist  - SW following for discharging planning     Active Problems:         Chronic atrial fibrillation with RVR (H)  Assessment: Patient with history of atrial fibrillation and is on  warfarin chronically. Patient in RVR with HR up to 130-140's at [facility]. Of note, patient had been on metoprolol 25 mg BID which was discontinued at time of discharge from prior hospitalization at [facility] due to hypotension. Suspect this was due to sepsis from pyelonephritis. Blood pressure has been stable. Patient received 1 dose of IV Toprol at [facility] and heart rate improved to 90's to low 100's. 2/15-Heart rate remains stable.   Plan:   - Continue Toprol XL 50 mg daily   - Telemetry stopped as patient has been stable for >48 hours  - Appreciate pharmacy assistance with dosing warfarin       Stage 3 chronic kidney disease (H)    Hyperkalemia  Assessment: Baseline creatinine around 2.6. Was 2.95 at time of initial presentation. Recheck today was 2.68. Patient with chronic hyperkalemia and is typically over 5. 2/15-Potassium today at 5.5  Plan:   - Continue renal diet but per patient and RD request will liberalize diet to regular diet with 2 g potassium and sodium restriction  - Continue to monitor        Infection due to 2019 novel coronavirus-Recent history January 2022  Assessment: Symptoms started early January per chart review.  Never needed any supplemental oxygen.  Cough has resolved. He is considered Covid recovered  Plan:   - No further acute intervention indicated        Chronic systolic congestive heart failure (H)  Assessment: Echocardiogram done January, 2022 showed EF of 45-50%. Also has known small perimembranous VSD with left to right shunting.   Plan:  - Continue Toprol XL as above   - Daily weights  - Strict I & O's       Open wound of lower leg-left lateral ankle, right shin  Assessment: Patient with approximate 2.5 cm wound to left lateral ankle which appears to be healing without signs of infection. 3 cm x 1 cm wound noted to right shin again without signs of infection. Known venous insufficiency and chronic venous stasis.   Plan:   - Wound care orders carried forward from [facility]  - WOC consult requested  "       TASHA (obstructive sleep apnea)  -Continue home CPAP     Diet: Combination Diet 2 gm K Diet; 2 gm NA Diet  Room Service    DVT Prophylaxis: Warfarin  Cheema Catheter: Not present  Central Lines: None  Cardiac Monitoring: None  Code Status: Full Code      Disposition Plan   Expected Discharge: 02/15/2022     Anticipated discharge location: inpatient rehabilitation facility    Delays: Placement       The patient's care was discussed with the Attending Physician, Dr. Jamil and Patient.    Sanchez Duran NP  Hospitalist Service  [facility]  Securely message with the Vocera Web Console (learn more here)  Text page via [employer] Paging/Directory         Clinically Significant Risk Factors Present on Admission        # Hyperkalemia: K = 5.5 mmol/L (Ref range: 3.4 - 5.3 mmol/L) on admission, will monitor as appropriate       # Coagulation Defect: home medication list includes an anticoagulant medication    # Severe Obesity: Estimated body mass index is 49.86 kg/m  as calculated from the following:    Height as of 2/10/22: 1.994 m (6' 6.5\").    Weight as of this encounter: 198.2 kg (437 lb).      ______________________________________________________________________    Interval History   Patient seen sitting up in bed with no new complaints. Remains medically stable. Chronic pain tolerable. Gout pain much improved. Afebrile and hemodynamically stable.     Data reviewed today: I reviewed all medications, new labs and imaging results over the last 24 hours.     Physical Exam   Vital Signs: Temp: 97  F (36.1  C) Temp src: Axillary BP: 124/67 Pulse: 75   Resp: 16 SpO2: 98 % O2 Device: None (Room air)    Weight: 437 lbs 0 oz  Constitutional: awake, alert, cooperative, no apparent distress, and appears stated age  Respiratory: No increased work of breathing, good air exchange, clear to auscultation bilaterally, no crackles or wheezing  Cardiovascular: irregularly irregular rhythm  GI: normal bowel " sounds, non-distended and non-tender  Skin: chronic wound noted to left lateral ankle approximately 2.5 cm in diameter; 3 cm x 1 cm wound noted to right shin; chronic venous stasis changes noted to BLE  Musculoskeletal: no lower extremity pitting edema present  Neurologic: Awake, alert, oriented to name, place and time.   Neuropsychiatric: Normal mood and affect    Data   Recent Labs   Lab 02/15/22  0600 02/14/22  0603 02/13/22  0519 02/12/22  0615 02/11/22  0823 02/10/22  1708   WBC  --   --   --  9.2  --  8.9   HGB  --   --   --  9.7*  --  10.5*   MCV  --   --   --  85  --  88   PLT  --   --   --  253  --  135*   INR 2.16* 2.73* 2.46* 2.45*   < > 2.16*    135 134 137   < > 138   POTASSIUM 5.5* 5.5* 5.0 5.1   < > 5.4*   CHLORIDE 108 109 107 110*   < > 109*   CO2 20 22 19* 21   < > 16*   BUN 89* 84* 72* 60*   < > 44*   CR 2.45* 2.47* 2.58* 2.52*   < > 2.95*   ANIONGAP 6 4 8 6   < > 13   JOHN 8.7 9.4 9.5 9.5   < > 9.1   * 106* 120* 114*   < > 84   ALBUMIN  --   --   --   --   --  2.6*   PROTTOTAL  --   --   --   --   --  7.7   BILITOTAL  --   --   --   --   --  1.1*   ALKPHOS  --   --   --   --   --  76   ALT  --   --   --   --   --  <9   AST  --   --   --   --   --  27    < > = values in this interval not displayed.     No results found for this or any previous visit (from the past 24 hour(s)).  Medications     - MEDICATION INSTRUCTIONS -       Warfarin Therapy Reminder         febuxostat  40 mg Oral Daily     fentaNYL  25 mcg Transdermal Q72H     fentaNYL   Transdermal Q8H     ferrous sulfate  325 mg Oral Daily     metoprolol succinate ER  50 mg Oral Daily     miconazole   Topical BID     predniSONE  30 mg Oral Daily     sodium chloride (PF)  3 mL Intracatheter Q8H     warfarin ANTICOAGULANT  7.5 mg Oral ONCE at 18:00

## 2022-02-15 NOTE — PROGRESS NOTES
Reason For Visit: Panda Miranda, 66 year old male, seen for a WOC consultation to evaluate and treat bilateral lower extremity wound. Patient was admitted on 2/11/22 for polyariticular gout, muscle weakness and back pain.  No one is is present with patient in the room.  Patient is A&Ox4, pleasant upon interaction.     History: Pt presented to the ED at [facility] on 2/10/2022 with generalized weakness and lower back pain. [facility] did not have any available beds and he was transferred to [facility] for further management.   Pt was noted to have wounds on his left lateral lower leg and right pretibial lower leg.  He was seen by woc nurse at [facility] and orders started as listed in current plan of treatement.  Compression was also recommended at that time.  Today during assessment discussed origin and length of time the wounds have been present.  Pt states the wound originated due to poor return blood flow from his legs cur to his heart condition.  He normally gets cares out of a wound clinic in the [facility] which includes compression.  He states he has had the two current wounds for years and will often have small minor wounds on the lower leg due to itching.  He states the primary dressing he has used is Silvercel.  The left lateral ankle wound at one time he reports was nearly to the bone level but no osteomyelitis history.    Past medial history includes: A fib, CKD, 1/2022 Covid 19 infection without need for supplemental oxygen, chronic systolic congestive hear failure, TASHA, morbid obesity, complex recent medical history hospitalized at [facility] 1/27-1/30 for pyelonephritis.  Chronic venous hypertension.    Personal/social history: Pt lives independently in the [place] area.    Objective:   Physical appearance: alert and oriented  Mobility: limited due to obesity, not fully assessed today.  Current treatment plan: Right pretibial lower leg, Mepilex gentle adhesive foam dressing.  Left lateral ankle, Silvercel to the wound  bed covered/secured with Mepilex gentle adhesive foam dressing.  Last changed: yesterday  Drainage: Right pretibial, small amounts serosanguinous.  Left lateral ankle, small to moderate amounts serosanguinous.    Removal of dressing reveals See Assessment for details.     Wound #1 Right pretibial lower leg, venous stasis ulcer.  Stage/tissue depth: partial thickness, see Assessment for details.   3 cm L x 1 cm W x 0 cm D  Tunneling: none  Undermining: none  Wound bed type/amount: 100 % red nongranular tissue; not fluctuant  Wound Edges: flush with wound bed and surrounding skin  Periwound: firm nonpitting woody edema  Odor: no  Pain: denies due to peripheral neuropathy    Photo from today's visit 2/15/22, initial woc nurse in patient assessment.    Wound #2 Left lateral lower leg/ankle, venous stasis ulcer  Stage/tissue depth: full thickness  3.2 cm L x 2.4 cm W x 0.1 cm D  Tunneling: none  Undermining: none  Wound bed type/amount: approximately 30 % red nongranular and 70 % yellow slough; Not fluctuant  Wound Edges: open   Periwound: firm nonpitting woody edema  Odor: no  Pain: denies due to peripheral neuropathy    Photo from today's visit 2/15/22, initial woc nurse in patient assessment.    Dorsalis Pedal Pulse: palpable: NA doppler: NA phasic  Hair growth: none noted below the knee's bilaterally  Capillary Refill: 3 seconds  Feet/toes color: normal skin tone, some dry flaky areas  Nails: not assessed this visit  R Leg: Edema nonpitting firm woody edema. Ankle circumference NA cm. Calf circumference Na cm.  L Leg: Edema nonpitting firm woody edema. Ankle circumference Na cm. Calf circumference Na cm.    Mobility: lmited  Current offloading/footwear: not assessed this visit  Sensation: peripheral neuropathy, without diabetes  HgbA1C: NA Date: NA as pt does not have a diagnosis of diabetes  Checks Blood Glucose?:  NA Average Readings: NA  Other callousing/areas of concern: bilateral lower leg has scattered areas of  healing scratches small dots all dry, noted most distinctly on the right distal lower leg.    Photo from today's visit 2/15/22, initial woc nurse in patient assessment.    Diet: Renal  Smoking: no    Discussed: etiology of wound (Venous stasis ulcers), pathophysiology and patient specific goals for wound healing.   Education: Role of woc nurse in the hospital setting.  Rational for current cares with current wound status.  Ration to resume contact with his wound clinic post hospitalization as compression here at [facility] is not normally applied in patient unless it is lymphedema wraps.      Goals of Wound Healing:   - Transition from open to closed stage of healing  - Maintain moist environment, with Silvercel and or Mepilex  - Control exudate with Silvercel and or Mepilex  - Autolytic debridement of with Silvercel and Mepilex to the left lateral ankle slough.  - Protect wound from outside environment, with Mepilex  - Antimicrobial Silvercel to the left lateral lower leg/ankle wound  - Pack open space,with Silvercel to the left lateral lower leg/ankle wound  - Promote healing by secondary intention for complete closure of wound, for the left lateral lower leg/ankle wound  - Offloading/pressure reduction, NA    Assessment:  Pt has two distinct wounds on the lower legs with some scattered minor full thickness dots from scratching.  Right pretibial lower leg, wound does appear partial thickness but note there is some periwound scar tissue present.  Pt states the left ankle wound was very deep but not the right pretibial.  Left lateral lower leg/ankle, is a full thickness wound based on pt's past description and slough present in the wound bed today.    The edema in the lower legs and feet is all firm woody nonpitting edema.  Lower legs and feet have very dry skin with scattered areas of hyperkeratotic tissue present.  No signs of infection in the lower leg.  DP pulses are strong but feet are cool to the touch, cap  refill 3 seconds.    Barriers to wound healing:   Poor nutrition: inadequate supply of protein, carbohydrates, fatty acids, and trace elements essential for all phases of wound healing.  Pt is on renal diet, states appetite is fair.  Reduced Blood Supply: inadequate perfusion to heal wound, appears arterial flow is adequate.  Medication: on immuno suppressors for gout  Chemotherapy: suppresses the immune system and inflammatory response  Radiotherapy: increases production of free radical which damage cells  Psychological stress: none ntoed  Obesity: decreases tissue perfusion  Infection: prolongs inflammatory phase, uses vital nutrients, impairs epithelialization and releases toxins  Underlying Disease: Chronic venous hypertension and CHF.  Maceration: reduces wound tensile strength and inhibits epithelial migration, none noted  Patient compliance, appear motivated to heal, knowledgeable of cares done in the past and course of healing.  Unrelieved pressure, NA  Immobility, present   Substance abuse: NA    Plan:  Current plan of care which was initially made at [facility] is appropriate.  Cleansing bilateral lower leg wounds with soap and water, saline or a no rinse dermal cleanser like MicroKlenz and dry well.  For the right pretibial lower leg, cover with a 4x4 inch Mepilex boarder flex dressing.  For the left lateral lower leg/ankle, apply cut to fit piece of Silvercel silver calcium alginate to the wound bed, cover/secure with a 4x4 inch Mepilex boarder flex dressing.  Change dressing once daily and prn for drainage control.    For any minor scratches of the lower legs cover with Mepilex if drainage is noted, otherwise ok to leave open to air.    Interventions to Barriers:  As listed in Plan    Topical care to right pretibial lower leg : Cleanse with saline and gauze, pat dry. Fill wound base with NA. Cover with Mepilex boarder flex. Secure with NA. Change daily.    Topical care to left lateral lower leg/ankle :  Cleanse with saline and gauze, pat dry. Fill wound base with Silvercel. Cover with Mepilex boarder flex. Secure with NA. Change daily.  Offloading: NA  Additional recommendations: Resume cares via wound clinic post discharge with compression.  Cares provided as listed above.    Discussed plan of care with patient and charge nurse Linda. Teaching done with patient and charge nurse Linda for dressing changes; pt will have cares performed by staff while hospitalized.    Discharge instructions updated to include:  Recommend to resume cares with pt's pre existing wound clinic in the [facility] for compression and wound cares.    Supplies: Hospital stock of Mepilex, has two sheets of Silvercel in the room today.    WOC Return visit: TBD, none set at this time  Nursing to notify Provider and WOC Nurse if wound deteriorates.    Verbal, written, & demonstrative education provided.  Face to face time (excluding procedure): approximately 20 minutes.  Procedure: NA  Care plan was not changed.    [phone]

## 2022-02-15 NOTE — PROGRESS NOTES
Care Management Follow Up    Length of Stay (days): 1    Expected Discharge Date: 02/15/2022     Concerns to be Addressed: discharge planning  3 level home-several stairs within the home.    Patient plan of care discussed at interdisciplinary rounds: Yes    Anticipated Discharge Disposition: Transitional Care  Disposition Comments: TCU Referrals pending  Anticipated Discharge Services: None  Anticipated Discharge DME:      Patient/family educated on Medicare website which has current facility and service quality ratings: yes    Education Provided on the Discharge Plan:      Patient/Family in Agreement with the Plan: yes    Referrals Placed by CM/SW: Internal Clinic Care Coordination,Post Acute Facilities    Private pay costs discussed: Not applicable    Additional Information:  Patient declined at [facility] (Main Phone: [phone] Admission Phone: [phone] Fax: [phone]) - No Beds    MAGALIE Us  [facility] [phone]/ [place] [phone]  Care Management

## 2022-02-16 ENCOUNTER — APPOINTMENT (OUTPATIENT)
Dept: PHYSICAL THERAPY | Facility: CLINIC | Age: 67
DRG: 641 | End: 2022-02-16
Attending: NURSE PRACTITIONER
Payer: COMMERCIAL

## 2022-02-16 PROBLEM — N18.4 CKD (CHRONIC KIDNEY DISEASE) STAGE 4, GFR 15-29 ML/MIN (H): Status: ACTIVE | Noted: 2022-02-16

## 2022-02-16 LAB
ANION GAP SERPL CALCULATED.3IONS-SCNC: 6 MMOL/L (ref 3–14)
ANION GAP SERPL CALCULATED.3IONS-SCNC: 8 MMOL/L (ref 3–14)
BASE EXCESS BLDA CALC-SCNC: -6.5 MMOL/L (ref -9–1.8)
BASE EXCESS BLDV CALC-SCNC: -7.2 MMOL/L (ref -7.7–1.9)
BUN SERPL-MCNC: 91 MG/DL (ref 7–30)
BUN SERPL-MCNC: 93 MG/DL (ref 7–30)
CALCIUM SERPL-MCNC: 9.3 MG/DL (ref 8.5–10.1)
CALCIUM SERPL-MCNC: 9.5 MG/DL (ref 8.5–10.1)
CHLORIDE BLD-SCNC: 106 MMOL/L (ref 94–109)
CHLORIDE BLD-SCNC: 108 MMOL/L (ref 94–109)
CO2 SERPL-SCNC: 20 MMOL/L (ref 20–32)
CO2 SERPL-SCNC: 21 MMOL/L (ref 20–32)
CREAT SERPL-MCNC: 2.29 MG/DL (ref 0.66–1.25)
CREAT SERPL-MCNC: 2.3 MG/DL (ref 0.66–1.25)
CREAT UR-MCNC: 100 MG/DL
FRACT EXCRET NA UR+SERPL-RTO: 0.4 %
GFR SERPL CREATININE-BSD FRML MDRD: 31 ML/MIN/1.73M2
GFR SERPL CREATININE-BSD FRML MDRD: 31 ML/MIN/1.73M2
GLUCOSE BLD-MCNC: 107 MG/DL (ref 70–99)
GLUCOSE BLD-MCNC: 98 MG/DL (ref 70–99)
GLUCOSE BLDC GLUCOMTR-MCNC: 105 MG/DL (ref 70–99)
GLUCOSE BLDC GLUCOMTR-MCNC: 132 MG/DL (ref 70–99)
GLUCOSE BLDC GLUCOMTR-MCNC: 154 MG/DL (ref 70–99)
GLUCOSE BLDC GLUCOMTR-MCNC: 81 MG/DL (ref 70–99)
GLUCOSE BLDC GLUCOMTR-MCNC: 88 MG/DL (ref 70–99)
HCO3 BLD-SCNC: 19 MMOL/L (ref 21–28)
HCO3 BLDV-SCNC: 21 MMOL/L (ref 21–28)
INR PPP: 2.22 (ref 0.85–1.15)
O2/TOTAL GAS SETTING VFR VENT: 21 %
O2/TOTAL GAS SETTING VFR VENT: 21 %
PCO2 BLD: 39 MM HG (ref 35–45)
PCO2 BLDV: 50 MM HG (ref 40–50)
PH BLD: 7.3 [PH] (ref 7.35–7.45)
PH BLDV: 7.22 [PH] (ref 7.32–7.43)
PO2 BLD: 96 MM HG (ref 80–105)
PO2 BLDV: 40 MM HG (ref 25–47)
POTASSIUM BLD-SCNC: 5.4 MMOL/L (ref 3.4–5.3)
POTASSIUM BLD-SCNC: 6.1 MMOL/L (ref 3.4–5.3)
POTASSIUM BLD-SCNC: 6.3 MMOL/L (ref 3.4–5.3)
POTASSIUM BLD-SCNC: 7.3 MMOL/L (ref 3.4–5.3)
SODIUM SERPL-SCNC: 134 MMOL/L (ref 133–144)
SODIUM SERPL-SCNC: 135 MMOL/L (ref 133–144)
SODIUM UR-SCNC: 25 MMOL/L

## 2022-02-16 PROCEDURE — 36600 WITHDRAWAL OF ARTERIAL BLOOD: CPT

## 2022-02-16 PROCEDURE — 84132 ASSAY OF SERUM POTASSIUM: CPT | Performed by: FAMILY MEDICINE

## 2022-02-16 PROCEDURE — 36415 COLL VENOUS BLD VENIPUNCTURE: CPT | Performed by: NURSE PRACTITIONER

## 2022-02-16 PROCEDURE — 250N000013 HC RX MED GY IP 250 OP 250 PS 637: Performed by: INTERNAL MEDICINE

## 2022-02-16 PROCEDURE — 96375 TX/PRO/DX INJ NEW DRUG ADDON: CPT

## 2022-02-16 PROCEDURE — 258N000003 HC RX IP 258 OP 636: Performed by: FAMILY MEDICINE

## 2022-02-16 PROCEDURE — 96374 THER/PROPH/DIAG INJ IV PUSH: CPT

## 2022-02-16 PROCEDURE — 250N000013 HC RX MED GY IP 250 OP 250 PS 637: Performed by: FAMILY MEDICINE

## 2022-02-16 PROCEDURE — G0378 HOSPITAL OBSERVATION PER HR: HCPCS

## 2022-02-16 PROCEDURE — 250N000011 HC RX IP 250 OP 636: Performed by: FAMILY MEDICINE

## 2022-02-16 PROCEDURE — 36415 COLL VENOUS BLD VENIPUNCTURE: CPT | Performed by: FAMILY MEDICINE

## 2022-02-16 PROCEDURE — 97530 THERAPEUTIC ACTIVITIES: CPT | Mod: GP | Performed by: PHYSICAL THERAPIST

## 2022-02-16 PROCEDURE — 82803 BLOOD GASES ANY COMBINATION: CPT | Performed by: FAMILY MEDICINE

## 2022-02-16 PROCEDURE — 99233 SBSQ HOSP IP/OBS HIGH 50: CPT | Performed by: FAMILY MEDICINE

## 2022-02-16 PROCEDURE — 97110 THERAPEUTIC EXERCISES: CPT | Mod: GP | Performed by: PHYSICAL THERAPIST

## 2022-02-16 PROCEDURE — 250N000013 HC RX MED GY IP 250 OP 250 PS 637: Performed by: NURSE PRACTITIONER

## 2022-02-16 PROCEDURE — 250N000012 HC RX MED GY IP 250 OP 636 PS 637: Performed by: FAMILY MEDICINE

## 2022-02-16 PROCEDURE — 250N000012 HC RX MED GY IP 250 OP 636 PS 637: Performed by: INTERNAL MEDICINE

## 2022-02-16 PROCEDURE — 258N000001 HC RX 258: Performed by: FAMILY MEDICINE

## 2022-02-16 PROCEDURE — 84300 ASSAY OF URINE SODIUM: CPT | Performed by: FAMILY MEDICINE

## 2022-02-16 PROCEDURE — 82310 ASSAY OF CALCIUM: CPT | Performed by: FAMILY MEDICINE

## 2022-02-16 PROCEDURE — 80048 BASIC METABOLIC PNL TOTAL CA: CPT | Performed by: NURSE PRACTITIONER

## 2022-02-16 PROCEDURE — 85610 PROTHROMBIN TIME: CPT | Performed by: NURSE PRACTITIONER

## 2022-02-16 PROCEDURE — 250N000009 HC RX 250: Performed by: FAMILY MEDICINE

## 2022-02-16 PROCEDURE — 120N000001 HC R&B MED SURG/OB

## 2022-02-16 PROCEDURE — 36416 COLLJ CAPILLARY BLOOD SPEC: CPT | Performed by: FAMILY MEDICINE

## 2022-02-16 RX ORDER — FUROSEMIDE 10 MG/ML
40 INJECTION INTRAMUSCULAR; INTRAVENOUS ONCE
Status: COMPLETED | OUTPATIENT
Start: 2022-02-17 | End: 2022-02-17

## 2022-02-16 RX ORDER — WARFARIN SODIUM 5 MG/1
5 TABLET ORAL
Status: COMPLETED | OUTPATIENT
Start: 2022-02-16 | End: 2022-02-16

## 2022-02-16 RX ORDER — CALCIUM GLUCONATE 20 MG/ML
1 INJECTION, SOLUTION INTRAVENOUS ONCE
Status: COMPLETED | OUTPATIENT
Start: 2022-02-16 | End: 2022-02-16

## 2022-02-16 RX ORDER — CALCIUM GLUCONATE 94 MG/ML
1 INJECTION, SOLUTION INTRAVENOUS ONCE
Status: DISCONTINUED | OUTPATIENT
Start: 2022-02-16 | End: 2022-02-16 | Stop reason: CLARIF

## 2022-02-16 RX ORDER — CALCIUM GLUCONATE 94 MG/ML
1 INJECTION, SOLUTION INTRAVENOUS ONCE
Status: COMPLETED | OUTPATIENT
Start: 2022-02-16 | End: 2022-02-16

## 2022-02-16 RX ORDER — DEXTROSE MONOHYDRATE 25 G/50ML
25 INJECTION, SOLUTION INTRAVENOUS ONCE
Status: COMPLETED | OUTPATIENT
Start: 2022-02-16 | End: 2022-02-16

## 2022-02-16 RX ORDER — SODIUM CHLORIDE 9 MG/ML
INJECTION, SOLUTION INTRAVENOUS CONTINUOUS
Status: DISCONTINUED | OUTPATIENT
Start: 2022-02-16 | End: 2022-02-17

## 2022-02-16 RX ORDER — CALCIUM GLUCONATE 20 MG/ML
1 INJECTION, SOLUTION INTRAVENOUS ONCE
Status: COMPLETED | OUTPATIENT
Start: 2022-02-16 | End: 2022-02-17

## 2022-02-16 RX ORDER — SODIUM POLYSTYRENE SULFONATE 15 G/60ML
30 SUSPENSION ORAL; RECTAL ONCE
Status: COMPLETED | OUTPATIENT
Start: 2022-02-16 | End: 2022-02-16

## 2022-02-16 RX ORDER — NICOTINE POLACRILEX 4 MG
15-30 LOZENGE BUCCAL
Status: DISCONTINUED | OUTPATIENT
Start: 2022-02-16 | End: 2022-01-01 | Stop reason: HOSPADM

## 2022-02-16 RX ORDER — DEXTROSE MONOHYDRATE 25 G/50ML
25 INJECTION, SOLUTION INTRAVENOUS ONCE
Status: COMPLETED | OUTPATIENT
Start: 2022-02-16 | End: 2022-02-17

## 2022-02-16 RX ORDER — DEXTROSE MONOHYDRATE 25 G/50ML
25-50 INJECTION, SOLUTION INTRAVENOUS
Status: DISCONTINUED | OUTPATIENT
Start: 2022-02-16 | End: 2022-01-01 | Stop reason: HOSPADM

## 2022-02-16 RX ADMIN — METOPROLOL SUCCINATE 50 MG: 50 TABLET, EXTENDED RELEASE ORAL at 08:12

## 2022-02-16 RX ADMIN — DEXTROSE MONOHYDRATE 25 G: 500 INJECTION PARENTERAL at 19:59

## 2022-02-16 RX ADMIN — SODIUM POLYSTYRENE SULFONATE 30 G: 15 SUSPENSION ORAL; RECTAL at 19:00

## 2022-02-16 RX ADMIN — DEXTROSE MONOHYDRATE 25 G: 500 INJECTION PARENTERAL at 09:47

## 2022-02-16 RX ADMIN — WARFARIN SODIUM 5 MG: 5 TABLET ORAL at 18:04

## 2022-02-16 RX ADMIN — DEXTROSE MONOHYDRATE 300 ML: 100 INJECTION, SOLUTION INTRAVENOUS at 09:43

## 2022-02-16 RX ADMIN — MICONAZOLE NITRATE: 20 POWDER TOPICAL at 20:02

## 2022-02-16 RX ADMIN — FERROUS SULFATE TAB 325 MG (65 MG ELEMENTAL FE) 325 MG: 325 (65 FE) TAB at 08:11

## 2022-02-16 RX ADMIN — DEXTROSE MONOHYDRATE 300 ML: 100 INJECTION, SOLUTION INTRAVENOUS at 19:44

## 2022-02-16 RX ADMIN — OXYCODONE HYDROCHLORIDE 5 MG: 5 TABLET ORAL at 23:57

## 2022-02-16 RX ADMIN — PREDNISONE 30 MG: 5 TABLET ORAL at 08:11

## 2022-02-16 RX ADMIN — SODIUM BICARBONATE 50 MEQ: 84 INJECTION, SOLUTION INTRAVENOUS at 18:59

## 2022-02-16 RX ADMIN — CALCIUM GLUCONATE 1 G: 98 INJECTION, SOLUTION INTRAVENOUS at 19:20

## 2022-02-16 RX ADMIN — FEBUXOSTAT 40 MG: 40 TABLET, FILM COATED ORAL at 08:13

## 2022-02-16 RX ADMIN — HUMAN INSULIN 10 UNITS: 100 INJECTION, SOLUTION SUBCUTANEOUS at 10:01

## 2022-02-16 RX ADMIN — CALCIUM GLUCONATE 1 G: 20 INJECTION, SOLUTION INTRAVENOUS at 09:43

## 2022-02-16 RX ADMIN — SODIUM CHLORIDE: 9 INJECTION, SOLUTION INTRAVENOUS at 18:29

## 2022-02-16 ASSESSMENT — ACTIVITIES OF DAILY LIVING (ADL)
ADLS_ACUITY_SCORE: 17
ADLS_ACUITY_SCORE: 17
ADLS_ACUITY_SCORE: 15
ADLS_ACUITY_SCORE: 17
ADLS_ACUITY_SCORE: 15
ADLS_ACUITY_SCORE: 17
ADLS_ACUITY_SCORE: 15
ADLS_ACUITY_SCORE: 15
ADLS_ACUITY_SCORE: 17
ADLS_ACUITY_SCORE: 15
ADLS_ACUITY_SCORE: 15
ADLS_ACUITY_SCORE: 17
ADLS_ACUITY_SCORE: 17

## 2022-02-16 NOTE — PROGRESS NOTES
"PRIMARY DIAGNOSIS: \"GENERIC\" NURSING  OUTPATIENT/OBSERVATION GOALS TO BE MET BEFORE DISCHARGE:  1. ADLs back to baseline: Yes    2. Activity and level of assistance: Up with standby assistance.    3. Pain status: Pain free.    4. Return to near baseline physical activity: No. Using walker, gait belt and Assist of 2     Discharge Planner Nurse      Please review provider order for any additional goals.   Nurse to notify provider when observation goals have been met and patient is ready for discharge.  "

## 2022-02-16 NOTE — PLAN OF CARE
S-(situation): Patient changed to inpatient status    B-(background): Patient status change due to observation goals not being met.     A-(assessment): Alert and oriented, potassium elevated, Ax2 with ambulation and transfers, moderate weakness.     R-(recommendations):  Treat potassium imbalance, work with PT/OT. Will monitor patient per MD orders.       Inpatient nursing criteria listed below were met:    Adult Profile completedYes  Health care directives status obtained and documented: Yes  VTE prophylaxis orders: Coumadin  (FYI: Asprin is not an approved anticoagulation for DVT prophylaxis)  SCD's Documented: Yes  Vaccine assessment done and vaccine ordered if needed. Yes  My Chart patient sign up addressed and documented: Yes  Care Plan initiated: Yes  Discharge planning review completed (admission navigator) Yes

## 2022-02-16 NOTE — PROGRESS NOTES
Patient's potassium has decreased down to 5.4 following interventions.  FENA still has not been performed and no urinary output from time of strict I/O order.  Nursing staff will have patient attempt to void now to collect needed sample so IV fluids can be started if pre-renal azotemia is present.  If patient unable to void, will perform bladder scan to assess for volume with consideration of straight cath if needed.  Will recheck potassium at 1800 as planned.      Electronically Signed:  Lore Jamil MD

## 2022-02-16 NOTE — PHARMACY-ANTICOAGULATION SERVICE
Clinical Pharmacy - Warfarin Dosing Consult     Pharmacy has been consulted to manage this patient s warfarin therapy.  Indication: Atrial Fibrillation  Therapy Goal: INR 2-3  Provider/Team: Dr. Ben Hamlin  Health system Clinic: [facility]  Warfarin Prior to Admission: Yes  Warfarin PTA Regimen: 7.5 mg Tue/Thur & 5 mg ROW  Recent documented change in oral intake/nutrition: Unknown    INR   Date Value Ref Range Status   02/16/2022 2.22 (H) 0.85 - 1.15 Final   02/15/2022 2.16 (H) 0.85 - 1.15 Final       Recommend warfarin 5 mg today.  Pharmacy will monitor Panda Miranda daily and order warfarin doses to achieve specified goal.      Please contact pharmacy as soon as possible if the warfarin needs to be held for a procedure or if the warfarin goals change.

## 2022-02-16 NOTE — PROGRESS NOTES
[facility]    Medicine Progress Note - Hospitalist Service    Date of Admission:  2/11/2022    Assessment & Plan        Patient is a 66-year-old gentleman with a past medical history of systolic CHF, chronic kidney disease stage IV, atrial fibrillation, gout with recent pyelonephritis and COVID-19 infection who presented to an outside emergency room on 2/10/2022 with generalized weakness and lower back pain.  He was found to be having an acute gouty flare in addition to significant weakness thought secondary to his recent infections and was registered to observation status at this facility.  Gouty flare has improved with prednisone burst and taper patient has been working with therapy daily and making progress on improving strength.  Social work was working towards TCU placement however today patient has significant worsening of hyperkalemia up to 6.1 with a evaluation of kidney function showing stabilization of creatinine but significant elevation of BUN in the past 5 days since hospitalization of unclear significance.  Patient does have dry itchy skin that has worsened significantly during this stay, worsening of appetite in the past few days, ongoing fatigue and sleepiness which are concerning for uremia.  Patient will be transition to inpatient status, given calcium gluconate in addition to sodium and dextrose with recheck potassiums every 4 hours.  Will perform FENA to further evaluate for possible prerenal azotemia with initiation of cautious IV fluids if this is present, start strict intake and output, place patient on telemetry monitoring and monitor patient closely.  We will have pharmacy evaluate to ensure patient is not on any medications that could exacerbate hyperkalemia.  If patient has persistent hyperkalemia and concern for worsening uremia, will need to have a telephone consultation discussion with nephrology with consideration of transfer.    Principal Problem:     Hyperkalemia    Assessment: Has been mildly elevated ranging from 5.0-5.5 so far during the stay but today was initially 6.5 but there was concern for hemolysis and therefore patient was redrawn and was confirmed at 6.1    Plan: Proceed with calcium gluconate, IV dextrose and insulin with serial potassiums every 4 hours throughout the day.  Evaluate for prerenal azotemia with IV fluid resuscitation initiation as appropriate.  Will place patient on telemetry and monitor closely    Active Problems:    Chronic kidney disease stage 4 (GFR 15-29) with worsening BUN and concerns for uremia development    Assessment: Patient's creatinine has slightly improved during this hospital stay however patient has had slow but persistent elevation of BUN from 44 up to a peak of 93 this morning    Plan: We will perform Shyanne to evaluate for prerenal azotemia with consideration of cautious IV fluids if this is present.  We will recheck creatinine and BUN with 1400 lab draw      Polyarticular gout;  Gout attack    Assessment: Present initially, significantly improved following steroid burst    Plan: Continue with prednisone 30 mg daily and monitor for ongoing progressive improvement.  Continue to hold colchicine secondary to patient's renal function and continue decreased dose of Uloric to 40 mg daily.  Patient to continue as needed Percocet as needed for pain      Back pain    Assessment: Chronic and normally well controlled with fentanyl patch but did have acute worsening in the days leading up to this hospitalization    Plan: Continue with fentanyl patch and as needed Percocet.  Ongoing physical therapy and social work continuing to work towards TCU planning versus home with assist depending on patient's progress      Muscle weakness (generalized)    Assessment: Developing slowly in the home environment leading up to this hospitalization    Plan: Continue with physical therapy during this hospital stay and at time of either discharge  home versus TCU      Infection due to 2019 novel coronavirus-Recent history January 2022    Assessment: Noted, patient never needed supplemental oxygen and is considered Covid recovered    Plan: No acute intervention needed      Chronic systolic congestive heart failure (H)    Assessment: Echocardiogram performed last month shows an EF of 45 to 50%.  Patient also has a small perimembranous VSD with left-to-right shunting    Plan: We will need to be cautious if IV fluids are initiated due to prerenal azotemia and monitor closely for any signs of volume overload.  We will continue with Toprol-XL 50 mg daily, daily weights, strict intake and output      Atrial fibrillation with RVR (H)    Assessment: Chronic and stable with rate well controlled and patient on Coumadin for anticoagulation    Plan: Continue Toprol-XL 50 mg daily, pharmacy to assist in Coumadin management during the stay      Open wound of lower leg-right shin and left lateral ankle    Assessment: Patient has a 2.5 cm wound on the left lateral ankle as well as a 3 cm x 1 cm wound on the right shin that were present prior to admission.  He has known venous insufficiency with chronic venous stasis    Plan: Wound care has been following and continue with local cares as recommended      TASHA (obstructive sleep apnea)    Assessment: Patient has home CPAP device for use during the stay    Plan: Continue home CPAP use without change       Diet: Combination Diet 2 gm K Diet; 2 gm NA Diet  Room Service    DVT Prophylaxis: Warfarin  Cheema Catheter: Not present  Central Lines: None  Cardiac Monitoring: ACTIVE order. Indication: Electrolyte Imbalance (24 hours)- Magnesium <1.3 mg/ml; Potassium < =2.8 or > 5.5 mg/ml  Code Status: Full Code      Disposition Plan   Expected Discharge: 02/16/2022     Anticipated discharge location: inpatient rehabilitation facility    Delays:     Placement - TCU            The patient's care was discussed with the Bedside Nurse and  "Patient.    Lore Jamil MD  Hospitalist Service  [facility]  Securely message with the [employer] Web Console (learn more here)  Text page via "[employer] Paging/Directory         Clinically Significant Risk Factors Present on Admission        # Hyperkalemia: K = 6.1 mmol/L (Ref range: 3.4 - 5.3 mmol/L) on admission, will monitor as appropriate       # Coagulation Defect: home medication list includes an anticoagulant medication    # Severe Obesity: Estimated body mass index is 48.99 kg/m  as calculated from the following:    Height as of 2/10/22: 1.994 m (6' 6.5\").    Weight as of this encounter: 194.8 kg (429 lb 6.4 oz).      ______________________________________________________________________    Interval History   Patient has remained vitally stable.  He does complain of feeling more fatigued and weak today with some mild nausea earlier which resolved without intervention and decreased appetite in the past few days but does not know if it is because of the food choices locally or something more.  He denies any additional new symptoms.  No new nursing concerns.    Data reviewed today: I reviewed all medications, new labs and imaging results over the last 24 hours.    Physical Exam   Vital Signs: Temp: 97.5  F (36.4  C) Temp src: Oral BP: 120/78 Pulse: 74   Resp: 18 SpO2: 98 % O2 Device: None (Room air)    Weight: 429 lbs 6.4 oz  Constitutional: awake, alert, cooperative, no apparent distress, and appears stated age  Respiratory: No increased work of breathing, decreased breath sounds diffusely but no crackles or wheezes  Cardiovascular: Irregularly irregular rhythm but rate controlled in the 70s to 80s  GI: Bowel sounds present, abdomen is obese but soft and nondistended  Musculoskeletal: Ongoing chronic venous stasis and nonpitting edema present  Neurologic: Awake, alert, oriented to name, place and  situation    Data   Recent Labs   Lab      02/16/22  0814 02/16/22  0558 " 02/15/22  0600 02/14/22  0603 02/13/22  0519 02/12/22  0615 02/11/22  0823 02/10/22  1708   WBC       --   --   --   --   --  9.2  --  8.9   HGB       --   --   --   --   --  9.7*  --  10.5*   MCV       --   --   --   --   --  85  --  88   PLT       --   --   --   --   --  253  --  135*   INR       --  2.22* 2.16* 2.73*   < > 2.45*   < > 2.16*   NA      135  --  134 135   < > 137   < > 138   POTASSIUM      6.1*  --  5.5* 5.5*   < > 5.1   < > 5.4*   CHLORIDE      108  --  108 109   < > 110*   < > 109*   CO2      21  --  20 22   < > 21   < > 16*   BUN      93*  --  89* 84*   < > 60*   < > 44*   CR      2.30*  --  2.45* 2.47*   < > 2.52*   < > 2.95*   ANIONGAP      6  --  6 4   < > 6   < > 13   JOHN      9.5  --  8.7 9.4   < > 9.5   < > 9.1   GLC      98  --  116* 106*   < > 114*   < > 84   ALBUMIN       --   --   --   --   --   --   --  2.6*   PROTTOTAL       --   --   --   --   --   --   --  7.7   BILITOTAL       --   --   --   --   --   --   --  1.1*   ALKPHOS       --   --   --   --   --   --   --  76   ALT       --   --   --   --   --   --   --  <9   AST       --   --   --   --   --   --   --  27    < > = values in this interval not displayed.

## 2022-02-16 NOTE — PLAN OF CARE
Goal Outcome Evaluation:    Plan of Care Reviewed With: patient     Overall Patient Progress: no change    Outcome Evaluation: Pt. potassium continues to increase, continue med regimen to decrease potassium. Working with PT/OT for strength. looking for placement per SW.

## 2022-02-16 NOTE — PROGRESS NOTES
No complaints of pain this shift, Pt offered benadryl for itching and declined.   One BM early this shift.   Pt repositioned for comfort.   Vitals remain stable.   /68 (BP Location: Right arm)   Pulse 74   Temp 97.4  F (36.3  C) (Oral)   Resp 16   Wt (!) 195 kg (430 lb)   SpO2 99%   BMI 49.06 kg/m    Will continue to monitor and follow plan of care.

## 2022-02-16 NOTE — PROGRESS NOTES
S-(situation): 67 y/o male hospitalized since 2/10/22 with generalized weakness and lower back.  PMH significant for morbid obesity with BMI of 49, CHR, CKD 3, A fib, gout, complex recent medical history hospitalized at [facility] 1/27-1/30 for pyelonephritis, CHF and COVID-19.  Patient with wounds on left lateral lower leg and right pretibial lower leg.  Has been receiving care from a wound clinic in the [facility] and per patient report, has been applying compression independently.     B-(background): This therapist saw patient yesterday (2/15/22) for application of bilateral short stretch compression bandaging to lower legs.      A-(assessment): This therapist returned today - wraps are still intact and wound dressings are scheduled to be changed every 3 days (next change scheduled for Fri 2/18/22).      R-(recommendations): Wraps do not need to be changed today.  Will plan to re-wrap legs on Fri 2/18/22 after wound dressings have been changed and completed.         Lupe Plummer, PT, DPT, CLT-JAYLEEN  [facility]  Physical Therapist     Phone: [phone]  Email: [email or web address]

## 2022-02-16 NOTE — UTILIZATION REVIEW
Inpatient appropriate    Admission Status; Secondary Review Determination      Under the authority of the Utilization Management Committee, the utilization review process indicated a secondary review on the above patient. The review outcome is based on review of the medical records, discussions with staff, and applying clinical experience noted on the date of the review.    (x) Inpatient Status Appropriate - This patient's medical care is consistent with medical management for inpatient care and reasonable inpatient medical practice.    RATIONALE FOR DETERMINATION  6-year-old male with history of congestive heart failure, chronic kidney disease stage IV, atrial fibrillation was admitted to [facility] on 2/11/2022.  Patient was otherwise stable however in the last 24 hours he has been hyperkalemic with potassium up to 6.1 and showing a steady upward trend.  Patient is not on any medication that could elevate potassium.  His potassium levels are being monitored so that it does not continue on an upward trend.  Moreover his BUN also has been gradually worsening from 44 at presentation to a peak of 93.  Discussed with Dr. Jamil, primary attending, she is concerned that patient has early symptoms of uremia.  There is ongoing nausea, decreased oral intake and fatigue.  Blood gases show that patient is acidotic.  She is also concerned that patient might be relatively dehydrated and will be started on intravenous fluids.  Patient will need ongoing hospitalization for evaluation and treatment for early concerns for uremia and worsening potassium.    At the time of admission with the information available to the attending physician more than 2 nights Hospital complex care was anticipated, based on patient risk of adverse outcome if treated as outpatient and complex care required. Inpatient admission is appropriate based on the Medicare guidelines.    This document was produced using voice recognition  software      The information on this document is developed by the utilization review team in order for the business office to ensure compliance. This only denotes the appropriateness of proper admission status and does not reflect the quality of care rendered.  The definitions of Inpatient Status and Observation Status used in making the determination above are those provided in the CMS Coverage Manual, Chapter 1 and Chapter 6, section 70.4.    Sincerely,    Utilization Review  Physician Advisor  [facility].

## 2022-02-16 NOTE — PROGRESS NOTES
Care Management Follow Up    Length of Stay (days): 1    Expected Discharge Date: 02/18/2022     Concerns to be Addressed: discharge planning  3 level home-several stairs within the home.    Patient plan of care discussed at interdisciplinary rounds: Yes    Anticipated Discharge Disposition: Transitional Care  Disposition Comments: TCU Referrals pending    Anticipated Discharge Services: None    Anticipated Discharge DME:      Patient/family educated on Medicare website which has current facility and service quality ratings: yes    Education Provided on the Discharge Plan:      Patient/Family in Agreement with the Plan: yes    Referrals Placed by CM/SW: Internal Clinic Care Coordination, Post Acute Facilities    Private pay costs discussed: Not applicable    Additional Information:  Patient with hyperkalemia today.  Now medically needing hospitalization and changed to inpatient status.      - [facility] - Declined - Bed not available    - [facility] -  Declined - Can not accommodate bariatric needs     - [facility] [place] - Declined - Does not accept [employer] insurance    Additional TCU referrals sent to the following facilities:    -     [facility] (SNF) Details  Fax          [address] [facility] [phone]        -   [facility] (SNF) Details  Fax            [address] [phone]        -   [facility] (SNF) Details  Fax            [address] [facility] [phone]        -   [facility] (SNF) Details  Fax            [address] [facility] [phone]        -   [facility] [place] (SNF) Details  Fax            [address] [phone]        -   [facility] (TCU) Details  Fax            [address] [place] [phone]        -   [facility] (SNF) Details  Fax            [address] [facility] [phone]         -   [facility] () Details  Fax In Basket          [address], [place] [address]        -     [facility]) Details  Fax          [address] [facility] [phone]        MAGALIE Us  [facility] [phone]/ [place] [phone]  Care Management

## 2022-02-17 ENCOUNTER — TELEPHONE (OUTPATIENT)
Dept: ANTICOAGULATION | Facility: CLINIC | Age: 67
End: 2022-02-17
Payer: COMMERCIAL

## 2022-02-17 ENCOUNTER — APPOINTMENT (OUTPATIENT)
Dept: PHYSICAL THERAPY | Facility: CLINIC | Age: 67
DRG: 641 | End: 2022-02-17
Attending: NURSE PRACTITIONER
Payer: COMMERCIAL

## 2022-02-17 PROBLEM — N17.9 ACUTE KIDNEY INJURY (H): Status: ACTIVE | Noted: 2022-02-17

## 2022-02-17 PROBLEM — I48.20 CHRONIC ATRIAL FIBRILLATION (H): Status: ACTIVE | Noted: 2022-02-11

## 2022-02-17 PROBLEM — L97.909 VENOUS STASIS ULCERS (H): Status: ACTIVE | Noted: 2022-02-11

## 2022-02-17 PROBLEM — I83.009 VENOUS STASIS ULCERS (H): Status: ACTIVE | Noted: 2022-02-11

## 2022-02-17 PROBLEM — Z86.16 HISTORY OF 2019 NOVEL CORONAVIRUS DISEASE (COVID-19): Status: ACTIVE | Noted: 2022-02-11

## 2022-02-17 LAB
ANION GAP SERPL CALCULATED.3IONS-SCNC: 4 MMOL/L (ref 3–14)
ANION GAP SERPL CALCULATED.3IONS-SCNC: 4 MMOL/L (ref 3–14)
ANION GAP SERPL CALCULATED.3IONS-SCNC: 5 MMOL/L (ref 3–14)
BASE EXCESS BLDV CALC-SCNC: -1.8 MMOL/L (ref -7.7–1.9)
BASE EXCESS BLDV CALC-SCNC: -3.2 MMOL/L (ref -7.7–1.9)
BASE EXCESS BLDV CALC-SCNC: -3.9 MMOL/L (ref -7.7–1.9)
BUN SERPL-MCNC: 90 MG/DL (ref 7–30)
CALCIUM SERPL-MCNC: 9.1 MG/DL (ref 8.5–10.1)
CHLORIDE BLD-SCNC: 106 MMOL/L (ref 94–109)
CHLORIDE BLD-SCNC: 108 MMOL/L (ref 94–109)
CHLORIDE BLD-SCNC: 109 MMOL/L (ref 94–109)
CO2 SERPL-SCNC: 24 MMOL/L (ref 20–32)
CREAT SERPL-MCNC: 2.21 MG/DL (ref 0.66–1.25)
ERYTHROCYTE [DISTWIDTH] IN BLOOD BY AUTOMATED COUNT: 16.2 % (ref 10–15)
GFR SERPL CREATININE-BSD FRML MDRD: 32 ML/MIN/1.73M2
GLUCOSE BLD-MCNC: 111 MG/DL (ref 70–99)
GLUCOSE BLDC GLUCOMTR-MCNC: 104 MG/DL (ref 70–99)
GLUCOSE BLDC GLUCOMTR-MCNC: 104 MG/DL (ref 70–99)
GLUCOSE BLDC GLUCOMTR-MCNC: 107 MG/DL (ref 70–99)
GLUCOSE BLDC GLUCOMTR-MCNC: 111 MG/DL (ref 70–99)
GLUCOSE BLDC GLUCOMTR-MCNC: 115 MG/DL (ref 70–99)
GLUCOSE BLDC GLUCOMTR-MCNC: 116 MG/DL (ref 70–99)
GLUCOSE BLDC GLUCOMTR-MCNC: 124 MG/DL (ref 70–99)
GLUCOSE BLDC GLUCOMTR-MCNC: 127 MG/DL (ref 70–99)
GLUCOSE BLDC GLUCOMTR-MCNC: 144 MG/DL (ref 70–99)
GLUCOSE BLDC GLUCOMTR-MCNC: 99 MG/DL (ref 70–99)
GLUCOSE BLDC GLUCOMTR-MCNC: 99 MG/DL (ref 70–99)
HCO3 BLDV-SCNC: 23 MMOL/L (ref 21–28)
HCO3 BLDV-SCNC: 24 MMOL/L (ref 21–28)
HCO3 BLDV-SCNC: 24 MMOL/L (ref 21–28)
HCT VFR BLD AUTO: 34.6 % (ref 40–53)
HGB BLD-MCNC: 10.9 G/DL (ref 13.3–17.7)
HOLD SPECIMEN: NORMAL
INR PPP: 2.4 (ref 0.85–1.15)
MCH RBC QN AUTO: 27.9 PG (ref 26.5–33)
MCHC RBC AUTO-ENTMCNC: 31.5 G/DL (ref 31.5–36.5)
MCV RBC AUTO: 89 FL (ref 78–100)
O2/TOTAL GAS SETTING VFR VENT: 21 %
PCO2 BLDV: 47 MM HG (ref 40–50)
PCO2 BLDV: 52 MM HG (ref 40–50)
PCO2 BLDV: 52 MM HG (ref 40–50)
PH BLDV: 7.26 [PH] (ref 7.32–7.43)
PH BLDV: 7.27 [PH] (ref 7.32–7.43)
PH BLDV: 7.32 [PH] (ref 7.32–7.43)
PLATELET # BLD AUTO: 354 10E3/UL (ref 150–450)
PO2 BLDV: 34 MM HG (ref 25–47)
PO2 BLDV: 41 MM HG (ref 25–47)
PO2 BLDV: 43 MM HG (ref 25–47)
POTASSIUM BLD-SCNC: 5 MMOL/L (ref 3.4–5.3)
POTASSIUM BLD-SCNC: 5.3 MMOL/L (ref 3.4–5.3)
POTASSIUM BLD-SCNC: 5.6 MMOL/L (ref 3.4–5.3)
POTASSIUM BLD-SCNC: 5.6 MMOL/L (ref 3.4–5.3)
RBC # BLD AUTO: 3.9 10E6/UL (ref 4.4–5.9)
SODIUM SERPL-SCNC: 135 MMOL/L (ref 133–144)
SODIUM SERPL-SCNC: 136 MMOL/L (ref 133–144)
SODIUM SERPL-SCNC: 137 MMOL/L (ref 133–144)
WBC # BLD AUTO: 17.2 10E3/UL (ref 4–11)

## 2022-02-17 PROCEDURE — 250N000009 HC RX 250: Performed by: INTERNAL MEDICINE

## 2022-02-17 PROCEDURE — 250N000012 HC RX MED GY IP 250 OP 636 PS 637: Performed by: INTERNAL MEDICINE

## 2022-02-17 PROCEDURE — 36415 COLL VENOUS BLD VENIPUNCTURE: CPT | Performed by: FAMILY MEDICINE

## 2022-02-17 PROCEDURE — 97530 THERAPEUTIC ACTIVITIES: CPT | Mod: GP | Performed by: PHYSICAL THERAPIST

## 2022-02-17 PROCEDURE — 85027 COMPLETE CBC AUTOMATED: CPT | Performed by: INTERNAL MEDICINE

## 2022-02-17 PROCEDURE — 82803 BLOOD GASES ANY COMBINATION: CPT | Performed by: PEDIATRICS

## 2022-02-17 PROCEDURE — 99207 PR CDG-MDM COMPONENT: MEETS HIGH - UP CODED: CPT | Performed by: PEDIATRICS

## 2022-02-17 PROCEDURE — 250N000013 HC RX MED GY IP 250 OP 250 PS 637: Performed by: INTERNAL MEDICINE

## 2022-02-17 PROCEDURE — 36415 COLL VENOUS BLD VENIPUNCTURE: CPT | Performed by: PEDIATRICS

## 2022-02-17 PROCEDURE — 82310 ASSAY OF CALCIUM: CPT | Performed by: FAMILY MEDICINE

## 2022-02-17 PROCEDURE — 99233 SBSQ HOSP IP/OBS HIGH 50: CPT | Performed by: PEDIATRICS

## 2022-02-17 PROCEDURE — 84132 ASSAY OF SERUM POTASSIUM: CPT | Performed by: PEDIATRICS

## 2022-02-17 PROCEDURE — 82803 BLOOD GASES ANY COMBINATION: CPT | Performed by: FAMILY MEDICINE

## 2022-02-17 PROCEDURE — 120N000001 HC R&B MED SURG/OB

## 2022-02-17 PROCEDURE — 85610 PROTHROMBIN TIME: CPT | Performed by: NURSE PRACTITIONER

## 2022-02-17 PROCEDURE — 250N000011 HC RX IP 250 OP 636: Performed by: INTERNAL MEDICINE

## 2022-02-17 PROCEDURE — 258N000001 HC RX 258: Performed by: INTERNAL MEDICINE

## 2022-02-17 PROCEDURE — 250N000012 HC RX MED GY IP 250 OP 636 PS 637: Performed by: FAMILY MEDICINE

## 2022-02-17 PROCEDURE — 250N000013 HC RX MED GY IP 250 OP 250 PS 637: Performed by: FAMILY MEDICINE

## 2022-02-17 PROCEDURE — 250N000013 HC RX MED GY IP 250 OP 250 PS 637: Performed by: NURSE PRACTITIONER

## 2022-02-17 PROCEDURE — 250N000013 HC RX MED GY IP 250 OP 250 PS 637: Performed by: PEDIATRICS

## 2022-02-17 PROCEDURE — 84132 ASSAY OF SERUM POTASSIUM: CPT | Performed by: FAMILY MEDICINE

## 2022-02-17 RX ORDER — METOPROLOL SUCCINATE 50 MG/1
50 TABLET, EXTENDED RELEASE ORAL DAILY
Qty: 30 TABLET | Refills: 0 | Status: ON HOLD | OUTPATIENT
Start: 2022-02-18 | End: 2022-01-01

## 2022-02-17 RX ORDER — FENTANYL 25 UG/1
25 PATCH TRANSDERMAL
Status: DISCONTINUED | OUTPATIENT
Start: 2022-02-17 | End: 2022-02-18

## 2022-02-17 RX ORDER — FEBUXOSTAT 40 MG/1
40 TABLET, FILM COATED ORAL DAILY
Qty: 30 TABLET | Refills: 0 | Status: ON HOLD | OUTPATIENT
Start: 2022-02-17 | End: 2022-01-01

## 2022-02-17 RX ORDER — WARFARIN SODIUM 5 MG/1
TABLET ORAL
Qty: 100 TABLET | Refills: 1 | COMMUNITY
Start: 2022-02-17 | End: 2022-01-01

## 2022-02-17 RX ORDER — SODIUM BICARBONATE 650 MG/1
650 TABLET ORAL 2 TIMES DAILY
Status: DISCONTINUED | OUTPATIENT
Start: 2022-02-17 | End: 2022-02-18

## 2022-02-17 RX ORDER — PREDNISONE 20 MG/1
20 TABLET ORAL DAILY
Qty: 14 TABLET | Refills: 0 | Status: ON HOLD | OUTPATIENT
Start: 2022-02-17 | End: 2022-01-01

## 2022-02-17 RX ADMIN — FENTANYL 1 PATCH: 25 PATCH, EXTENDED RELEASE TRANSDERMAL at 02:25

## 2022-02-17 RX ADMIN — DEXTROSE MONOHYDRATE 300 ML: 100 INJECTION, SOLUTION INTRAVENOUS at 00:21

## 2022-02-17 RX ADMIN — FERROUS SULFATE TAB 325 MG (65 MG ELEMENTAL FE) 325 MG: 325 (65 FE) TAB at 09:00

## 2022-02-17 RX ADMIN — FUROSEMIDE 40 MG: 10 INJECTION, SOLUTION INTRAVENOUS at 00:32

## 2022-02-17 RX ADMIN — FEBUXOSTAT 40 MG: 40 TABLET, FILM COATED ORAL at 09:01

## 2022-02-17 RX ADMIN — OXYCODONE HYDROCHLORIDE 5 MG: 5 TABLET ORAL at 09:04

## 2022-02-17 RX ADMIN — CALCIUM GLUCONATE 1 G: 20 INJECTION, SOLUTION INTRAVENOUS at 00:23

## 2022-02-17 RX ADMIN — PREDNISONE 30 MG: 5 TABLET ORAL at 09:00

## 2022-02-17 RX ADMIN — OXYCODONE HYDROCHLORIDE 5 MG: 5 TABLET ORAL at 17:18

## 2022-02-17 RX ADMIN — DIPHENHYDRAMINE HYDROCHLORIDE 50 MG: 25 CAPSULE ORAL at 02:24

## 2022-02-17 RX ADMIN — HUMAN INSULIN 10 UNITS: 100 INJECTION, SOLUTION SUBCUTANEOUS at 00:17

## 2022-02-17 RX ADMIN — SODIUM BICARBONATE 650 MG TABLET 650 MG: at 21:46

## 2022-02-17 RX ADMIN — SODIUM ZIRCONIUM CYCLOSILICATE 10 G: 5 POWDER, FOR SUSPENSION ORAL at 09:01

## 2022-02-17 RX ADMIN — DEXTROSE MONOHYDRATE 25 G: 500 INJECTION PARENTERAL at 00:18

## 2022-02-17 RX ADMIN — SODIUM BICARBONATE 650 MG TABLET 650 MG: at 13:12

## 2022-02-17 RX ADMIN — SODIUM ZIRCONIUM CYCLOSILICATE 10 G: 5 POWDER, FOR SUSPENSION ORAL at 14:20

## 2022-02-17 RX ADMIN — SODIUM ZIRCONIUM CYCLOSILICATE 10 G: 5 POWDER, FOR SUSPENSION ORAL at 01:00

## 2022-02-17 RX ADMIN — METOPROLOL SUCCINATE 50 MG: 50 TABLET, EXTENDED RELEASE ORAL at 08:59

## 2022-02-17 RX ADMIN — WARFARIN SODIUM 7.5 MG: 5 TABLET ORAL at 17:18

## 2022-02-17 RX ADMIN — MICONAZOLE NITRATE: 20 POWDER TOPICAL at 09:05

## 2022-02-17 ASSESSMENT — ACTIVITIES OF DAILY LIVING (ADL)
ADLS_ACUITY_SCORE: 17
ADLS_ACUITY_SCORE: 18
ADLS_ACUITY_SCORE: 17
ADLS_ACUITY_SCORE: 18
ADLS_ACUITY_SCORE: 17

## 2022-02-17 NOTE — PROGRESS NOTES
Patient was finally able to void and voided large amount at 600 mL.  FENA shows prerenal azotemia, which may be contributing to patient's increasing BUN and uremia symptoms.  Repeat potassium at 1800 has significantly jumped up and is 7.1.    PLAN:  - Will start normal saline at 75 ml/hr - starting at a lower dose given patient's known systolic CHF and high risk for volume overload.  Recheck creatinine/BUN in the morning    - Give another dose of calcium gluconate, dextrose and insulin but will also give bicarb x1 given low bicarb on ABG and give Kayexalate x1.  Recheck potassium at 2200, 0200 and 0600.    -If patient does not improve with the above or has recurrent issues into tomorrow, will need to discuss with nephrology.      Electronically Signed:  Lore Jamil MD

## 2022-02-17 NOTE — PROGRESS NOTES
[facility]    Medicine Progress Note - Hospitalist Service    Date of Admission:  2/11/2022    Assessment & Plan               Patient is a 66-year-old gentleman with a past medical history of chronic systolic CHF, chronic kidney disease stage IV, atrial fibrillation, gout and pseudogout and recent hospitalization for pyelonephritis and COVID-19 infection who presented to an outside emergency room on 2/10/2022 with generalized weakness and lower back pain and was hospitalized for observation due to an acute flare of crystal induced arthritis in addition to significant weakness.  He then yesterday developed worsening hyperkalemia to peak 7.3 without definite EKG changes and was admitted for treatment of severe hyperkalemia.  Cause for severe hyperkalemia remains indeterminant.  He had transient metabolic acidosis with normal anion gap due to kidney disease which may have exacerbated hyperkalemia.  Prerenal azotemia with uremia may also have exacerbated hyperkalemia.   After investigation, the combination of increased creatinine compared with his normal baseline at admission along with worsening uremia, metabolic acidosis, and hyperkalemia is all concerning for acute kidney injury superimposed upon severe stage IV chronic kidney disease.  Hyperkalemia has improved with treatment although persists.  Metabolic acidosis has resolved although he has respiratory acidosis this morning.  He is at risk for chronic respiratory acidosis from obesity hypoventilation and obstructive sleep apnea and may not be able to generate a compensatory metabolic alkalosis because of his severe kidney disease all of which leads to net acidotic state likely exacerbating hyperkalemia.  Uremia has stabilized, creatinine has improved, and he has had adequate urine output.    Acute flare of gout and/or pseudogout has improved with starting prednisone.  Chronic back pain is stable on his normal fentanyl patch with  use of oxycodone as needed for breakthrough pain.  Chronic venous stasis ulcers of the lower legs are stable.  Chronic systolic CHF and atrial fibrillation are stable.  Anemia of chronic kidney disease is stable.  Aside from mild respiratory acidosis this morning after he had not used CPAP overnight, obstructive sleep apnea appears to be stable and he reports that he does not normally use CPAP when his weight is less than 450 pounds.  Overall, his functional status is improving, and he continues to work with physical therapy.  Disposition planning continues.    Principal Problem:    Hyperkalemia  Active Problems:    Acute kidney injury (H)-possible    Calcium pyrophosphate deposition disease    Polyarticular gout    Chronic systolic congestive heart failure (H)    Chronic atrial fibrillation (H)    Gout attack    CKD (chronic kidney disease) stage 4, GFR 15-29 ml/min (H)    Morbid obesity (H)    Ventricular septal defect    Status post bariatric surgery    Venous stasis ulcers of both lower extremities (H)-right shin and left lateral ankle    Back pain    Anemia of chronic renal failure, stage 4 (severe) (H)    Muscle weakness (generalized)    History of 2019 novel coronavirus disease (COVID-19)-Jan 2022, now recovered    TASHA (obstructive sleep apnea)-only uses CPAP when weight is over 450    Start sodium bicarbonate supplementation  Follow serial potassium and blood gases today and overnight   Complete three dose treatment course with Lokelma as advised by nephrologist overnight  Continue prednisone and Uloric for treatment of crystal induced acute arthritis  Continue chronic fentanyl patch and oxycodone as needed for breakthrough pain  Continue Toprol-XL, pharmacy assisting with warfarin dosing  No need to reestablish IV access at this time, agree with considering midline IV versus PICC line if his clinical status changes such that reestablishing IV access is necessary       Diet: Combination Diet 2 gm K Diet; 2  gm NA Diet  Room Service    DVT Prophylaxis: Warfarin  Cheema Catheter: Not present  Central Lines: None  Cardiac Monitoring: ACTIVE order. Indication: Electrolyte Imbalance (24 hours)- Magnesium <1.3 mg/ml; Potassium < =2.8 or > 5.5 mg/ml  Code Status: Full Code      Disposition Plan   Expected Discharge: 02/18/2022     Anticipated discharge location: inpatient rehabilitation facility    Delays:     Placement - TCU            The patient's care was discussed with the Patient.    Flaco Selby MD  Hospitalist Service  [facility]  Securely message with the Vocera Web Console (learn more here)  Text page via [facility] Paging/Directory         Clinically Significant Risk Factors Present on Admission                 ______________________________________________________________________    Interval History   He says he feels better today.  His hand pain is much improved.  He can now use his hands for his activities including feeding himself, moving in bed, and getting out of bed.  His right knee pain has improved and he has now been able to get out of bed and bear weight on his legs and ambulate.  He has no new concerns.  Overnight he developed worsening hyperkalemia with potassium 7.3 last evening.  He was treated with doses of calcium gluconate, IV Lasix, and insulin with dextrose.  Overnight physician reports that she discussed his case with nephrology last night who advised starting Lokelma for three dose treatment of hyperkalemia.  He received one dose of it at about 1 AM and a second dose at 9 AM today.  Nursing reports that he lost IV access overnight and that it has been difficult to reestablish peripheral IV access.    Data reviewed today: I reviewed all medications, new labs and imaging results over the last 24 hours. I personally reviewed the EKG tracing showing Atrial fibrillation with controlled ventricular response.  No EKG signs of hyperkalemia are evident.  Cardiac monitor  strips were also reviewed and also demonstrate atrial fibrillation with controlled ventricular response and no overt signs of hyperkalemia.    Physical Exam   Vital Signs: Temp: (!) 96.6  F (35.9  C) Temp src: Oral BP: 119/70 Pulse: 76   Resp: 18 SpO2: 93 % O2 Device: None (Room air)    Weight: 438 lbs 0 oz Body mass index is 49.97 kg/m .  Vitals:    02/15/22 0441 02/15/22 1045 02/16/22 0642 02/17/22 0650   Weight: (!) 198.2 kg (437 lb) (!) 195 kg (430 lb) (!) 194.8 kg (429 lb 6.4 oz) (!) 198.7 kg (438 lb)       Intake/Output Summary (Last 24 hours) at 2/17/2022 1211  Last data filed at 2/17/2022 1047  Gross per 24 hour   Intake --   Output 1025 ml   Net -1025 ml       General Appearance: Extremely obese with BMI 50, no acute distress resting in bed  Respiratory: Normal respiratory effort, good air movement, clear lungs  Cardiovascular: Irregularly irregular rate and rhythm, good radial pulse, brisk capillary refill  Other: No acutely inflamed joints in the hands, right knee is not acutely inflamed    Data   Recent Labs   Lab 02/17/22  1057 02/17/22  0655 02/17/22  0549 02/17/22  0302 02/17/22  0234 02/16/22  2234 02/16/22  2155 02/16/22  1756 02/16/22  1401 02/16/22  1126 02/16/22  0814 02/16/22  0558 02/15/22  0600 02/13/22  0519 02/12/22  0615 02/11/22  0823 02/10/22  1708   WBC  --   --  17.2*  --   --   --   --   --   --   --   --   --   --   --  9.2  --  8.9   HGB  --   --  10.9*  --   --   --   --   --   --   --   --   --   --   --  9.7*  --  10.5*   MCV  --   --  89  --   --   --   --   --   --   --   --   --   --   --  85  --  88   PLT  --   --  354  --   --   --   --   --   --   --   --   --   --   --  253  --  135*   INR  --   --  2.40*  --   --   --   --   --   --   --   --  2.22* 2.16*   < > 2.45*   < > 2.16*   NA  --   --  137  --   --   --   --   --  134  --  135  --  134   < > 137   < > 138   POTASSIUM  --   --  5.6*  --  5.6*  --  6.3*   < > 5.4*  --  6.1*  --  5.5*   < > 5.1   < > 5.4*   CHLORIDE   --   --  109  --   --   --   --   --  106  --  108  --  108   < > 110*   < > 109*   CO2  --   --  24  --   --   --   --   --  20  --  21  --  20   < > 21   < > 16*   BUN  --   --  90*  --   --   --   --   --  91*  --  93*  --  89*   < > 60*   < > 44*   CR  --   --  2.21*  --   --   --   --   --  2.29*  --  2.30*  --  2.45*   < > 2.52*   < > 2.95*   ANIONGAP  --   --  4  --   --   --   --   --  8  --  6  --  6   < > 6   < > 13   JOHN  --   --  9.1  --   --   --   --   --  9.3  --  9.5  --  8.7   < > 9.5   < > 9.1   GLC 99 107* 111*   < > 104*   < >  --    < > 107*   < > 98  --  116*   < > 114*   < > 84   ALBUMIN  --   --   --   --   --   --   --   --   --   --   --   --   --   --   --   --  2.6*   PROTTOTAL  --   --   --   --   --   --   --   --   --   --   --   --   --   --   --   --  7.7   BILITOTAL  --   --   --   --   --   --   --   --   --   --   --   --   --   --   --   --  1.1*   ALKPHOS  --   --   --   --   --   --   --   --   --   --   --   --   --   --   --   --  76   ALT  --   --   --   --   --   --   --   --   --   --   --   --   --   --   --   --  <9   AST  --   --   --   --   --   --   --   --   --   --   --   --   --   --   --   --  27    < > = values in this interval not displayed.     Blood sugars ranged  yesterday and overnight    Venous Blood Gas  Recent Labs   Lab 02/17/22  0550 02/16/22  1546 02/16/22  1401   PHV 7.27*  --  7.22*   PCO2V 52*  --  50   PO2V 34  --  40   HCO3V 24  --  21   EDMUND -3.2  --  -7.2   O2PER 21 21 21     Medications     - MEDICATION INSTRUCTIONS -       sodium chloride Stopped (02/16/22 2300)     Warfarin Therapy Reminder         febuxostat  40 mg Oral Daily     fentaNYL  25 mcg Transdermal Q72H     fentaNYL   Transdermal Q8H     ferrous sulfate  325 mg Oral Daily     metoprolol succinate ER  50 mg Oral Daily     miconazole   Topical BID     predniSONE  30 mg Oral Daily     sodium chloride (PF)  3 mL Intracatheter Q8H     sodium zirconium cyclosilicate  10 g Oral TID      warfarin ANTICOAGULANT  7.5 mg Oral ONCE at 18:00

## 2022-02-17 NOTE — PLAN OF CARE
Pt is A  & O x 4, VSS, afebrile, on room air. Pain to back and right arm. Prn oxycodone given x 1. Previous right arm infiltrate still painful and swollen, arm elevated. Good appetite. Potassium down to 5.0 this shift. Worked with PT this afternoon and was up and walking in room. IV to left arm noted to be bad this AM, removed. Hard IV stick, MD notified and fluids were discontinued.

## 2022-02-17 NOTE — PROVIDER NOTIFICATION
Provider called to discuss with writer his most recent Potassium. She wanted to know how to consult Nephrology. She was given the number to [facility].     She has written orders for the elevated Potassium, writer updated current RN and on-coming RN

## 2022-02-17 NOTE — PLAN OF CARE
S-(situation): hyperkalemia    B-(background): Gout, weakness    A-(assessment): patient working with PT and has had some increase in activity. Potassium continues to be elevated. Down from 6.3 to 5.6 after multiple interventions. Patient is alert and oriented lungs clear on room air. He is on tele with heart rhythm atrial fib with occ PVC's noted. He had a very large bowel movement during the night post k exelate. Blood sugars stable after insulin and D50, 25 gram. Patient a very difficult stick for IV's and lab.     R-(recommendations): PICC line for lab draws and IV fluid as ordered. Notify MD of any other changes.         Goal Outcome Evaluation:    Plan of Care Reviewed With: patient     Overall Patient Progress: no change    Outcome Evaluation: Pt. potassium continues to increase, continue med regimen to decrease potassium. Working with PT/OT for strength. looking for placement per SW.

## 2022-02-17 NOTE — PHARMACY-ANTICOAGULATION SERVICE
Clinical Pharmacy - Warfarin Dosing Consult     Pharmacy has been consulted to manage this patient s warfarin therapy.  Indication: Atrial Fibrillation  Therapy Goal: INR 2-3  Provider/Team: Dr. Ben Hamlin  [facility]: [facility]  Warfarin Prior to Admission: Yes  Warfarin PTA Regimen: 7.5 mg Tue/Thur & 5 mg ROW  Recent documented change in oral intake/nutrition: Unknown    INR   Date Value Ref Range Status   02/17/2022 2.40 (H) 0.85 - 1.15 Final   02/16/2022 2.22 (H) 0.85 - 1.15 Final     Continuing with PTA regimen    Recommend warfarin 7.5 mg today.  Pharmacy will monitor Panda Miranda daily and order warfarin doses to achieve specified goal.      Please contact pharmacy as soon as possible if the warfarin needs to be held for a procedure or if the warfarin goals change.

## 2022-02-17 NOTE — PROGRESS NOTES
S-(situation): patient status update, patient c/o heart feeling funny    B-(background): gouty flare, disposition due to cannot care for self at home, higher potassium levels over the last 24-36 hours but currently 5.6    A-(assessment): IV fluidsNS ordered at 75cc/hour, no current IV after 6 unsuccessful attempts through the night. Did discuss with provider this morning about evaluating to see if PICC line should be considered.    Also of note: WBC spiked today to 17.2 with previous being 9.2 on 2/12/2022.    Patient now also stating his heart feels funny.    R-(recommendations): MD notified

## 2022-02-17 NOTE — PROGRESS NOTES
DATE:  2/16/2022   TIME OF RECEIPT FROM LAB:  2226  LAB TEST:  Potassium  LAB VALUE:  6.3  RESULTS GIVEN WITH READ-BACK TO (PROVIDER):  Dr. Urena     TIME LAB VALUE REPORTED TO PROVIDER:   2223

## 2022-02-17 NOTE — TELEPHONE ENCOUNTER
ANTICOAGULATION     Panda Miranda is overdue for INR check.      is in hospital currently    Patricia Acosta RN

## 2022-02-17 NOTE — PROGRESS NOTES
Pt alert and oriented, pleasant and cooperative with care. Pt received calcium gluconate, dextrose, and bicarb as ordered. Insulin order held due to blood glucose of 88, provider notified. Continuing to monitor patients blood glucose following Potassium reduction protocol and readings remain WNL. Patients potassium level repeated at 2155 and result of 6.4 given to MD. Patient voiding via bedside moderate amounts using urinal.

## 2022-02-17 NOTE — PROGRESS NOTES
Care Management Follow Up    Length of Stay (days): 2    Expected Discharge Date: 02/18/2022     Concerns to be Addressed: discharge planning  3 level home-several stairs within the home.    Patient plan of care discussed at interdisciplinary rounds: Yes    Anticipated Discharge Disposition: Transitional Care  Disposition Comments: TCU Referrals pending    Anticipated Discharge Services: None    Anticipated Discharge DME:      Patient/family educated on Medicare website which has current facility and service quality ratings: yes    Education Provided on the Discharge Plan:      Patient/Family in Agreement with the Plan: yes    Referrals Placed by CM/SW: Internal Clinic Care Coordination, Post Acute Facilities    Private pay costs discussed: Not applicable    Additional Information:  Patient Declined at [facility].  They do not accept [employer] insurance.    Patient Declined at [facility] in [place] - Over weight limit    MAGALIE Us  [facility] [phone]/ [facility] [phone]  Care Management

## 2022-02-18 ENCOUNTER — APPOINTMENT (OUTPATIENT)
Dept: PHYSICAL THERAPY | Facility: CLINIC | Age: 67
DRG: 641 | End: 2022-02-18
Attending: NURSE PRACTITIONER
Payer: COMMERCIAL

## 2022-02-18 LAB
ANION GAP SERPL CALCULATED.3IONS-SCNC: 3 MMOL/L (ref 3–14)
ANION GAP SERPL CALCULATED.3IONS-SCNC: 4 MMOL/L (ref 3–14)
BASE EXCESS BLDV CALC-SCNC: -2.3 MMOL/L (ref -7.7–1.9)
BASE EXCESS BLDV CALC-SCNC: -4.6 MMOL/L (ref -7.7–1.9)
BUN SERPL-MCNC: 88 MG/DL (ref 7–30)
CALCIUM SERPL-MCNC: 8.8 MG/DL (ref 8.5–10.1)
CHLORIDE BLD-SCNC: 108 MMOL/L (ref 94–109)
CHLORIDE BLD-SCNC: 109 MMOL/L (ref 94–109)
CO2 SERPL-SCNC: 24 MMOL/L (ref 20–32)
CO2 SERPL-SCNC: 24 MMOL/L (ref 20–32)
CREAT SERPL-MCNC: 2.27 MG/DL (ref 0.66–1.25)
GFR SERPL CREATININE-BSD FRML MDRD: 31 ML/MIN/1.73M2
GLUCOSE BLD-MCNC: 107 MG/DL (ref 70–99)
HCO3 BLDV-SCNC: 22 MMOL/L (ref 21–28)
HCO3 BLDV-SCNC: 24 MMOL/L (ref 21–28)
HOLD SPECIMEN: NORMAL
INR PPP: 2.9 (ref 0.85–1.15)
O2/TOTAL GAS SETTING VFR VENT: 21 %
O2/TOTAL GAS SETTING VFR VENT: 21 %
PCO2 BLDV: 47 MM HG (ref 40–50)
PCO2 BLDV: 49 MM HG (ref 40–50)
PH BLDV: 7.28 [PH] (ref 7.32–7.43)
PH BLDV: 7.31 [PH] (ref 7.32–7.43)
PO2 BLDV: 44 MM HG (ref 25–47)
PO2 BLDV: 54 MM HG (ref 25–47)
POTASSIUM BLD-SCNC: 5.4 MMOL/L (ref 3.4–5.3)
POTASSIUM BLD-SCNC: 5.4 MMOL/L (ref 3.4–5.3)
SODIUM SERPL-SCNC: 136 MMOL/L (ref 133–144)
SODIUM SERPL-SCNC: 136 MMOL/L (ref 133–144)

## 2022-02-18 PROCEDURE — 97140 MANUAL THERAPY 1/> REGIONS: CPT | Mod: GP | Performed by: PHYSICAL THERAPIST

## 2022-02-18 PROCEDURE — 82803 BLOOD GASES ANY COMBINATION: CPT | Performed by: PEDIATRICS

## 2022-02-18 PROCEDURE — 85610 PROTHROMBIN TIME: CPT | Performed by: NURSE PRACTITIONER

## 2022-02-18 PROCEDURE — 36415 COLL VENOUS BLD VENIPUNCTURE: CPT | Performed by: PEDIATRICS

## 2022-02-18 PROCEDURE — 97530 THERAPEUTIC ACTIVITIES: CPT | Mod: GP | Performed by: PHYSICAL THERAPIST

## 2022-02-18 PROCEDURE — 250N000013 HC RX MED GY IP 250 OP 250 PS 637: Performed by: PEDIATRICS

## 2022-02-18 PROCEDURE — 250N000013 HC RX MED GY IP 250 OP 250 PS 637: Performed by: FAMILY MEDICINE

## 2022-02-18 PROCEDURE — 99232 SBSQ HOSP IP/OBS MODERATE 35: CPT | Performed by: PEDIATRICS

## 2022-02-18 PROCEDURE — 80051 ELECTROLYTE PANEL: CPT | Performed by: PEDIATRICS

## 2022-02-18 PROCEDURE — 80048 BASIC METABOLIC PNL TOTAL CA: CPT | Performed by: PEDIATRICS

## 2022-02-18 PROCEDURE — 250N000012 HC RX MED GY IP 250 OP 636 PS 637: Performed by: INTERNAL MEDICINE

## 2022-02-18 PROCEDURE — 120N000001 HC R&B MED SURG/OB

## 2022-02-18 PROCEDURE — 250N000013 HC RX MED GY IP 250 OP 250 PS 637: Performed by: INTERNAL MEDICINE

## 2022-02-18 PROCEDURE — 250N000013 HC RX MED GY IP 250 OP 250 PS 637: Performed by: NURSE PRACTITIONER

## 2022-02-18 RX ORDER — FENTANYL 25 UG/1
25 PATCH TRANSDERMAL
Status: DISCONTINUED | OUTPATIENT
Start: 2022-02-18 | End: 2022-01-01 | Stop reason: HOSPADM

## 2022-02-18 RX ORDER — WARFARIN SODIUM 2.5 MG/1
2.5 TABLET ORAL
Status: COMPLETED | OUTPATIENT
Start: 2022-02-18 | End: 2022-02-18

## 2022-02-18 RX ORDER — SODIUM BICARBONATE 650 MG/1
650 TABLET ORAL 3 TIMES DAILY
Status: DISCONTINUED | OUTPATIENT
Start: 2022-02-18 | End: 2022-01-01

## 2022-02-18 RX ADMIN — SODIUM BICARBONATE 650 MG TABLET 650 MG: at 20:35

## 2022-02-18 RX ADMIN — FENTANYL 1 PATCH: 25 PATCH, EXTENDED RELEASE TRANSDERMAL at 17:51

## 2022-02-18 RX ADMIN — FEBUXOSTAT 40 MG: 40 TABLET, FILM COATED ORAL at 08:19

## 2022-02-18 RX ADMIN — PREDNISONE 30 MG: 5 TABLET ORAL at 08:13

## 2022-02-18 RX ADMIN — WARFARIN SODIUM 2.5 MG: 2.5 TABLET ORAL at 17:32

## 2022-02-18 RX ADMIN — METOPROLOL SUCCINATE 50 MG: 50 TABLET, EXTENDED RELEASE ORAL at 08:13

## 2022-02-18 RX ADMIN — MICONAZOLE NITRATE: 20 POWDER TOPICAL at 08:17

## 2022-02-18 RX ADMIN — FERROUS SULFATE TAB 325 MG (65 MG ELEMENTAL FE) 325 MG: 325 (65 FE) TAB at 08:16

## 2022-02-18 RX ADMIN — SODIUM BICARBONATE 650 MG TABLET 650 MG: at 08:13

## 2022-02-18 RX ADMIN — SODIUM BICARBONATE 650 MG TABLET 650 MG: at 13:34

## 2022-02-18 ASSESSMENT — ACTIVITIES OF DAILY LIVING (ADL)
ADLS_ACUITY_SCORE: 18
ADLS_ACUITY_SCORE: 17
ADLS_ACUITY_SCORE: 18
ADLS_ACUITY_SCORE: 17
ADLS_ACUITY_SCORE: 18
ADLS_ACUITY_SCORE: 17
ADLS_ACUITY_SCORE: 18
ADLS_ACUITY_SCORE: 16
ADLS_ACUITY_SCORE: 18
ADLS_ACUITY_SCORE: 17
ADLS_ACUITY_SCORE: 18
ADLS_ACUITY_SCORE: 18
ADLS_ACUITY_SCORE: 16
ADLS_ACUITY_SCORE: 16
ADLS_ACUITY_SCORE: 18
ADLS_ACUITY_SCORE: 16
ADLS_ACUITY_SCORE: 18
ADLS_ACUITY_SCORE: 16
ADLS_ACUITY_SCORE: 16

## 2022-02-18 NOTE — PROGRESS NOTES
CLINICAL NUTRITION SERVICES - REASSESSMENT NOTE     Nutrition Prescription    RECOMMENDATIONS FOR MDs/PROVIDERS TO ORDER:  None at this time     Malnutrition Status:    Patient does not meet two of the established criteria necessary for diagnosing malnutrition but is at risk for malnutrition    Recommendations already ordered by Registered Dietitian (RD):  None additionally     Future/Additional Recommendations:  Monitor PO intake, diet tolerance, weight trends     EVALUATION OF THE PROGRESS TOWARD GOALS   Diet: Combination Diet 2 gm K Diet; 2 gm NA Diet  Room Service  Diet    Intake: Patient consuming mostly % of meals with the exceptions of 2 meals at 50% per flowsheet documentation. Per patient, eating well with no concerns.      NEW FINDINGS   Weight: 195 kg (2/15, admit), 198.2 kg (2/18) -- weight fluctuations noted over course of admission.   Labs: Reviewed; K+ 5.4 (H)   Meds: Reviewed; ferrous sulfate, lasix, prednisone, sodium bicarb,   GI: Last BM 2/17  Renal: BUN 88, Cr 2.27   Skin: WOC following, chronic wounds on RLE, LLE,     MALNUTRITION  % Intake: Decreased intake does not meet criteria  % Weight Loss: Unable to assess  Subcutaneous Fat Loss: None observed  Muscle Loss: None observed  Fluid Accumulation/Edema: None noted  Malnutrition Diagnosis: Patient does not meet two of the established criteria necessary for diagnosing malnutrition but is at risk for malnutrition    Previous Goals   Patient to consume % of nutritionally adequate meal trays TID, or the equivalent with supplements/snacks.  Evaluation: Met    Previous Nutrition Diagnosis  No nutrition diagnosis at this time related to   Evaluation: No longer applicable, nutrition diagnosis changed below    CURRENT NUTRITION DIAGNOSIS  Altered nutrition-related laboratory values related to kidney function as evidenced by hyperkalemia       INTERVENTIONS  Implementation  Nutrition Education: RD provided education and handouts related to CKD  stage 3, hyperkalemia, and weight management. Per patient, wanting to lose weight. Addressed and questions or concerns regarding this from patient.    Collaboration with other providers - IDT rounds     Goals  Patient to consume % of nutritionally adequate meal trays TID, or the equivalent with supplements/snacks.    Monitoring/Evaluation  Progress toward goals will be monitored and evaluated per protocol.    Samir Hurst RDN, BRANDIN  Clinical Dietitian   Office: [phone]  Weekend Pager: [phone]

## 2022-02-18 NOTE — PHARMACY-ANTICOAGULATION SERVICE
Clinical Pharmacy - Warfarin Dosing Consult     Pharmacy has been consulted to manage this patient s warfarin therapy.  Indication: Atrial Fibrillation  Therapy Goal: INR 2-3  Provider/Team: Dr. Ben Hamlin  [facility]: [facility]  Warfarin Prior to Admission: Yes  Warfarin PTA Regimen: 7.5 mg Tue/Thur & 5 mg ROW  Recent documented change in oral intake/nutrition: Unknown    INR   Date Value Ref Range Status   02/18/2022 2.90 (H) 0.85 - 1.15 Final   02/17/2022 2.40 (H) 0.85 - 1.15 Final       Recommend warfarin 2.5 mg today. Lower dose due to rapid uptrend in recent INR's Pharmacy will monitor Panda Miranda daily and order warfarin doses to achieve specified goal.      Please contact pharmacy as soon as possible if the warfarin needs to be held for a procedure or if the warfarin goals change.

## 2022-02-18 NOTE — PLAN OF CARE
Goal Outcome Evaluation:    Plan of Care Reviewed With: patient     Overall Patient Progress: improving    Outcome Evaluation: Potassium monitoring continues with levels above 5.0. Infiltration remains painful at site, but improving. Patient has slept majority of shift. Declines pain interventions. Rest, reposition, and current management are effective.

## 2022-02-18 NOTE — PROGRESS NOTES
Care Management Follow Up    Length of Stay (days): 3    Expected Discharge Date: 02/18/2022     Concerns to be Addressed: discharge planning  3 level home-several stairs within the home.    Patient plan of care discussed at interdisciplinary rounds: Yes    Anticipated Discharge Disposition: Transitional Care  Disposition Comments: TCU Referrals pending    Anticipated Discharge Services: None    Anticipated Discharge DME:      Patient/family educated on Medicare website which has current facility and service quality ratings: yes    Education Provided on the Discharge Plan:      Patient/Family in Agreement with the Plan: yes    Referrals Placed by CM/SW: Internal Clinic Care Coordination, Post Acute Facilities    Private pay costs discussed: Not applicable    Additional Information:  Patient medically ready for discharge.  Physical therapy evaluation today due to possibility of patient returning home, however, patient could not safely complete stairs required to return home.  Care Management to continue to work on bariatric TCU placement.    MAGALIE Us  [facility] [phone]/ [facility] [phone]  Care Management

## 2022-02-18 NOTE — PROGRESS NOTES
[facility]    Medicine Progress Note - Hospitalist Service    Date of Admission:  2/11/2022    Assessment & Plan          Patient is a 66-year-old gentleman with a past medical history of chronic systolic CHF, chronic kidney disease stage IV, atrial fibrillation, gout and pseudogout, severe obesity and recent hospitalization for pyelonephritis and COVID-19 infection who presented to an outside emergency room on 2/10/2022 with generalized weakness and lower back pain and was hospitalized for observation due to an acute flare of crystal induced polyarthritis in addition to significant weakness and associated acute functional decline.  He then developed worsening hyperkalemia on 2/16 up to 7.3 and was admitted for treatment of severe hyperkalemia.  Cause for severe hyperkalemia is probably multifactorial including transient metabolic acidosis with normal anion gap due to acute kidney injury and intermittent respiratory acidosis without capacity to develop compensatory metabolic alkalosis because of severe chronic kidney disease.  Hyperkalemia has improved with treatment and as renal function recovers to baseline.  Metabolic acidosis has resolved as has respiratory acidosis this morning.  Uremia has stabilized, creatinine has improved, and he has had adequate urine output.    Acute flare of gout and/or pseudogout has improved and seems to be resolving with starting prednisone.  Chronic back pain is stable on his normal fentanyl patch with use of oxycodone as needed for breakthrough pain.  Chronic venous stasis ulcers of the lower legs are stable.  Chronic systolic CHF and atrial fibrillation are stable.  Anemia of chronic kidney disease is stable.  Aside from mild respiratory acidosis yesterday morning after he had not used CPAP, obstructive sleep apnea appears to be stable and he reports that he does not normally use CPAP when his weight is less than 450 pounds.  Overall, his functional  status is improving although he continues to be weaker than his normal baseline with inability to climb even a single stair which prevents him from returning to home.  He continues to work with physical therapy. He is medically stable but does not yet have a safe disposition plan. Disposition planning continues.    Principal Problem:    Hyperkalemia  Active Problems:    Acute kidney injury (H)-possible    Calcium pyrophosphate deposition disease    Polyarticular gout    Chronic systolic congestive heart failure (H)    Chronic atrial fibrillation (H)    Gout attack    CKD (chronic kidney disease) stage 4, GFR 15-29 ml/min (H)    Morbid obesity (H)    Ventricular septal defect    Status post bariatric surgery    Venous stasis ulcers of both lower extremities (H)-right shin and left lateral ankle    Back pain    Anemia of chronic renal failure, stage 4 (severe) (H)    Muscle weakness (generalized)    History of 2019 novel coronavirus disease (COVID-19)-Jan 2022, now recovered    TASHA (obstructive sleep apnea)-only uses CPAP when weight is over 450    Increase sodium bicarbonate supplementation and continue to follow potassium and blood gas  Continue prednisone with plan for gradual taper and Uloric at lower dose than his previous dose because of impaired renal function for treatment of crystal induced polyarthritis  Continue chronic fentanyl patch and oxycodone as needed for breakthrough pain  Continue Toprol-XL, pharmacy assisting with warfarin dosing  Anticipate discharge once he has a safe disposition plan, expect probable discharge to TCU       Diet: Combination Diet 2 gm K Diet; 2 gm NA Diet  Room Service  Diet    DVT Prophylaxis: Warfarin  Cheema Catheter: Not present  Central Lines: None  Cardiac Monitoring: None  Code Status: Full Code      Disposition Plan   Expected Discharge: 02/18/2022     Anticipated discharge location: inpatient rehabilitation facility    Delays:     Placement - TCU            The patient's  care was discussed with the Care Coordinator/, Patient and PT Consultant.    Flaco Selby MD  Hospitalist Service  [facility]  Securely message with the Vocera Web Console (learn more here)  Text page via "[employer] Paging/Directory         Clinically Significant Risk Factors Present on Admission                 ______________________________________________________________________    Interval History   There were no significant events overnight.  He remains afebrile and hemodynamically stable.  Oxygenation remains normal.  PT reports that the patient was able to ambulate about 25 feet using a walker today.  However, he could not even climb 1 step today and normally has multiple steps he has to negotiate at home.  Joint pain has been improved.    Data reviewed today: I reviewed all medications, new labs and imaging results over the last 24 hours. I personally reviewed no images or EKG's today.    Physical Exam   Vital Signs: Temp: (!) 96.7  F (35.9  C) Temp src: Oral BP: 130/66 Pulse: 75   Resp: 16 SpO2: 100 % O2 Device: None (Room air)    Weight: 441 lbs 8 oz   Vitals:    02/15/22 1045 02/16/22 0642 02/17/22 0650 02/18/22 0629   Weight: (!) 195 kg (430 lb) (!) 194.8 kg (429 lb 6.4 oz) (!) 198.7 kg (438 lb) (!) 198.2 kg (437 lb)    02/18/22 1031   Weight: (!) 200.3 kg (441 lb 8 oz)     General Appearance: No acute distress sitting in a chair  Other: Appears alert, maintains wakefulness and attention, no confusion evident    Data   Recent Labs   Lab 02/18/22  0613 02/17/22  2146 02/17/22  1400 02/17/22  1057 02/17/22  0655 02/17/22  0549 02/16/22  1756 02/16/22  1401 02/16/22  0814 02/16/22  0558 02/13/22  0519 02/12/22  0615   WBC  --   --   --   --   --  17.2*  --   --   --   --   --  9.2   HGB  --   --   --   --   --  10.9*  --   --   --   --   --  9.7*   MCV  --   --   --   --   --  89  --   --   --   --   --  85   PLT  --   --   --   --   --  354  --   --   --   --    --  253   INR 2.90*  --   --   --   --  2.40*  --   --   --  2.22*   < > 2.45*    135 136  --   --  137  --  134   < >  --    < > 137   POTASSIUM 5.4* 5.3 5.0  --   --  5.6*   < > 5.4*   < >  --    < > 5.1   CHLORIDE 108 106 108  --   --  109  --  106   < >  --    < > 110*   CO2 24 24 24  --   --  24  --  20   < >  --    < > 21   BUN 88*  --   --   --   --  90*  --  91*   < >  --    < > 60*   CR 2.27*  --   --   --   --  2.21*  --  2.29*   < >  --    < > 2.52*   ANIONGAP 4 5 4  --   --  4  --  8   < >  --    < > 6   JOHN 8.8  --   --   --   --  9.1  --  9.3   < >  --    < > 9.5   *  --   --  99 107* 111*   < > 107*   < >  --    < > 114*    < > = values in this interval not displayed.     Venous Blood Gas  Recent Labs   Lab 02/18/22  0613 02/17/22  2146 02/17/22  1400 02/17/22  0550   PHV 7.31* 7.32 7.26* 7.27*   PCO2V 49 47 52* 52*   PO2V 54* 43 41 34   HCO3V 24 24 23 24   EDMUND -2.3 -1.8 -3.9 -3.2   O2PER 21 21 21 21       Medications     - MEDICATION INSTRUCTIONS -       Warfarin Therapy Reminder         febuxostat  40 mg Oral Daily     fentaNYL  25 mcg Transdermal Q72H     fentaNYL   Transdermal Q8H     ferrous sulfate  325 mg Oral Daily     metoprolol succinate ER  50 mg Oral Daily     miconazole   Topical BID     predniSONE  30 mg Oral Daily     sodium bicarbonate  650 mg Oral TID     warfarin ANTICOAGULANT  2.5 mg Oral ONCE at 18:00

## 2022-02-18 NOTE — PLAN OF CARE
Goal Outcome Evaluation:    Plan of Care Reviewed With: patient     Overall Patient Progress: improving    Outcome Evaluation: Potassium at normal level, infiltration site getting better although still some pain.    Reciving oxycodone for pain. Warm compress applied to infiltration site. Sat at edge of the bed for some activity this evening.

## 2022-02-19 NOTE — PROGRESS NOTES
[facility]    Medicine Progress Note - Hospitalist Service    Date of Admission:  2/11/2022    Assessment & Plan        Patient is a 66-year-old gentleman with a past medical history of chronic systolic CHF, chronic kidney disease stage IV, atrial fibrillation, gout and pseudogout, severe obesity and recent hospitalization for pyelonephritis and COVID-19 infection who presented to an outside emergency room on 2/10/2022 with generalized weakness and lower back pain and was hospitalized for observation due to an acute flare of crystal induced polyarthritis in addition to significant weakness and associated acute functional decline.  He then developed worsening hyperkalemia on 2/16 up to 7.3 and was admitted for treatment of severe hyperkalemia.  Cause for severe hyperkalemia is probably multifactorial including transient metabolic acidosis with normal anion gap due to acute kidney injury and intermittent respiratory acidosis without capacity to develop compensatory metabolic alkalosis because of severe chronic kidney disease.  Hyperkalemia has improved with treatment and as renal function recovers to baseline.  Metabolic acidosis has resolved although mild respiratory acidosis has again recurred this morning.  Uremia has improved, creatinine has improved, and he has had adequate urine output.    Acute flare of gout and/or pseudogout has improved and seems to be resolving with starting prednisone.  Chronic back pain is stable on his normal fentanyl patch with use of oxycodone as needed for breakthrough pain.  Chronic venous stasis ulcers of the lower legs are stable.  Chronic systolic CHF and atrial fibrillation are stable.  Anemia of chronic kidney disease is stable.  Aside from mild respiratory acidosis yesterday morning after he had not used CPAP, obstructive sleep apnea appears to be stable and he reports that he does not normally use CPAP when his weight is less than 450 pounds.   Overall, his functional status is improving.  He continues to work with physical therapy and looks forward to the challenge of reattempting stairs because he has not yet been able to return home due to inability to climb stairs which he will need to do at home. He is medically stable but does not yet have a safe disposition plan.     Principal Problem:    Hyperkalemia  Active Problems:    Acute kidney injury (H)-possible    Calcium pyrophosphate deposition disease    Polyarticular gout    Chronic systolic congestive heart failure (H)    Chronic atrial fibrillation (H)    Gout attack    CKD (chronic kidney disease) stage 4, GFR 15-29 ml/min (H)    Morbid obesity (H)    Ventricular septal defect    Status post bariatric surgery    Venous stasis ulcers of both lower extremities (H)-right shin and left lateral ankle    Back pain    Anemia of chronic renal failure, stage 4 (severe) (H)    Muscle weakness (generalized)    History of 2019 novel coronavirus disease (COVID-19)-Jan 2022, now recovered    TASHA (obstructive sleep apnea)-only uses CPAP when weight is over 450    Increase sodium bicarbonate supplementation and continue to follow electrolytes and blood gases  Continue prednisone with plan for gradual taper off gradually, continue Uloric at lower dose than his previous dose because of impaired renal function for treatment of crystal induced polyarthritis, colchicine discontinued earlier this hospital stay because of impaired renal function but may be able to restart at lower dose as renal function improves now that estimated GFR has been greater than 30 for 4 consecutive days  Continue chronic fentanyl patch and oxycodone as needed for breakthrough pain  Continue Toprol-XL, pharmacy assisting with warfarin dosing  Anticipate discharge once he has a safe disposition plan, may be able to return home if he can safely negotiate stairs with the assistance of using a railing and his cane which is the way he normally can  climb his stairs at home     Diet: Combination Diet 2 gm K Diet; 2 gm NA Diet  Room Service  Diet    DVT Prophylaxis: Warfarin  Cheema Catheter: Not present  Central Lines: None  Cardiac Monitoring: None  Code Status: Full Code      Disposition Plan   Expected Discharge: 02/19/2022     Anticipated discharge location: inpatient rehabilitation facility    Delays:     Placement - TCU            The patient's care was discussed with the Bedside Nurse, Care Coordinator/ and Patient.    Flaco Selby MD  Hospitalist Service  formerly [facility]  Securely message with the Voc[employer] Web Console (learn more here)  Text page via [employer] Paging/Directory         Clinically Significant Risk Factors Present on Admission                    ______________________________________________________________________    Interval History   Overall he feels about the same.  He has had some twinges of joint pain in his hands that make him think that his gout may be acting up, but they have not lasted long and have not interfered with his ability to use his hands.  His knee pain has not recurred.  He remains afebrile and hemodynamically stable.  Oxygenation is normal.  He has had good urine output.  He is tolerating good oral intake.  He is also tolerating increasing ambulation.  He says he normally climbs stairs at home holding onto a railing with one hand and using a cane in the other hand.  Here at the hospital he has not yet tried to climb stairs in that manner and hopes to do so either today or tomorrow.    Data reviewed today: I reviewed all medications, new labs and imaging results over the last 24 hours. I personally reviewed no images or EKG's today.    Physical Exam   Vital Signs: Temp: 96.9  F (36.1  C) Temp src: Oral BP: 113/62 Pulse: 82   Resp: 16 SpO2: 100 % O2 Device: None (Room air)    Weight: 439 lbs 0 oz  General Appearance: Severely obese man, no distress resting in bed  Other: Grossly  adequate and symmetric active range of motion of the hands    Data   Recent Labs   Lab 02/19/22  0615 02/18/22  1410 02/18/22  0613 02/17/22  1400 02/17/22  1057 02/17/22  0655 02/17/22  0549   WBC  --   --   --   --   --   --  17.2*   HGB  --   --   --   --   --   --  10.9*   MCV  --   --   --   --   --   --  89   PLT  --   --   --   --   --   --  354   INR 2.91*  --  2.90*  --   --   --  2.40*    136 136   < >  --   --  137   POTASSIUM 4.9 5.4* 5.4*   < >  --   --  5.6*   CHLORIDE 111* 109 108   < >  --   --  109   CO2 24 24 24   < >  --   --  24   BUN 81*  --  88*  --   --   --  90*   CR 2.02*  --  2.27*  --   --   --  2.21*   ANIONGAP 4 3 4   < >  --   --  4   JOHN 8.6  --  8.8  --   --   --  9.1   *  --  107*  --  99   < > 111*    < > = values in this interval not displayed.       Venous Blood Gas  Recent Labs   Lab 02/19/22  0614 02/18/22  1410 02/18/22  0613 02/17/22  2146   PHV 7.28* 7.28* 7.31* 7.32   PCO2V 52* 47 49 47   PO2V 30 44 54* 43   HCO3V 24 22 24 24   EDMUND -2.9 -4.6 -2.3 -1.8   O2PER 21 21 21 21     Medications     - MEDICATION INSTRUCTIONS -       Warfarin Therapy Reminder         febuxostat  40 mg Oral Daily     fentaNYL  25 mcg Transdermal Q72H     fentaNYL   Transdermal Q8H     ferrous sulfate  325 mg Oral Daily     metoprolol succinate ER  50 mg Oral Daily     miconazole   Topical BID     predniSONE  30 mg Oral Daily     sodium bicarbonate  1,300 mg Oral TID     warfarin ANTICOAGULANT  2.5 mg Oral ONCE at 18:00

## 2022-02-19 NOTE — PHARMACY-ANTICOAGULATION SERVICE
Clinical Pharmacy - Warfarin Dosing Consult     Pharmacy has been consulted to manage this patient s warfarin therapy.       INR   Date Value Ref Range Status   02/19/2022 2.91 (H) 0.85 - 1.15 Final   02/18/2022 2.90 (H) 0.85 - 1.15 Final       Recommend warfarin 2.5 mg today.  Pharmacy will monitor Panda Miranda daily and order warfarin doses to achieve specified goal.      Please contact pharmacy as soon as possible if the warfarin needs to be held for a procedure or if the warfarin goals change.

## 2022-02-19 NOTE — PLAN OF CARE
Problem: Muscle Strength Impairment  Goal: Improved Muscle Strength  2/19/2022 1548 by Kendrick Wagoner, RN  Outcome: Ongoing, Progressing  2/19/2022 1546 by Kendrick Wagoner, RN  Outcome: Adequate for Care Transition   Goal Outcome Evaluation:    Plan of Care Reviewed With: patient     Overall Patient Progress: Improved activity with PT, Plans to attempt stairs tomorrow. Was able transition into and out of bed and chair.    Outcome Evaluation: Pt infiltration site is improving. Small amount of pain when moving.   Problem: Electrolyte Imbalance  Goal: Electrolyte Balance  2/19/2022 1548 by Kendrick Wagoner, RN  Outcome: Ongoing, Progressing Potassium decreased to 4.9 today and tolerating renal diet well  2/19/2022 1546 by Kendrick Wagoner, RN  Outcome: Adequate for Care Transition

## 2022-02-19 NOTE — PLAN OF CARE
Goal Outcome Evaluation:    Plan of Care Reviewed With: patient     Overall Patient Progress: no change    Outcome Evaluation: Pt infiltration site is improving. Some reddness and miminal pain, not puffy.    Fentanyl patch in place, nothing else needed for pain.

## 2022-02-19 NOTE — PLAN OF CARE
Goal Outcome Evaluation:    Plan of Care Reviewed With: patient     Overall Patient Progress: improving    Outcome Evaluation: Pt up walking in room/hallway with PT. Pt was able to sit up in bariatric chair and sit up on edge of bed for meals. K level of 5.4 this AM.

## 2022-02-19 NOTE — PLAN OF CARE
Goal Outcome Evaluation:    Plan of Care Reviewed With: patient     Overall Patient Progress: no change    Outcome Evaluation: Fentanyl patch in place. Pt declines any other pain medications. Resting/sleeping comfortably.

## 2022-02-20 PROBLEM — N19 RENAL FAILURE, UNSPECIFIED CHRONICITY: Status: ACTIVE | Noted: 2022-01-01

## 2022-02-20 PROBLEM — M62.81 GENERALIZED MUSCLE WEAKNESS: Status: ACTIVE | Noted: 2022-01-01

## 2022-02-20 NOTE — PROGRESS NOTES
Care Management Follow Up    Length of Stay (days): 9    Expected Discharge Date: 02/21/2022     Concerns to be Addressed: discharge planning  3 level home-several stairs within the home.    Patient plan of care discussed at interdisciplinary rounds: Yes    Anticipated Discharge Disposition: Home, Home Care  Disposition Comments: Patient plans to d/c home. Referral has been sent to [facility]    Anticipated Discharge Services: None    Patient/family educated on Medicare website which has current facility and service quality ratings: yes    Education Provided on the Discharge Plan:      Patient/Family in Agreement with the Plan: yes    Referrals Placed by CM/SW: Internal Clinic Care Coordination, Post Acute Facilities    Private pay costs discussed: Not applicable    Additional Information:  Writer visited with patient this afternoon.  He was able to complete going up 4 stairs today with PT.  Patient is confident and states he will be able to manage the 16 stairs at home.      Discussed recommendation for home PT and RN services.  Patient in agreement. Pt was provided with Medicare certified home care list. Pt chooses to use: [facility] (Phone: [phone]). Referral has been sent.      Patient is working on setting up transportation for his [facility] home.  It is undetermined if he will be able to get transport for this evening or for tomorrow.  Patient has family that he is planning to call to discuss transport.         MAGALIE Lazar  [facility]   [phone]

## 2022-02-20 NOTE — PLAN OF CARE
Goal Outcome Evaluation:    Plan of Care Reviewed With: patient     Overall Patient Progress: improving    Outcome Evaluation: Pt resting in bed, eating full meals, ambulation improving. Will start on colchicine tomorrow.

## 2022-02-20 NOTE — PROGRESS NOTES
PRIMARY DIAGNOSIS: GENERALIZED WEAKNESS    OUTPATIENT/OBSERVATION GOALS TO BE MET BEFORE DISCHARGE  1. Orthostatic performed: No    2. Tolerating PO medications: Yes    3. Return to near baseline physical activity: Yes    4. Cleared for discharge by consultants (if involved): N/A    Discharge Planner Nurse   Safe discharge environment identified: Yes  Barriers to discharge: No       Entered by: Kendrick Wagoner 02/20/2022 3:25 PM   Pt activity increased today with PT. Patient up in room with staff to commode. Pt able to reposition in bed with no assistance.   Please review provider order for any additional goals.   Nurse to notify provider when observation goals have been met and patient is ready for discharge.

## 2022-02-20 NOTE — PLAN OF CARE
"Goal Outcome Evaluation:    Plan of Care Reviewed With: patient     Overall Patient Progress: improving    Outcome Evaluation: Potassium WNL        S-(situation): End of shift note    B-(background): Hypokalemia    A-(assessment): Vital signs stable. /68 (BP Location: Right arm)   Pulse 77   Temp 97.6  F (36.4  C) (Oral)   Resp 18   Ht 1.994 m (6' 6.5\")   Wt (!) 198.8 kg (438 lb 3.2 oz)   SpO2 96%   BMI 50.00 kg/m  .  Afebrile. Lung sounds CTA B/L.  On RA.   A&O x4.  Pt denies pain.  Bowel sounds normoactive.  Bilateral extremities ace wrapped. IV site asymptomatic.   Able to make needs known and uses call light appropriately.     R-(recommendations): Continue to monitor per care plan.          "

## 2022-02-20 NOTE — PLAN OF CARE
Goal Outcome Evaluation:    Plan of Care Reviewed With: patient     Overall Patient Progress: Met    Outcome Evaluation: Potassium WNL

## 2022-02-20 NOTE — PROGRESS NOTES
[facility]    Medicine Progress Note - Hospitalist Service    Date of Admission:  2/11/2022    Assessment & Plan                Patient is a 66-year-old gentleman with a past medical history of chronic systolic CHF, chronic kidney disease stage IV, atrial fibrillation, gout and pseudogout, severe obesity and recent hospitalization for pyelonephritis and COVID-19 infection who presented to an outside emergency room on 2/10/2022 with generalized weakness and lower back pain and was hospitalized for observation due to an acute flare of crystal induced polyarthritis in addition to significant weakness and associated acute functional decline.  He then developed worsening hyperkalemia on 2/16 up to 7.3 and was admitted for treatment of severe hyperkalemia.  Cause for severe hyperkalemia is probably multifactorial including transient metabolic acidosis with normal anion gap due to acute kidney injury and intermittent respiratory acidosis without capacity to develop compensatory metabolic alkalosis because of severe chronic kidney disease.  Hyperkalemia has improved with treatment and as renal function recovers to baseline.  Metabolic acidosis has resolved although mild respiratory acidosis continues to recur especially in the morning.  Uremia has improved, creatinine has improved, and he has had adequate urine output.    Acute flare of gout and/or pseudogout has improved and seems to be resolving with starting prednisone.  Chronic back pain is stable on his normal fentanyl patch with use of oxycodone as needed for breakthrough pain.  Chronic venous stasis ulcers of the lower legs are stable.  Chronic systolic CHF and atrial fibrillation are stable.  Anemia of chronic kidney disease is stable.  Aside from mild respiratory acidosis in the mornings when he has not used CPAP overnight, obstructive sleep apnea appears to be stable and he reports that he does not normally use CPAP when his weight  is less than 450 pounds.  Overall, his functional status is improving.  He continues to work with physical therapy and was able to climb stairs today with PT and believes that he can function adequately at home at this time. He is medically stable for discharge but does not yet have a transportation plan to get home today and reports that he has not been able to reach family members for assistance yet.     Principal Problem:    Hyperkalemia  Active Problems:    Acute kidney injury (H)-possible    Calcium pyrophosphate deposition disease    Polyarticular gout    Chronic systolic congestive heart failure (H)    Chronic atrial fibrillation (H)    Gout attack    CKD (chronic kidney disease) stage 4, GFR 15-29 ml/min (H)    Morbid obesity (H)    Ventricular septal defect    Status post bariatric surgery    Venous stasis ulcers of both lower extremities (H)-right shin and left lateral ankle    Back pain    Anemia of chronic renal failure, stage 4 (severe) (H)    Muscle weakness (generalized)    History of 2019 novel coronavirus disease (COVID-19)-Jan 2022, now recovered    TASHA (obstructive sleep apnea)-only uses CPAP when weight is over 450    Continue higher dose sodium bicarbonate supplementation today and expect to continue it after discharge  Continue prednisone with plan for gradual taper off after discharge, continue Uloric at lower dose than his previous dose because of impaired renal function for treatment of crystal induced polyarthritis, restart colchicine at lower dose as renal function improves now that estimated GFR has been greater than 30 for 5 consecutive days  Continue chronic fentanyl patch and oxycodone as needed for breakthrough pain  Continue Toprol-XL, pharmacy assisting with warfarin dosing  Anticipate discharge once he has a plan for safe transportation       Diet: Combination Diet 2 gm K Diet; 2 gm NA Diet  Room Service  Diet    DVT Prophylaxis: Warfarin  Cheema Catheter: Not present  Central  Lines: None  Cardiac Monitoring: None  Code Status: Full Code      Disposition Plan   Expected Discharge: 02/21/2022     Anticipated discharge location: inpatient rehabilitation facility    Delays:     Transportation - Family/Friend            The patient's care was discussed with the Bedside Nurse, Care Coordinator/ and Patient.    Flaco Selby MD  Hospitalist Service  [facility]  Securely message with the Vocera Web Console (learn more here)  Text page via [employer] Paging/Directory         Clinically Significant Risk Factors Present on Admission                    ______________________________________________________________________    Interval History   He generally is feeling better.  He is not having ongoing significant persistent joint pain in his hands or right knee.  He worked with PT today and was able to climb 4 steps in the stairwell.  Although he has a longer staircase at home, he believes that he can negotiate his stairs at home at this time.  He has been afebrile and hemodynamically stable.  Oxygenation is normal.  Urine output has been oliguric but adequate.  He is tolerating oral intake.    Data reviewed today: I reviewed all medications, new labs and imaging results over the last 24 hours. I personally reviewed no images or EKG's today.    Physical Exam   Vital Signs: Temp: 97.5  F (36.4  C) Temp src: Oral BP: 119/75 Pulse: 85   Resp: 18 SpO2: 100 % O2 Device: None (Room air)    Weight: 438 lbs 3.2 oz  General Appearance: Severely obese man, no acute distress resting in bed  Other: Alert and answers questions appropriately    Data   Recent Labs   Lab 02/20/22  0602 02/19/22  0615 02/18/22  1410 02/18/22  0613 02/17/22  0655 02/17/22  0549   WBC  --   --   --   --   --  17.2*   HGB  --   --   --   --   --  10.9*   MCV  --   --   --   --   --  89   PLT  --   --   --   --   --  354   INR 2.60* 2.91*  --  2.90*  --  2.40*    139 136 136   < > 137    POTASSIUM 5.1 4.9 5.4* 5.4*   < > 5.6*   CHLORIDE 110* 111* 109 108   < > 109   CO2 24 24 24 24   < > 24   BUN 76* 81*  --  88*  --  90*   CR 1.93* 2.02*  --  2.27*  --  2.21*   ANIONGAP 4 4 3 4   < > 4   JOHN 8.7 8.6  --  8.8  --  9.1   * 114*  --  107*   < > 111*    < > = values in this interval not displayed.     Venous Blood Gas  Recent Labs   Lab 02/20/22  0600 02/19/22  0614 02/18/22  1410 02/18/22  0613   PHV 7.29* 7.28* 7.28* 7.31*   PCO2V 51* 52* 47 49   PO2V 37 30 44 54*   HCO3V 25 24 22 24   EDMUND -2.4 -2.9 -4.6 -2.3   O2PER 21 21 21 21       Medications     - MEDICATION INSTRUCTIONS -       Warfarin Therapy Reminder         colchicine  0.3 mg Oral Daily     [START ON 2/21/2022] COVID-19 mRNA vacc (PFIZER)  30 mcg Intramuscular Prior to discharge     febuxostat  40 mg Oral Daily     fentaNYL  25 mcg Transdermal Q72H     fentaNYL   Transdermal Q8H     ferrous sulfate  325 mg Oral Daily     metoprolol succinate ER  50 mg Oral Daily     miconazole   Topical BID     predniSONE  30 mg Oral Daily     sodium bicarbonate  1,300 mg Oral TID     warfarin ANTICOAGULANT  5 mg Oral ONCE at 18:00

## 2022-02-20 NOTE — PROGRESS NOTES
PRIMARY DIAGNOSIS: GENERALIZED WEAKNESS    OUTPATIENT/OBSERVATION GOALS TO BE MET BEFORE DISCHARGE  1. Orthostatic performed: No    2. Tolerating PO medications: Yes    3. Return to near baseline physical activity: Yes    4. Cleared for discharge by consultants (if involved): N/A    Discharge Planner Nurse   Safe discharge environment identified: Yes  Barriers to discharge: No       Entered by: Kendrick Wagoner 02/20/2022 4:05 PM   PT to see patient today.   Please review provider order for any additional goals.   Nurse to notify provider when observation goals have been met and patient is ready for discharge.

## 2022-02-20 NOTE — PHARMACY-ANTICOAGULATION SERVICE
Clinical Pharmacy - Warfarin Dosing Consult     Pharmacy has been consulted to manage this patient s warfarin therapy.       INR   Date Value Ref Range Status   02/20/2022 2.60 (H) 0.85 - 1.15 Final   02/19/2022 2.91 (H) 0.85 - 1.15 Final       Recommend warfarin 5 mg today.  Pharmacy will monitor Panda Miranda daily and order warfarin doses to achieve specified goal.      Please contact pharmacy as soon as possible if the warfarin needs to be held for a procedure or if the warfarin goals change.

## 2022-02-21 NOTE — PHARMACY-ANTICOAGULATION SERVICE
Clinical Pharmacy - Warfarin Dosing Consult     Pharmacy has been consulted to manage this patient s warfarin therapy.  Indication: Atrial Fibrillation  Therapy Goal: INR 2-3  Warfarin PTA Regimen: 7.5 mg every Tue, Thu; 5 mg all other days    INR   Date Value Ref Range Status   02/20/2022 2.60 (H) 0.85 - 1.15 Final   02/19/2022 2.91 (H) 0.85 - 1.15 Final       Recommend warfarin 5 mg today.  Pharmacy will monitor Panda Miranda daily and order warfarin doses to achieve specified goal.      Please contact pharmacy as soon as possible if the warfarin needs to be held for a procedure or if the warfarin goals change.

## 2022-02-21 NOTE — ED NOTES
"[facility]  ED Nurse Handoff Report    ED Chief complaint: Social Work Services and Extremity Weakness      ED Diagnosis:   Final diagnoses:   Generalized muscle weakness   Morbid obesity (H)   Renal failure, unspecified chronicity       Code Status: Full Code    Allergies: No Known Allergies    Patient Story: Pt was discharged from [facility] about 2 hours ago;  States that once he got home, he just did not have enough strength in his legs to make it up the stairs of his home.  Pt called EMS; states that he needs a TCU.  Focused Assessment:  Patient is alert and oriented x 4, calm and cooperative. VS are stable, skin is warm and dry, respirations are even and non-labored on room air. Patient denied chest pain, shortness of breath, dizziness, and nausea.       Treatments and/or interventions provided: Pending MD orders    Patient's response to treatments and/or interventions: Stable    To be done/followed up on inpatient unit:  Monitor, arrange for placement. Bariatric bed ordered to be sent to patient's room.    Does this patient have any cognitive concerns?: None    Activity level - Baseline/Home:  Unknown  Activity Level - Current:   Unknown    Patient's Preferred language: English   Needed?: No    Isolation: None  Infection: Not Applicable  Patient tested for COVID 19 prior to admission: YES  Bariatric?: No    Vital Signs:   Vitals:    02/20/22 2215   BP: 109/80   Pulse: 63   Resp: 16   Temp: 98.9  F (37.2  C)   TempSrc: Oral   SpO2: 98%   Weight: (!) 198.2 kg (437 lb)   Height: 1.981 m (6' 6\")       Cardiac Rhythm:     Was the PSS-3 completed:   Yes  What interventions are required if any?               Family Comments: No family present at this time.    OBS brochure/video discussed/provided to patient/family: Yes              Name of person given brochure if not patient: NA              Relationship to patient: NA    For the majority of the shift this patient's behavior was " Ernst.   Behavioral interventions performed were  information and reassurance provided.  .    ED NURSE PHONE NUMBER: [phone]

## 2022-02-21 NOTE — PLAN OF CARE
Physical Therapy and Lymphedema Care Discharge Summary    Reason for therapy discharge:    Discharged to home with home therapy.    Progress towards therapy goal(s). See goals on Care Plan in [facility] electronic health record for goal details.  Goals partially met.  Barriers to achieving goals:   limited tolerance for therapy and discharge from facility.    Therapy recommendation(s):    Continued therapy is recommended.  Rationale/Recommendations:  to address LE strength, endurance, balance, and stair management for safe and efficient management of his home environment..     Thank you for your referral.  Kathi Degroot, PT, DPT, ATC, [facility]  O: [phone]  E: [email or web address]

## 2022-02-21 NOTE — ED NOTES
Bed: ED09  Expected date: 2/20/22  Expected time: 10:10 PM  Means of arrival: Ambulance  Comments:  HEMS 424 66M failure to thrive

## 2022-02-21 NOTE — PLAN OF CARE
Goal Outcome Evaluation:    Plan of Care Reviewed With: patient     Overall Patient Progress: improving    Outcome Evaluation: Potassium WNL    S-(situation): Patient discharged to home via w/c with nephew.    B-(background): hyperkalemia, weakness    A-(assessment): pt A/O x4. Hyperkalemia resolving. Pt transferring with A1/walker. Reports chronic pain is controlled with fentanyl patch, no PRN meds needed. See skin f/s.    R-(recommendations): Discharge instructions reviewed with pt. Listed belongings gathered and returned to patient.          Discharge Nursing Criteria:     Care Plan and Patient education resolved: Yes    New Medications- pt has been educated about purpose and side effects: Yes    Vaccines  Influenza status verified at discharge:  Yes    MISC  Prescriptions if needed, hard copies sent with patient  Yes  Home medications returned to patient: Yes  Medication Bin checked and emptied on discharge Yes  Patient reports post-discharge pain management plan is effective: Yes

## 2022-02-21 NOTE — PROVIDER NOTIFICATION
MD Notification    Notified Person: DO    Notified Person Name: Dr. Layton    Notification Date/Time: 2/21/22 @1039    Notification Interaction: [employer] messaging    Purpose of Notification: Need PT orders, no orders from admitting provider     Orders Received:    Comments:

## 2022-02-21 NOTE — ED TRIAGE NOTES
Pt was discharged from [facility] about 2 hours ago;  States that once he got home, he just did not have enough strength in his legs to make it up the stairs of his home.  Pt called EMS; states that he needs a TCU.

## 2022-02-21 NOTE — DISCHARGE SUMMARY
[facility]  Hospitalist Discharge Summary      Date of Admission:  2/11/2022  Date of Discharge:  2/20/2022  Discharging Provider: Flaco Selby MD  Discharge Service: Hospitalist Service    Discharge Diagnoses   Principal Problem:    Hyperkalemia  Active Problems:    Acute kidney injury (H)-possible    Calcium pyrophosphate deposition disease    Polyarticular gout    Chronic systolic congestive heart failure (H)    Chronic atrial fibrillation (H)    Gout attack    CKD (chronic kidney disease) stage 4, GFR 15-29 ml/min (H)    Morbid obesity (H)    Ventricular septal defect    Status post bariatric surgery    Venous stasis ulcers of both lower extremities (H)-right shin and left lateral ankle    Back pain    Anemia of chronic renal failure, stage 4 (severe) (H)    Muscle weakness (generalized)    History of 2019 novel coronavirus disease (COVID-19)-Jan 2022, now recovered    TASHA (obstructive sleep apnea)-only uses CPAP when weight is over 450    Follow-ups Needed After Discharge   Follow-up Appointments     Follow-up and recommended labs and tests       Follow up with primary care provider, Ben Hamlin, within 7 days to   evaluate medication change and for hospital follow- up.  The following   labs/tests are recommended: basic metabolic panel, venous blood gas, and   INR within 1 week.             Discharge Disposition   Admited to home care:   Agency: [facility]  Discharged to home  Condition at discharge: Stable    Hospital Course              Patient is a 66-year-old gentleman with a past medical history of chronic systolic CHF, chronic kidney disease stage IV, atrial fibrillation, gout and pseudogout, severe obesity and recent hospitalization for pyelonephritis and COVID-19 infection who presented to an outside emergency room on 2/10/2022 with generalized weakness and lower back pain and was hospitalized for observation due to an acute flare of crystal induced polyarthritis in  addition to significant weakness and associated acute functional decline.  He then developed worsening hyperkalemia on 2/16 up to 7.3 and was admitted for treatment of severe hyperkalemia.      Problem #1 severe hyperkalemia probably due to acute kidney injury and acidosis.  Hyperkalemia was treated per protocol and improved.  He did not have obvious EKG changes or dysrhythmias associated with severe hyperkalemia.  Cause for severe hyperkalemia  was thought to be multifactorial including transient metabolic acidosis with normal anion gap due to acute kidney injury and intermittent respiratory acidosis without capacity to develop compensatory metabolic alkalosis because of chronic kidney disease.  Chronic medications were adjusted due to impaired renal function.  By discharge, renal function recovered to baseline and hyperkalemia and metabolic acidosis resolved.  However, mild respiratory acidosis continued to recur, so bicarbonate supplementation was continued.      Problem #2 acute flare of polyarticular gout and/or pseudogout.  He had worsening joint pain in his hands and right knee as well as worsening back pain.  He was started on prednisone.  Colchicine was initially stopped because of worsening renal function.  Uloric dose was reduced because of worsening renal function.  After starting prednisone, signs of acute exacerbation of gout or pseudogout resolved and back pain improved.  By discharge, chronic back pain was stable on his normal fentanyl patch with use of oxycodone as needed for breakthrough pain.  As joint pain improved, he tolerated advancing activity.  He was evaluated and treated by PT.  He was able to ambulate independently and climb stairs on the day of discharge, and the patient thought that he could manage at home based on his functional status at the time of discharge.  As renal function recovered, colchicine was restarted at low-dose.  As he continued to improve after acute exacerbation of  polyarticular gout and/or pseudogout, ongoing taper off of prednisone after discharge by his PCP was recommended.    During this hospital stay, chronic venous stasis ulcers of the lower legs were stable.  Chronic systolic CHF and atrial fibrillation were stable.  Anemia of chronic kidney disease was stable.  Aside from mild respiratory acidosis in the mornings when he did not use CPAP overnight, obstructive sleep apnea appeared to be stable.  He reported during hospitalization that he does not normally use CPAP when his weight is less than 450 pounds.      Consultations This Hospital Stay   PHARMACY TO DOSE WARFARIN  WOUND OSTOMY CONTINENCE NURSE  IP CONSULT  PHYSICAL THERAPY ADULT IP CONSULT  CARE MANAGEMENT / SOCIAL WORK IP CONSULT  PHYSICAL THERAPY ADULT IP CONSULT  LYMPHEDEMA THERAPY IP CONSULT  PHARMACY IP CONSULT    Code Status   Full Code    Time Spent on this Encounter   I, Flaco Selby MD, personally saw the patient today and spent greater than 30 minutes discharging this patient.       Flaco Selby MD  [address] SURGICAL  [address] [place] [address]  Phone: [phone]  ______________________________________________________________________    Physical Exam   Vital Signs: Temp: 97.5  F (36.4  C) Temp src: Oral BP: 119/75 Pulse: 85   Resp: 18 SpO2: 100 % O2 Device: None (Room air)    Weight: 438 lbs 3.2 oz     General Appearance:  Severely obese man, no acute distress resting in bed  Other:  Alert and answers questions appropriately       Primary Care Physician   Ben Hamlin    Discharge Orders      Medication Therapy Management Referral      Home Care Referral      Reason for your hospital stay    Hospitalized due to worsening arthritis and high potassium level and improved     Activity    Your activity upon discharge: activity as tolerated     Follow-up and recommended labs and tests     Follow up with primary care provider, Ben Hamlin, within 7 days  to evaluate medication change and for hospital follow- up.  The following labs/tests are recommended: basic metabolic panel, venous blood gas, and INR within 1 week.     Diet    Follow this diet upon discharge: Orders Placed This Encounter      Room Service      Combination Diet 2 gm K Diet; 2 gm NA Diet       Significant Results and Procedures   Most Recent 3 CBC's:Recent Labs   Lab Test 02/17/22  0549 02/12/22  0615 02/10/22  1708   WBC 17.2* 9.2 8.9   HGB 10.9* 9.7* 10.5*   MCV 89 85 88    253 135*     Most Recent 3 BMP's:Recent Labs   Lab Test 02/20/22  0602 02/19/22  0615 02/18/22  1410 02/18/22  0613    139 136 136   POTASSIUM 5.1 4.9 5.4* 5.4*   CHLORIDE 110* 111* 109 108   CO2 24 24 24 24   BUN 76* 81*  --  88*   CR 1.93* 2.02*  --  2.27*   ANIONGAP 4 4 3 4   JOHN 8.7 8.6  --  8.8   * 114*  --  107*     Most Recent 2 LFT's:Recent Labs   Lab Test 02/10/22  1708 02/03/22  1554   AST 27 33   ALT <9 20   ALKPHOS 76 76   BILITOTAL 1.1* 0.6       Most Recent ABG:Recent Labs   Lab Test 02/16/22  1546   PH 7.30*   PO2 96   PCO2 39   HCO3 19*   JULISSA -6.5     Venous Blood Gas  Recent Labs   Lab 02/20/22  0600 02/19/22  0614 02/18/22  1410 02/18/22  0613   PHV 7.29* 7.28* 7.28* 7.31*   PCO2V 51* 52* 47 49   PO2V 37 30 44 54*   HCO3V 25 24 22 24   EDMUND -2.4 -2.9 -4.6 -2.3   O2PER 21 21 21 21       Most Recent ESR & CRP:Recent Labs   Lab Test 02/10/22  1708   SED 92*   CRP 31.7*   ,   Results for orders placed or performed during the hospital encounter of 02/10/22   XR Chest Port 1 View    Narrative    EXAM: XR CHEST PORT 1 VIEW  LOCATION: [facility]  DATE/TIME: 2/10/2022 6:36 PM    INDICATION: chest pain  COMPARISON: 01/28/2022      Impression    IMPRESSION: Cardiomegaly is unchanged. Prominent perihilar vessels as seen previously suggesting mild congestion. No infiltrates or pleural effusions.       Discharge Medications   Current Discharge Medication List      START taking  these medications    Details   metoprolol succinate ER (TOPROL-XL) 50 MG 24 hr tablet Take 1 tablet (50 mg) by mouth daily  Qty: 30 tablet, Refills: 0    Associated Diagnoses: Chronic atrial fibrillation (H)      sodium bicarbonate 650 MG tablet Take 2 tablets (1,300 mg) by mouth 3 times daily  Qty: 180 tablet, Refills: 0    Associated Diagnoses: CKD (chronic kidney disease) stage 4, GFR 15-29 ml/min (H); Hyperkalemia         CONTINUE these medications which have CHANGED    Details   colchicine (COLCYRS) 0.6 MG tablet Take 0.5 tablets (0.3 mg) by mouth daily  Qty: 15 tablet, Refills: 0    Associated Diagnoses: Polyarticular gout      febuxostat (ULORIC) 40 MG TABS tablet Take 1 tablet (40 mg) by mouth daily  Qty: 30 tablet, Refills: 0    Associated Diagnoses: Gout, unspecified cause, unspecified chronicity, unspecified site      predniSONE (DELTASONE) 20 MG tablet Take 1 tablet (20 mg) by mouth daily  Qty: 14 tablet, Refills: 0    Associated Diagnoses: Pain      warfarin ANTICOAGULANT (COUMADIN) 5 MG tablet Take 5 mg by mouth daily OR AS DIRECTED.  Adjust dose based on INR.  Qty: 100 tablet, Refills: 1    Associated Diagnoses: Warfarin anticoagulation         CONTINUE these medications which have NOT CHANGED    Details   acetaminophen (TYLENOL) 325 MG tablet Take 2 tablets (650 mg) by mouth every 6 hours as needed for mild pain or other (and adjunct with moderate or severe pain or per patient request)    Associated Diagnoses: Pain      famotidine (PEPCID) 20 MG tablet Take 1 tablet (20 mg) by mouth 2 times daily as needed (heartburn)    Associated Diagnoses: Gastroesophageal reflux disease with esophagitis, unspecified whether hemorrhage      fentaNYL (DURAGESIC) 25 mcg/hr 72 hr patch Place 1 patch onto the skin every 72 hours for 15 days remove old patch.  Qty: 5 patch, Refills: 0    Associated Diagnoses: Gout, unspecified cause, unspecified chronicity, unspecified site; Arthritis of knee      ferrous sulfate  (FEROSUL) 325 (65 Fe) MG tablet Take 1 tablet (325 mg) by mouth daily  Qty: 90 tablet, Refills: 3    Associated Diagnoses: Iron deficiency; Encounter for medication refill      methocarbamol (ROBAXIN) 500 MG tablet Take 500 mg by mouth 3 times daily as needed for muscle spasms      oxyCODONE (ROXICODONE) 5 MG tablet Take 1 tablet (5 mg) by mouth every 6 hours as needed for moderate to severe pain  Refills: 0    Associated Diagnoses: Pain      melatonin 1 MG TABS tablet Take 1 tablet (1 mg) by mouth nightly as needed for sleep    Associated Diagnoses: Insomnia, unspecified type         STOP taking these medications       metoprolol tartrate (LOPRESSOR) 25 MG tablet Comments:   Reason for Stopping:             Allergies   No Known Allergies

## 2022-02-21 NOTE — PROGRESS NOTES
RECEIVING UNIT ED HANDOFF REVIEW    ED Nurse Handoff Report was reviewed by: Katherine Martino RN on February 21, 2022 at 1:07 AM

## 2022-02-21 NOTE — H&P
[facility]    History and Physical - Hospitalist Service       Date of Admission:  2/20/2022    Assessment & Plan      Panda Miranda is a 66 year old male admitted on 2/20/2022 after being discharged earlier from [facility] stay from 2/11 - 2/20 for the following problems:    Principal Problem:    Hyperkalemia  Active Problems:    Acute kidney injury (H)-possible    Calcium pyrophosphate deposition disease    Polyarticular gout    Chronic systolic congestive heart failure (H)    Chronic atrial fibrillation (H)    Gout attack    CKD (chronic kidney disease) stage 4, GFR 15-29 ml/min (H)    Morbid obesity (H)    Ventricular septal defect    Status post bariatric surgery    Venous stasis ulcers of both lower extremities (H)-right shin and left lateral ankle    Back pain    Anemia of chronic renal failure, stage 4 (severe) (H)    Muscle weakness (generalized)    History of 2019 novel coronavirus disease (COVID-19)-Jan 2022, now recovered    TASHA (obstructive sleep apnea)-only uses CPAP when weight is over 450      Patient is morbidly obese and worked with therapies including walking up the stairs.  He was discharge to his home but he was unable to manage going up steps in his home.  He was brought in to [facility] ED for placement at TCU.  He had gout exacerbation and is currently on prednisone which is likely raising his white count.  He denies any fever, chills, nausea, vomiting, diarrhea, weakness, dizziness or lightheadedness.      In the ER he was seen by Dr Henning, vitals were normal.  Labs were reviewed from his recent discharge with Cr of 1.93 but overall stable with patient anticoagulated.     He is being admitted for placement.    1. Recent Admission with decondition in setting of morbid obesity  -- patient will be seen by PT and social work  -- he will likely need placement at TCU for strength and conditioning    2. Gout exacerbation  -- continue prednisone  -- continue uloric,  colchicine    3. Atrial fibrillation  -- started on toprol on his recent admission  -- INR therapeutic  -- Rph to dose    4. Chronic knee pain  -- continue fentanyl patch  -- continue robaxin  -- continue prn oxycodone    5. CKD  -- continue NaHC03  -- Cr is at baseline.     Diet:   regular  DVT Prophylaxis: Warfarin  Cheema Catheter: Not present  Central Lines: None  Cardiac Monitoring: None  Code Status:   Full    Clinically Significant Risk Factors Present on Admission   -- morbid obesity  -- anticoagulation    Disposition Plan   -- to TCU      Noam Gil MD  Hospitalist Service  [facility]  Securely message with the Vocera Web Console (learn more here)  Text page via [email or web address] Paging/Directory         ______________________________________________________________________    Chief Complaint   Weakness, unable to manage steps at his home    History is obtained from the patient and ER MD.    History of Present Illness   Panda Miranda is a 66 year old male who is morbidly obese and worked with therapies including walking up the stairs.  He was discharge to his home but he was unable to manage going up steps in his home.  He was brought in to [facility] ED for placement at TCU.  He had gout exacerbation and is currently on prednisone which is likely raising his white count.  He denies any fever, chills, nausea, vomiting, diarrhea, weakness, dizziness or lightheadedness.        Review of Systems    The 10 point Review of Systems is negative other than noted in the HPI      Past Medical History    I have reviewed this patient's medical history and updated it with pertinent information if needed.   Past Medical History:   Diagnosis Date     (HFpEF) heart failure with preserved ejection fraction (H)      Arthritis      Arthropathy of wrist      Atrial fibrillation (H)      Bradycardia      Cancer (H) 2011    colon cancer; hemicolectomy     CHF (congestive heart failure) (H)      Chronic kidney  disease      Gout      Hand swelling      Heart valve disease     intermittent mitral regurgitation     HTN (hypertension)      Hyperlipidemia      Morbid obesity (H)      Obesity      TASHA (obstructive sleep apnea)     CPAP     Perimembranous ventricular septal defect        Past Surgical History   I have reviewed this patient's surgical history and updated it with pertinent information if needed.  Past Surgical History:   Procedure Laterality Date     COLON SURGERY       COLONOSCOPY N/A 12/12/2019    Procedure: COLONOSCOPY;  Surgeon: Flaco Magaña MD;  Location: [facility];  Service: General     GASTROPLASTY VERTICAL BANDED      Dr. Arizmendi [facility] 1996 Initial weight 800++ Lost to 440- (was 320# at lowest)     KNEE SURGERY       ZZC PART REMOVAL COLON W ANASTOMOSIS      Description: Partial Colectomy;  Recorded: 12/02/2011;  Comments: Dr Magaña       Social History   I have reviewed this patient's social history and updated it with pertinent information if needed.  Social History     Tobacco Use     Smoking status: Never Smoker     Smokeless tobacco: Never Used   Substance Use Topics     Alcohol use: Yes     Alcohol/week: 0.8 standard drinks     Comment: Alcoholic Drinks/day: occasional     Drug use: No       Family History   I have reviewed this patient's family history and updated it with pertinent information if needed.  Family History   Problem Relation Age of Onset     Diabetes Type 2  Other      Heart Failure Other      Heart Disease Mother      Hypertension Mother      Diabetes Sister        Prior to Admission Medications   Prior to Admission Medications   Prescriptions Last Dose Informant Patient Reported? Taking?   acetaminophen (TYLENOL) 325 MG tablet   No No   Sig: Take 2 tablets (650 mg) by mouth every 6 hours as needed for mild pain or other (and adjunct with moderate or severe pain or per patient request)   colchicine (COLCYRS) 0.6 MG tablet   No No   Sig: Take 0.5 tablets (0.3 mg) by  mouth daily   famotidine (PEPCID) 20 MG tablet   No No   Sig: Take 1 tablet (20 mg) by mouth 2 times daily as needed (heartburn)   febuxostat (ULORIC) 40 MG TABS tablet   No No   Sig: Take 1 tablet (40 mg) by mouth daily   fentaNYL (DURAGESIC) 25 mcg/hr 72 hr patch   No No   Sig: Place 1 patch onto the skin every 72 hours for 15 days remove old patch.   ferrous sulfate (FEROSUL) 325 (65 Fe) MG tablet   No No   Sig: Take 1 tablet (325 mg) by mouth daily   melatonin 1 MG TABS tablet   No No   Sig: Take 1 tablet (1 mg) by mouth nightly as needed for sleep   methocarbamol (ROBAXIN) 500 MG tablet   Yes No   Sig: Take 500 mg by mouth 3 times daily as needed for muscle spasms   metoprolol succinate ER (TOPROL-XL) 50 MG 24 hr tablet   No No   Sig: Take 1 tablet (50 mg) by mouth daily   oxyCODONE (ROXICODONE) 5 MG tablet   No No   Sig: Take 1 tablet (5 mg) by mouth every 6 hours as needed for moderate to severe pain   predniSONE (DELTASONE) 20 MG tablet   No No   Sig: Take 1 tablet (20 mg) by mouth daily   sodium bicarbonate 650 MG tablet   No No   Sig: Take 2 tablets (1,300 mg) by mouth 3 times daily   warfarin ANTICOAGULANT (COUMADIN) 5 MG tablet   Yes No   Sig: Take 5 mg by mouth daily OR AS DIRECTED.  Adjust dose based on INR.      Facility-Administered Medications: None     Allergies   No Known Allergies    Physical Exam   Vital Signs: Temp: 98.9  F (37.2  C) Temp src: Oral BP: 109/80 Pulse: 63   Resp: 16 SpO2: 98 %      Weight: 437 lbs 0 oz    General Appearance: NAD  HEENT: NCAT  Respiratory: CTA anteriorly  Cardiovascular: Irregular  GI: morbidly obese, soft  Skin: warm and dry  Musculoskeletal: ace wraps to LE  Neurologic: moves all 4 ext equally, CN intact  Psychiatric: normal, compensated    Data   Data reviewed today: I reviewed all medications, new labs and imaging results over the last 24 hours. I personally reviewed no images or EKG's today.    Recent Labs   Lab 02/20/22  0602 02/19/22  0615 02/18/22  1410  02/18/22  0613 02/17/22  0655 02/17/22  0549   WBC  --   --   --   --   --  17.2*   HGB  --   --   --   --   --  10.9*   MCV  --   --   --   --   --  89   PLT  --   --   --   --   --  354   INR 2.60* 2.91*  --  2.90*  --  2.40*    139 136 136   < > 137   POTASSIUM 5.1 4.9 5.4* 5.4*   < > 5.6*   CHLORIDE 110* 111* 109 108   < > 109   CO2 24 24 24 24   < > 24   BUN 76* 81*  --  88*  --  90*   CR 1.93* 2.02*  --  2.27*  --  2.21*   ANIONGAP 4 4 3 4   < > 4   JOHN 8.7 8.6  --  8.8  --  9.1   * 114*  --  107*   < > 111*    < > = values in this interval not displayed.

## 2022-02-21 NOTE — ED PROVIDER NOTES
History   Chief Complaint:  Extremity Weakness     The history is provided by the patient.      Panda Miranda is a 66 year old male on Coumadin with history of colon cancer, atrial fibrillation, Stage 4 CKD, CHF, heart valve disease, hypertension, hyperlipidemia, and morbid obesity who presents with extremity weakness. The patient reports that he comfortably discharged from [facility] earlier today and was admitted there for significant hyperkalemia and acute kidney injury. Upon returning home, the patient was unable to walk up his stairs, prompting him to present to the ED. He notes a history of gout primarily in his hands and is currently on colchicine and prednisone. He also points out a wound to his lower left leg that is recovering well. He denies any recent falls. He is eating and drinking normally.  No fevers or chills at home.  No vomiting.  He did not fall.      [facility] Discharge Summary - 02/20/22  Hospitalist Discharge Summary       Date of Admission:  2/11/2022  Date of Discharge:  2/20/2022  Discharging Provider: Flaco Selby MD  Discharge Service: Hospitalist Service     Discharge Diagnoses     Principal Problem:    Hyperkalemia  Active Problems:    Acute kidney injury (H)-possible    Calcium pyrophosphate deposition disease    Polyarticular gout    Chronic systolic congestive heart failure (H)    Chronic atrial fibrillation (H)    Gout attack    CKD (chronic kidney disease) stage 4, GFR 15-29 ml/min (H)    Morbid obesity (H)    Ventricular septal defect    Status post bariatric surgery    Venous stasis ulcers of both lower extremities (H)-right shin and left lateral ankle    Back pain    Anemia of chronic renal failure, stage 4 (severe) (H)    Muscle weakness (generalized)    History of 2019 novel coronavirus disease (COVID-19)-Jan 2022, now recovered    TASHA (obstructive sleep apnea)-only uses CPAP when weight is over 450    Hospital  Course    Patient is a 66-year-old gentleman with a past medical history of chronic systolic CHF, chronic kidney disease stage IV, atrial fibrillation, gout and pseudogout, severe obesity and recent hospitalization for pyelonephritis and COVID-19 infection who presented to an outside emergency room on 2/10/2022 with generalized weakness and lower back pain and was hospitalized for observation due to an acute flare of crystal induced polyarthritis in addition to significant weakness and associated acute functional decline.  He then developed worsening hyperkalemia on 2/16 up to 7.3 and was admitted for treatment of severe hyperkalemia.       Problem #1 severe hyperkalemia probably due to acute kidney injury and acidosis.  Hyperkalemia was treated per protocol and improved.  He did not have obvious EKG changes or dysrhythmias associated with severe hyperkalemia.  Cause for severe hyperkalemia  was thought to be multifactorial including transient metabolic acidosis with normal anion gap due to acute kidney injury and intermittent respiratory acidosis without capacity to develop compensatory metabolic alkalosis because of chronic kidney disease.  Chronic medications were adjusted due to impaired renal function.  By discharge, renal function recovered to baseline and hyperkalemia and metabolic acidosis resolved.  However, mild respiratory acidosis continued to recur, so bicarbonate supplementation was continued.       Problem #2 acute flare of polyarticular gout and/or pseudogout.  He had worsening joint pain in his hands and right knee as well as worsening back pain.  He was started on prednisone.  Colchicine was initially stopped because of worsening renal function.  Uloric dose was reduced because of worsening renal function.  After starting prednisone, signs of acute exacerbation of gout or pseudogout resolved and back pain improved.  By discharge, chronic back pain was stable on his normal fentanyl patch with use of  oxycodone as needed for breakthrough pain.  As joint pain improved, he tolerated advancing activity.  He was evaluated and treated by PT.  He was able to ambulate independently and climb stairs on the day of discharge, and the patient thought that he could manage at home based on his functional status at the time of discharge.  As renal function recovered, colchicine was restarted at low-dose.  As he continued to improve after acute exacerbation of polyarticular gout and/or pseudogout, ongoing taper off of prednisone after discharge by his PCP was recommended.     During this hospital stay, chronic venous stasis ulcers of the lower legs were stable.  Chronic systolic CHF and atrial fibrillation were stable.  Anemia of chronic kidney disease was stable.  Aside from mild respiratory acidosis in the mornings when he did not use CPAP overnight, obstructive sleep apnea appeared to be stable.  He reported during hospitalization that he does not normally use CPAP when his weight is less than 450 pounds.       Basic Metabolic Panel, [facility]- 02/20/22  Sodium 138, Potassium 5.1, Chloride 110 (H), Urea Nitrogen 76 (H), Creatinine 1.93 (H), Glucose 110 (H)    Review of Systems   Neurological: Positive for weakness.   All other systems reviewed and are negative.        Allergies:  The patient has no known allergies.     Medications:  Coumadin  Colcyrs  Pepcid  Uloric  Duragesic  Ferosul  Robaxin  Toprol XL  Oxycodone  Prednisone  Lopressor  Lisinopril    Past Medical History:     Anemia  Arthritis   Arthropathy of wrist   Atrial fibrillation  Bradycardia   Calcium pyrophosphate deposition disease  Chronic venous hypertension (idiopathic) with ulcer of left lower extremity  CHF   CKD, Stage 4  COVID-19  Gout   Heart valve disease   Hyperglycemia  Hyperkalemia  Hyperlipidemia   Hypertension  Hypotension  Malignant neoplasm of sigmoid colon  Morbid obesity   Non-rheumatic mitral  "regurgitation  Obstructive sleep apnea  Onychomycosis  Osteoarthritis  Perimembranous ventricular septal defect  Peripheral vascular disease  Plantar fasciitis  Secondary renal hyperparathyroidism  Sickle cell crisis  Urge incontinence of urine  Ventricular septal defect  Venous stasis ulcers of lower extremities  Vitamin D deficiency    Past Surgical History:    Unspecified colon surgery  Colonoscopy x2  Gastroplasty  Patella tendon repair  Partial colon removal with anastomosis  Colectomy  Cataract extraction with IOL, right  Cataract extraction with IOL, left      Family History:    Mother: unspecified heart disease, hypertension  Sister: unspecified diabetes    Social History:  The patient presents to the ED alone via EMS.   He lives with his wife.  He is a retired [employer] .    Physical Exam     Patient Vitals for the past 24 hrs:   BP Temp Temp src Pulse Resp SpO2 Height Weight   02/20/22 2215 109/80 98.9  F (37.2  C) Oral 63 16 98 % 1.981 m (6' 6\") (!) 198.2 kg (437 lb)       Physical Exam  Nursing note and vitals reviewed.    Constitutional:  Appears comfortable at rest, morbidly obese.  HENT:     Nose normal.  No discharge.      Oral mucosa is moist.  Eyes:    Conjunctivae are normal without injection.  Pupils are equal.  Cardiovascular:  Normal rate, regular rhythm with normal S1 and S2.      Normal heart sounds and peripheral pulses 2+ and equal.       No murmur or carolyn.  Pulmonary:  Effort normal and breath sounds clear to auscultation bilaterally.     No respiratory distress.  No stridor.     No wheezes. No rales.     GI:    Soft. No distension and no mass. No tenderness.  Large panniculus noted.  Musculoskeletal:  Normal range of motion.  Does not have a lot of swelling in his hands.  Said there is some tenderness when he moves them.  Neurological:   Alert and oriented. No focal weakness.     Generally weak.     GCS eye subscore is 4. GCS verbal subscore is 5.      GCS motor subscore " is 6.   Skin:    Skin is warm and dry. No rash noted.   Psychiatric:   Behavior is normal. Appropriate mood and affect.     Judgment and thought content normal.     Emergency Department Course     Emergency Department Course:         Reviewed:  I reviewed nursing notes, vitals, past medical history and Care Everywhere    Assessments:  2247 I obtained history and examined the patient as noted above.     Consults:  9199 I spoke with Dr. Gil, hospitalist, who agreed to admit the patient.     Disposition:  The patient was admitted to the hospital under the care of Dr. Gil.     Impression & Plan     Medical Decision Making:  Patient comes in after being admitted for a week at [facility].  His labs this morning showed a potassium of 5.1, he has renal failure with a GFR of 38 creatinine 1.93.  His INR is therapeutic at 2.60.  He could not get up the steps at his home and is too weak to go back home.  He basically presents to the emergency room for placement to a TCU.  I did not repeat any of his labs.  He had a white count that was up a few days ago but I think that was secondary to the steroid that he was started on for his gout flare.  We can repeat his labs in the morning.  I admitted him under the care of Dr. Gil to an observation bed with social service consult tomorrow.  He is Covid recovered and had Covid in January.    Diagnosis:    ICD-10-CM    1. Generalized muscle weakness  M62.81    2. Morbid obesity (H)  E66.01    3. Renal failure, unspecified chronicity  N19        Scribe Disclosure:  I, Timmy Kelly, am serving as a scribe at 10:38 PM on 2/20/2022 to document services personally performed by Stella Henning MD based on my observations and the provider's statements to me.        Stella Henning MD  02/20/22 3072

## 2022-02-21 NOTE — PLAN OF CARE
Goal Outcome Evaluation:      AVSS; pt denied pain; no IV in place; MD aware and ok with it; pt voiding; wraps on LE's; TCU placement pending; pt appeared to sleep well overnight.     Pt's Bicarb tabs x 2 sent to Pharmacy. Bag # [id number]

## 2022-02-21 NOTE — PROGRESS NOTES
Observation goals  PRIOR TO DISCHARGE        Comments: Placement to TCU: not met, awaiting SW consult

## 2022-02-21 NOTE — PHARMACY-ADMISSION MEDICATION HISTORY
Pharmacy Medication History  Admission medication history interview status for the 2/20/2022  admission is complete. See EPIC admission navigator for prior to admission medications     Location of Interview: Outside patient room but on unit  Medication history sources: Patient discharged from [facility] on 2/20/22 then presented to [facility] ED. Pharmacist completed med list last evening but was locked out of list to moise as complete. The patient did not take any home meds after leaving hospital prior to ED admission.    Medication reconciliation completed by provider prior to medication history? Yes    Time spent in this activity: 10 minutes    Prior to Admission medications    Medication Sig Last Dose Taking? Auth Provider   acetaminophen (TYLENOL) 325 MG tablet Take 2 tablets (650 mg) by mouth every 6 hours as needed for mild pain or other (and adjunct with moderate or severe pain or per patient request)  Yes Fei Jones DO   colchicine (COLCYRS) 0.6 MG tablet Take 0.5 tablets (0.3 mg) by mouth daily  Yes Flaco Selby MD   famotidine (PEPCID) 20 MG tablet Take 1 tablet (20 mg) by mouth 2 times daily as needed (heartburn)  Yes Fei Jones DO   febuxostat (ULORIC) 40 MG TABS tablet Take 1 tablet (40 mg) by mouth daily  Yes Flaco Selby MD   fentaNYL (DURAGESIC) 25 mcg/hr 72 hr patch Place 1 patch onto the skin every 72 hours for 15 days remove old patch.  Yes Ben Hamlin MD   ferrous sulfate (FEROSUL) 325 (65 Fe) MG tablet Take 1 tablet (325 mg) by mouth daily  Yes Ben Hamlin MD   melatonin 1 MG TABS tablet Take 1 tablet (1 mg) by mouth nightly as needed for sleep  Yes Fei Jones DO   methocarbamol (ROBAXIN) 500 MG tablet Take 500 mg by mouth 3 times daily as needed for muscle spasms  Yes Unknown, Entered By History   metoprolol succinate ER (TOPROL-XL) 50 MG 24 hr tablet Take 1 tablet (50 mg) by mouth daily  Yes Flaco Selby MD   oxyCODONE (ROXICODONE) 5 MG tablet Take 1  tablet (5 mg) by mouth every 6 hours as needed for moderate to severe pain  Yes Fei Jones DO   predniSONE (DELTASONE) 20 MG tablet Take 1 tablet (20 mg) by mouth daily  Yes Flaco Selby MD   sodium bicarbonate 650 MG tablet Take 2 tablets (1,300 mg) by mouth 3 times daily  Yes Flaco Selby MD   warfarin ANTICOAGULANT (COUMADIN) 5 MG tablet Take 5 mg by mouth daily OR AS DIRECTED.  Adjust dose based on INR. 2/20/2022 at 1749 - 5 mg Yes Flaco Selby MD       The information provided in this note is only as accurate as the sources available at the time of update(s)

## 2022-02-22 NOTE — PROGRESS NOTES
[facility]    Medicine Progress Note - Hospitalist Service    Date of Admission:  2/20/2022    Assessment & Plan        Panda Miranda is a 66 year old male admitted on 2/20/2022 after being discharged earlier from [facility] stay from 2/11 - 2/20 for acute gout flare and subsequent SHANTE and hyperkalemia likely caused by gout treatment. Patient is morbidly obese and he was discharged to his home with home health but he was unable to manage going up steps immediately upon arrival home.  He was brought in to [facility] ED for placement. In the ER he was seen by Dr Henning, vitals were normal.  Labs were reviewed from his recent discharge with Cr of 1.93 which was improved from his discharge and actually better than baseline.    Deconditing in setting of morbid obesity and recent hospitalization  -- observation admission  -- patient will be seen by PT and social work  -- he will likely need placement at TCU for strength and conditioning     Gout exacerbation  Symptomatically improved.   - Continue tx for exacerbation with steroid taper, uloric and colchicine that were prescribed at discharge.   - No clear plans for tapering meds were given on discharge. However if pain improved now can likely begin dose reduction in the next 1-2 days.     Atrial fibrillation  HFpEF, not in acute exacerbation  Essential HTN  Hx of VSD  - Continue PTA metoprolol and pharmacy to dose warfarin    Chronic venosus stasis, bilateral  -stable, compression wraps as needed     Chronic knee pain, likely OA  -continue PTA fentanyl patch, robaxin and prn oxycodone     CKD  - Cr is currently at baseline or better  - continue sodium bicarb  - monitor renal function intermittenly    COVID negative       Diet: Regular Diet Adult    DVT Prophylaxis: Warfarin  Cheema Catheter: Not present  Central Lines: None  Cardiac Monitoring: None  Code Status: Full Code      Disposition Plan   Expected Discharge: 02/23/2022    "  Anticipated discharge location:  Awaiting care coordination huddle  Delays:             Jillian Layton DO  Hospitalist Service  [facility]  Securely message with the Vocera Web Console (learn more here)  Text page via [employer] Paging/Directory     Spoke with nursing about getting him up to chair today.    Clinically Significant Risk Factors Present on Admission              # Coagulation Defect: home medication list includes an anticoagulant medication    # Severe Obesity: Estimated body mass index is 52.42 kg/m  as calculated from the following:    Height as of this encounter: 1.981 m (6' 6\").    Weight as of this encounter: 205.8 kg (453 lb 9.6 oz).      ______________________________________________________________________    Interval History   Patient calm and cooperative today. He reports his gout flare and joint pain is much better but he just is overall weak. He feels his muscles have gotten weaker since his hospitalization up [place]. He denies cough. No fever or chills. No GI complaints.     Data reviewed today: I reviewed all medications, new labs and imaging results over the last 24 hours.     Physical Exam   Vital Signs: Temp: 98.6  F (37  C) Temp src: Oral BP: 100/63 Pulse: 80   Resp: 18 SpO2: 99 % O2 Device: None (Room air)    Weight: 453 lbs 9.6 oz    Constitutional: Awake, alert, cooperative, no apparent distress, obese  Respiratory: Clear to auscultation bilaterally, no crackles or wheezing distance lung sounds  Cardiovascular: Regular rate and rhythm, normal S1 and S2, and no murmur noted  GI: Normal bowel sounds, soft, non-distended, non-tender  Skin/Integumen: No rashes, no cyanosis, no edema  Other: No obvious joint swelling    Data   Recent Labs   Lab 02/22/22  0623 02/21/22  1924 02/21/22  0915 02/20/22  0602 02/19/22  0615 02/18/22  1410 02/18/22  0613 02/17/22  0655 02/17/22  0549   WBC  --   --   --   --   --   --   --   --  17.2*   HGB  --  10.2*  --   --   --   -- "   --   --  10.9*   MCV  --   --   --   --   --   --   --   --  89   PLT  --   --   --   --   --   --   --   --  354   INR 2.91*  --  3.12* 2.60* 2.91*  --  2.90*  --  2.40*   NA  --   --   --  138 139 136 136   < > 137   POTASSIUM  --   --   --  5.1 4.9 5.4* 5.4*   < > 5.6*   CHLORIDE  --   --   --  110* 111* 109 108   < > 109   CO2  --   --   --  24 24 24 24   < > 24   BUN  --   --   --  76* 81*  --  88*  --  90*   CR  --   --   --  1.93* 2.02*  --  2.27*  --  2.21*   ANIONGAP  --   --   --  4 4 3 4   < > 4   JOHN  --   --   --  8.7 8.6  --  8.8  --  9.1   GLC  --   --   --  110* 114*  --  107*   < > 111*    < > = values in this interval not displayed.     No results found for this or any previous visit (from the past 24 hour(s)).  Medications     Warfarin Therapy Reminder         colchicine  0.3 mg Oral Daily     febuxostat  40 mg Oral Daily     fentaNYL  25 mcg Transdermal Q72H     fentaNYL   Transdermal Q8H     metoprolol succinate ER  50 mg Oral Daily     predniSONE  20 mg Oral Daily     sodium bicarbonate  1,300 mg Oral TID     warfarin ANTICOAGULANT  5 mg Oral ONCE at 18:00

## 2022-02-22 NOTE — PROGRESS NOTES
Observation goals  PRIOR TO DISCHARGE        Comments:     -Placement to TCU: not met        A/O x4. VSS on RA. Denies pain. Regular diet. Up w/ SBA/GB/W. Using BS urinal. BLE wounds, Ace wraps in place,dressing changed yesterday. Fentanyl patch on L upper chest. Hgb: 10.2. Incontinent of bladder x1. No PIV access, provider aware. Plan: WOC/PT/SW consults.

## 2022-02-22 NOTE — PROVIDER NOTIFICATION
MD Notification    Notified Person: PA    Notified Person Name: Anaya Johns    Notification Date/Time: 2/21/22 @1900    Notification Interaction: Am[employer]    Purpose of Notification: FYI, bright red blood noted in stool just now. Patient states its due to straining    Orders Received: recheck hemoglobin     Comments:

## 2022-02-22 NOTE — PROGRESS NOTES
[facility]    Medicine Progress Note - Hospitalist Service    Date of Admission:  2/20/2022    Assessment & Plan        Panda Miranda is a 66 year old male admitted on 2/20/2022 after being discharged earlier from [facility] stay from 2/11 - 2/20 for acute gout flare and subsequent SHANTE and hyperkalemia likely caused by gout treatment. Patient is morbidly obese he was discharge to his home with home health but he was unable to manage going up steps in his home immediately upon arrival home.  He was brought in to [facility] ED for placement. In the ER he was seen by Dr Henning, vitals were normal.  Labs were reviewed from his recent discharge with Cr of 1.93 which was improved from his discharge and actually better than baseline.    Decondition in setting of morbid obesity  -- observation admission  -- patient will be seen by PT and social work  -- he will likely need placement at TCU for strength and conditioning     Gout exacerbation  Symptomatically improved. Continue tx for exacerbation with steroid taper, uloric and colchicine     Atrial fibrillation  Continue PTA metoprolol and pharmacy to dose warfarin     Chronic knee pain, likely OA  -- continue fentanyl patch  -- continue robaxin  -- continue prn oxycodone     CKD  - Cr is at baseline or better  - continue sodium bicarb  -- monitor renal function intermittenly       Diet: Regular Diet Adult    DVT Prophylaxis: Warfarin  Cheema Catheter: Not present  Central Lines: None  Cardiac Monitoring: None  Code Status: Full Code      Disposition Plan   Expected Discharge: 02/23/2022     Anticipated discharge location:  Awaiting care coordination huddle  Delays:             Jillian Layton DO  Hospitalist Service  [facility]  Securely message with the Vocera Web Console (learn more here)  Text page via [employer] Paging/Directory         Clinically Significant Risk Factors Present on Admission              #  "Coagulation Defect: home medication list includes an anticoagulant medication    # Severe Obesity: Estimated body mass index is 52.42 kg/m  as calculated from the following:    Height as of this encounter: 1.981 m (6' 6\").    Weight as of this encounter: 205.8 kg (453 lb 9.6 oz).      ______________________________________________________________________    Interval History   Patient without complaints. Baseline weakness noted per nursing notes.    Mix up in hospitalist list and patient not examined at bedside today. Chart reviewed only    Data reviewed today: I reviewed all medications, new labs and imaging results over the last 24 hours.     Physical Exam   Vital Signs: Temp: 98.6  F (37  C) Temp src: Oral BP: 100/63 Pulse: 80   Resp: 18 SpO2: 99 % O2 Device: None (Room air)    Weight: 453 lbs 9.6 oz      Data   Recent Labs   Lab 02/22/22  0623 02/21/22  1924 02/21/22  0915 02/20/22  0602 02/19/22  0615 02/18/22  1410 02/18/22  0613 02/17/22  0655 02/17/22  0549   WBC  --   --   --   --   --   --   --   --  17.2*   HGB  --  10.2*  --   --   --   --   --   --  10.9*   MCV  --   --   --   --   --   --   --   --  89   PLT  --   --   --   --   --   --   --   --  354   INR 2.91*  --  3.12* 2.60* 2.91*  --  2.90*  --  2.40*   NA  --   --   --  138 139 136 136   < > 137   POTASSIUM  --   --   --  5.1 4.9 5.4* 5.4*   < > 5.6*   CHLORIDE  --   --   --  110* 111* 109 108   < > 109   CO2  --   --   --  24 24 24 24   < > 24   BUN  --   --   --  76* 81*  --  88*  --  90*   CR  --   --   --  1.93* 2.02*  --  2.27*  --  2.21*   ANIONGAP  --   --   --  4 4 3 4   < > 4   JOHN  --   --   --  8.7 8.6  --  8.8  --  9.1   GLC  --   --   --  110* 114*  --  107*   < > 111*    < > = values in this interval not displayed.     No results found for this or any previous visit (from the past 24 hour(s)).  Medications     Warfarin Therapy Reminder         colchicine  0.3 mg Oral Daily     febuxostat  40 mg Oral Daily     fentaNYL  25 mcg " Transdermal Q72H     fentaNYL   Transdermal Q8H     metoprolol succinate ER  50 mg Oral Daily     predniSONE  20 mg Oral Daily     sodium bicarbonate  1,300 mg Oral TID     warfarin ANTICOAGULANT  5 mg Oral ONCE at 18:00

## 2022-02-22 NOTE — UTILIZATION REVIEW
Concurrent stay review; Secondary Review Determination    Under the authority of the Utilization Management Committee, the utilization review process indicated a secondary review on the above patient. The review outcome is based on review of the medical records, discussions with staff, and applying clinical experience noted on the date of the review.    (x) Observation Status Appropriate - Concurrent stay review        RATIONALE FOR DETERMINATION: 66-year-old male admitted on 2/10/2022 at [facility] with complaints of polyarticular arthritis and low back pain and too weak to get out of bed.  Patient has morbid obesity weighing 433 pounds, and recent hospitalization the last 4 days in January for sepsis secondary to urinary tract infection with associated hypotension and acute back pain along with acute heart failure. Due to patient's weakness, back pain and morbid obesity patient was unsafe for discharge requiring oral prednisone for polyarticular gout flare awaiting disposition complicated by an episode of hyperkalemia that required acute inpatient management.  Patient felt strong enough to use stairs and walk and was discharged on 2/20/2022.  However upon arriving home he realized he could not manage the stairs at his house and thus presented to [facility]'s emergency room where he was admitted for TCU placement.  Patient remains in hospital awaiting placement.    Patient is clinically improving and there is no clear indication to change patient's status to inpatient. The severity of illness, intensity of service provided, expected LOS and risk for adverse outcome make the care appropriate for observation.    This document was produced using voice recognition software    The information on this document is developed by the utilization review team in order for the business office to ensure compliance. This only denotes the appropriateness of proper admission status and does not reflect the quality  of care rendered.    The definitions of Inpatient Status and Observation Status used in making the determination above are those provided in the CMS Coverage Manual, Chapter 1 and Chapter 6, section 70.4.    Sincerely,    Nate Wild MD  Utilization Review  Physician Advisor  [facility].

## 2022-02-22 NOTE — CONSULTS
WO Nurse Inpatient Wound Assessment   Reason for consultation: Evaluate and treat  Left lateral shin wounds    Assessment  Left lateral Shin wounds due to Venous Ulcer  Status: initial assessment  Pt has very dry peeling skin and scattered scabbing to BL lower extremities.   Treatment Plan  Left lateral shin wounds: Every 3 days   1. Cleanse with wound cleanser and blot dry  2. Apply Pea size amount of wound gel (skintegrity-floorstock) to Polymem foam pad  (# [id number]) and depress into wound.  3. Spray all intact skin to BL lower extremities with Carmela cleanse and protect and rub in. Allow to soak for 10mins and then wipe away any excess.   4. Apply sween 24 (Pink top lotion) from base of toes to below knees.  5. Allow to completely dry prior to applying compression.       Orders Written  Recommended provider order: None, at this time  WO Nurse follow-up plan:weekly  Nursing to notify the Provider(s) and re-consult the WO Nurse if wound(s) deteriorates or new skin concern.    Patient History  According to provider note(s):  Panda Miranda is a 66 year old male admitted on 2/20/2022 after being discharged earlier from [facility] stay from 2/11 - 2/20 for acute gout flare and subsequent SHANTE and hyperkalemia likely caused by gout treatment. Patient is morbidly obese and he was discharged to his home with home health but he was unable to manage going up steps immediately upon arrival home.  He was brought in to [facility] ED for placement. In the ER he was seen by Dr Henning, vitals were normal.  Labs were reviewed from his recent discharge with Cr of 1.93 which was improved from his discharge and actually better than baseline.       Objective Data  Containment of urine/stool: Incontinence Protocol    Active Diet Order  Orders Placed This Encounter      Regular Diet Adult      Output:   I/O last 3 completed shifts:  In: 880 [P.O.:880]  Out: 1025 [Urine:1025]    Risk Assessment:   Sensory Perception: 4-->no  impairment  Moisture: 4-->rarely moist  Activity: 3-->walks occasionally  Mobility: 3-->slightly limited  Nutrition: 3-->adequate  Friction and Shear: 3-->no apparent problem  Sandoval Score: 20                          Labs: Recent Labs   Lab 02/22/22  0623 02/21/22  1924 02/18/22  0613 02/17/22  0549   HGB  --  10.2*  --  10.9*   INR 2.91*  --    < > 2.40*   WBC  --   --   --  17.2*    < > = values in this interval not displayed.       Physical Exam  Areas of skin assessed: focused BL lower extremities    Wound Location:  Left Lateral Olivera        Date of last photo 2/22/22  Wound History: Pt with chronic wound he reports to be very slow healing. Pt currently has lymph wraps inpatient and uses velcro wraps outpatient    Wound Base: 100 % dermis, red, semidry     Palpation of the wound bed: normal      Drainage: scant     Description of drainage: serosanguinous     Measurements (length x width x depth, in cm) 2.6  x 1.8  x  0.2 cm      Tunneling N/A     Undermining N/A  Periwound skin: intact and dry/scaly      Color: normal and consistent with surrounding tissue      Temperature: normal   Odor: none  Pain: denies , none  Pain intervention prior to dressing change: NA    Interventions  Visual inspection and assessment completed   Wound Care Rationale Protect periwound skin, Promote moist wound healing without tissue dehydration , Gently fill dead space to prevent abscess formation  and Provide protection   Wound Care: done per plan of care  Supplies: at bedside  Current off-loading measures: Pillows  Current support surface: Standard  Atmos Air mattress  Education provided to: importance of repositioning and plan of care  Discussed plan of care with Patient and Nurse    Andreas Perez RN CWOCN

## 2022-02-22 NOTE — PLAN OF CARE
[facility]      OUTPATIENT PHYSICAL THERAPY EVALUATION  PLAN OF TREATMENT FOR OUTPATIENT REHABILITATION  (COMPLETE FOR INITIAL CLAIMS ONLY)  Patient's Last Name, First Name, M.I.  YOB: 1955  Panda Miranda                        Provider's Name  [facility] Medical Record No.  [id number]                               Onset Date:  02/20/22   Start of Care Date:  02/22/22      Type:     _X_PT   ___OT   ___SLP Medical Diagnosis:  impaired mobility and genearlized weakness                        PT Diagnosis:  impaired functional mobility, generalized weakness    Visits from SOC:  1   _________________________________________________________________________________  Plan of Treatment/Functional Goals    Planned Interventions: balance training, bed mobility training, gait training, home exercise program, patient/family education, ROM (range of motion), stair training, strengthening, stretching, transfer training     Goals: See Physical Therapy Goals on Care Plan in [employer] electronic health record.    Therapy Frequency: 5x/week  Predicted Duration of Therapy Intervention: 02/25/22  _________________________________________________________________________________    I CERTIFY THE NEED FOR THESE SERVICES FURNISHED UNDER        THIS PLAN OF TREATMENT AND WHILE UNDER MY CARE     (Physician co-signature of this document indicates review and certification of the therapy plan).                Certification date from: 02/22/22, Certification date to: 03/01/22    Referring Physician: Jillian Layton,             Initial Assessment        See Physical Therapy evaluation dated 02/22/22 in Epic electronic health record.Goal Outcome Evaluation:    Plan of Care Reviewed With: patient

## 2022-02-22 NOTE — PLAN OF CARE
A&Ox4. VSS on RA. Was able to walk to bathroom SBA with GB/walker. Uses urinal at bedside at times. Incontinent of urine x1.Tolerating regular diet. BLE wound care complete, wraps changed. Fentanyl patch on left upper chest. Bright red blood noted when patient had HERBERT CROCKETT aware. Reports some SOB, baseline per pt. Reports lightheadedness when walking and states his right leg feels very weak when he ambulated back from the bathroom. SW/PT/WOC consults in place.

## 2022-02-23 NOTE — PLAN OF CARE
Goal Outcome Evaluation: TCU placement not met, referrals sent    Plan of Care Reviewed With: patient     Overall Patient Progress: improving    A&Ox4. VSS on RA. A+2 walker, GB. Denies pain. BLE ace wrapped, WOC following wound cares q3 days, see WOC note, c/o BLE numbness in feet, baseline. Up in chairs for meals. Plan for TCU placement, CC/SW consulted.

## 2022-02-23 NOTE — PROGRESS NOTES
CM assisting with delivering MOON / Observation notices to pt.  Noted admitting provider placed a SW consult for Placement.  This CM RN changed the order to Care Management / Social Work IP consult so that the patient can be seen by the Care Management team.  This CM RN will print list of TCUs for pt for his review to deliver along with [facility].    Pt/family was provided with the Medicare Compare list for SNF.  Discussed associated Medicare star ratings to assist with choice for referrals/discharge planning Yes.  Education was given to pt/family that star ratings are updated/maintained by Medicare and can be reviewed by visiting www.medicare.gov Yes    Provided the above, pt verbalizes understanding and would like TCU referral sent to [facility] in [place].

## 2022-02-23 NOTE — PROGRESS NOTES
[facility]    Medicine Progress Note - Hospitalist Service    Date of Admission:  2/20/2022    Assessment & Plan        Panda Miranda is a 66 year old male admitted on 2/20/2022 after being discharged earlier from [facility] stay from 2/11 - 2/20 for acute gout flare and subsequent SHANTE and hyperkalemia likely caused by gout treatment. Patient is morbidly obese and he was discharged to his home with home health but he was unable to manage going up steps immediately upon arrival home.  He was brought in to [facility] ED for placement. In the ER he was seen by Dr Henning, vitals were normal.  Labs were reviewed from his recent discharge with Cr of 1.93 which was improved from his discharge and actually better than baseline.    Deconditing in setting of morbid obesity and recent hospitalization  -- observation admission  -- patient will be seen by PT and social work  -- he will likely need placement at TCU for strength and conditioning     Gout exacerbation  Symptomatically improved.   - Continue tx for exacerbation with steroid taper, uloric and colchicine that were prescribed at discharge.   -Right knee pain much improved  There is high risk of cardiovascular death with Uloric so will stop your liquor and start him on allopurinol 300 mg p.o. daily for gout prevention.     Atrial fibrillation  HFpEF, not in acute exacerbation  Essential HTN  Hx of VSD  - Continue PTA metoprolol and pharmacy to dose warfarin    Chronic venosus stasis, bilateral  -stable, compression wraps as needed     Chronic knee pain, likely OA  -continue PTA fentanyl patch, robaxin and prn oxycodone     CKD  - Cr is currently at baseline or better  - continue sodium bicarb  - monitor renal function intermittenly    COVID negative       Diet: Regular Diet Adult    DVT Prophylaxis: Warfarin  Cheema Catheter: Not present  Central Lines: None  Cardiac Monitoring: None  Code Status: Full Code      Disposition Plan  "  Expected Discharge: When TCU bed is available for placement  Anticipated discharge location:  Awaiting care coordination huddle  Delays:             Kathrine Herrera MD  Hospitalist Service  [facility]  Securely message with the Vocera Web Console (learn more here)  Text page via [employer] Paging/Directory         Clinically Significant Risk Factors Present on Admission              # Coagulation Defect: home medication list includes an anticoagulant medication    # Severe Obesity: Estimated body mass index is 52.42 kg/m  as calculated from the following:    Height as of this encounter: 1.981 m (6' 6\").    Weight as of this encounter: 205.8 kg (453 lb 9.6 oz).      ______________________________________________________________________    Interval History   Patient calm and cooperative today.  His right knee pain is much better from gout.  No other acute issues today.  Awaiting placement to TCU    Data reviewed today: I reviewed all medications, new labs and imaging results over the last 24 hours.     Physical Exam   Vital Signs: Temp: 97.7  F (36.5  C) Temp src: Oral BP: 95/58 Pulse: 87   Resp: 20 SpO2: 100 % O2 Device: None (Room air)    Weight: 453 lbs 9.6 oz    Constitutional: Awake, alert, cooperative, no apparent distress, obese  Respiratory: Clear to auscultation bilaterally, no crackles or wheezing distance lung sounds  Cardiovascular: Regular rate and rhythm, normal S1 and S2, and no murmur noted  GI: Normal bowel sounds, soft, non-distended, non-tender  Skin/Integumen: No rashes, no cyanosis, bilateral lower extremities wrapped in lymphedema wraps  Other: No obvious joint swelling    Data   Recent Labs   Lab 02/23/22  0644 02/22/22  0623 02/21/22  1924 02/21/22  0915 02/20/22  0602 02/19/22  0615 02/18/22  1410 02/18/22  0613 02/17/22  0655 02/17/22  0549   WBC  --   --   --   --   --   --   --   --   --  17.2*   HGB  --   --  10.2*  --   --   --   --   --   --  10.9*   MCV  --   --   " --   --   --   --   --   --   --  89   PLT  --   --   --   --   --   --   --   --   --  354   INR 3.10* 2.91*  --  3.12* 2.60* 2.91*  --  2.90*  --  2.40*   NA  --   --   --   --  138 139 136 136   < > 137   POTASSIUM  --   --   --   --  5.1 4.9 5.4* 5.4*   < > 5.6*   CHLORIDE  --   --   --   --  110* 111* 109 108   < > 109   CO2  --   --   --   --  24 24 24 24   < > 24   BUN  --   --   --   --  76* 81*  --  88*  --  90*   CR  --   --   --   --  1.93* 2.02*  --  2.27*  --  2.21*   ANIONGAP  --   --   --   --  4 4 3 4   < > 4   JOHN  --   --   --   --  8.7 8.6  --  8.8  --  9.1   GLC  --   --   --   --  110* 114*  --  107*   < > 111*    < > = values in this interval not displayed.     No results found for this or any previous visit (from the past 24 hour(s)).  Medications     Warfarin Therapy Reminder         allopurinol  300 mg Oral Daily     colchicine  0.3 mg Oral Daily     fentaNYL  25 mcg Transdermal Q72H     fentaNYL   Transdermal Q8H     metoprolol succinate ER  50 mg Oral Daily     predniSONE  20 mg Oral Daily     sodium bicarbonate  1,300 mg Oral TID

## 2022-02-23 NOTE — PLAN OF CARE
A&Ox4. VSS on RA. Up Ax1 GB and walker. BLE redressed. MACKEY, patient states this is baseline. Reports dizziness/lightheadedness when up and walking. Denies pain, and nausea.Tolerating regular diet. Fentanyl patch in place. PT and WOC consult complete. Plan for SW consult.

## 2022-02-23 NOTE — CONSULTS
"Care Management Initial Consult    General Information  Assessment completed with: Panda Lindsey  Type of CM/SW Visit: Initial Assessment    Primary Care Provider verified and updated as needed: Yes ([facility] PCP Dr. Ben Hamlin [phone])   Readmission within the last 30 days: previous discharge plan unsuccessful   Return Category: Exacerbation of disease  Reason for Consult: discharge planning  Advance Care Planning:    no ACP documents on file in [facility]      Communication Assessment  Patient's communication style: spoken language (English or Bilingual)    Hearing Difficulty or Deaf: no      Cognitive  Cognitive/Neuro/Behavioral: WDL                      Living Environment:   People in home: spouse, child(ashli), adult  Angelica  Current living Arrangements: house      Able to return to prior arrangements: no     Family/Social Support:  Care provided by: self  Provides care for: child(ashli)  Marital Status:   Description of Support System:         Current Resources:   Patient receiving home care services: No     Community Resources: None  Equipment currently used at home: cane, straight  Supplies currently used at home: Wound Care Supplies (notes indicates uses CPAP when his weight is >450lb)    Employment/Financial:  Employment Status: retired     Finance Comments: active [employer]ICARE ADVANTAGE insurance     Lifestyle & Psychosocial Needs:  Outpatient \"Social determinants of health\" assessment not done     Functional Status:  Assesssment of Functional Status: Not at baseline with mobility (see therapy assessment)    Mental Health Status:  Mental Health Status: No Current Concerns       Chemical Dependency Status:  Chemical Dependency Status: No Current Concerns           Values/Beliefs:  Spiritual, Cultural Beliefs, Scientology Practices, Values that affect care: no          Values/Beliefs Comment: Pranay    Additional Information:   Pt was given medicare.gov list of TCUs.  " Pt has [employer] MEDICARE ADVANTAGE insurance and weighs 453 pounds.  He failed prior discharge plan of going home with spouse due in part to inability to walk up stairs (Number of Stairs, Main Entrance 5).  Also reviewed recent list of referrals sent 2/16, pt agreeable to all of these being re-sent with the  Lucas Bass being marked as #1 first choice (done);     1. [facility] (SNF) [address] [place] [address]  2. (declined 2/23) [facility] ([facility]) [address] [facility] [address]  3.  [facility] [address] [facility] [address]  4. [facility] [address] [place] [address]  5. [facility]  [address] [facility] [address]  6. [facility] (TCU) [address] [place] [address]  7. [facility] (SNF) [address] [address]  8. [facility] (SNF) [address] [facility] [address]  9. [facility] [address] [facility] [address]  10. [facility] (SNF) [address] [place] [address]  11. [facility] [address] [place] [address]  12. [facility] ([facility]) [address] [facility] [address]    Of note SOLOMON-RN checked with [facility] to find out if they had been involved in pt's homecare discharge plan with his prior discharge.  Received response;     Patient is not open to us. We received referral from [facility] on 2/20. Patient was re admitted to [facility] on 2/20. We closed our episode for patient since pt was re admitted however, our team did let me know that we would be unable to accept referral due to capacity.      Xin Lopez RN, BSN, PHN  [facility]  Inpatient Care Management - FLOAT  Mobile: [phone] 02/23/22 until 4pm  (after today's date, please call the patient's  unit)

## 2022-02-23 NOTE — PROGRESS NOTES
Observation goals  PRIOR TO DISCHARGE        Comments: Placement to TCU: not met     A/O x4. VSS on RA. Denies pain. Regular diet. Up w/ Ax1/GB/W. Using BS urinal. BLE wounds, Ace wraps in place. Fentanyl patch on L upper chest. Incontinent of bladder at times. Plan: WOC/PT/SW consults.

## 2022-02-24 NOTE — PLAN OF CARE
Goal Outcome Evaluation:    Plan of Care Reviewed With: patient   Overall Patient Progress: improving       A&Ox4 and VSS on RA. Up assist of 2 with gb/w and tolerating regular diet. BLE wrapped with ACE wraps and No IV acces. Fentanyl patch in place and denies pain. PT, WOC consult. Continue to monitor.

## 2022-02-24 NOTE — PROGRESS NOTES
PRIMARY DIAGNOSIS: GENERALIZED WEAKNESS    OUTPATIENT/OBSERVATION GOALS TO BE MET BEFORE DISCHARGE  1. Orthostatic performed: N/A    2. Tolerating PO medications: Yes    3. Return to near baseline physical activity: No    4. Cleared for discharge by consultants (if involved): Yes    Discharge Planner Nurse   Safe discharge environment identified: No  Barriers to discharge: Yes       Entered by: Odalis Spencer 02/24/2022 4:47 PM     Please review provider order for any additional goals.   Nurse to notify provider when observation goals have been met and patient is ready for discharge.

## 2022-02-24 NOTE — PLAN OF CARE
Orientation/Cognitive: A&Ox4  Observation Goals (Met/ Not Met): Not met  Mobility Level/Assist Equipment: assist 2 + walker/GB  Fall Risk (Y/N): yes  Behavior Concerns: none  Pain Management: denies pain, Fentanyl patch in place  ABNL VS/O2: WNL  ABNL Lab/BG: creatinine 1.93, INR 3.10  Diet: regular  Bowel/Bladder: continent, at times incontinent of urine  Skin Concerns: Arun LE wounds, ACE wrap, WOC saw pt  Drains/Devices: PIV sl'd  Tests/Procedures for next shift: SW following with discharge  Anticipated DC date: 1-2 day, waiting for placement  Patient Stated Goal for Today: hoping that a placement is found soon.

## 2022-02-24 NOTE — PROGRESS NOTES
[facility]    Medicine Progress Note - Hospitalist Service    Date of Admission:  2/20/2022    Assessment & Plan        Panda Miranda is a 66 year old male admitted on 2/20/2022 after being discharged earlier from [facility] stay from 2/11 - 2/20 for acute gout flare and subsequent SHANTE and hyperkalemia likely caused by gout treatment. Patient is morbidly obese and he was discharged to his home with home health but he was unable to manage going up steps immediately upon arrival home.  He was brought in to [facility] ED for placement. In the ER he was seen by Dr Henning, vitals were normal.  Labs were reviewed from his recent discharge with Cr of 1.93 which was improved from his discharge and actually better than baseline.    Deconditing in setting of morbid obesity and recent hospitalization  -- observation admission  -- patient will be seen by PT and social work  -- he will likely need placement at TCU for strength and conditioning     Gout exacerbation  Symptomatically improved.   - Continue tx for exacerbation with steroid taper, uloric and colchicine that were prescribed at discharge.   -Right knee pain much improved  There is high risk of cardiovascular death with Uloric so  Stopped  Urolic  and start him on allopurinol 300 mg p.o. daily for gout prevention. Discussed with pt and he agrees with the change      Atrial fibrillation  HFpEF, not in acute exacerbation  Essential HTN  Hx of VSD  - Continue PTA metoprolol and pharmacy to dose warfarin    Chronic venosus stasis, bilateral  -stable, compression wraps as needed     Chronic knee pain, likely OA  -continue PTA fentanyl patch, robaxin and prn oxycodone     CKD  - Cr is currently at baseline or better  - continue sodium bicarb  - monitor renal function intermittenly    COVID negative       Diet: Regular Diet Adult    DVT Prophylaxis: Warfarin  Cheema Catheter: Not present  Central Lines: None  Cardiac Monitoring: None  Code  "Status: Full Code      Disposition Plan   Expected Discharge: When TCU bed is available for placement  Anticipated discharge location:  Awaiting care coordination huddle  Delays: { TIP  Update expected discharge date and delays and refresh note (tipsheet)     :[id number]}            Kathrine Herrera MD  Hospitalist Service  [facility]  Securely message with the Vocera Web Console (learn more here)  Text page via [employer] Paging/Directory         Clinically Significant Risk Factors Present on Admission              # Coagulation Defect: home medication list includes an anticoagulant medication    # Severe Obesity: Estimated body mass index is 52.42 kg/m  as calculated from the following:    Height as of this encounter: 1.981 m (6' 6\").    Weight as of this encounter: 205.8 kg (453 lb 9.6 oz).      ______________________________________________________________________    Interval History   Patient calm and cooperative today.  His right knee pain is controlled. C/o  Mild pain with mobility .   No other acute issues today.  Awaiting placement to TCU    Data reviewed today: I reviewed all medications, new labs and imaging results over the last 24 hours.     Physical Exam   Vital Signs: Temp: 99.2  F (37.3  C) Temp src: Oral BP: 100/59 Pulse: 66   Resp: 18 SpO2: 100 % O2 Device: None (Room air)    Weight: 453 lbs 9.6 oz    Constitutional: Awake, alert, cooperative, no apparent distress, obese  Respiratory: Clear to auscultation bilaterally, no crackles or wheezing distance lung sounds  Cardiovascular: Regular rate and rhythm, normal S1 and S2, and no murmur noted  GI: Normal bowel sounds, soft, non-distended, non-tender  Skin/Integumen: No rashes, no cyanosis, bilateral lower extremities wrapped in lymphedema wraps  Other: No obvious joint swelling    Data   Recent Labs   Lab 02/24/22  0642 02/23/22  0644 02/22/22  0623 02/21/22  1924 02/21/22  0915 02/20/22  0602 02/19/22  0615 02/18/22  1410 " 02/18/22 0613   HGB  --   --   --  10.2*  --   --   --   --   --    INR 3.17* 3.10* 2.91*  --    < > 2.60* 2.91*  --  2.90*   NA  --   --   --   --   --  138 139 136 136   POTASSIUM  --   --   --   --   --  5.1 4.9 5.4* 5.4*   CHLORIDE  --   --   --   --   --  110* 111* 109 108   CO2  --   --   --   --   --  24 24 24 24   BUN  --   --   --   --   --  76* 81*  --  88*   CR  --   --   --   --   --  1.93* 2.02*  --  2.27*   ANIONGAP  --   --   --   --   --  4 4 3 4   JOHN  --   --   --   --   --  8.7 8.6  --  8.8   GLC  --   --   --   --   --  110* 114*  --  107*    < > = values in this interval not displayed.     No results found for this or any previous visit (from the past 24 hour(s)).  Medications     Warfarin Therapy Reminder         allopurinol  300 mg Oral Daily     colchicine  0.3 mg Oral Daily     fentaNYL  25 mcg Transdermal Q72H     fentaNYL   Transdermal Q8H     metoprolol succinate ER  50 mg Oral Daily     predniSONE  20 mg Oral Daily     sodium bicarbonate  1,300 mg Oral TID     warfarin ANTICOAGULANT  2.5 mg Oral ONCE at 18:00

## 2022-02-24 NOTE — PLAN OF CARE
Goal Outcome Evaluation:    Plan of Care Reviewed With: patient   A&O x4. Ax2 gbw. VSS on RA. Pt has bilateral ace wraps in place, CDI. Pending TCU placement.     Observation goals  PRIOR TO DISCHARGE       Comments: Placement to TCU: not met  Overall Patient Progress: improving

## 2022-02-25 NOTE — PROGRESS NOTES
"SPIRITUAL HEALTH SERVICES Progress Note  [facility]    Length-of-Stay visit.    Upon my introduction, Panda shared that he's \"doing all right\" and he's \"just waiting for a place in rehab.\" He said he had no SH needs at this time.    I offered emotional support and said a blessing.    SH remains available.    Rev Caryl Jorge  Associate   Spiritual Health Phone Line [phone]  Spiritual Health Pager [phone]  "

## 2022-02-25 NOTE — PLAN OF CARE
Orientation/Cognitive: A&Ox4  Observation Goals (Met/ Not Met): not met  Mobility Level/Assist Equipment: Ax2-3 GB walker, very weak after sitting in chair most of day, back to bed with help from PT  Fall Risk (Y/N): yes  Behavior Concerns: none  Pain Management: fentynal patch  ABNL VS/O2: VSS. RA.   ABNL Lab/BG: see chart  Diet: regular diet  Bowel/Bladder: continent of bladder, no BM  Skin Concerns: wounds to BLE, woc involved, dressings CDI.   Drains/Devices: none  Tests/Procedures for next shift: none  Anticipated DC date: pending TCU placement, medically ready  Patient Stated Goal for Today: walk with therapy- no met, was too weak when PT came.

## 2022-02-25 NOTE — PLAN OF CARE
Goal Outcome Evaluation:    AVSS; pt denied pain; fentanyl patch in place; pt with chronic lower extremity wounds; not able to assess these overnight as wraps on BLE's; WOCN following; pt up with 2-3 assist and a walker/gait belt; voiding in adequate amounts; appeared to sleep well overnight; awaiting TCU placement.

## 2022-02-25 NOTE — PLAN OF CARE
Goal Outcome Evaluation: Met    Plan of Care Reviewed With: patient     Overall Patient Progress: improving    Orientation/Cognitive: A&Ox4  Observation Goals (Met/ Not Met): not met, needs placement  Mobility Level/Assist Equipment: A+2, GB walker, Bariatric  Fall Risk (Y/N): YES  Behavior Concerns: N  Pain Management: Fentanyl patch on chest  ABNL VS/O2: VSS on RA  ABNL Lab/BG: N  Diet: Regular  Bowel/Bladder: Inc @ times, using BSC and urinal  Skin Concerns: BLE wounds, dx changes q3 days, WOC following  Drains/Devices: Saline locked  Tests/Procedures for next shift: N  Anticipated DC date: 2/26? Discharge when placement found  Patient Stated Goal for Today: Bed bath, change linens, walk.

## 2022-02-25 NOTE — PROGRESS NOTES
Expand All Collapse All      PRIMARY DIAGNOSIS: GENERALIZED WEAKNESS     OUTPATIENT/OBSERVATION GOALS TO BE MET BEFORE DISCHARGE  1. Orthostatic performed: N/A     2. Tolerating PO medications: Yes     3. Return to near baseline physical activity: No     4. Cleared for discharge by consultants (if involved): Yes

## 2022-02-25 NOTE — PROGRESS NOTES
"Received call from Gabby, Parks.   " [facility]    Medicine Progress Note - Hospitalist Service       Date of Admission:  2/20/2022  Assessment & Plan   Panda Miranda is a 66 year old male admitted on 2/20/2022 after being discharged earlier from [facility] stay from 2/11 - 2/20 for acute gout flare and subsequent SHANTE and hyperkalemia likely caused by gout treatment. Patient is morbidly obese and he was discharged to his home with home health but he was unable to manage going up steps immediately upon arrival home.  He was brought in to [facility] ED for placement. In the ER he was seen by Dr Henning, vitals were normal.  Labs were reviewed from his recent discharge with Cr of 1.93 which was improved from his discharge and actually better than baseline.     Of note, he was also admitted to the hospital at [facility] 1/27-1/30 for sepsis due to complicated UTI vs pyelonephritis with hyperkalemia, acute on chronic pulmonary edema, and mild COVID-19 infection.     Physical deconditing in setting of morbid obesity and recent hospitalization  - Observation status  - PT evaluated on 2/22 and recommended TCU due to impaired mobility and generalized weakness, SW assisting on placement  - PT to work with patient 5x week while in observation, continues to recommend TCU     Polyarticular gout with exacerbation  History of pseudogout  Started with right knee, hands, and worsening back pain, treated at [facility] while hospitalized. Symptomatically improved. Patient reports right knee is greatly improved. States he has arthritis and has had knee injections in the past.   - Continue tx for exacerbation with steroid taper and colchicine that were prescribed at discharge.   - Right knee pain much improved  - There is high risk of cardiovascular death with Uloric therefore stopped urolic and started allopurinol 300 mg p.o. daily for gout prevention. Discussed with pt and he agrees with the change.  - Prednisone ordered for 20mg/d with no taper  added, initially started at 40mg and received 6 days of 30mg/d, then decreased to 20mg/d, will complete 7 days of 20mg/d and decrease to 10mg on 2/28     Atrial fibrillation  HFpEF, not in acute exacerbation  Essential HTN  Hx of VSD  - Continue PTA metoprolol and pharmacy to dose warfarin     Chronic knee pain, likely OA  -continue PTA fentanyl patch, robaxin and prn oxycodone     CKD  - Cr is currently at baseline or better  - continue sodium bicarb  - monitor renal function intermittently  - check BMP in AM     Other pertinent medical history and chronic stable diagnoses: Hold all nonessential medications, vitamins, supplements while on observation status.  Chronic venosus stasis, bilateral: stable, compression wraps as needed    COVID-19 admission screening PCR: Not done due to recent COVID-19 infection <90 days ago and no new symptoms.   Recent COVID-19 infection 1/27/22. COVID recovered and no need for special precautions.  - COVID-19 vaccination status: Fully vaccinated with Pfizer.   - Influenza vaccination status: 1/2022      Diet: Regular Diet Adult    DVT Prophylaxis: Warfarin  Cheema Catheter: Not present  Code Status: Full Code      Disposition Plan   Expected Discharge: 02/26/2022     Anticipated discharge location:  Awaiting care coordination huddle  Delays:     Placement - TCU         Entered: Janett Donnelly PA-C 02/25/2022, 4:09 PM       The patient's care was discussed with the Attending Physician, Dr. Fernández, Bedside Nurse, Care Coordinator/ and Patient.    ENIO Galindo PA-C  Hospitalist KEO  [facility]  Securely message with the Vocera Web Console (learn more here)  Text page via [employer] Paging/Directory  ______________________________________________________________________    Interval History   Seen and examined. NAEO. Feeling improved. Working with PT. Taking PO. Right knee pain improving. Changed gout meds yesterday. Continues on  steroids, will add taper. Awaiting TCU.    Data reviewed today: I reviewed all medications, new labs and imaging results over the last 24 hours. I personally reviewed no images or EKG's today.    Physical Exam   Vital Signs: Temp: 97.9  F (36.6  C) Temp src: Oral BP: 107/70 Pulse: 80   Resp: 18 SpO2: 100 % O2 Device: None (Room air)    Weight: 453 lbs 9.6 oz    Physical Exam    General: Awake, alert, very pleasant obese gentleman who appears stated age. Looks comfortable sitting up in bed. No acute distress.  HEENT: Normocephalic, atraumatic. Extraocular movements intact.   Respiratory: Clear to auscultation bilaterally, no rales, wheezing, or rhonchi.  Cardiovascular: Irregular rate and rhythm, +S1 and S2, no murmur auscultated.   Gastrointestinal: Soft, non-tender, non-distended. Bowel sounds present.  Skin: Warm, dry. No obvious rashes or lesions on exposed skin. Dorsalis pedis pulses palpable bilaterally. Lymphedema wraps in place. Chronic stasis and lichenification noted. Small skin tears with clean dressings noted. Non pitting edema.   Musculoskeletal: Moves all extremities. +Dorsi/plantar flexion intact. Right knee minimal tenderness to palpation. No large effusion palpated.  Neurologic: AAO x3. Cranial nerves 2-12 grossly intact, normal strength and sensation.  Psychiatric: Appropriate mood and affect. No obvious anxiety or depression.    Data   Recent Labs   Lab 02/25/22  0609 02/24/22  0642 02/23/22  0644 02/22/22  0623 02/21/22  1924 02/21/22  0915 02/20/22  0602 02/19/22  0615   HGB  --   --   --   --  10.2*  --   --   --    INR 2.87* 3.17* 3.10*   < >  --    < > 2.60* 2.91*   NA  --   --   --   --   --   --  138 139   POTASSIUM  --   --   --   --   --   --  5.1 4.9   CHLORIDE  --   --   --   --   --   --  110* 111*   CO2  --   --   --   --   --   --  24 24   BUN  --   --   --   --   --   --  76* 81*   CR  --   --   --   --   --   --  1.93* 2.02*   ANIONGAP  --   --   --   --   --   --  4 4   JOHN  --    --   --   --   --   --  8.7 8.6   GLC  --   --   --   --   --   --  110* 114*    < > = values in this interval not displayed.     No results found for this or any previous visit (from the past 24 hour(s)).  Medications     Warfarin Therapy Reminder         allopurinol  300 mg Oral Daily     colchicine  0.3 mg Oral Daily     fentaNYL  25 mcg Transdermal Q72H     fentaNYL   Transdermal Q8H     metoprolol succinate ER  50 mg Oral Daily     [START ON 2/28/2022] predniSONE  10 mg Oral Daily     [START ON 2/26/2022] predniSONE  20 mg Oral Daily     sodium bicarbonate  1,300 mg Oral TID     warfarin ANTICOAGULANT  5 mg Oral ONCE at 18:00

## 2022-02-25 NOTE — PROGRESS NOTES
Orientation/Cognitive: A&Ox4  Observation Goals (Met/ Not Met): Not met  Mobility Level/Assist Equipment: Ax2-3 GB walker. Verbalized willingness to ambulate in the morning and not to sit in chair for long. PT and nursing to follow up  Fall Risk (Y/N): yes  Behavior Concerns: none  Pain Management: fentynal patch  ABNL VS/O2: VSS. RA.   ABNL Lab/BG: see chart  Diet: regular diet  Bowel/Bladder: continent of bladder, no BM  Skin Concerns: wounds to BLE, woc involved, dressings CDI.   Drains/Devices: none  Tests/Procedures for next shift: none  Anticipated DC date: pending TCU placement  Patient Stated Goal for Today: walk with therapy- no met, PT to follow in the morning

## 2022-02-25 NOTE — PROGRESS NOTES
Care Management Follow Up    Length of Stay (days): 5    Expected Discharge Date: 02/26/2022     Concerns to be Addressed: all concerns addressed in this encounter     Patient plan of care discussed at interdisciplinary rounds: Yes    Anticipated Discharge Disposition: Transitional Care     Anticipated Discharge Services:    Anticipated Discharge DME:      Patient/family educated on Medicare website which has current facility and service quality ratings:    Education Provided on the Discharge Plan:    Patient/Family in Agreement with the Plan:      Referrals Placed by CM/SW:    Private pay costs discussed: transportation costs and insurance costs out of pocket expenses and co-pays    Additional Information:  Additional TCU referrals sent and pending. Patient limited to [facility] TCU list and also limited to facilities with bariatric beds.    SW to continue to follow and assist with discharge planning.    MAGALIE Durbin  Daytime (8:00am-4:30pm): [phone]  After-Hours SW Pager (4:30pm-11:30pm): [phone]           MAGALIE Moya

## 2022-02-26 NOTE — PLAN OF CARE
[facility]      OUTPATIENT OCCUPATIONAL THERAPY  EVALUATION  PLAN OF TREATMENT FOR OUTPATIENT REHABILITATION  (COMPLETE FOR INITIAL CLAIMS ONLY)  Patient's Last Name, First Name, M.I.  YOB: 1955  RubenPanda MALONE                          Provider's Name  [facility] Medical Record No.  [id number]                               Onset Date:  02/10/22   Start of Care Date:  02/11/22     Type:     ___PT   _X_OT   ___SLP Medical Diagnosis:  Hyperkalemia                        OT Diagnosis:  impaired ADLs   Visits from SOC:  1   _________________________________________________________________________________  Plan of Treatment/Functional Goals    Planned Interventions: ADL retraining, IADL retraining, balance training, bed mobility training, fine motor coordination training, strengthening, motor coordination training, transfer training, progressive activity/exercise   Goals: See Occupational Therapy Goals on Care Plan in [employer] electronic health record.    Therapy Frequency: Daily  Predicted Duration of Therapy Intervention:    _________________________________________________________________________________    I CERTIFY THE NEED FOR THESE SERVICES FURNISHED UNDER        THIS PLAN OF TREATMENT AND WHILE UNDER MY CARE     (Physician co-signature of this document indicates review and certification of the therapy plan).                Certification date from: 02/11/22, Certification date to: 02/18/22    Referring Physician: Rani            Initial Assessment        See Occupational Therapy evaluation dated 02/11/22 in Epic electronic health record.

## 2022-02-26 NOTE — PROGRESS NOTES
[facility]    Medicine Progress Note - Hospitalist Service       Date of Admission:  2/20/2022  Assessment & Plan   Panda Miranda is a 66 year old male admitted on 2/20/2022 after being discharged earlier from [facility] stay from 2/11 - 2/20 for acute gout flare and subsequent SHANTE and hyperkalemia likely caused by gout treatment. Patient is morbidly obese and he was discharged to his home with home health but he was unable to manage going up steps immediately upon arrival home.  He was brought in to [facility] ED for placement. In the ER he was seen by Dr Henning, vitals were normal.  Labs were reviewed from his recent discharge with Cr of 1.93 which was improved from his discharge and actually better than baseline.     Of note, he was also admitted to the hospital at [facility] 1/27-1/30 for sepsis due to complicated UTI vs pyelonephritis with hyperkalemia, acute on chronic pulmonary edema, and mild COVID-19 infection.    His K on admission was 5.1, up to 6.7 today 2/26.    Recurrent Hyperkalemia  CKD stage 4  - baseline cr 2--2.2  - had admissions for SHANTE and hyperkalemia  - recently started on sodium bicarb  - cr on admission 1.93, at baseline or better  - K on admission 5.1  - today K 6.7, cr 1.83  - Tele  - EKG- Afib, incomplete RBBB, some nonspecific ST-t changes, no peaked T waves  - given some iv fluids, alb neb, Insulin +dextrose, Kayexalate  - repeat K  - continue sodium bicarb    ADDENDUM: repeat K still 6.7; will give a bolus NS with asix 40 mg ivX1, repeat Insulin with dextrose, Alb nebX1. Lokelma 10 mg poX1, repeat K.     Physical deconditing in setting of morbid obesity and recent hospitalization  - initially- Observation status- swicthed to inpatient due to hyperkalemia  - PT evaluated on 2/22 and recommended TCU due to impaired mobility and generalized weakness, SW assisting on placement  - PT to work with patient 5x week while in observation, continues to recommend  TCU     Polyarticular gout with exacerbation  History of pseudogout  Started with right knee, hands, and worsening back pain, treated at [facility] while hospitalized. Symptomatically improved. Patient reports right knee is greatly improved. States he has arthritis and has had knee injections in the past.   - Continue tx for exacerbation with steroid taper and colchicine that were prescribed at discharge.   - Right knee pain much improved  - There is high risk of cardiovascular death with Uloric therefore stopped urolic and started allopurinol 300 mg p.o. daily for gout prevention. Discussed with pt and he agrees with the change.  - Prednisone ordered for 20mg/d with no taper added, initially started at 40mg and received 6 days of 30mg/d, then decreased to 20mg/d, will complete 7 days of 20mg/d and decrease to 10mg on 2/28     Atrial fibrillation  HFpEF, not in acute exacerbation  Essential HTN  Hx of VSD  - Continue PTA metoprolol and pharmacy to dose warfarin  - INR 2.99     Chronic knee pain, likely OA  -continue PTA fentanyl patch, robaxin and prn oxycodone     Chronic venosus stasis, bilateral: stable, compression wraps as needed    TASHA  - apparently uses CPAP only wjen weight is over 450lbs    COVID-19 admission screening PCR: Not done due to recent COVID-19 infection <90 days ago and no new symptoms.   Recent COVID-19 infection 1/27/22. COVID recovered and no need for special precautions.  - COVID-19 vaccination status: Fully vaccinated with Pfizer.   - Influenza vaccination status: 1/2022      Diet: Regular Diet Adult    DVT Prophylaxis: Warfarin  Cheema Catheter: Not present  Code Status: Full Code      Disposition Plan   Expected Discharge: 1-2 days, pending TCU placement, normalizing K level.   Anticipated discharge location:  Awaiting care coordination huddle  Delays:     Placement - TCU         Entered: Ruthy Fernández MD 02/26/2022, 8:51 AM       The patient's care was discussed with the Bedside  Nurse and Patient.      ______________________________________________________________________    Interval History    Had hyperkalemia again today  - feeling fine, denies chest pain. No SOB  - no abd pain, no N/V  - no BM today yet after kayexalate   - lost iv and needed a midline because difficult iv access    Data reviewed today: I reviewed all medications, new labs and imaging results over the last 24 hours. I personally reviewed the EKG tracing showing as above, Afib, no peaked T waves.    Physical Exam   Vital Signs: Temp: 98  F (36.7  C) Temp src: Oral BP: 106/66 Pulse: 64   Resp: 18 SpO2: 100 % O2 Device: None (Room air)    Weight: 453 lbs 9.6 oz    Physical Exam    General: Awake, alert, pleasant, morbidly obese, NAD  HEENT: Normocephalic, atraumatic. Extraocular movements intact.   Respiratory: Clear to auscultation bilaterally, no rales, wheezing, or rhonchi.  Cardiovascular: irregularly irregular, no murmur auscultated.   Gastrointestinal: Soft, non-tender, non-distended. Bowel sounds present.  Skin: Warm, dry. No obvious rashes or lesions on exposed skin. Dorsalis pedis pulses palpable bilaterally. Lymphedema wraps in place. Chronic stasis and lichenification noted. Small skin tears with clean dressings noted. Non pitting edema.   Musculoskeletal: Moves all extremities. +Dorsi/plantar flexion intact. Right knee minimal tenderness to palpation. No large effusion palpated.  Neurologic: AAO x3. Cranial nerves 2-12 grossly intact, normal strength and sensation.  Psychiatric: Appropriate mood and affect. No obvious anxiety or depression.    Data   Recent Labs   Lab 02/26/22  0644 02/25/22  0609 02/24/22  0642 02/22/22  0623 02/21/22  1924 02/21/22  0915 02/20/22  0602   HGB  --   --   --   --  10.2*  --   --    INR 2.99* 2.87* 3.17*   < >  --    < > 2.60*     --   --   --   --   --  138   POTASSIUM 6.7*  --   --   --   --   --  5.1   CHLORIDE 111*  --   --   --   --   --  110*   CO2 24  --   --   --    --   --  24   BUN 65*  --   --   --   --   --  76*   CR 1.83*  --   --   --   --   --  1.93*   ANIONGAP 3  --   --   --   --   --  4   JOHN 8.8  --   --   --   --   --  8.7   GLC 97  --   --   --   --   --  110*    < > = values in this interval not displayed.     No results found for this or any previous visit (from the past 24 hour(s)).  Medications     sodium chloride       Warfarin Therapy Reminder         albuterol  10 mg Nebulization Once     allopurinol  300 mg Oral Daily     calcium gluconate  1 g Intravenous Once     colchicine  0.3 mg Oral Daily     dextrose 10%  300 mL Intravenous Once     dextrose  25 g Intravenous Once     fentaNYL  25 mcg Transdermal Q72H     fentaNYL   Transdermal Q8H     insulin regular (HumuLIN R,NovoLIN R) for IV use  10 Units Intravenous Once     metoprolol succinate ER  50 mg Oral Daily     [START ON 2/28/2022] predniSONE  10 mg Oral Daily     predniSONE  20 mg Oral Daily     sodium bicarbonate  1,300 mg Oral TID     sodium polystyrene  30 g Oral Once

## 2022-02-26 NOTE — PROGRESS NOTES
SW aware of TCU recommendation.  Patient's insurance may be a barrier to a Sunday discharge.Will follow up tomorrow.

## 2022-02-26 NOTE — PLAN OF CARE
VSS, denies pain. Up with assist x2 and lift. Tolerated regular diet. Awaiting TCU placement.

## 2022-02-26 NOTE — PLAN OF CARE
A&Ox4. VSS on RA. Denies pain. Up Ax2,Gb, walker although not OOB overnight. Voiding adequately in urinal. Able to turn and pull self up in bed. Wound care provided last night, BLEs airing out w/ plan to re-wrap later this AM after breakfast. Chhaya regular diet. SW/CC following, awaiting TCU placement.

## 2022-02-26 NOTE — PROVIDER NOTIFICATION
MD Notification    Notified Person: MD    Notified Person Name: Dr Arredondolino     Notification Date/Time: 2/26/2022   10:24 AM      Notification Interaction: Text paged    Purpose of Notification: Lost IV in the middle of hyperkalemia treatment, hard to place IV, IV nurse recommending midline. Ca we get an order?    Orders Received:    Comments:

## 2022-02-26 NOTE — UTILIZATION REVIEW
Admission Status; Secondary Review Determination     Under the authority of the Utilization Management Commitee, the utilization review process indicated a secondary review on the above patient. The review outcome is based on review of the medical records, discussions with staff, and applying clinical experience noted on the date of the review.     (x) Inpatient Status Appropriate - This patient's medical care is consistent with medical management for inpatient care and reasonable inpatient medical practice.     RATIONALE FOR DETERMINATION: 66 year old male admitted on 2/20/2022 after being discharged recently from [facility] [place] 2[facility] following a hospitalization from 2/11/22 - 2/20/22 for acute gout flare and subsequent SHANTE and hyperkalemia likely caused by gout treatment. Patient was discharged to his home with home health but he was unable to manage going up steps upon arrival home.  He was brought in to [facility] ED for placement on 2/20/22. In the ER vitals were normal and labs were stable.    The patient was appropriately admitted to observation status.  Vital signs notable for modest hypotension with SBP in the 90s-low 100s.  The patient has been receiving prednisone and colchicine for treatment of gout flare.     Labs were rechecked today and notable for acute hyperkalemia, with potassium level of 6.7 this morning.  Creatinine is 1.8.    The patient has been administered IVF, D50/IV insulin, albuterol nebulizer, kayexalate, IV calcium gluconate.  Potassium is being followed every 4 hours on lab draw.      Given his severe hyperkalemia in the setting of CKD, putting him at increased risk for cardiac arythmia, transition to inpatient status appears appropriate in the setting of emergent need for treatment and frequent need for lab monitoring.  I notified Dr. Fernández via the Sn[employer] ignacio of this recommendation today.      At the time of admission with the information available to the attending physician more  than 2 nights Hospital complex care was anticipated, based on patient risk of adverse outcome if treated as outpatient and complex care required. Inpatient admission is appropriate based on the Medicare guidelines.    The information on this document is developed by the utilization review team in order for the business office to ensure compliance. This only denotes the appropriateness of proper admission status and does not reflect the quality of care rendered.   The definitions of Inpatient Status and Observation Status used in making the determination above are those provided in the CMS Coverage Manual, Chapter 1 and Chapter 6, section 70.4.     Sincerely,     Maycol Gutierrez MD  Utilization Review   Physician Advisor   [facility]

## 2022-02-26 NOTE — PROGRESS NOTES
MD Notification    Notified Person: MD    Notified Person Name: Dr. Fernández    Notification Date/Time: 2/26/2022 7504    Notification Interaction: paged    Purpose of Notification: K+ still 6.7    Orders Received:     Comments:

## 2022-02-26 NOTE — PROVIDER NOTIFICATION
MD Notification    Notified Person: MD    Notified Person Name: Dr Fernández    Notification Date/Time: 2/26/2022  8:06 AM    Notification Interaction: text paged    Purpose of Notification: critical lab for K+ of 6.7    Orders Received:    Comments:

## 2022-02-26 NOTE — PLAN OF CARE
"Goal Outcome Evaluation:    Plan of Care Reviewed With: patient     Overall Patient Progress:K+ elevated at 6.8, treated, recheck pending  Orientation/Cognitive: A&OX4  Observation Goals (Met/ Not Met): met  Mobility Level/Assist Equipment: AX2-3 GB and walker  Fall Risk (Y/N): yes  Behavior Concerns: none  Pain Management: None, fentanyl patch in place  ABNL VS/O2: VSS on RA  ABNL Lab/BG: K+ of 6.7, treated, recheck pending  Diet: Regular  Bowel/Bladder: Using urinal  Skin Concerns: BLE wounds/scabs  Drains/Devices: PIV in place, patent, no need for central or midline this shift. Orders can be disregarded.  Tests/Procedures for next shift:  K+ recheck.  Anticipated DC date: Pending progress and placement  Patient Stated Goal for Today: \" get stronger\"               "

## 2022-02-27 NOTE — PROGRESS NOTES
"Requested to evaluate midline, as no blood return despite placement in last two hours.  Patient also complaining of pain with D50 administration.  Upon this RN assessment, no blood return, but saline flush is very easy, patient reports no pain with flush, and no signs extravasation.  Tissue of upper arm soft, non-blanched, no redness.  Assist RN staff (at bedside) with remaining D50 administration, (approx. 15 ml) , slow, to determine midline patency.  Patient reports stinging, but reports stinging is same as it has been when received D50 through other PIV's. Explain to patient that solution is viscous, and some patients do complain of discomfort with administration.  Patient states \"just give it, the pain is not that bad.\" Able to administer without resistance, and without signs of extravasation.  Saline flush following, easy to flush, no signs extravasation, no pain with flushing.  Patient then states slight stinging remains 10 minutes after flushing.  Though midline seems patent, explain to patient that continued reports of pain are cause to remove.  Patient agreeable.  PIV established for other medication administration, advise RN staff that if new PIV needed overnight, will likely need to call anesthesia, patient is difficult access.  "

## 2022-02-27 NOTE — PLAN OF CARE
[id number]    AOx4.  Ax2-3 GB/[place] [facility] [place]  Initial K 6.7, protocol followed and 0348 redraw 5.8.  BG stable.  VSS on RA except .  (Albuterol neb given per protocol)  Pain controlled via Fentanyl patch on chest.  Diet 2gmK+.  External cath in place, inc at times. L PIV SL.  BLE wounds, WOC following, ace wrapped. CDI. Tele. Discharge to TCU pending normalized K levels.

## 2022-02-27 NOTE — PROVIDER NOTIFICATION
MD Notification    Notified Person: MD    Notified Person Name: Dr Fernández    Notification Date/Time: 2/27/2022  11:17 AM    Notification Interaction: Text paged    Purpose of Notification: K+ still elevated at 5.6.    Orders Received: Pending    Comments:

## 2022-02-27 NOTE — PROCEDURES
[facility]    Single Lumen Midline Placement    Date/Time: 2/27/2022 9:00 AM  Performed by: Gerri Real RN  Authorized by: Alban Mane MD   Indications: vascular access      UNIVERSAL PROTOCOL   Site Marked: Yes  Prior Images Obtained and Reviewed:  Yes  Required items: Required blood products, implants, devices and special equipment available    Patient identity confirmed:  Verbally with patient, hospital-assigned identification number and arm band  NA - No sedation, light sedation, or local anesthesia  Confirmation Checklist:  Patient's identity using two indicators, relevant allergies, procedure was appropriate and matched the consent or emergent situation and correct equipment/implants were available  Time out: Immediately prior to the procedure a time out was called    Universal Protocol: the Joint Commission Universal Protocol was followed    Preparation: Patient was prepped and draped in usual sterile fashion       ANESTHESIA    Anesthesia: See MAR for details  Local Anesthetic:  Lidocaine 1% without epinephrine  Anesthetic Total (mL):  0.5        Skin prep agent: skin prep agent completely dried prior to procedure  Sterile barriers: maximum sterile barriers were used: cap, mask, sterile gown, sterile gloves, and large sterile sheet  Hand hygiene: hand hygiene performed prior to central venous catheter insertion  Type of line used: Midline  Catheter type: single lumen  Catheter size: 2.7fr.  Brand: Bard  Lot number: [id number]  Placement method: venipuncture and ultrasound  Number of attempts: 1  Successful placement: yes  Orientation: left  Location: brachial vein (medial)  Arm circumference: adults 10 cm  Extremity circumference: 41  Visible catheter length: 0.5  Internal length: 7.5 cm  Total catheter length: 8  Dressing and securement: chlorhexidine patch applied, sterile dressing applied and securement device  Post procedure assessment: blood return through all  ports  PROCEDURE   Patient Tolerance:  Patient tolerated the procedure well with no immediate complicationsDescribe Procedure: Nursing note  Pt a/o x 4. The writer explained the risks/benefits of the procedure to the pt and addressed pt concerns. Pt verbalized understanding and signed. Found 3 mm left brachial vein and was successful on one attempt with good blood return.

## 2022-02-27 NOTE — PROGRESS NOTES
MD Notification    Notified Person: MD    Notified Person Name: Renato     Notification Date/Time: 0249 2/27/22    Notification Interaction: text page    Purpose of Notification: d10 infusing for prevention of hypoglycemia r/t hyperkalemia insulin treatment. per order let MD know if after 2 hrs BG is <100. Pt's is 95. Previously 104 and 94.    0325: Orders Received: no new orders

## 2022-02-27 NOTE — PROGRESS NOTES
Called lab to ensure someone will draw 0200 potassium. Per tech, pt is on lab's list and someone will come draw the potassium.

## 2022-02-27 NOTE — PROCEDURES
[facility]    Single Lumen Midline Placement    Date/Time: 2/26/2022 9:25 PM  Performed by: Jane Campbell RN  Authorized by: Wade Moya PA-C   Indications: vascular access      UNIVERSAL PROTOCOL   Site Marked: Yes  Prior Images Obtained and Reviewed:  Yes  Required items: Required blood products, implants, devices and special equipment available    Patient identity confirmed:  Verbally with patient  Patient was reevaluated immediately before administering moderate or deep sedation or anesthesia  Confirmation Checklist:  Patient's identity using two indicators, relevant allergies, procedure was appropriate and matched the consent or emergent situation and correct equipment/implants were available  Time out: Immediately prior to the procedure a time out was called    Universal Protocol: the Joint Commission Universal Protocol was followed    Preparation: Patient was prepped and draped in usual sterile fashion       ANESTHESIA    Anesthesia: See MAR for details  Local Anesthetic:  Lidocaine 1% without epinephrine  Anesthetic Total (mL):  1      SEDATION    Patient Sedated: No        Preparation: skin prepped with 2% chlorhexidine  Skin prep agent: skin prep agent completely dried prior to procedure  Sterile barriers: maximum sterile barriers were used: cap, mask, sterile gown, sterile gloves, and large sterile sheet  Hand hygiene: hand hygiene performed prior to central venous catheter insertion  Type of line used: Midline  Catheter type: single lumen  Lumen type: non-valved  Catheter size: 4 Fr  Brand: Bard  Lot number: [id number]  Placement method: ultrasound and MST  Number of attempts: 1  Successful placement: yes  Orientation: right  Location: brachial vein (lateral)  Arm circumference: adults 10 cm  Extremity circumference: 39.5  Visible catheter length: 0  Internal length: 8 cm  Total catheter length: 8  Dressing and securement: chlorhexidine disc applied, sterile  dressing applied and securement device  PROCEDURE   Patient Tolerance:  Patient tolerated the procedure well with no immediate complications

## 2022-02-27 NOTE — PLAN OF CARE
Goal Outcome Evaluation: not met    Plan of Care Reviewed With: patient     Overall Patient Progress: K+ the same, needing TCU placement.    Orientation/Cognitive: A&Ox4  Observation Goals (Met/ Not Met): not met  Mobility Level/Assist Equipment: A+2-3 GB and walker to BSC  Fall Risk (Y/N): YES  Behavior Concerns: N  Pain Management: Fentanyl patch on chest  ABNL VS/O2: N  ABNL Lab/BG: K+ 6.7  Diet: 2gm K+   Bowel/Bladder: Inc @ times, urinal @ bedside  Skin Concerns: BLE wounds, WOC following, ace wrapped  Drains/Devices: Tele  Tests/Procedures for next shift: Labs am  Anticipated DC date: 2/28/2022??  Patient Stated Goal for Today: To find placement and discharge

## 2022-02-27 NOTE — PROGRESS NOTES
2203: 35 mL (of 50mL) d50 25g administered over 2 min via newly placed RUE midline when pt started complaining of pain. Site above midline feels cool. Flushed w/ 10mL NS but unable to get blood return. Charge RN Tanja contacted IV RN Jane to assess.     Prior to d50 administration 30mL (of 50mL total) of calcium gluconate had infused.    2215: During IV RN assessment remaining 15 mL of d50 administered and flushed w/ NS. Line flushed easily but became painful shortly after. Plan to remove midline and place short term PIV for IV insulin and lasix. Tentative plan to re-assess again for a new midline tomorrow if necessary.    2233 B. Will continue to monitor blood sugars q 30min x4. IV RN attempting to place PIV.     2300: PIV placed by IV RN in L hand. If IV goes bad will need to consult anesthesia. See IV RN Jane's note. Finished infusing calcium gluconate.

## 2022-02-27 NOTE — PROVIDER NOTIFICATION
CROSS COVER:    Paged regarding elevated potassium of 6.7 and IV infiltrated.    - Medline order placed.      Jordan Moya PA-C

## 2022-02-27 NOTE — CONSULTS
[facility]    Nephrology Consultation     Date of Admission:  2/20/2022    Assessment & Plan     Panda Miranda is a 66 year old male who was admitted on 2/20/2022 for placement related to ambulation difficulty. This came after brief discharge following a prolonged hospital course prior to that related to gout, back/joint pain and weakness.    HYPERKALEMIA: Confusing picture with new onset mixed acidosis in the setting of longstanding modest kidney dysfunction. The acid base disorder is being adequately treated with sodium bicarbonate and the hyperkalemia has required significant measures including acute shifting treatment as well as potassium binding with Lokelma and kayexelate.     Suspect tubular impairment in potassium secretion which would not be an uncommon finding in patients with diabetic kidney disease, but what is unusual is he has not had a problem until this month, with his COVID 19 infection having been at the end of last month along with his SHANTE. There are reports of tubular dysfunction after COVID. That is possible but is more likely residual dysfunction from his SHANTE on top of his CKD.    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 May consider empiric therapy with fludrocortisone if hyperkalemia persists.              ~Renal ultrasound ordered to evaluate for obstruction.   ~Limit K intake            ~Continue Lokelma. Kayexelate prn.             ~Consider fludrocortisone if does not improve.   ~His body habitus may indicate a larger burden of accumulated potassium.              May contribute to his prolonged course.      Gurinder Cervantes MD  [facility] - Nephrology  C:[phone]  P:[phone]    Reason for Consult     I was asked to see the patient for persistent hyperkalemia.    Primary Care Physician     Ben Hamlin    Chief Complaint     Weakness. Back and joint  pain.          History of Present Illness     Panda Miranda is a 66 year old male who presented with weakness, back and joint pain 2/11/2022. He was diagnosed with gout flare and SHANTE with hyperkalemia. During his recovery he was discharged to home with assistance but was unable to manage so was readmitted for placement.     He has chronic kidney disease likely secondary to DM and CHF per his primary nephrologist, Dr. Coreas. He has had very low level albuminuria historically and has not really had issues with hyperkalemia prior to his most recent admission. Over over ten years there was only one elevated K.     Since being in the hospital he has been treated with furosemide, fluids, shifting medications and kayexelate. More recently Lokelma was tried. The hyperkalemia has persisted to a modest degree.    His main symptom is knee pain. His breathing has been stable, urination has been stable. He remains very limited in getting around related to his obesity and his knee pain. He attributes a lot of his decline to his having had COVID in January despite the facct he was not very ill at the time from a respiratory standpoint. He has not been having diarrhea or vomiting and has not been using salt substitutes or receiving heparin.     The potassium really has started being a problem since the end of January when he was found to have a K of 6.1 on 1/27. His creatinine at that time was 2.69 and the total CO2 was 22. The highest value was 7.3 on 2/16/2022. There has been a decline in the total CO2 with a salima of 16 on 2/1-2022. Arterial pH was 7.3 and the PCO2 was 39 indicating a mild mixed acidosis. More recent VBGs have been a bit more ambiguous with pHs running in the 7.3 range venous with PCo2 in the 47-50 range.         Mr. Miranda was recently readmitted to [facility] on 2/20      History is obtained from the patient and chart review.      Past Medical History   I have reviewed this patient's medical history and updated  it with pertinent information if needed.   Past Medical History:   Diagnosis Date     (HFpEF) heart failure with preserved ejection fraction (H)      Arthritis      Arthropathy of wrist      Atrial fibrillation (H)      Bradycardia      Cancer (H) 2011    colon cancer; hemicolectomy     CHF (congestive heart failure) (H)      Chronic kidney disease      Gout      Hand swelling      Heart valve disease     intermittent mitral regurgitation     HTN (hypertension)      Hyperlipidemia      Morbid obesity (H)      Obesity      TASHA (obstructive sleep apnea)     CPAP     Perimembranous ventricular septal defect    UTI versus pyelonephritis at [facility] 1/27-1/30. Had COVID at that time.     Past Surgical History   I have reviewed this patient's surgical history and updated it with pertinent information if needed.  Past Surgical History:   Procedure Laterality Date     COLON SURGERY       COLONOSCOPY N/A 12/12/2019    Procedure: COLONOSCOPY;  Surgeon: Flaco Magaña MD;  Location: [facility] [place];  Service: General     GASTROPLASTY VERTICAL BANDED      Dr. Arizmendi [facility] 1996 Initial weight 800++ Lost to 440- (was 320# at lowest)     KNEE SURGERY       ZZC PART REMOVAL COLON W ANASTOMOSIS      Description: Partial Colectomy;  Recorded: 12/02/2011;  Comments: Dr Magaña       Prior to Admission Medications   Prior to Admission Medications   Prescriptions Last Dose Informant Patient Reported? Taking?   acetaminophen (TYLENOL) 325 MG tablet   No Yes   Sig: Take 2 tablets (650 mg) by mouth every 6 hours as needed for mild pain or other (and adjunct with moderate or severe pain or per patient request)   colchicine (COLCYRS) 0.6 MG tablet   No Yes   Sig: Take 0.5 tablets (0.3 mg) by mouth daily   famotidine (PEPCID) 20 MG tablet   No Yes   Sig: Take 1 tablet (20 mg) by mouth 2 times daily as needed (heartburn)   febuxostat (ULORIC) 40 MG TABS tablet   No Yes   Sig: Take 1 tablet (40 mg) by mouth daily   fentaNYL (DURAGESIC)  25 mcg/hr 72 hr patch   No Yes   Sig: Place 1 patch onto the skin every 72 hours for 15 days remove old patch.   ferrous sulfate (FEROSUL) 325 (65 Fe) MG tablet   No Yes   Sig: Take 1 tablet (325 mg) by mouth daily   melatonin 1 MG TABS tablet   No Yes   Sig: Take 1 tablet (1 mg) by mouth nightly as needed for sleep   methocarbamol (ROBAXIN) 500 MG tablet   Yes Yes   Sig: Take 500 mg by mouth 3 times daily as needed for muscle spasms   metoprolol succinate ER (TOPROL-XL) 50 MG 24 hr tablet   No Yes   Sig: Take 1 tablet (50 mg) by mouth daily   oxyCODONE (ROXICODONE) 5 MG tablet   No Yes   Sig: Take 1 tablet (5 mg) by mouth every 6 hours as needed for moderate to severe pain   predniSONE (DELTASONE) 20 MG tablet   No Yes   Sig: Take 1 tablet (20 mg) by mouth daily   sodium bicarbonate 650 MG tablet   No Yes   Sig: Take 2 tablets (1,300 mg) by mouth 3 times daily   warfarin ANTICOAGULANT (COUMADIN) 5 MG tablet 2/20/2022 at 1749 - 5 mg  Yes Yes   Sig: Take 5 mg by mouth daily OR AS DIRECTED.  Adjust dose based on INR.      Facility-Administered Medications: None     Allergies   No Known Allergies    Social History   I have reviewed this patient's social history and updated it with pertinent information if needed. Panda Miranda  reports that he has never smoked. He has never used smokeless tobacco. He reports current alcohol use of about 0.8 standard drinks of alcohol per week. He reports that he does not use drugs. 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     Family History   I have reviewed this patient's family history and updated it with pertinent information if needed. No family history of kidney disease.   Family History   Problem Relation Age of Onset     Diabetes Type 2  Other      Heart Failure Other      Heart Disease Mother      Hypertension Mother      Diabetes Sister        Review of Systems   The 14 point Review of Systems is negative  other than noted in the HPI.     Physical Exam   Temp: 97.4  F (36.3  C) Temp src: Oral BP: 126/88 Pulse: 92   Resp: 20 SpO2: 100 % O2 Device: None (Room air)    Vital Signs with Ranges  Temp:  [97.4  F (36.3  C)-98.5  F (36.9  C)] 97.4  F (36.3  C)  Pulse:  [82-92] 92  Resp:  [18-20] 20  BP: (117-132)/(61-88) 126/88  SpO2:  [99 %-100 %] 100 %  453 lbs 9.6 oz    GENERAL APPEARANCE:  Alert, in no distress, pleasant, cooperative, oriented x self, place and recent events.   EYES:  EOM, lids, pupils and irises normal, sclera clear and conjunctiva normal  ENT:  Mouth normal, moist mucous membranes, nose normal without drainage or crusting, external ears without lesions, hearing acuity grossly intact  NECK: Supple, symmetrical, trachea midline, No JVD visible related to body habitus.  RESP:  Respiratory effort normal,no respiratory distress, Lung sounds clear but decreased related to body habitus.   CV:  regular, rate and rhythm controlled and regular, no murmur, no rub or gallop, but heart tones soft related to body habitus.. ABDOMEN:  Hypoactive bowel sounds, soft, ibese, nontender, no palpable masses.  EXT: Edema: Some edema with chronic venous stasis changes in the the bilateral lower extremities.  No gross deformities.  SKIN:  skin on extremities warm, dry and intact without rashes  NEURO: cranial nerves grossly intact, no facial asymmetry, no speech deficits and able to follow directions, moves all extremities symmetrically  PSYCH:  insight and judgement and memory appear intact, affect and mood normal    Data   BMP  Recent Labs   Lab 02/27/22  1020 02/27/22  0425 02/27/22  0348 02/27/22  0322 02/27/22  0220 02/26/22  1659 02/26/22  1618 02/26/22  0936 02/26/22  0644     --   --   --   --   --   --   --  138   POTASSIUM 5.6*  --  5.8*  --   --   --  6.7*  --  6.7*   CHLORIDE 107  --   --   --   --   --   --   --  111*   JOHN 8.7  --   --   --   --   --   --   --  8.8   CO2 27  --   --   --   --   --   --   --   24   BUN 64*  --   --   --   --   --   --   --  65*   CR 1.89*  --   --   --   --   --   --   --  1.83*   * 114*  --  124* 95   < >  --    < > 97    < > = values in this interval not displayed.     Phos@LABNTIPR(phos:4)  CBC)  Recent Labs   Lab 02/21/22 1924   HGB 10.2*     No lab results found in last 7 days.    Invalid input(s): BILIRUBININDIRECT  Recent Labs   Lab 02/27/22  1113   INR 3.05*     No results found for: D2VIT, D3VIT, DTOT  Recent Labs   Lab 02/21/22 1924   HGB 10.2*     No results for input(s): PTHI in the last 168 hours.

## 2022-02-27 NOTE — PROGRESS NOTES
[facility]    Medicine Progress Note - Hospitalist Service       Date of Admission:  2/20/2022  Assessment & Plan   Panda Miranda is a 66 year old male admitted on 2/20/2022 after being discharged earlier from [facility] stay from 2/11 - 2/20 for acute gout flare and subsequent SHANTE and hyperkalemia likely caused by gout treatment. Patient is morbidly obese and he was discharged to his home with home health but he was unable to manage going up steps immediately upon arrival home.  He was brought in to [facility] ED for placement. In the ER he was seen by Dr Henning, vitals were normal.  Labs were reviewed from his recent discharge with Cr of 1.93 which was improved from his discharge and actually better than baseline.     Of note, he was also admitted to the hospital at [facility] 1/27-1/30 for sepsis due to complicated UTI vs pyelonephritis with hyperkalemia, acute on chronic pulmonary edema, and mild COVID-19 infection.    Initially admitted unde Obs status; his K on admission was 5.1, up to 6.7 on  2/26; he was switched to inpatient status on 2/26/2022.    Recurrent Hyperkalemia  CKD stage 4  - baseline cr 2--2.2  - had admissions for SHANTE and hyperkalemia  - recently started on sodium bicarb  - cr on admission 1.93, at baseline or better  - K on admission 5.1  - 2/26- K 6.7, cr 1.83  - Tele  - EKG- Afib, incomplete RBBB, some nonspecific ST-t changes, no peaked T waves  - given some iv fluids, alb neb, Insulin +dextrose, Kayexalate  - repeat K stilll 6.7; ordered a bolus NS with asix 40 mg ivX1, repeat Insulin with dextrose, Alb nebX1. Lokelma 10 mg poX1  - repeat K 5.8 in am-->5.6  - nephrology consulted given recurrent hyperkalemia  - renal US- Suboptimal study; No right hydronephrosis. Right renal presumed cyst. This could be further evaluated with additional ultrasound or CT. Left kidney not visualized  - BMP in am  - management as per Nephrology     Physical deconditing in setting of  morbid obesity and recent hospitalization  - initially- Observation status- swicthed to inpatient on 2/26 due to hyperkalemia  - PT evaluated on 2/22 and recommended TCU due to impaired mobility and generalized weakness, SW assisting on placement  - PT to work with patient 5x week while in observation, continues to recommend TCU     Polyarticular gout with exacerbation  History of pseudogout  Started with right knee, hands, and worsening back pain, treated at [facility] while hospitalized. Symptomatically improved. Patient reports right knee is greatly improved. States he has arthritis and has had knee injections in the past.   - Continue tx for exacerbation with steroid taper and colchicine that were prescribed at discharge.   - Right knee pain much improved  - There is high risk of cardiovascular death with Uloric therefore stopped Uloric and started allopurinol 300 mg p.o. daily for gout prevention. Discussed with pt and he agrees with the change.  - started on Prednisone taper, started at 40mg po daily, then decreased to 20mg/d, will complete 7 days of 20mg/d and decrease to 10mg on 2/28     Atrial fibrillation  HFpEF, not in acute exacerbation  Essential HTN  Hx of VSD  - Continue PTA metoprolol and pharmacy to dose warfarin  - INR 3.05     Chronic knee pain, likely OA  -continue PTA fentanyl patch, robaxin and prn oxycodone     Chronic venosus stasis, bilateral: stable, compression wraps as needed    TASHA  - apparently uses CPAP only wjen weight is over 450lbs    COVID-19 admission screening PCR: Not done due to recent COVID-19 infection <90 days ago and no new symptoms.   Recent COVID-19 infection 1/27/22. COVID recovered and no need for special precautions.  - COVID-19 vaccination status: Fully vaccinated with Pfizer.   - Influenza vaccination status: 1/2022      Diet: Combination Diet 2 gm K Diet    DVT Prophylaxis: Warfarin  Cheema Catheter: Not present  Code Status: Full Code      Disposition Plan   Expected  Discharge: 1-2 days, pending TCU placement, normalizing K level.   Anticipated discharge location:  Awaiting care coordination huddle  Delays:     Placement - TCU         Entered: Ruthy Fernández MD 02/27/2022, 12:11 PM       The patient's care was discussed with the Bedside Nurse and Patient.      ______________________________________________________________________    Interval History    K trending down, still elevated  - feeling fine, denies chest pain, no SOB  - no abd pain, no N/V    Data reviewed today: I reviewed all medications, new labs and imaging results over the last 24 hours. I personally reviewed the renal US image(s) showing as above.    Physical Exam   Vital Signs: Temp: 97.4  F (36.3  C) Temp src: Oral BP: 126/88 Pulse: 92   Resp: 20 SpO2: 100 % O2 Device: None (Room air)    Weight: 453 lbs 9.6 oz    Physical Exam    General: Awake, alert, pleasant, morbidly obese, NAD  HEENT: Normocephalic, atraumatic. Extraocular movements intact.   Respiratory: Clear to auscultation bilaterally, no rales, wheezing, or rhonchi.  Cardiovascular: irregularly irregular, no murmur auscultated.   Gastrointestinal: Soft, non-tender, non-distended. Bowel sounds present.  Skin: Warm, dry. No obvious rashes or lesions on exposed skin. Dorsalis pedis pulses palpable bilaterally. Lymphedema wraps in place. Chronic stasis and lichenification noted. Small skin tears with clean dressings noted. Non pitting edema.   Musculoskeletal: Moves all extremities. +Dorsi/plantar flexion intact. Right knee minimal tenderness to palpation. No large effusion palpated.  Neurologic: AAO x3. Cranial nerves 2-12 grossly intact, normal strength and sensation.  Psychiatric: Appropriate mood and affect. No obvious anxiety or depression.    Data   Recent Labs   Lab 02/27/22  1113 02/27/22  1020 02/27/22  0425 02/27/22  0348 02/27/22  0322 02/26/22  1659 02/26/22  1618 02/26/22  0936 02/26/22  0644 02/25/22  0609 02/22/22  0623 02/21/22  1924    0000   HGB  --   --   --   --   --   --   --   --   --   --   --  10.2*  --    INR 3.05*  --   --   --   --   --   --   --  2.99* 2.87*   < >  --   --    NA  --  137  --   --   --   --   --   --  138  --   --   --   --    POTASSIUM  --  5.6*  --  5.8*  --   --  6.7*  --  6.7*  --   --   --    < >   CHLORIDE  --  107  --   --   --   --   --   --  111*  --   --   --   --    CO2  --  27  --   --   --   --   --   --  24  --   --   --   --    BUN  --  64*  --   --   --   --   --   --  65*  --   --   --   --    CR  --  1.89*  --   --   --   --   --   --  1.83*  --   --   --   --    ANIONGAP  --  3  --   --   --   --   --   --  3  --   --   --   --    JOHN  --  8.7  --   --   --   --   --   --  8.8  --   --   --   --    GLC  --  116* 114*  --  124*   < >  --    < > 97  --   --   --   --     < > = values in this interval not displayed.     No results found for this or any previous visit (from the past 24 hour(s)).  Medications     Warfarin Therapy Reminder         allopurinol  300 mg Oral Daily     colchicine  0.3 mg Oral Daily     fentaNYL  25 mcg Transdermal Q72H     fentaNYL   Transdermal Q8H     metoprolol succinate ER  50 mg Oral Daily     [START ON 2/28/2022] predniSONE  10 mg Oral Daily     sodium bicarbonate  1,300 mg Oral TID     sodium chloride (PF)  10 mL Intracatheter Q8H     sodium chloride (PF)  10 mL Intracatheter Q8H     warfarin ANTICOAGULANT  2 mg Oral ONCE at 18:00

## 2022-02-28 NOTE — PLAN OF CARE
Goal Outcome Evaluation:    Plan of Care Reviewed With: patient     Orientation/Cognitive: A&Ox4  Observation Goals (Met/ Not Met): Not met  Mobility Level/Assist Equipment: A+2-3 GB walker  Fall Risk (Y/N): YES  Behavior Concerns: N  Pain Management: Fentanyl patch, oxy available prn  ABNL VS/O2: Soft BP otherwise VSS on RA  ABNL Lab/BG: Na+ 5.7, Creat 1.87  Diet: 2gm K+ diet  Bowel/Bladder: Uses urinal, continent of bowel  Skin Concerns: BLE wounds, WOC following, scratch on abdomen.  Drains/Devices: Tele A Fib w/ BBB  Tests/Procedures for next shift: PT, WOC following  Anticipated DC date: Discharge when TCU placement found  Patient Stated Goal for Today: Discharge to TCU

## 2022-02-28 NOTE — PLAN OF CARE
[id number]     AOx4.  Ax2-3 GB/W to BSC.  VSS on RA.  K 5.7.  Midline in R arm. Unit collect.  Pain controlled via Fentanyl patch on chest.  Diet 2gmK+.  Inc at times. Pt uses bedside urinal.  BLE wounds, WOC following, ace wrapped. CDI. Tele. Discharge to TCU pending normalized K levels.

## 2022-02-28 NOTE — PROGRESS NOTES
[facility]    Medicine Progress Note - Hospitalist Service       Date of Admission:  2/20/2022    Assessment & Plan   Panda Miranda is a 66 year old male admitted on 2/20/2022 after being discharged earlier from [facility] stay from 2/11 - 2/20 for acute gout flare and subsequent SHANTE and hyperkalemia likely caused by gout treatment. Patient is morbidly obese and he was discharged to his home with home health but he was unable to manage going up steps immediately upon arrival home.  He was brought in to [facility] ED for placement. In the ER he was seen by Dr Henning, vitals were normal.  Labs were reviewed from his recent discharge with Cr of 1.93 which was improved from his discharge and actually better than baseline.     Of note, he was also admitted to the hospital at [facility] 1/27-1/30 for sepsis due to complicated UTI vs pyelonephritis with hyperkalemia, acute on chronic pulmonary edema, and mild COVID-19 infection.    Initially admitted unde Obs status; his K on admission was 5.1, up to 6.7 on  2/26; he was switched to inpatient status on 2/26/2022.    Recurrent Hyperkalemia  CKD stage 4  - baseline cr 2.2-2.5 range.   - had admissions for SHANTE and hyperkalemia  - recently started on sodium bicarb  - cr on admission 1.93, at baseline or better and improve to 1.87 better than baseline.   - K on admission 5.1 and increase to 6.7  - Treated with Kayexalate 30 gm 2/26 and Lokelma 10 gm on 2/27, given some iv fluids, alb neb, Insulin +dextrose, Kayexalate before,  Potassium improve but still elevated to 5.7 today.  - Tele without arrhythmia.   - EKG- Afib, incomplete RBBB, some nonspecific ST-t changes, no peaked T waves  - renal US- Suboptimal study; No right hydronephrosis. Right renal presumed cyst. This could be further evaluated with additional ultrasound or CT. Left kidney not visualized    For now will repeat Kayexalate 15 gm today, keep him on low potassium diet, repeat lab later  today and tomorrow.   Nephrology was consulted and evaluation caden,.      Physical deconditing in setting of morbid obesity and recent hospitalization  - initially- Observation status- swicthed to inpatient on 2/26 due to hyperkalemia  - PT evaluated on 2/22 and recommended TCU due to impaired mobility and generalized weakness, SW assisting on placement  - PT to work with patient 5x week while in observation, continues to recommend TCU  Awaiting placement once his Potassium improves.      Polyarticular gout with exacerbation  History of pseudogout  Started with right knee, hands, and worsening back pain, treated at [facility] while hospitalized. Symptomatically improved. Patient reports right knee is greatly improved. States he has arthritis and has had knee injections in the past.   - Continue tx for exacerbation with steroid taper and colchicine that were prescribed at discharge.   - Right knee pain much improved  - There is high risk of cardiovascular death with Uloric therefore stopped Uloric and started allopurinol 300 mg p.o. daily for gout prevention. Discussed with pt and he agrees with the change.  - started on Prednisone taper, started at 40mg po daily, then decreased to 20mg/d, will complete 7 days of 20mg/d and decrease to 10mg on 2/28     Atrial fibrillation  HFpEF, not in acute exacerbation  Essential HTN  Hx of VSD  - Continue PTA metoprolol and pharmacy to dose warfarin  - INR therapeutic      Chronic knee pain, likely OA  -continue PTA fentanyl patch, robaxin and prn oxycodone  Pain improve, no significant effusion or swelling at this time, no warmth or redness.      Chronic venosus stasis, bilateral: stable, compression wraps as needed    TASHA  - apparently uses CPAP only wjen weight is over 450lbs    COVID-19 admission screening PCR: Not done due to recent COVID-19 infection <90 days ago and no new symptoms.   Recent COVID-19 infection 1/27/22. COVID recovered and no need for special  precautions.  - COVID-19 vaccination status: Fully vaccinated with Pfizer.   - Influenza vaccination status: 1/2022      Diet: Combination Diet 2 gm K Diet    DVT Prophylaxis: Warfarin  Cheema Catheter: Not present  Code Status: Full Code      Disposition Plan   Expected Discharge: 1-2 days, pending TCU placement, normalizing K level.   Anticipated discharge location:  Awaiting care coordination huddle  Delays:     Placement - TCU         Entered: Joshua Duval MD 02/28/2022, 1:49 PM       The patient's care was discussed with the Bedside Nurse and Patient.      ______________________________________________________________________    Interval History    Overall feeling much better, pain is better control, want to walk some, denies any fever, chills, chest pain, nausea, vomiting, headache or dizziness at this time.     No other significant event overnight.     Data reviewed today: I reviewed all medications, new labs and imaging results over the last 24 hours. I personally reviewed the renal US image(s) showing as above.    Physical Exam   Vital Signs: Temp: 97.8  F (36.6  C) Temp src: Oral BP: 103/57 Pulse: 84   Resp: 18 SpO2: 100 % O2 Device: None (Room air)    Weight: 453 lbs 9.6 oz    Physical Exam    General: Awake, alert, pleasant, morbidly obese, NAD  HEENT: Normocephalic, atraumatic. Extraocular movements intact.   Respiratory: Clear to auscultation bilaterally, no rales, wheezing, or rhonchi.  Cardiovascular: irregularly irregular, no murmur auscultated.   Gastrointestinal: Soft, non-tender, non-distended. Bowel sounds present.  Skin: Warm, dry. No obvious rashes or lesions on exposed skin. Dorsalis pedis pulses palpable bilaterally. Lymphedema wraps in place. Chronic stasis and lichenification noted. Small skin tears with clean dressings noted. Non pitting edema.   Musculoskeletal: Moves all extremities. +Dorsi/plantar flexion intact. Right knee minimal tenderness to palpation. Nosignificant  effusion  Neurologic: AAO x3. Cranial nerves 2-12 grossly intact, normal strength and sensation.  Psychiatric: Appropriate mood and affect. No obvious anxiety or depression.    Data   Recent Labs   Lab 02/28/22  0550 02/27/22  1113 02/27/22  1020 02/27/22  0425 02/27/22  0348 02/26/22  0936 02/26/22  0644 02/22/22  0623 02/21/22  1924   HGB  --   --   --   --   --   --   --   --  10.2*   INR 2.91* 3.05*  --   --   --   --  2.99*   < >  --      --  137  --   --   --  138  --   --    POTASSIUM 5.7*  --  5.6*  --  5.8*   < > 6.7*  --   --    CHLORIDE 111*  --  107  --   --   --  111*  --   --    CO2 25  --  27  --   --   --  24  --   --    BUN 61*  --  64*  --   --   --  65*  --   --    CR 1.87*  --  1.89*  --   --   --  1.83*  --   --    ANIONGAP 3  --  3  --   --   --  3  --   --    JOHN 8.3*  --  8.7  --   --   --  8.8  --   --    *  --  116* 114*  --    < > 97  --   --     < > = values in this interval not displayed.     Recent Results (from the past 24 hour(s))   US Renal Complete    Narrative    EXAM: US RENAL COMPLETE  LOCATION: [facility]  DATE/TIME: 2/27/2022 3:15 PM    INDICATION: CKD with recent onset of acidosis and hyperkalemia. Eval for obstruction.  COMPARISON: None.  TECHNIQUE: Routine Bilateral Renal and Bladder Ultrasound.    FINDINGS: Severely limited study by patient size.    RIGHT KIDNEY: 9.7 x 4.8 x 4.3 cm. The central kidney has a 2.1 cm anechoic structure with sharp back wall which is likely a simple cyst. No hydronephrosis seen, however the kidney is not well visualized.     LEFT KIDNEY: This kidney is not visualized.    BLADDER: Normal.      Impression    IMPRESSION:  1.  Suboptimal study.  2.  No right hydronephrosis.  3.  Right renal presumed cyst. This could be further evaluated with additional ultrasound or CT.  4.  Left kidney not visualized.     Medications     Warfarin Therapy Reminder         allopurinol  300 mg Oral Daily     colchicine  0.3 mg  Oral Daily     fentaNYL  25 mcg Transdermal Q72H     fentaNYL   Transdermal Q8H     metoprolol succinate ER  50 mg Oral Daily     predniSONE  10 mg Oral Daily     sodium bicarbonate  1,300 mg Oral TID     sodium chloride (PF)  10 mL Intracatheter Q8H     sodium chloride (PF)  10 mL Intracatheter Q8H     warfarin ANTICOAGULANT  2 mg Oral ONCE at 18:00

## 2022-02-28 NOTE — PROGRESS NOTES
Renal Medicine Progress Note                                Panda Miranda MRN# [id number]   Age: 66 year old YOB: 1955   Date of Admission: 2/20/2022 Hospital LOS: 2                  Assessment/Plan:     Panda Miranda is a 66 year old male who was admitted on 2/20/2022 for placement related to ambulation difficulty. This came after brief discharge following a prolonged hospital course prior to that related to gout, back/joint pain and weakness.    Seen regarding CKD and elevated K    1.  CKD stage 3b/4   -HTN and CHF   -creatinine 2 to 2.4 mg/dl   -eGFR 30 ml/min range   -follows with Dr KIRA Coreas of [facility]  2.  Albuminuria   -ACR: 82 mg/g date 01/11/22  3.  Anemia   -no current MIGUEL requirement   4.  Hyperkalemia     New onset mixed acidosis in the setting of longstanding modest kidney dysfunction. The acid base disorder is being adequately treated with sodium bicarbonate and the hyperkalemia has required significant measures including acute shifting treatment as well as potassium binding with Lokelma and kayexelate.     Concern for potential tubular impairment in potassium secretion     No obstruction  Low K diet  Continue daily Lokelma  Document          Interval History:     Comfortable  IO reviewed   Good UO which should aid K excretion    Labs reviewed with patient     ROS:     GENERAL: NAD, No fever,chills  R: NEGATIVE for significant cough or SOB  CV: NEGATIVE for chest pain, palpitations  EXT: no change edema  ROS otherwise negative    Medications and Allergies:     Reviewed    Physical Exam:     Vitals were reviewed  Patient Vitals for the past 8 hrs:   BP Temp Temp src Pulse Resp SpO2   02/28/22 1049 103/57 97.8  F (36.6  C) Oral 84 18 100 %   02/28/22 0816 101/65 98  F (36.7  C) Oral 96 18 100 %     I/O last 3 completed shifts:  In: 720 [P.O.:720]  Out: 1675 [Urine:1675]    Vitals:    02/20/22 2215 02/21/22 0132   Weight: (!) 198.2 kg (437 lb) (!) 205.8 kg (453 lb 9.6 oz)          GENERAL: awake, alert, follows  HEENT: NC/AT, PERRLA, EOMI, non icteric, pharynx moist without lesion  RESP:  clear anteriorly  CV: RRR, normal S1 S2  ABDOMEN: obeses  NEURO: speech normal and cranial nerves 2-12 intact  PSYCH: affect normal/bright  EXT: venous stasis     Data:     Recent Labs   Lab 02/28/22  0550 02/27/22  1020 02/27/22  0425 02/27/22  0348 02/27/22  0322 02/26/22  1659 02/26/22  1618 02/26/22  0936 02/26/22  0644    137  --   --   --   --   --   --  138   POTASSIUM 5.7* 5.6*  --  5.8*  --   --  6.7*  --  6.7*   CHLORIDE 111* 107  --   --   --   --   --   --  111*   CO2 25 27  --   --   --   --   --   --  24   ANIONGAP 3 3  --   --   --   --   --   --  3   * 116* 114*  --  124*   < >  --    < > 97   BUN 61* 64*  --   --   --   --   --   --  65*   CR 1.87* 1.89*  --   --   --   --   --   --  1.83*   GFRESTIMATED 39* 39*  --   --   --   --   --   --  40*   JOHN 8.3* 8.7  --   --   --   --   --   --  8.8    < > = values in this interval not displayed.         Recent Labs   Lab Test 02/28/22  0550 02/27/22  1020 02/26/22  0644 02/20/22  0602 02/19/22  0615 02/18/22  0613 02/17/22  0549 02/16/22  1401 02/16/22  0814 02/15/22  0600   CR 1.87* 1.89* 1.83* 1.93* 2.02* 2.27* 2.21* 2.29* 2.30* 2.45*     Recent Labs   Lab 02/28/22  0550 02/27/22  1020 02/27/22  0348 02/26/22  1618 02/26/22  0644   POTASSIUM 5.7* 5.6* 5.8* 6.7* 6.7*     No lab results found in last 7 days.  Recent Labs   Lab 02/21/22 1924   HGB 10.2*         G Ben Norris MD    [facility] - Nephrology  [phone]

## 2022-02-28 NOTE — PLAN OF CARE
Goal Outcome Evaluation:    Plan of Care Reviewed With: patient     Overall Patient Progress: improving    Goal Outcome Evaluation: not met     Plan of Care Reviewed With: patient      Overall Patient Progress: K+ the same, needing TCU placement.     Orientation/Cognitive: A&Ox4  Observation Goals (Met/ Not Met): NA  Mobility Level/Assist Equipment: A+2-3 GB and walker to BSC  Fall Risk (Y/N): YES  Behavior Concerns: None  Pain Management: Fentanyl patch on chest  ABNL VS/O2: VSS on RA  ABNL Lab/BG: K+ 5.6, trending down, nephrology consult pending.   Diet: 2gm K+   Bowel/Bladder: Inc @ times, urinal @ bedside, Male external catheter in place.  Skin Concerns: BLE wounds, WOC following, ace wrapped  Drains/Devices: Tele  Tests/Procedures for next shift: Labs am, Renal US from today was sub optimal, no R hydronephrosis, L kidney visualized.  Anticipated DC date: TBD pending nephrology consult  Patient Stated Goal for Today: To find placement and discharge

## 2022-03-01 NOTE — PROGRESS NOTES
Orientation/Cognitive: A/Ox4   Mobility Level/Assist Equipment: Ax3, lift w/ transfers, GB walker if OOB  Fall Risk (Y/N): Yes   Behavior Concerns: None   Pain Management: Denies pain, Fentanyl patch w/ full benefit, applied at left upper chest   ABNL VS/O2: K: 5.7, Lokelma started, NaHCO3 administered  ABNL Lab/BG: Hyperkalemia   Diet: regular: good appetite   Bowel/Bladder: Continent, utilized bedside urinal, BMx1, large   Skin Concerns: BLE wound/edema, wound care done   Drains/Devices: PICC at Left Brachial  Tests/Procedures for next shift: CBC, Renal Panel, INR   Anticipated DC date: 3/1/2022 awaiting TCU placement   Patient Stated Goal for Today: BM, pain mgt and K levels regulation

## 2022-03-01 NOTE — PROVIDER NOTIFICATION
MD Notification    Notified Person: MD    Notified Person Name: Dr. Connolly    Notification Date/Time:  3-1 1006  Notification Interaction: web based page    Purpose of Notification: Woc requests lymph edema consult.     Orders Received:    Comments:

## 2022-03-01 NOTE — PROGRESS NOTES
Renal Medicine Progress Note                                Panda Miranda MRN# [id number]   Age: 66 year old YOB: 1955   Date of Admission: 2/20/2022 Hospital LOS: 3                  Assessment/Plan:     Panda Miranda is a 66 year old male who was admitted on 2/20/2022 for placement related to ambulation difficulty. This came after brief discharge following a prolonged hospital course prior to that related to gout, back/joint pain and weakness.    Seen regarding CKD and elevated K    1.  CKD stage 3b/4   -HTN and CHF   -creatinine 2 to 2.4 mg/dl   -eGFR 30 ml/min range   -follows with Dr KIRA Coreas of [facility]  2.  Albuminuria   -ACR: 82 mg/g date 01/11/22  3.  Anemia   -no current MIGUEL requirement   4.  Hyperkalemia     New onset mixed acidosis in the setting of longstanding modest kidney dysfunction. The acid base disorder is being adequately treated with sodium bicarbonate and the hyperkalemia has required significant measures including acute shifting treatment as well as potassium binding with Lokelma and kayexelate.     Concern for potential tubular impairment in potassium secretion     No obstruction  Low K diet    Lower Lokelma dose to 10 mg daily    Follow-up with Dr. Coreas  Call with questions  Signing off          Interval History:     Comfortable  IO reviewed   Good UO which should aid K excretion    K 5.2 today        ROS:     GENERAL: NAD, No fever,chills  R: NEGATIVE for significant cough or SOB  CV: NEGATIVE for chest pain, palpitations  EXT: no change edema  ROS otherwise negative    Medications and Allergies:     Reviewed    Physical Exam:     Vitals were reviewed  Patient Vitals for the past 8 hrs:   BP Temp Temp src Pulse Resp SpO2   03/01/22 1127 117/77 99.1  F (37.3  C) Oral 92 20 100 %   03/01/22 0746 113/68 98.8  F (37.1  C) Oral 95 22 100 %     I/O last 3 completed shifts:  In: 720 [P.O.:720]  Out: 1275 [Urine:1275]    Vitals:    02/20/22 2215 02/21/22 0132   Weight: (!) 198.2 kg  (437 lb) (!) 205.8 kg (453 lb 9.6 oz)         GENERAL: awake, alert, follows  HEENT: NC/AT, PERRLA, EOMI, non icteric, pharynx moist without lesion  RESP:  clear anteriorly  CV: RRR, normal S1 S2  ABDOMEN: obeses  NEURO: speech normal and cranial nerves 2-12 intact  PSYCH: affect normal/bright  EXT: venous stasis     Data:     Recent Labs   Lab 03/01/22  0636 02/28/22  0550 02/27/22  1020 02/27/22  0425 02/27/22  0348 02/26/22  0936 02/26/22  0644    139 137  --   --   --  138   POTASSIUM 5.2 5.7* 5.6*  --  5.8*   < > 6.7*   CHLORIDE 112* 111* 107  --   --   --  111*   CO2 26 25 27  --   --   --  24   ANIONGAP 2* 3 3  --   --   --  3   * 112* 116* 114*  --    < > 97   BUN 58* 61* 64*  --   --   --  65*   CR 1.76* 1.87* 1.89*  --   --   --  1.83*   GFRESTIMATED 42* 39* 39*  --   --   --  40*   JOHN 8.5 8.3* 8.7  --   --   --  8.8    < > = values in this interval not displayed.         Recent Labs   Lab Test 03/01/22  0636 02/28/22  0550 02/27/22  1020 02/26/22  0644 02/20/22  0602 02/19/22  0615 02/18/22  0613 02/17/22  0549 02/16/22  1401 02/16/22  0814   CR 1.76* 1.87* 1.89* 1.83* 1.93* 2.02* 2.27* 2.21* 2.29* 2.30*     Recent Labs   Lab 03/01/22  0636 02/28/22  0550 02/27/22  1020 02/27/22  0348 02/26/22  1618   POTASSIUM 5.2 5.7* 5.6* 5.8* 6.7*     Recent Labs   Lab 03/01/22  0636   ALBUMIN 2.7*     Recent Labs   Lab 03/01/22  0636   PHOS 3.5   HGB 9.1*         G Ben Norris MD    [facility] - Nephrology  [phone]

## 2022-03-01 NOTE — PROGRESS NOTES
[facility] Nurse Inpatient Wound Assessment   Reason for consultation: Evaluate and treat  Left lateral shin wounds    Assessment  Left lateral Shin wounds due to Venous Ulcer  Status: improving  Significant improvement of overall skin care to BL lower extremities. Nursing doing a wonderful job taking care of his legs.   Treatment Plan  Left lateral shin wounds: Every 3 days   1. Cleanse with wound cleanser and blot dry  2. Apply Pea size amount of wound gel (skintegrity-floorstock) to Polymem foam pad  (# [id number]) and depress into wound.  3. Spray all intact skin to BL lower extremities with Carmela cleanse and protect and rub in. Allow to soak for 10mins and then wipe away any excess.   4. Apply sween 24 (Pink top lotion) from base of toes to below knees.  5. Allow to completely dry prior to applying compression.       Orders Reviewed  Recommended provider order: Lymphedema consult  [facility] Nurse follow-up plan:weekly  Nursing to notify the Provider(s) and re-consult the WO Nurse if wound(s) deteriorates or new skin concern.    Patient History  According to provider note(s):  Panda Miranda is a 66 year old male admitted on 2/20/2022 after being discharged earlier from [facility] stay from 2/11 - 2/20 for acute gout flare and subsequent SHANTE and hyperkalemia likely caused by gout treatment. Patient is morbidly obese and he was discharged to his home with home health but he was unable to manage going up steps immediately upon arrival home.  He was brought in to [facility] ED for placement. In the ER he was seen by Dr Henning, vitals were normal.  Labs were reviewed from his recent discharge with Cr of 1.93 which was improved from his discharge and actually better than baseline.       Objective Data  Containment of urine/stool: Incontinence Protocol    Active Diet Order  Orders Placed This Encounter      Combination Diet 2 gm K Diet      Output:   I/O last 3 completed shifts:  In: 720 [P.O.:720]  Out: 6176  [Urine:1275]    Risk Assessment:   Sensory Perception: 3-->slightly limited  Moisture: 3-->occasionally moist  Activity: 2-->chairfast  Mobility: 2-->very limited  Nutrition: 3-->adequate  Friction and Shear: 3-->no apparent problem  Sandoval Score: 16                          Labs:   Recent Labs   Lab 03/01/22  0636   ALBUMIN 2.7*   HGB 9.1*   INR 2.49*   WBC 8.6       Physical Exam  Areas of skin assessed: focused BL lower extremities    Wound Location:  Left Lateral Shin    2/22/22      3/1/22        Wound History: Pt with chronic wound he reports to be very slow healing. Pt currently has lymph wraps inpatient and uses velcro wraps outpatient    Wound Base: 100 % dermis, red, moist non granular     Palpation of the wound bed: normal      Drainage: scant     Description of drainage: serosanguinous     Measurements (length x width x depth, in cm) 2.5  x 1.7  x  0.2 cm      Tunneling N/A     Undermining N/A  Periwound skin: intact      Color: normal and consistent with surrounding tissue      Temperature: normal   Odor: none  Pain: denies , none  Pain intervention prior to dressing change: NA    Interventions  Visual inspection and assessment completed   Wound Care Rationale Protect periwound skin, Promote moist wound healing without tissue dehydration , Gently fill dead space to prevent abscess formation  and Provide protection   Wound Care: done per plan of care  Supplies: at bedside  Current off-loading measures: Pillows  Current support surface: Standard  Atmos Air mattress  Education provided to: importance of repositioning and plan of care  Discussed plan of care with Patient and Nurse    Andreas Perez RN CWOCN

## 2022-03-01 NOTE — PROGRESS NOTES
[facility]    Medicine Progress Note - Hospitalist Service       Date of Admission:  2/20/2022    Assessment & Plan   Panda Miranda is a 66 year old male admitted on 2/20/2022 after being discharged earlier from [facility] stay from 2/11 - 2/20 for acute gout flare and subsequent SHANTE and hyperkalemia likely caused by gout treatment. Patient is morbidly obese and he was discharged to his home with home health but he was unable to manage going up steps immediately upon arrival home.  He was brought in to [facility] ED for placement. In the ER he was seen by Dr Henning, vitals were normal.  Labs were reviewed from his recent discharge with Cr of 1.93 which was improved from his discharge and actually better than baseline.     Of note, he was also admitted to the hospital at [facility] 1/27-1/30 for sepsis due to complicated UTI vs pyelonephritis with hyperkalemia, acute on chronic pulmonary edema, and mild COVID-19 infection.    Initially admitted unde Obs status; his K on admission was 5.1, up to 6.7 on  2/26; he was switched to inpatient status on 2/26/2022.    Recurrent Hyperkalemia: now resolved.   CKD stage 4  - baseline cr 2.2-2.5 range.   - had admissions for SHANTE and hyperkalemia  - recently started on sodium bicarb  - cr on admission 1.93, at baseline or better and improve to 1.87 better than baseline.   - K on admission 5.1 and increase to 6.7  - Treated with Kayexalate 30 gm 2/26 and Lokelma 10 gm on 2/27, given some iv fluids, alb neb, Insulin +dextrose, Kayexalate before,  Potassium improve but still elevated to 5.7 today.  - Tele without arrhythmia.   - EKG- Afib, incomplete RBBB, some nonspecific ST-t changes, no peaked T waves  - renal US- Suboptimal study; No right hydronephrosis. Right renal presumed cyst. This could be further evaluated with additional ultrasound or CT. Left kidney not visualized    Was given Kayexalate or Lokelma and now Potassium is normal, keep him on Low  potassium diet. Appreciate input  From Nephrology   Repeat potassium tomorrow to make sure remain in control.      Physical deconditing in setting of morbid obesity and recent hospitalization  - initially- Observation status- swicthed to inpatient on 2/26 due to hyperkalemia  - PT evaluated on 2/22 and recommended TCU due to impaired mobility and generalized weakness, SW assisting on placement  - PT to work with patient 5x week while in observation, continues to recommend TCU  Awaiting placement once his Potassium improves.      Polyarticular gout with exacerbation  History of pseudogout  Started with right knee, hands, and worsening back pain, treated at [facility] while hospitalized. Symptomatically improved. Patient reports right knee is greatly improved. States he has arthritis and has had knee injections in the past.   - Continue tx for exacerbation with steroid taper and colchicine that were prescribed at discharge.   - Right knee pain much improved  - There is high risk of cardiovascular death with Uloric therefore stopped Uloric and started allopurinol 300 mg p.o. daily for gout prevention. Discussed with pt and he agrees with the change.  - started on Prednisone taper, started at 40mg po daily, then decreased to 20mg/d, complete 7 days of 20mg/d and decrease to 10mg on 2/28, continue with 10 mg daily for now      Atrial fibrillation  HFpEF, not in acute exacerbation  Essential HTN  Hx of VSD  - Continue PTA metoprolol and pharmacy to dose warfarin  - INR therapeutic      Chronic knee pain, likely OA  -continue PTA fentanyl patch, robaxin and prn oxycodone  Pain improve, no significant effusion or swelling at this time, no warmth or redness.      Chronic venosus stasis, bilateral: stable, compression wraps as needed    TASHA  - apparently uses CPAP only wjen weight is over 450lbs    COVID-19 admission screening PCR: Not done due to recent COVID-19 infection <90 days ago and no new symptoms.   Recent COVID-19  infection 1/27/22. COVID recovered and no need for special precautions.  - COVID-19 vaccination status: Fully vaccinated with Pfizer.   - Influenza vaccination status: 1/2022      Diet: Combination Diet 2 gm K Diet    DVT Prophylaxis: Warfarin  Cheema Catheter: Not present  Code Status: Full Code      Disposition Plan   Expected Discharge: awaiting placement at this time, he is ready to discharge.   Anticipated discharge location:  Awaiting safe disposition     Entered: Joshua Duval MD 03/01/2022, 12:20 PM       The patient's care was discussed with the Bedside Nurse and Patient.      ______________________________________________________________________    Interval History    Patient doing well, offer no complaints this morning, working with therapy and getting better.     No other significant event overnight.     Data reviewed today: I reviewed all medications, new labs and imaging results over the last 24 hours. I personally reviewed the renal US image(s) showing as above.    Physical Exam   Vital Signs: Temp: 99.1  F (37.3  C) Temp src: Oral BP: 117/77 Pulse: 92   Resp: 20 SpO2: 100 % O2 Device: None (Room air)    Weight: 453 lbs 9.6 oz    Physical Exam    General: Awake, alert, pleasant, morbidly obese, NAD  HEENT: Normocephalic, atraumatic. Extraocular movements intact.   Respiratory: Clear to auscultation bilaterally, no rales, wheezing, or rhonchi.  Cardiovascular: irregularly irregular, no murmur auscultated.   Gastrointestinal: Soft, non-tender, non-distended. Bowel sounds present.  Skin: Warm, dry. No obvious rashes or lesions on exposed skin. Dorsalis pedis pulses palpable bilaterally. Lymphedema wraps in place. Chronic stasis and lichenification noted. Small skin tears with clean dressings noted. Non pitting edema.   Musculoskeletal: Moves all extremities. +Dorsi/plantar flexion intact. Right knee minimal tenderness to palpation. Nosignificant effusion  Neurologic: AAO x3. Cranial nerves 2-12 grossly  intact, normal strength and sensation.  Psychiatric: Appropriate mood and affect. No obvious anxiety or depression.    Data   Recent Labs   Lab 03/01/22  0636 02/28/22  0550 02/27/22  1113 02/27/22  1020   WBC 8.6  --   --   --    HGB 9.1*  --   --   --    MCV 89  --   --   --    *  --   --   --    INR 2.49* 2.91* 3.05*  --     139  --  137   POTASSIUM 5.2 5.7*  --  5.6*   CHLORIDE 112* 111*  --  107   CO2 26 25  --  27   BUN 58* 61*  --  64*   CR 1.76* 1.87*  --  1.89*   ANIONGAP 2* 3  --  3   JOHN 8.5 8.3*  --  8.7   * 112*  --  116*   ALBUMIN 2.7*  --   --   --      No results found for this or any previous visit (from the past 24 hour(s)).  Medications     Warfarin Therapy Reminder         allopurinol  300 mg Oral Daily     colchicine  0.3 mg Oral Daily     fentaNYL  25 mcg Transdermal Q72H     fentaNYL   Transdermal Q8H     metoprolol succinate ER  50 mg Oral Daily     predniSONE  10 mg Oral Daily     sodium bicarbonate  1,300 mg Oral TID     sodium chloride (PF)  10 mL Intracatheter Q8H     sodium chloride (PF)  10 mL Intracatheter Q8H     sodium zirconium cyclosilicate  15 g Oral Daily     warfarin ANTICOAGULANT  3 mg Oral ONCE at 18:00

## 2022-03-01 NOTE — PLAN OF CARE
Goal Outcome Evaluation:        Orientation/Cognitive: A&Ox4  Observation Goals (Met/ Not Met): N/A inpatient  Mobility Level/Assist Equipment: Ax3 and lift  Fall Risk (Y/N): Yes  Behavior Concerns: Calm  Pain Management: Pain controlled with fentanyl patch on L upper chest  ABNL VS/O2: VSS on RA  ABNL Lab/BG: K+:5.7   Diet: combination diet 2gm K diet  Bowel/Bladder: continent, uses bedpan and urinal   Skin Concerns: Wound to R outter knee and L outer ankle. +1 bilat edema   Drains/Devices: PICC on L upper arm   Tests/Procedures for next shift: AM lab draws   Anticipated DC date: TBD awaiting placement   Patient Stated Goal for Today: Discharge to TCU

## 2022-03-01 NOTE — PROGRESS NOTES
Orientation/Cognitive: A&O  Observation Goals (Met/ Not Met): Not met  Mobility Level/Assist Equipment: Lift, A2-3 when up to commode  Fall Risk (Y/N): Y  Behavior Concerns: No  Pain Management: fentanyl patch in place, L chest  ABNL VS/O2: RA, no    ABNL Lab/BG: K has been elevated, last lab 5.2. Lokelma given.   Diet: Comb, 2gm K   Bowel/Bladder: urinal, commode bm. Continent   Skin Concerns: Lymph edema consult requested per woc. L ankle wound CDI. R shin and knee wounds open to air. Saw woc today  Drains/Devices: none. Bariatric bed.  Tests/Procedures for next shift: Lymph edema consult  Anticipated DC date: pending placement. Ready for discharge  Patient Stated Goal for Today: discharge when appropriate

## 2022-03-02 NOTE — PLAN OF CARE
VSS on RA. Tele Afib CVR w/ occasional PVCs. On 2g K diet. Fent patch on R chest. Voiding in urinal. Using BSC, BM x1. Lymph wraps wrapped today by PT. Plan for PT tomorrow, discharge pending placement.

## 2022-03-02 NOTE — PROGRESS NOTES
"   03/02/22 1421   Rehab Discipline   Discipline PT   Type of Visit   Type of visit Initial Edema Evaluation   General Information   Start of care 03/02/22   Referring physician Joshua Duval MD   Orders Evaluate and treat as indicated   Order date 03/01/22   Medical diagnosis  acute gout flare and subsequent SHANTE and hyperkalemia likely caused by gout treatment   Onset of illness / date of surgery 02/20/22   Edema onset   (patient unsure \" a long time ago\")   Affected body parts LLE;RLE   Edema etiology Unknown  (patient unsure)   Edema etiology comments patient unable to give much history of edema treatment other than having wraps in past and use of Velcro wraps   Pertinent history of current problem (PT: include personal factors and/or comorbidities that impact the POC; OT: include additional occupational profile info) per chart: Panda Miranda is a 66 year old male admitted on 2/20/2022 after being discharged earlier from [facility] stay from 2/11 - 2/20 for acute gout flare and subsequent SHANTE and hyperkalemia likely caused by gout treatment. Patient is morbidly obese and he was discharged to his home with home health but he was unable to manage going up steps immediately upon arrival home.  He was brought in to [facility] ED for placement. In the ER he was seen by Dr Henning, vitals were normal.  Labs were reviewed from his recent discharge with Cr of 1.93 which was improved from his discharge and actually better than baseline; patient having wound cares   Surgical / medical history reviewed Yes   Prior level of functional mobility Patient had been mod I with use of cane; after last hospitalization, was unable to get up stairs to get into home   Prior treatment Compression garments;Elevation;Other;Gradient compression bandaging  (Velcro wraps)   Living environment House [facility]   Living environment comments lives with spouse per chart   Current assistive devices Front wheeled walker   General " observations patient in bed; agreeable to wraps   Fall Risk Screen   Is patient a fall risk? Yes   Precautions   Precautions Other   Precautions comments L leg wound; R shin wound   Patient / Family Goals   Patient / family goals statement going to TCU prior to return homne   Pain   Patient currently in pain Yes, see vital signs flowsheet   Pain comments LE's at times   Cognitive Status   Orientation Orientation to person, place and time   Cognitive status comments appears intact during session   Edema Exam / Assessment   Skin condition Pitting;Dryness   Skin condition comments wound on L lateral leg; covered; scrape below R knee   Pitting 2+   Pitting location lower legs, ankles, feet   Ulceration comments see wound care notes   Strength   Strength comments functional weakness from recent illness/inactivity   Bed Mobility   Bed mobility see PT notes   Transfers   Transfers see PT notes   Sensory   Sensory perception comments numbness in lower legs per patient   Planned Edema Interventions   Planned edema interventions Gradient compression bandaging;Exercises;Education;Manual therapy   Clinical Impression   Criteria for skilled therapeutic intervention met Yes   Therapy diagnosis bilateral LE acute on chronic edema   Influenced by the following impairments / conditions Edema   Functional limitations due to impairments / conditions impaired independence with functional mobility and cares   Clinical Presentation Evolving/Changing   Clinical Presentation Rationale clinical judgement; level of assist   Clinical Decision Making (Complexity) Moderate complexity   Treatment Frequency Daily   Treatment duration   (5 days)   Patient / family and/or staff in agreement with plan of care Yes   Risks and benefits of therapy have been explained Yes   Goals   Edema Eval Goals (IP) See plan of care for patient goals

## 2022-03-02 NOTE — PROGRESS NOTES
[facility]    Medicine Progress Note - Hospitalist Service       Date of Admission:  2/20/2022    Assessment & Plan   Panda Miranda is a 66 year old male admitted on 2/20/2022 after being discharged earlier from [facility] stay from 2/11 - 2/20 for acute gout flare and subsequent SHANTE and hyperkalemia likely caused by gout treatment. Patient is morbidly obese and he was discharged to his home with home health but he was unable to manage going up steps immediately upon arrival home.  He was brought in to [facility] ED for placement. In the ER he was seen by Dr Henning, vitals were normal.  Labs were reviewed from his recent discharge with Cr of 1.93 which was improved from his discharge and actually better than baseline.     Of note, he was also admitted to the hospital at [facility] 1/27-1/30 for sepsis due to complicated UTI vs pyelonephritis with hyperkalemia, acute on chronic pulmonary edema, and mild COVID-19 infection.    Initially admitted unde Obs status; his K on admission was 5.1, up to 6.7 on  2/26; he was switched to inpatient status on 2/26/2022.    Recurrent Hyperkalemia: now resolved.   CKD stage 4  - baseline cr 2.2-2.5 range.   - had admissions for SHANTE and hyperkalemia  - recently started on sodium bicarb  - cr on admission 1.93, at baseline or better and improve to 1.75  better than baseline and remain stable.  - K on admission 5.1 and increase to 6.7  - Treated with Kayexalate 30 gm 2/26 and Lokelma 10 gm on 2/27, given some iv fluids, alb neb, Insulin +dextrose, Kayexalate before,  Potassium now improve and in normal range since 03/01  - Tele without arrhythmia.   - EKG- Afib, incomplete RBBB, some nonspecific ST-t changes, no peaked T waves  - renal US- Suboptimal study; No right hydronephrosis. Right renal presumed cyst. This could be further evaluated with additional ultrasound or CT. Left kidney not visualized    Was given Kayexalate or Lokelma and now Potassium is  normal, keep him on Low potassium diet. Appreciate input  From Nephrology   Repeat potassium normal and improve.      Physical deconditing in setting of morbid obesity and recent hospitalization  - initially- Observation status- swicthed to inpatient on 2/26 due to hyperkalemia  - PT evaluated on 2/22 and recommended TCU due to impaired mobility and generalized weakness, SW assisting on placement  - PT to work with patient 5x week while in observation, continues to recommend TCU  Awaiting placement at this time.      Polyarticular gout with exacerbation  History of pseudogout  Started with right knee, hands, and worsening back pain, treated at [facility] while hospitalized. Symptomatically improved. Patient reports right knee is greatly improved. States he has arthritis and has had knee injections in the past.   - Continue tx for exacerbation with steroid taper and colchicine that were prescribed at discharge.   - Right knee pain much improved  - There is high risk of cardiovascular death with Uloric therefore stopped Uloric and started allopurinol 300 mg p.o. daily for gout prevention. Discussed with pt and he agrees with the change.  - started on Prednisone taper, started at 40mg po daily, then decreased to 20mg/d, complete 7 days of 20mg/d and decrease to 10mg on 2/28, continue with 10 mg daily for now      Atrial fibrillation  HFpEF, not in acute exacerbation  Essential HTN  Hx of VSD  - Continue PTA metoprolol and pharmacy to dose warfarin  - INR therapeutic      Chronic knee pain, likely OA  -continue PTA fentanyl patch, robaxin and prn oxycodone  Pain improve, no significant effusion or swelling at this time, no warmth or redness.      Chronic venosus stasis, bilateral: stable, compression wraps as needed    TASHA  - apparently uses CPAP only wjen weight is over 450lbs    COVID-19 admission screening PCR: Not done due to recent COVID-19 infection <90 days ago and no new symptoms.   Recent COVID-19 infection  1/27/22. COVID recovered and no need for special precautions.  - COVID-19 vaccination status: Fully vaccinated with Pfizer.   - Influenza vaccination status: 1/2022      Diet: Combination Diet 2 gm K Diet    DVT Prophylaxis: Warfarin  Cheema Catheter: Not present  Code Status: Full Code      Disposition Plan   Expected Discharge:Patient is ready to discharge once have safe disposition.   Anticipated discharge location:  Awaiting safe disposition     Entered: Joshua Duval MD 03/02/2022, 11:04 AM       The patient's care was discussed with the Bedside Nurse, Care Coordinator/ and Patient.      ______________________________________________________________________    Interval History    Offer no complaints this morning.     No other significant event overnight.     Data reviewed today: I reviewed all medications, new labs and imaging results over the last 24 hours. I personally reviewed the renal US image(s) showing as above.    Physical Exam   Vital Signs: Temp: 97.9  F (36.6  C) Temp src: Oral BP: 121/70 Pulse: 91   Resp: 18 SpO2: 100 % O2 Device: None (Room air)    Weight: 453 lbs 9.6 oz    Physical Exam    General: Awake, alert, pleasant, morbidly obese, NAD  HEENT: Normocephalic, atraumatic. Extraocular movements intact.   Respiratory: Clear to auscultation bilaterally, no rales, wheezing, or rhonchi.  Cardiovascular: irregularly irregular, no murmur auscultated.   Gastrointestinal: Soft, non-tender, non-distended. Bowel sounds present.  Skin: Warm, dry. No obvious rashes or lesions on exposed skin. Dorsalis pedis pulses palpable bilaterally. Lymphedema wraps in place. Chronic stasis and lichenification noted. Small skin tears with clean dressings noted. Non pitting edema.   Musculoskeletal: Moves all extremities. +Dorsi/plantar flexion intact. Right knee no tendenress. No effusion  Neurologic: AAO x3. Cranial nerves 2-12 grossly intact, normal strength and sensation.  Psychiatric: Appropriate mood  and affect. No obvious anxiety or depression.    Data   Recent Labs   Lab 03/02/22  0604 03/01/22  0636 02/28/22  0550   WBC  --  8.6  --    HGB  --  9.1*  --    MCV  --  89  --    PLT  --  116*  --    INR 2.15* 2.49* 2.91*    140 139   POTASSIUM 4.7 5.2 5.7*   CHLORIDE 109 112* 111*   CO2 26 26 25   BUN 55* 58* 61*   CR 1.75* 1.76* 1.87*   ANIONGAP 5 2* 3   JOHN 8.6 8.5 8.3*   GLC 99 102* 112*   ALBUMIN  --  2.7*  --      No results found for this or any previous visit (from the past 24 hour(s)).  Medications     Warfarin Therapy Reminder         allopurinol  300 mg Oral Daily     colchicine  0.3 mg Oral Daily     fentaNYL  25 mcg Transdermal Q72H     fentaNYL   Transdermal Q8H     metoprolol succinate ER  50 mg Oral Daily     predniSONE  10 mg Oral Daily     sodium bicarbonate  1,300 mg Oral TID     sodium chloride (PF)  10 mL Intracatheter Q8H     sodium chloride (PF)  10 mL Intracatheter Q8H     sodium zirconium cyclosilicate  10 g Oral Daily

## 2022-03-02 NOTE — PLAN OF CARE
Pt A&Ox3, VSS on RA. 2g potassium restriction, diet tolerated well. Continent, uses bedpan and urinal. Wound to R outer knee, L outer ankle, just above umbilicus. +1 bilateral edema. Tele Afiv CVR. Baseline numbness tingling in feet. Discharge pending TCU placement.

## 2022-03-02 NOTE — PROGRESS NOTES
Pt settled on 88. Oriented x4. On tele. Pain controlled with fentanyl patch on L chest. Up A2-3 with walker, pivots well and moves well in bed. BM x1. Monitoring K level. Plan for lymphedema to consult tomorrow.

## 2022-03-02 NOTE — PROGRESS NOTES
Pt to transfer to [facility]. Report given, all questions answered. All belongings sent w/ pt to [facility].

## 2022-03-02 NOTE — PROGRESS NOTES
[id number]:    Transfer from Obs. VSS on RA. A&Ox4. Assist x3, pivot. Encourage activity, pt able to turn self in bed. Fentanyl patch on L chest. Lymphedema consult tmrw. Voiding in urinal, utilizing bed pan. Monitor K - last recheck this AM was 5.2. Midline in place on LUE. Tele afib controlled vent rate. Awaiting TCU placement.

## 2022-03-03 NOTE — PLAN OF CARE
VSS on RA. Tele Afib CVR. Low potassium diet. Fent patch on R chest. Midline SL. Miconazole and interdry applied to abd folds. Lymph edema wraps on. Up to chair with therapy. Discharge pending placement.

## 2022-03-03 NOTE — PLAN OF CARE
Goal Outcome Evaluation:          Pt A&O x 4, VSS on RA, TELE Afib CVR with PVCs. Reports improved strength. Pain managed well with scheduled Fentanyl patch, in place to R chest. Low potassium diet, tolerating well with good appetite. L PICC with blood return but positional. Utilizing urinal at bedside. Moves well in bed. Obtained order for Miconazole powder to pannus due to redness and foul smell. Lymph wraps in place to BLE. Discharge pending placement.

## 2022-03-03 NOTE — PROGRESS NOTES
Care Management Follow Up    Length of Stay (days): 5    Expected Discharge Date: 03/04/2022     Concerns to be Addressed: discharge planning     Patient plan of care discussed at interdisciplinary rounds: Yes    Anticipated Discharge Disposition: Transitional Care     Anticipated Discharge Services:    Anticipated Discharge DME:      Patient/family educated on Medicare website which has current facility and service quality ratings:    Education Provided on the Discharge Plan:    Patient/Family in Agreement with the Plan:      Referrals Placed by CM/SW:    Private pay costs discussed: Not applicable    Additional Information:  Left message for [facility] and [facility] TCU. [facility] said they don't have any beds until 3/7.    Will continue to follow.    MAGALIE James

## 2022-03-03 NOTE — PROGRESS NOTES
[facility]    Medicine Progress Note - Hospitalist Service       Date of Admission:  2/20/2022    Assessment & Plan   Panda Miranda is a 66 year old male admitted on 2/20/2022 after being discharged earlier from [facility] stay from 2/11 - 2/20 for acute gout flare and subsequent SHANTE and hyperkalemia likely caused by gout treatment. Patient is morbidly obese and he was discharged to his home with home health but he was unable to manage going up steps immediately upon arrival home.  He was brought in to [facility] ED for placement. In the ER he was seen by Dr Henning, vitals were normal.  Labs were reviewed from his recent discharge with Cr of 1.93 which was improved from his discharge and actually better than baseline.     Of note, he was also admitted to the hospital at [facility] 1/27-1/30 for sepsis due to complicated UTI vs pyelonephritis with hyperkalemia, acute on chronic pulmonary edema, and mild COVID-19 infection.    Initially admitted unde Obs status; his K on admission was 5.1, up to 6.7 on  2/26; he was switched to inpatient status on 2/26/2022.    Recurrent Hyperkalemia: now resolved.   CKD stage 4  - baseline cr 2.2-2.5 range.   - had admissions for SHANTE and hyperkalemia  - recently started on sodium bicarb  - cr on admission 1.93, at baseline or better and improve to 1.75  better than baseline and remain stable.  - K on admission 5.1 and increase to 6.7  - Treated with Kayexalate 30 gm 2/26 and Lokelma 10 gm on 2/27, given some iv fluids, alb neb, Insulin +dextrose, Kayexalate before,  Potassium now improve and in normal range since 03/01  - Tele without arrhythmia.   - EKG- Afib, incomplete RBBB, some nonspecific ST-t changes, no peaked T waves  - renal US- Suboptimal study; No right hydronephrosis. Right renal presumed cyst. This could be further evaluated with additional ultrasound or CT. Left kidney not visualized    Was given Kayexalate or Lokelma and now Potassium is  normal, keep him on Low potassium diet. Appreciate input  From Nephrology   Repeat potassium normal and improve. Check as needed only at this time.      Physical deconditing in setting of morbid obesity and recent hospitalization  - initially- Observation status- swicthed to inpatient on 2/26 due to hyperkalemia  - PT evaluated on 2/22 and recommended TCU due to impaired mobility and generalized weakness, SW assisting on placement  - PT to work with patient 5x week while in observation, continues to recommend TCU  Awaiting placement, overall improving but Therapy still recommending TCU at this time.      Polyarticular gout with exacerbation  History of pseudogout  Started with right knee, hands, and worsening back pain, treated at [facility] while hospitalized. Symptomatically improved. Patient reports right knee is greatly improved. States he has arthritis and has had knee injections in the past.   - Continue tx for exacerbation with steroid taper and colchicine that were prescribed at discharge.   - Right knee pain much improved  - There is high risk of cardiovascular death with Uloric therefore stopped Uloric and started allopurinol 300 mg p.o. daily for gout prevention. Discussed with pt and he agrees with the change.  - started on Prednisone taper, started at 40mg po daily, then decreased to 20mg/d, complete 7 days of 20mg/d and decrease to 10mg on 2/28, continue with 10 mg daily for now, 4 more doses left.      Atrial fibrillation  HFpEF, not in acute exacerbation  Essential HTN  Hx of VSD  - Continue PTA metoprolol and pharmacy to dose warfarin  - INR therapeutic      Chronic knee pain, likely OA  -continue PTA fentanyl patch, robaxin and prn oxycodone  Pain improve, no significant effusion or swelling at this time, no warmth or redness.      Chronic venosus stasis, bilateral: stable, compression wraps as needed    TASHA  - apparently uses CPAP only wjen weight is over 450lbs    COVID-19 admission screening PCR:  Not done due to recent COVID-19 infection <90 days ago and no new symptoms.   Recent COVID-19 infection 1/27/22. COVID recovered and no need for special precautions.  - COVID-19 vaccination status: Fully vaccinated with Pfizer.   - Influenza vaccination status: 1/2022      Diet: Combination Diet 2 gm K Diet    DVT Prophylaxis: Warfarin  Cheema Catheter: Not present  Code Status: Full Code      Disposition Plan   Expected Discharge:Patient is ready to discharge once have safe disposition.   Anticipated discharge location:  Awaiting safe disposition     Entered: Joshua Duval MD 03/03/2022, 12:49 PM       The patient's care was discussed with the Bedside Nurse, Care Coordinator/ and Patient.      ______________________________________________________________________    Interval History    Patient feeling well, offer no complaints, think he is working well with therapy, asking about TCU, not find one yet.    No other significant event overnight.       Data reviewed today: I reviewed all medications, new labs and imaging results over the last 24 hours. I personally reviewed the renal US image(s) showing as above.    Physical Exam   Vital Signs: Temp: 98.4  F (36.9  C) Temp src: Axillary BP: 115/79 Pulse: 95   Resp: 20 SpO2: 100 % O2 Device: None (Room air)    Weight: 465 lbs 0 oz    Physical Exam    General: Awake, alert, pleasant, morbidly obese, NAD  HEENT: Normocephalic, atraumatic. Extraocular movements intact.   Respiratory: Clear to auscultation bilaterally, no rales, wheezing, or rhonchi.  Cardiovascular: irregularly irregular, no murmur auscultated.   Gastrointestinal: Soft, non-tender, non-distended. Bowel sounds present.  Skin: Warm, dry. No obvious rashes or lesions on exposed skin. Dorsalis pedis pulses palpable bilaterally. Lymphedema wraps in place. Chronic stasis and lichenification noted. Small skin tears with clean dressings noted. Non pitting edema.   Musculoskeletal: Moves all extremities.  +Dorsi/plantar flexion intact. Right knee no tendenress. No effusion, lymphedema wrap on   Neurologic: AAO x3. Cranial nerves 2-12 grossly intact, normal strength and sensation.  Psychiatric: Appropriate mood and affect. No obvious anxiety or depression.    Data   Recent Labs   Lab 03/03/22  0553 03/02/22  0604 03/01/22  0636 02/28/22  0550   WBC  --   --  8.6  --    HGB  --   --  9.1*  --    MCV  --   --  89  --    PLT  --   --  116*  --    INR 2.08* 2.15* 2.49* 2.91*   NA  --  140 140 139   POTASSIUM  --  4.7 5.2 5.7*   CHLORIDE  --  109 112* 111*   CO2  --  26 26 25   BUN  --  55* 58* 61*   CR  --  1.75* 1.76* 1.87*   ANIONGAP  --  5 2* 3   JOHN  --  8.6 8.5 8.3*   GLC  --  99 102* 112*   ALBUMIN  --   --  2.7*  --      No results found for this or any previous visit (from the past 24 hour(s)).  Medications     Warfarin Therapy Reminder         allopurinol  300 mg Oral Daily     colchicine  0.3 mg Oral Daily     fentaNYL  25 mcg Transdermal Q72H     fentaNYL   Transdermal Q8H     metoprolol succinate ER  50 mg Oral Daily     predniSONE  10 mg Oral Daily     sodium bicarbonate  1,300 mg Oral TID     sodium chloride (PF)  10 mL Intracatheter Q8H     sodium chloride (PF)  10 mL Intracatheter Q8H     sodium zirconium cyclosilicate  10 g Oral Daily     warfarin ANTICOAGULANT  5 mg Oral ONCE at 18:00

## 2022-03-03 NOTE — PROGRESS NOTES
MD Notification    Notified Person: MD    Notified Person Name: Corona     Notification Date/Time: 3/2/22 @ 2143    Notification Interaction: [employer]     Purpose of Notification: Requesting Miconazole powder for abdominal folds as reddened and yeast smelling odor    Orders Received:

## 2022-03-04 NOTE — PROGRESS NOTES
Care Management Follow Up    Length of Stay (days): 6    Expected Discharge Date: 03/05/2022     Concerns to be Addressed: discharge planning     Patient plan of care discussed at interdisciplinary rounds: Yes    Anticipated Discharge Disposition: Transitional Care     Anticipated Discharge Services:    Anticipated Discharge DME:      Patient/family educated on Medicare website which has current facility and service quality ratings:    Education Provided on the Discharge Plan:    Patient/Family in Agreement with the Plan:      Referrals Placed by CM/SW:    Private pay costs discussed: Not applicable    Additional Information:  RN told writer that patient wanted to talk about tcu options. Talked to patient and he said his wife wants to give writer a referral option. Told him writer would call his wife. Writer called Angelica (wife) and she said that she's waiting for the tcu to call her back. She asked for writer to stop in there around 1500 today.     Talked to patient and patient's wife in patient's room. They said they want a referral sent to [facility]. Told her that [facility]'t accept [facility] insurance but could double check. Called [facility] and left a message asking if they accept [facility].    Will continue to follow.    MAGALIE James

## 2022-03-04 NOTE — PROGRESS NOTES
Care Management Follow Up    Length of Stay (days): 6    Expected Discharge Date: 03/05/2022     Concerns to be Addressed: discharge planning     Patient plan of care discussed at interdisciplinary rounds: Yes    Anticipated Discharge Disposition: Transitional Care     Anticipated Discharge Services:    Anticipated Discharge DME:      Patient/family educated on Medicare website which has current facility and service quality ratings:    Education Provided on the Discharge Plan:    Patient/Family in Agreement with the Plan:      Referrals Placed by CM/SW:    Private pay costs discussed: Not applicable    Additional Information:  Writer spent time following up on referrals to assist assigned SW.  : No Answer and VM is full  [facility]: Left Voicemail   [facility]: Left Voicemail     Marga Cabrera, OREN, LGSW   Social Work   [facility]

## 2022-03-04 NOTE — PLAN OF CARE
Goal Outcome Evaluation:      Pt A&O x 4, VSS on RA, TELE Afib CVR with PVCs. Denied any acute concerns. Pain managed well with Fentanyl patch in place to R chest. Continues on low potassium diet with good appetite. Uses urinal in bed overnight, moves well in bed. Innerdry in place to pannus, prn miconazole powder. L PICC with blood return but positional. Lymph wraps in place to BLE, to be removed at 12pm today for wound care. Discharge pending TCU placement.

## 2022-03-04 NOTE — PLAN OF CARE
Goal Outcome Evaluation:           Pt A&O x 4, VSS q4 on RA, TELE Afib CVR. Denied any acute concerns. Pain managed well with Fentanyl patch in place to R chest. Denies chest pain,  denies nausea. Continues on low potassium diet with good appetite. Uses urinal in bed, moves well in bed. Large BM via commode. Innerdry in place to pannus, prn miconazole powder. L PICC with blood return, SL. Lymph wraps in place.Will be in place till Sunday per pt. Reports baseline numbness and tingling. INR today is 2.19. Discharge pending TCU placement, SW saw pt today.

## 2022-03-04 NOTE — TELEPHONE ENCOUNTER
"Routing refill request to provider for review/approval because:  Labs out of range:  creatinine    Last office visit provider:  2/10/22     Requested Prescriptions   Pending Prescriptions Disp Refills     lisinopril (ZESTRIL) 10 MG tablet 90 tablet 0     Sig: Take 1 tablet (10 mg) by mouth daily       ACE Inhibitors (Including Combos) Protocol Failed - 3/3/2022 10:24 AM        Failed - Medication is active on med list        Failed - Normal serum creatinine on file in past 12 months     Recent Labs   Lab Test 03/02/22  0604   CR 1.75*       Ok to refill medication if creatinine is low          Passed - Blood pressure under 140/90 in past 12 months     BP Readings from Last 3 Encounters:   03/04/22 129/83   02/20/22 119/75   02/11/22 123/58                 Passed - Recent (12 mo) or future (30 days) visit within the authorizing provider's specialty     Patient has had an office visit with the authorizing provider or a provider within the authorizing providers department within the previous 12 mos or has a future within next 30 days. See \"Patient Info\" tab in inbasket, or \"Choose Columns\" in Meds & Orders section of the refill encounter.              Passed - Patient is age 18 or older        Passed - Normal serum potassium on file in past 12 months     Recent Labs   Lab Test 03/02/22  0604   POTASSIUM 4.7                  Lizz Sweeney RN 03/04/22 4:26 PM  "

## 2022-03-04 NOTE — PROGRESS NOTES
[facility]    Medicine Progress Note - Hospitalist Service       Date of Admission:  2/20/2022    Assessment & Plan   Panda Miranda is a 66 year old male admitted on 2/20/2022 after being discharged earlier from [facility] stay from 2/11 - 2/20 for acute gout flare and subsequent SHANTE and hyperkalemia likely caused by gout treatment. Patient is morbidly obese and he was discharged to his home with home health but he was unable to manage going up steps immediately upon arrival home.  He was brought in to [facility] ED for placement. In the ER he was seen by Dr Henning, vitals were normal.  Labs were reviewed from his recent discharge with Cr of 1.93 which was improved from his discharge and actually better than baseline.     Of note, he was also admitted to the hospital at [facility] 1/27-1/30 for sepsis due to complicated UTI vs pyelonephritis with hyperkalemia, acute on chronic pulmonary edema, and mild COVID-19 infection.    Initially admitted unde Obs status; his K on admission was 5.1, up to 6.7 on  2/26; he was switched to inpatient status on 2/26/2022.    Recurrent Hyperkalemia: now resolved.   CKD stage 4  - baseline cr 2.2-2.5 range.   - had admissions for SHANTE and hyperkalemia  - recently started on sodium bicarb  - cr on admission 1.93, at baseline or better and improve to 1.75  better than baseline and remain stable.  - K on admission 5.1 and increase to 6.7  - Treated with Kayexalate 30 gm 2/26 and Lokelma 10 gm on 2/27, given some iv fluids, alb neb, Insulin +dextrose, Kayexalate before,  Potassium now improve and in normal range since 03/01  - Tele without arrhythmia.   - EKG- Afib, incomplete RBBB, some nonspecific ST-t changes, no peaked T waves  - renal US- Suboptimal study; No right hydronephrosis. Right renal presumed cyst. This could be further evaluated with additional ultrasound or CT. Left kidney not visualized    Was given Kayexalate or Lokelma and now Potassium is  normal, keep him on Low potassium diet. Appreciate input  From Nephrology   Repeat potassium normal and improve.   Will repeat BMP tomorrow to make sure creatinine and potassium remain stable.      Physical deconditing in setting of morbid obesity and recent hospitalization  - initially- Observation status- swicthed to inpatient on 2/26 due to hyperkalemia  - PT evaluated on 2/22 and recommended TCU due to impaired mobility and generalized weakness, SW assisting on placement  - PT to work with patient 5x week while in observation, continues to recommend TCU  Awaiting placement, overall improving but Therapy still recommending TCU at this time.      Polyarticular gout with exacerbation  History of pseudogout  Started with right knee, hands, and worsening back pain, treated at [facility] while hospitalized. Symptomatically improved. Patient reports right knee is greatly improved. States he has arthritis and has had knee injections in the past.   - Continue tx for exacerbation with steroid taper and colchicine that were prescribed at discharge.   - Right knee pain much improved  - There is high risk of cardiovascular death with Uloric therefore stopped Uloric and started allopurinol 300 mg p.o. daily for gout prevention. Discussed with pt and he agrees with the change.  - started on Prednisone taper, started at 40mg po daily, then decreased to 20mg/d, complete 7 days of 20mg/d and decrease to 10mg on 2/28, I will decrease the dose to 5 mg for 2 more doses from tomorrow then stop.      Atrial fibrillation  HFpEF, not in acute exacerbation  Essential HTN  Hx of VSD  - Continue PTA metoprolol and pharmacy to dose warfarin  - INR therapeutic      Chronic knee pain, likely OA  -continue PTA fentanyl patch, robaxin and prn oxycodone  Pain improve, no significant effusion or swelling at this time, no warmth or redness.      Chronic venosus stasis, bilateral: stable, compression wraps as needed    TASHA  - apparently uses CPAP  only wjen weight is over 450lbs    COVID-19 admission screening PCR: Not done due to recent COVID-19 infection <90 days ago and no new symptoms.   Recent COVID-19 infection 1/27/22. COVID recovered and no need for special precautions.  - COVID-19 vaccination status: Fully vaccinated with Pfizer.   - Influenza vaccination status: 1/2022    Awaiting placement at this time  He remain stable  Continue therapy  Decrease dose of Prednisone to 5 mg for 2 more doses then stop       Diet: Combination Diet 2 gm K Diet    DVT Prophylaxis: Warfarin  Cheema Catheter: Not present  Code Status: Full Code      Disposition Plan   Expected Discharge:Patient is ready to discharge once have safe disposition.   Anticipated discharge location:  Awaiting safe disposition     Entered: Joshua Duval MD 03/04/2022, 10:09 AM       The patient's care was discussed with the Bedside Nurse, Care Coordinator/ and Patient.      ______________________________________________________________________    Interval History    Patient continue to do well, have no complaints his morning, told me that his wife is working to find a place for him as well. No fever, chills, chest pain, SOB, headache or dizziness at this time.     No other significant event overnight.       Data reviewed today: I reviewed all medications, new labs and imaging results over the last 24 hours. I personally reviewed the renal US image(s) showing as above.    Physical Exam   Vital Signs: Temp: 98  F (36.7  C) Temp src: Oral BP: 129/85 Pulse: 85   Resp: 18 SpO2: 100 % O2 Device: None (Room air)    Weight: 465 lbs 0 oz    Physical Exam    General: Awake, alert, pleasant, morbidly obese, NAD  HEENT: Normocephalic, atraumatic. Extraocular movements intact.   Respiratory: Clear to auscultation bilaterally, no rales, wheezing, or rhonchi.  Cardiovascular: irregularly irregular, no murmur auscultated.   Gastrointestinal: Soft, non-tender, non-distended. Bowel sounds  present.  Skin: Warm, dry. No obvious rashes or lesions on exposed skin. Dorsalis pedis pulses palpable bilaterally. Lymphedema wraps in place. Chronic stasis and lichenification noted. Small skin tears with clean dressings noted. Non pitting edema.   Musculoskeletal: Moves all extremities. +Dorsi/plantar flexion intact. Right knee no tendenress. No effusion, trace LE edema   Neurologic: AAO x3. Cranial nerves 2-12 grossly intact, normal strength and sensation.  Psychiatric: Appropriate mood and affect. No obvious anxiety or depression.    Data   Recent Labs   Lab 03/04/22  0547 03/03/22  0553 03/02/22  0604 03/01/22  0636 02/28/22  0550   WBC  --   --   --  8.6  --    HGB  --   --   --  9.1*  --    MCV  --   --   --  89  --    PLT  --   --   --  116*  --    INR 2.19* 2.08* 2.15* 2.49* 2.91*   NA  --   --  140 140 139   POTASSIUM  --   --  4.7 5.2 5.7*   CHLORIDE  --   --  109 112* 111*   CO2  --   --  26 26 25   BUN  --   --  55* 58* 61*   CR  --   --  1.75* 1.76* 1.87*   ANIONGAP  --   --  5 2* 3   JOHN  --   --  8.6 8.5 8.3*   GLC  --   --  99 102* 112*   ALBUMIN  --   --   --  2.7*  --      No results found for this or any previous visit (from the past 24 hour(s)).  Medications     Warfarin Therapy Reminder         allopurinol  300 mg Oral Daily     colchicine  0.3 mg Oral Daily     fentaNYL  25 mcg Transdermal Q72H     fentaNYL   Transdermal Q8H     metoprolol succinate ER  50 mg Oral Daily     predniSONE  10 mg Oral Daily     sodium bicarbonate  1,300 mg Oral TID     sodium chloride (PF)  10 mL Intracatheter Q8H     sodium zirconium cyclosilicate  10 g Oral Daily

## 2022-03-05 NOTE — PROVIDER NOTIFICATION
MD Notification    Notified Person: MD    Notified Person Name: Dr. Connolly    Notification Date/Time:3/5/2022 at 1610    Notification Interaction: Spoke with MD on phone    Purpose of Notification: Pt loss midline access. Difficult stick. Not currently on IV meds.  Tele has been stable.     Orders Received: Ok to not have IV access at this time.  Ok to discontinue tele.     Comments:

## 2022-03-05 NOTE — PLAN OF CARE
[id number] shift. Vitals stable. Tele- a fib CVR. A&O. Up w/ 2 assist gb/walker, declined sitting in chair for supper. Fentanyl patch to right chest, declines pain. Tolerating low K diet. Uses urinal in bed. Wounds to abdomen- dressings CDI. Interdry under panus changed. L midline SL. Lymph wraps to BLE- keep in place until Sunday. Discharge to TCU pending placement, SW following.

## 2022-03-05 NOTE — PLAN OF CARE
Goal Outcome Evaluation:        Shift [id number]  VSS on RA. AOX4.Up w/ 2 assist gb/walker, refused turn and repo. Fentanyl patch to right chest for chronic back pain, denies pain. Tolerating low K diet. Uses urinal in bed.Sybil UOP. Wounds to abdomen- dressings CDI.  L midline taken out because no blood return, MD put in orders to discontinue midline, Vaseline applied and Tegederm in place. Pt tolerated well. Lymph wraps to BLE- keep in place until Sunday.Tele A FIB cvr. Discharge to TCU pending placement, SW following.

## 2022-03-05 NOTE — PROGRESS NOTES
[facility]    Medicine Progress Note - Hospitalist Service       Date of Admission:  2/20/2022    Assessment & Plan   Panda Miranda is a 66 year old male admitted on 2/20/2022 after being discharged earlier from [facility] stay from 2/11 - 2/20 for acute gout flare and subsequent SHANTE and hyperkalemia likely caused by gout treatment. Patient is morbidly obese and he was discharged to his home with home health but he was unable to manage going up steps immediately upon arrival home.  He was brought in to [facility] ED for placement. In the ER he was seen by Dr Henning, vitals were normal.  Labs were reviewed from his recent discharge with Cr of 1.93 which was improved from his discharge and actually better than baseline.     Of note, he was also admitted to the hospital at [facility] 1/27-1/30 for sepsis due to complicated UTI vs pyelonephritis with hyperkalemia, acute on chronic pulmonary edema, and mild COVID-19 infection.    Initially admitted unde Obs status; his K on admission was 5.1, up to 6.7 on  2/26; he was switched to inpatient status on 2/26/2022.    Recurrent Hyperkalemia: now resolved.   CKD stage 4  - baseline cr 2.2-2.5 range.   - had admissions for SHANTE and hyperkalemia  - recently started on sodium bicarb  - cr on admission 1.93, at baseline or better and improve to 1.75  better than baseline and remain stable.  - K on admission 5.1 and increase to 6.7  - Treated with Kayexalate 30 gm 2/26 and Lokelma 10 gm on 2/27, given some iv fluids, alb neb, Insulin +dextrose, Kayexalate before,  Potassium now improve and in normal range since 03/01  - Tele without arrhythmia.   - EKG- Afib, incomplete RBBB, some nonspecific ST-t changes, no peaked T waves  - renal US- Suboptimal study; No right hydronephrosis. Right renal presumed cyst. This could be further evaluated with additional ultrasound or CT. Left kidney not visualized    Was given Kayexalate or Lokelma and now Potassium is  normal, keep him on Low potassium diet. Appreciate input  From Nephrology   Repeat potassium normal and improve.   Creatinine improve and potassium remain normal at this time.      Physical deconditing in setting of morbid obesity and recent hospitalization  - initially- Observation status- swicthed to inpatient on 2/26 due to hyperkalemia  - PT evaluated on 2/22 and recommended TCU due to impaired mobility and generalized weakness, SW assisting on placement  - PT to work with patient 5x week while in observation, continues to recommend TCU  Awaiting placement, overall improving but Therapy still recommending TCU at this time.      Polyarticular gout with exacerbation  History of pseudogout  Started with right knee, hands, and worsening back pain, treated at [facility] while hospitalized. Symptomatically improved. Patient reports right knee is greatly improved. States he has arthritis and has had knee injections in the past.   - Continue tx for exacerbation with steroid taper and colchicine that were prescribed at discharge.   - Right knee pain much improved  - There is high risk of cardiovascular death with Uloric therefore stopped Uloric and started allopurinol 300 mg p.o. daily for gout prevention. Discussed with pt and he agrees with the change.  - started on Prednisone taper, started at 40mg po daily, then decreased to 20mg/d, complete 7 days of 20mg/d and decrease to 10mg on 2/28, decrease to 5 mg now and last dose tomorrow that is 3/6 and then stop.      Atrial fibrillation  HFpEF, not in acute exacerbation  Essential HTN  Hx of VSD  - Continue PTA metoprolol and pharmacy to dose warfarin  - INR therapeutic      Chronic knee pain, likely OA  -continue PTA fentanyl patch, robaxin and prn oxycodone  Pain improve, no significant effusion or swelling at this time, no warmth or redness.      Chronic venosus stasis, bilateral: stable, compression wraps as needed    TASHA  - apparently uses CPAP only wjen weight is  over 450lbs    COVID-19 admission screening PCR: Not done due to recent COVID-19 infection <90 days ago and no new symptoms.   Recent COVID-19 infection 1/27/22. COVID recovered and no need for special precautions.  - COVID-19 vaccination status: Fully vaccinated with Pfizer.   - Influenza vaccination status: 1/2022    Last dose of prednisone tomorrow  Awaiting placement at this time.       Diet: Combination Diet 2 gm K Diet    DVT Prophylaxis: Warfarin  Cheema Catheter: Not present  Code Status: Full Code      Disposition Plan   Expected Discharge:Patient is ready to discharge once have safe disposition.   Anticipated discharge location:  Awaiting safe disposition     Entered: Joshua Duval MD 03/05/2022, 11:15 AM       The patient's care was discussed with the Bedside Nurse, Care Coordinator/ and Patient.      ______________________________________________________________________    Interval History    Offer no complaints today.     No other significant event overnight.       Data reviewed today: I reviewed all medications, new labs and imaging results over the last 24 hours. I personally reviewed the renal US image(s) showing as above.    Physical Exam   Vital Signs: Temp: 98.4  F (36.9  C) Temp src: Oral BP: 110/66 Pulse: 84   Resp: 18 SpO2: 100 % O2 Device: None (Room air)    Weight: 465 lbs 0 oz    Physical Exam    General: Awake, alert, pleasant, morbidly obese, NAD  HEENT: Normocephalic, atraumatic. Extraocular movements intact.   Respiratory: Clear to auscultation bilaterally, no rales, wheezing, or rhonchi.  Cardiovascular: irregularly irregular, no murmur auscultated.   Gastrointestinal: Soft, non-tender, non-distended. Bowel sounds present.  Skin: Warm, dry. No obvious rashes or lesions on exposed skin. Dorsalis pedis pulses palpable bilaterally. Lymphedema wraps in place. Chronic stasis and lichenification noted. Small skin tears with clean dressings noted. Non pitting edema.    Musculoskeletal: Moves all extremities. +Dorsi/plantar flexion intact. Right knee no tendenress. No effusion, trace LE edema   Neurologic: AAO x3. Cranial nerves 2-12 grossly intact, normal strength and sensation.  Psychiatric: Appropriate mood and affect. No obvious anxiety or depression.    Data   Recent Labs   Lab 03/05/22  0748 03/04/22  0547 03/03/22  0553 03/02/22  0604 03/01/22  0636   WBC  --   --   --   --  8.6   HGB  --   --   --   --  9.1*   MCV  --   --   --   --  89   PLT  --   --   --   --  116*   INR 2.21* 2.19* 2.08* 2.15* 2.49*     --   --  140 140   POTASSIUM 4.6  --   --  4.7 5.2   CHLORIDE 113*  --   --  109 112*   CO2 25  --   --  26 26   BUN 46*  --   --  55* 58*   CR 1.67*  --   --  1.75* 1.76*   ANIONGAP 2*  --   --  5 2*   JOHN 8.5  --   --  8.6 8.5   GLC 99  --   --  99 102*   ALBUMIN  --   --   --   --  2.7*     No results found for this or any previous visit (from the past 24 hour(s)).  Medications     Warfarin Therapy Reminder         allopurinol  300 mg Oral Daily     colchicine  0.3 mg Oral Daily     fentaNYL  25 mcg Transdermal Q72H     fentaNYL   Transdermal Q8H     metoprolol succinate ER  50 mg Oral Daily     predniSONE  5 mg Oral Daily     sodium bicarbonate  1,300 mg Oral TID     sodium chloride (PF)  10 mL Intracatheter Q8H     sodium zirconium cyclosilicate  10 g Oral Daily     warfarin ANTICOAGULANT  5 mg Oral ONCE at 18:00

## 2022-03-05 NOTE — PLAN OF CARE
Goal Outcome Evaluation:       Up w/ 2 assist gb/walker, Fentanyl patch to right chest for chronic back pain, denies pain. Tolerating low K diet. Uses urinal in bed. Wounds to abdomen- dressings CDI.  L midline SL. Lymph wraps to BLE- keep in place until Sunday. Unable to draw off midline for morning labs. Changed to lab collect.  Discharge to TCU pending placement, SW following.

## 2022-03-05 NOTE — PROVIDER NOTIFICATION
MD Notification    Notified Person: MD    Notified Person Name:  Duval    Notification Date/Time: 3/5/22 1020    Notification Interaction: Vocera Paging    Purpose of Notification: Hey, it appears pt's  Midline has gone bad, no blood return noted. He states it hurts when flushes. Is there still need for Midline for RJ ?    Orders Received: Per MD order, Midline can be taken out    Comments:

## 2022-03-06 NOTE — PLAN OF CARE
Goal Outcome Evaluation:          VSS on RA. AOX4.Up w/ 2 assist gb/walker,Fentanyl patch to right chest for chronic back pain, denies pain. Tolerating low K diet. Uses urinal in bed.Sybil UOP. Wounds to abdomen- dressings CDI.  L midline taken out because no blood return, Lymph wraps to BLE- keep in place until Sunday.Tele A FIB cvr. Discharge to TCU pending placement, SW following.

## 2022-03-06 NOTE — PROGRESS NOTES
[facility]    Medicine Progress Note - Hospitalist Service       Date of Admission:  2/20/2022    Assessment & Plan   Panda Miranda is a 66 year old male admitted on 2/20/2022 after being discharged earlier from [facility] stay from 2/11 - 2/20 for acute gout flare and subsequent SHANTE and hyperkalemia likely caused by gout treatment. Patient is morbidly obese and he was discharged to his home with home health but he was unable to manage going up steps immediately upon arrival home.  He was brought in to [facility] ED for placement. In the ER he was seen by Dr Henning, vitals were normal.  Labs were reviewed from his recent discharge with Cr of 1.93 which was improved from his discharge and actually better than baseline.     Of note, he was also admitted to the hospital at [facility] 1/27-1/30 for sepsis due to complicated UTI vs pyelonephritis with hyperkalemia, acute on chronic pulmonary edema, and mild COVID-19 infection.    Initially admitted unde Obs status; his K on admission was 5.1, up to 6.7 on  2/26; he was switched to inpatient status on 2/26/2022.    Recurrent Hyperkalemia: now resolved.   CKD stage 4  - baseline cr 2.2-2.5 range.   - had admissions for SHANTE and hyperkalemia  - recently started on sodium bicarb  - cr on admission 1.93, at baseline or better and improve to 1.75  better than baseline and remain stable.  - K on admission 5.1 and increase to 6.7  - Treated with Kayexalate 30 gm 2/26 and Lokelma 10 gm on 2/27, given some iv fluids, alb neb, Insulin +dextrose, Kayexalate before,  Potassium now improve and in normal range since 03/01  - Tele without arrhythmia.   - EKG- Afib, incomplete RBBB, some nonspecific ST-t changes, no peaked T waves  - renal US- Suboptimal study; No right hydronephrosis. Right renal presumed cyst. This could be further evaluated with additional ultrasound or CT. Left kidney not visualized    Was given Kayexalate or Lokelma and now Potassium is  normal, keep him on Low potassium diet. Appreciate input  From Nephrology   Repeat potassium normal and improve.   Creatinine improve and potassium remain normal at this time. Stop Schedule Lokelma and follow      Physical deconditing in setting of morbid obesity and recent hospitalization  - initially- Observation status- swicthed to inpatient on 2/26 due to hyperkalemia  - PT evaluated on 2/22 and recommended TCU due to impaired mobility and generalized weakness, SW assisting on placement  - PT to work with patient 5x week while in observation, continues to recommend TCU  Awaiting placement, overall improving but continue PT and OT, awaiting for TCU at this time. .      Polyarticular gout with exacerbation  History of pseudogout  Started with right knee, hands, and worsening back pain, treated at [facility] while hospitalized. Symptomatically improved. Patient reports right knee is greatly improved. States he has arthritis and has had knee injections in the past.   - Continue tx for exacerbation with steroid taper and colchicine that were prescribed at discharge.   - Right knee pain much improved  - There is high risk of cardiovascular death with Uloric therefore stopped Uloric and started allopurinol 300 mg p.o. daily for gout prevention. Discussed with pt and he agrees with the change.  - started on Prednisone taper, started at 40mg po daily, then decreased to 20mg/d, complete 7 days of 20mg/d and decrease to 10mg on 2/28, decrease to 5 mg now and last dose today 3/6 and then stop.      Atrial fibrillation  HFpEF, not in acute exacerbation  Essential HTN  Hx of VSD  - Continue PTA metoprolol and pharmacy to dose warfarin  - INR therapeutic      Chronic knee pain, likely OA  -continue PTA fentanyl patch, robaxin and prn oxycodone  Pain improve, no significant effusion or swelling at this time, no warmth or redness.      Chronic venosus stasis, bilateral: stable, compression wraps as needed    TASHA  - apparently  uses CPAP only wjen weight is over 450lbs    COVID-19 admission screening PCR: Not done due to recent COVID-19 infection <90 days ago and no new symptoms.   Recent COVID-19 infection 1/27/22. COVID recovered and no need for special precautions.  - COVID-19 vaccination status: Fully vaccinated with Pfizer.   - Influenza vaccination status: 1/2022    Overall remain stable.  Continue therapy  Ready to discharge once have placement.       Diet: Combination Diet 2 gm K Diet    DVT Prophylaxis: Warfarin  Cheema Catheter: Not present  Code Status: Full Code      Disposition Plan   Expected Discharge:Patient is ready to discharge once have safe disposition.   Anticipated discharge location:  Awaiting safe disposition     Entered: Joshua Duval MD 03/06/2022, 10:22 AM       The patient's care was discussed with the Bedside Nurse, Care Coordinator/ and Patient.      ______________________________________________________________________    Interval History    Patient feeling well, denies any chest pain, SOB, fever, chills, nausea, vomiting, headache or dizziness, good appetite.     No other significant event overnight.       Data reviewed today: I reviewed all medications, new labs and imaging results over the last 24 hours. I personally reviewed the renal US image(s) showing as above.    Physical Exam   Vital Signs: Temp: 98.3  F (36.8  C) Temp src: Oral BP: 127/84 Pulse: 91   Resp: 18 SpO2: 100 % O2 Device: None (Room air)    Weight: 465 lbs 0 oz    Physical Exam    General: Awake, alert, pleasant, morbidly obese, NAD  HEENT: Normocephalic, atraumatic. Extraocular movements intact.   Respiratory: Clear to auscultation bilaterally, no rales, wheezing, or rhonchi.  Cardiovascular: irregularly irregular, no murmur auscultated.   Gastrointestinal: Soft, non-tender, non-distended. Bowel sounds present.  Skin: Warm, dry. No obvious rashes or lesions on exposed skin. Dorsalis pedis pulses palpable bilaterally.  Lymphedema wraps in place. Chronic stasis and lichenification noted. Small skin tears with clean dressings noted. Non pitting edema.   Musculoskeletal: Moves all extremities. +Dorsi/plantar flexion intact. Right knee no tendenress. No effusion, trace LE edema   Neurologic: AAO x3. Cranial nerves 2-12 grossly intact, normal strength and sensation.  Psychiatric: Appropriate mood and affect. No obvious anxiety or depression.    Data   Recent Labs   Lab 03/06/22  0749 03/05/22  0748 03/04/22  0547 03/03/22  0553 03/02/22  0604 03/01/22  0636   WBC  --   --   --   --   --  8.6   HGB  --   --   --   --   --  9.1*   MCV  --   --   --   --   --  89   PLT  --   --   --   --   --  116*   INR 2.11* 2.21* 2.19*   < > 2.15* 2.49*   NA  --  140  --   --  140 140   POTASSIUM  --  4.6  --   --  4.7 5.2   CHLORIDE  --  113*  --   --  109 112*   CO2  --  25  --   --  26 26   BUN  --  46*  --   --  55* 58*   CR  --  1.67*  --   --  1.75* 1.76*   ANIONGAP  --  2*  --   --  5 2*   JOHN  --  8.5  --   --  8.6 8.5   GLC  --  99  --   --  99 102*   ALBUMIN  --   --   --   --   --  2.7*    < > = values in this interval not displayed.     No results found for this or any previous visit (from the past 24 hour(s)).  Medications     Warfarin Therapy Reminder         allopurinol  300 mg Oral Daily     colchicine  0.3 mg Oral Daily     fentaNYL  25 mcg Transdermal Q72H     fentaNYL   Transdermal Q8H     metoprolol succinate ER  50 mg Oral Daily     sodium bicarbonate  1,300 mg Oral TID     sodium zirconium cyclosilicate  10 g Oral Daily     warfarin ANTICOAGULANT  7.5 mg Oral ONCE at 18:00

## 2022-03-06 NOTE — PLAN OF CARE
A&O.  VSS.  Denies pain, fentanyl patch in place on right chest.  Lymphedema wraps re-applied today by PT.  Wound cares done to both shins.  Pt appears to have blood blister on right heel, area cleansed and mepilex applied.  Bilateral LE cool, rosendo and dusky with mild edema-this is baseline for patient.  Up to commode with assist of 2, large bowel movement today.  Voiding adequately in urinal.  Powder applied to abdominal folds.  Tolerating low potassium and renal diet.  Discharge pending TCU placement.

## 2022-03-06 NOTE — PLAN OF CARE
[id number]:  A&O.  VSS.  New fentanyl patch applied to right chest.  Tele discontinued.  MD order OK not to have IV access.  Lymph wraps in place to LE bilaterally.  Pt remained in bed and repositioned independently.  Discharge plan awaiting TCU placement.

## 2022-03-07 NOTE — PROGRESS NOTES
[id number]. A&OX4, VSS on R/A.  Up w/2 assist gb/walker, Fentanyl patch to right chest for chronic back pain. denies pain. Tolerating low K diet. Uses urinal in bed. Wounds to abdomen- dressings CDI.  Lymph wraps to BLE. CPAP for NOC. Discharge to TCU pending placement, SW following.

## 2022-03-07 NOTE — PROGRESS NOTES
[facility]    Medicine Progress Note - Hospitalist Service       Date of Admission:  2/20/2022    Assessment & Plan   Panda Miranda is a 66 year old male admitted on 2/20/2022 after being discharged earlier from [facility] stay from 2/11 - 2/20 for acute gout flare and subsequent SHANTE and hyperkalemia likely caused by gout treatment. Patient is morbidly obese and he was discharged to his home with home health but he was unable to manage going up steps immediately upon arrival home.  He was brought in to [facility] ED for placement. In the ER he was seen by Dr Henning, vitals were normal.  Labs were reviewed from his recent discharge with Cr of 1.93 which was improved from his discharge and actually better than baseline.     Of note, he was also admitted to the hospital at [facility] 1/27-1/30 for sepsis due to complicated UTI vs pyelonephritis with hyperkalemia, acute on chronic pulmonary edema, and mild COVID-19 infection.    Initially admitted unde Obs status; his K on admission was 5.1, up to 6.7 on  2/26; he was switched to inpatient status on 2/26/2022.    At this time, patient is stable and waiting for placement.     Recurrent Hyperkalemia: now resolved.   CKD stage 4  - baseline cr 2.2-2.5 range.   - had admissions for SHANTE and hyperkalemia  - recently started on sodium bicarb  - cr on admission 1.93, at baseline or better and improve to 1.75  better than baseline and remain stable.  - K on admission 5.1 and increase to 6.7  - Treated with Kayexalate 30 gm 2/26 and Lokelma 10 gm on 2/27, given some iv fluids, alb neb, Insulin +dextrose, Kayexalate before,  Potassium now improve and in normal range since 03/01  - Tele without arrhythmia.   - EKG- Afib, incomplete RBBB, some nonspecific ST-t changes, no peaked T waves  - renal US- Suboptimal study; No right hydronephrosis. Right renal presumed cyst. This could be further evaluated with additional ultrasound or CT. Left kidney not  visualized    Was given Kayexalate or Lokelma and now Potassium is normal, keep him on Low potassium diet. Appreciate input  From Nephrology   Repeat potassium normal and improve.   Creatinine improve and potassium remain normal at this time. Stop Schedule Lokelma on 3/6. Potassium remain stable today      Physical deconditing in setting of morbid obesity and recent hospitalization  - initially- Observation status- swicthed to inpatient on 2/26 due to hyperkalemia  - PT evaluated on 2/22 and recommended TCU due to impaired mobility and generalized weakness, SW assisting on placement  - PT to work with patient 5x week while in observation, continues to recommend TCU  Awaiting placement, overall improving but continue PT and OT, awaiting for TCU at this time. .      Polyarticular gout with exacerbation  History of pseudogout  Started with right knee, hands, and worsening back pain, treated at [facility] while hospitalized. Symptomatically improved. Patient reports right knee is greatly improved. States he has arthritis and has had knee injections in the past.   - Continue tx for exacerbation with steroid taper and colchicine that were prescribed at discharge.   - Right knee pain much improved  - There is high risk of cardiovascular death with Uloric therefore stopped Uloric and started allopurinol 300 mg p.o. daily for gout prevention. Discussed with pt and he agrees with the change.  - started on Prednisone taper, started at 40mg po daily, then decreased to 20mg/d, complete 7 days of 20mg/d and decrease to 10mg on 2/28, decrease to 5 mg now and last dose on 3/6      Atrial fibrillation  HFpEF, not in acute exacerbation  Essential HTN  Hx of VSD  - Continue PTA metoprolol and pharmacy to dose warfarin  - INR therapeutic      Chronic knee pain, likely OA  -continue PTA fentanyl patch, robaxin and prn oxycodone  Pain improve, no significant effusion or swelling at this time, no warmth or redness.      Chronic venosus  stasis, bilateral: stable, compression wraps as needed    TASHA  - apparently uses CPAP only wjen weight is over 450lbs    COVID-19 admission screening PCR: Not done due to recent COVID-19 infection <90 days ago and no new symptoms.   Recent COVID-19 infection 1/27/22. COVID recovered and no need for special precautions.  - COVID-19 vaccination status: Fully vaccinated with Pfizer.   - Influenza vaccination status: 1/2022    Potassium and creatinine remain stable.   Should not be on ACE inhibitors or ARB because of Hyperkalemia.   Overall improve and stable.   Awaiting placement.       Diet: Combination Diet 2 gm K Diet    DVT Prophylaxis: Warfarin  Cheema Catheter: Not present  Code Status: Full Code      Disposition Plan   Expected Discharge:Patient is ready to discharge once have safe disposition.   Anticipated discharge location:  Awaiting safe disposition     Entered: Joshua Duval MD 03/07/2022, 10:16 AM       The patient's care was discussed with the Bedside Nurse, Care Coordinator/ and Patient.      ______________________________________________________________________    Interval History    Patient feeling well, denies any chest pain, SOB, fever, chills, nausea, vomiting, headache or dizziness, good appetite.     No other significant event overnight.       Data reviewed today: I reviewed all medications, new labs and imaging results over the last 24 hours. I personally reviewed the renal US image(s) showing as above.    Physical Exam   Vital Signs: Temp: 97.9  F (36.6  C) Temp src: Oral BP: 116/76 Pulse: 72   Resp: 24 SpO2: 100 % O2 Device: BiPAP/CPAP    Weight: 465 lbs 0 oz    Physical Exam    General: Awake, alert, pleasant, morbidly obese, NAD  HEENT: Normocephalic, atraumatic. Extraocular movements intact.   Respiratory: Clear to auscultation bilaterally, no rales, wheezing, or rhonchi.  Cardiovascular: irregularly irregular, no murmur auscultated.   Gastrointestinal: Soft, non-tender,  non-distended. Bowel sounds present.  Skin: Warm, dry. No obvious rashes or lesions on exposed skin. Dorsalis pedis pulses palpable bilaterally. Lymphedema wraps in place. Chronic stasis and lichenification noted. Small skin tears with clean dressings noted. Non pitting edema.   Musculoskeletal: Moves all extremities. +Dorsi/plantar flexion intact. Right knee no tendenress. No effusion, trace LE edema   Neurologic: AAO x3. Cranial nerves 2-12 grossly intact, normal strength and sensation.  Psychiatric: Appropriate mood and affect. No obvious anxiety or depression.    Data   Recent Labs   Lab 03/07/22  0751 03/06/22  0749 03/05/22  0748 03/03/22  0553 03/02/22  0604 03/01/22  0636   WBC  --   --   --   --   --  8.6   HGB  --   --   --   --   --  9.1*   MCV  --   --   --   --   --  89   PLT  --   --   --   --   --  116*   INR 2.21* 2.11* 2.21*   < > 2.15* 2.49*     --  140  --  140 140   POTASSIUM 4.6  --  4.6  --  4.7 5.2   CHLORIDE 110*  --  113*  --  109 112*   CO2 27  --  25  --  26 26   BUN 43*  --  46*  --  55* 58*   CR 1.74*  --  1.67*  --  1.75* 1.76*   ANIONGAP 1*  --  2*  --  5 2*   JOHN 9.0  --  8.5  --  8.6 8.5   GLC 96  --  99  --  99 102*   ALBUMIN  --   --   --   --   --  2.7*    < > = values in this interval not displayed.     No results found for this or any previous visit (from the past 24 hour(s)).  Medications     Warfarin Therapy Reminder         allopurinol  300 mg Oral Daily     colchicine  0.3 mg Oral Daily     fentaNYL  25 mcg Transdermal Q72H     fentaNYL   Transdermal Q8H     metoprolol succinate ER  50 mg Oral Daily     sodium bicarbonate  1,300 mg Oral TID

## 2022-03-07 NOTE — PROGRESS NOTES
Care Management Follow Up    Length of Stay (days): 9    Expected Discharge Date: 03/09/2022     Concerns to be Addressed: discharge planning     Patient plan of care discussed at interdisciplinary rounds: Yes    Anticipated Discharge Disposition: Transitional Care     Anticipated Discharge Services:    Anticipated Discharge DME:      Patient/family educated on Medicare website which has current facility and service quality ratings:    Education Provided on the Discharge Plan:    Patient/Family in Agreement with the Plan:      Referrals Placed by CM/SW:    Private pay costs discussed: Not applicable    Additional Information:  Writer received a VM from patient's spouse asking for a call to discuss placement. Writer called and spouse to spouse and stated that the patient was informed patient is going to TCU today. Writer explained that writer is still looking for a TCU placement. Patient asked that a referral go to [facility]. Writer explained that with patient's insurance, there are only a few facilities that are in network with it and writer does not believe that these facilities are but would inquire. Spouse asked for the list of in network facilities to be sent with her via email. Writer and spouse discussed that writer can send referral to [facility], [facility],  And writer can follow up with [facility]. Writer explained that if we still cannot find a bed that can accept patient, we will need to expand our search even more to facilities in contract with [facility] due to bed availability. Writer emailed [facility] list to spouse.    Writer hand faxed referral to:  [facility]: No appropraite bed, will hold referral if bed opens.  Herminia Blackburn Colton Myron Cabrera, OREN, SW   Social Work   [facility]

## 2022-03-07 NOTE — PLAN OF CARE
[id number]  A/Ox4. VSS on RA. Denies pain. Tolerating low K+/renal diet. Lymph wraps in place to BLE. Scab & small open wound to abdomen - mepliex intact, changed this shift. Up w/ A2 + Lift - not OOB this shift. Continent B/B, urinal @ bedside. Discharge to TCU pending placement.

## 2022-03-07 NOTE — PLAN OF CARE
Goal Outcome Evaluation:      [id number]:    A/Ox4. VSS on CPAP. Denied pain/N/V/SOB.  Up A2 GB/W , not OOB this shift. On low K /renal diet. Fentanyl patch at upper right chest in place. Lymph wraps in place to BLE. Wounds to abdomen, dressings JEANINE. Continent B/B, no BM this shift, urinal bedside with good UOP.  Discharge to TCU pending placement.

## 2022-03-07 NOTE — PROGRESS NOTES
02/22/22 1600   Quick Adds   Type of Visit PT Re-evaluation   Living Environment   People in Home spouse   Current Living Arrangements house  (3 floors)   Number of Stairs, Main Entrance 5   Stair Railings, Main Entrance railings on both sides of stairs   Number of Stairs, Within Home, Primary other (see comments)  (16)   Stair Railings, Within Home, Primary railings on both sides of stairs   Transportation Anticipated family or friend will provide   Living Environment Comments Per pt, spouse is home most of the time but he is too big for her to comfortably assist in mobility    Self-Care   Usual Activity Tolerance moderate   Current Activity Tolerance fair   Equipment Currently Used at Home cane, straight   Fall history within last six months no   Activity/Exercise/Self-Care Comment pt was previously Mod-I with SEC and Ind with all ADL's and IADL's, pt previously drove   General Information   Onset of Illness/Injury or Date of Surgery 02/20/22   Referring Physician Jillian Layton, DO   Patient/Family Therapy Goals Statement (PT) get stronger at TCU so he can go home and do stairs   Pertinent History of Current Problem (include personal factors and/or comorbidities that impact the POC) 66-year-old male admitted on 2/10/2022 at [facility] with complaints of polyarticular arthritis and low back pain and too weak to get out of bed.  Patient has morbid obesity weighing 433 pounds, and recent hospitalization the last 4 days in January for sepsis secondary to urinary tract infection with associated hypotension and acute back pain along with acute heart failure. Due to patient's weakness, back pain and morbid obesity patient was unsafe for discharge requiring oral prednisone for polyarticular gout flare awaiting disposition complicated by an episode of hyperkalemia that required acute inpatient management.  Patient felt strong enough to use stairs and walk and was discharged on 2/20/2022.  However upon  Diagnosis: Pancreatic cancer   Regimen: Abraxane/Gemzar  Cycle/Day: C3D15  Is this a C1D1 appt?  No    Rossi Vaughn PA-C, is supervising clinician today.    ECOG:   ECOG [22 1241]   ECOG Performance Status 3       Review and verified Advanced Directives: No, patient has no interest in completing Advanced Directives at this time    Verified if patient has state DNR bracelet on: No; Full Code    Nursing Assessment:   A focused nursing assessment addressing the toxicity of chemotherapy was performed and the patient reports the following:  Nausea: YES, taking prn compazine   Vomiting: NO  Fever: NO  Chills: NO  Other signs of infection: NO  Bleeding: NO  Mucositis: NO  Diarrhea: NO  Constipation: NO  Anorexia: NO  Dysuria: NO  Urinary Bleeding: NO  Cough: NO  Shortness of Breath: NO  Fatigue/Weakness: YES, fatigue/weakness, improving   Numbness/Tingling: YES, neuropathy, previous dose reduction   Other Neuropathies: NO  Edema: YES, Pierre LE edema   Rash: NO  Hand/Foot Syndrome: NO  Anxiety/Depression/Insomnia: NO  Pain: NO       Pre-Treatment: Treatment consent signed  Patient has valid pre-authorization  VS completed  Height and weight verified  BSA independently double checked & verified by two practitioners  Premed orders, including hydration, are verified prior to administration  Treatment parameters verified in patient protocol  Lab results checked - MD notified; labs reviewed by MD  Chemotherapy doses independently doubled checked & verified by two practitioners  I have reviewed the following with the patient:  Name of chemo drug, duration and route of infusion, and reportable infusion-related symptoms.    Treatment: Refer to [facility] and MAR for line assessment and medication administration  Chemotherapy has not ; double checked & verified by two practitioners  Appearance and physical integrity of drugs meets standard of drug monograph; double checked & verified by two practitioners  Rate set on infusion  "arriving home he realized he could not manage the stairs at his house and thus presented to [facility]'s emergency room where he was admitted for TCU placement.  Patient remains in hospital awaiting placement   Existing Precautions/Restrictions fall   Cognition   Affect/Mental Status (Cognition) WNL   Orientation Status (Cognition) oriented x 4   Pain Assessment   Patient Currently in Pain   (\"mild\" pain in Right knee)   Integumentary/Edema   Integumentary/Edema Comments bilat LE wounds wrapped and bandaged   Posture    Posture Forward head position;Protracted shoulders   Posture Comments forward flexed with standing and amb    Range of Motion (ROM)   Range of Motion ROM is WFL   Strength (Manual Muscle Testing)   Strength (Manual Muscle Testing) Deficits observed during functional mobility   Strength Comments generalized weakness noted with functional mobility, appears grossly antigravity   Bed Mobility   Comment, (Bed Mobility) supine<>sit ModA x 1    Sit-Stand Transfer   Sit-Stand [place] (Transfers) contact guard   Assistive Device (Sit-Stand Transfers) walker, front-wheeled   Gait/Stairs (Locomotion)   [place] Level (Gait) contact guard   Assistive Device (Gait) walker, front-wheeled   Distance in Feet (Required for LE Total Joints) 10' + 20' + 20'   Pattern (Gait) step-through   Deviations/Abnormal Patterns (Gait) antalgic;gait speed decreased   Comment, (Gait/Stairs) amb with FWW CGA   Balance   Balance Comments sitting EOB ind, standing with FWW balance seems okay, deficits with dynamic balance due to generalized weakness and reduced activity tolerance, pt has baseline neuropahty with decreased sensation in bilat feet   Sensory Examination   Sensory Perception Comments pt reports baseline neuropathy bilat LE, decreased sensation in bottom of feet    Coordination   Coordination no deficits were identified   Muscle Tone   Muscle Tone no deficits were identified   Clinical Impression " pump is in alignment with ordered rate; double checked & verified by two practitioners  Blood return confirmed before, during and after treatment administered  Infusion pump used for non-vesicant drugs  Extravasation/Infiltration: None   Drugs were administered in proper sequencing    Post Treatment: Treatment tolerated well; no adverse reaction    Does the Patient have a central line?  yes:   Device: Port  Central Line Site: Right   Central Line Site Appearance: WDL  Is this a port? Yes: Implanted port accessed using a 20 gauge non-coring needle primed with 0.9% sodium chloride. Good blood return obtained.  Blood obtained and sent to lab.  Site Care: Aseptic site care per protocol, Sterile gauze and tape dressing applied and Injection caps changed/applied  Comments: accessed by Yaneli Romero   Central line flushed with: 20 ml 0.9 normal saline and 100 un/ml- 500 units heparin  Central line removed: no  Hampton Needle: Hampton needle de-accessed        Transfusion: Not needed    Integrative Medicine: No    Oral Chemotherapy: No    Education: No new instructions needed    Next appointment scheduled: 3/21 for labs, NP follow up and treatment   Patient instructed to call the office with any questions or concerns.    Patient Discharged: patient discharged to home per self, ambulatory     Criteria for Skilled Therapeutic Intervention Yes, treatment indicated   PT Diagnosis (PT) impaired functional mobility, generalized weakness    Influenced by the following impairments decrease in functional strength and balance, pain     Functional limitations due to impairments decreased ind with bed mobility, transfers, giat and stairs, reduced activity tolerance, fall risk    Clinical Presentation (PT Evaluation Complexity) Stable/Uncomplicated   Clinical Presentation Rationale PMH and clinical judgment   Clinical Decision Making (Complexity) low complexity   Planned Therapy Interventions (PT) balance training;bed mobility training;gait training;home exercise program;patient/family education;ROM (range of motion);stair training;strengthening;stretching;transfer training   Anticipated Equipment Needs at Discharge (PT) walker, rolling   Risk & Benefits of therapy have been explained evaluation/treatment results reviewed;risks/benefits reviewed;care plan/treatment goals reviewed;current/potential barriers reviewed;participants included;participants voiced agreement with care plan;patient   PT Discharge Planning   PT Discharge Recommendation (DC Rec) Transitional Care Facility;home with assist;home with home care physical therapy   PT Rationale for DC Rec Pt is currently below baseline of being able to amb Mod-I with SEC, perform stairs, and ind with ADL's. Pt currently requiring assist of 1 for all mobility including bed mobility, transfers and amb short distances with FWW. Recommend TCU to progress pt's mobility to safely return home. Pt's generalized weakness and low activity tolerance make him a fall risk, if he were to return home would need 24/7 assist for all mobility, per pt his spouse is not able or comfortable in providing mobility assistance to the patient. HHPT to help pt mobilized safely in home and progress [place].    PT Brief overview of current status bed mobility: modA, sit<>stand with FWW  CGA, amb short distances with FWW and CGA    Plan of Care Review   Plan of Care Reviewed With patient   Therapy Certification   Start of care date 02/22/22   Certification date from 02/22/22   Certification date to 03/01/22   Medical Diagnosis impaired mobility and genearlized weakness   Total Evaluation Time   Total Evaluation Time (Minutes) 10   Physical Therapy Goals   PT Frequency 5x/week   PT Predicated Duration/Target Date for Goal Attainment 02/25/22   PT: Bed Mobility Modified independent;Supine to/from sit;Rolling;Bridging   PT: Transfers Supervision/stand-by assist;Assistive device;Sit to/from stand   PT: Gait Supervision/stand-by assist;Rolling walker;50 feet   PT: Stairs Minimal assist;Greater than 10 stairs  (16 stairs)

## 2022-03-08 NOTE — PLAN OF CARE
Goal Outcome Evaluation:     A&OX4. VSS on RA. Denies pain. Low K diet tolerating well. Continent of bowel and baldder. Dressing on abdomen CDI. Lymph wraps in place on BLE, remove 3/8. No IV access MD aware. A2 gaitbelt and walker.

## 2022-03-08 NOTE — PROGRESS NOTES
[facility]    Medicine Progress Note - Hospitalist Service       Date of Admission:  2/20/2022    Assessment & Plan   Panda Miranda is a 66 year old male admitted on 2/20/2022 after being discharged earlier from [facility] stay from 2/11 - 2/20 for acute gout flare and subsequent SHANTE and hyperkalemia likely caused by gout treatment. Patient is morbidly obese and he was discharged to his home with home health but he was unable to manage going up steps immediately upon arrival home.  He was brought in to [facility] ED for placement. In the ER he was seen by Dr Henning, vitals were normal.  Labs were reviewed from his recent discharge with Cr of 1.93 which was improved from his discharge and actually better than baseline.     Of note, he was also admitted to the hospital at [facility] 1/27-1/30 for sepsis due to complicated UTI vs pyelonephritis with hyperkalemia, acute on chronic pulmonary edema, and mild COVID-19 infection.    Initially admitted unde Obs status; his K on admission was 5.1, up to 6.7 on  2/26; he was switched to inpatient status on 2/26/2022.    Patient remain stable for the last few days and awaiting placement at this time.     Recurrent Hyperkalemia: now resolved.   CKD stage 4  - baseline cr 2.2-2.5 range.   - had admissions for SHANTE and hyperkalemia  - recently started on sodium bicarb  - cr on admission 1.93, at baseline or better and improve to 1.75  better than baseline and remain stable.  - K on admission 5.1 and increase to 6.7  - Treated with Kayexalate 30 gm 2/26 and Lokelma 10 gm on 2/27, given some iv fluids, alb neb, Insulin +dextrose, Kayexalate before,  Potassium now improve and in normal range since 03/01  He was on daily Lokelma from 3/01 to 3/05 and then discontinue, Potassium remain stable.    - Tele without arrhythmia.   - EKG- Afib, incomplete RBBB, some nonspecific ST-t changes, no peaked T waves  - renal US- Suboptimal study; No right hydronephrosis.  Right renal presumed cyst. This could be further evaluated with additional ultrasound or CT. Left kidney not visualized    He was treated for hyperkalemia as above with Kayexalate and Lokelma, Potassium remain stable, No treatment since 3/5 and potassium within normal range, kidney functions improve. Avoid ACE inhibitors or ARB in the future.   Nephrology was consulted and now signed off.     Physical deconditing in setting of morbid obesity and recent hospitalization  - initially- Observation status- swicthed to inpatient on 2/26 due to hyperkalemia  - PT evaluated on 2/22 and recommended TCU due to impaired mobility and generalized weakness, SW assisting on placement  - PT to work with patient 5x week while in observation, continues to recommend TCU  Awaiting placement,   Overall improving but still require TCU placement.      Polyarticular gout with exacerbation  History of pseudogout  Started with right knee, hands, and worsening back pain, treated at [facility] while hospitalized. Symptomatically improved. Patient reports right knee is greatly improved. States he has arthritis and has had knee injections in the past.   - Continue tx for exacerbation with steroid taper and colchicine that were prescribed at discharge.   - Right knee pain much improved  - There is high risk of cardiovascular death with Uloric therefore stopped Uloric and started allopurinol 300 mg p.o. daily for gout prevention. Discussed with pt and he agrees with the change.  - started on Prednisone taper, started at 40mg po daily, then decreased to 20mg/d, complete 7 days of 20mg/d and decrease to 10mg on 2/28, decrease to 5 mg now and last dose on 3/6. Continue daily colchicine 0.3 mg daily,  Creatinine clearance is 42, can increase the dose to 0.6 mg daily      Atrial fibrillation  HFpEF, not in acute exacerbation  Essential HTN  Hx of VSD  - Continue PTA metoprolol and pharmacy to dose warfarin  - INR therapeutic      Chronic knee pain,  likely OA  -continue PTA fentanyl patch, robaxin and prn oxycodone  Pain improve, no significant effusion or swelling at this time, no warmth or redness.   Pain improve at this time.      Chronic venosus stasis, bilateral: stable, compression wraps as needed    TASHA  - apparently uses CPAP only wjen weight is over 450lbs    COVID-19 admission screening PCR: Not done due to recent COVID-19 infection <90 days ago and no new symptoms.   Recent COVID-19 infection 1/27/22. COVID recovered and no need for special precautions.  - COVID-19 vaccination status: Fully vaccinated with Pfizer.   - Influenza vaccination status: 1/2022    Overall improve and stable at this time.   Potassium remain stable without Lokelma so no need for daily use  Increase Colchicine to 0.6 mg daily for prophylaxis, off prednisone now.   Awaiting placement at this time.       Diet: Combination Diet 2 gm K Diet    DVT Prophylaxis: Warfarin  Cheema Catheter: Not present  Code Status: Full Code      Disposition Plan   Expected Discharge:Patient is ready to discharge once have safe disposition.   Anticipated discharge location:  Awaiting safe disposition     Entered: Joshua Duval MD 03/08/2022, 10:24 AM       The patient's care was discussed with the Bedside Nurse, Care Coordinator/ and Patient.      ______________________________________________________________________    Interval History    Patient seen and evaluated in his room today, has some mild pain to his left hand last night but much better now, no fever, chills, chest pain, SOB, nausea, vomiting, headache or dizziness.     No other significant event overnight.       Data reviewed today: I reviewed all medications, new labs and imaging results over the last 24 hours. I personally reviewed the renal US image(s) showing as above.    Physical Exam   Vital Signs: Temp: 98  F (36.7  C) Temp src: Oral BP: 97/60 Pulse: 96   Resp: 16 SpO2: 97 % O2 Device: None (Room air)    Weight: 465 lbs  0 oz    Physical Exam    General: Awake, alert, pleasant, morbidly obese, NAD  HEENT: Normocephalic, atraumatic. Extraocular movements intact.   Respiratory: Clear to auscultation bilaterally, no rales, wheezing, or rhonchi.  Cardiovascular: irregularly irregular, no murmur auscultated.   Gastrointestinal: Soft, non-tender, non-distended. Bowel sounds present.  Skin: Warm, dry. No obvious rashes or lesions on exposed skin. Dorsalis pedis pulses palpable bilaterally. Lymphedema wraps in place. Chronic stasis and lichenification noted. Small skin tears with clean dressings noted. Non pitting edema.   Musculoskeletal: Moves all extremities. +Dorsi/plantar flexion intact. Right knee no tendenress. No effusion, trace LE edema   Neurologic: AAO x3. Cranial nerves 2-12 grossly intact, normal strength and sensation.  Psychiatric: Appropriate mood and affect. No obvious anxiety or depression.    Data   Recent Labs   Lab 03/08/22  0648 03/07/22  0751 03/06/22  0749 03/05/22  0748   INR 2.37* 2.21* 2.11* 2.21*    138  --  140   POTASSIUM 4.4 4.6  --  4.6   CHLORIDE 110* 110*  --  113*   CO2 25 27  --  25   BUN 40* 43*  --  46*   CR 1.75* 1.74*  --  1.67*   ANIONGAP 2* 1*  --  2*   JOHN 8.7 9.0  --  8.5   * 96  --  99     No results found for this or any previous visit (from the past 24 hour(s)).  Medications     Warfarin Therapy Reminder         allopurinol  300 mg Oral Daily     colchicine  0.3 mg Oral Daily     fentaNYL  25 mcg Transdermal Q72H     fentaNYL   Transdermal Q8H     metoprolol succinate ER  50 mg Oral Daily     sodium bicarbonate  1,300 mg Oral TID

## 2022-03-08 NOTE — PROGRESS NOTES
Care Management Follow Up    Length of Stay (days): 10    Expected Discharge Date: 03/10/2022     Concerns to be Addressed: discharge planning     Patient plan of care discussed at interdisciplinary rounds: Yes    Anticipated Discharge Disposition: Transitional Care     Anticipated Discharge Services:    Anticipated Discharge DME:      Patient/family educated on Medicare website which has current facility and service quality ratings:    Education Provided on the Discharge Plan:    Patient/Family in Agreement with the Plan:      Referrals Placed by CM/SW:    Private pay costs discussed: Not applicable    Additional Information:  SW following up with referrals to facilities to inquire about bed availability for patient:    [facility]: [facility]: Sent referral  [facility]: No bariatric bed available   [facility]: Sent referral  [facility]: No b[facility]ric bed available  [facility]: Not taking any admissions - no one is covering admissions at this time.     MAGALIE Riddle

## 2022-03-08 NOTE — PROGRESS NOTES
[facility] Nurse Inpatient Wound Assessment   Reason for consultation: Evaluate and treat  Left lateral shin wounds    Assessment  Left lateral Shin wounds due to Venous Ulcer  Status: dimensions slightly larger with large amount of strike though exudate through foam dressing and mild hypergranulation tissue and periwound maceration.  No local signs of infection. Will alter topical wound care to improve absorptive capacity and emphasize periwound skin protection., overall skin care to BL lower extremities effective. Nursing doing a wonderful job taking care of his legs.   Treatment Plan  Left lateral shin wounds: Every 2-3 days  (please coordinate with Lymphedema therapy)  1. Cleanse with wound cleanser and blot dry  2. Dry and protect surrounding skin with no sting barrier film wipe #[id number] (this is very important)  3. Cover with silicone foam dressing Mepilex  4x4 #[id number]  4. Spray all intact skin to BL lower extremities with Carmela cleanse and protect and rub in. Allow to soak for 10mins and then wipe away any excess.   5. Apply sween 24 (Pink top lotion) from base of toes to below knees.  6. Allow to completely dry prior to applying compression.     Pressure Injury prevention (RN-please order supplies if not in room)  1. Turn every 2 hours, side to side avoid supine on bariatric pressure redistribution support surface  2.   Float heels off bed with use of pillows under legs, if ineffective, order offloading boots: Heel Lift boots (Prevalon #[id number])  3.   If incontinent Cleanse with incontinent cleanser (Carmela spray # [id number]) followed by skin barrier protectant (Critic Aid paste #53278)  BID and after each incontinent episode  4.   Prevent sliding and shear by limiting HOB to 30 degrees or less unless contraindicated, use knee gatch first if not contraindicated  5.   If sitting, use pressure redistribution chair cushion large(#[id number]); if unable to shift weight every hour, please limit sitting to an hour at a time  6.    Protective foam dressings PRN,Change at least q 4 days, peel back, peek and replace for daily skin inspection.  7.   Optimize nutrition       Orders Reviewed  Recommended provider order: Lymphedema consult  WOC Nurse follow-up plan:weekly  Nursing to notify the Provider(s) and re-consult the WOC Nurse if wound(s) deteriorates or new skin concern.    Patient History  According to provider note(s):  Panda Miranda is a 66 year old male admitted on 2/20/2022 after being discharged earlier from [facility] stay from 2/11 - 2/20 for acute gout flare and subsequent SHANTE and hyperkalemia likely caused by gout treatment. Patient is morbidly obese and he was discharged to his home with home health but he was unable to manage going up steps immediately upon arrival home.  He was brought in to [facility] ED for placement. In the ER he was seen by Dr Henning, vitals were normal.  Labs were reviewed from his recent discharge with Cr of 1.93 which was improved from his discharge and actually better than baseline.      No Known Allergies  Objective Data  Containment of urine/stool: Incontinence Protocol    Body mass index is 53.74 kg/m .     Active Diet Order  Orders Placed This Encounter      Combination Diet 2 gm K Diet      Intake/Output Summary (Last 24 hours) at 3/8/2022 1112  Last data filed at 3/8/2022 1112  Gross per 24 hour   Intake 360 ml   Output 750 ml   Net -390 ml       Risk Assessment:   Sensory Perception: 3-->slightly limited  Moisture: 3-->occasionally moist  Activity: 2-->chairfast  Mobility: 2-->very limited  Nutrition: 3-->adequate  Friction and Shear: 2-->potential problem  Sandoval Score: 15                          Labs:   Recent Labs   Lab 03/08/22  0648   INR 2.37*       Physical Exam  Areas of skin assessed: Left Lateral lower leg    Wound Location:  Left Lateral lower leg    3/1/22       3/8/2022      Wound History: Pt with chronic wound he reports to be very slow healing. Pt currently has lymph wraps  inpatient and uses velcro wraps outpatient    Wound Base: 100 % dermis, red, moist small areas of hypergranulation tissue     Palpation of the wound bed: normal      Drainage: moderate strike through dressing     Description of drainage: yellow     Measurements (length x width x depth, in cm) slightly increase in length 3.5  x 1.5  x  0.2 cm      Tunneling N/A     Undermining N/A  Periwound skin: macerated (mild)      Temperature: normal   Odor: none  Pain: denies , none  Pain intervention prior to dressing change: NA    Interventions  Visual inspection and assessment completed   Wound Care Rationale Protect periwound skin, Promote moist wound healing without tissue dehydration , Gently fill dead space to prevent abscess formation  and Provide protection   Wound Care: done per plan of care  Supplies: at bedside  Current off-loading measures: pillows for positioning, HOB 30 degrees or less, heels floating off beds, bariatric low airloss support surfaces, chair cushion ordered PRN  Current support surface: Bariatric low air loss  Education provided to: importance of repositioning and plan of care  Discussed plan of care with Patient and Nurse    Patricia Ayers MS RN CWOCN

## 2022-03-08 NOTE — PLAN OF CARE
[id number]  Pt is A&Ox4. VSS on RA. Denies pain. Fentanyl patch to R chest. BS active, passing gas, no BM this shift. Voiding in urinal w/o difficulty. Lymph wraps on, to be taken off tomorrow. Dressings to L shin and abdomen CDI. No IV access, MD aware. Up A2 with GB+W. On bariatric pulsate mattress, shifts weight in bed independently. On low K+ diet, tolerating well, denies N/V. Discharge pending placement.

## 2022-03-08 NOTE — PLAN OF CARE
Goal Outcome Evaluation:      [id number]:    Pt is A&Ox4. VSS on RA. Denies pain/ N/V/SOB. Up A2 GB/W, not Oob this shift. Fentanyl patch at R chest. BS active, passing gas. Urinal bedside with good UOP. No BM this shift. Dressings to L shin and abdomen DIC. No IV access, MD aware. Pt can shift weight in bed independently. On low K diet, renal diet, not eating this shift. Plan to remove lymph wraps today.  Discharge pending placement.

## 2022-03-08 NOTE — PLAN OF CARE
A&O, VSS on RA, denies pain, continent x 2, A2 GB/W pivot to commode, on Renal diet, tolerating dressing on LLE changed  by WOC, lymphedema wrap managed by PT, continue to monitor.

## 2022-03-09 NOTE — PLAN OF CARE
[id number]  Pt is A&Ox4. VSS on RA. Denies pain. Fentanyl patch to R chest. BS active, passing gas, no BM this shift. Voiding in urinal w/o difficulty. Lymph wraps on. Dressings to L & R shins and abdomen CDI. No IV access, MD aware. Up A2 with GB+W. On bariatric pulsate mattress, shifts weight in bed independently. Refuses repositioning. On low K+ diet, tolerating well, denies N/V. Home Cpap on overnight. Discharge pending placement.

## 2022-03-09 NOTE — PROGRESS NOTES
Care Management Follow Up    Length of Stay (days): 11    Expected Discharge Date: Upon placement   Concerns to be Addressed: discharge planning     Patient plan of care discussed at interdisciplinary rounds: Yes    Anticipated Discharge Disposition: Transitional Care     Anticipated Discharge Services:    Anticipated Discharge DME:      Patient/family educated on Medicare website which has current facility and service quality ratings:    Education Provided on the Discharge Plan:    Patient/Family in Agreement with the Plan:      Referrals Placed by CM/SW:    Private pay costs discussed: Not applicable    Additional Information:  SW sent additional referrals to  TCU. No current bed availability for patient and have a wait list at this time. SW discussed with CM Supervisor for assistance with potential placement within the system. SW will continue to assist and follow for discharge.    MAGALIE Riddle

## 2022-03-09 NOTE — PROGRESS NOTES
[facility]  Hospitalist Progress Note        Joselito Booker MD   03/09/2022        Interval History:        - No acute issues overnight; denies any active complaints  - blood pressure soft this morning 90/60; will decrease PTA Toprol XL to 25 mg daily  -  following for placement to TCU with bariatric bed         Assessment and Plan:        Panda Miranda is a 66 year old male admitted on 2/20/2022 after being discharged earlier from [facility] stay from 2/11 - 2/20 for acute gout flare and subsequent SHANTE and hyperkalemia likely caused by gout treatment. Patient is morbidly obese and he was discharged to his home with home health but he was unable to manage going up steps immediately upon arrival home and thus was brought in to [facility] ED for placement. Initially admitted unde Obs status, switched to inpatient on 2/26 due to hyperkalemia.    Of note, he was also admitted to the hospital at [facility] 1/27-1/30 for sepsis due to complicated UTI vs pyelonephritis with hyperkalemia, acute on chronic pulmonary edema, and mild COVID-19 infection.    Hospital course now prolonged due to need for placement.    Recurrent Hyperkalemia: now resolved.   CKD stage 4  - baseline cr 2.2-2.5 range; had admissions for SHANTE and hyperkalemia; recently started on sodium bicarb, continue  - cr on admission 1.93, at baseline or better and improve to 1.75 better than baseline and remain stable.  - K on admission 5.1, upto 6.7; was treated with Kayexalate 30 gm 2/26 and Lokelma 10 gm on 2/27, given some iv fluids, alb neb, Insulin +dextrose  - K improved and stable; was on daily Lokelma from 3/01 to 3/05    - Tele without arrhythmia.   - EKG- Afib, incomplete RBBB, some nonspecific ST-t changes, no peaked T waves  - renal US- Suboptimal study; No right hydronephrosis. Right renal presumed cyst. This could be further evaluated with additional ultrasound or CT. Left kidney not visualized  - to  Avoid ACE  inhibitors or ARB in the future; Nephrology was consulted and now signed off.      Physical deconditing in setting of morbid obesity and recent hospitalization  - initially- Observation status- swicthed to inpatient on 2/26 due to hyperkalemia  - PT evaluated on 2/22 and recommended TCU due to impaired mobility and generalized weakness, SW assisting on placement  - PT to work with patient 5x week while in observation, continues to recommend TCU  -  following for placement to TCU with bariatric bed      Polyarticular gout with exacerbation  History of pseudogout  - Started with right knee, hands, and worsening back pain, treated at [facility] while hospitalized. Symptomatically improved  - There is high risk of cardiovascular death with Uloric therefore stopped Uloric and started allopurinol 300 mg p.o. daily for gout prevention. Discussed with pt and he agrees with the change.  - was started on Prednisone taper, completed 3/6  - continue daily colchicine 0.6 mg daily      Atrial fibrillation  HFpEF, not in acute exacerbation  Essential HTN  Hx of VSD  - Continue PTA metoprolol and pharmacy to dose warfarin  - INR therapeutic      Chronic knee pain, likely OA  - continue PTA fentanyl patch, robaxin and prn oxycodone    Chronic venosus stasis, bilateral: stable, compression wraps as needed     TASHA  - continue CPAP     COVID-19 admission screening PCR: Not done due to recent COVID-19 infection <90 days ago and no new symptoms.   Recent COVID-19 infection 1/27/22. COVID recovered and no need for special precautions.  - COVID-19 vaccination status: Fully vaccinated with Pfizer.   - Influenza vaccination status: 1/2022     Orders Placed This Encounter      Combination Diet 2 gm K Diet    DVT Prophylaxis: Warfarin    Code Status: Full Code         Disposition Plan     Expected Discharge: medically stable for discharge pending safe disposition;  following for placement to TCU with bariatric  "bed    Clinically Significant Risk Factors Present on Admission                     Page Me (7 am to 6 pm)              Physical Exam:      Blood pressure 93/63, pulse 76, temperature 98  F (36.7  C), temperature source Oral, resp. rate 20, height 1.981 m (6' 6\"), weight (!) 210.9 kg (465 lb), SpO2 98 %.  Vitals:    02/20/22 2215 02/21/22 0132 03/02/22 1621   Weight: (!) 198.2 kg (437 lb) (!) 205.8 kg (453 lb 9.6 oz) (!) 210.9 kg (465 lb)     Vital Signs with Ranges  Temp:  [98  F (36.7  C)] 98  F (36.7  C)  Pulse:  [76-96] 76  Resp:  [16-22] 20  BP: ()/(60-70) 93/63  SpO2:  [97 %-99 %] 98 %  I/O's Last 24 hours  I/O last 3 completed shifts:  In: -   Out: 555 [Urine:555]    Constitutional: Alert, awake and oriented X 3; resting comfortably in no apparent distress; morbidly obese    HEENT: Pupils equal and reactive to light and accomodation, neck supple    Oral cavity: Moist mucosa   Cardiovascular: Normal s1 s2, irregularly irregular rate and rhythm, no murmur   Lungs: B/l clear to auscultation, no wheezes or crepitations   Abdomen: Soft, nt, nd, no guarding, rigidity or rebound; BS +   LE :  trace b/l edema +; lymphedema wraps in place, multiple skin tears ; chronic venous stasis changes    Musculoskeletal: Power 5/5 in all extremities   Neuro: No focal neurological deficits noted   Psychiatry: normal mood and affect                Medications:          allopurinol  300 mg Oral Daily     colchicine  0.6 mg Oral Daily     fentaNYL  25 mcg Transdermal Q72H     fentaNYL   Transdermal Q8H     metoprolol succinate ER  50 mg Oral Daily     sodium bicarbonate  1,300 mg Oral TID     PRN Meds: acetaminophen **OR** acetaminophen, glucose **OR** dextrose **OR** glucagon, famotidine, melatonin, methocarbamol, miconazole, naloxone **OR** naloxone **OR** naloxone **OR** naloxone, ondansetron **OR** ondansetron, oxyCODONE, Warfarin Therapy Reminder         Data:      All new lab and imaging data was reviewed.   Recent Labs "   Lab Test 03/08/22  0648 03/07/22  0751 03/06/22  0749 03/02/22  0604 03/01/22  0636 02/22/22  0623 02/21/22  1924 02/18/22  0613 02/17/22  0549 02/13/22  0519 02/12/22  0615   WBC  --   --   --   --  8.6  --   --   --  17.2*  --  9.2   HGB  --   --   --   --  9.1*  --  10.2*  --  10.9*  --  9.7*   MCV  --   --   --   --  89  --   --   --  89  --  85   PLT  --   --   --   --  116*  --   --   --  354  --  253   INR 2.37* 2.21* 2.11*   < > 2.49*   < >  --    < > 2.40*   < > 2.45*    < > = values in this interval not displayed.      Recent Labs   Lab Test 03/08/22  0648 03/07/22  0751 03/05/22  0748    138 140   POTASSIUM 4.4 4.6 4.6   CHLORIDE 110* 110* 113*   CO2 25 27 25   BUN 40* 43* 46*   CR 1.75* 1.74* 1.67*   ANIONGAP 2* 1* 2*   JOHN 8.7 9.0 8.5   * 96 99     Recent Labs   Lab Test 06/15/21  1428   TROPI <0.015

## 2022-03-09 NOTE — PLAN OF CARE
Goal Outcome Evaluation:             No change overnight:   A&Ox4. VSS on RA. Fentanyl patch to R chest. BS active, passing gas, no BM this shift. Voiding in urinal w/o difficulty. Lymph wraps on. Dressings to L & R shins and abdomen CDI. No IV access, MD aware. Up A2 with GB+W. On bariatric pulsate mattress, shifts weight in bed independently.  On low K+ diet, tolerating well. Discharge pending placement.

## 2022-03-09 NOTE — PROGRESS NOTES
A&Ox4. VSS ex soft Bps. Held Metoprolol this am. Voiding in urinal w/o difficulty. Up A2 with GB+W, able to shift weight ind in bed. Lymph wraps on. Dressings to L & R shins and abdomen CDI. No drainage observed. No IV. On low K+ diet, tolerating well. Discharge pending placement.

## 2022-03-09 NOTE — PROGRESS NOTES
A&O, Calm and cooperative, VSS on RA, on 2 gram Na+ diet, denies pain, A2 GBW, refused repositioning but shifting weight on own, pulsate mattress, dressing on L leg changed by WOC RN, R leg dressing due to be changed tomorrow, uses the urinal. Fentanyl on R chest, discharge pending placement.

## 2022-03-09 NOTE — PROVIDER NOTIFICATION
MD Notification    Notified Person: MD    Notified Person Name:  Dr. Leaverenaannmarie     Notification Date/Time: 0835, 3/9/22    Notification Interaction: Page    Purpose of Notification: Pts BP is 94/62 this am, do you want to hold Metoprolol this am    Orders Received: New parameters placed, hold for today.    Comments:

## 2022-03-10 NOTE — PROGRESS NOTES
[facility]  Hospitalist Progress Note        Joselito Booker MD   03/10/2022        Interval History:        - BP better with adjustment of Toprol Xl  - no acute issues overnight, awaiting placement         Assessment and Plan:        Panda Miranda is a 66 year old male admitted on 2/20/2022 after being discharged earlier from [facility] stay from 2/11 - 2/20 for acute gout flare and subsequent SHANTE and hyperkalemia likely caused by gout treatment. Patient is morbidly obese and he was discharged to his home with home health but he was unable to manage going up steps immediately upon arrival home and thus was brought in to [facility] ED for placement. Initially admitted unde Obs status, switched to inpatient on 2/26 due to hyperkalemia.    Of note, he was also admitted to the hospital at [facility] 1/27-1/30 for sepsis due to complicated UTI vs pyelonephritis with hyperkalemia, acute on chronic pulmonary edema, and mild COVID-19 infection.    Hospital course now prolonged due to need for placement.    Recurrent Hyperkalemia: now resolved.   CKD stage 4  - baseline cr 2.2-2.5 range; had admissions for SHANTE and hyperkalemia; recently started on sodium bicarb, continue  - cr on admission 1.93, at baseline or better and improve to 1.75 better than baseline and remain stable.  - K on admission 5.1, upto 6.7; was treated with Kayexalate 30 gm 2/26 and Lokelma 10 gm on 2/27, given some iv fluids, alb neb, Insulin +dextrose  - K improved and stable; was on daily Lokelma from 3/01 to 3/05    - Tele without arrhythmia.   - EKG- Afib, incomplete RBBB, some nonspecific ST-t changes, no peaked T waves  - renal US- Suboptimal study; No right hydronephrosis. Right renal presumed cyst. This could be further evaluated with additional ultrasound or CT. Left kidney not visualized  - to  Avoid ACE inhibitors or ARB in the future; Nephrology was consulted and now signed off.; holding PTA Lisinopril; plan to  discontinue on discharge       Physical deconditing in setting of morbid obesity and recent hospitalization  - initially- Observation status- swicthed to inpatient on 2/26 due to hyperkalemia  - PT evaluated on 2/22 and recommended TCU due to impaired mobility and generalized weakness, SW assisting on placement  - PT to work with patient 5x week while in observation, continues to recommend TCU  -  following for placement to TCU with bariatric bed      Polyarticular gout with exacerbation  History of pseudogout  - Started with right knee, hands, and worsening back pain, treated at [facility] while hospitalized. Symptomatically improved  - There is high risk of cardiovascular death with Uloric therefore stopped Uloric and started allopurinol 300 mg p.o. daily for gout prevention. Discussed with pt and he agrees with the change.  - was started on Prednisone taper, completed 3/6  - continue daily colchicine 0.6 mg daily      Atrial fibrillation  HFpEF, not in acute exacerbation  Essential HTN  Hx of VSD  - Continue PTA metoprolol and pharmacy to dose warfarin  - INR therapeutic   - toprol XL decreased to 25 mg daily for soft bood pressure-- BP better     Chronic knee pain, likely OA  - continue PTA fentanyl patch, robaxin and prn oxycodone    Chronic venosus stasis, bilateral: stable, compression wraps as needed     TASHA  - continue CPAP     COVID-19 admission screening PCR: Not done due to recent COVID-19 infection <90 days ago and no new symptoms.   Recent COVID-19 infection 1/27/22. COVID recovered and no need for special precautions.  - COVID-19 vaccination status: Fully vaccinated with Pfizer.   - Influenza vaccination status: 1/2022     Orders Placed This Encounter      Combination Diet 2 gm K Diet    DVT Prophylaxis: Warfarin    Code Status: Full Code         Disposition Plan     Expected Discharge: medically stable for discharge pending safe disposition;  following for placement to TCU  "with bariatric bed    Clinically Significant Risk Factors Present on Admission                     Page Me (7 am to 6 pm)              Physical Exam:      Blood pressure (!) 142/77, pulse 52, temperature 98.6  F (37  C), temperature source Oral, resp. rate 16, height 1.981 m (6' 6\"), weight (!) 210.9 kg (465 lb), SpO2 93 %.  Vitals:    02/20/22 2215 02/21/22 0132 03/02/22 1621   Weight: (!) 198.2 kg (437 lb) (!) 205.8 kg (453 lb 9.6 oz) (!) 210.9 kg (465 lb)     Vital Signs with Ranges  Temp:  [98.3  F (36.8  C)-98.6  F (37  C)] 98.6  F (37  C)  Pulse:  [52-88] 52  Resp:  [16] 16  BP: ()/(56-77) 142/77  SpO2:  [93 %-100 %] 93 %  I/O's Last 24 hours  I/O last 3 completed shifts:  In: -   Out: 1000 [Urine:1000]    Constitutional: Alert, awake and oriented X 3; resting comfortably in no apparent distress; morbidly obese    HEENT: Pupils equal and reactive to light and accomodation, neck supple    Oral cavity: Moist mucosa   Cardiovascular: Normal s1 s2, irregularly irregular rate and rhythm, no murmur   Lungs: B/l clear to auscultation, no wheezes or crepitations   Abdomen: Soft, nt, nd, no guarding, rigidity or rebound; BS +   LE :  trace b/l edema +; lymphedema wraps in place, multiple skin tears ; chronic venous stasis changes    Musculoskeletal: Power 5/5 in all extremities   Neuro: No focal neurological deficits noted   Psychiatry: normal mood and affect                Medications:          allopurinol  300 mg Oral Daily     colchicine  0.6 mg Oral Daily     fentaNYL  25 mcg Transdermal Q72H     fentaNYL   Transdermal Q8H     metoprolol succinate ER  25 mg Oral Daily     sodium bicarbonate  1,300 mg Oral TID     PRN Meds: acetaminophen **OR** acetaminophen, glucose **OR** dextrose **OR** glucagon, famotidine, melatonin, methocarbamol, miconazole, naloxone **OR** naloxone **OR** naloxone **OR** naloxone, ondansetron **OR** ondansetron, oxyCODONE, Warfarin Therapy Reminder         Data:      All new lab and " imaging data was reviewed.   Recent Labs   Lab Test 03/09/22  0731 03/08/22  0648 03/07/22  0751 03/02/22  0604 03/01/22  0636 02/22/22  0623 02/21/22  1924 02/18/22  0613 02/17/22  0549 02/13/22  0519 02/12/22  0615   WBC  --   --   --   --  8.6  --   --   --  17.2*  --  9.2   HGB  --   --   --   --  9.1*  --  10.2*  --  10.9*  --  9.7*   MCV  --   --   --   --  89  --   --   --  89  --  85   PLT  --   --   --   --  116*  --   --   --  354  --  253   INR 2.29* 2.37* 2.21*   < > 2.49*   < >  --    < > 2.40*   < > 2.45*    < > = values in this interval not displayed.      Recent Labs   Lab Test 03/08/22  0648 03/07/22  0751 03/05/22  0748    138 140   POTASSIUM 4.4 4.6 4.6   CHLORIDE 110* 110* 113*   CO2 25 27 25   BUN 40* 43* 46*   CR 1.75* 1.74* 1.67*   ANIONGAP 2* 1* 2*   JOHN 8.7 9.0 8.5   * 96 99     Recent Labs   Lab Test 06/15/21  1428   TROPI <0.015

## 2022-03-10 NOTE — PROGRESS NOTES
Patient is A&Ox4. VSS on RA.  Voiding in urinal w/o difficulty. Up A2 with GB+W, able to shift weight independently in bed. Lymph wraps on. Dressings on abdomen and below left knee. changed. Physical  Therapy did not have time to change lymph wraps this afternoon.  Will change tomorrow.  No drainage observed on dressings. No IV. On low K+ diet, tolerating well. Discharge pending placement.

## 2022-03-11 NOTE — PLAN OF CARE
Goal Outcome Evaluation:        No change overnight:   A&Ox4. VSS. Voiding in urinal w/o difficulty. Up A2 with GB+W to EOB and BSC, able to shift weight ind in [facility], air bed. Lymph wraps removed, sleeves replaced. No IV. On low K+ diet, tolerating well. Discharge pending placement.

## 2022-03-11 NOTE — PROGRESS NOTES
[facility]  Hospitalist Progress Note        Joselito Booker MD   03/11/2022        Interval History:        - no acute issues overnight, awaiting placement; denies any active complaints         Assessment and Plan:        Panda Miranda is a 66 year old male admitted on 2/20/2022 after being discharged earlier from [facility] stay from 2/11 - 2/20 for acute gout flare and subsequent SHANTE and hyperkalemia likely caused by gout treatment. Patient is morbidly obese and he was discharged to his home with home health but he was unable to manage going up steps immediately upon arrival home and thus was brought in to [facility] ED for placement. Initially admitted unde Obs status, switched to inpatient on 2/26 due to hyperkalemia.    Of note, he was also admitted to the hospital at [facility] 1/27-1/30 for sepsis due to complicated UTI vs pyelonephritis with hyperkalemia, acute on chronic pulmonary edema, and mild COVID-19 infection.    Hospital course now prolonged due to need for placement.    Recurrent Hyperkalemia: now resolved.   CKD stage 4  - baseline cr 2.2-2.5 range; had admissions for SHANTE and hyperkalemia; recently started on sodium bicarb, continue  - cr on admission 1.93, at baseline or better and improve to 1.75 better than baseline and remain stable.  - K on admission 5.1, upto 6.7; was treated with Kayexalate 30 gm 2/26 and Lokelma 10 gm on 2/27, given some iv fluids, alb neb, Insulin +dextrose  - K improved and stable; was on daily Lokelma from 3/01 to 3/05    - Tele without arrhythmia.   - EKG- Afib, incomplete RBBB, some nonspecific ST-t changes, no peaked T waves  - renal US- Suboptimal study; No right hydronephrosis. Right renal presumed cyst. This could be further evaluated with additional ultrasound or CT. Left kidney not visualized  - to  Avoid ACE inhibitors or ARB in the future; Nephrology was consulted and now signed off.; holding PTA Lisinopril; plan to discontinue on  discharge       Physical deconditing in setting of morbid obesity and recent hospitalization  - initially- Observation status- swicthed to inpatient on 2/26 due to hyperkalemia  - PT evaluated on 2/22 and recommended TCU due to impaired mobility and generalized weakness, SW assisting on placement  - PT to work with patient 5x week while in observation, continues to recommend TCU  -  following for placement to TCU with bariatric bed      Polyarticular gout with exacerbation  History of pseudogout  - Started with right knee, hands, and worsening back pain, treated at [facility] while hospitalized. Symptomatically improved  - There is high risk of cardiovascular death with Uloric therefore stopped Uloric and started allopurinol 300 mg p.o. daily for gout prevention. Discussed with pt and he agrees with the change.  - was started on Prednisone taper, completed 3/6  - continue daily colchicine 0.6 mg daily      Atrial fibrillation  HFpEF, not in acute exacerbation  Essential HTN  Hx of VSD  - Continue PTA metoprolol and pharmacy to dose warfarin  - INR therapeutic   - toprol XL decreased to 25 mg daily for soft bood pressure-- BP better     Chronic knee pain, likely OA  - continue PTA fentanyl patch, robaxin and prn oxycodone    Chronic venosus stasis, bilateral: stable, compression wraps as needed     TASHA  - continue CPAP     COVID-19 admission screening PCR: Not done due to recent COVID-19 infection <90 days ago and no new symptoms.   Recent COVID-19 infection 1/27/22. COVID recovered and no need for special precautions.  - COVID-19 vaccination status: Fully vaccinated with Pfizer.   - Influenza vaccination status: 1/2022     Orders Placed This Encounter      Combination Diet 2 gm K Diet    DVT Prophylaxis: Warfarin    Code Status: Full Code         Disposition Plan     Expected Discharge: medically stable for discharge pending safe disposition;  following for placement to TCU with bariatric  "bed    Clinically Significant Risk Factors Present on Admission                     Page Me (7 am to 6 pm)              Physical Exam:      Blood pressure 95/62, pulse 87, temperature 98.6  F (37  C), temperature source Oral, resp. rate 16, height 1.981 m (6' 6\"), weight (!) 210.9 kg (465 lb), SpO2 100 %.  Vitals:    02/20/22 2215 02/21/22 0132 03/02/22 1621   Weight: (!) 198.2 kg (437 lb) (!) 205.8 kg (453 lb 9.6 oz) (!) 210.9 kg (465 lb)     Vital Signs with Ranges  Pulse:  [87-94] 87  Resp:  [16] 16  BP: ()/(62-65) 95/62  SpO2:  [100 %] 100 %  I/O's Last 24 hours  I/O last 3 completed shifts:  In: -   Out: 530 [Urine:530]    Constitutional: Alert, awake and oriented X 3; resting comfortably in no apparent distress; morbidly obese    HEENT: Pupils equal and reactive to light and accomodation, neck supple    Oral cavity: Moist mucosa   Cardiovascular: Normal s1 s2, irregularly irregular rate and rhythm, no murmur   Lungs: B/l clear to auscultation, no wheezes or crepitations   Abdomen: Soft, nt, nd, no guarding, rigidity or rebound; BS +   LE :  trace b/l edema +; lymphedema wraps in place, multiple skin tears ; chronic venous stasis changes    Musculoskeletal: Power 5/5 in all extremities   Neuro: No focal neurological deficits noted   Psychiatry: normal mood and affect                Medications:          allopurinol  300 mg Oral Daily     colchicine  0.6 mg Oral Daily     fentaNYL  25 mcg Transdermal Q72H     fentaNYL   Transdermal Q8H     metoprolol succinate ER  25 mg Oral Daily     sodium bicarbonate  1,300 mg Oral TID     PRN Meds: acetaminophen **OR** acetaminophen, glucose **OR** dextrose **OR** glucagon, famotidine, melatonin, methocarbamol, miconazole, naloxone **OR** naloxone **OR** naloxone **OR** naloxone, ondansetron **OR** ondansetron, oxyCODONE, Warfarin Therapy Reminder         Data:      All new lab and imaging data was reviewed.   Recent Labs   Lab Test 03/11/22  0647 03/10/22  0846 " 03/09/22  0731 03/02/22  0604 03/01/22  0636 02/22/22  0623 02/21/22  1924 02/18/22  0613 02/17/22  0549 02/13/22  0519 02/12/22  0615   WBC  --   --   --   --  8.6  --   --   --  17.2*  --  9.2   HGB  --   --   --   --  9.1*  --  10.2*  --  10.9*  --  9.7*   MCV  --   --   --   --  89  --   --   --  89  --  85   PLT  --   --   --   --  116*  --   --   --  354  --  253   INR 2.76* 2.36* 2.29*   < > 2.49*   < >  --    < > 2.40*   < > 2.45*    < > = values in this interval not displayed.      Recent Labs   Lab Test 03/08/22  0648 03/07/22  0751 03/05/22  0748    138 140   POTASSIUM 4.4 4.6 4.6   CHLORIDE 110* 110* 113*   CO2 25 27 25   BUN 40* 43* 46*   CR 1.75* 1.74* 1.67*   ANIONGAP 2* 1* 2*   JOHN 8.7 9.0 8.5   * 96 99     Recent Labs   Lab Test 06/15/21  1428   TROPI <0.015

## 2022-03-11 NOTE — PROGRESS NOTES
Care Management Follow Up    Length of Stay (days): 13    Expected Discharge Date: 03/15/2022     Concerns to be Addressed: discharge planning     Patient plan of care discussed at interdisciplinary rounds: Yes    Anticipated Discharge Disposition: Transitional Care     Anticipated Discharge Services:    Anticipated Discharge DME:      Patient/family educated on Medicare website which has current facility and service quality ratings:    Education Provided on the Discharge Plan:    Patient/Family in Agreement with the Plan:      Referrals Placed by CM/SW:    Private pay costs discussed: Not applicable    Additional Information:  SW received VM from spouse requesting call for update. SW returned call to spouse to update that we are still pursing placement for patient. Left VM.     MAGALIE Riddle

## 2022-03-12 NOTE — PLAN OF CARE
Goal Outcome Evaluation:      A&Ox4. VSS. Voiding in urinal w/o difficulty. Up A2 with GB+W to BS, able to shift weight ind in [facility] bed. Denies pain. Edema sleeves in place. No IV. On low K+ diet, tolerating well. Discharge pending placement.

## 2022-03-12 NOTE — PLAN OF CARE
Goal Outcome Evaluation:           Pt A&O x 4, VSS on RA. Denied any acute concerns overnight. Denies pain, Fentanyl patch in place to L chest. Innerdry in place to pannus. Voiding in urinal and also had large formed BM in bedpan. Edema sleeves in place to BLE, dressings CDI. Remains on low potassium diet with good intake. Able to move well in bed, not OOB this shift. Discharge pending placement.

## 2022-03-12 NOTE — PROGRESS NOTES
[facility]    Medicine Progress Note - Hospitalist Service    Date of Admission:  2/20/2022    Assessment & Plan        Panda Miranda is a 66 year old male admitted on 2/20/2022 after being discharged earlier from [facility] stay from 2/11 - 2/20 for acute gout flare and subsequent SHANTE and hyperkalemia likely caused by gout treatment. Patient is morbidly obese and he was discharged to his home with home health but he was unable to manage going up steps immediately upon arrival home and thus was brought in to [facility] ED for placement. Initially admitted unde Obs status, switched to inpatient on 2/26 due to hyperkalemia.     Of note, he was also admitted to the hospital at [facility] 1/27-1/30 for sepsis due to complicated UTI vs pyelonephritis with hyperkalemia, acute on chronic pulmonary edema, and mild COVID-19 infection.     Hospital course now prolonged due to need for placement.     Recurrent Hyperkalemia: now resolved.   CKD stage 4  - baseline cr 2.2-2.5 range; had admissions for SHANTE and hyperkalemia; recently started on sodium bicarb, continue  - cr on admission 1.93, at baseline or better and improve to 1.75 better than baseline and remain stable.  - K on admission 5.1, upto 6.7; was treated with Kayexalate 30 gm 2/26 and Lokelma 10 gm on 2/27, given some iv fluids, alb neb, Insulin +dextrose  - K improved and stable; was on daily Lokelma from 3/01 to 3/05    - Tele without arrhythmia.   - EKG- Afib, incomplete RBBB, some nonspecific ST-t changes, no peaked T waves  - renal US- Suboptimal study; No right hydronephrosis. Right renal presumed cyst. This could be further evaluated with additional ultrasound or CT. Left kidney not visualized  - to  Avoid ACE inhibitors or ARB in the future; Nephrology was consulted and now signed off.; holding PTA Lisinopril; plan to discontinue on discharge       Physical deconditing in setting of morbid obesity and recent hospitalization  -  initially- Observation status- swicthed to inpatient on 2/26 due to hyperkalemia  - PT evaluated on 2/22 and recommended TCU due to impaired mobility and generalized weakness, SW assisting on placement  - PT to work with patient 5x week while in observation, continues to recommend TCU  -  following for placement to TCU with bariatric bed      Polyarticular gout with exacerbation  History of pseudogout  - Started with right knee, hands, and worsening back pain, treated at [facility] while hospitalized. Symptomatically improved  - There is high risk of cardiovascular death with Uloric therefore stopped Uloric and started allopurinol 300 mg p.o. daily for gout prevention. Discussed with pt and he agrees with the change.  - was started on Prednisone taper, completed 3/6  - continue daily colchicine 0.6 mg daily      Atrial fibrillation  HFpEF, not in acute exacerbation  Essential HTN  Hx of VSD  - Continue PTA metoprolol and pharmacy to dose warfarin  - INR therapeutic   - toprol XL decreased to 25 mg daily for soft bood pressure-- BP better     Chronic knee pain, likely OA  - continue PTA fentanyl patch, robaxin and prn oxycodone     Chronic venosus stasis, bilateral: stable, compression wraps as needed     TASHA  - continue CPAP     COVID-19 admission screening PCR: Not done due to recent COVID-19 infection <90 days ago and no new symptoms.   Recent COVID-19 infection 1/27/22. COVID recovered and no need for special precautions.  - COVID-19 vaccination status: Fully vaccinated with Pfizer.   - Influenza vaccination status: 1/2022    3/12- stable, no complaints.  Labs tomorrow.     Diet: Combination Diet 2 gm K Diet    DVT Prophylaxis: Warfarin  Cheema Catheter: Not present  Central Lines: None  Cardiac Monitoring: None  Code Status: Full Code      Disposition Plan   Expected Discharge: 03/15/2022     Anticipated discharge location:  Awaiting care coordination huddle  Delays:     Placement - TCU            The  patient's care was discussed with the Patient.    Guilherme Pinedo MD  Hospitalist Service  [facility]  Securely message with the [employer] Web Console (learn more here)  Text page via [employer] Paging/Directory         Clinically Significant Risk Factors Present on Admission                    ______________________________________________________________________    Interval History   No pain or complaints     Data reviewed today: I reviewed all medications, new labs and imaging results over the last 24 hours. I personally reviewed no images or EKG's today.    Physical Exam   Vital Signs: Temp: 98.4  F (36.9  C) Temp src: Oral BP: 118/59 Pulse: 96   Resp: 18 SpO2: 100 % O2 Device: None (Room air)    Weight: 465 lbs 0 oz  Constitutional: awake, alert, cooperative, no apparent distress  Respiratory: No increased work of breathing, good air exchange, clear to auscultation bilaterally, no crackles or wheezing  Cardiovascular:  regular rate and rhythm, normal S1 and S2, no S3 or S4, and no murmur noted  GI:  normal bowel sounds, soft, non-distended, non-tender  Skin: no rashes  Neuropsychiatric: General: normal, calm and normal eye contact    Data   Recent Labs   Lab 03/12/22  0648 03/11/22  0647 03/10/22  0846 03/09/22  0731 03/08/22  0648 03/07/22  0751   INR 2.66* 2.76* 2.36*   < > 2.37* 2.21*   NA  --   --   --   --  137 138   POTASSIUM  --   --   --   --  4.4 4.6   CHLORIDE  --   --   --   --  110* 110*   CO2  --   --   --   --  25 27   BUN  --   --   --   --  40* 43*   CR  --   --   --   --  1.75* 1.74*   ANIONGAP  --   --   --   --  2* 1*   JOHN  --   --   --   --  8.7 9.0   GLC  --   --   --   --  100* 96    < > = values in this interval not displayed.     No results found for this or any previous visit (from the past 24 hour(s)).  Medications     Warfarin Therapy Reminder         allopurinol  300 mg Oral Daily     colchicine  0.6 mg Oral Daily     fentaNYL  25 mcg Transdermal Q72H      fentaNYL   Transdermal Q8H     metoprolol succinate ER  25 mg Oral Daily     sodium bicarbonate  1,300 mg Oral TID

## 2022-03-13 NOTE — PROGRESS NOTES
[facility]    Medicine Progress Note - Hospitalist Service    Date of Admission:  2/20/2022    Assessment & Plan        Panda Miranda is a 66 year old male admitted on 2/20/2022 after being discharged earlier from [facility] stay from 2/11 - 2/20 for acute gout flare and subsequent SHANTE and hyperkalemia likely caused by gout treatment. Patient is morbidly obese and he was discharged to his home with home health but he was unable to manage going up steps immediately upon arrival home and thus was brought in to [facility] ED for placement. Initially admitted unde Obs status, switched to inpatient on 2/26 due to hyperkalemia.     Of note, he was also admitted to the hospital at [facility] 1/27-1/30 for sepsis due to complicated UTI vs pyelonephritis with hyperkalemia, acute on chronic pulmonary edema, and mild COVID-19 infection.     Hospital course now prolonged due to need for placement.     Recurrent Hyperkalemia: now resolved.   CKD stage 4  - baseline cr 2.2-2.5 range; had admissions for SHANTE and hyperkalemia; recently started on sodium bicarb, continue  - cr on admission 1.93, at baseline or better and improve to 1.75 better than baseline and remain stable.  - K on admission 5.1, upto 6.7; was treated with Kayexalate 30 gm 2/26 and Lokelma 10 gm on 2/27, given some iv fluids, alb neb, Insulin +dextrose  - K improved and stable; was on daily Lokelma from 3/01 to 3/05    - Tele without arrhythmia.   - EKG- Afib, incomplete RBBB, some nonspecific ST-t changes, no peaked T waves  - renal US- Suboptimal study; No right hydronephrosis. Right renal presumed cyst. This could be further evaluated with additional ultrasound or CT. Left kidney not visualized  - to  Avoid ACE inhibitors or ARB in the future; Nephrology was consulted and now signed off.; holding PTA Lisinopril; plan to discontinue on discharge       Physical deconditing in setting of morbid obesity and recent hospitalization  -  initially- Observation status- swicthed to inpatient on 2/26 due to hyperkalemia  - PT evaluated on 2/22 and recommended TCU due to impaired mobility and generalized weakness, SW assisting on placement  - PT to work with patient 5x week while in observation, continues to recommend TCU  -  following for placement to TCU with bariatric bed      Polyarticular gout with exacerbation  History of pseudogout  - Started with right knee, hands, and worsening back pain, treated at [facility] while hospitalized. Symptomatically improved  - There is high risk of cardiovascular death with Uloric therefore stopped Uloric and started allopurinol 300 mg p.o. daily for gout prevention. Discussed with pt and he agrees with the change.  - was started on Prednisone taper, completed 3/6  - continue daily colchicine 0.6 mg daily      Atrial fibrillation  HFpEF, not in acute exacerbation  Essential HTN  Hx of VSD  - Continue PTA metoprolol and pharmacy to dose warfarin  - INR therapeutic   - toprol XL decreased to 25 mg daily for soft bood pressure-- BP better     Chronic knee pain, likely OA  - continue PTA fentanyl patch, robaxin and prn oxycodone     Chronic venosus stasis, bilateral: stable, compression wraps as needed     TASHA  - continue CPAP     COVID-19 admission screening PCR: Not done due to recent COVID-19 infection <90 days ago and no new symptoms.   Recent COVID-19 infection 1/27/22. COVID recovered and no need for special precautions.  - COVID-19 vaccination status: Fully vaccinated with Pfizer.   - Influenza vaccination status: 1/2022    3/13- stable but labs show pancytopenia.  The patient says that he had this years ago but never saw a hematologist and does not know the cause.  Will check a smear, retic count, iron, B12 and folate.  Unless an obvious cause is found, will need hematology/onc to see him.       Diet: Combination Diet 2 gm K Diet    DVT Prophylaxis: Warfarin  Cheema Catheter: Not present  Central  Lines: None  Cardiac Monitoring: None  Code Status: Full Code      Disposition Plan   Expected Discharge: 03/15/2022     Anticipated discharge location:  Awaiting care coordination huddle  Delays:     Placement - TCU            The patient's care was discussed with the Patient.    Guilherme Pinedo MD  Hospitalist Service  [facility]  Securely message with the [employer] Web Console (learn more here)  Text page via [employer] Paging/Directory         Clinically Significant Risk Factors Present on Admission                    ______________________________________________________________________    Interval History   No pain or complaints     Data reviewed today: I reviewed all medications, new labs and imaging results over the last 24 hours. I personally reviewed no images or EKG's today.    Physical Exam   Vital Signs: Temp: 98.1  F (36.7  C) Temp src: Oral BP: 108/70 Pulse: 86   Resp: 20 SpO2: 93 % O2 Device: None (Room air)    Weight: 465 lbs 0 oz  Constitutional: awake, alert, cooperative, no apparent distress  Neuropsychiatric: General: normal, calm and normal eye contact    Data   Recent Labs   Lab 03/13/22  1044 03/13/22  0725 03/12/22  0648 03/11/22  0647 03/09/22  0731 03/08/22  0648 03/07/22  0751   WBC 3.4* 3.3*  --   --   --   --   --    HGB 9.1* 9.1*  --   --   --   --   --    MCV 90 86  --   --   --   --   --    * 98*  --   --   --   --   --    INR  --  2.72* 2.66* 2.76*   < > 2.37* 2.21*   NA  --  138  --   --   --  137 138   POTASSIUM  --  4.4  --   --   --  4.4 4.6   CHLORIDE  --  110*  --   --   --  110* 110*   CO2  --  24  --   --   --  25 27   BUN  --  28  --   --   --  40* 43*   CR  --  1.81*  --   --   --  1.75* 1.74*   ANIONGAP  --  4  --   --   --  2* 1*   JOHN  --  8.7  --   --   --  8.7 9.0   GLC  --  98  --   --   --  100* 96   ALBUMIN  --  2.4*  --   --   --   --   --    PROTTOTAL  --  6.0*  --   --   --   --   --    BILITOTAL  --  0.5  --   --   --   --   --     ALKPHOS  --  68  --   --   --   --   --    ALT  --  16  --   --   --   --   --    AST  --  12  --   --   --   --   --     < > = values in this interval not displayed.     No results found for this or any previous visit (from the past 24 hour(s)).  Medications     Warfarin Therapy Reminder         allopurinol  300 mg Oral Daily     colchicine  0.6 mg Oral Daily     fentaNYL  25 mcg Transdermal Q72H     fentaNYL   Transdermal Q8H     metoprolol succinate ER  25 mg Oral Daily     sodium bicarbonate  1,300 mg Oral TID     warfarin ANTICOAGULANT  5 mg Oral ONCE at 18:00

## 2022-03-13 NOTE — PLAN OF CARE
Goal Outcome Evaluation:        Pt A&O x 4, VSS on RA. Denies pain. Fentanyl patch fell off, new patch placed to L chest. Innerdry in place to pannus. Voiding in urinal w/o difficulty. Up A2 with GB+W to BSC. Ambulated in room. Able to shift weight independently in tima bed. Edema sleeves in place to BLE, dressings CDI. Remains on low potassium diet with good intake. Discharge pending placement.

## 2022-03-13 NOTE — PLAN OF CARE
Goal Outcome Evaluation:        Pt A&O x 4, VSS on RA. Denied any acute concerns this shift. Pain managed well with Fentanyl patch(L chest). Innerdry in place to pannus. Uses urinal at bedside. Low K+ diet with good intake. Dressings to LE, abd and chest CDI. Discharge pending placement.

## 2022-03-14 NOTE — PLAN OF CARE
Goal Outcome Evaluation:      Pt A&O x 4. VSS on RA. Denied any acute concerns overnight. Pain continues to be managed well with fentanyl patch(L chest). Innerdry to pannus. Uses urinal at bedside, not OOB this shift. Dressings to BLE CDI, edema wear in place to LLE, declines to wear to RLE. Discharge pending placement.

## 2022-03-14 NOTE — PLAN OF CARE
Goal Outcome Evaluation:    Patient is A&Ox4. VSS. C/o slight knee pain but has fentanyl patch to left chest. Interdry to pannus. Using urinal in bed. Wounds noted on abdomen and BLE. Edema wear on LLE. Up with assist of 1 and walker/gait belt to commode. Bed bath completed on days. Wound cares completed on days. Discharge pending placement.

## 2022-03-14 NOTE — PLAN OF CARE
Goal Outcome Evaluation:      Pt A&O x 4, VSS on RA. Pain managed well with Fentanyl patch (L chest). Innerdry in place to pannus. Voiding in urinal at bedside. Low K+ diet with good intake. Dressings to LE, abd and chest CDI. Requested to remove right edema sleeve d/t discomfort. Refused ambulation this afternoon d/t being tired. Discharge pending placement.

## 2022-03-14 NOTE — PROGRESS NOTES
[facility]    Medicine Progress Note - Hospitalist Service    Date of Admission:  2/20/2022    Assessment & Plan        Panda Miranda is a 66 year old male admitted on 2/20/2022 after being discharged earlier from [facility] stay from 2/11 - 2/20 for acute gout flare and subsequent SHANTE and hyperkalemia likely caused by gout treatment. Patient is morbidly obese and he was discharged to his home with home health but he was unable to manage going up steps immediately upon arrival home and thus was brought in to [facility] ED for placement. Initially admitted unde Obs status, switched to inpatient on 2/26 due to hyperkalemia.  Of note, he was also admitted to the hospital at [facility] 1/27-1/30 for sepsis due to complicated UTI vs pyelonephritis with hyperkalemia, acute on chronic pulmonary edema, and mild COVID-19 infection.  Hospital course now prolonged due to need for placement.     Recurrent Hyperkalemia: now resolved.   CKD stage 4  - baseline cr 2.2-2.5 range; had admissions for SHANTE and hyperkalemia; recently started on sodium bicarb, continue  - cr on admission 1.93, at baseline or better and improve to 1.75 better than baseline and remain stable.  - K on admission 5.1, upto 6.7; was treated with Kayexalate 30 gm 2/26 and Lokelma 10 gm on 2/27, given some iv fluids, alb neb, Insulin +dextrose  - K improved and stable; was on daily Lokelma from 3/01 to 3/05    - Tele without arrhythmia.   - EKG- Afib, incomplete RBBB, some nonspecific ST-t changes, no peaked T waves  - renal US- Suboptimal study; No right hydronephrosis. Right renal presumed cyst. This could be further evaluated with additional ultrasound or CT. Left kidney not visualized  - to  Avoid ACE inhibitors or ARB in the future; Nephrology was consulted and now signed off.; holding PTA Lisinopril; plan to discontinue on discharge       Physical deconditing in setting of morbid obesity and recent hospitalization  - initially-  Observation status- swicthed to inpatient on 2/26 due to hyperkalemia  - PT evaluated on 2/22 and recommended TCU due to impaired mobility and generalized weakness, SW assisting on placement  - PT to work with patient 5x week while in observation, continues to recommend TCU  -  following for placement to TCU with bariatric bed      Polyarticular gout with exacerbation  History of pseudogout  - Started with right knee, hands, and worsening back pain, treated at [facility] while hospitalized. Symptomatically improved  - There is high risk of cardiovascular death with Uloric therefore stopped Uloric and started allopurinol 300 mg p.o. daily for gout prevention. Discussed with pt and he agrees with the change.  - was started on Prednisone taper, completed 3/6  - continue daily colchicine 0.6 mg daily      Atrial fibrillation  HFpEF, not in acute exacerbation  Essential HTN  Hx of VSD  - Continue PTA metoprolol and pharmacy to dose warfarin  - INR therapeutic   - toprol XL decreased to 25 mg daily for soft bood pressure-- BP better     Chronic knee pain, likely OA  - continue PTA fentanyl patch, robaxin and prn oxycodone     Chronic venosus stasis, bilateral: stable, compression wraps as needed     TASHA  - continue CPAP    Pancytopenia   Anticoagulation with warfarin for atrial fibrillation   He had normal WBC and platelets 2/17.  Hemoglobin was 11g.  Now has pancytopenia.  B12, folate and iron ok.  Normal retic count.  Smear pending.    INR therapeutic.  -Will ask oncologist to see him   -Monitor      COVID-19 admission screening PCR: Not done due to recent COVID-19 infection <90 days ago and no new symptoms.   Recent COVID-19 infection 1/27/22. COVID recovered and no need for special precautions.  - COVID-19 vaccination status: Fully vaccinated with Pfizer.   - Influenza vaccination status: 1/2022       Diet: Combination Diet 2 gm K Diet    DVT Prophylaxis: Warfarin  Cheema Catheter: Not present  Central  Lines: None  Cardiac Monitoring: None  Code Status: Full Code      Disposition Plan   Expected Discharge: 03/16/2022     Anticipated discharge location:  Awaiting care coordination huddle  Delays:     Placement - TCU            The patient's care was discussed with the Patient.    Guilherme Pinedo MD  Hospitalist Service  [facility]  Securely message with the Vocera Web Console (learn more here)  Text page via [facility] Paging/Directory         Clinically Significant Risk Factors Present on Admission                    ______________________________________________________________________    Interval History   No pain or complaints     Data reviewed today: I reviewed all medications, new labs and imaging results over the last 24 hours. I personally reviewed no images or EKG's today.    Physical Exam   Vital Signs: Temp: 98.2  F (36.8  C) Temp src: Oral BP: 102/64 Pulse: 78   Resp: 20 SpO2: 100 % O2 Device: None (Room air)    Weight: 465 lbs 0 oz  Constitutional: awake, alert, cooperative, no apparent distress  Neuropsychiatric: General: normal, calm and normal eye contact    Data   Recent Labs   Lab 03/14/22  0735 03/13/22  1044 03/13/22  0725 03/12/22  0648 03/09/22  0731 03/08/22  0648   WBC 3.5* 3.4* 3.3*  --   --   --    HGB 8.7* 9.1* 9.1*  --   --   --    MCV 87 90 86  --   --   --    * 112* 98*  --   --   --    INR 2.77*  --  2.72* 2.66*   < > 2.37*   NA  --   --  138  --   --  137   POTASSIUM  --   --  4.4  --   --  4.4   CHLORIDE  --   --  110*  --   --  110*   CO2  --   --  24  --   --  25   BUN  --   --  28  --   --  40*   CR  --   --  1.81*  --   --  1.75*   ANIONGAP  --   --  4  --   --  2*   JOHN  --   --  8.7  --   --  8.7   GLC  --   --  98  --   --  100*   ALBUMIN  --   --  2.4*  --   --   --    PROTTOTAL  --   --  6.0*  --   --   --    BILITOTAL  --   --  0.5  --   --   --    ALKPHOS  --   --  68  --   --   --    ALT  --   --  16  --   --   --    AST  --   --  12   --   --   --     < > = values in this interval not displayed.     No results found for this or any previous visit (from the past 24 hour(s)).  Medications     Warfarin Therapy Reminder         allopurinol  300 mg Oral Daily     colchicine  0.6 mg Oral Daily     fentaNYL  25 mcg Transdermal Q72H     fentaNYL   Transdermal Q8H     metoprolol succinate ER  25 mg Oral Daily     sodium bicarbonate  1,300 mg Oral TID     warfarin ANTICOAGULANT  4 mg Oral ONCE at 18:00

## 2022-03-15 NOTE — DISCHARGE INSTRUCTIONS
Left lateral shin wound: Every 2-3 days    1. Cleanse with wound cleanser and blot dry  2. Dry and protect surrounding skin with no sting barrier film wipe (this is very important)  3. Cover with silicone foam dressing Mepilex or equivalent  4. Spray all intact skin to BL lower extremities with Carmela cleanse and protect and rub in.   5. Apply sween 24 (Pink top lotion)or equivalent from base of toes to below knees.  6. Allow to completely dry prior to any wrapping      Abdominal skin fold care  1. Cleans with carmela spray (cleanser/moisture barrier)  2. Apply Triad paste #[id number] to any open skin     Right lateral foot and heel every other day  1. Cleanse with wound cleanser   2. Dry and protect surrounding skin with no sting barrier film wipe   3. Cover with silicone foam dressing Mepilex or equivalent      Pressure Injury prevention (RN-please order supplies if not in room)  1. Turn every 2 hours, side to side avoid supine on bariatric pressure redistribution support surface  2.   Float heels off bed with use of  Heel Lift boots   3.   Prevent sliding and shear by limiting HOB to 30 degrees or less unless contraindicated, use knee gatch first if not contraindicated  4.   If sitting, use pressure redistribution chair cushion  if unable to shift weight every hour, please limit sitting to an hour at a time  5.   Protective foam dressings PRN,Change at least q 4 days, peel back, peek and replace for daily skin inspection.  6.   Optimize nutrition

## 2022-03-15 NOTE — PLAN OF CARE
2505-8907: A&Ox4, very pleasant. VSS on RA-- on home CPAP overnight. Denies pain- has fentanyl patch to L chest. Uses urinal to bed. Wound dressings CDI. Edema wear to LLE. Up A1 GB/W to BSC. Discharge pending placement.

## 2022-03-15 NOTE — CONSULTS
Consult Date: 03/14/2022    REASON FOR CONSULTATION:    This consult has been requested by Dr. Pinedo for pancytopenia.    HISTORY OF PRESENT ILLNESS:    Mr. Miranda is a 66-year-old gentleman with multiple medical problems including atrial fibrillation, CHF, chronic kidney disease, gout and morbid obesity. In last few months, he had multiple hospitalizations.  He was admitted in January with sepsis.  He was admitted twice in February.  First time he was admitted with hyperkalemia and gout.  He was discharged and then readmitted within 24 hours because of weakness.  The patient's labs have been monitored.  He has developed pancytopenia.  Because of that, a Hematology/Oncology consult has been requested.  His investigations are reviewed and summarized below.  1.  The patient has been anemic for a long time.    -On 01/08/2018, hemoglobin of 12.5 and platelets of 121.  -On 01/27/2022, WBC of 6.4, hemoglobin of 10.7 and platelets of 184.  -On 02/12/2022, WBC of 9.2, hemoglobin of 9.7 and platelets of 253.  2.  Multiple labs done on 03/13/2022.  -WBC of 3.3, hemoglobin of 9.1 of platelets of 98.  Normal MCV.  -Creatinine of 1.81.  -Normal calcium.  -Normal AST, ALT and bilirubin.  -Normal vitamin B2.  -Normal folate.  -Normal iron and ferritin.  -Elevated haptoglobin.  -Peripheral blood smear review did not reveal any abnormal cells.  -Normal LDH.    The patient denies any history of blood disorder or any cancer.    The patient's main problem is weakness. No headache.  No dizziness.  No chest pain.  No shortness of breath at rest.  He does have sleep apnea and uses CPAP.  No abdominal pain, nausea or vomiting.  No urinary or bowel complaint.  Denies bleeding from any site.      All other review of systems is negative.    ALLERGIES:  REVIEWED.    MEDICATIONS:  Reviewed.    PAST MEDICAL HISTORY:  1.  CHF.  2.  Gout.  3.  Osteoarthritis.  4.  Atrial fibrillation.  5.  Sleep apnea.  6.  Morbid obesity.  7.   Hyperlipidemia.  8.  Hypertension.  9.  Mitral regurgitation.  10.  Chronic kidney disease.  11.  History of colon cancer, status post hemicolectomy (I cannot find the report).  12.  Gastroplasty for weight loss.  13.  Knee surgery.    SOCIAL HISTORY:    -No smoking.   -Occasional alcohol use.    PHYSICAL EXAMINATION:    GENERAL:  He is alert, oriented x 3.  Not in any respiratory distress.  VITAL SIGNS:  Reviewed.  The rest of the systems not examined.    LABORATORY DATA:  Reviewed.    IMPRESSION:  1.  A 66-year-old gentleman with pancytopenia.  His pancytopenia is multifactorial.    -His leukopenia is recent and could be due to his acute illness and medications.    -Patient has chronic anemia, which has gotten worse.  Anemia is due to multiple factors including renal disease, anemia of chronic disease, acute illness and medication.    -Patient has mild thrombocytopenia.  Review of labs reveals that he has been intermittently thrombocytopenic at least since 2018.  In between his platelets have been normal.  Thrombocytopenia is again due to medications, acute illness and there could be a component of immune thrombocytopenia.  Patient is morbidly obese and he likely has a fatty liver, which will also cause thrombocytopenia.  2.  Multiple other medical problems including chronic kidney disease, atrial fibrillation, hypertension and congestive heart failure.    RECOMMENDATIONS:  1.  I had a long discussion with the patient regarding pancytopenia.  Different causes of leukopenia, anemia and thrombocytopenia were discussed. His leukopenia is mild and recent.  At this time I will simply recommend monitoring. His anemia can be explained on the basis of his renal disease and anemia of chronic disease.  Worsening is due to his acute illness and medication. Thrombocytopenia has been chronic.  In between platelets have been normal, which could be due to the fact that he got a steroid in between.  This raises the possibility he  has immune thrombocytopenia.    The patient already had multiple labs done.  No deficiency of iron, vitamin B12, folate.  No evidence of any hemolysis.    At this time, I would recommend that we will simply monitor his CBC.  If there is worsening of his cytopenia then we will plan on further investigation, which will include CT abdomen and pelvis to evaluate liver and spleen and also a bone marrow biopsy.  Bone marrow biopsy will be challenging given his obesity.    2.  He had few questions, which were all answered.  Hematology/Oncology will continue to follow.    Thanks for the consult.    TOTAL TIME SPENT:  60 minutes.  Time was spent in today's visit, review of chart/investigations today and documentation.      Shell Rivera MD        D: 2022   T: 2022   MT: [facility]    Name:     NARENDRA WADE  MRN:      [id number]        Account:      [id number]   :      1955           Consult Date: 2022     Document: [id number]    cc:  MD Ben Suarez MD

## 2022-03-15 NOTE — PLAN OF CARE
Goal Outcome Evaluation:  [id number]: A&Ox4, very pleasant. VSS on RA-- on home CPAP overnight. Denies pain- has fentanyl patch to L chest. Uses urinal to bed. Wound dressings CDI. Edema wear to LLE. Up A1 GB/W to BSC. Discharge pending placement.

## 2022-03-15 NOTE — PLAN OF CARE
A&Ox4. VSS on RA. L/s diminished with intermittent exp wheezes. Denies chest pain and SOB. Fentanyl patch in place. Tolerating 2g K diet. Up Ax2 to BSC. Pt refused walker and GB. Voiding per urinal. Multiple scabs-mepilex in place. Mepilex to right heel for new PI. Seen by wound nurse. Booke boots in place. Baseline neuropathy to BLE. Wound Nurse following. Discharge pending placement. SW following.

## 2022-03-15 NOTE — PROGRESS NOTES
[facility]    Medicine Progress Note - Hospitalist Service    Date of Admission:  2/20/2022    Assessment & Plan        Panda Miranda is a 66 year old male admitted on 2/20/2022 after being discharged earlier from [facility] stay from 2/11 - 2/20 for acute gout flare and subsequent SHANTE and hyperkalemia likely caused by gout treatment. Patient is morbidly obese and he was discharged to his home with home health but he was unable to manage going up steps immediately upon arrival home and thus was brought in to [facility] ED for placement. Initially admitted unde Obs status, switched to inpatient on 2/26 due to hyperkalemia.  Of note, he was also admitted to the hospital at [facility] 1/27-1/30 for sepsis due to complicated UTI vs pyelonephritis with hyperkalemia, acute on chronic pulmonary edema, and mild COVID-19 infection.  Hospital course now prolonged due to need for placement.     Recurrent Hyperkalemia: now resolved.   CKD stage 4  - baseline cr 2.2-2.5 range; had admissions for SHANTE and hyperkalemia; recently started on sodium bicarb, continue  - cr on admission 1.93, at baseline or better and improve to 1.75 better than baseline and remain stable.  - K on admission 5.1, upto 6.7; was treated with Kayexalate 30 gm 2/26 and Lokelma 10 gm on 2/27, given some iv fluids, alb neb, Insulin +dextrose  - K improved and stable; was on daily Lokelma from 3/01 to 3/05    - Tele without arrhythmia.   - EKG- Afib, incomplete RBBB, some nonspecific ST-t changes, no peaked T waves  - renal US- Suboptimal study; No right hydronephrosis. Right renal presumed cyst. This could be further evaluated with additional ultrasound or CT. Left kidney not visualized  - to  Avoid ACE inhibitors or ARB in the future; Nephrology was consulted and now signed off.; holding PTA Lisinopril; plan to discontinue on discharge       Physical deconditing in setting of morbid obesity and recent hospitalization  - initially-  Observation status- swicthed to inpatient on 2/26 due to hyperkalemia  - PT evaluated on 2/22 and recommended TCU due to impaired mobility and generalized weakness, SW assisting on placement  - PT to work with patient 5x week while in observation, continues to recommend TCU  -  following for placement to TCU with bariatric bed      Polyarticular gout with exacerbation  History of pseudogout  - Started with right knee, hands, and worsening back pain, treated at [facility] while hospitalized. Symptomatically improved  - There is high risk of cardiovascular death with Uloric therefore stopped Uloric and started allopurinol 300 mg p.o. daily for gout prevention. Discussed with pt and he agrees with the change.  - was started on Prednisone taper, completed 3/6  - continue daily colchicine 0.6 mg daily      Atrial fibrillation  HFpEF, not in acute exacerbation  Essential HTN  Hx of VSD  - Continue PTA metoprolol and pharmacy to dose warfarin  - INR therapeutic   - toprol XL decreased to 25 mg daily for soft bood pressure-- BP better     Chronic knee pain, likely OA  - continue PTA fentanyl patch, robaxin and prn oxycodone     Chronic venosus stasis, bilateral: stable, compression wraps as needed     TASHA  - continue CPAP    Pancytopenia   Anticoagulation with warfarin for atrial fibrillation   He had normal WBC and platelets 2/17.  Hemoglobin was 11g.  Now has pancytopenia.  B12, folate and iron ok.  Normal retic count.  Smear pending.    INR therapeutic.  Dr. Rivera saw him yesterday, feels the pancytopenia is multifactorial- probably has immune thrombocytopenia.    -Monitor H3     COVID-19 admission screening PCR: Not done due to recent COVID-19 infection <90 days ago and no new symptoms.   Recent COVID-19 infection 1/27/22. COVID recovered and no need for special precautions.  - COVID-19 vaccination status: Fully vaccinated with Pfizer.   - Influenza vaccination status: 1/2022       Diet: Combination Diet 2  gm K Diet    DVT Prophylaxis: Warfarin  Cheema Catheter: Not present  Central Lines: None  Cardiac Monitoring: None  Code Status: Full Code      Disposition Plan   Expected Discharge: 03/16/2022     Anticipated discharge location:  Awaiting care coordination huddle  Delays:     Placement - TCU            The patient's care was discussed with the Patient.    Guilherme Pinedo MD  Hospitalist Service  [facility]  Securely message with the Vocera Web Console (learn more here)  Text page via [employer] Paging/Directory         Clinically Significant Risk Factors Present on Admission                    ______________________________________________________________________    Interval History   No pain or complaints     Data reviewed today: I reviewed all medications, new labs and imaging results over the last 24 hours. I personally reviewed no images or EKG's today.    Physical Exam   Vital Signs: Temp: 98.3  F (36.8  C) Temp src: Oral BP: 111/67 Pulse: 79   Resp: 20 SpO2: 100 % O2 Device: BiPAP/CPAP    Weight: 465 lbs 0 oz  Constitutional: awake, alert, cooperative, no apparent distress  Neuropsychiatric: General: normal, calm and normal eye contact    Data   Recent Labs   Lab 03/15/22  0704 03/14/22  0735 03/13/22  1044 03/13/22  0725   WBC  --  3.5* 3.4* 3.3*   HGB  --  8.7* 9.1* 9.1*   MCV  --  87 90 86   PLT  --  111* 112* 98*   INR 2.86* 2.77*  --  2.72*   NA  --   --   --  138   POTASSIUM  --   --   --  4.4   CHLORIDE  --   --   --  110*   CO2  --   --   --  24   BUN  --   --   --  28   CR  --   --   --  1.81*   ANIONGAP  --   --   --  4   JOHN  --   --   --  8.7   GLC  --   --   --  98   ALBUMIN  --   --   --  2.4*   PROTTOTAL  --   --   --  6.0*   BILITOTAL  --   --   --  0.5   ALKPHOS  --   --   --  68   ALT  --   --   --  16   AST  --   --   --  12     No results found for this or any previous visit (from the past 24 hour(s)).  Medications     Warfarin Therapy Reminder          allopurinol  300 mg Oral Daily     colchicine  0.6 mg Oral Daily     fentaNYL  25 mcg Transdermal Q72H     fentaNYL   Transdermal Q8H     metoprolol succinate ER  25 mg Oral Daily     sodium bicarbonate  1,300 mg Oral TID

## 2022-03-15 NOTE — PROGRESS NOTES
[facility] Nurse Inpatient Wound Assessment   Reason for consultation: Evaluate and treat  Left lateral shin wounds    Assessment  Left lateral Shin wounds due to Venous Ulcer  Status: Much improved, decreased size compared to 1 week ago, no local signs of infection    New Consult- Right Lateral Heel and Right Lateral Foot Pressure Injuries  Status: Intact dark discoloration indicative of deep tissue pressure injury (DTPI). Patient demonstrates ability to offload his heels by positioning them off the bed.  Compression bandage has been removed (Lymphedema had been wrapping).  We discussed his neuropathy and potential for heels to sit on bed.s extention roll rather than low air loss. Patient consented to try offloading boots  Treatment Plan  Left lateral shin wound: Every 2-3 days    1. Cleanse with wound cleanser and blot dry  2. Dry and protect surrounding skin with no sting barrier film wipe #[id number] (this is very important)  3. Cover with silicone foam dressing Mepilex  4x4 #556422  4. Spray all intact skin to BL lower extremities with Carmela cleanse and protect and rub in. Allow to soak for 10mins and then wipe away any excess.   5. Apply sween 24 (Pink top lotion) from base of toes to below knees.  6. Allow to completely dry prior to any wrapping     Abdominal skin fold care  1. Cleans with carmela spray (cleanser/moisture barrier)  2. Apply Triad paste #[id number] to any open skin    Right lateral foot and heel every other day  1. Cleanse with wound cleanser   2. Dry and protect surrounding skin with no sting barrier film wipe #[id number] (this is very important)  3. Cover with silicone foam dressing Mepilex  4x4 #566052      Pressure Injury prevention (RN-please order supplies if not in room)  1. Turn every 2 hours, side to side avoid supine on bariatric pressure redistribution support surface  2.   Float heels off bed with use of  Heel Lift boots (Prevalon #206171)  3.   Prevent sliding and shear by limiting HOB to 30 degrees or  less unless contraindicated, use knee gatch first if not contraindicated  4.   If sitting, use pressure redistribution chair cushion large(#607273); if unable to shift weight every hour, please limit sitting to an hour at a time  5.   Protective foam dressings PRN,Change at least q 4 days, peel back, peek and replace for daily skin inspection.  6.   Optimize nutrition       Orders Revised  Recommended provider order: none at this time  WO Nurse follow-up plan: 1-2 x weekly  Nursing to notify the Provider(s) and re-consult the WOC Nurse if wound(s) deteriorates or new skin concern.    Patient History  According to provider note(s):   66 year old male admitted through on 2/20/2022 after being discharged earlier from [facility] stay from 2/11 - 2/20 for acute gout flare and subsequent SHANTE and hyperkalemia likely caused by gout treatment. Patient is morbidly obese and he was discharged to his home with home health but he was unable to manage going up steps immediately upon arrival home.  He was brought in to [facility] ED for placement. In the ER he was seen by Dr Henning, vitals were normal.  Labs were reviewed from his recent discharge with Cr of 1.93 which was improved from his discharge and actually better than baseline.     3/15/2022 pending TCU for deconditioning      No Known Allergies  Objective Data  Containment of urine/stool: Incontinence Protocol as needed    Body mass index is 53.74 kg/m .     Active Diet Order  Orders Placed This Encounter      Combination Diet 2 gm K Diet      Intake/Output Summary (Last 24 hours) at 3/15/2022 1444  Last data filed at 3/15/2022 1030  Gross per 24 hour   Intake 260 ml   Output 600 ml   Net -340 ml       Pressure Injury Risk Assessment:   Sensory Perception: 3-->slightly limited  Moisture: 3-->occasionally moist  Activity: 2-->chairfast  Mobility: 2-->very limited  Nutrition: 4-->excellent  Friction and Shear: 2-->potential problem  Sandoval Score: 16                           Labs:   Recent Labs   Lab 03/15/22  0704 03/14/22  0735 03/13/22  1044 03/13/22  0725   ALBUMIN  --   --   --  2.4*   HGB  --  8.7*   < > 9.1*   INR 2.86* 2.77*  --  2.72*   WBC  --  3.5*   < > 3.3*    < > = values in this interval not displayed.       Physical Exam  Areas of skin assessed: Left Lateral lower leg, Right lateral foot and heel    Wound Location:  Left Lateral lower leg    3/1/22       3/8/2022     3/15/2022      Wound Base: dermis, red, moist small areas of granulation tissue     Palpation of the wound bed: normal      Drainage: moderate NO strike through exudate     Description of drainage: yellow     Measurements (length x width x depth, in cm) decreased to 1.2  x 1  x  0.2 cm      Tunneling N/A     Undermining N/A  Periwound skin: intact      Temperature: normal   Odor: none  Pain: denies , none  Pain intervention prior to dressing change: NA    Right lateral foot 3/15/2022    Right lateral heel 3/15/2022    Right lateral foot 3/15/2022    Right lateral heel 3/15/2022    Wound Location:  Right lateral foot 3/15/2022   Date of last photo 3/15/2022  Wound History: New  Wound Base: 100 % Intact dark discoloration indicative of deep tissue pressure injury (DTPI)     Palpation of the wound bed: normal      Drainage: none     Description of drainage: none     Measurements (length x width x depth, in cm) 1.2  x 1  No measurable depth     Tunneling N/A     Undermining N/A  Periwound skin: intact      Color: normal and consistent with surrounding tissue      Temperature: normal   Odor: none  Pain: denies ,     Wound Location:  Right lateral heel 3/15/2022  Date of last photo 3/15/2022  Wound History: new  Wound Base: 25% open dermis 75% intact gray and brown     Palpation of the wound bed: firm , dry     Drainage: none     Description of drainage: none     Measurements (length x width x depth, in cm) Irregularly shaped  6  x 3  x  0.2 cm depth on open aspect of wound otherwise intact     Tunneling N/A      Undermining N/A  Periwound skin: intact      Color: normal and consistent with surrounding tissue      Temperature: normal   Odor: none  Pain: denies ,     Interventions  Visual inspection and assessment completed   Wound Care Rationale Right heel and foot Protect and monitor as DTPI evolves. Left lateral leg selective debridement (autolysis) of nonviable tissue   Wound Care: done per plan of care  Supplies: at bedside  Current off-loading measures: Chair cushion and ordered PRN  Current support surface: Bariatric Low air loss mattress with extention  Education provided to: plan of care, wound progress, Off-loading pressure and effects of neuropathy  Discussed plan of care with Patient and Nurse    Interventions  Visual inspection and assessment completed   Wound Care Rationale Protect periwound skin, Promote moist wound healing without tissue dehydration , Gently fill dead space to prevent abscess formation  and Provide protection   Wound Care: done per plan of care  Supplies: at bedside  Current off-loading measures: pillows for positioning, HOB 30 degrees or less, heels floating off beds, bariatric low airloss support surfaces, chair cushion ordered PRN  Current support surface: Bariatric low air loss  Education provided to: importance of repositioning and plan of care  Discussed plan of care with Patient and Nurse    Patricia Ayers MS RN CWOCN

## 2022-03-16 NOTE — PROGRESS NOTES
[facility]    Medicine Progress Note - Hospitalist Service    Date of Admission:  2/20/2022    Assessment & Plan        Panda Miranda is a 66 year old male admitted on 2/20/2022 after being discharged earlier from [facility] stay from 2/11 - 2/20 for acute gout flare and subsequent SHANTE and hyperkalemia likely caused by gout treatment. Patient is morbidly obese and he was discharged to his home with home health but he was unable to manage going up steps immediately upon arrival home and thus was brought in to [facility] ED for placement. Initially admitted unde Obs status, switched to inpatient on 2/26 due to hyperkalemia.  Of note, he was also admitted to the hospital at [facility] 1/27-1/30 for sepsis due to complicated UTI vs pyelonephritis with hyperkalemia, acute on chronic pulmonary edema, and mild COVID-19 infection.  Hospital course now prolonged due to need for placement.     Recurrent Hyperkalemia: now resolved.   CKD stage 4  - baseline cr 2.2-2.5 range; had admissions for SHANTE and hyperkalemia; recently started on sodium bicarb, continue  - cr on admission 1.93, at baseline or better and improve to 1.75 better than baseline and remain stable.  - K on admission 5.1, upto 6.7; was treated with Kayexalate 30 gm 2/26 and Lokelma 10 gm on 2/27, given some iv fluids, alb neb, Insulin +dextrose  - K improved and stable; was on daily Lokelma from 3/01 to 3/05    - Tele without arrhythmia.   - EKG- Afib, incomplete RBBB, some nonspecific ST-t changes, no peaked T waves  - renal US- Suboptimal study; No right hydronephrosis. Right renal presumed cyst. This could be further evaluated with additional ultrasound or CT. Left kidney not visualized  - to  Avoid ACE inhibitors or ARB in the future; Nephrology was consulted and now signed off.; holding PTA Lisinopril; plan to discontinue on discharge       Physical deconditing in setting of morbid obesity and recent hospitalization  - initially-  Observation status- swicthed to inpatient on 2/26 due to hyperkalemia  - PT evaluated on 2/22 and recommended TCU due to impaired mobility and generalized weakness, SW assisting on placement  - PT to work with patient 5x week while in observation, continues to recommend TCU  -  following for placement to TCU with bariatric bed      Polyarticular gout with exacerbation  History of pseudogout  - Started with right knee, hands, and worsening back pain, treated at [facility] while hospitalized. Symptomatically improved  - There is high risk of cardiovascular death with Uloric therefore stopped Uloric and started allopurinol 300 mg p.o. daily for gout prevention. Discussed with pt and he agrees with the change.  - was started on Prednisone taper, completed 3/6  - continue daily colchicine 0.6 mg daily      Atrial fibrillation  HFpEF, not in acute exacerbation  Essential HTN  Hx of VSD  - Continue PTA metoprolol and pharmacy to dose warfarin  - INR therapeutic   - toprol XL decreased to 25 mg daily for soft bood pressure-- BP better     Chronic knee pain, likely OA  - continue PTA fentanyl patch, robaxin and prn oxycodone     Chronic venosus stasis, bilateral: stable, compression wraps as needed     TASHA  - continue CPAP    Pancytopenia   Anticoagulation with warfarin for atrial fibrillation   He had normal WBC and platelets 2/17.  Hemoglobin was 11g.  Now has pancytopenia.  B12, folate and iron ok.  Normal retic count.  Smear pending.    INR therapeutic.  Dr. Rivera saw him yesterday, feels the pancytopenia is multifactorial- probably has immune thrombocytopenia.    -Monitor H3     COVID-19 admission screening PCR: Not done due to recent COVID-19 infection <90 days ago and no new symptoms.   Recent COVID-19 infection 1/27/22. COVID recovered and no need for special precautions.  - COVID-19 vaccination status: Fully vaccinated with Pfizer.   - Influenza vaccination status: 1/2022    3/16- stable and awaiting  transitional care unit placement     Diet: Combination Diet 2 gm K Diet    DVT Prophylaxis: Warfarin  Cheema Catheter: Not present  Central Lines: None  Cardiac Monitoring: None  Code Status: Full Code      Disposition Plan   Expected Discharge: 03/22/2022     Anticipated discharge location:  Awaiting care coordination huddle  Delays:     Placement - TCU            The patient's care was discussed with the Patient.    Guilherme Pinedo MD  Hospitalist Service  [facility]  Securely message with the Vocera Web Console (learn more here)  Text page via [email or web address] Paging/Directory         Clinically Significant Risk Factors Present on Admission                    ______________________________________________________________________    Interval History   No pain or complaints     Data reviewed today: I reviewed all medications, new labs and imaging results over the last 24 hours. I personally reviewed no images or EKG's today.    Physical Exam   Vital Signs: Temp: 98.1  F (36.7  C) Temp src: Oral BP: 106/61 Pulse: 83   Resp: 18 SpO2: 100 % O2 Device: BiPAP/CPAP    Weight: 465 lbs 0 oz  Constitutional: awake, alert, cooperative, no apparent distress  Neuropsychiatric: General: normal, calm and normal eye contact    Data   Recent Labs   Lab 03/16/22  0903 03/15/22  1201 03/15/22  0704 03/14/22  0735 03/13/22  1044 03/13/22  0725   WBC  --   --   --  3.5* 3.4* 3.3*   HGB  --   --   --  8.7* 9.1* 9.1*   MCV  --   --   --  87 90 86   PLT  --   --   --  111* 112* 98*   INR 2.87*  --  2.86* 2.77*  --  2.72*   NA  --  138  --   --   --  138   POTASSIUM  --  4.1  --   --   --  4.4   CHLORIDE  --  109  --   --   --  110*   CO2  --  28  --   --   --  24   BUN  --  24  --   --   --  28   CR  --  1.90*  --   --   --  1.81*   ANIONGAP  --  1*  --   --   --  4   JOHN  --  8.6  --   --   --  8.7   GLC  --  85  --   --   --  98   ALBUMIN  --   --   --   --   --  2.4*   PROTTOTAL  --   --   --   --   --  6.0*    BILITOTAL  --   --   --   --   --  0.5   ALKPHOS  --   --   --   --   --  68   ALT  --   --   --   --   --  16   AST  --   --   --   --   --  12     No results found for this or any previous visit (from the past 24 hour(s)).  Medications     Warfarin Therapy Reminder         allopurinol  300 mg Oral Daily     colchicine  0.6 mg Oral Daily     fentaNYL  25 mcg Transdermal Q72H     fentaNYL   Transdermal Q8H     metoprolol succinate ER  25 mg Oral Daily     sodium bicarbonate  1,300 mg Oral TID     warfarin ANTICOAGULANT  4 mg Oral ONCE at 18:00

## 2022-03-16 NOTE — PLAN OF CARE
Goal Outcome Evaluation:        Pt A/O x4, VSS on RA. A2 gb/w. Denies pain/N/V. 2g potassium diet, tolerating well. Fentanyl patch to L chest. Continent B/B, voids in urinal, no BM this shift. Scattered wounds to R chest, R & L abdomen, BLE and heels, wound cares performed with new mepis. Lymph socks on. No IV access, MD aware. Refuses repositioning but does shift weight well. Home CPAP at nighttime. Discharge pending TCU placement.

## 2022-03-16 NOTE — PROGRESS NOTES
Service Date: 03/15/2022    SUBJECTIVE:  Mr. Wade is a 66-year-old gentleman with multiple medical problems.  Hematology is seeing him for pancytopenia.  His pancytopenia is mainly due to bone marrow suppression from his acute illness and medication.  Anemia is also due to renal disease.    His pancytopenia is not causing any complication.  At this time, plan is to simply monitor it.  Bone marrow biopsy will be considered if there is worsening.    Overall, patient's condition is stable.  He continues to be weak.  Not in pain.  No nausea or vomiting.  No bleeding.    PHYSICAL EXAMINATION:    He is alert and oriented x3.  Rest of the systems not examined.    LABORATORY DATA:  CBC reviewed.    ASSESSMENT:    1.  A 66-year-old gentleman with pancytopenia, which is stable.  2.  Multiple other medical problems including chronic kidney disease, atrial fibrillation and hypertension.    PLAN:    1.  I explained to the patient that his pancytopenia is stable.  At this time, we will simply monitor it.  I am hoping it will improve.  If it gets worse, we will consider bone marrow biopsy.  He is agreeable for it.  2.  Hematology/Oncology will see him intermittently.  Case discussed with Dr. Pinedo.    Shell Rivera MD        D: 03/15/2022   T: 03/15/2022   MT: [id number]    Name:     NARENDRA WADE  MRN:      [id number]        Account:      [id number]   :      1955           Service Date: 03/15/2022       Document: [id number]

## 2022-03-16 NOTE — PLAN OF CARE
Aox4. VSS on RA. CPAP @ night. Ax1 w/GB and W. 2g K diet. No PIV access. Fentanyl patch on L chest. BLE mild edema and neuropathy. Wounds on abd and BLE - CDI. WOCN following - rooke boots on. Cont of B&B. Urinal at bedside. discharge pending TCU placement.

## 2022-03-16 NOTE — PLAN OF CARE
Pt is A&Ox4. VSS on RA. Denies pain. Fentanyl patch to L chest. BS active, passing gas, no BM this shift. Voiding in urinal w/o difficulty. Rooke boots on. Dressings to L & R shins CDI. Abdominal abrasion ISAI per woc.  No IV access, MD aware. Up A2 with GB+W. On bariatric pulsate mattress, shifts weight in bed independently. Refuses repositioning. On low K+ diet, tolerating well, denies N/V. Home Cpap on overnight. Discharge pending TCU placement.

## 2022-03-16 NOTE — PLAN OF CARE
Goal Outcome Evaluation:           A&O x4, VSS on RA, Cpap at night. Up A1-2 GBW to BSC. Lower left wound dressing and rt heel dressing changed on day shift by WOC. Heel boots on. CPAP used at night. Discharge pending placement

## 2022-03-17 NOTE — PROGRESS NOTES
Care Management Follow Up    Length of Stay (days): 19    Expected Discharge Date: 03/22/2022     Concerns to be Addressed: discharge planning     Patient plan of care discussed at interdisciplinary rounds: Yes    Anticipated Discharge Disposition: [facility]     Anticipated Discharge Services:  TCU  Anticipated Discharge DME:      Patient/family educated on Medicare website which has current facility and service quality ratings:    Education Provided on the Discharge Plan:    Patient/Family in Agreement with the Plan:      Referrals Placed by CM/SW:  [facility]  Private pay costs discussed: Not applicable    Additional Information:  GRACIA placed call to Kendrick with [facility] to inquire about bed availability for patient. Kendrick stated that clinically they are able to accept patient and they would start prior authorization for insurance. Once we receive insurance auth, patient is able to discharge to facility.  GRACIA spoke with patient and he is in agreement with discharge plan.     Kendrick stated that if patient is not fully vaccinated (now includes booster shot) for Covid-19, he would need to quarantine for 10 days. Patient has had two vaccines (1/11/22 and 2/20/22) and is not currently eligible for the booster. Kendrick will check on this status. Patient is able to have visitors, but would need to quarantine for 10 days. Patient is aware of this and in understanding.     OREN Riddle, LGSW  [phone]  [facility]

## 2022-03-17 NOTE — PLAN OF CARE
Goal Outcome Evaluation:     Patient remains stable with vitals in the normal range. Alert and oriented. Denies pain, scratching back with his cane. Itching from linen wrinkles.Up to the commode with one assist with a gait belt and walker. Showered patient, linen changed and  room organized. Discharge to TCU pending prior authorization.

## 2022-03-17 NOTE — PROGRESS NOTES
Care Management Follow Up    Length of Stay (days): 19    Expected Discharge Date: 03/22/2022     Concerns to be Addressed: discharge planning     Patient plan of care discussed at interdisciplinary rounds: Yes    Anticipated Discharge Disposition: Transitional Care     Anticipated Discharge Services:    Anticipated Discharge DME:      Patient/family educated on Medicare website which has current facility and service quality ratings:    Education Provided on the Discharge Plan:    Patient/Family in Agreement with the Plan:      Referrals Placed by CM/SW:    Private pay costs discussed: Not applicable    Additional Information:  GRACIA completed PAS for [facility].  PAS-RR    D: Per DHS regulation, SW completed and submitted PAS-RR to [place] [employer] Direct Connect via the [employer] Line.  PAS-RR confirmation # is : [id number]    I: SW spoke with patient and they are aware a PAS-RR has been submitted.  SW reviewed with patient that they may be contacted for a follow up appointment within 10 days of hospital discharge if their SNF stay is < 30 days.  Contact information for [employer] was also provided.    A: patient verbalized understanding.    P: Further questions may be directed to [employer] Line at #[phone], option #4 for PAS-RR staff.        MAGALIE Meza    [facility]

## 2022-03-17 NOTE — PROGRESS NOTES
[facility]    Medicine Progress Note - Hospitalist Service    Date of Admission:  2/20/2022    Assessment & Plan        Panda Miranda is a 66 year old male admitted on 2/20/2022 after being discharged earlier from [facility] stay from 2/11 - 2/20 for acute gout flare and subsequent SHANTE and hyperkalemia likely caused by gout treatment. Patient is morbidly obese and he was discharged to his home with home health but he was unable to manage going up steps immediately upon arrival home and thus was brought in to [facility] ED for placement. Initially admitted unde Obs status, switched to inpatient on 2/26 due to hyperkalemia.  Of note, he was also admitted to the hospital at [facility] 1/27-1/30 for sepsis due to complicated UTI vs pyelonephritis with hyperkalemia, acute on chronic pulmonary edema, and mild COVID-19 infection.  Hospital course now prolonged due to need for placement.     Recurrent Hyperkalemia: now resolved.   CKD stage 4  - baseline cr 2.2-2.5 range; had admissions for SHANTE and hyperkalemia; recently started on sodium bicarb, continue  - cr on admission 1.93, at baseline or better and improve to 1.75 better than baseline and remain stable.  - K on admission 5.1, upto 6.7; was treated with Kayexalate 30 gm 2/26 and Lokelma 10 gm on 2/27, given some iv fluids, alb neb, Insulin +dextrose  - K improved and stable; was on daily Lokelma from 3/01 to 3/05    - Tele without arrhythmia.   - EKG- Afib, incomplete RBBB, some nonspecific ST-t changes, no peaked T waves  - renal US- Suboptimal study; No right hydronephrosis. Right renal presumed cyst. This could be further evaluated with additional ultrasound or CT. Left kidney not visualized  - to  Avoid ACE inhibitors or ARB in the future; Nephrology was consulted and now signed off.; holding PTA Lisinopril; plan to discontinue on discharge       Physical deconditing in setting of morbid obesity and recent hospitalization  - initially-  Observation status- swicthed to inpatient on 2/26 due to hyperkalemia  - PT evaluated on 2/22 and recommended TCU due to impaired mobility and generalized weakness, SW assisting on placement  - PT to work with patient 5x week while in observation, continues to recommend TCU  -  following for placement to TCU with bariatric bed      Polyarticular gout with exacerbation  History of pseudogout  - Started with right knee, hands, and worsening back pain, treated at [facility] while hospitalized. Symptomatically improved  - There is high risk of cardiovascular death with Uloric therefore stopped Uloric and started allopurinol 300 mg p.o. daily for gout prevention. Discussed with pt and he agrees with the change.  - was started on Prednisone taper, completed 3/6  - continue daily colchicine 0.6 mg daily      Atrial fibrillation  HFpEF, not in acute exacerbation  Essential HTN  Hx of VSD  - Continue PTA metoprolol and pharmacy to dose warfarin  - INR therapeutic   - toprol XL decreased to 25 mg daily for soft bood pressure-- BP better     Chronic knee pain, likely OA  - continue PTA fentanyl patch, robaxin and prn oxycodone     Chronic venosus stasis, bilateral: stable, compression wraps as needed     TASHA  - Continue CPAP.    Pancytopenia   Anticoagulation with warfarin for atrial fibrillation   He had normal WBC and platelets 2/17.  Hemoglobin was 11g.  Now has pancytopenia.  B12, folate and iron ok.  Normal retic count.  Smear pending.    INR therapeutic.  Dr. Rivera saw him yesterday, feels the pancytopenia is multifactorial- probably has immune thrombocytopenia.    - Monitor CBC intermittently.     COVID-19 admission screening PCR: Not done due to recent COVID-19 infection <90 days ago and no new symptoms.   Recent COVID-19 infection 1/27/22. COVID recovered and no need for special precautions.  - COVID-19 vaccination status: Fully vaccinated with Pfizer.   - Influenza vaccination status: 1/2022    3/17-  stable and awaiting transitional care unit placement       Diet: Combination Diet 2 gm K Diet    DVT Prophylaxis: Warfarin  Cheema Catheter: Not present  Central Lines: None  Cardiac Monitoring: None  Code Status: Full Code      Disposition Plan   Expected Discharge: 03/22/2022     Anticipated discharge location:  Awaiting care coordination huddle  Delays:     Placement - TCU            The patient's care was discussed with the Bedside Nurse and Patient.    Igor Mcdowell MD  Hospitalist [facility]  Securely message with the Vocera Web Console (learn more here)  Text page via [employer] Paging/Directory         Clinically Significant Risk Factors Present on Admission                    ______________________________________________________________________    Interval History   Panda Miranda was seen this morning. No new complaints/concerns. Some MACKEY, but no shortness of breath at rest. Denies fevers, chest pain, shortness of breath, nausea, abdominal pain.    Data reviewed today: I reviewed all medications, new labs and imaging results over the last 24 hours. I personally reviewed no images or EKG's today.    Physical Exam   Vital Signs: Temp: 97.5  F (36.4  C) Temp src: Axillary BP: 111/69 Pulse: 78   Resp: 18 SpO2: 100 % O2 Device: BiPAP/CPAP    Weight: 465 lbs 0 oz  Constitutional: awake, alert, cooperative, no apparent distress, laying in the hospital bed  Respiratory: clear to auscultation anteriorly  Cardiovascular: regular rate and rhythm, normal S1 and S2, no murmur noted  GI: normal bowel sounds, soft, obese, non-distended, non-tender  Skin: warm, dry  Musculoskeletal: no lower extremity pitting edema present  Neurologic: awake, alert, answers questions appropriately    Data   Recent Labs   Lab 03/17/22  0722 03/16/22  0903 03/15/22  1201 03/15/22  0704 03/14/22  0735 03/13/22  1044 03/13/22  0725   WBC 3.8*  --   --   --  3.5* 3.4* 3.3*   HGB 9.1*  --   --   --  8.7*  9.1* 9.1*   MCV 89  --   --   --  87 90 86     --   --   --  111* 112* 98*   INR 3.25* 2.87*  --  2.86* 2.77*  --  2.72*     --  138  --   --   --  138   POTASSIUM 4.7  --  4.1  --   --   --  4.4   CHLORIDE 111*  --  109  --   --   --  110*   CO2 23  --  28  --   --   --  24   BUN 22  --  24  --   --   --  28   CR 1.89*  --  1.90*  --   --   --  1.81*   ANIONGAP 4  --  1*  --   --   --  4   JOHN 8.7  --  8.6  --   --   --  8.7   *  --  85  --   --   --  98   ALBUMIN  --   --   --   --   --   --  2.4*   PROTTOTAL  --   --   --   --   --   --  6.0*   BILITOTAL  --   --   --   --   --   --  0.5   ALKPHOS  --   --   --   --   --   --  68   ALT  --   --   --   --   --   --  16   AST  --   --   --   --   --   --  12     Medications     Warfarin Therapy Reminder         allopurinol  300 mg Oral Daily     colchicine  0.6 mg Oral Daily     fentaNYL  25 mcg Transdermal Q72H     fentaNYL   Transdermal Q8H     metoprolol succinate ER  25 mg Oral Daily     sodium bicarbonate  1,300 mg Oral TID

## 2022-03-17 NOTE — PLAN OF CARE
Goal Outcome Evaluation:        A&Ox4.  VSS, RA; home CPAP at night.  Denies pain; Fentanyl patch to upper L chest.  Tolerating renal diet without problems.  Uses urinal in bed, no BM this shift.  Multiple wounds to chest, abdomen and BLE; covered with Mepilex.  No IV access.  Patient was supposed to have shower this shift, but declined it until his son could come give him a haircut (tonight), but this did not happen.  Lymph wraps off and patient declined them being put back on until after his shower.  Discharge to TCU pending placement.

## 2022-03-17 NOTE — PROGRESS NOTES
[facility] Nurse Inpatient Wound Assessment   Reason for consultation: Evaluate and treat  Left lateral shin wounds, R heel    Assessment  Left lateral Shin wounds due to Venous Ulcer  Status: Regressing due to lymphedema with new weepy areas to BL lower extremities.     Right Lateral Heel and Right Lateral Foot Pressure Injuries  Status: Hospital acquired pressure injury DTPI now evolved and open stage 2. Patient demonstrates ability to offload his heels by positioning them off the bed.  Compression bandage not currently on (Lymphedema had been wrapping) and now legs and extremely edematous, tight and weepy in areas  We discussed his neuropathy and potential for heels to sit on bed.s extention roll rather than low air loss. Patient not wearing offloading boots, educated on importance of wearing these or floating on pillows.   Treatment Plan  Left shin wounds: Every 2days    1. Cleanse with wound cleanser and blot dry  2. Spray all intact skin to BL lower extremities with Carmela cleanse and protect and rub in. Allow to soak for 10mins and then wipe away any excess.   3. Apply sween 24 (Pink top lotion) from base of toes to below knees.  4. Apply Adaptic (mineral gauze) to any open areas  5. Cover with kerlix and tape.   6. Time/Date/Initial dressing change       Abdominal skin fold care  1. Cleans with carmela spray (cleanser/moisture barrier)  2. Apply Triad paste #[id number] to any open skin    Right lateral foot and heel every other day  1. Cleanse with wound cleanser   2. Dry and protect surrounding skin with no sting barrier film wipe #[id number] (this is very important)  3. Cover with silicone foam dressing Mepilex  4x4 #477900      Pressure Injury prevention (RN-please order supplies if not in room)  1. Turn every 2 hours, side to side avoid supine on bariatric pressure redistribution support surface  2.   Float heels off bed with use of  Heel Lift boots (Prevalon #787731)  3.   Prevent sliding and shear by limiting HOB to 30  degrees or less unless contraindicated, use knee gatch first if not contraindicated  4.   If sitting, use pressure redistribution chair cushion large(#077719); if unable to shift weight every hour, please limit sitting to an hour at a time  5.   Protective foam dressings PRN,Change at least q 4 days, peel back, peek and replace for daily skin inspection.  6.   Optimize nutrition       Orders Revised  Recommended provider order: Lymphedema  WOC Nurse follow-up plan: Weekly  Nursing to notify the Provider(s) and re-consult the WOC Nurse if wound(s) deteriorates or new skin concern.    Patient History  According to provider note(s):   66 year old male admitted through on 2/20/2022 after being discharged earlier from [facility] stay from 2/11 - 2/20 for acute gout flare and subsequent SHANTE and hyperkalemia likely caused by gout treatment. Patient is morbidly obese and he was discharged to his home with home health but he was unable to manage going up steps immediately upon arrival home.  He was brought in to [facility] ED for placement. In the ER he was seen by Dr Henning, vitals were normal.  Labs were reviewed from his recent discharge with Cr of 1.93 which was improved from his discharge and actually better than baseline.     3/15/2022 pending TCU for deconditioning      No Known Allergies  Objective Data  Containment of urine/stool: Incontinence Protocol as needed    Body mass index is 53.74 kg/m .     Active Diet Order  Orders Placed This Encounter      Combination Diet 2 gm K Diet      Intake/Output Summary (Last 24 hours) at 3/15/2022 1444  Last data filed at 3/15/2022 1030  Gross per 24 hour   Intake 260 ml   Output 600 ml   Net -340 ml       Pressure Injury Risk Assessment:   Sensory Perception: 3-->slightly limited  Moisture: 3-->occasionally moist  Activity: 3-->walks occasionally  Mobility: 3-->slightly limited  Nutrition: 3-->adequate  Friction and Shear: 2-->potential problem  Sandoval Score: 17                           Labs:   Recent Labs   Lab 03/17/22  0722 03/13/22  1044 03/13/22  0725   ALBUMIN  --   --  2.4*   HGB 9.1*   < > 9.1*   INR 3.25*   < > 2.72*   WBC 3.8*   < > 3.3*    < > = values in this interval not displayed.       Physical Exam  Areas of skin assessed: Left Lateral lower leg, Right lateral foot and heel    Wound Location:  Left Lateral lower leg    3/1/22       3/15            Wound Base: dermis, red, moist small areas of granulation tissue     Palpation of the wound bed: normal      Drainage: scant     Description of drainage: yellow, lymph     Measurements (length x width x depth, in cm)1.2  x 1  x  0.2 cm      Tunneling N/A     Undermining N/A  Periwound skin: edematous, weepy, macerated      Temperature: normal   Odor: none  Pain: denies , none  Pain intervention prior to dressing change: NA    Right lateral foot 3/15/2022            Righ lateral foot 03/17/22        Right lateral heel 3/15/2022         Right lateral heel 3/17/2022         Wound Location:  Right lateral foot 3/15/2022   Date of last photo 3/15/2022  Wound History: New  Wound Base: 100 % Intact dark discoloration indicative of deep tissue pressure injury (DTPI)     Palpation of the wound bed: normal      Drainage: none     Description of drainage: none     Measurements (length x width x depth, in cm) 0.6 x 1.1 x 0     Tunneling N/A     Undermining N/A  Periwound skin: intact      Color: normal and consistent with surrounding tissue      Temperature: normal   Odor: none  Pain: denies ,     Wound Location:  Right lateral heel   Wound History: new  Wound Base: 25% open dermis 75% intact gray and brown     Palpation of the wound bed: firm , dry     Drainage: none     Description of drainage: none     Measurements (length x width x depth, in cm) Irregularly shaped  0.9 x2.9 x 0.2  x 3  x  0.2 cm depth      Tunneling N/A     Undermining N/A  Periwound skin: intact      Color: normal and consistent with surrounding tissue       Temperature: normal   Odor: none  Pain: denies ,     Interventions  Visual inspection and assessment completed   Wound Care Rationale Right heel and foot Protect and monitor as DTPI evolves. Left lateral leg selective debridement (autolysis) of nonviable tissue   Wound Care: done per plan of care  Supplies: at bedside  Current off-loading measures: Chair cushion and ordered PRN  Current support surface: Bariatric Low air loss mattress with extention  Education provided to: plan of care, wound progress, Off-loading pressure and effects of neuropathy  Discussed plan of care with Patient and Nurse    Interventions  Visual inspection and assessment completed   Wound Care Rationale Protect periwound skin, Promote moist wound healing without tissue dehydration , Gently fill dead space to prevent abscess formation  and Provide protection   Wound Care: done per plan of care  Supplies: at bedside  Current off-loading measures: pillows for positioning, HOB 30 degrees or less, heels floating off beds, bariatric low airloss support surfaces, chair cushion ordered PRN  Current support surface: Bariatric low air loss  Education provided to: importance of repositioning and plan of care  Discussed plan of care with Patient, Nurse and Physician     Andreas Perez RN CWOCN

## 2022-03-17 NOTE — PLAN OF CARE
Goal Outcome Evaluation:                    A&Ox4.  VSS, RA; home CPAP at night.  No pain. Fentanyl page changed this shift. Not OOB this shift, but uses urinal at bedside. Pt asked about lymph wraps, pageraman VAZ.  Wounds to LLE and R heel seen and changed today by WOC.On 2 gram K+ diet tolerating well.  Discharge scheduled for 3/22.

## 2022-03-18 PROBLEM — R52 ACHES: Status: ACTIVE | Noted: 2022-01-01

## 2022-03-18 PROBLEM — R53.1 GENERALIZED WEAKNESS: Status: ACTIVE | Noted: 2022-01-01

## 2022-03-18 NOTE — LETTER
Recipient:  [facility] REFERRAL for facilities in contract with [facility] and ability to accommodate pt's bariatric needs (535lbs).          Sender:  Trang LindquistSOLISW: [phone]        Date: April 5, 2022  Patient Name:  Panda Miranda  Patient YOB: 1955  Routing Message:    Please review for placement. Pt is needing LTC and on-going PT/OT under part B Medicare benefit before returning home. Pt will be private pay.    Thank you!      The documents accompanying this notice contain confidential information belonging to the sender.  This information is intended only for the use of the individual or entity named above.  The authorized recipient of this information is prohibited from disclosing this information to any other party and is required to destroy the information after its stated need has been fulfilled, unless otherwise required by state law.    If you are not the intended recipient, you are hereby notified that any disclosure, copy, distribution or action taken in reliance on the contents of these documents is strictly prohibited.  If you have received this document in error, please return it by fax to [phone] with a note on the cover sheet explaining why you are returning it (e.g. not your patient, not your provider, etc.).  If you need further assistance, please call [facility] Centralized Transcription at [phone].  Documents may also be returned by mail to [employer], , [address], [place], [place] [address].

## 2022-03-18 NOTE — LETTER
Recipient:  [facility]          Sender:  SOLIS SandhuW:  [phone]          Date: April 13, 2022  Patient Name:  Panda Miranda  Patient YOB: 1955  Routing Message:  Please review for LTC placement.  Thank you!        The documents accompanying this notice contain confidential information belonging to the sender.  This information is intended only for the use of the individual or entity named above.  The authorized recipient of this information is prohibited from disclosing this information to any other party and is required to destroy the information after its stated need has been fulfilled, unless otherwise required by state law.    If you are not the intended recipient, you are hereby notified that any disclosure, copy, distribution or action taken in reliance on the contents of these documents is strictly prohibited.  If you have received this document in error, please return it by fax to [phone] with a note on the cover sheet explaining why you are returning it (e.g. not your patient, not your provider, etc.).  If you need further assistance, please call [facility] Centralized Transcription at [phone].  Documents may also be returned by mail to [employer], , [facility] [address], [place], [place] [address].

## 2022-03-18 NOTE — PROGRESS NOTES
Clinic Care Coordination Contact  Care Coordination Transition Communication         Clinical Data: Patient was hospitalized at [facility] from 2/20/2022 to 3/18/2022 with diagnosis of CHF, gout, hyperlipidemia, hypertension and chronic kidney disease.  Hematology/Oncology is seeing him for pancytopenia.    Transition to Facility:              Facility Name: [facility]                  Plan: RN/SW Care Coordinator will await notification from facility staff informing RN/SW Care Coordinator of patient's discharge plans/needs. RN/SW Care Coordinator will review chart and outreach to facility staff every 4 weeks and as needed.

## 2022-03-18 NOTE — PLAN OF CARE
Goal Outcome Evaluation:        A&Ox4.  VSS; RA while awake and home CPAP while asleep.  Denies pain; Fentanyl patch to L upper chest.  Uses the urinal in bed; no BM this shift.  Patient did not get OOB this shift, but is assist x 1 with GB and walker per report.  Tolerating renal diet without problems.  Multiple wounds to abdomen, BLE and heel; all dressings changed yesterday.  Wraps to BLE and Rooke boots in place.  No IV access.  Discharge pending to TCU once bed is available.

## 2022-03-18 NOTE — PROGRESS NOTES
Care Management Discharge Note    Discharge Date: 03/18/2022       Discharge Disposition: [facility]    Discharge Services:  TCU    Discharge DME:      Discharge Transportation:  [facility] secondary to Bariatric Status.     Private pay costs discussed: Not applicable    PAS Confirmation Code:  [id number]  Patient/family educated on Medicare website which has current facility and service quality ratings:      Education Provided on the Discharge Plan:  Yes  Persons Notified of Discharge Plans: Patient  Patient/Family in Agreement with the Plan:  Yes    Handoff Referral Completed: No    Additional Information:  Received call from Kendrick with  [facility] stating that they received authorization for patient from insurance. Requested transport be arranged at 4:00. SW placed call to  Transport and arranged for 4:30 ride today. Updated Kendrick and they are in agreement. Updated physician for discharge orders. PCS form completed, faxed to  and provided to [facility].     OREN Riddle, LGSW  [phone]  [facility]

## 2022-03-18 NOTE — LETTER
Recipient: [facility] [employer] REFERRAL for facilities in contract with [facility] and ability to accommodate pt's bariatric needs (535lbs).          Sender:  Trang LindquistSOLISW:  [phone]        Date: April 5, 2022  Patient Name:  Panda Miranda  Patient YOB: 1955  Routing Message:    Please review for placement. Pt is needing LTC and on-going PT/OT under part B Medicare benefit before returning home. Pt will be private pay.    Thank you!        The documents accompanying this notice contain confidential information belonging to the sender.  This information is intended only for the use of the individual or entity named above.  The authorized recipient of this information is prohibited from disclosing this information to any other party and is required to destroy the information after its stated need has been fulfilled, unless otherwise required by state law.    If you are not the intended recipient, you are hereby notified that any disclosure, copy, distribution or action taken in reliance on the contents of these documents is strictly prohibited.  If you have received this document in error, please return it by fax to [phone] with a note on the cover sheet explaining why you are returning it (e.g. not your patient, not your provider, etc.).  If you need further assistance, please call [facility] Centralized Transcription at [phone].  Documents may also be returned by mail to [employer], , [address], [place], [place] [address].

## 2022-03-18 NOTE — DISCHARGE SUMMARY
[facility]  Hospitalist Discharge Summary      Date of Admission:  2/20/2022  Date of Discharge:  3/18/2022  Discharging Provider: Barrera Jeter MD  Discharge Service: Hospitalist Service    Discharge Diagnoses   Recurrent Hyperkalemia: now resolved.   CKD stage 4  Physical deconditing in setting of morbid obesity and recent hospitalization  Polyarticular gout with exacerbation  History of pseudogout  Atrial fibrillation  HFpEF, not in acute exacerbation  Essential HTN  Hx of VSD  Chronic knee pain, likely OA   Chronic venosus stasis, bilateral  TASHA  Pancytopenia  Anticoagulated with warfarin for atrial fibrillation    Follow-ups Needed After Discharge   Follow-up Appointments     Follow Up and recommended labs and tests      TCU MD for hospitalization follow up  [facility] in 2-3 weeks with CBC             Unresulted Labs Ordered in the Past 30 Days of this Admission     No orders found from 1/21/2022 to 2/21/2022.      These results will be followed up by NA    Discharge Disposition   Discharged to short-term care facility  Condition at discharge: Stable      Hospital Course   Panda Miranda is a 66 year old male admitted on 2/20/2022 after being discharged earlier from [facility] Hospital stay from 2/11 - 2/20 for acute gout flare and subsequent SHANTE and hyperkalemia likely caused by gout treatment. Patient is morbidly obese and he was discharged to his home with home health but he was unable to manage going up steps immediately upon arrival home and thus was brought in to [facility] ED for placement. Initially admitted unde Obs status, switched to inpatient on 2/26 due to hyperkalemia.  Of note, he was also admitted to the hospital at [facility] 1/27-1/30 for sepsis due to complicated UTI vs pyelonephritis with hyperkalemia, acute on chronic pulmonary edema, and mild COVID-19 infection.  Hospital course now prolonged due to need for placement.     Recurrent Hyperkalemia:  now resolved.   CKD stage 4  - baseline cr 2.2-2.5 range; had admissions for SHANTE and hyperkalemia; recently started on sodium bicarb, continue  - cr on admission 1.93, at baseline or better and improve to 1.75 better than baseline and remain stable.  - K on admission 5.1, upto 6.7; was treated with Kayexalate 30 gm 2/26 and Lokelma 10 gm on 2/27, given some iv fluids, alb neb, Insulin +dextrose  - K improved and stable; was on daily Lokelma from 3/01 to 3/05    - Tele without arrhythmia.   - EKG- Afib, incomplete RBBB, some nonspecific ST-t changes, no peaked T waves  - renal US- Suboptimal study; No right hydronephrosis. Right renal presumed cyst. This could be further evaluated with additional ultrasound or CT. Left kidney not visualized  - to  Avoid ACE inhibitors or ARB in the future; Nephrology was consulted and now signed off.  - holding PTA Lisinopril; plan to discontinue on discharge       Physical deconditing in setting of morbid obesity and recent hospitalization  - initially- Observation status- swicthed to inpatient on 2/26 due to hyperkalemia  - PT evaluated on 2/22 and recommended TCU due to impaired mobility and generalized weakness, SW assisting on placement  - PT to work with patient 5x week while in observation, continues to recommend TCU  -  following for placement to TCU with bariatric bed      Polyarticular gout with exacerbation  History of pseudogout  - Started with right knee, hands, and worsening back pain, treated at [facility] while hospitalized. Symptomatically improved  - There is high risk of cardiovascular death with Uloric therefore stopped Uloric and started allopurinol 300 mg p.o. daily for gout prevention. Discussed with pt and he agrees with the change.  - was started on Prednisone taper, completed 3/6  - continue daily colchicine 0.6 mg daily      Atrial fibrillation  HFpEF, not in acute exacerbation  Essential HTN  Hx of VSD  - Continue PTA metoprolol and pharmacy to  dose warfarin  - INR therapeutic   - toprol XL decreased to 25 mg daily for soft bood pressure-- BP better     Chronic knee pain, likely OA  - continue PTA fentanyl patch, robaxin and prn oxycodone     Chronic venosus stasis, bilateral: stable, compression wraps as needed     TASHA  - Continue CPAP.     Pancytopenia   Anticoagulation with warfarin for atrial fibrillation   He had normal WBC and platelets 2/17.  Hemoglobin was 11g.  Now has pancytopenia.  B12, folate and iron ok.  Normal retic count.  Smear pending.    INR therapeutic.  Dr. Rivera saw him yesterday, feels the pancytopenia is multifactorial- probably has immune thrombocytopenia.    - Monitor CBC intermittently.     COVID-19 admission screening PCR: Not done due to recent COVID-19 infection <90 days ago and no new symptoms.   Recent COVID-19 infection 1/27/22. COVID recovered and no need for special precautions.  - COVID-19 vaccination status: Fully vaccinated with Pfizer.   - Influenza vaccination status: 1/2022         Consultations This Hospital Stay   SOCIAL WORK IP CONSULT  PHARMACY TO DOSE WARFARIN  PHYSICAL THERAPY ADULT IP CONSULT  WOUND OSTOMY CONTINENCE NURSE  IP CONSULT  CARE MANAGEMENT / SOCIAL WORK IP CONSULT  VASCULAR ACCESS ADULT IP CONSULT  VASCULAR ACCESS ADULT IP CONSULT  NEPHROLOGY IP CONSULT  LYMPHEDEMA THERAPY IP CONSULT  HEMATOLOGY & ONCOLOGY IP CONSULT  PHYSICAL THERAPY ADULT IP CONSULT  OCCUPATIONAL THERAPY ADULT IP CONSULT    Code Status   Full Code    Time Spent on this Encounter   I, Barrera Jeter MD, personally saw the patient today and spent greater than 30 minutes discharging this patient.       Barrera Jeter MD  [facility] [id number] [facility]  [address] [place] [address], [address] [address]  Phone: [phone]  ______________________________________________________________________    Physical Exam   Vital Signs: Temp: 98.2  F (36.8  C) Temp src: Oral BP: 112/80 Pulse: 89   Resp: 19 SpO2: 100 % O2 Device:  None (Room air)    Weight: 465 lbs 0 oz    Constitutional: awake, alert, cooperative, no apparent distress, laying in the hospital bed  Respiratory: clear to auscultation anteriorly  Cardiovascular: regular rate and rhythm, normal S1 and S2, no murmur noted  GI: normal bowel sounds, soft, obese, non-distended, non-tender  Skin: warm, dry  Musculoskeletal: no lower extremity pitting edema present  Neurologic: awake, alert, answering questions appropriately         Primary Care Physician   Ben Hamlin    Discharge Orders      Medication Therapy Management Referral      Primary Care - Care Coordination Referral      General info for SNF    Length of Stay Estimate: Short Term Care: Estimated # of Days <30  Condition at Discharge: Stable  Level of care:skilled   Rehabilitation Potential: Fair  Admission H&P remains valid and up-to-date: Yes  Recent Chemotherapy: N/A  Use Nursing Home Standing Orders: Yes     Mantoux instructions    Give two-step Mantoux (PPD) Per Facility Policy Yes     Follow Up and recommended labs and tests    TCU MD for hospitalization follow up  [facility] in 2-3 weeks with CBC     Reason for your hospital stay    Generalized weakness, back pain, found to have gout flare and acute kidney injury     Intake and output    Every shift     Daily weights    Call Provider for weight gain of more than 2 pounds per day or 5 pounds per week.     Wound care    Left shin wounds: Every 2days    1. Cleanse with wound cleanser and blot dry  2. Spray all intact skin to BL lower extremities with Carmela cleanse and protect and rub in. Allow to soak for 10mins and then wipe away any excess.   3. Apply sween 24 (Pink top lotion) from base of toes to below knees.  4. Apply Adaptic (mineral gauze) to any open areas  5. Cover with kerlix and tape.   6. Time/Date/Initial dressing change         Abdominal skin fold care  1. Cleans with carmela spray (cleanser/moisture barrier)  2. Apply Triad paste  #[id number] to any open skin     Right lateral foot and heel every other day  1. Cleanse with wound cleanser   2. Dry and protect surrounding skin with no sting barrier film wipe #[id number] (this is very important)  3. Cover with silicone foam dressing Mepilex  4x4 #[id number]        Pressure Injury prevention (RN-please order supplies if not in room)  1. Turn every 2 hours, side to side avoid supine on bariatric pressure redistribution support surface  2.   Float heels off bed with use of  Heel Lift boots (Prevalon #812870)  3.   Prevent sliding and shear by limiting HOB to 30 degrees or less unless contraindicated, use knee gatch first if not contraindicated  4.   If sitting, use pressure redistribution chair cushion large(#346940); if unable to shift weight every hour, please limit sitting to an hour at a time  5.   Protective foam dressings PRN,Change at least q 4 days, peel back, peek and replace for daily skin inspection.  6.   Optimize nutrition                 Orders Revised  Recommended provider order: Lymphedema  WOC Nurse follow-up plan: Weekly  Nursing to notify the Provider(s) and re-consult the WOC Nurse if wound(s) deteriorates or new skin concern.     Activity - Up with nursing assistance     Physical Therapy Adult Consult    Evaluate and treat as clinically indicated.    Reason:  deconditioned, gout, gabriela, obesity     Occupational Therapy Adult Consult    Evaluate and treat as clinically indicated.    Reason:  deconditioned, gout, gabriela, obesity     Fall precautions     Diet    Follow this diet upon discharge: Orders Placed This Encounter      Combination Diet 2 gm K Diet       Significant Results and Procedures   Most Recent 3 CBC's:Recent Labs   Lab Test 03/17/22  0722 03/14/22  0735 03/13/22  1044   WBC 3.8* 3.5* 3.4*   HGB 9.1* 8.7* 9.1*   MCV 89 87 90    111* 112*     Most Recent 3 BMP's:Recent Labs   Lab Test 03/17/22  0722 03/15/22  1201 03/13/22  0725    138 138   POTASSIUM 4.7 4.1 4.4    CHLORIDE 111* 109 110*   CO2 23 28 24   BUN 22 24 28   CR 1.89* 1.90* 1.81*   ANIONGAP 4 1* 4   JOHN 8.7 8.6 8.7   * 85 98   ,   Results for orders placed or performed during the hospital encounter of 02/20/22   US Renal Complete    Narrative    EXAM: US RENAL COMPLETE  LOCATION: [facility]  DATE/TIME: 2/27/2022 3:15 PM    INDICATION: CKD with recent onset of acidosis and hyperkalemia. Eval for obstruction.  COMPARISON: None.  TECHNIQUE: Routine Bilateral Renal and Bladder Ultrasound.    FINDINGS: Severely limited study by patient size.    RIGHT KIDNEY: 9.7 x 4.8 x 4.3 cm. The central kidney has a 2.1 cm anechoic structure with sharp back wall which is likely a simple cyst. No hydronephrosis seen, however the kidney is not well visualized.     LEFT KIDNEY: This kidney is not visualized.    BLADDER: Normal.      Impression    IMPRESSION:  1.  Suboptimal study.  2.  No right hydronephrosis.  3.  Right renal presumed cyst. This could be further evaluated with additional ultrasound or CT.  4.  Left kidney not visualized.       Discharge Medications   Discharge Medication List as of 3/18/2022  5:32 PM      START taking these medications    Details   allopurinol (ZYLOPRIM) 300 MG tablet Take 1 tablet (300 mg) by mouth daily, Transitional         CONTINUE these medications which have CHANGED    Details   colchicine (COLCYRS) 0.6 MG tablet Take 1 tablet (0.6 mg) by mouth daily, Transitional      fentaNYL (DURAGESIC) 25 mcg/hr 72 hr patch Place 1 patch onto the skin every 72 hours for 9 days remove old patch., Disp-3 patch, R-0, Local Print      metoprolol succinate ER (TOPROL-XL) 25 MG 24 hr tablet Take 1 tablet (25 mg) by mouth daily, Transitional      warfarin ANTICOAGULANT (COUMADIN) 3 MG tablet Take 1 tablet (3 mg) by mouth daily Adjust dose based on INR, Transitional         CONTINUE these medications which have NOT CHANGED    Details   acetaminophen (TYLENOL) 325 MG tablet Take 2  tablets (650 mg) by mouth every 6 hours as needed for mild pain or other (and adjunct with moderate or severe pain or per patient request), No Print Out      famotidine (PEPCID) 20 MG tablet Take 1 tablet (20 mg) by mouth 2 times daily as needed (heartburn), No Print Out      ferrous sulfate (FEROSUL) 325 (65 Fe) MG tablet Take 1 tablet (325 mg) by mouth daily, Disp-90 tablet, R-3, E-Prescribe      melatonin 1 MG TABS tablet Take 1 tablet (1 mg) by mouth nightly as needed for sleep, No Print Out      methocarbamol (ROBAXIN) 500 MG tablet Take 500 mg by mouth 3 times daily as needed for muscle spasms, Historical      sodium bicarbonate 650 MG tablet Take 2 tablets (1,300 mg) by mouth 3 times daily, Disp-180 tablet, R-0, E-Prescribe         STOP taking these medications       febuxostat (ULORIC) 40 MG TABS tablet Comments:   Reason for Stopping:         lisinopril (ZESTRIL) 10 MG tablet Comments:   Reason for Stopping:         oxyCODONE (ROXICODONE) 5 MG tablet Comments:   Reason for Stopping:         predniSONE (DELTASONE) 20 MG tablet Comments:   Reason for Stopping:             Allergies   No Known Allergies

## 2022-03-18 NOTE — PROGRESS NOTES
Service Date: 2022    SUBJECTIVE:  Mr. Wade is a 66-year-old gentleman with multiple medical problems including CHF, gout, hyperlipidemia, hypertension and chronic kidney disease.  Hematology/Oncology is seeing him for pancytopenia.  Pancytopenia is due to marrow suppression.  No deficiency of iron, vitamin B12 and folate.  Anemia is also from renal disease.  Plan is to monitor CBC and consider bone marrow biopsy if there is worsening of his cytopenia.    The patient's cytopenia is better.  Today, his platelets are normal at 170.  Hemoglobin and WBC also have improved.    Overall, his condition is stable.  No worsening shortness of breath.  No bleeding from any site.  No fever or chills.  He feels weak. Weakness has not gotten worse.    PHYSICAL EXAMINATION:  He is alert and oriented x 3.  Not in distress.  Rest of systems not examined.    ASSESSMENT:    1.  A 66-year-old gentleman with pancytopenia, which is improving. Pancytopenia is mainly due to marrow suppression from his acute illness and medication.  Anemia is also due to renal disease.  2.  Other medical problems including chronic kidney disease and hypertension.    PLAN:  1.  CBC was reviewed with him.  He was happy to know that pancytopenia is better.  His platelets are normal.  WBC is only minimally low.  Hemoglobin is stable around 9.  2.  I explained to the patient that I am hoping his pancytopenia improves further.  He is always going to be anemic because of renal disease and anemia of chronic disease.  3.  Hematology/Oncology will sign off.  We will see him in clinic in 2-3 weeks with a CBC.  Please call us with any questions.    Shell Rivera MD        D: 2022   T: 2022   MT: marquez    Name:     NARENDRA WADE  MRN:      [id number]        Account:      [id number]   :      1955           Service Date: 2022       Document: [id number]

## 2022-03-18 NOTE — PHARMACY-ANTICOAGULATION SERVICE
Clinical Pharmacy - Warfarin Dosing Consult     Pharmacy has been consulted to manage this patient s warfarin therapy.  Indication: Atrial Fibrillation  Therapy Goal: INR 2-3  Provider/Team: Dr. Ben Hamlin  [facility]: [facility]  Warfarin Prior to Admission: Yes (see eMAR for current in-house doses)  Warfarin PTA Regimen: 7.5 mg every Tue, Thu; 5 mg all other days  Significant drug interactions: allopurinol (may increase INR)        Recommend warfarin 3 mg today.  Pharmacy will monitor Panda Miranda daily and order warfarin doses to achieve specified goal.      Please contact pharmacy as soon as possible if the warfarin needs to be held for a procedure or if the warfarin goals change.

## 2022-03-18 NOTE — PROGRESS NOTES
TCU ADMISSION COVID STATUS        Patient recently admitted to TCU.     Per Immunization record, patient has the following for COVID-19 prevention: Pfizer and 2 doses.    No need to meet with patient to discuss vaccine.    Patient is up to date on vaccine, but is not yet eligible to receive the BOOSTER. Second does of vaccine 2/20/22.    Jyoti Pearson RN, BSN  [facility] and

## 2022-03-18 NOTE — PLAN OF CARE
Goal Outcome Evaluation:    Patient remains stable wit vitals in the normal range. Denies pain. Alert and oriented. Good appetite. Using the urine by self. No bowel movement this morning.  Plant to discharge to [facility] TCU this afternoon. Belonging packed in bags and patient luggage.

## 2022-03-19 NOTE — PLAN OF CARE
Goal Outcome Evaluation:  Pt.A&Ox4. Uses call light appropriately and makes needs known. Morbidly obese. Immerse mattress. Awake initial rounds - eating some leftover food. Has no c/o's pain, discomfort, CP and SOB. BLE with lymphedema wraps and Rooke boots. Continent using urinal indep.- staff emptied. Appears to be sleeping well with personal cpap in use.         Patient's most recent vital signs are:     Vital signs:  BP: 132/54  Temp: 97.5  HR: 88  RR: 18  SpO2: 100 %     Patient does not have new respiratory symptoms.  Patient does not have new sore throat.  Patient does not have a fever greater than 99.5.

## 2022-03-19 NOTE — H&P
[facility]    History and Physical - Hospitalist Service       Date of Admission:  3/18/2022    Assessment & Plan      A: Patient is a 61 y/o man who has multiple medical problems including gout, pseudogout, atrial fibrillation, morbid obesity s/p past bariatric surgery and chronic kidney disease stage III. Patient was hospitalized at [facility] from 27-Jan-2022 to 30-Jan-2022 for sepsis secondary to complicated urinary tract infection vs. pyelonephritis. Patient was hospitalized at [facility] from 11-Feb-2022 to 20-Feb-2022 for hyperkalemia, acute kidney injury and acute gout. Patient was discharged on 20-Feb-2022 and presented to [facility] with weakness - patient reportedly was unable to walk up the steps to his home. Patient was later found to have recurrent hyperkalemia. During hospital stay, patient was also treated for acute polyarticular gout. Patient completed a prednisone taper during hospital stay. Patient was also transitioned from uloric to allopurinol. Patient had pancytopenia during hospital stay that resolved prior to discharge. Patient was discharged to TCU on 18-Mar-2022.     From review of laboratory data, patient appears to have chronic kidney disease stage IIIb. Patient was positive for Covid-19 on 27-Jan-2022 but is now regarded as Covid-19 recovered.    P:  1.) Generalized weakness and debility: PT/OT.  2.) Acute polyarticular gout; gout flare appears resolved; patient also has history of pseudogout: Patient on allopurinol and colchicine. Patient no longer on uloric.  3.) Atrial fibrillation: Patient on metoprolol for rate control and on coumadin for anticoagulation.  4.) Chronic diastolic heart failure: Monitoring for evidence of acute heart failure.   5.) Hypertension: Monitoring on metoprolol.  6.) Chronic kidney disease stage IIIb: Patient on oral sodium bicarbonate. Avoiding nephrotoxins.  7.) Obstructive sleep apnea: Patient using CPAP.  8.)  Pancytopenia, resolved: Monitoring labs periodically.  9.) Osteoarthritis: Monitoring for new symptoms.  10.) Morbid obesity; patient had bariatric surgery in the past: To f/u as outpatient.  11.) Chronic venous stasis ulcers, bilateral: Compression wraps as needed.         Omer Gilmore MD  Hospitalist Service  [facility]  Securely message with the Vocera Web Console (learn more here)  Text page via [employer] Paging/Directory     ______________________________________________________________________    Chief Complaint     Generalized weakness    History of Present Illness     Patient is a 61 y/o man who has multiple medical problems including gout, pseudogout, atrial fibrillation, morbid obesity and chronic kidney disease stage III. Patient was hospitalized at [facility] from 27-Jan-2022 to 30-Jan-2022 for sepsis secondary to complicated urinary tract infection vs. pyelonephritis. Patient was hospitalized at [facility] from 11-Feb-2022 to 20-Feb-2022 for hyperkalemia, acute kidney injury and acute gout. Patient was discharged on 20-Feb-2022 and presented to [facility] with weakness - patient reportedly was unable to walk up the steps to his home. Patient was later found to have recurrent hyperkalemia. During hospital stay, patient was also treated for acute polyarticular gout. Patient was discharged to TCU on 18-Mar-2022.     Patient currently reports feeling well but notes some dyspnea with activity. Patient notes no orthopnea. Patient notes occasional cough and wheezing at baseline but reports no cough and no wheezing at present. Patient notes no fever and no chills. Patient notes that pain from gout flare has resolved. Patient notes no other problems at this time.       Review of Systems    - 10 point review of systems unremarkable aside from what was mentioned in HPI    Past Medical History    I have reviewed this patient's medical history and updated it with pertinent information  if needed.   Past Medical History:   Diagnosis Date     (HFpEF) heart failure with preserved ejection fraction (H)      Arthritis      Arthropathy of wrist      Atrial fibrillation (H)      Bradycardia      Cancer (H) 2011    colon cancer; hemicolectomy     CHF (congestive heart failure) (H)      Chronic kidney disease      Gout      Hand swelling      Heart valve disease     intermittent mitral regurgitation     HTN (hypertension)      Hyperlipidemia      Morbid obesity (H)      Obesity      TASHA (obstructive sleep apnea)     CPAP     Perimembranous ventricular septal defect    - Chronic kidney disease stage III      Past Surgical History   I have reviewed this patient's surgical history and updated it with pertinent information if needed.  Past Surgical History:   Procedure Laterality Date     COLON SURGERY       COLONOSCOPY N/A 12/12/2019    Procedure: COLONOSCOPY;  Surgeon: Flaco Magaña MD;  Location: [facility];  Service: General     GASTROPLASTY VERTICAL BANDED      Dr. Arizmendi [facility] 1996 Initial weight 800++ Lost to 440- (was 320# at lowest)     KNEE SURGERY       ZZC PART REMOVAL COLON W ANASTOMOSIS      Description: Partial Colectomy;  Recorded: 12/02/2011;  Comments: Dr Magaña       Social History   I have reviewed this patient's social history and updated it with pertinent information if needed.  Social History     Tobacco Use     Smoking status: Never Smoker     Smokeless tobacco: Never Used   Substance Use Topics     Alcohol use: Yes     Alcohol/week: 0.8 standard drinks     Comment: Alcoholic Drinks/day: occasional     Drug use: No       Family History   I have reviewed this patient's family history and updated it with pertinent information if needed.  Family History   Problem Relation Age of Onset     Diabetes Type 2  Other      Heart Failure Other      Heart Disease Mother      Hypertension Mother      Diabetes Sister        Prior to Admission Medications   Prior to Admission  Medications   Prescriptions Last Dose Informant Patient Reported? Taking?   acetaminophen (TYLENOL) 325 MG tablet   No No   Sig: Take 2 tablets (650 mg) by mouth every 6 hours as needed for mild pain or other (and adjunct with moderate or severe pain or per patient request)   allopurinol (ZYLOPRIM) 300 MG tablet   No No   Sig: Take 1 tablet (300 mg) by mouth daily   colchicine (COLCYRS) 0.6 MG tablet   No No   Sig: Take 1 tablet (0.6 mg) by mouth daily   famotidine (PEPCID) 20 MG tablet   No No   Sig: Take 1 tablet (20 mg) by mouth 2 times daily as needed (heartburn)   fentaNYL (DURAGESIC) 25 mcg/hr 72 hr patch   No No   Sig: Place 1 patch onto the skin every 72 hours for 9 days remove old patch.   ferrous sulfate (FEROSUL) 325 (65 Fe) MG tablet   No No   Sig: Take 1 tablet (325 mg) by mouth daily   melatonin 1 MG TABS tablet   No No   Sig: Take 1 tablet (1 mg) by mouth nightly as needed for sleep   methocarbamol (ROBAXIN) 500 MG tablet   Yes No   Sig: Take 500 mg by mouth 3 times daily as needed for muscle spasms   metoprolol succinate ER (TOPROL-XL) 25 MG 24 hr tablet   No No   Sig: Take 1 tablet (25 mg) by mouth daily   sodium bicarbonate 650 MG tablet   No No   Sig: Take 2 tablets (1,300 mg) by mouth 3 times daily   warfarin ANTICOAGULANT (COUMADIN) 3 MG tablet   No No   Sig: Take 1 tablet (3 mg) by mouth daily Adjust dose based on INR      Facility-Administered Medications: None     Allergies   No Known Allergies    Physical Exam   Vital Signs: Temp: (!) 96.7  F (35.9  C) Temp src: Oral BP: 124/62 Pulse: 84   Resp: 18 SpO2: 97 % O2 Device: None (Room air)      General: Patient comfortable, NAD.  Eyes: No scleral icterus or conjunctival injection.  Heart: Irregularly irregular, S1 S2 w/o murmurs.  Lungs: Breath sounds present. No crackles/wheezes heard.  Gastrointestinal: Abdomen soft, nontender.  Musculoskeletal: Mild arthritic changes in hands. No visible joint swelling or erythema.   Skin: Warm, dry.  Neuro:  Cranial nerves II-XII grossly intact.  Psych: Affect pleasant.

## 2022-03-19 NOTE — PLAN OF CARE
"Pt is alert and oriented x4. Declined full skin assessment. Pt itches back a lot. Reports it can get very itchy and that he has a something he uses to itch at home. At the moment using his cane to scratch, writer offered to wash his back and applied lotion, pt reported feeling better. Writer encouraged him not to use his cane as it causes skin breakdown, reported he couldn't help it. Back w/ scratch marks, healed and new ones. Skin cleansed on folds area, moisture noted, offered interdy but declined, says it makes it worse. Sticky note left for provider to order micatin powder for skin folds.  Sore noted on left pannus area, writer offered to place mepilex, declined, wanted the area to \"breath\". No skin concerns noted on groin area. Declined assessment to buttock/sacrum/coccyx area. Said he is fine and has no sores. BLE dressings CDI, completed yesterday by WOC RN. Came w/ rooke boots, requested for them at hs, boots applied.  "

## 2022-03-19 NOTE — PLAN OF CARE
Pt is A&Ox4. He denied pain this shift. Pt was in bed the whole shift. He had BM on bedpan, he used urinal, staff emptied. Changed 2 LLE dressings and R heel dressing. Rooke boots and compression wrap removed for several hours from BLE, skin was washed and moisturized. Boots and compression wrap back on at 1400. Pt had a good appetite. He was compliant with his medications. Continue POC.       Patient's most recent vital signs are:     Vital signs:  BP: 124/62  Temp: 96.7  HR: 84  RR: 18  SpO2: 97 %     Patient does not have new respiratory symptoms.  Patient does not have new sore throat.  Patient does not have a fever greater than 99.5.

## 2022-03-20 NOTE — PHARMACY-MEDICATION REGIMEN REVIEW
Pharmacy Medication Regimen Review  Panda Miranda is a 66 year old male who is currently in the Transitional Care Unit.    Assessment: All medications have an appropriate indications, durations and no unnecessary use was found    Plan:   Continue current medication regimen.   Attending provider will be sent this note for review.  If there are any emergent issues noted above, pharmacist will contact provider directly by phone.      Pharmacy will periodically review the resident's medication regimen for any PRN medications not administered in > 72 hours and discontinue them. The pharmacist will discuss gradual dose reductions of psychopharmacologic medications with interdisciplinary team on a regular basis.    Please contact pharmacy if the above does not answer specific medication questions/concerns.    Background:  A pharmacist has reviewed all medications and pertinent medical history today.  Medications were reviewed for appropriate use and any irregularities found are listed with recommendations.      Current Facility-Administered Medications:      [START ON 3/21/2022] - Skin Test Reading -, , Does not apply, Q21 Days, Omer Gilmore MD     acetaminophen (TYLENOL) tablet 650 mg, 650 mg, Oral, Q6H PRN, Omer Gilmore MD     allopurinol (ZYLOPRIM) tablet 300 mg, 300 mg, Oral, Daily, Omer Gilmore MD, 300 mg at 03/20/22 0930     bisacodyl (DULCOLAX) Suppository 10 mg, 10 mg, Rectal, Daily PRN, Omer Gilmore MD     colchicine (COLCYRS) tablet 0.6 mg, 0.6 mg, Oral, Daily, Omer Gilmore MD, 0.6 mg at 03/20/22 0929     docusate sodium (COLACE) capsule 100 mg, 100 mg, Oral, BID PRN, Omer Gilmore MD     famotidine (PEPCID) tablet 20 mg, 20 mg, Oral, BID PRN, Omer Gilmore MD     fentaNYL (DURAGESIC) 25 mcg/hr 72 hr patch 1 patch, 25 mcg, Transdermal, Q72H, Omer Gilmore MD, 1 patch at 03/20/22 0930     fentaNYL (DURAGESIC) Patch in Place, , Transdermal, Q8H, Omer Gilmore MD     magnesium hydroxide (MILK OF MAGNESIA)  suspension 30 mL, 30 mL, Oral, Daily PRN, Omer Gilmore MD     methocarbamol (ROBAXIN) tablet 500 mg, 500 mg, Oral, TID PRN, Omer Gilmore MD     metoprolol succinate ER (TOPROL-XL) 24 hr tablet 25 mg, 25 mg, Oral, Daily, Omer Gilmore MD, 25 mg at 03/20/22 0929     naloxone (NARCAN) injection 0.2 mg, 0.2 mg, Intravenous, Q2 Min PRN **OR** naloxone (NARCAN) injection 0.4 mg, 0.4 mg, Intravenous, Q2 Min PRN **OR** naloxone (NARCAN) injection 0.2 mg, 0.2 mg, Intramuscular, Q2 Min PRN **OR** naloxone (NARCAN) injection 0.4 mg, 0.4 mg, Intramuscular, Q2 Min PRN, Omer Gilmore MD     Nurse may request from Pharmacy a change of form of medication (e.g. Liquid to tablet)., , Does not apply, Continuous PRN, Omer Gilmore MD     ondansetron (ZOFRAN-ODT) ODT tab 4 mg, 4 mg, Oral, Q6H PRN **OR** ondansetron (ZOFRAN) injection 4 mg, 4 mg, Intravenous, Q6H PRN, Omer Gilmore MD     oxyCODONE (ROXICODONE) tablet 5 mg, 5 mg, Oral, Q6H PRN, Omer Gilmore MD     Patient is already receiving anticoagulation with heparin, enoxaparin (LOVENOX), warfarin (COUMADIN)  or other anticoagulant medication, , Does not apply, Continuous PRN, Omer Gilmore MD     polyethylene glycol (MIRALAX) Packet 17 g, 17 g, Oral, Daily PRN, Omer Gilmore MD     sodium bicarbonate tablet 1,300 mg, 1,300 mg, Oral, TID, Omer Gilmore MD, 1,300 mg at 03/20/22 0930     tuberculin injection 5 Units, 5 Units, Intradermal, Q21 Days, Omer Gilmore MD, 5 Units at 03/19/22 0912     warfarin ANTICOAGULANT (COUMADIN) tablet 3 mg, 3 mg, Oral, ONCE at 18:00, Omer Gilmore MD     Warfarin Therapy Reminder (Check START DATE - warfarin may be starting in the FUTURE), 1 each, Does not apply, Continuous PRMAXIMO, Omer Gilmore MD  No current outpatient prescriptions on file.   PMH: gout, pseudogout, atrial fibrillation, morbid obesity s/p past bariatric surgery and chronic kidney disease stage III

## 2022-03-20 NOTE — PROGRESS NOTES
Social Work: Initial Assessment with Discharge Plan    Patient Name: Panda Miranda  : 1955  Age: 66 year old  MRN: [id number]  Completed assessment with: Chart review and pt interview  Admitted to TCU: 3/18/2022    Presenting Information   Date of SW assessment: 2022  Health Care Directive: Health Care Directive Agent (if patient not able to make decisions)  Primary Health Care Agent: Patient/self  Secondary Health Care Agent: NOK/spouse, Angelica  Living Situation: 3 level split- home in [place]; [address], 16 stairs within home.  Previous Functional Status: Pt was IND with ADLs and IADLs.  DME available: straight cane, manual WC, reacher, CPAP, ramp   Patient and family understanding of hospitalization: Appropriate  Cultural/Language/Spiritual Considerations: Pt is a 66 y.o. black male, , English-speaking, [employer].  Abuse concerns: None reported  BIMS: Pt scored 13/15 on BIMS indicating cognition intact.  PHQ-9: Pt scored 00 on PHQ-9 indicating no depressive symptoms.  PAS: confirmation number- 07959  Has there been a level II screen?  No  Were there any recommendations in the screen? No  If yes, will the recommendations we incorporated into the Plan of Care?  N/A  Physical Health  Reason for admission:   Patient is a 59 y/o man who has multiple medical problems including gout, pseudogout, atrial fibrillation, morbid obesity s/p past bariatric surgery and chronic kidney disease stage III. Patient was hospitalized at [facility] from 2022 to 2022 for sepsis secondary to complicated urinary tract infection vs. pyelonephritis. Patient was hospitalized at [facility] from 2022 to 2022 for hyperkalemia, acute kidney injury and acute gout. Patient was discharged on 2022 and presented to [facility] with weakness - patient reportedly was unable to walk up the steps to his home. Patient was later found to have recurrent hyperkalemia. During hospital  stay, patient was also treated for acute polyarticular gout. Patient completed a prednisone taper during hospital stay. Patient was also transitioned from uloric to allopurinol. Patient had pancytopenia during hospital stay that resolved prior to discharge. Patient was discharged to TCU on 18-Mar-2022.     Provider Information   Primary Care Physician:Ben Hamlin   [address] [place] [id number]  PH: [phone]  : None reported    Mental Health:   Diagnosis: None reported  Current Support/Services: None reported  Previous Services: Not discussed   Services Needed/Recommended: N/A    Substance Use:  Diagnosis: Pt denies past or current concerns.  Current Support/Services: N/A  Previous Services: Not discussed  Services Needed/Recommended: N/A    Support System  Marital Status: Wife- Angelica, works full-time usually from home  Family support: Pt has one adult son, Panda, who lives close by. Also has 2 adult children through marriage:1-lives out of state, 1-lives in their home but is disabled and received 24 hr nursing care.        Other support available: siblings, nieces and nephews.  Gaps in support system: None identified    Community Resources  Current in home services: None reported  Previous services: [facility]    Financial/Employment/Education  Employment Status: Retired; [employer]/-20 years, [employer], [employer]  Income Source: States he has 3 pensions  Education: Highschool, some technical college  Financial Concerns:  None reported  Insurance: [facility] Med Adv.      Discharge Plan   Patient and family discharge goal: Return home  Provided Education on discharge plan: YES  Patient agreeable to discharge plan:  YES  A list of Medicare Certified Facilities was provided to the patient and/or family to encourage patient choice. Based on location and rating, patient would like referrals made to: Not applicable at this time.  General information regarding  anticipated insurance coverage and possible out of pocket cost was discussed. Patient and patient's family are aware patient may incur the cost of transportation to the facility, pending insurance payment: YES  Barriers to discharge: Stairs within home    Discharge Recommendations   Disposition: Home  Transportation Needs: Family will provide  Name of Transportation Company and Phone: N/A    Additional comments     SW will continue to follow for support and discharge planning.        BRODIE Sandhu, LSW  [facility]   Pager: [phone]

## 2022-03-20 NOTE — PROGRESS NOTES
03/20/22 0820   Quick Adds   Quick Adds Certification   Type of Visit Initial Occupational Therapy Evaluation   Living Environment   People in Home spouse  (wife Nieves)   Current Living Arrangements house   Home Accessibility stairs to enter home;stairs within home   Transportation Anticipated family or friend will provide  (Pt and wife drive)   Living Environment Comments oT: pt reports living in house w/ wife in [place], 5 stairs to enter, but main level of house has kitchen/LR/DR/ half bath, needs to go up 15 stairs to get to bedroom and full bath, full bath has tub/shower combo w/ grab bars but does not have bench/shower seat, pt reports he and wife drive, pt retired, wife works full time for state but usually works from home, pt reports using SEC (he has it in TCU) for mobility, reports he is mostly house hold ambulator because gets SOB so uses w/c at clinic for appts, states he has a 4WW in gargage donated to him but not sure it is right size (pt is 6ft 6in and 465#), pt has reacher at home, stated he and wife both do cooking/cleaning, hires nephew to do yardwork, uses CPAP at home and here. pt reports indep w/ adls/mobility PTA, pt report there is a ramp at home but pt prefers not to use it , Ramp installed due to pt has handicapped son that lives in house w/ pt and wife . pt reports son receieves 24hr nsg care. pt added the half bathroom on main level has grab bar and  taller toilet.    Self-Care   Usual Activity Tolerance fair   Current Activity Tolerance poor   Regular Exercise No   Equipment Currently Used at Home cane, straight   Fall history within last six months no   Activity/Exercise/Self-Care Comment OT: see details above in Live en[facility] comments   Instrumental Activities of Daily Living (IADL)   Previous Responsibilities meal prep;housekeeping  (wife/pt share)   General Information   Onset of Illness/Injury or Date of Surgery 02/20/22   Referring Physician Omer Gilmore MD   Patient/Family Therapy  Goal Statement (OT) get more mobile, get home   Additional Occupational Profile Info/Pertinent History of Current Problem per chart review:Patient is a 66-year-old gentleman with a past medical history of chronic systolic CHF, chronic kidney disease stage IV, atrial fibrillation, gout and pseudogout, severe obesity and recent hospitalization for pyelonephritis and COVID-19 infection who presented to an outside emergency room on 2/10/2022 with generalized weakness and lower back pain and was hospitalized for observation due to an acute flare of crystal induced polyarthritis in addition to significant weakness and associated acute functional decline.  He then developed worsening hyperkalemia on 2/16 up to 7.3 and was admitted for treatment of severe hyperkalemia.  History of 2019 novel coronavirus disease (COVID-19)-Jan 2022, now recovered, see H&P for extensive medical hx,    Existing Precautions/Restrictions fall  (bariatric (6ft 6in, 465#))   General Observations and Info oT: pt fatigues quickly but very motivated   Cognitive Status Examination   Orientation Status orientation to person, place and time   Cognitive Status Comments OT; basic cog appears WFL, assess PRN   Visual Perception   Visual Acuity   (pt reports not wearing glasses, rding glasses occassionally)   Pain Assessment   Patient Currently in Pain   (no pain on eval/treatment)   Range of Motion Comprehensive   Comment, General Range of Motion OT: kings ue sh flex <full but WFL, distal WFL   Strength Comprehensive (MMT)   Comment, General Manual Muscle Testing (MMT) Assessment R dom, R slightly weaker than LUE, 3+ R/4- L, but fatigues quick w/ repetition   Bed Mobility   Comment (Bed Mobility) OT: rolling w/ min A and bed rail, supine to sit w/ HOB elevated and min A , sat unsupported at EOB w/ sba   Transfers   Transfer Comments OT; sit to stand w/ min A of 2 , bariatric FWW and bed ht elevated    Activities of Daily Living   BADL Assessment/Intervention    (gown set up, pants refused, socks total assist)   Clinical Impression   Criteria for Skilled Therapeutic Interventions Met (OT) Yes, treatment indicated   OT Diagnosis decreased indep w/ adls/Iadls/mobiity   OT Problem List-Impairments impacting ADL problems related to;activity tolerance impaired;strength  (large body  habitus)   Assessment of Occupational Performance 3-5 Performance Deficits   Planned Therapy Interventions (OT) ADL retraining;IADL retraining;balance training;bed mobility training;strengthening;transfer training;progressive activity/exercise   Clinical Decision Making Complexity (OT) low complexity   Anticipated Equipment Needs Upon Discharge (OT) bariatric equipment  (consider toileting and bathing equip, LB dsg equip)   Risk & Benefits of therapy have been explained evaluation/treatment results reviewed;care plan/treatment goals reviewed;risks/benefits reviewed;current/potential barriers reviewed;participants voiced agreement with care plan;participants included;patient   Clinical Impression Comments OT:pt present w/ significant generalized weakness and impaird activty artur, pt has large body habitus, limiting pts indep w/ adls/mobiity/Iadls, recommend cont OT to address deficits to increase ease/safety and maximize indep w/ adls/IADLs/mobiity and asses for needed AE to return home .   Therapy Certification   Start of Care Date 03/20/22   Certification date from 03/20/22   Certification date to 04/18/22   Medical Diagnosis sepsis, Covid recovery,CKD stage 4,  chronic systolic CHF, chronic kidney disease stage IV, atrial fibrillation, gout and pseudogout, severe obesity    Total Evaluation Time (Minutes)   Total Evaluation Time (Minutes) 15   OT Goals   Therapy Frequency (OT) 6 times/wk   OT Predicated Duration/Target Date for Goal Attainment 04/11/22   OT Goals Hygiene/Grooming;Upper Body Dressing;Lower Body Dressing;Upper Body Bathing;Bed Mobility;Transfers;Toilet Transfer/Toileting   OT:  Hygiene/Grooming supervision/stand-by assist   OT: Upper Body Dressing Supervision/stand-by assist   OT: Lower Body Dressing Minimal assist;using adaptive equipment   OT: Upper Body Bathing Supervision/stand-by assist   OT: Bed Mobility Modified independent   OT: Transfer Modified independent;with assistive device   OT: Toilet Transfer/Toileting Modified independent;using adaptive equipment

## 2022-03-20 NOTE — PLAN OF CARE
Pt is alert and oriented x4. Able to make needs known. Denies osmel, cp or SOB. Continent of bowel and bladder. Utilizes bed pan and urinal; staff empties. Assit of 2 pivot transfer. BLE dressings CDI, wound cares done in day shift. Rooke boots intact. Pleasant and cooperative. CPAP at hs, pt independent with it. Call light in reach, continue w/ POC.    Addendum: Around 2310, pt in room 423 mistakenly entered room 421 after done using bathroom( shared bathroom); Pt thought that was his room as he was assigned that room previously. Pt in 423 got redirected back to his room. RN assessed pt in 421 and asked him to report what happened while other pt was in room.Pt reported that when other pt came in he touched his boots and his CPAP machine hose pulling on it but no harm done. Writer apologized about the situation, pt said it was ok and writer left room.    Patient's most recent vital signs are:     Vital signs:  BP: 120/67  Temp: 97.4  HR: 75  RR: 18  SpO2: 99 %     Patient does not have new respiratory symptoms.  Patient does not have new sore throat.  Patient does not have a fever greater than 99.5.

## 2022-03-20 NOTE — PLAN OF CARE
Goal Outcome Evaluation:  No acute concerns overnight. Requested and given erna crackers/milk. Sleeps well with personal cpap in use. Wakes occasionally. Has no c/o's pain, discomfort, CP and SOB. Rooke boots and wraps to BLEs. Continent using urinal indep.- staff empties. LBM yesterday using bedpan.         Patient's most recent vital signs are:     Vital signs:  BP: 120/67  Temp: 97.4  HR: 75  RR: 18  SpO2: 99 %     Patient does not have new respiratory symptoms.  Patient does not have new sore throat.  Patient does not have a fever greater than 99.5.

## 2022-03-20 NOTE — PLAN OF CARE
Pt is A&Ox4. He denied pain this shift. Pt was up on the side of the bed for a period. He used urinal, staff emptied. Reported large BM. Rooke boots and compression wrap removed for several hours from BL. Boots and compression wrap back on at 1400. Pt had a good appetite. He was compliant with his medications. Continue POC.     Patient's most recent vital signs are:     Vital signs:  BP: 95/56  Temp: 96.2  HR: 65  RR: 20  SpO2: 93 %     Patient does not have new respiratory symptoms.  Patient does not have new sore throat.  Patient does not have a fever greater than 99.5.

## 2022-03-20 NOTE — PLAN OF CARE
"Discharge Planner Post-Acute Rehab PT:     Discharge Plan: Home with spouse, mod I    Precautions: fall risk    Current Status:  Bed Mobility: CGA - min A rolling, Mod A for supine to sit, Max A for legs sit to supine  Transfer: SBA sit to stand, raise bed up to stand, lower bed to sit  Gait: ~30' with front WW, SBA, 180 degree turn  Stairs: NT  Balance: sits EOB demonstrates good balance ~20 min with intermittent UE support. Requires heavy UE support on walker with standing balance.    Assessment:  PT is a 66 year old male who present following hospitilization. He was hospitilized for several weeks and upon return home he could not ascend stairs to his home so returned to hospital. He has a significant medical history including gout and SHANTE. Pt eval revealed decreased endurance, strength and functional mobility as compared to baseline. Morbid obesity is a complicating factor. Pt will benefit from skilled physical therapy to address these deficits and aide in restoration to PLOF and reduce risk of rehospitilization and to reduce fall risk.    Pt requires bariatric w/c but is 6' 6\" tall so need to find w/c with high seat to use on unit    Other Barriers to Discharge (DME, Family Training, etc): pt will need bariatric front WW, 15 steps to get to bed room, steps to get into house ~8 inches tall.      "

## 2022-03-20 NOTE — PROGRESS NOTES
"   03/20/22 1201   Quick Adds   Quick Adds Certification   Type of Visit Initial PT Evaluation   Living Environment   People in Home spouse   Current Living Arrangements house   Home Accessibility stairs to enter home;stairs within home   Number of Stairs, Main Entrance 5   Stair Railings, Main Entrance railings on both sides of stairs   Number of Stairs, Within Home, Primary greater than 10 stairs  (15)   Stair Railings, Within Home, Primary railings on both sides of stairs   Transportation Anticipated family or friend will provide   Living Environment Comments States that when he went home from hospital last time, he got home and could not ascend his stairs. The ones in practicie in PT were shorter than his. He estimates his steps are 8-10\". He also states he couldnt put weight on R knee d/t pain. He states he will need to go up steps in house to his bedroom, unable to have a bedroom on first floor as his medically dependent son lives with them and has a bedroom in the dining room already. States wife mostly works from home but goes in a few times a week.    Self-Care   Usual Activity Tolerance fair   Current Activity Tolerance poor   Regular Exercise No   Equipment Currently Used at Home cane, straight   Fall history within last six months no   Activity/Exercise/Self-Care Comment Pt states PLOF he could walk around house with cane, although he would get short winded. He could walk out to truck and climb inside.  He states he has, arthritis in back and probably R knee. Played HS football and injured knee \"ripped in half playing football - had 3 torn ligaments sewn back together. He has edema that he manages at home. He attends wound clinic once a month to manage edema. Pt is retired [employer] , retiredin 2012.   Post-Acute Assessment Only   Post-Acute Functional Assessment See IP Rehab Daily Documentation Flowsheet for Functional Mobility/ADL Assessment   Previous Level of Function/Home Environm   Bed " "Mobility, Premorbid Functional Level independent   Transfers, Premorbid Functional Level independent   Household Ambulation, Premorbid Functional Level uses device or equipment  (cane)   Stairs, Premorbid Functional Level uses device or equipment   Community Ambulation, Premorbid Functional Level   ( appelliot get w/c and wheeled in)   Previous Level of Function, Premorbid ind with household ambulation, drove.   General Information   Onset of Illness/Injury or Date of Surgery 02/20/22   Referring Physician Dr. Omer Gilmore MD   Patient/Family Therapy Goals Statement (PT) to get home, be able to walk without becoming so winded, be able to climb steps to second story.   Pertinent History of Current Problem (include personal factors and/or comorbidities that impact the POC) Per chart review :\"Panda Miranda is a 66 year old male admitted on 2/20/2022 after being discharged earlier from [facility] stay from 2/11 - 2/20 for acute gout flare and subsequent SHANTE and hyperkalemia likely caused by gout treatment. Patient is morbidly obese and he was discharged to his home with home health but he was unable to manage going up steps immediately upon arrival home and thus was brought in to [facility] ED for placement. Initially admitted unde Obs status, switched to inpatient on 2/26 due to hyperkalemia.\"   Existing Precautions/Restrictions fall   General Observations Pt is awake lying supine in bed upon arrival, pt able to communicate verbally without concern.   Cognition   Affect/Mental Status (Cognition) WNL   Orientation Status (Cognition) oriented x 4   Cognitive Status Comments Defer to OT and SLP for formal assessment   Pain Assessment   Patient Currently in Pain No   Integumentary/Edema   Integumentary/Edema Comments pt has long history of edema, he has been managing at home with monthly visits to wound clinic.   Range of Motion (ROM)   ROM Comment LE ROM WFL   Strength (Manual Muscle Testing)   Strength Comments LE " Grossly 3+/5 as evidenced by motion against gravity   Balance   Balance Comments Pt demonstrates good sitting balance: sits EOB for ~15 minutes without UE support. Standing he requires heavy UE support on walker   Sensory Examination   Sensory Perception Comments with light touch screen pt absent sensation inferior to mid calf kings   Coordination   Coordination no deficits were identified   Clinical Impression   Criteria for Skilled Therapeutic Intervention Yes, treatment indicated   PT Diagnosis (PT) to walk and build wind up, walk up the steps   Influenced by the following impairments right knee pain, SOB with activity   Functional limitations due to impairments bed mobility, transfers, ambulation, stairs, out of bed activity   Clinical Presentation (PT Evaluation Complexity) Evolving/Changing   Clinical Presentation Rationale age, morbid obesity, recent hospitilization   Clinical Decision Making (Complexity) moderate complexity   Planned Therapy Interventions (PT) balance training;bed mobility training;cryotherapy;E-stim;gait training;groups;home exercise program;joint mobilization;lumbar stabilization;manual therapy techniques;neuromuscular re-education;motor coordination training;orthotic fitting/training;patient/family education;postural re-education;ROM (range of motion);prosthetic fitting/training;stair training;strengthening;stretching;swiss ball techniques;TENS;thermotherapy;transfer training;wheelchair management/propulsion training   Anticipated Equipment Needs at Discharge (PT) bariatric equipment;walker, standard  (front WW, )   Risk & Benefits of therapy have been explained evaluation/treatment results reviewed;care plan/treatment goals reviewed;risks/benefits reviewed;current/potential barriers reviewed;participants voiced agreement with care plan;participants included;patient   Clinical Impression Comments PT is a 66 year old male who present following hospitilization. He was hospitilized for several  weeks and upon return home he could not ascend stairs to his home so returned to hospital. He has a significant medical history including gout and SHANTE. Pt eval revealed decreased endurance, strength and functional mobility as compared to baseline. Morbid obesity is a complicating factor. Pt will benefit from skilled physical therapy to address these deficits and aide in restoration to PLOF and reduce risk of rehospitilization and to reduce fall risk.   PT Discharge Planning   PT Discharge Recommendation (DC Rec) home with assist   Therapy Certification   Start of care date 03/20/22   Certification date from 03/20/22   Certification date to 04/18/22   Medical Diagnosis Generalized weakness   Total Evaluation Time   Total Evaluation Time (Minutes) 20   Physical Therapy Goals   PT Frequency 6x/week   PT Predicated Duration/Target Date for Goal Attainment 04/11/22   PT Goals Bed Mobility;Transfers;Gait;Stairs;Aerobic Activity   PT: Bed Mobility Independent;Rolling;Supine to/from sit   PT: Transfers Modified independent;Assistive device;Sit to/from stand;Bed to/from chair  (front WW)   PT: Gait Modified independent;Standard walker;50 feet  (front WW)   PT: Stairs Modified independent;Greater than 10 stairs;Rail on both sides  (15 steps)   PT: Perform aerobic activity with stable cardiovascular response intermittent activity;15 minutes;ambulation  (with front WW)

## 2022-03-20 NOTE — PLAN OF CARE
Discharge Planner Post-Acute Rehab OT:     Discharge Plan: home w/ wife, home OT TBD  Recert due: 4/18    Precautions: falls, needs bariatric equip including FWW (pt is 6ft 6in, 465#), Pt allowed to leave ROOM for therapy (re: covid vacination status)  Current Status:  ADLs:    Mobility: rolling cga to min A and bedrail, supine to sit w/ min A of 2 and bedrail/HOB elevated 30 deg, STS w/ min A of 1-2 w/ ht of bed elevated and use of bariatric FWW    Grooming: set up    Dressing: uB drg w/ gown change w/ set up, LB pants/shorts declined, feet w/ total assist    Bathing: ub set up, LB dep w/ sponge bath    Toileting: bed pan dep w/ placement and dep w/ pericares  IADLs: PTA pt and wife both performed, wife works full time but mostly from home  Vision/Cognition: WFL for basic cog, pt reports no glasses except occasionally used reading glasses    Assessment: OT:pt presents w/ significant generalized weakness and impaired activity artur, pt has large body habitus, limiting pts indep w/ adls/mobiity/IADLs, recommend cont OT to address deficits to increase ease/safety and maximize indep w/ adls/IADLs/mobiity and asses for needed AE to return home .antic approx 3 weeks but stairs appear to be most significant barrier.     Other Barriers to Discharge (DME, Family Training, etc): 5 steps to get into house (has ramp but prefers to use stairs), 15 steps in house to get to bedroom/full bathroom, wife works full time but mostly from home, PTA used SEC , main level bathroom half bath w/ taller toilet/grab bar, full bathroom upstairs std ht toilet w/ grab bar on wall, tub/shower combo w/ grab bars but does not have bench/shower chair, pt reports being house hold ambulator but used w/c at clinic for appts (does NOT own w/c, states he as 4WW in garage donated to him but not sure it is the right size)

## 2022-03-21 NOTE — PROGRESS NOTES
[facility]    Medicine Progress Note - Hospitalist Service    Date of Admission:  3/18/2022    Assessment & Plan          This is a morbidly obese 67 yo  male with hx of chronic diastolic congestive heart failure, CKD, TASHA, paroxysmal atrial fibrillation, hypertension, chronic lower extremities venous stasis ulcers, hx of Gout arthritis who was admitted to TCU with generalized weakness and debility. Pt reportedly had an acute on chronic kidney injury with hyperkalemia, as well as pancytopenia which resolved with treatment     1) Generalized weakness and Debility: Physical and occupational therapy evaluating and treating. D/W PT, reviewed OT note. At this moment pt do require bariatric w/c that is appropriate for his height i.e w/c with tall seat   2) Chronic Diastolic Congestive heart failure, stable, hx of perimembranous VSD continue current medications  3) TASHA on CPAP at night.   4) Paroxysmal atrial fibrillation, rate controlled on metoprolol and chronic anticoagulation with a therapeutic INR. Pharmacy to dose warfarin and monitor INR  5) Benign and Essential Hypertension, Hyperlipidemia: BP stable and within normal limit, continue current  Medications.   6) Chronic Kidney Disease, stage IIIb. Serum Creatinine  level at baseline.   BMP done reviewed, no renal metabolic acidosis.   7) Anemia, chronic, likely due to chronic renal disease, stable  8) Hx of Pancytopenia, resolved  9) Hx of complicated UTI/PN with sepsis  10) chronic bilateral LE edema and ulcers, venous stasis ulcers, arterial duplex US on 2/3/22 showed no occlusion or stenosis in the bilateral Lower extremities. Diffuse atheromatous plaques.   11) Gout arthritis with a recent flare ups on colchicine 0.6 mg po daily and maintenance allopurinol   12) Morbid Obesity with BMI  53.74  13/Hx of colon cancer s/p hemicolectomy in 2011          Diet: Regular Diet Adult    DVT Prophylaxis: continued on Warfarin   Cheema  "Catheter: Not present  Central Lines: None  Cardiac Monitoring: None  Code Status: Full Code      Disposition Plan    Pending PT/OT final recommendations for discharge needs: Likely Home with Family   Pt is medically stable      The patient's care was discussed with the Pt's RN    Charline Lambert MD  Hospitalist [facility]    Clinically Significant Risk Factors Present on Admission     # Severe Obesity: Estimated body mass index is 53.74 kg/m  as calculated from the following:    Height as of 2/21/22: 1.981 m (6' 6\").    Weight as of 3/2/22: 210.9 kg (465 lb).     # TASHA, with possible OHS, on CPAP nightly    ______________________________________________________________________    Interval History   Pt had some issues with his home cpap, per RN, respiratory therapy was called and the cpap tubes where checked and cpap machine found to be functioning well. He did not want the hospital cpap when offered.   Has occasional dizzy spells when he gets up but denies having any dizziness or lightheadedness now. No chest pain or shortness of breath, no nausea, vomiting or diarrhea  Edema on the lower extremities and and venous stasis wounds which was inspected, noted to be healing and pt told me that he has a regular monthly follow up at wound clinic      Data reviewed today: I reviewed all medications, new labs and imaging results over the last 24 hours. I personally reviewed his Arterial duplex US studies from a month ago, no critical stenosis or occlusion.     Physical Exam   Vital Signs: Temp: (!) 96.7  F (35.9  C) Temp src: Oral BP: 101/57 Pulse: 85   Resp: 20 SpO2: 100 % O2 Device: None (Room air)    Weight: 210.9 Kg  Morbidly obese, lying in bed, appears comfortable with no signs of distress  HEENT: Pink conjuctiva, an icteric sclera, no oral thrush  Neck is supple  Abdomen: soft, non tender  Skin: hyperpigmented skin and superficial ulcers on the BLE, healing ulcers, pannus inspected, " no rashes noted   CNS; normal affect, no anxiety or agitation       Data   INR: 2.6  Hemoglobin: 8.9  Scr: 1.94  K: 4.9

## 2022-03-21 NOTE — PROGRESS NOTES
WOC Nurse Inpatient Wound Assessment   Reason for consultation: Evaluate and treat  Left lateral shin wounds, R heel    Assessment  Left lateral Shin wounds due to Venous Ulcer  Status: initial assessment     Bilateral upper shin wounds due to venous stasis and edema  Status: initial assessment    Right lateral heel Pressure injury community acquired.   Pressure injury stage 2  Status: initial assessment     Right Lateral Heel and Right Lateral Foot Pressure Injuries  Status:healed on assessment 3/21    Treatment Plan  Bilateral shin wounds: Every other day    Cleanse with wound cleanser and blot dry  Spray all intact skin to BL lower extremities with Carmela cleanse and protect and rub in. Allow to soak for 10mins and then wipe away any excess.   Apply sween 24 (Pink top lotion) from base of toes to below knees.  Apply Adaptic (mineral gauze) to any open areas  Cover with kerlix and tape.     Right lateral heel and left lateral lower leg every other day  Cleanse with wound cleanser   Dry and protect surrounding skin with no sting barrier film wipe #[id number] (this is very important)  Cover with silicone foam dressing Mepilex  4x4 #[id number]    Pressure Injury prevention (RN-please order supplies if not in room)  Turn every 2 hours, side to side avoid supine on bariatric pressure redistribution support surface  Float heels off bed with use of  Heel Lift boots (Prevalon #[id number])    Prevent sliding and shear by limiting HOB to 30 degrees or less unless contraindicated, use knee gatch first if not contraindicated  If sitting, use pressure redistribution chair cushion large(#[id number]); if unable to shift weight every hour, please limit sitting to an hour at a time.  Mepilex PRN,Change at least q 3 days, peel back, peek and replace for daily skin inspection.      Orders Revised  Recommended provider order: Lymphedema  WOC Nurse follow-up plan: Weekly  Nursing to notify the Provider(s) and re-consult the WOC Nurse if wound(s)  deteriorates or new skin concern.    Patient History  According to provider note(s):   66 year old male admitted through on 2/20/2022 after being discharged earlier from [facility] stay from 2/11 - 2/20 for acute gout flare and subsequent SHANTE and hyperkalemia likely caused by gout treatment. Patient is morbidly obese and he was discharged to his home with home health but he was unable to manage going up steps immediately upon arrival home.  He was brought in to [facility] ED for placement. In the ER he was seen by Dr Henning, vitals were normal.  Labs were reviewed from his recent discharge with Cr of 1.93 which was improved from his discharge and actually better than baseline.     3/15/2022 pending TCU for deconditioning      No Known Allergies  Objective Data  Containment of urine/stool: Incontinence Protocol as needed    There is no height or weight on file to calculate BMI.     Active Diet Order  Orders Placed This Encounter      Regular Diet Adult      Intake/Output Summary (Last 24 hours) at 3/15/2022 1444  Last data filed at 3/15/2022 1030  Gross per 24 hour   Intake 260 ml   Output 600 ml   Net -340 ml       Pressure Injury Risk Assessment:   Sensory Perception: 3-->slightly limited  Moisture: 3-->occasionally moist  Activity: 3-->walks occasionally  Mobility: 3-->slightly limited  Nutrition: 3-->adequate  Friction and Shear: 2-->potential problem  Sandoval Score: 17                          Labs:   Recent Labs   Lab 03/21/22  0812   HGB 8.9*   INR 2.60*   WBC 4.9       Physical Exam  Areas of skin assessed: Left Lateral lower leg, Right lateral foot and heel    Right and left lateral foot wounds healed on assessment 3/21    Wound Location:  Left Lateral lower leg        3/1 previous admit    3/21    Wound Base: dermis, red, moist small areas of granulation tissue     Palpation of the wound bed: normal      Drainage: scant     Description of drainage: yellow, lymph     Measurements (length x width x depth,  in cm)1.2  x 1  x  0.2 cm      Tunneling N/A     Undermining N/A  Periwound skin: edematous, weepy, macerated      Temperature: normal   Odor: none  Pain: denies , none  Pain intervention prior to dressing change: NA    Right lateral heel 3/15/2022  (prev admit)          3/21       Wound Location:  Right lateral heel   Wound History: present on admit to TCU  Wound Base: 25% open dermis 75% intact     Palpation of the wound bed: firm , dry     Drainage: none     Description of drainage: none     Measurements (length x width x depth, in cm) Irregularly shaped  0.9 x 2.3 x 0.2 cm       Tunneling N/A     Undermining N/A  Periwound skin: intact      Color: normal and consistent with surrounding tissue      Temperature: normal   Odor: none  Pain: denies ,     Wound Location:  Bilateral upper shins    RIGHT upper shin    LEFT upper shin    Date of last photo 3/21/22  Wound History: present on admit. Pt has had fluctuating edema in LE causing weepy areas and skin to open.   Wound Base: 100 % dermis     Palpation of the wound bed: normal      Drainage: small     Description of drainage: serous     Measurements (length x width x depth, in cm)   Right: 2 x 2 x 0.1 cm  Left: 1 x 1 x 0.1 cm  Periwound skin: hemosiderin staining and irritant dermatitis      Color: normal and consistent with surrounding tissue      Temperature: normal   Odor: none  Pain: denies , none      Interventions  Visual inspection and assessment completed   Wound Care Rationale Right heel and foot Protect and monitor as DTPI evolves. Left lateral leg selective debridement (autolysis) of nonviable tissue   Wound Care: done per plan of care  Supplies: at bedside  Current off-loading measures: Chair cushion and ordered PRN  Current support surface: Bariatric Low air loss mattress with extention  Education provided to: plan of care, wound progress, Off-loading pressure and effects of neuropathy  Discussed plan of care with Patient and  Nurse    Interventions  Visual inspection and assessment completed   Wound Care Rationale Protect periwound skin, Promote moist wound healing without tissue dehydration , Gently fill dead space to prevent abscess formation  and Provide protection   Wound Care: done per plan of care  Supplies: at bedside  Current off-loading measures: pillows for positioning, HOB 30 degrees or less, heels floating off beds, bariatric low airloss support surfaces, chair cushion ordered PRN  Current support surface: Bariatric low air loss  Education provided to: importance of repositioning and plan of care  Discussed plan of care with Patient, Nurse and Physician     Michelle Meléndez RN BSN CWOCN

## 2022-03-21 NOTE — PLAN OF CARE
Care Coordination:    Writer briefly met with patient this morning to inquire about CPAP. Patient states that another patient was confused and did enter his room. The other patient pulled on his CPAP hose and kinked it. Patient received his CPAP from [facility] and writer obtained contact information. Patient also requesting for a new mask and harness.     Writer called [facility] to inquire about obtaining new hose supply. They did transfer writer to billing, as the patient's account is on hold due to debt owed by patient. Writer confirmed that facility could order the hose needed separately and she stated that was okay.     Writer ordered new hose for patient and it will be delivered to the TCU.    Mari Polk RN, BSN, CRRN  Patient Care Management Coordinator  Acute Rehabilitation Unit/Transitional Care Unit  PH: [phone]  Pager: [phone]

## 2022-03-21 NOTE — PLAN OF CARE
Goal Outcome Evaluation:  Pt.A&Ox4. Uses call light appropriately. Has been sleeping well tonight with personal cpap in use. Has no c/o's pain, discomfort, CP and SOB. Compression wraps and Rooke boots to BLEs. Continent B&B using urinal indep.and bedpan / LBM yesterday. Has Fentanyl patch (L) chest.        Patient's most recent vital signs are:     Vital signs:  BP: 97/70  Temp: 98  HR: 86  RR: 18  SpO2: 99 %     Patient does not have new respiratory symptoms.  Patient does not have new sore throat.  Patient does not have a fever greater than 99.5.

## 2022-03-21 NOTE — PLAN OF CARE
"Discharge Planner Post-Acute Rehab PT:     Discharge Plan: Home with spouse, mod I    Precautions: fall risk    Current Status:  Bed Mobility: CGA - min A rolling, Mod A for supine to sit, Max A for legs sit to supine  Transfer: SBA sit to stand, raise bed up to stand, lower bed to sit  Gait: ~35' with front WW, SBA, 180 degree turn  Stairs: NT  Balance: sits EOB demonstrates good balance ~20 min with intermittent UE support. Requires heavy UE support on walker with standing balance, able to stand ~3 minutes.    Assessment: pt amb ~35' x 2 with seated rest break in between bouts, SBA, Front WW, stands for several minutes with forward flexed posture and resting forearms on walker while bedding is changed. Pt maintains O2 in 90s with activity, does become SOB with activity requiring ~5 min rest breaks. HR ~135 with activity. Pt demonstrates improved endurance today, able to ambulate twice.    Pt requires bariatric w/c but is 6' 6\" tall so need to find w/c with high seat to use on unit    Other Barriers to Discharge (DME, Family Training, etc): pt will need bariatric front WW, 15 steps to get to bed room, steps to get into house ~8 inches tall.      "

## 2022-03-21 NOTE — CARE PLAN
Patient is alert and oriented x 4. VSS on room air. Assist x 2 with pivot transfer. Compression wraps in place. Fentanyl patch in place. Continent B/B, uses urinal and bedpan. On regular diet, takes pills whole with thin liquids. Call light within reach, able to make needs known. Continue with plan of care.    Patient's most recent vital signs are:     Vital signs:  BP: 101/57  Temp: 96.7  HR: 85  RR: 20  SpO2: 100 %     Patient does not have new respiratory symptoms.  Patient does not have new sore throat.  Patient does not have a fever greater than 99.5.

## 2022-03-21 NOTE — PLAN OF CARE
Physical Therapy Discharge Summary    Reason for therapy discharge:    Discharged to transitional care facility.    Progress towards therapy goal(s). See goals on Care Plan in [facility] electronic health record for goal details.  Goals not met.  Barriers to achieving goals:   discharge from facility.    Therapy recommendation(s):    Continued therapy is recommended.  Rationale/Recommendations:  to address ongoing mobility deficits.

## 2022-03-21 NOTE — PLAN OF CARE
Discharge Planner Post-Acute Rehab OT:     Discharge Plan: home w/ wife, home OT TBD  Recert due: 4/18    Precautions: falls, needs bariatric equip including FWW (pt is 6ft 6in, 465#), Pt allowed to leave ROOM for therapy (re: covid vacination status)  Current Status:  ADLs:    Mobility: rolling cga to min A and bedrail, supine to sit w/ min A of 2 and bedrail/HOB elevated 30 deg, STS w/ min A of 1-2 w/ ht of bed elevated and use of bariatric FWW    Grooming: set up    Dressing: uB drg w/ gown change w/ set up, LB pants/shorts declined, feet w/ total assist    Bathing: ub set up, LB dep w/ sponge bath    Toileting: bed pan dep w/ placement and dep w/ pericares  IADLs: PTA pt and wife both performed, wife works full time but mostly from home  Vision/Cognition: WFL for basic cog, pt reports no glasses except occasionally used reading glasses    Assessment: OT: Pt participates well with ADL performance although requires increased assist d/t body habitus/weakness. Focused today on full body sponge bathing with pt participating in directing cares and assisting with lifting skin folds and rolling. Pt declines dressing at this time, discussed plan to trial this week. Recommend cont OT to address deficits to increase ease/safety and maximize indep w/ adls/IADLs/mobiity and asses for needed AE to return home.    Other Barriers to Discharge (DME, Family Training, etc): 5 steps to get into house (has ramp but prefers to use stairs), 15 steps in house to get to bedroom/full bathroom, wife works full time but mostly from home, PTA used SEC , main level bathroom half bath w/ taller toilet/grab bar, full bathroom upstairs std ht toilet w/ grab bar on wall, tub/shower combo w/ grab bars but does not have bench/shower chair, pt reports being house hold ambulator but used w/c at clinic for appts (does NOT own w/c, states he as 4WW in garage donated to him but not sure it is the right size)

## 2022-03-21 NOTE — PLAN OF CARE
"Pt is alert and oriented x4. Able to make needs known. Denies pain, cp or SOB. Continent of bowel and bladder. Utilizes bed pan and urinal; staff empties. Assit of 2 pivot transfer, not OOB this shift. BLE dressings CDI, WOC consult in place. Rooke boots intact. Pleasant and cooperative. Pt did complain that when other pt came in last night and pulled on his CPAP hose that it broke something along the hose but it works fine. Writer called RT to come assess CPAP. Pt said RT looked at it and its ok for now. Offered if RT can bring hospital supplied CPAP, said no \"its too big\". Applied own CPAP at hs. Currently working well. Notified to report any concerns. Call light in reach, continue w/ POC.      Patient's most recent vital signs are:     Vital signs:  BP: 97/70  Temp: 98  HR: 86  RR: 18  SpO2: 99 %     Patient does not have new respiratory symptoms.  Patient does not have new sore throat.  Patient does not have a fever greater than 99.5.              "

## 2022-03-22 NOTE — PROGRESS NOTES
Patient received in bed. Alert and oriented Denied pain or SOB. Patient watching tv. Use urinal independent. Bilateral wound dressing. Patient using cpap at the moment. Safety and comfort implemented.  Call light within reach.

## 2022-03-22 NOTE — PLAN OF CARE
Discharge Planner Post-Acute Rehab PT:     Discharge Plan: Home with spouse, mod I    Precautions: fall risk    Current Status:  Bed Mobility: CGA - min A rolling, Mod A for supine to sit, Max A for legs sit to supine  Transfer: SBA sit to stand, raise bed up to stand, lower bed to sit  Gait: ~35' with front WW, SBA, 180 degree turn  Stairs: NT  Balance: sits EOB demonstrates good balance ~20 min with intermittent UE support. Requires heavy UE support on walker with standing balance, able to stand ~3 minutes.    Assessment:  Pt demonstrates improved functional mobility and endurance today, able to participate with CV training using floor pedal and able to stand from low w/c in // bars with max A. Pt demonstrates fatigue and becomes SOB with activity maintaining O2 sats in 90s, HR gets up to 140 with activity. He requires extended rest breaks to recover. Bariatric cushion has been ordered for use here.    Other Barriers to Discharge (DME, Family Training, etc): pt will need bariatric front WW, 15 steps to get to bed room, steps to get into house ~8 inches tall.

## 2022-03-22 NOTE — PLAN OF CARE
Discharge Planner Post-Acute Rehab OT:     Discharge Plan: home w/ wife, home OT TBD  Recert due: 4/18    Precautions: falls, needs bariatric equip including FWW (pt is 6ft 6in, 465#), Pt allowed to leave ROOM for therapy (re: covid vacination status)  Current Status:  ADLs:    Mobility: rolling cga to min A and bedrail, supine to sit w/ min A of 2 and bedrail/HOB elevated 30 deg, STS w/ min A of 1-2 w/ ht of bed elevated and use of bariatric FWW    Grooming: set up    Dressing: uB drg w/ gown change w/ set up, LB pants/shorts declined, feet w/ total assist    Bathing: ub set up, LB dep w/ sponge bath    Toileting: bed pan dep w/ placement and dep w/ pericares  IADLs: PTA pt and wife both performed, wife works full time but mostly from home  Vision/Cognition: WFL for basic cog, pt reports no glasses except occasionally used reading glasses    Assessment: OT: Progressing full body dressing today from EOB. Session interrupted d/t need for bedpan during session, tolerated supine <> sit and supine > sit during session with decreased level of assist today versus yesterday in regards to BLE management into bed. Recommend cont OT to address deficits to increase ease/safety and maximize indep w/ adls/IADLs/mobiity and asses for needed AE to return home.    Other Barriers to Discharge (DME, Family Training, etc): 5 steps to get into house (has ramp but prefers to use stairs), 15 steps in house to get to bedroom/full bathroom, wife works full time but mostly from home, PTA used SEC , main level bathroom half bath w/ taller toilet/grab bar, full bathroom upstairs std ht toilet w/ grab bar on wall, tub/shower combo w/ grab bars but does not have bench/shower chair, pt reports being house hold ambulator but used w/c at clinic for appts (does NOT own w/c, states he as 4WW in garage donated to him but not sure it is the right size)

## 2022-03-22 NOTE — PROGRESS NOTES
RN: Denies pain.  MACKEY with therapy.  See therapy notes for pt mobility. Makes needs known. Lift transfer, see therapy notes.   Very pleasant and makes needs known. Mepilex changed BLE x 4, tubigrips off 1450- for skin break.  New mepiles applied right flank skin fold due to pt saying sore area, pin point open area, keep clean and dry and continue to monitor.  All skin folds to be monitored daily. Declined denise/bkuttock/back skin check. Other mepilex in skin folds not changed this shift.   New parameters put on metoprolol, all scheduled meds met parameters this shift.     Patient's most recent vital signs are:     Vital signs:  BP: 105/62  Temp: 95.8  HR: 92  RR: 18  SpO2: 100 %     Patient does not have new respiratory symptoms.  Patient does not have new sore throat.  Patient does not have a fever greater than 99.5.

## 2022-03-22 NOTE — PROGRESS NOTES
03/22/22 1300   Name of Certified Therapeutic Rec Specialist   Name of Certified Therapeutic Rec Specialist   (LILIAN Knight)   Appointment Type   Type of Therapeutic Rec Session Therapeutic Rec Assessment   General Information   Patient Profile Review See Profile for full history and prior level of function   Daily Contact with Relatives or Friends Phone call   Community Involvement   Community Involvement Retired   [facility]   Hobbies/Interests   Cards Other (see comments)  (spades)   Games Other (comments)  (dominoes, monopoly)   Music   Music Preferences Classical;Country;Rock;Jazz;Popular;R&B   Listens to Recorded Music Yes   Brought Own Equipment Yes   Media   Computer Will use tablet/phone   TV / Movies TV;Movie list   Reading Books   Reading Preferences Other (see comments)  (bible)   Sports / Physical Activities   Outdoor Activities Fishing   Sports Fan Baseball;Basketball;Football   Impression   Open to Socializing with Others Independent   Barriers to Leisure Endurance;Mobility   Patient, family and / or staff in agreement with Plan of Care Yes   Treatment Plan   Interested in Unit [place]? No   Type of Intervention Independent with activity   Equipment and Supplies While on Unit Movies;Other (comments)  (deck of cards)   Assessment Assessment completed, pt was oriented to leisure materials available, pt requested and was given movie list and deck of cards. Pt was also oriented on how to check out movies. Neo provide check in for materials as needed.

## 2022-03-22 NOTE — PLAN OF CARE
3-7 pm: Pt is alert and oriented. Did not get oob this shift. Denies shortness of breath/chest pain. Uses urinal at bedside. Continent. Uses bedpan for BMs. LBM yesterday. BLE wound dressings CDI, done today by [facility] nurse. Tubi  on. Pt uses cpap at bedtime. Takes meds whole with water. Denies any other concerns at this time. Continue POC.     Patient's most recent vital signs are:     Vital signs:  BP: 119/64  Temp: 97.5  HR: 86  RR: 18  SpO2: 100 %     Patient does not have new respiratory symptoms.  Patient does not have new sore throat.  Patient does not have a fever greater than 99.5.

## 2022-03-23 NOTE — PLAN OF CARE
Discharge Planner Post-Acute Rehab OT:     Discharge Plan: home w/ wife, home OT TBD  Recert due: 4/18    Precautions: falls, needs bariatric equip including FWW (pt is 6ft 6in, 465#), Pt allowed to leave ROOM for therapy (re: covid vacination status)  Current Status:  ADLs:    Mobility: rolling cga to min A and bedrail, supine to sit w/ min A of 2 and bedrail/HOB elevated 30 deg, STS w/ min A of 1-2 w/ ht of bed elevated and use of bariatric FWW    Grooming: set up    Dressing: uB drg w/ gown change w/ set up, LB pants/shorts declined, feet w/ total assist    Bathing: ub set up, LB dep w/ sponge bath    Toileting: bed pan dep w/ placement and dep w/ pericares  IADLs: PTA pt and wife both performed, wife works full time but mostly from home  Vision/Cognition: WFL for basic cog, pt reports no glasses except occasionally used reading glasses    Assessment: Pt con't to need EOB raised for STS due to LE weakness.  Good participation throughout bed mob and ADLs.  Dependent denise cares, th will provide a commode. Con't OT for strengthening and improving ADL/mobility.     Other Barriers to Discharge (DME, Family Training, etc): 5 steps to get into house (has ramp but prefers to use stairs), 15 steps in house to get to bedroom/full bathroom, wife works full time but mostly from home, PTA used SEC , main level bathroom half bath w/ taller toilet/grab bar, full bathroom upstairs std ht toilet w/ grab bar on wall, tub/shower combo w/ grab bars but does not have bench/shower chair, pt reports being house hold ambulator but used w/c at clinic for appts (does NOT own w/c, states he as 4WW in garage donated to him but not sure it is the right size)

## 2022-03-23 NOTE — PLAN OF CARE
Goal Outcome Evaluation:    Pt is alert and oriented x3. Able to make needs known. Pt was OOB this shift. Pt refused to turn for back skin assessment. All dressings intact. Pt has been refusing his miconazole powder. Continue with current POC.      Patient's most recent vital signs are:     Vital signs:  BP: 111/67  Temp: 95.9  HR: 91  RR: 18  SpO2: 100 %     Patient does not have new respiratory symptoms.  Patient does not have new sore throat.  Patient does not have a fever greater than 99.5.

## 2022-03-23 NOTE — PROGRESS NOTES
MDS Pain Assessment    The following is the pain interview as conducted by the TCU RN caring for the patient on March / 22 / 2022. This assessment is required by the [facility] for all patients in [place] SNF (Skilled Nursing Facilities).       1. Have you had pain or hurting at any time in the last 5 days? Yes    2. How much of the time have you experienced pain or hurting over the last 5 days? frequently    3. Over the past 5 days, has pain made it hard for you to sleep at night? No    4. Over the past 5 days, have you limited your day-to-day activities because of pain? No    5. Pain intensity (Numeric scale used first-if patient unable to answer, verbal scale to be used.)    Numeric Scale: Please rate your worst pain over the last 5 days on a zero to ten scale, with zero being no pain and ten being the worst pain you can imagine.     2    Verbal Scale: USED ONLY if unable to give numeric, otherwise N/A  Please rate the intensity of your worst pain over the last 5 days.     moderate-severe     Jyoti Pearson RN, BSN  MDS Quality and

## 2022-03-23 NOTE — CARE PLAN
RN: continue to change mepilex dressings as they fall off throughout the shift. Makes needs known. Denies need for pain meds prn, has fentanyl patch newly placed today on right upper arm.   mepilex changed BLE, none changed abd area. sween cream applied BLE.  Denies pain, makes needs known. Up with therapies, see notes.     Patient's most recent vital signs are:     Vital signs:  BP: 117/72  Temp: 95.6  HR: 95  RR: 18  SpO2: 100 %     Patient does not have new respiratory symptoms.  Patient does not have new sore throat.  Patient does not have a fever greater than 99.5.

## 2022-03-23 NOTE — PROGRESS NOTES
[facility]    Medicine Progress Note - Hospitalist Service    Date of Admission:  3/18/2022    Assessment & Plan          This is a morbidly obese 65 yo  male with hx of chronic diastolic congestive heart failure, CKD, TASHA, paroxysmal atrial fibrillation, hypertension, chronic lower extremities venous stasis ulcers, hx of Gout arthritis who was admitted to TCU with generalized weakness and debility.       1) Generalized weakness and Debility: Pt is progressing in therapies.    2) Chronic Diastolic Congestive heart failure, stable, hx of perimembranous VSD stable on metoprolol    3) TASHA on CPAP at night.     4) Paroxysmal atrial fibrillation, rate controlled on metoprolol and chronic anticoagulation with a therapeutic INR.    5) Benign and Essential Hypertension, Hyperlipidemia: BP stable     6) Chronic Kidney Disease, stage IIIb. Serum Creatinine  level at baseline. On bicarb, metabolic acidosis    7) Anemia, chronic, likely due to chronic renal disease, stable    8) Hx of Pancytopenia, resolved    9) Hx of complicated UTI/PN with sepsis    10) chronic bilateral LE edema and ulcers, venous stasis ulcers, arterial duplex US on 2/3/22 showed no occlusion or stenosis in the bilateral Lower extremities. Diffuse atheromatous plaques.     11) Gout arthritis with a recent flare ups on colchicine 0.6 mg po daily and maintenance allopurinol     12) Morbid Obesity with BMI  53.74    13/Hx of colon cancer s/p hemicolectomy in 2011          Diet: Regular Diet Adult    DVT Prophylaxis: continued on Warfarin   Cheema Catheter: Not present  Central Lines: None  Cardiac Monitoring: None  Code Status: Full Code      Disposition Plan    Pending PT/OT final recommendations for discharge needs: Likely Home with Family   Pt is medically stable      The patient's care was discussed with the Pt's RN    Nasir Escobedo MD  Hospitalist Service  [facility]        ______________________________________________________________________    Interval History     Patient states he is feeling stronger, is progressing in therapy.  He is walking with a walker.  He denies significant lower extremity pain, has not had recurrence of gout type pain involving his right foot or knee.  He denies cough, chest pain, shortness of breath, nausea, vomiting.        Physical Exam   Vital Signs: Temp: (!) 95.6  F (35.3  C) Temp src: Oral BP: 117/72 Pulse: 95   Resp: 18 SpO2: 100 % O2 Device: None (Room air)      Morbidly obese, lying in bed, appears comfortable   Lungs: Clear  CV: Regular rhythm  Abdomen: soft, non tender  Skin: hyperpigmented skin, 2+ lower extremity edema bilaterally  superficial ulcers on the BLE covered  CNS; alert, fully oriented, pleasant    Results for NARENDRA WADE (MRN [id number]) as of 3/23/2022 09:47   Ref. Range 3/21/2022 08:12   Sodium Latest Ref Range: 133 - 144 mmol/L 137   Potassium Latest Ref Range: 3.4 - 5.3 mmol/L 4.9   Chloride Latest Ref Range: 94 - 109 mmol/L 110 (H)   Carbon Dioxide Latest Ref Range: 20 - 32 mmol/L 24   Urea Nitrogen Latest Ref Range: 7 - 30 mg/dL 25   Creatinine Latest Ref Range: 0.66 - 1.25 mg/dL 1.94 (H)   GFR Estimate Latest Ref Range: >60 mL/min/1.73m2 37 (L)   Calcium Latest Ref Range: 8.5 - 10.1 mg/dL 9.0   Anion Gap Latest Ref Range: 3 - 14 mmol/L 3   Glucose Latest Ref Range: 70 - 99 mg/dL 93   WBC Latest Ref Range: 4.0 - 11.0 10e3/uL 4.9   Hemoglobin Latest Ref Range: 13.3 - 17.7 g/dL 8.9 (L)   Hematocrit Latest Ref Range: 40.0 - 53.0 % 28.7 (L)   Platelet Count Latest Ref Range: 150 - 450 10e3/uL 199   RBC Count Latest Ref Range: 4.40 - 5.90 10e6/uL 3.37 (L)   MCV Latest Ref Range: 78 - 100 fL 85   MCH Latest Ref Range: 26.5 - 33.0 pg 26.4 (L)   MCHC Latest Ref Range: 31.5 - 36.5 g/dL 31.0 (L)   RDW Latest Ref Range: 10.0 - 15.0 % 16.3 (H)

## 2022-03-23 NOTE — PLAN OF CARE
Discharge Planner Post-Acute Rehab PT:     Discharge Plan: Home with spouse, mod I    Precautions: fall risk    Current Status:  Bed Mobility: CGA - min A rolling, Mod A for supine to sit, Max A for legs sit to supine  Transfer: SBA sit to stand, raise bed up to stand, lower bed to sit  Gait: ~35' with front WW, SBA, 180 degree turn  Stairs: NT  Balance: sits EOB demonstrates good balance ~20 min with intermittent UE support. Requires heavy UE support on walker with standing balance, able to stand ~3 minutes.    Assessment:  Pt able to participate in functional transfer trianing and amb with fww and in bars for LE strengthening, CP endurance.  Pt hagan by MACKEY with amb, about 32 ft at a time in the bars.  Pt also participated in floor biking.  Sit to stand transfers from the w/c still very difficult due to LE weakness, knee ROM, body habitus , needing modA and pulling up on bars , and A of 2 in the room.  .    Other Barriers to Discharge (DME, Family Training, etc): pt will need bariatric front WW, 15 steps to get to bed room, steps to get into house ~8 inches tall.       Pt to ED with c/o \"lump\" to his left testicle. First noticed at the beginning of this week. Seen Friday and was referred for US. seen friday for lump on testicle. left. need US.       Russ Colindres RN  11/08/20 [id number]

## 2022-03-23 NOTE — PLAN OF CARE
Goal Outcome Evaluation:    Patient appeared to be sleeping during safety rounds, denies pain and SOB, is using C-pap overnight and able to manage indep. Patient declined repositioning, laying on back with HOB elevated. Call light is within reach and he is able to make needs known, continue plan of care.       Patient's most recent vital signs are:     Vital signs:  BP: 111/67  Temp: 95.9  HR: 91  RR: 18  SpO2: 100 %     Patientdoes nothave new respiratory symptoms.  Patient does not have new sore throat.  Patient does not have a fever greater than 99.5.

## 2022-03-24 NOTE — PLAN OF CARE
Discharge Planner Post-Acute Rehab PT:     Discharge Plan: Home with spouse, mod I    Precautions: fall risk    Current Status:  Bed Mobility: CGA - min A rolling, Mod A for supine to sit, Max A for legs sit to supine  Transfer: SBA sit to stand, raise bed up to stand, lower bed to sit  Gait: ~35' with front WW, SBA, 180 degree turn  Stairs: NT  Balance: sits EOB demonstrates good balance ~20 min with intermittent UE support. Requires heavy UE support on walker with standing balance, able to stand ~3 minutes.    Assessment:  Pt able stand x 3 from low w/c with Max A x 1 demonstrating increased tolerance and improvement for sit<>stands from low surface, difficulty with sit to stand d/t LE weakness, body habitus. Requires supervision when standing from elevated bed, mod - max A when standing in parallel bars.  Pt demonstrates increased cardiovascular endurance performing floor bike for 10 continuous minutes. O2 drops to mid 80s with activity, but rebounds to 90's within 30 sec - 1 min. Pt continues to participate fully in therapy, becomes SOB with functional activity requiring seated rest breaks.    Other Barriers to Discharge (DME, Family Training, etc): pt will need bariatric front WW, 15 steps to get to bed room, steps to get into house ~8 inches tall.

## 2022-03-24 NOTE — PLAN OF CARE
Goal Outcome Evaluation:       Pt is alert and oriented x3. Able to make needs known. Pt was OOB this shift. All dressings intact. Dressing change due tomorrow. Pt has been refusing his miconazole powder. INR was 2.13, new order for coumadin. Continue with current POC.      Patient's most recent vital signs are:     Vital signs:  BP: 106/60  Temp: 96.4  HR: 77  RR: 16  SpO2: 96 %     Patient does not have new respiratory symptoms.  Patient does not have new sore throat.  Patient does not have a fever greater than 99.5.

## 2022-03-24 NOTE — CARE PLAN
"RN: makes needs known.  Fentanyl patch in place, rates pain level right ankle at 2 and gets pains briefly in ankle when foot moves in bed, better with boots on. BLE cares done per order, kerlix wraps BLE per order. Refused intradry abd folds to wick moisture, \" that makes sores for me\". Washed with soap and water during OT, pt declines abd fold assessment by RN. See labs.     Patient's most recent vital signs are:     Vital signs:  BP: 110/44  Temp: 97.6  HR: 61  RR: 16  SpO2: 99 %     Patient does not have new respiratory symptoms.  Patient does not have new sore throat.  Patient does not have a fever greater than 99.5.         "

## 2022-03-24 NOTE — PROGRESS NOTES
Pt appears to be alert and oriented x4. Able to make needs known. Call light appropriate. Pt slept most of the shift with no major even noted. Uses a C-PAP overnight. Manages C-PAP independently. Denied  Chest pain and SOB on assessment. Call light within reach and all safety measures in place. Appears to be comfortable at the time of this report. Will continue with plan of care.           Patient's most recent vital signs are:     Vital signs:  BP: 106/60  Temp: 96.4  HR: 77  RR: 16  SpO2: 96 %     Patient does not have new respiratory symptoms.  Patient does not have new sore throat.  Patient does not have a fever greater than 99.5.

## 2022-03-24 NOTE — PLAN OF CARE
Discharge Planner Post-Acute Rehab OT:     Discharge Plan: home w/ wife, home OT TBD  Recert due: 4/18    Precautions: falls, needs bariatric equip including FWW (pt is 6ft 6in, 465#), Pt allowed to leave ROOM for therapy (re: covid vacination status)  Current Status:  ADLs:    Mobility: rolling cga to min A and bedrail, supine to sit w/ min A of 2 and bedrail/HOB elevated 30 deg, STS w/ min A of 1-2 w/ ht of bed elevated and use of bariatric FWW    Grooming: set up    Dressing: uB drg w/ gown change w/ set up, LB pants/shorts declined, feet w/ total assist    Bathing: ub set up, LB dep w/ sponge bath    Toileting: bed pan dep w/ placement and dep w/ pericares  IADLs: PTA pt and wife both performed, wife works full time but mostly from home  Vision/Cognition: WFL for basic cog, pt reports no glasses except occasionally used reading glasses    Assessment: Tx focus on bed mobility and EOB g/h to promote activity tolerance with graded assist. Pt max A supine > sit HOB elevated with HHA, able to march legs to EOB. S/u for UB wash, g/h, and oral cares EOB, requiring A to wash back and inguinal folds. Plan at beginning of session to trial tima BSC; however pt requiring bed pan d/t urgency and requiring Ax1 BLE mgmt return sit > supine and roll to R with bed rail SBA.  Con't OT for strengthening and improving ADL/mobility, plan to trial SPT Ax2 EOB > BSC.     Other Barriers to Discharge (DME, Family Training, etc): 5 steps to get into house (has ramp but prefers to use stairs), 15 steps in house to get to bedroom/full bathroom, wife works full time but mostly from home, PTA used SEC , main level bathroom half bath w/ taller toilet/grab bar, full bathroom upstairs std ht toilet w/ grab bar on wall, tub/shower combo w/ grab bars but does not have bench/shower chair, pt reports being house hold ambulator but used w/c at clinic for appts (does NOT own w/c, states he as 4WW in garage donated to him but not sure it is the right  size)

## 2022-03-25 NOTE — PLAN OF CARE
Discharge Planner Post-Acute Rehab OT:     Discharge Plan: home w/ wife, home OT TBD  Recert due: 4/18    Precautions: falls, needs bariatric equip including FWW (pt is 6ft 6in, 465#), Pt allowed to leave ROOM for therapy (re: covid vacination status)  Current Status:  ADLs:    Mobility: rolling cga to min A and bedrail, supine to sit w/ min A of 2 and bedrail/HOB elevated 30 deg, STS w/ min A of 1-2 w/ ht of bed elevated and use of bariatric FWW    Grooming: set up    Dressing: uB drg w/ gown change w/ set up, LB pants/shorts declined, feet w/ total assist    Bathing: ub set up, LB dep w/ sponge bath    Toileting: bed pan dep w/ placement and dep w/ pericares  IADLs: PTA pt and wife both performed, wife works full time but mostly from home  Vision/Cognition: WFL for basic cog, pt reports no glasses except occasionally used reading glasses    Assessment: Provided patient with alternate bariatric option for commode although due to wheels on commode patient declined due to safety concern. Pt. Presents decreased activity tolerance and fatigue. Pt. Had PT prior to this session and reports LE with increased fatigue and unable to work on standing with OT. Intervention focused on endurance CV exercises seated at EOB with SOB/ Fatigue tolerating 1-1 1/2 minutes out of 2 minute exercise x 6 exercises. Pt. Will benefit from continued work on endurance/strength/ADL and mobility to maximize safety to return home. Continue with plan per direction of OTR/L.  Other Barriers to Discharge (DME, Family Training, etc): 5 steps to get into house (has ramp but prefers to use stairs), 15 steps in house to get to bedroom/full bathroom, wife works full time but mostly from home, PTA used SEC , main level bathroom half bath w/ taller toilet/grab bar, full bathroom upstairs std ht toilet w/ grab bar on wall, tub/shower combo w/ grab bars but does not have bench/shower chair, pt reports being house hold ambulator but used w/c at clinic for  [facility] (does NOT own w/c, states he as 4WW in garage donated to him but not sure it is the right size)

## 2022-03-25 NOTE — PROGRESS NOTES
CLINICAL NUTRITION SERVICES - ASSESSMENT NOTE     Nutrition Prescription    RECOMMENDATIONS FOR MDs/PROVIDERS TO ORDER:  None at this time    Malnutrition Status:    Unable to determine due to missing weights and unable to perform NFPA    Recommendations already ordered by Registered Dietitian (RD):  None at this time    Future/Additional Recommendations:  Monitor intake, especially protein     REASON FOR ASSESSMENT  Panda Miranda is a 66 year old male assessed by the dietitian for [facility] significant for gout, pseudogout, atrial fibrillation, morbid obesity s/p past bariatric surgery and chronic kidney disease stage III. Patient was hospitalized at [facility] from 27-Jan-2022 to 30-Jan-2022 for sepsis secondary to complicated urinary tract infection vs. pyelonephritis. Patient was hospitalized at [facility] from 11-Feb-2022 to 20-Feb-2022 for hyperkalemia, acute kidney injury and acute gout. Patient was discharged on 20-Feb-2022 and presented to [facility] with weakness - patient reportedly was unable to walk up the steps to his home. Patient was later found to have recurrent hyperkalemia. During hospital stay, patient was also treated for acute polyarticular gout. Patient completed a prednisone taper during hospital stay. Patient was also transitioned from uloric to allopurinol. Patient had pancytopenia during hospital stay that resolved prior to discharge. Patient was discharged to TCU on 18-Mar-2022    NUTRITION HISTORY  - Pt reports that his intake in the hospital is about the same as baseline  - He is getting tired of the menu, but says he switches it up as much as he can  -Reports that he may have lost a little weight (not quantified)  -100% of meals prior to transfer     CURRENT NUTRITION ORDERS  Diet: Regular    Intake/Tolerance: 100% of meals per flowsheet. On average, pt is ordering 2706 kcal and 100 g protein daily per [employer]. This is likely meeting 100% minimum energy and 68% protein  "needs.    LABS  BG  (03/08-03/24)    MEDICATIONS  Medications reviewed:  Sodium bicarbonate  Coumadin  PRN: dulcolax, colace, pepcid, MOM, zofran, miralax    ANTHROPOMETRICS  Ht Readings from Last 1 Encounters:   02/21/22 1.981 m (6' 6\")     Most Recent Weight:  (Unable to do it, bed mulfunction w/ scale?)  IBW: 97.3 kg (217% IBW)  BMI: 53.8 kg/m^2, Obesity Grade III BMI >40  Weight History: Based on weights through 03/02/22 : Patient has gained 32 lbs (7%) over the last 1 month, overall gained 9 lbs (2%) over the last 6 months.   Wt Readings from Last 10 Encounters:   03/02/22 (!) 210.9 kg (465 lb)   02/20/22 (!) 198.8 kg (438 lb 3.2 oz)   02/10/22 (!) 196.4 kg (433 lb)   01/29/22 (!) 196.5 kg (433 lb 1.6 oz)   12/07/21 (!) 209.3 kg (461 lb 8 oz)   09/08/21 (!) 206.9 kg (456 lb 3.2 oz)   06/17/21 (!) 209.7 kg (462 lb 4.9 oz)   06/09/21 (!) 220 kg (485 lb)   06/01/21 (!) 220.1 kg (485 lb 3.2 oz)   05/17/21 (!) 219.2 kg (483 lb 3.2 oz)     Dosing Weight: 121.3 kg - Adjusted    ASSESSED NUTRITION NEEDS  Estimated Energy Needs: 3114-3284 kcals/day (22 - 25 kcals/kg)  Justification: Obese  Estimated Protein Needs: 146-182 grams protein/day (1.2 - 1.5 grams of pro/kg)  Justification: Wound healing  Estimated Fluid Needs: 0629-3052 mL/day (1 mL/kcal)   Justification: Maintenance    PHYSICAL FINDINGS  See malnutrition section below.  Per 3/21 WOC RN note:  Left lateral Shin wounds due to Venous Ulcer  Status: initial assessment      Bilateral upper shin wounds due to venous stasis and edema  Status: initial assessment     Right lateral heel Pressure injury community acquired.   Pressure injury stage 2  Status: initial assessment      Right Lateral Heel and Right Lateral Foot Pressure Injuries  Status:healed on assessment 3/21    MALNUTRITION  % Intake: No decreased intake noted  % Weight Loss: Unable to assess - No current weight  Subcutaneous Fat Loss: Unable to assess - Pt in pain  Muscle Loss: Unable to assess - " Pt in pain  Fluid Accumulation/Edema: Per flowsheet: Dependent  Malnutrition Diagnosis: Unable to determine due to missing weights and unable to perform NFPA    NUTRITION DIAGNOSIS  Predicted inadequate nutrient intake (protein) related to menu fatigue r/t LOS and high protein requirements (min 146g/day) for wound healing.     INTERVENTIONS  Implementation  Nutrition Education: Discussed the importance of protein for healing.      Goals  Patient to consume % of nutritionally adequate meal trays TID, or the equivalent with supplements/snacks.     Monitoring/Evaluation  Progress toward goals will be monitored and evaluated per protocol.    Sergey Moore  Dietetic Intern

## 2022-03-25 NOTE — CARE PLAN
Patient is alert and oriented x 4. VSS on room air. Denies CP and SOB. On a renal diet, takes pills whole with thin liquids. Continent B/B, LBM today. Uses urinal. Fentanyl patch to R shoulder. Patient calls appropriately and is able to make needs known. Continue with plan of care.    Patient's most recent vital signs are:     Vital signs:  BP: 103/57  Temp: 97.6  HR: 90  RR: 16  SpO2: 100 %     Patient does not have new respiratory symptoms.  Patient does not have new sore throat.  Patient does not have a fever greater than 99.5.

## 2022-03-25 NOTE — PLAN OF CARE
Discharge Planner Post-Acute Rehab PT:     Discharge Plan: Home with spouse, mod I    Precautions: fall risk    Current Status:  Bed Mobility: CGA - min A rolling, Mod A for supine to sit, Max A for legs sit to supine  Transfer: SBA sit to stand, raise bed up to stand, lower bed to sit  Gait: ~35' with front WW, SBA, 180 degree turn  Stairs: NT  Balance: sits EOB demonstrates good balance ~20 min with intermittent UE support. Requires heavy UE support on walker with standing balance, able to stand ~3 minutes.    Assessment:  Pt able stand x 3 from low w/c with Max A x 1 demonstrating increased tolerance and improvement for sit<>stands from low surface, difficulty with sit to stand d/t LE weakness, body habitus. Pt amb 18 ft in house and then amb length of bars and back x 1, x 3 sets, seated rest break after each lap. Pt states he feela more tired, and is hagan by LE fatigue and MACKEY, but sats in 90s throughout. Pt uses bed rail to pull up to  room and PT and NA, A to get pt on BSC by moving wc and putting BSC behind pt.        Other Barriers to Discharge (DME, Family Training, etc): pt will need bariatric front WW, 15 steps to get to bed room, steps to get into house ~8 inches tall.

## 2022-03-25 NOTE — PLAN OF CARE
Goal Outcome Evaluation:      Pt is alert and oriented x4. Able to make needs known. Kerlix wrap to bilateral extremities intact. Pt educated on the importance to apply Miconazole powder to the abdominal folds and pt accepted the powder to be applied. Continue with POC.      Patient's most recent vital signs are:     Vital signs:  BP: 107/61  Temp: 97.6  HR: 77  RR: 16  SpO2: 99 %     Patient does not have new respiratory symptoms.  Patient does not have new sore throat.  Patient does not have a fever greater than 99.5.

## 2022-03-25 NOTE — PLAN OF CARE
Goal Outcome Evaluation:    Patient is alert and oriented x4. Makes needs known to staff. Denied SOB and chest pain. Continent with both bowel and bladder. Remained in bed this shift. Kerlix wraps to BLE. Urinal within reach. Independent with CPAP use/management. Will continue with POC.      Patient's most recent vital signs are:     Vital signs:  BP: 116/56  Temp: 97.8  HR: 76  RR: 18  SpO2: 100 %     Patient does not have new respiratory symptoms.  Patient does not have new sore throat.  Patient does not have a fever greater than 99.5.

## 2022-03-25 NOTE — PLAN OF CARE
AOx4. Denies CP & SOB. Baseline N/T to feet. CPAP overnight. Urinal at bedside. Voiding without difficulty. No BM this shift. Pt c/o pain in BLE; refuses intervention. Fentanyl patch in place. Pt slept well overnight. Call light within reach and pt able to make needs known. Continue with POC.    Patient's most recent vital signs are:     Vital signs:  BP: 107/61  Temp: 97.6  HR: 77  RR: 16  SpO2: 99 %     Patient does not have new respiratory symptoms.  Patient does not have new sore throat.  Patient does not have a fever greater than 99.5.

## 2022-03-26 NOTE — PROGRESS NOTES
Labs reviewed:  K 5.4 this am (5.1 on 3/24)  Patient currently is on a regular diet, prior to admission while at [facility] he was on a low potassium diet.    History of hyperkalemia felt secondary to acute on chronic kidney disease, ACE inhibitor use and metabolic acidosis.  Now on bicarb with normal serum bicarbonate level.  On no offending medications with exception of metoprolol which is chronic.    Plan  Low potassium diet, rationale discussed with patient  Repeat BMP in a.m.

## 2022-03-26 NOTE — PLAN OF CARE
Discharge Planner Post-Acute Rehab PT:     Discharge Plan: Home with spouse, mod I    Precautions: fall risk    Current Status:  Bed Mobility: CGA - min A rolling, Mod A for supine to sit, Max A for legs sit to supine  Transfer: SBA sit to stand, raise bed up to stand, lower bed to sit- max A x1 with pt pulling on // bars from W/c x5  Gait:short amb in // bars down and back with SBA - limited by SOB weakness and fatigue   Stairs: NT  Balance: sits EOB demonstrates good balance ~20 min with intermittent UE support. Requires heavy UE support on walker with standing balance, able to stand ~3 minutes.    Assessment:  Pt able stand x 3 from low w/c with Max A x 1 in // bars.  demonstrating increased tolerance and improvement for sit<>stands from low surface, difficulty with sit to stand d/t LE weakness, body habitus. . Pt states he feela more tired, . Pt uses bed rail to pull up to  room and PT and NA, A to get pt on BSC by moving wc and putting BSC behind pt.        Other Barriers to Discharge (DME, Family Training, etc): pt will need bariatric front WW, 15 steps to get to bed room, steps to get into house ~8 inches tall.

## 2022-03-26 NOTE — PLAN OF CARE
Status Note  [id number]    Patient is A&Ox4, CPAP worn throughout night, able to make needs known, using call light appropriately, slept between cares.  Patient denies pain throughout shift, no acute events noted this shift, continue with POC.

## 2022-03-26 NOTE — PROGRESS NOTES
[facility]itional [facility]    Medicine Progress Note - Hospitalist Service    Date of Admission:  3/18/2022    Assessment & Plan          This is a morbidly obese 67 yo  male with hx of chronic diastolic congestive heart failure, CKD, TASHA, paroxysmal atrial fibrillation, hypertension, chronic lower extremities venous stasis ulcers, hx of Gout arthritis who was admitted to TCU with generalized weakness and debility.         1) Generalized weakness and Debility: Pt is progressing slowly in  Therapies, limited by weakness, back pain, mild MACKEY.  Overall is gradually improving    2) CHF, stable, hx of perimembranous VSD stable on metoprolol  Last Echo 1/22:Interpretation Summary  Left ventricular function is decreased. The ejection fraction is 45-50%  (mildly reduced).  Dilated RV with probably moderately reduced RV function on limited views.  Known small perimembranous VSD with left to right shunting.  The inferior vena cava was normal in size with preserved respiratory  variability.  This study was compared with the study from 1/14/22 .  No significant changes noted.  Not on diuretics at baseline    3) TASHA on CPAP at night.     4) Paroxysmal atrial fibrillation, rate controlled on metoprolol and chronic anticoagulation with a therapeutic INR.    5) Benign and Essential Hypertension, Hyperlipidemia: BP stable     6) Chronic Kidney Disease, stage IIIb. Serum Creatinine  level at baseline. On bicarb for  metabolic acidosis  Renal function stable over the last week  Plan repeat BMP    7) hyperkalemia, secondary to CKD with metabolic acidosis  Has been stable since admission. On bicarb, not on NSAIDS or ARB/ACE inhibitors  Plan repeat K in am    8) Anemia, chronic, likely due to chronic renal disease, stable    9) Hx of complicated UTI/PN with sepsis    10) chronic bilateral LE edema and ulcers, venous stasis ulcers, arterial duplex US on 2/3/22 showed no occlusion or stenosis in the bilateral Lower  extremities. Diffuse atheromatous plaques.     11) Gout arthritis with a recent flare ups on colchicine 0.6 mg po daily and maintenance allopurinol     12) Morbid Obesity with BMI  53.74    13/Hx of colon cancer s/p hemicolectomy in 2011          Diet: Regular Diet Adult    DVT Prophylaxis: continued on Warfarin   Cheema Catheter: Not present  Central Lines: None  Cardiac Monitoring: None  Code Status: Full Code      Disposition Plan    Pending PT/OT final recommendations for discharge needs: Likely Home with Family   Pt is medically stable      The patient's care was discussed with the Pt's RN    Nasir Escobedo MD  Hospitalist [facility]        ______________________________________________________________________    Interval History     Pt states he is gradually feeling stronger  Chronic back pain stable  Still feels week overall,   Dyspneic with activity (02 sats generally 90s RA with activity      Physical Exam   Vital Signs: Temp: (!) 96.2  F (35.7  C) Temp src: Axillary BP: 99/62 Pulse: 98   Resp: 16 SpO2: 100 % O2 Device: None (Room air)      Morbidly obese, lying in bed, appears comfortable   Lungs: Clear  CV: Regular rhythm  Abdomen: soft, non tender  Skin:LE wrapped, 2 + LE edema  CNS; alert, fully oriented, pleasant    Results for NARENDRA WADE (MRN [id number]) as of 3/26/2022 10:06   Ref. Range 3/24/2022 07:46   Sodium Latest Ref Range: 133 - 144 mmol/L 138   Potassium Latest Ref Range: 3.4 - 5.3 mmol/L 5.1   Chloride Latest Ref Range: 94 - 109 mmol/L 110 (H)   Carbon Dioxide Latest Ref Range: 20 - 32 mmol/L 23   Urea Nitrogen Latest Ref Range: 7 - 30 mg/dL 22   Creatinine Latest Ref Range: 0.66 - 1.25 mg/dL 1.95 (H)   GFR Estimate Latest Ref Range: >60 mL/min/1.73m2 37 (L)   Calcium Latest Ref Range: 8.5 - 10.1 mg/dL 9.2   Anion Gap Latest Ref Range: 3 - 14 mmol/L 5   Glucose Latest Ref Range: 70 - 99 mg/dL 90   WBC Latest Ref Range: 4.0 - 11.0 10e3/uL 5.1    Hemoglobin Latest Ref Range: 13.3 - 17.7 g/dL 10.0 (L)   Hematocrit Latest Ref Range: 40.0 - 53.0 % 32.3 (L)   Platelet Count Latest Ref Range: 150 - 450 10e3/uL 188   RBC Count Latest Ref Range: 4.40 - 5.90 10e6/uL 3.76 (L)   MCV Latest Ref Range: 78 - 100 fL 86   MCH Latest Ref Range: 26.5 - 33.0 pg 26.6   MCHC Latest Ref Range: 31.5 - 36.5 g/dL 31.0 (L)

## 2022-03-26 NOTE — CARE PLAN
Patient is alert and oriented x 4. Denies CP and SOB. Continent B/B. Patient got moved to low potassium diet today and takes pills whole with thin liquids. Fentanyl patch to L chest. Patient is able to make needs known and calls appropriately. Continue with plan of care.    Patient's most recent vital signs are:     Vital signs:  BP: 99/62  Temp: 96.2  HR: 98  RR: 16  SpO2: 100 %     Patient does not have new respiratory symptoms.  Patient does not have new sore throat.  Patient does not have a fever greater than 99.5.

## 2022-03-26 NOTE — PLAN OF CARE
Discharge Planner Post-Acute Rehab OT:     Discharge Plan: home w/ wife, home OT TBD  Recert due: 4/18    Precautions: falls, needs bariatric equip including FWW (pt is 6ft 6in, 465#), Pt allowed to leave ROOM for therapy (re: covid vacination status)  Current Status:  ADLs:    Mobility: rolling cga to min A and bedrail, supine to sit w/ min A of 2 and bedrail/HOB elevated 30 deg, STS w/ min A of 1-2 w/ ht of bed elevated and use of bariatric FWW    Grooming: set up    Dressing: uB drg w/ gown change w/ set up, LB pants/shorts declined, feet w/ total assist    Bathing: ub set up, LB dep w/ sponge bath    Toileting: bed pan dep w/ placement and dep w/ pericares  IADLs: PTA pt and wife both performed, wife works full time but mostly from home  Vision/Cognition: WFL for basic cog, pt reports no glasses except occasionally used reading glasses    Assessment: Tx focus on STS at rail in shower, problem solving approp shower chair for safety as shower chairs are lower than pt's w/c and he has significant weakness/challenged by large habitus.   Con't OT for strengthening and improving ADL/mobility, plan to trial SPT Ax2 EOB >< BSC and showering.    Other Barriers to Discharge (DME, Family Training, etc): 5 steps to get into house (has ramp but prefers to use stairs), 15 steps in house to get to bedroom/full bathroom, wife works full time but mostly from home, PTA used SEC , main level bathroom half bath w/ taller toilet/grab bar, full bathroom upstairs std ht toilet w/ grab bar on wall, tub/shower combo w/ grab bars but does not have bench/shower chair, pt reports being house hold ambulator but used w/c at clinic for appts (does NOT own w/c, states he as 4WW in garage donated to him but not sure it is the right size)     Th provided pt with picture reference handout to consider bariatric shower bench for home.

## 2022-03-27 NOTE — PLAN OF CARE
Pt alert and oriented. Cooperative. Denies pain. No s/s of SOB and chest pain noted. C-PAP at night. Sleeps well all night. Continent with bladder, using urinal. Call light within reach. Will continue POC.        Patient's most recent vital signs are:     Vital signs:  BP: 105/61  Temp: 96.8  HR: 99  RR: 16  SpO2: 100 %     Patient DOES NOT have new respiratory symptoms.  Patient DOES NOT have new sore throat.  Patient DOES NOT have a fever greater than 99.5.

## 2022-03-27 NOTE — PLAN OF CARE
Goal Outcome Evaluation:      Pt is alert and oriented x4. Potassium was elevated and pt is on low K+ diet, BMP to be rechecked in the AM. All dressings to bilateral heels and bilateral chin completed per care plan. Fentanyl patch in place. Able to verbalize needs. Uses bedside urinal. Repositions per patient's request. Continue with POC.      Patient's most recent vital signs are:     Vital signs:  BP: 105/61  Temp: 96.8  HR: 99  RR: 16  SpO2: 100 %     Patient does not have new respiratory symptoms.  Patient does not have new sore throat.  Patient does not have a fever greater than 99.5.

## 2022-03-27 NOTE — CARE PLAN
Patient is alert and oriented x 4. VSS on room air. Fentanyl patch on L chest. On low potasium diet, takes pills whole with thin liquids. Continent B/B, LBM yesterday. Urinal at bedside. Patient did not get OOB today shift. Call light within reach, continue with plan of care.    Patient's most recent vital signs are:     Vital signs:  BP: 98/58  Temp: 95.9  HR: 89  RR: 16  SpO2: 100 %     Patient does not have new respiratory symptoms.  Patient does not have new sore throat.  Patient does not have a fever greater than 99.5.

## 2022-03-28 NOTE — PLAN OF CARE
"Discharge Planner Post-Acute Rehab PT:     Discharge Plan: Home with spouse, mod I    Precautions: fall risk    Current Status:  Bed Mobility: CGA - min A rolling, Mod A for supine to sit, Max A for legs sit to supine  Transfer: Supervision sit to stand from bed, raise bed up to stand, lower bed to sit. Max A x1 with pt pulling on // bars from W/c x5  Gait:short amb in // bars down and back with SBA - limited by SOB weakness and fatigue. Pt amb with Front WW ~ 20' from bed to w/c in hallway   Stairs: NT  Balance: sits EOB demonstrates good balance ~20 min with intermittent UE support. Requires heavy UE support on walker with standing balance    Assessment:  Pt able stand x 3 from low w/c with Max A x 1 in // bars.  demonstrating increased tolerance and improvement for sit<>stands from low surface, difficulty with sit to stand d/t LE weakness, body habitus. Pt able to perform repeated toe taps for first time to 5\" step  X 10 x 2 sets with seated rest in between, He demonstrates improving tolerance for activity but continues to become SOB with activity, requiring seated rest break to recover.    3/28: Care conference held with patient, pt's wife, RN care coordinator, SW, PT/OT. Updated current functional status. Encouraged OOB/EOB activity. Pt is agreeable to remain on first floor of his home if he is unable to climb stairs to bedroom. Pt also has ramp to access his home. Pt's wife agreed to measure step height. Will provide LE exercise program for pt to help pt improve endurance and strength.    Other Barriers to Discharge (DME, Family Training, etc): pt will need bariatric front WW, 15 steps to get to bed room, steps to get into house ~8 inches tall.      "

## 2022-03-28 NOTE — PLAN OF CARE
"Discharge Planner Post-Acute Rehab OT:     Discharge Plan: home w/ wife, home OT TBD  Recert due: 4/18    Precautions: falls, needs bariatric equip including FWW (pt is 6ft 6in, 465#), Pt allowed to leave ROOM for therapy (re: covid vacination status)  Current Status:  ADLs:    Mobility: rolling cga to min A and bedrail, supine to sit w/ min A of 2 and bedrail/HOB elevated 30 deg, STS w/ min A of 1-2 w/ ht of bed elevated and use of bariatric FWW    Grooming: set up    Dressing: uB drg w/ gown change w/ set up, LB pants/shorts declined, feet w/ total assist    Bathing: ub set up, LB dep w/ sponge bath    Toileting: bed pan dep w/ placement and dep w/ pericares  IADLs: PTA pt and wife both performed, wife works full time but mostly from home  Vision/Cognition: WFL for basic cog, pt reports no glasses except occasionally used reading glasses    Assessment: Pt seen for full shower. Pt able to transfer from EOB <> rolling shower commode with FWW and SBA. Increased difficulty completing STS from 22\" commode, increased effort, SOB. Transported pt to shower room Ax1 via rolling commode. Pt able to wash hair, underarms and chest with setup, Max A under skin folds/back. Dep to wash LB. Pt did not attempt standing from chair for LB d/t safety concern/fall risk in shower. Pt demonstrates good efforts with task and participates in problem solving/sequencing. Setup of hospital gown at end of session, did not complete full body dressing d/t time constraint (care conference scheduled following shower). Con't OT for strengthening and improving ADL/mobility, plan to trial SPT Ax2 EOB >< BSC and showering.    3/28: Care conference held with patient, pt's wife, RN care coordinator, GRACIA, PT/OT. Updated current functional status. Encouraged OOB/EOB activity. Plan to initiate UB HEP for pt to complete independently in room outside of therapies. Pt will need bariatric BSC upon discharge. Pt will most likely require sponge bathing initially " upon discharge d/t shower upstairs and pt unable to do stairs at this time. See Mari Polk's note from 3/28 for further care conference details.    Other Barriers to Discharge (DME, Family Training, etc): 5 steps to get into house (has ramp but prefers to use stairs), 15 steps in house to get to bedroom/full bathroom, wife works full time but mostly from home, PTA used SEC , main level bathroom half bath w/ taller toilet/grab bar, full bathroom upstairs std ht toilet w/ grab bar on wall, tub/shower combo w/ grab bars but does not have bench/shower chair, pt reports being house hold ambulator but used w/c at clinic for appts (does NOT own w/c, states he as 4WW in garage donated to him but not sure it is the right size)     Th provided pt with picture reference handout to consider bariatric shower bench for home.

## 2022-03-28 NOTE — PATIENT INSTRUCTIONS - HE
- Please call us if your compression wraps fall more than 1-2 inches below the bend of the knee. Remove the wraps if you experience any shortness of breathe or notice your toes turning blue/purple and then give us a call. Call if they are too painful. Call if they get wet. If it is a weekend and the wraps fall down, are too painful, or get wet take the wraps off and put on another form of compression. Compression such as velcro wraps, compression stockings, short stretch bandages, or tubular compression. Apply one of these until you can be seen in clinic. Please call us if you have any questions [phone].    - Treatment:  Layered Compression Bandaging (2-layer)    What is it?  The layered compression bandaging has a layer of absorbent material that will soak up drainage.     Why we do it.   This is done to treat swelling, wounds, or both.  This will in turn help circulation and healing.    How to care for your bandages.  The wraps need to be kept dry. If  the wraps become wet, remove them and call the clinic to have another wrap applied.    What to expect.  It is common for the wraps to be uncomfortable at the beginning. The first two days are usually the hardest; then they will become more comfortable.       Elevating your legs will help the discomfort. Try to elevate your legs as much as possible.    If rest and elevation does not help your discomfort, call your provider.  If your provider is not available you can remove the wrap and leave a message for further instructions.    - Swelling and Compression Therapy    Swelling in the legs can be caused by many reasons. No matter what the reason, treatment usually includes some type of compression. This may be done with a support sock, dressing, ace wrap, or layered wraps.     It is important to treat the swelling for many reasons. If the swelling is not treated you may develop blisters that can lead to ulcerations. This is caused when extra fluid goes into tissue  [FreeTextEntry1] : 30 yo F with h/o morbid obesity with MWL s/p sleeve gastrectomy who is a good candidate for body contouring surgery.  Grade III hanging abdominal pannus over BL hip and thighs.\par \par 6 months s/p Courtney Encarnacion extended panniculectomy. Doing very well.\par \par Recommend painful scar revision of upper and lower abdominal scar and umbilicus\par \par -Benefits, risks and alternatives of the procedure were discussed. The risks include but not limited to bleeding, infection, poor wound healing and scarring, possible keloid, recurrence of deformity, and need for re-operation. Location of scar and expected post-surgical outcomes were discussed. The patient understands the risks and would like to proceed with BUTCH procedure under IV sedation\par -Informed consent obtained\par -all questions answered\par -pre-op photos were taken with pt permission\par \par Due to COVID-19, pre-visit patient instructions were explained to the patient and their symptoms were checked upon arrival. Masks were used by the healthcare provider and staff and the examination room was cleaned after the patient visit concluded\par \par  causing damage and blocking blood flow to the tissue.     It is important that you wear your compression every day, including days that you will be seen in clinic.     Compression is often the most important part of treating leg wounds. Without controlling the swelling it is often not possible to heal wounds.     Going without compression for even brief periods of time can be damaging to your legs and your health.  Your compression should be put on first thing in the morning. Take the compression off at night only when instructed by your care provider to do so. Sometimes wearing compression 24 hours a day will be recommended.       If you are having difficulty wearing your compression it is important to notify your care provider so that other options may be reviewed.    Thank you for choosing [facility]. Please call us if you have any questions [phone].

## 2022-03-28 NOTE — PROGRESS NOTES
[facility] Transitional [facility]    Progress Note - Hospitalist Service       Date of Admission:  3/18/2022    Pt was seen by Dr Escobedo     Assessment & Plan                 Ruben Panda MALONE. is a 66 year old  male with PMHx relevant for  Morbid obesity,  hx of chronic diastolic congestive heart failure, CKD, TASHA, paroxysmal atrial fibrillation, hypertension, chronic lower extremities venous stasis ulcers, hx of Gout arthritis who was admitted to TCU with generalized weakness and debility.       # Generalized Weakness and Debility     > Working towards goal (slow improvement)    - Continue with PT sessions/therapy as per department's disposition    # HF(mildly reduced)EF (45-50%) as per 2 D echocardiogram 01/29/22 (compensated at the time)    > Not tolerating guideline medical directed therapy (including        ACE-I and ARB) due to Hyperkalemia in the context of CKD  # Known Perimembranous VSD (small) with left to right shunting  # Essential Hypertension     - Continue to monitor cardiopulmonary status  - Continue Metoprolol Succinate 25mg per oral daily   - Not on diuretics  - Recommend no resumption of ACE-I/ARB anytime soon (may          potentially never tolerate due to life threatening complications)    # Chronic Kidney Disease Stage IV (likely due to Essential Hypertension and HF)  # Chronic Metabolic Acidosis related to CKD  # Hyperkalemia (related to previous SHANTE on CKD, ACE-I and gout flare therapies)       Potassium and Scr continue to improve (the latter now closer to baseline of 2.2-2.5). Would encourage to hold on K+ sparing therapies and to continue with a modified (low K+ diet) to avoid further electrolyte pooling in the context of his CKD and acidosis.     - Continue K+ (3g diet)  - Continue NaHCO3 1,300mg per oral TID  - Repeat BMP on Wednesday       # Paroxysmal Atria Fibrillation    - On Warfarin (continue)  - On Beta-Blocker   - Goal INR of 2-3    # TASHA vs. OHS    -  Continue CPAP at night     # Anemia of Chronic Disease    > Stable    - CTM    # Gout Arthritis with recent Flares    - On colchicine 0.6mg per oral daily  - On Maintenance Allopurinol     #Morbid Obesity with BMI  53.74  -CTM     #Hx of colon cancer s/p hemicolectomy in 2011         Diet: 3 Gram K Diet    DVT Prophylaxis: Warfarin  Cheema Catheter: Not present  Fluids: N/A  Central Lines: None  Cardiac Monitoring: None  Code Status: Full Code      Disposition Plan   Expected Discharge: 04/12/2022     Anticipated discharge location: home with familyAwaiting care coordination huddle       Pt was seen by Dr Viki Yates and Dr Escobedo on a collaborative visit                       ______________________________________________________________________    Interval History   Reviewed by Dr Gregg Machado MACKEY, no chest pain    Data reviewed today: I reviewed all medications, new labs and imaging results over the last 24 hours.    Physical Exam  (performed by Dr Escobedo  Vital Signs: Temp: 97.2  F (36.2  C) Temp src: Oral BP: 112/64 Pulse: 113   Resp: 18 SpO2: 100 % O2 Device: None (Room air)   Alert, pleasant,sitting up with therapy  RR 12, unlabored  Lungs clear  CV rrr  Abd soft obese   1 + LE edema B    Data   Results for orders placed or performed during the hospital encounter of 03/18/22 (from the past 24 hour(s))   CBC with platelets   Result Value Ref Range    WBC Count 5.1 4.0 - 11.0 10e3/uL    RBC Count 3.54 (L) 4.40 - 5.90 10e6/uL    Hemoglobin 9.4 (L) 13.3 - 17.7 g/dL    Hematocrit 29.9 (L) 40.0 - 53.0 %    MCV 85 78 - 100 fL    MCH 26.6 26.5 - 33.0 pg    MCHC 31.4 (L) 31.5 - 36.5 g/dL    RDW 16.5 (H) 10.0 - 15.0 %    Platelet Count 178 150 - 450 10e3/uL   Basic metabolic panel   Result Value Ref Range    Sodium 142 133 - 144 mmol/L    Potassium 5.0 3.4 - 5.3 mmol/L    Chloride 113 (H) 94 - 109 mmol/L    Carbon Dioxide (CO2) 21 20 - 32 mmol/L    Anion Gap 8 3 - 14 mmol/L    Urea Nitrogen 22 7 - 30 mg/dL     Creatinine 2.01 (H) 0.66 - 1.25 mg/dL    Calcium 8.8 8.5 - 10.1 mg/dL    Glucose 100 (H) 70 - 99 mg/dL    GFR Estimate 36 (L) >60 mL/min/1.73m2   INR   Result Value Ref Range    INR 2.19 (H) 0.86 - 1.14

## 2022-03-28 NOTE — PLAN OF CARE
RN: [id number]    Pt is alert and oriented x4. Able to make needs known. Denies pain, cp or SOB. Continent of bowel and bladder. Utilizes bed pan and urinal; staff empties. Assit of 2 pivot transfer; up with therapy. Had shower w/ OT, BLE dressings changed per WOC orders. Declined full skin assessment. Fentanyl patch to L chest area. Declined rooke boots, said they make his feet hurt. Pleasant and cooperative. Call light is in reach, continue w/ POC.      Patient's most recent vital signs are:     Vital signs:  BP: 120/54  Temp: 97.4  HR: 98  RR: 18  SpO2: 100 %     Patient does not have new respiratory symptoms.  Patient does not have new sore throat.  Patient does not have a fever greater than 99.5.

## 2022-03-28 NOTE — PLAN OF CARE
Care Coordination:    Patient and spouse joined writer, PT, OT, and LSW for a care conference this morning. Patient's spouse, Aleyda, voiced a lot of concerns about patient's motivation once home and the need for him to see psychology. Patient declining psych services at this time. Aleyda reports that she has a son at home that lives in the living room with 24/7 care due to being a victim of a crime. She reports that there are 16 steps to patient's bedroom and the shower upstairs. She reports that there are 6 steps to enter through the back of the home, but she also has a ramp that patient can utilize.     IDT presented an option B for patient and spouse, since insurance coverage could be in question after 3/30 with a potential discharge on 4/2. Patient agreeable to staying on main level of the home if needed. A hospital bed would need to be ordered, as well as a bedside commode. Patient states that sponge bathing is an option until he can make it to the second level of his home. Patient would discharge with home care services to assist him with his goal of the 16 stairs.     While patient remains on TCU, therapy encouraged him to get up in his room in between therapy sessions. Nursing staff are not cleared to ambulate with patient as of yet, but they are able to supervise him while he performs sit to stands. Therapy also offered to provide a home work out program for him to complete while he is in between therapies. Patient stated that his stairs are larger than the stairs at TCU, so his spouse did offer to measure the stairs.  He has been unable to practice stairs yet in therapy due to endurance and strength.     Writer inquired about plan for dressing changes every other day. Patient's spouse states that she is willing to assist with that, as she has been doing dressing changes in the past. She would like a home care RN to assist with these dressing changes. Patient states that he has been having his INR obtained  via his PCP.     Plan is for patient to discharge home with home PT/OT/RN/HHA services. Writer did explain the difficulty with finding a home care agency that has a contract with [employer]. Aleyda inquired about PCA services, but it was explained that patient would need to have MA for PCA services to be covered 100%. [facility] did offer private pay option, but this is not an option financially for patient. Aleyda voiced concern over being patient's caregiver, and would appreciate as much assistance as she could have. She also voiced to patient that he would need to be motivated and willing to do more things for himself.    Patient's spouse did take contact information for writer and LSW. Writer will continue to assist with coordination of care and discharge planning.    Mari Polk RN, BSN, CRRN  Patient Care Management Coordinator  Acute Rehabilitation Unit/Transitional Care Unit  PH: [phone]  Pager: [phone]

## 2022-03-28 NOTE — PLAN OF CARE
Goal Outcome Evaluation:       Pt is alert and oriented x4. Pt is on low K+ diet,  All dressings to bilateral heels and bilateral chin appear to be CDI, dressings due tomorrow. Fentanyl patch in place. Able to verbalize needs. Uses bedside urinal. Repositions per patient's request. Refused Miconazole powder. Call light within reach. Continue with POC.      Patient's most recent vital signs are:     Vital signs:  BP: 99/48  Temp: 97.2  HR: 102  RR: 18  SpO2: 100 %     Patient does not have new respiratory symptoms.  Patient does not have new sore throat.  Patient does not have a fever greater than 99.5.

## 2022-03-28 NOTE — CARE PLAN
Patient is stable. Respiration even and unlabored. Sleep all night with Bi-pap machine in use. Repositions per pt request. Denies pain or SOB at this time.safety measures maintained. Call light in reach.        Patient's most recent vital signs are:     Vital signs:  BP: 99/48  Temp: 97.2  HR: 102  RR: 18  SpO2: 100 %     Patient does not have new respiratory symptoms.  Patient does not have new sore throat.  Patient does not have a fever greater than 99.5.

## 2022-03-29 NOTE — PLAN OF CARE
Goal Outcome Evaluation:  Patient A&O x 4. Voiced pain at level 2 when Fentanyl patch changed this A.M. Tolerates medications whole without difficult. ADL extensive assist x 1-2. Two person transfer OOB with lift. Continent of B&B. Compliant with treatment. Refuses rooke boots. Compliant with therapy services.Patient voices no concerns. Call light and personal items within patient reach.        Patient's most recent vital signs are:     Vital signs:  BP: 102/57  Temp: 97.4  HR: 78  RR: 18  SpO2: 99 %     Patient does not have new respiratory symptoms.  Patient does not have new sore throat.  Patient does not have a fever greater than 99.5.

## 2022-03-29 NOTE — PROGRESS NOTES
WOC Nurse Inpatient Wound Assessment   Reason for consultation: Evaluate and treat  Left lateral shin wounds, R heel    Assessment  Left lateral Shin wounds due to Venous Ulcer  Status: healing    Bilateral upper shin wounds due to venous stasis and edema  Status: Right healing, left healed    Right lateral heel Pressure injury community acquired.   Pressure injury stage 2  Status: stable    Treatment Plan  Right shin wound: Every other day    Cleanse with wound cleanser and blot dry  Spray all intact skin to BL lower extremities with Carmela cleanse and protect and rub in. Allow to soak for 10mins and then wipe away any excess.   Apply sween 24 (Pink top lotion) from base of toes to below knees.  Apply Adaptic (mineral gauze) to any open areas  Cover with kerlix and tape.     Right lateral heel and left lateral lower leg every other day  Cleanse with wound cleanser   Dry and protect surrounding skin with no sting barrier film wipe #[id number] (this is very important)  Cover with silicone foam dressing Mepilex  4x4 #200931    Pressure Injury prevention (RN-please order supplies if not in room)  Turn every 2 hours, side to side avoid supine on bariatric pressure redistribution support surface  Float heels off bed with use of  Heel Lift boots (Prevalon #[id number])    Prevent sliding and shear by limiting HOB to 30 degrees or less unless contraindicated, use knee gatch first if not contraindicated  If sitting, use pressure redistribution chair cushion large(#[id number]); if unable to shift weight every hour, please limit sitting to an hour at a time.  Mepilex PRN,Change at least q 3 days, peel back, peek and replace for daily skin inspection.      Orders Revised  Recommended provider order: Lymphedema  WOC Nurse follow-up plan: Weekly  Nursing to notify the Provider(s) and re-consult the WOC Nurse if wound(s) deteriorates or new skin concern.    Patient History  According to provider note(s):   66 year old male admitted through on  2/20/2022 after being discharged earlier from [facility] Hospital stay from 2/11 - 2/20 for acute gout flare and subsequent SHANTE and hyperkalemia likely caused by gout treatment. Patient is morbidly obese and he was discharged to his home with home health but he was unable to manage going up steps immediately upon arrival home.  He was brought in to [facility] ED for placement. In the ER he was seen by Dr Henning, vitals were normal.  Labs were reviewed from his recent discharge with Cr of 1.93 which was improved from his discharge and actually better than baseline.     3/15/2022 pending TCU for deconditioning      No Known Allergies  Objective Data  Containment of urine/stool: Incontinence Protocol as needed    There is no height or weight on file to calculate BMI.     Active Diet Order  Orders Placed This Encounter      3 Gram K Diet      Intake/Output Summary (Last 24 hours) at 3/15/2022 1444  Last data filed at 3/15/2022 1030  Gross per 24 hour   Intake 260 ml   Output 600 ml   Net -340 ml       Pressure Injury Risk Assessment:   Sensory Perception: 3-->slightly limited  Moisture: 3-->occasionally moist  Activity: 3-->walks occasionally  Mobility: 3-->slightly limited  Nutrition: 3-->adequate  Friction and Shear: 2-->potential problem  Sandoval Score: 17                          Labs:   Recent Labs   Lab 03/28/22  0810   HGB 9.4*   INR 2.19*   WBC 5.1       Physical Exam  Areas of skin assessed: Left Lateral lower leg, Right lateral foot and heel    Right and left lateral foot wounds healed on assessment 3/21    Wound Location:  Left Lateral lower leg  See Media tab for mor images      3/21      3/29    Wound Base: dermis, red, moist small areas of granulation tissue     Palpation of the wound bed: normal      Drainage: scant     Description of drainage: yellow, lymph     Measurements (length x width x depth, in cm)1  x  x  0. cm      Tunneling N/A     Undermining N/A  Periwound skin: pink, intact      Temperature:  normal   Odor: none  Pain: denies , none  Pain intervention prior to dressing change: NA    Wound Location:  Right lateral heel        3/21     3/29    Wound History: present on admit to TCU  Wound Base: % open dermis % fibrin     Palpation of the wound bed: moist     Drainage: none     Description of drainage: none     Measurements (length x width x depth, in cm) Irregularly shaped  0.9 x 2.3 x 0.2 cm       Tunneling N/A     Undermining N/A  Periwound skin: intact      Color: normal and consistent with surrounding tissue      Temperature: normal   Odor: none  Pain: denies ,     Wound Location:  Bilateral upper shins    3/21 Right upper shin    3/29 Right upper shin    Date of last photo 3/29/22  Wound History: present on admit. Pt has had fluctuating edema in LE causing weepy areas and skin to open.   Wound Base: 100 % dermis     Palpation of the wound bed: normal      Drainage: small     Description of drainage: serous     Measurements (length x width x depth, in cm)   Right: 1 x 1 x 0.1 cm  Left: healed  Periwound skin: hemosiderin staining and irritant dermatitis      Color: normal and consistent with surrounding tissue      Temperature: normal   Odor: none  Pain: denies , none      Interventions  Visual inspection and assessment completed   Wound Care Rationale Right heel and foot Protect and monitor as DTPI evolves. Left lateral leg selective debridement (autolysis) of nonviable tissue   Wound Care: done per plan of care  Supplies: at bedside  Current off-loading measures: Chair cushion and ordered PRN  Current support surface: Bariatric Low air loss mattress with extention  Education provided to: plan of care, wound progress, Off-loading pressure and effects of neuropathy  Discussed plan of care with Patient and Nurse    Interventions  Visual inspection and assessment completed   Wound Care Rationale Protect periwound skin, Promote moist wound healing without tissue dehydration , Gently fill dead space to  prevent abscess formation  and Provide protection   Wound Care: done per plan of care  Supplies: at bedside  Current off-loading measures: pillows for positioning, HOB 30 degrees or less, heels floating off beds, bariatric low airloss support surfaces, chair cushion ordered PRN  Current support surface: Bariatric low air loss  Education provided to: importance of repositioning and plan of care  Discussed plan of care with Patient, Nurse and Physician     Cynthia Ortega RN, CWOCN  Pager [phone]

## 2022-03-29 NOTE — PLAN OF CARE
"Discharge Planner Post-Acute Rehab PT:     Discharge Plan: Home with spouse, mod I    Precautions: fall risk    Current Status:  Bed Mobility: CGA - min A rolling, Mod A for supine to sit, Max A for legs sit to supine  Transfer: Supervision sit to stand from bed, raise bed up to stand, lower bed to sit. Max A x1 with pt pulling on // bars from W/c x5  Gait:short amb in // bars down and back with SBA - limited by SOB weakness and fatigue. Pt amb with Front WW ~ 20' from bed to w/c in hallway   Stairs: NT  Balance: sits EOB demonstrates good balance ~20 min with intermittent UE support. Requires heavy UE support on walker with standing balance    Assessment:  Pt who is 6' 6\" tall is able to stand from low w/c, 21 inches from ground. First rep he requires Max A x 1,but next two reps demonstrates improved performance requiring min A. Pt, demonstrating increased tolerance and improvement for sit<>stands from low surface. Pt is progressing towards goal of stairs. Today patient performs repeated toe taps for first time to 8\" step and 7\" step. Pt demonstrates improved lower extremity strength as evidence by ability to perform step ups for first time today to 1\" surface. Pt demonstrates improving tolerance for activity as evidenced by riding foot pedal bike for 6 continuous minutes. Pt participates well with therapy and is motivated to achieve goals so he can return home.    3/28: Care conference held with patient, pt's wife, RN care coordinator, SW, PT/OT. Updated current functional status. Encouraged OOB/EOB activity. Pt is agreeable to remain on first floor of his home if he is unable to climb stairs to bedroom. Pt also has ramp to access his home. Pt's wife agreed to measure step height. Will provide LE exercise program for pt to help pt improve endurance and strength.    Other Barriers to Discharge (DME, Family Training, etc): pt will need bariatric front WW, 15 steps to get to bed room, steps to get into house ~8 " inches tall.

## 2022-03-29 NOTE — CARE PLAN
Patient received alert and oriented with no acute distress noted. Routine medications well tolerated, denies pain at this time. Pt voice needs, and follow commends. Pt continue in a low potassium diet as ordered. Fentanyl patch in place to left chest  Area. Refused the used of miconazole powder, reposition per his requested. Hygiene care provided, all needs met. Safety maintained, call light with in reach. Will continue to monitor.      Patient's most recent vital signs are:     Vital signs:  BP: 120/54  Temp: 97.4  HR: 98  RR: 18  SpO2: 100 %     Patient does not have new respiratory symptoms.  Patient does not have new sore throat.  Patient does not have a fever greater than 99.5.

## 2022-03-29 NOTE — PLAN OF CARE
"Discharge Planner Post-Acute Rehab OT:     Discharge Plan: home w/ wife, home OT TBD  Recert due: 4/18    Precautions: falls, needs bariatric equip including FWW (pt is 6ft 6in, 465#), Pt allowed to leave ROOM for therapy (re: covid vacination status)  Current Status:  ADLs:    Mobility: rolling cga to min A and bedrail, supine to sit w/ min A of 2 and bedrail/HOB elevated 30 deg, STS w/ min A of 1-2 w/ ht of bed elevated and use of bariatric FWW    Grooming: set up    Dressing: uB drg w/ gown change w/ set up, LB pants/shorts declined, feet w/ total assist    Bathing: ub set up, LB dep w/ sponge bath    Toileting: bed pan dep w/ placement and dep w/ pericares  IADLs: PTA pt and wife both performed, wife works full time but mostly from home  Vision/Cognition: WFL for basic cog, pt reports no glasses except occasionally used reading glasses    Assessment: Problem solved BSC to promote increased time spent OOB and completing transfers more frequently throughout day with nursing staff. Provided white PVC shower commode (22\") that pt has previously demod ability to complete STS from with FWW. Previous commode requires use of lift d/t lower surface height. Concern of white PVC BSC instability. Provided dycem to place under wheels to increase safety. Updated whiteboard. Initiated UB HEP for pt to complete independently in room 1-2x/day. Pt motivated and demonstrates good effort with exercises. Provided calendar to track daily completion. Plan to continue to progress toileting in future sessions with focus on consistent transfers using FWW. Con't OT for strengthening and improving ADL/mobility.    3/28: Care conference held with patient, pt's wife, RN care coordinator, GRACIA, PT/OT. Updated current functional status. Encouraged OOB/EOB activity. Plan to initiate UB HEP for pt to complete independently in room outside of therapies. Pt will need bariatric BSC upon discharge. Pt will most likely require sponge bathing initially " upon discharge d/t shower upstairs and pt unable to do stairs at this time. See Mari Polk's note from 3/28 for further care conference details.    Other Barriers to Discharge (DME, Family Training, etc): 5 steps to get into house (has ramp but prefers to use stairs), 15 steps in house to get to bedroom/full bathroom, wife works full time but mostly from home, PTA used SEC , main level bathroom half bath w/ taller toilet/grab bar, full bathroom upstairs std ht toilet w/ grab bar on wall, tub/shower combo w/ grab bars but does not have bench/shower chair, pt reports being house hold ambulator but used w/c at clinic for appts (does NOT own w/c, states he as 4WW in garage donated to him but not sure it is the right size)     Th provided pt with picture reference handout to consider bariatric shower bench for home.

## 2022-03-30 NOTE — CARE PLAN
RN: 1800 pt requesting to be boosted up in bed as feet touching foot board of bed and experiencing right upper thigh/groin pain . Pt boosted up in bed, pain not relieved. Pt rates pain 10/10 with movement. Says pain increased with movement. Says pain is sharp like stabbing pain.  Assessment does not show any open area or injury. Area seems larger thigh than left thigh and sl increase in warmth.  No fluid leaking from this area, fluid has been leaking from BLE since admission. Serous fluid.  Pt declines to sit up in chair, does get OOB during therapy sessions, states he rode stationary bike today.   No pain in Right lower leg.   Pt had coumadin dose 5 mg 1739. Is wearing fentanyl patch, declines any additional pain meds at this time.   VSS, no fever. MD paged and called back and will come to unit to assess pt.  Pt updated. Will continue to monitor and assess. See labs. Awaiting MD assessment.   Dr Manriquez saw pt, no interventions needed, pt pain alleviated and pt thinks it was a cramp, see MD note and continue to monitor.   Sizewise came later pm and changed out side rail on pt bed, upper right, pt right side.  Currently in good working condition.

## 2022-03-30 NOTE — PROGRESS NOTES
[facility]    Medicine Progress Note - Hospitalist Service    Date of Admission:  3/18/2022    Assessment & Plan         Panda Huerta is a 66 year old  male with PMHx relevant for  Morbid obesity,  hx of chronic diastolic congestive heart failure and HFrEF , CKD, TASHA, paroxysmal atrial fibrillation, hypertension, chronic lower extremities venous stasis ulcers, hx of Gout arthritis who was admitted to TCU with generalized weakness and debility 3/18/2022 .   He was initially hospitalized wit complicated UTI vs pyelonephritis 1/27-/1/30  Then 2/11-2/20 with hyperkalemia  SHANTE  And acute gout. Then readmitted with weakness  Recurrent hyperkalemia   Acute polyarticular gout            # Generalized Weakness and Debility    - Working towards goal (slow improvement)   - Continue with PT sessions/therapy as per department's disposition     # HF(mildly reduced)EF (45-50%) as per 2 D echocardiogram 01/29/22 (compensated at the time)    > Not tolerating guideline medical directed therapy (including        ACE-I and ARB) due to Hyperkalemia in the context of CKD  # Known Perimembranous VSD (small) with left to right shunting  # Essential Hypertension    - Continue to monitor cardiopulmonary status  - Continue Metoprolol Succinate 25mg per oral daily   - Not on diuretics   - Recommend no resumption of ACE-I/ARB anytime soon (may          potentially never tolerate due to life threatening complications)     # Chronic Kidney Disease Stage IV (likely due to Essential Hypertension and HF) with baseline creatinine 2-2.4    # Chronic Metabolic Acidosis related to CKD  # Hyperkalemia (related to previous SHANTE on CKD, ACE-I and gout flare therapies)   # history of albuminuria     - Potassium and Scr continue to improve (the latter now closer to baseline of 2.2-2.5). Would encourage to hold on K+ sparing therapies and to continue with a modified (low K+ diet) to avoid further electrolyte  pooling in the context of his CKD and acidosis.   - sees Dr KIRA Coreas of [facility]    - Continue K+ (3g diet)  - Continue NaHCO3 1,300mg per oral TID  -was on lokelma 10 mg daily while was hospitalized, no longer on it  - K 3/30 was 4.7  --follow up  With nephrology post discharge       # Paroxysmal Atria Fibrillation   - On Warfarin (continue) pharmacy dosing  - On Beta-Blocker   - Goal INR of 2-3     # TASHA vs. OHS   - Continue CPAP at night      # Anemia of Chronic Disease, pancytopenia   -was seen by heme during hospitalization  -pancytopenia was felty to be due to bone marrow suppression from acute illness and medications ,  No iron, B12 or folate deficiency noted    - WBC plt counts now normal  - Hg still bellow normal in 9 range but has been stable, also due to CKD         # Gout Arthritis with recent Flares   - On colchicine 0.6mg per oral daily  - On Maintenance Allopurinol      #Morbid Obesity   - BMI  53.74       #Hx of colon cancer   -s/p hemicolectomy in 2011      #morbid obesity  BMI 53     # chronic bilateral lower extremities  edema, anasarca, venous stasis  Ulcers   - no DVT 2/3/22   - lymphedema  tx consult in      # history complicated UTI /PN with sepsis  -resolved      # chronic pain  - on chronic pain medications     History COVID  1/2022, received vaccination , second dose was 2/20/22 , needs  booster     Diet: 3 Gram K Diet    DVT Prophylaxis: Warfarin  Cheema Catheter: Not present  Fluids: N/A  Central Lines: None  Cardiac Monitoring: None  Code Status: Full Code              Diet: 3 Gram K Diet    DVT Prophylaxis: Warfarin  Cheema Catheter: Not present  Central Lines: None  Cardiac Monitoring: None  Code Status: Full Code      Disposition Plan   Expected Discharge: 04/12/2022     Anticipated discharge location: home with familyAwaiting care coordination huddle       The patient's care was discussed with the care team  .    Gayle Almanzar MD  Hospitalist Service  [facility]  Care  Securely message with the Vocera Web Console (learn more here)  Text page via [facility] Paging/Directory         Clinically Significant Risk Factors Present on Admission                    ______________________________________________________________________    Interval History      No major events, no new concerns, breathing improving, no cough no shortness of breath  No nausea vomiting no abdominal pan, legs weeping   Data reviewed today: I reviewed all medications, new labs and imaging results over the last 24 hours.    Physical Exam   Vital Signs: Temp: 96.9  F (36.1  C) Temp src: Oral BP: (!) 141/67 Pulse: 90   Resp: 18 SpO2: 100 % O2 Device: None (Room air)    Weight: 0 lbs 0 oz     General appearence: awake alert  no apparent distress    HEENT: EOMI, PEARLA, sclera nonicteric,  moist,  mucus membranes,   NECK : supple  RESPIRATORY: lungs clear to auscultation bilateral,  no wheezing or crackles   CARDIOVASCULAR:S1 S2 regular rate and rhythm, no rubs gallops or murmurs appreciated  GASTROINTESTINAL: obese , soft, non-distended , non-tender , + bowel sounds, could not feel for masses with body habitus   SKIN: warm and dry, no mottling noted   NEUROLOGIC; awake alert and oriented, no focal deficits found  EXTREMITIES: no clubbing, cyanosis, has tense edema in lower extremities up to thigh, weeping bellow the knee      Data   Recent Labs   Lab 03/28/22  0810 03/27/22  0625 03/26/22  1116 03/26/22  0649 03/24/22  0746   WBC 5.1  --   --   --  5.1   HGB 9.4*  --   --   --  10.0*   MCV 85  --   --   --  86     --   --   --  188   INR 2.19*  --   --  2.31* 2.21*    139 138  --  138   POTASSIUM 5.0 5.0 5.1 5.4* 5.1   CHLORIDE 113* 111* 111*  --  110*   CO2 21 24 20  --  23   BUN 22 23 24  --  22   CR 2.01* 1.98* 2.04*  --  1.95*   ANIONGAP 8 4 7  --  5   JOHN 8.8 8.8 9.1  --  9.2   * 93 136*  --  90     No results found for this or any previous visit (from the past 24 hour(s)).

## 2022-03-30 NOTE — CARE PLAN
Patient in stable condition, alert and awake with no acute distress. Po meds well tolerated. Fentanyl patch to right chest in place. Wound dressing to bilateral legs intact. Pt continent of bowel and bladder urinal in use at bedside. Bi pap machine in use through the night. Denies pain or discomfort. Safety measures maintained. Call light with in reach.      Patient's most recent vital signs are:     Vital signs:  BP: 113/78  Temp: 97.9  HR: 93  RR: 18  SpO2: 100 %     Patient does not have new respiratory symptoms.  Patient does not have new sore throat.  Patient does not have a fever greater than 99.5.

## 2022-03-30 NOTE — PLAN OF CARE
"Discharge Planner Post-Acute Rehab OT:     Discharge Plan: home w/ wife, home OT TBD  Recert due: 4/18    Precautions: falls, needs bariatric equip including FWW (pt is 6ft 6in, 465#), Pt allowed to leave ROOM for therapy (re: covid vacination status)  Current Status:  ADLs:    Mobility: rolling cga to min A and bedrail, supine to sit w/ min A of 2 and bedrail/HOB elevated 30 deg, STS w/ min A of 1-2 w/ ht of bed elevated and use of bariatric FWW    Grooming: set up    Dressing: uB drg w/ gown change w/ set up, LB pants/shorts declined, feet w/ total assist    Bathing: ub set up, LB dep w/ sponge bath    Toileting: bed pan dep w/ placement and dep w/ pericares  IADLs: PTA pt and wife both performed, wife works full time but mostly from home  Vision/Cognition: WFL for basic cog, pt reports no glasses except occasionally used reading glasses    Assessment: Continued focus on problem solving BSC decreasing to Ax1 for transfer and cleanup. Time spent locating higher and more stable BSC for patient to decrease transfer to Ax1. Pt completed stand pivot EOB (elevated height) > BSC with SBA and FWW. Pt uses bedrail with RUE to stabilize self on BSC and bring trunk into flexion to sit upright. Pt required Mod Ax1 and heavy use of bed rail with RUE for STS from gray platform BSC (21\") with FWW, then Ax1 for denise cares while pt standing at FWW. Pt tolerated 1-2 min static standing while denise cares completed. Pt completed 2/2 HEP and noted to be sitting EOB for long duration this AM continuing to work on BUE strengthening exercise. Con't OT for strengthening and improving ADL/mobility.    3/28: Care conference held with patient, pt's wife, RN care coordinator, GRACIA, PT/OT. Updated current functional status. Encouraged OOB/EOB activity. Plan to initiate UB HEP for pt to complete independently in room outside of therapies. Pt will need bariatric BSC upon discharge. Pt will most likely require sponge bathing initially upon discharge " d/t shower upstairs and pt unable to do stairs at this time. See Mari Polk's note from 3/28 for further care conference details.    Other Barriers to Discharge (DME, Family Training, etc): 5 steps to get into house (has ramp but prefers to use stairs), 15 steps in house to get to bedroom/full bathroom, wife works full time but mostly from home, PTA used SEC , main level bathroom half bath w/ taller toilet/grab bar, full bathroom upstairs std ht toilet w/ grab bar on wall, tub/shower combo w/ grab bars but does not have bench/shower chair, pt reports being house hold ambulator but used w/c at clinic for appts (does NOT own w/c, states he as 4WW in garage donated to him but not sure it is the right size)     Th provided pt with picture reference handout to consider bariatric shower bench for home.

## 2022-03-30 NOTE — PLAN OF CARE
"Discharge Planner Post-Acute Rehab PT:     Discharge Plan: Home with spouse, mod I    Precautions: fall risk    Current Status:  Bed Mobility: CGA - min A rolling, Mod A for supine to sit, Max A for legs sit to supine  Transfer: Supervision sit to stand from bed, raise bed up to stand, lower bed to sit. Max A x1 with pt pulling on // bars from W/c x5  Gait:short amb in // bars down and back with SBA - limited by SOB weakness and fatigue. Pt amb with Front WW ~ 20' from bed to w/c in hallway   Stairs: NT  Balance: sits EOB demonstrates good balance ~20 min with intermittent UE support. Requires heavy UE support on walker with standing balance    Assessment:  Pt able stand x 3 from low w/c with Mod A x 1 in // bars.  demonstrating increased tolerance and improvement for sit<>stands from low surface, difficulty with sit to stand d/t LE weakness, body habitus. Pt able to perform repeated toe taps  to 5\" step  X 6  x 2 sets with seated rest in between, He demonstrates improving tolerance for activity but continues to become SOB with activity, requiring seated rest break to recover.  Pt also amb 20 ft with fww and amb  In bars x 2 laps x 3 sets, biking 6 minutes for goal of improving CP endurance and LE strength.   Pt's current wc measured and [employer] is calling vendors to try and find an available tima wc (his is 28 inches) and snug fit.  SO far, she has not located one (called  [employer], [employer], [employer]).    [facility] walker and wheels should work for pt (up to 500 lbs capacity ).      3/28: Care conference held with patient, pt's wife, RN care coordinator, SW, PT/OT. Updated current functional status. Encouraged OOB/EOB activity. Pt is agreeable to remain on first floor of his home if he is unable to climb stairs to bedroom. Pt also has ramp to access his home. Pt's wife agreed to measure step height. Will provide LE exercise program for pt to help pt improve endurance and strength.    Other Barriers to Discharge " (DME, Family Training, etc): pt will need bariatric front WW, 15 steps to get to bed room, steps to get into house ~8 inches tall.

## 2022-03-30 NOTE — PLAN OF CARE
Patient alert and oriented x 4. Lying in bed no acute distress noted. Continent of B&B. Patient uses urinal and bedpan.Refuses Rooke boots due to dressing on BLE and painful when applied. ADL assist extensive x 1. Bed mobility x 2 with use of walker. Denies chest pain, SOB, and pain. Dressings intact to BLE. Call bell and personal items in patient reach.        Patient's most recent vital signs are:     Vital signs:  BP: 90/60  Temp: 96.9  HR: 83  RR: 18  SpO2: 100 %     Patient does not have new respiratory symptoms.  Patient does not have new sore throat.  Patient does not have a fever greater than 99.5.

## 2022-03-30 NOTE — PLAN OF CARE
Patient A&O x4 and able to make his needs known. Denied CP, lightheadedness, dizziness, numbness or tingling. Denied SOB/MACKEY. Eating, drinking well and voiding spontaneously without difficulties. CMS intact and denied pain. Incentive spirometer encouraged and done several times, repositioned and turned in bed, heels elevated off bed, demonstrates the ability to use call light appropriately. Ax2 with liko. Will continue with POC.

## 2022-03-31 NOTE — CARE PLAN
Patient alert oriented x 4, with no acute distress noted. Pt able to voice need and follow commends. Routine medications well tolerated, denies pain at this time. Out going nurse reported that pt had  severe pain to right thigh. MD was notified. Around 2100 Dr Manriquez went to see the pt, and no new order was issued. Pt remain stable, Bi-pap machine in use, staff assist with emptying urinal at bedside. Pt did not experienced anymore thigh pain through the night. Rested peacefully, safety maintained. Call bell light with in reach. Will continue to monitor.        Patient's most recent vital signs are:     Vital signs:  BP: 129/69  Temp: 97.5  HR: 100  RR: 18  SpO2: 99 %     Patient does not have new respiratory symptoms.  Patient does not have new sore throat.  Patient does not have a fever greater than 99.5.

## 2022-03-31 NOTE — PROGRESS NOTES
SPIRITUAL HEALTH SERVICES  SPIRITUAL ASSESSMENT Progress Note  [facility] [employer] ([facility]) TCU R 421 3/31/22        Pt's chart stated 'not at this time' determining whether or not Pt will receive SHS. Unit  introduced self and the department to Pt. He was grateful for the visit and spiritual insight. Provided prayer for Pt's healing and comfort. Will follow up with Pt soon.        Guido Kim MA, MPA  Associate   Pager: [phone]

## 2022-03-31 NOTE — PLAN OF CARE
Discharge Planner Post-Acute Rehab OT:     Discharge Plan: home w/ wife, home OT TBD  Recert due: 4/18    Precautions: falls, needs bariatric equip including FWW (pt is 6ft 6in, 465#), Pt allowed to leave ROOM for therapy (re: covid vacination status)  Current Status:  ADLs:    Mobility: rolling cga to min A and bedrail, supine to sit w/ min A of 2 and bedrail/HOB elevated 30 deg, STS w/ min A of 1-2 w/ ht of bed elevated and use of bariatric FWW    Grooming: set up    Dressing: uB drg w/ gown change w/ set up, LB pants/shorts declined, feet w/ total assist    Bathing: ub set up, LB dep w/ sponge bath    Toileting: bed pan dep w/ placement and dep w/ pericares  IADLs: PTA pt and wife both performed, wife works full time but mostly from home  Vision/Cognition: WFL for basic cog, pt reports no glasses except occasionally used reading glasses    Assessment: Pt requesting sponge bathing from EOB upon arrival. Pt having increased difficulty completing supine > sit today, requiring Max A and heavy use of bed rails/bed controls. Pt began sliding off EOB. Instructed pt to lie backward while writer called for second assist. Ax2 to lift BLEs into bed and reposition to supine. Writer felt returning to EOB was unsafe at this time, completed supine sponge bathing. Mod A UB d/t large body habitus/skin folds. Dep LB. Pt requiring Mod/Max A to roll to each side fully with heavy use of bed rail. Con't OT for strengthening and improving ADL/mobility.    3/28: Care conference held with patient, pt's wife, RN care coordinator, SW, PT/OT. Updated current functional status. Encouraged OOB/EOB activity. Plan to initiate UB HEP for pt to complete independently in room outside of therapies. Pt will need bariatric BSC upon discharge. Pt will most likely require sponge bathing initially upon discharge d/t shower upstairs and pt unable to do stairs at this time. See Mari Polk's note from 3/28 for further care conference details.    Other  Barriers to Discharge (DME, Family Training, etc): 5 steps to get into house (has ramp but prefers to use stairs), 15 steps in house to get to bedroom/full bathroom, wife works full time but mostly from home, PTA used SEC , main level bathroom half bath w/ taller toilet/grab bar, full bathroom upstairs std ht toilet w/ grab bar on wall, tub/shower combo w/ grab bars but does not have bench/shower chair, pt reports being house hold ambulator but used w/c at clinic for appts (does NOT own w/c, states he as 4WW in garage donated to him but not sure it is the right size)     Th provided pt with picture reference handout to consider bariatric shower bench for home.

## 2022-03-31 NOTE — PROGRESS NOTES
I was called earlier for right sided groin pain  By the time of arrival, patient reports he had cramp and it is almost gone.   He states that it was probably due to position issue, and is feeling much better  I did not do any intervention  Will continue to monitor for now    Carlos Alberto Manriquez MD  [facility]  Contact information available via [facility] Paging/Directory

## 2022-03-31 NOTE — PLAN OF CARE
Discharge Planner Post-Acute Rehab PT:     Discharge Plan: Home with spouse, mod I    Precautions: fall risk    Current Status:  Bed Mobility: CGA - min A rolling, Mod A for supine to sit, Max A for legs sit to supine  Transfer: Supervision sit to stand from bed, raise bed up to stand, lower bed to sit. Max A x1 with pt pulling on // bars from W/c x5  Gait:short amb in // bars down and back with SBA - limited by SOB weakness and fatigue. Pt amb with Front WW ~ 20' from bed to w/c in hallway   Stairs: NT  Balance: sits EOB demonstrates good balance ~20 min with intermittent UE support. Requires heavy UE support on walker with standing balance    Assessment: focus of session was all on edema management--nursing needed to do dressing change before spandigrip applied as L dressing had slipped off wound, not enough time to do a walk. Issued ex for pt to do in room; instructed to wear the spandigrip at all times along with elevating feet on pillows in bed.Lower legs not weeping today as pt reports severe weeping yesterday  3/28: Care conference held with patient, pt's wife, RN care coordinator, SW, PT/OT. Updated current functional status. Encouraged OOB/EOB activity. Pt is agreeable to remain on first floor of his home if he is unable to climb stairs to bedroom. Pt also has ramp to access his home. Pt's wife agreed to measure step height. Will provide LE exercise program for pt to help pt improve endurance and strength.    Other Barriers to Discharge (DME, Family Training, etc): pt will need bariatric front WW, 15 steps to get to bed room, steps to get into house ~8 inches tall.    Pt's current wc measured and [employer] is calling vendors to try and find an available tima wc (his is 28 inches) and snug fit.  SO far, she has not located one (called  [employer], [employer], [employer]).    [facility] walker and wheels should work for pt (up to 500 lbs capacity ).

## 2022-03-31 NOTE — PLAN OF CARE
Patient alert and oriented x 3. Denies pain. Fentanyl patch intact to the right chest wall. Continent of B&B. Tolerates medications whole without difficult. HOB at 30 degrees. Continues with therapy services.BLE lymph wraps.Refuses rooke boots due to c/o hurting feet.No other concerns voiced.         Patient's most recent vital signs are:     Vital signs:  BP: 116/59  Temp: 97.1  HR: 81  RR: 18  SpO2: 100 %     Patient does not have new respiratory symptoms.  Patient does not have new sore throat.  Patient does not have a fever greater than 99.5.

## 2022-04-01 NOTE — PLAN OF CARE
Care Coordination:    Writer attempted to make a home care referral to [facility], but they are at capacity for [facility].   Writer called [facility] to make a referral for home care services. Writer was transferred to the [facility] and they do not have any nursing services currently available.  Writer called [facility] to make a home care referral. They requested that writer fax information to [phone], which writer did.     Writer will continue to follow up on home care referrals.    1627:  [facility] did call writer back and stated that they are able to provide home care services. They will manage patient's INR during their phase of care. They will be able to start care week of 4/11.     Mari Polk, RN, BSN, CRRN  Patient Care Management Coordinator  Acute Rehabilitation Unit/Transitional Care Unit  PH: [phone]  Pager: [phone]

## 2022-04-01 NOTE — PROGRESS NOTES
/61 (BP Location: Right arm, Cuff Size: Adult Regular)   Pulse 81   Temp 97.5  F (36.4  C) (Oral)   Resp 18   SpO2 99%      Patient is alert and oriented x4, uses call light appropriately, and is able to make needs known. Assist of 1-2 for pivot transfers. Wore CPAP all shift. Denies pain. Continent of urine; uses urinal in bed. LBM was 3/31. BLE dressings CDI.

## 2022-04-01 NOTE — PLAN OF CARE
Patient alert and oriented x 4. Denies pain. Resting in bed. No acute distress. Dressings intact to BLE. Lymph wraps to BLE. No condition changes noted. V/S WNL. Call bell and personal items within patient reach.         Patient's most recent vital signs are:     Vital signs:  BP: 110/52  Temp: 97.2  HR: 86  RR: 18  SpO2: 100 %     Patient does not have new respiratory symptoms.  Patient does not have new sore throat.  Patient does not have a fever greater than 99.5.

## 2022-04-01 NOTE — PLAN OF CARE
Pt is A&Ox4, calm, & cooperative with care. Denied SOB and pain. Pt is A 1-2 for transfer (stand & pivot). Pt is continent for both B&B; uses urinal for bladder and bedpan for BM. LBM was 03/31/22. Fentanyl patch to the right chest is in place and intact. Pt has own CPAP independent with it, applied at HS. BLE dressing CDI. Declined rooke boots. Pt is able to make needs known and call light within reach. Will continue with POC.    Patient's most recent vital signs are:     Vital signs:  BP: 106/61  Temp: 97.5  HR: 81  RR: 18  SpO2: 99 %     Patient does not have new respiratory symptoms.  Patient does not have new sore throat.  Patient does not have a fever greater than 99.5.

## 2022-04-01 NOTE — PLAN OF CARE
Discharge Planner Post-Acute Rehab PT:     Discharge Plan: Home with spouse, mod I    Precautions: fall risk    Current Status:  Bed Mobility: CGA - min A rolling, Mod A for supine to sit, Max A for legs sit to supine  Transfer: Supervision sit to stand from bed, raise bed up to stand, lower bed to sit. Max A x1 with pt pulling on // bars from W/c x5  Gait:short amb in // bars down and back with SBA - limited by SOB weakness and fatigue. Pt amb with Front WW ~ 20' from bed to w/c in hallway   Stairs: NT  Balance: sits EOB demonstrates good balance ~20 min with intermittent UE support. Requires heavy UE support on walker with standing balance    Assessment: Pt very SOB with today's session, c/o of mucous in nose. With wet wash cloth able to clear nose after several attempts. Pt blaire lifted to w/c after attempting to stand and he felt like he was sliding off edge of bed. Pt assisted to get legs back in bed then he could reposition himself for sling placement. Pt transported to gym via w/c. Stands with Max A x 1 in // bars. Stands ~ 2 min then ambulates ~20' in bars. States he needs to use commode. In room he stands from w/c by pulling up on bed rail and mod A x 2 to help push from behind. Pt fatigued with activity today requiring extended rest breaks.     Plan - sit to stands lowering surface as able so pt can be mod I with getting out of bed and chairs.    3/28: Care conference held with patient, pt's wife, RN care coordinator, , PT/OT. Updated current functional status. Encouraged OOB/EOB activity. Pt is agreeable to remain on first floor of his home if he is unable to climb stairs to bedroom. Pt also has ramp to access his home. Pt's wife agreed to measure step height. Will provide LE exercise program for pt to help pt improve endurance and strength.    Other Barriers to Discharge (DME, Family Training, etc): pt will need bariatric front WW - it is sitting in 4th floor rehab gym  Pt needs w/c see below.  **FOCUS  "on sit to stands with mod I - start with high surface and lower to improve pt's ability to get out of chairs with Mod I.    Pt's current wc measured and [employer] is calling vendors to try and find an available tima wc (his is 28 inches) and snug fit.  SO far, she has not located one (called  [employer], [employer], [employer]).    [facility] walker and wheels should work for pt (up to 500 lbs capacity ).      4/1: Pt needs a minimum of 28\" w/c, 30\" if possible. He states that he normally sits at a high stool in kitchen and would mostly use w/c for transportation to appts. Would be best if chair was able to fit through door if possible. Pt is able to stand and use walker to walk through door, but in case he is fatigued. Will ask pt to have wife measure their doors. Rehab techs working on finding vendor.  Pt has handicapped bathroom on first floor with high toilet. Second floor has low toilet with grab bar. Pt used door frame at home to pull himself up from bed.        "

## 2022-04-01 NOTE — PLAN OF CARE
Goal Outcome Evaluation:      Pt alert and oriented X4, able to make needs know. No C/O SOB, chest pain, N/V, constipation/diarrhea. Wounds not assessed, dressings intact and due for change 4/2. Skin on buttock intact, midline abd incision WNL with one scab, abd folds cleaned and miconazole administered.  Pain 2/10 per baseline. New fentanyl patch placed on left chest. Old patch disposed of with Nicole RN as witness. Pt had BM this shift.        Patient's most recent vital signs are:     Vital signs:  BP: 107/65  Temp: 96.9  HR: 84  RR: 18  SpO2: 99 %     Patient does not have new respiratory symptoms.  Patient does not have new sore throat.  Patient does not have a fever greater than 99.5.

## 2022-04-01 NOTE — PLAN OF CARE
Discharge Planner Post-Acute Rehab OT:     Discharge Plan: home w/ wife, home OT TBD  Recert due: 4/18    Precautions: falls, needs bariatric equip including FWW (pt is 6ft 6in, 465#), Pt allowed to leave ROOM for therapy (re: covid vacination status)  Current Status:  ADLs:    Mobility: rolling cga to min A and bedrail, supine to sit w/ min A of 2 and bedrail/HOB elevated 30 deg, STS w/ min A of 1-2 w/ ht of bed elevated and use of bariatric FWW    Grooming: set up    Dressing: uB drg w/ gown change w/ set up, LB pants/shorts declined, feet w/ total assist    Bathing: ub set up, LB dep w/ sponge bath    Toileting: bed pan dep w/ placement and dep w/ pericares  IADLs: PTA pt and wife both performed, wife works full time but mostly from home  Vision/Cognition: WFL for basic cog, pt reports no glasses except occasionally used reading glasses    Assessment: Pt. Declined to work on commode transfer due to just being on commode prior to OT arrival. Pt. Agreeable to work on sit<>Stand transfers from wheelchair. Pt. Requires something to pull on to transition from sit to stand with Max assist needed from therapist and 4 trials to get to standing. OT addressed this need for home and patient unclear of what the plan will be to assist. Continued to work on strengthening program grading exercises to green band. HUC notified of patient need for new commode bucket and will follow up with SPD.    3/28: Care conference held with patient, pt's wife, RN care coordinator, SW, PT/OT. Updated current functional status. Encouraged OOB/EOB activity. Plan to initiate UB HEP for pt to complete independently in room outside of therapies. Pt will need bariatric BSC upon discharge. Pt will most likely require sponge bathing initially upon discharge d/t shower upstairs and pt unable to do stairs at this time. See Mari Polk's note from 3/28 for further care conference details.    Other Barriers to Discharge (DME, Family Training, etc): 5  steps to get into house (has ramp but prefers to use stairs), 15 steps in house to get to bedroom/full bathroom, wife works full time but mostly from home, PTA used SEC , main level bathroom half bath w/ taller toilet/grab bar, full bathroom upstairs std ht toilet w/ grab bar on wall, tub/shower combo w/ grab bars but does not have bench/shower chair, pt reports being house hold ambulator but used w/c at clinic for appts (does NOT own w/c, states he as 4WW in garage donated to him but not sure it is the right size)     Th provided pt with picture reference handout to consider bariatric shower bench for home.

## 2022-04-02 NOTE — PLAN OF CARE
Goal Outcome Evaluation:             Pt alert and oriented X4, able to make needs know. No C/O SOB, chest pain, N/V, constipation/diarrhea. Wound dressings changed to BLE and R heel. Skin on buttock intact, midline abd incision WNL with one scab, abd folds cleaned and miconazole administered.  Pain 2/10 per baseline. Fentanyl patch on left chest in place.         Patient's most recent vital signs are:     Vital signs:  BP: 102/56  Temp: 96.9  HR: 77  RR: 18  SpO2: 99 %     Patient does not have new respiratory symptoms.  Patient does not have new sore throat.  Patient does not have a fever greater than 99.5.

## 2022-04-02 NOTE — CARE PLAN
Patient is alert and oriented x 4. No acute distress noted routine medication tolerates well. Dressing to bilateral legs intact. Denies pain at this time. Safety measures maintained call light in reach. Will continue to monitor.        Patient's most recent vital signs are:     Vital signs:  BP: 110/52  Temp: 97.2  HR: 86  RR: 18  SpO2: 100 %     Patient does not have new respiratory symptoms.  Patient does not have new sore throat.  Patient does not have a fever greater than 99.5.

## 2022-04-03 NOTE — PLAN OF CARE
Alert/oriented x4, afebrile, denied pain, no SOB at rest, present with activity or with turning to sides when in bed. With intact dressings on Rt. Olivera, Rt. Lateral Heel, Left Lower leg ( all done this morning/EOD, next due: 04/04), wears tubigrips on BLE. Refused full skin assessment. With complaint of constipation, last documented BM on 3/30, assisted to use bedpan, but no BM.Colace and Miralax given per request at 2155H, offered Bisacodyl supp but refused , said he doesn't want it. Appears comfortable at this time. Uses call light for help.     Patient's most recent vital signs are:     Vital signs:  BP: 112/70  Temp: 97.1  HR: 99  RR: 18  SpO2: 98 %     Patient does not have new respiratory symptoms.  Patient does not have new sore throat.  Patient does not have a fever greater than 99.5.

## 2022-04-03 NOTE — PLAN OF CARE
Goal Outcome Evaluation:      Pt is alert and oriented x 4. Able to make needs known through the use of call light. Pt appears to be comfortable with no o complain of chest pain, SOB, N/V. Fentanyl patch in place on left chest. Pt had CPAP all night which he self administers. Had a BM this shift. No complain at this time. Will continue with plan of care.         Patient's most recent vital signs are:     Vital signs:  BP: 112/70  Temp: 97.1  HR: 99  RR: 18  SpO2: 98 %     Patient does not have new respiratory symptoms.  Patient does not have new sore throat.  Patient does not have a fever greater than 99.5.

## 2022-04-03 NOTE — PLAN OF CARE
Discharge Planner Post-Acute Rehab OT:     Discharge Plan: home w/ wife, home OT TBD  Recert due: 4/18    Precautions: falls, needs bariatric equip including FWW (pt is 6ft 6in, 465#), Pt allowed to leave ROOM for therapy (re: covid vacination status)  Current Status:  ADLs:    Mobility: rolling cga to min A and bedrail, supine to sit w/ min A of 2 and bedrail/HOB elevated 30 deg, STS w/ min A of 1-2 w/ ht of bed elevated and use of bariatric FWW    Grooming: set up    Dressing: uB drg w/ gown change w/ set up, LB pants/shorts declined, feet w/ total assist    Bathing: ub set up, LB dep w/ sponge bath    Toileting: bed pan dep w/ placement and dep w/ pericares  IADLs: PTA pt and wife both performed, wife works full time but mostly from home  Vision/Cognition: WFL for basic cog, pt reports no glasses except occasionally used reading glasses    Assessment: OT; attempted supine to sit w/ just th and bed rails,unsuccessful on air mattress. reattempted w/th applying long gait belt as strap to bottom bed frame, but pt unsuccessful and appeared unsafe w/ R hip too close to edge so returned to supine and contacted 2 nsg assist to assist w/ repositioning, applied sling w/ pt rolling side to side in bed, used mech lift to transfer pt to w/c , completed ub wash/grooming/oral cares and gown change w/ setup from w/c, then pt engaged on UE strengthening exer, pt continues to be variable w/ mobility, especially transitional movements. Pt high falls risk and requires staff intervention to utilize safer approach due to pt's lack of safety awareness at times. Cont w/ POC as able.    3/28: Care conference held with patient, pt's wife, RN care coordinator, GRACIA, PT/OT. Updated current functional status. Encouraged OOB/EOB activity. Plan to initiate UB HEP for pt to complete independently in room outside of therapies. Pt will need bariatric BSC upon discharge. Pt will most likely require sponge bathing initially upon discharge d/t shower  upstairs and pt unable to do stairs at this time. See Mari Polk's note from 3/28 for further care conference details.    Other Barriers to Discharge (DME, Family Training, etc): 5 steps to get into house (has ramp but prefers to use stairs), 15 steps in house to get to bedroom/full bathroom, wife works full time but mostly from home, PTA used SEC , main level bathroom half bath w/ taller toilet/grab bar, full bathroom upstairs std ht toilet w/ grab bar on wall, tub/shower combo w/ grab bars but does not have bench/shower chair, pt reports being house hold ambulator but used w/c at clinic for appts (does NOT own w/c, states he as 4WW in garage donated to him but not sure it is the right size)     Th provided pt with picture reference handout to consider bariatric shower bench for home.

## 2022-04-03 NOTE — PLAN OF CARE
Discharge Planner Post-Acute Rehab PT:     Discharge Plan: Home with spouse, mod I    Precautions: fall risk    Current Status:  Bed Mobility: CGA - min A rolling, Mod A for supine to sit, Max A for legs sit to supine  Transfer: Supervision sit to stand from bed, raise bed up to stand, lower bed to sit. Max A x1 with pt pulling on // bars from W/c x5  Gait:short amb in // bars down and back with SBA - limited by SOB weakness and fatigue. Pt amb with Front WW ~ 20' from bed to w/c in hallway   Stairs: NT  Balance: sits EOB demonstrates good balance ~20 min with intermittent UE support. Requires heavy UE support on walker with standing balance    Assessment: Pt continues to be very SOB with activity. SpO2 remained > 94% through all activity. STS in // with first attempt being unsuccessful, requiring second to push from behind, max A x 2. Gait activity in //, SBA 44ft x 1, 22ft x 1. Pt fatigued with activity today requiring extended rest breaks for recovery. Pt could benefit from pursed lip breathing for management of SOB.    Plan - sit to stands lowering surface as able so pt can be mod I with getting out of bed and chairs.    3/28: Care conference held with patient, pt's wife, RN care coordinator, GRACIA, PT/OT. Updated current functional status. Encouraged OOB/EOB activity. Pt is agreeable to remain on first floor of his home if he is unable to climb stairs to bedroom. Pt also has ramp to access his home. Pt's wife agreed to measure step height. Will provide LE exercise program for pt to help pt improve endurance and strength.    Other Barriers to Discharge (DME, Family Training, etc): pt will need bariatric front WW - it is sitting in 4th floor rehab gym  Pt needs w/c see below.  **FOCUS on sit to stands with mod I - start with high surface and lower to improve pt's ability to get out of chairs with Mod I.    Pt's current wc measured and [employer] is calling vendors to try and find an available tima wc (his is 28  "inches) and snug fit.  SO far, she has not located one (called  [employer], [email or web address], [employer]).    [facility] walker and wheels should work for pt (up to 500 lbs capacity ).      4/1: Pt needs a minimum of 28\" w/c, 30\" if possible. He states that he normally sits at a high stool in kitchen and would mostly use w/c for transportation to appts. Would be best if chair was able to fit through door if possible. Pt is able to stand and use walker to walk through door, but in case he is fatigued. Will ask pt to have wife measure their doors. Rehab techs working on finding vendor.  Pt has handicapped bathroom on first floor with high toilet. Second floor has low toilet with grab bar. Pt used door frame at home to pull himself up from bed.        "

## 2022-04-03 NOTE — PLAN OF CARE
Goal Outcome Evaluation:      Pt alert and oriented X4, able to make needs know. No C/O SOB, chest pain, N/V, constipation/diarrhea. Wound dressings to BLE and R heel intact. Skin on buttock intact, midline abd incision WNL with one scab, abd folds cleaned and miconazole administered.  Pain 2/10 per baseline. Fentanyl patch on left chest in place.         Patient's most recent vital signs are:     Vital signs:  BP: 111/66  Temp: 97.1  HR: 76  RR: 18  SpO2: 98 %     Patient does not have new respiratory symptoms.  Patient does not have new sore throat.  Patient does not have a fever greater than 99.5.                       small area of ecchymosis index finger, no other bruising on exam. less likely allergic reaction. pt and brother instructed to follow up with pediatrician.

## 2022-04-03 NOTE — PROGRESS NOTES
[facility]    Medicine Progress Note - Hospitalist Service    Date of Admission:  3/18/2022    Assessment & Plan         Panda Huerta is a 66 year old  male with PMHx relevant for  Morbid obesity,  hx of chronic diastolic congestive heart failure and HFrEF , CKD, TASHA, paroxysmal atrial fibrillation, hypertension, chronic lower extremities venous stasis ulcers, hx of Gout arthritis who was admitted to TCU with generalized weakness and debility 3/18/2022 .   He was initially hospitalized wit complicated UTI vs pyelonephritis 1/27-/1/30  Then 2/11-2/20 with hyperkalemia  SHANTE  And acute gout. Then readmitted with weakness  Recurrent hyperkalemia   Acute polyarticular gout          # Generalized Weakness and Debility    - Working towards goal (slow improvement)   - Continue with PT sessions/therapy as per department's disposition     # HF(mildly reduced)EF (45-50%) as per 2 D echocardiogram 01/29/22 (compensated at the time)    > Not tolerating guideline medical directed therapy (including        ACE-I and ARB) due to Hyperkalemia in the context of CKD  # Known Perimembranous VSD (small) with left to right shunting  # Essential Hypertension    - Continue to monitor cardiopulmonary status  - Continue Metoprolol Succinate 25mg per oral daily   - Not on diuretics but monitor for need , has CKD  - Recommend no resumption of ACE-I/ARB anytime soon (may          potentially never tolerate due to life threatening complications)     # Chronic Kidney Disease Stage IV (likely due to Essential Hypertension and HF) with baseline creatinine 2-2.4    # Chronic Metabolic Acidosis related to CKD  # Hyperkalemia (related to previous SHANTE on CKD, ACE-I and gout flare therapies)   # history of albuminuria     - Potassium and Scr continue to improve (the latter now closer to baseline of 2.2-2.5). Would encourage to hold on K+ sparing therapies and to continue with a modified (low K+ diet) to  avoid further electrolyte pooling in the context of his CKD and acidosis.   - sees Dr KIRA Coreas of [facility]    - Continue K+ (3g diet)  - Continue NaHCO3 1,300mg per oral TID  -was on lokelma 10 mg daily while was hospitalized, no longer on it  - K 3/30 was 4.7  --follow up  With nephrology post discharge       # Paroxysmal Atria Fibrillation   - On Warfarin (continue) pharmacy dosing  - On Beta-Blocker   - Goal INR of 2-3     # TASHA vs. OHS   - Continue CPAP at night      # Anemia of Chronic Disease, pancytopenia   -was seen by heme during hospitalization  -pancytopenia was felty to be due to bone marrow suppression from acute illness and medications ,  No iron, B12 or folate deficiency noted    - WBC plt counts now normal  - Hg still bellow normal in 9 range but has been stable, also due to CKD         # Gout Arthritis with recent Flares   - On colchicine 0.6mg per oral daily  - On Maintenance Allopurinol      #Morbid Obesity   - BMI  53.74       #Hx of colon cancer   -s/p hemicolectomy in 2011      #morbid obesity  BMI 53     # chronic bilateral lower extremities  edema, anasarca, venous stasis  Ulcers   - no DVT 2/3/22   - lymphedema  tx consult I-not on chronic diuretics with his renal functions     # history complicated UTI /PN with sepsis  -resolved      # chronic pain  - on chronic pain medications     History COVID  1/2022, received vaccination , second dose was 2/20/22 , needs  booster     Diet: 3 Gram K Diet    DVT Prophylaxis: Warfarin  Cheema Catheter: Not present  Fluids: N/A  Central Lines: None  Cardiac Monitoring: None  Code Status: Full Code              Diet: 3 Gram K Diet    DVT Prophylaxis: Warfarin  Cheema Catheter: Not present  Central Lines: None  Cardiac Monitoring: None  Code Status: Full Code      Disposition Plan   Expected Discharge: 04/08/2022     Anticipated discharge location: home with familyAwaiting care coordination huddle       The patient's care was discussed with the care team   .    Gayle Almanzar MD  Hospitalist Service  [facility]  Securely message with the [employer] Web Console (learn more here)  Text page via [employer] Paging/Directory         Clinically Significant Risk Factors Present on Admission                    ______________________________________________________________________    Interval History    Feels ok, breathing ok , no chest pain  No nausea vomiting ,     Data reviewed today: I reviewed all medications, new labs and imaging results over the last 24 hours.    Physical Exam   Vital Signs: Temp: 97.1  F (36.2  C) Temp src: Oral BP: 112/70 Pulse: 99   Resp: 18 SpO2: 98 % O2 Device: None (Room air)    Weight: 0 lbs 0 oz     General appearence: awake alert  no apparent distress    HEENT: EOMI, PEARLA, sclera nonicteric,  moist,  mucus membranes,   NECK : supple  RESPIRATORY: lungs clear to auscultation bilateral,  no wheezing or crackles   CARDIOVASCULAR:S1 S2 regular rate and rhythm, no rubs gallops or murmurs appreciated  GASTROINTESTINAL: obese , soft, non-distended , non-tender , + bowel sounds, could not feel for masses with body habitus   SKIN: warm and dry, no mottling noted   NEUROLOGIC; awake alert and oriented, no focal deficits found  EXTREMITIES: no clubbing, cyanosis, anasarca , has tense edema in lower extremities up to thigh,       Data   Recent Labs   Lab 03/31/22  0836 03/30/22  0959 03/28/22  0810   WBC  --   --  5.1   HGB  --   --  9.4*   MCV  --   --  85   PLT  --   --  178   INR 2.36*  --  2.19*   NA  --  142 142   POTASSIUM  --  4.7 5.0   CHLORIDE  --  112* 113*   CO2  --  21 21   BUN  --  21 22   CR  --  2.08* 2.01*   ANIONGAP  --  9 8   JOHN  --  9.0 8.8   GLC  --  93 100*     No results found for this or any previous visit (from the past 24 hour(s)).

## 2022-04-04 NOTE — PROGRESS NOTES
Had episode of shortness of breath  During physical therapy, no drop in oxygen saturation .  he is on chronic AC with warfarin with therapeutic INR  So pulmonary embolism  Less likely.  He denies any chest pain  No palpitations. shortness of breath  Gets worse with exertion  He Is morbidly obese and now seems motivated for weight loss as I suspect is a major player in this     Lungs clear, will check CXR , also give lasix 40 mg 2 doses today and check BMP in AM     Gayle Almanzar MD

## 2022-04-04 NOTE — PLAN OF CARE
04/04/22 1200   Appointment Canceled   Appointment Canceled Patient declined;With other staff/provider   Cancel Comments Session cancelled d/t patient unaware of scheduled session (not written on board) so patient had just received breakfast and nursing cares. Pt requesting shower today. Planned with patient for shower tomorrow as this will take increased time d/t body habitus and possible dependent transfer d/t decline in mobility.   Signing Clinician's Name / Credentials   Signing clinician's name / credentials LURDES Bishop/L   Quick Adds   Rehab Discipline OT   Additional Documentation   OT Plan OT: shower 4/5 with white/blue PVC tima rolling commode. Either STS with FWW or dep transfer via mechanical lift

## 2022-04-04 NOTE — CARE PLAN
RN [id number]:    Patient is alert and oriented. Denies CP and SOB this shift. Takes pills whole with thin liquids. In bed with call light within reach. Will continue with plan of care.

## 2022-04-04 NOTE — PLAN OF CARE
Goal Outcome Evaluation:  Pt alert and oriented X4. Able to make needs known. No C/O chest pain, N/V, constipation/diarrhea, During therapy pt was sitting at EOB, could not support himself and laid down in bed. At that point pt became SOB and became less responsive with therapy staff. patient  reported remembering the entire event and was not responding due to trying to catch his breath. No C/O feeling faint, dizzy, or having vision changes. 02 during episode was  on RA. VS post episode were  on RA, pulse- 73, BP-142/82, R-18. Pt was able to continue with therapy and stood to pivot transfer with no issues noted. Provider updated.  Ordered lasix and chest xray. Pt WT is up, wound not completed this AM, pt up in chair currently.         Patient's most recent vital signs are:     Vital signs:  BP: 108/73  Temp: 97.8  HR: 72  RR: 16  SpO2: 100 %     Patient does have new respiratory symptoms.  Patient does not have new sore throat.  Patient does not have a fever greater than 99.5.

## 2022-04-04 NOTE — PLAN OF CARE
"Discharge Planner Post-Acute Rehab PT:     Discharge Plan: Home with spouse, mod I    Precautions: fall risk    Current Status:  Bed Mobility: CGA - min A rolling, Mod A for supine to sit, Max A for legs sit to supine  Transfer: Supervision sit to stand from bed, raise bed up to stand, lower bed to sit. Max A x1 with pt pulling on // bars from W/c x5  Gait:short amb in // bars down and back with SBA - limited by SOB weakness and fatigue. Pt amb with Front WW ~ 20' from bed to w/c in hallway   Stairs: NT  Balance: sits EOB demonstrates good balance ~20 min with intermittent UE support. Requires heavy UE support on walker with standing balance    Assessment: Above status from previous authors, but did not demonstrate this performance today. Max-A for bed mobility, required max-A of 2 to stand from high bed and >30 min in total to get to w/c. Pt reports spouse is not approving main level living. To this author, pt appears to be far from being able to do the stairs to enter his home especially considering lack of consistency with bed mobility and transfers alone. Assuming doorways will not accommodate w/c width, but will await measurements.    Other Barriers to Discharge (DME, Family Training, etc):   - Bariatric FWW - in 4th fl rehab gym  - w/c - Needs 28-30\" (30 ideal for pt). Need to confirm doorway width, spouse was asked to measure  - Stairs - Spouse reports he cannot come home until he can complete 16 stairs to bedroom  - Hospital bed - If spouse allows main level living    "

## 2022-04-04 NOTE — PLAN OF CARE
Goal Outcome Evaluation:    Patient is alert. Denied SOB. No s/s of SOB. Makes needs known. Independent of CPAP on at bedtime after setup. Dressings to bilateral legs are intact. No concerns throughout the night. Call light within reach at all times. Continue with POC.        Patient's most recent vital signs are:     Vital signs:  BP: 108/59  Temp: 97.2  HR: 75  RR: 20  SpO2: 99 %     Patient does not have new respiratory symptoms.  Patient does not have new sore throat.  Patient does not have a fever greater than 99.5.

## 2022-04-04 NOTE — PLAN OF CARE
Goal Outcome Evaluation:      Alert and oriented x 4. Law chest pain and respiratory distress. Pt declined for skin assessment and Miconazole powder. Ate with good appetite.  CPAP on. Call light with in reach. Able to make needs known. Continue with POC.      Patient's most recent vital signs are:     Vital signs:  BP: 108/59  Temp: 97.2  HR: 75  RR: 20  SpO2: 99 %     Patient does not have new respiratory symptoms.  Patient does not have new sore throat.  Patient does not have a fever greater than 99.5.

## 2022-04-04 NOTE — PROGRESS NOTES
CLINICAL NUTRITION SERVICES - REASSESSMENT NOTE     Nutrition Prescription    Malnutrition Status:    Patient does not meet two of the established criteria necessary for diagnosing malnutrition    Recommendations already ordered by Registered Dietitian (RD):  Nutrition education  Ensure max (vanilla) once daily     Future/Additional Recommendations:  Monitor intakes and wt trends  Additional diet education as needed      EVALUATION OF THE PROGRESS TOWARD GOALS   Diet: 3 g Potassium  Intake: 100% of meals per flowsheet      NEW FINDINGS   Per discussion with provider pt more motivated to lose weight to help therapy progress. Pt stated he has tried (and succeeded) to lose weight in the past. Stated at that time he tried to eat lower fat meats such as baked chicken, fish and turkey. RD discussed ways to reduce calorie and discussed my plate. Encouraged adequate protein for maintenance of LBM.     Per [facility] pt is ordering (on average) 1638 kcal and 66g protein daily. He is receiving additional items from outside of the hospital.     70# wt gain in 1 month. Question the accuracy of most recent wt  04/04/22 242.7 kg (535 lb)   03/02/22 (!) 210.9 kg (465 lb)   02/20/22 (!) 198.8 kg (438 lb 3.2 oz)   02/10/22 (!) 196.4 kg (433 lb)   01/29/22 (!) 196.5 kg (433 lb 1.6 oz)   12/07/21 (!) 209.3 kg (461 lb 8 oz)   09/08/21 (!) 206.9 kg (456 lb 3.2 oz)   06/17/21 (!) 209.7 kg (462 lb 4.9 oz)   06/09/21 (!) 220 kg (485 lb)   06/01/21 (!) 220.1 kg (485 lb 3.2 oz)   05/17/21 (!) 219.2 kg (483 lb 3.2 oz)     Per 3/29 WOC RN note:  Left lateral Shin wounds due to Venous Ulcer  Status: healing     Bilateral upper shin wounds due to venous stasis and edema  Status: Right healing, left healed     Right lateral heel Pressure injury community acquired.   Pressure injury stage 2  Status: stable  MALNUTRITION  % Intake: No decreased intake noted  % Weight Loss: Unable to assess  Subcutaneous Fat Loss: difficult to assess d/t body  habitus   Muscle Loss: difficult to assess d/t body habitus   Fluid Accumulation/Edema: 2-3+ BLE, 2+ generalized   Malnutrition Diagnosis: Patient does not meet two of the established criteria necessary for diagnosing malnutrition    Previous Goals   Patient to consume % of nutritionally adequate meal trays TID, or the equivalent with supplements/snacks.  Evaluation: Met    Previous Nutrition Diagnosis  Predicted inadequate nutrient intake (protein) related to menu fatigue r/t LOS and high protein requirements (min 146g/day) for wound healing.   Evaluation: No change    CURRENT NUTRITION DIAGNOSIS  Predicted inadequate nutrient intake (protein) related to menu fatigue r/t LOS and high protein requirements for wound healing.     INTERVENTIONS  Implementation  Nutrition education: Provided verbal and written education for weight loss. Handouts provided (myplate, tips to support wt loss)  Ensure max (vanilla) once daily     Goals  Patient to consume % of nutritionally adequate meal trays TID, or the equivalent with supplements/snacks.    Monitoring/Evaluation  Progress toward goals will be monitored and evaluated per protocol.    Mari Herndon MS, RD, LDN  Unit Pager [phone]

## 2022-04-05 NOTE — PROGRESS NOTES
[facility] Nurse Inpatient Wound Assessment   Reason for consultation: Evaluate and treat  Left lateral shin wounds, R heel    Assessment  Left lateral Shin wounds due to Venous Ulcer  Status: healing    Bilateral upper shin wounds due to venous stasis and edema  Status: Right healing, left healed    Right lateral heel Pressure injury community acquired.   Pressure injury stage 2  Status: healing    Patient does have significant edema to bilateral lower legs with recommendation from PT to wear spandigrip at all times for edema control. Unable to find stockings in room after wound care completed today. [facility] notes fluid oozing from skin around bilateral ankles, needs to be wearing compression stockings.     Treatment Plan  Right shin wound: Every other day    Cleanse with wound cleanser and blot dry  Spray all intact skin to BL lower extremities with Carmela cleanse and protect and rub in. Allow to soak for 10mins and then wipe away any excess.   Apply sween 24 (Pink top lotion) from base of toes to below knees.  Apply Adaptic (mineral gauze) to any open areas  Cover with kerlix and tape.     Right lateral heel and left lateral lower leg every other day  Cleanse with wound cleanser   Dry and protect surrounding skin with no sting barrier film wipe #[id number] (this is very important)  Cover with silicone foam dressing Mepilex  4x4 #[id number]    Pressure Injury prevention (RN-please order supplies if not in room)  Turn every 2 hours, side to side avoid supine on bariatric pressure redistribution support surface  Float heels off bed with use of  Heel Lift boots (Prevalon #[id number])    Prevent sliding and shear by limiting HOB to 30 degrees or less unless contraindicated, use knee gatch first if not contraindicated  If sitting, use pressure redistribution chair cushion large(#[id number]); if unable to shift weight every hour, please limit sitting to an hour at a time.  Mepilex PRN,Change at least q 3 days, peel back, peek and replace for daily  skin inspection.      Orders Revised  Recommended provider order: Lymphedema  WOC Nurse follow-up plan: Weekly  Nursing to notify the Provider(s) and re-consult the WOC Nurse if wound(s) deteriorates or new skin concern.    Patient History  According to provider note(s):   66 year old male admitted through on 2/20/2022 after being discharged earlier from [facility] stay from 2/11 - 2/20 for acute gout flare and subsequent SHANTE and hyperkalemia likely caused by gout treatment. Patient is morbidly obese and he was discharged to his home with home health but he was unable to manage going up steps immediately upon arrival home.  He was brought in to [facility] ED for placement. In the ER he was seen by Dr Henning, vitals were normal.  Labs were reviewed from his recent discharge with Cr of 1.93 which was improved from his discharge and actually better than baseline.     3/15/2022 pending TCU for deconditioning      No Known Allergies  Objective Data  Containment of urine/stool: Incontinence Protocol as needed    Body mass index is 61.83 kg/m .     Active Diet Order  Orders Placed This Encounter      3 Gram K Diet      Intake/Output Summary (Last 24 hours) at 3/15/2022 1444  Last data filed at 3/15/2022 1030  Gross per 24 hour   Intake 260 ml   Output 600 ml   Net -340 ml       Pressure Injury Risk Assessment:   Sensory Perception: 4-->no impairment  Moisture: 3-->occasionally moist  Activity: 3-->walks occasionally  Mobility: 3-->slightly limited  Nutrition: 3-->adequate  Friction and Shear: 3-->no apparent problem  Sandoval Score: 19                          Labs:   Recent Labs   Lab 04/04/22  0740   HGB 9.6*   INR 2.84*   WBC 5.6       Physical Exam  Areas of skin assessed: Left Lateral lower leg, Right lateral foot and heel    Right and left lateral foot wounds healed on assessment 3/21    Wound Location:  Left Lateral lower leg  See Media tab for mor images      3/21      3/29    Wound Base: dermis, red, moist small  areas of granulation tissue     Palpation of the wound bed: normal      Drainage: scant     Description of drainage: yellow, lymph     Measurements (length x width x depth, in cm)1  x  x  0. cm      Tunneling N/A     Undermining N/A  Periwound skin: pink, intact      Temperature: normal   Odor: none  Pain: denies , none  Pain intervention prior to dressing change: NA    Wound Location:  Right lateral heel        3/21     3/29    Wound History: present on admit to TCU  Wound Base: % open dermis % fibrin     Palpation of the wound bed: moist     Drainage: none     Description of drainage: none     Measurements (length x width x depth, in cm) Irregularly shaped  0.9 x 2.3 x 0.2 cm       Tunneling N/A     Undermining N/A  Periwound skin: intact      Color: normal and consistent with surrounding tissue      Temperature: normal   Odor: none  Pain: denies ,     Wound Location:  Bilateral upper shins    3/21 Right upper shin    3/29 Right upper shin    Date of last photo 3/29/22  Wound History: present on admit. Pt has had fluctuating edema in LE causing weepy areas and skin to open.   Wound Base: 100 % dermis     Palpation of the wound bed: normal      Drainage: small     Description of drainage: serous     Measurements (length x width x depth, in cm)   Right: 1 x 1 x 0.1 cm  Left: healed  Periwound skin: hemosiderin staining and irritant dermatitis      Color: normal and consistent with surrounding tissue      Temperature: normal   Odor: none  Pain: denies , none      Interventions  Visual inspection and assessment completed   Wound Care Rationale Right heel and foot Protect and monitor as DTPI evolves. Left lateral leg selective debridement (autolysis) of nonviable tissue   Wound Care: done per plan of care  Supplies: at bedside  Current off-loading measures: Chair cushion and ordered PRN  Current support surface: Bariatric Low air loss mattress with extention  Education provided to: plan of care, wound progress,  Off-loading pressure and effects of neuropathy  Discussed plan of care with Patient and Nurse    Interventions  Visual inspection and assessment completed   Wound Care Rationale Protect periwound skin, Promote moist wound healing without tissue dehydration , Gently fill dead space to prevent abscess formation  and Provide protection   Wound Care: done per plan of care  Supplies: at bedside  Current off-loading measures: pillows for positioning, HOB 30 degrees or less, heels floating off beds, bariatric low airloss support surfaces, chair cushion ordered PRN  Current support surface: Bariatric low air loss  Education provided to: importance of repositioning and plan of care  Discussed plan of care with Patient, Nurse and Physician     Cynthia Ortega RN, CWOCN  Pager [phone]

## 2022-04-05 NOTE — PLAN OF CARE
Alert and oriented times four. Regular diet. Able to use call light and make needs known. Therapy assisted patient with shower. Calm and cooperative. Continue with plan of care.     Patient's most recent vital signs are:     Vital signs:  BP: 124/73  Temp: 96.4  HR: 113  RR: 18  SpO2: 100 %     Patient does not have new respiratory symptoms.  Patient does not have new sore throat.  Patient does not have a fever greater than 99.5.

## 2022-04-05 NOTE — PROGRESS NOTES
"Pt's discharge plan has changed, as pt is not at PLOF and, therefore, cannot return home. Pt's spouse, Angelica, called SW, stating, \"There is no way I can take care of him, He needs to do stairs before he can come home.\" Angelica reported to SW that there is no family available to move furniture out of dining room to allow space for a hospital bed and other DME for main-level living for pt. Angelica also stated that she is not able to provide assistance for any of pt's ADLs, stating, \"I will not kill myself taking care of him.\" SW provided emotional support for spouse and validated her concerns. SW discussed next options for discharge planning: private pay HC or community SNF. OOP costs and MA application process discussed. Angelica agreed to discuss MA qualifications with  FC. GRACIA provided FC, Dustin Mcgowan, pt's MRN and spouse's contact #; also provided Dustin's phone number to Angelica.  GRACIA met with pt at bedside to discuss [facility] insurance restrictions and discharge options. GRACIA had same discussion with pt/provided same information as with Angelica. Pt agreed that community SNF is next option, stated, \"I don't care where it is, we just need to find a place.\"    Pt has [employer] Medicare Advantage [place] (PPO) plan for [facility].    GRACIA cross-referenced [facility]-contracted SNFs with [facility] list of SNFs that can potentially accept pt's bariatric status, and provided list to pt to review. SW will f/u with pt and spouse re: SNF preferences for SW to send referrals to.      ADDENDUM: Pt reviewed SNF list and approved for SW to send referrals to all [facility]-contracted SNFs that can potentially accommodate pt's bariatric status. (Out of the 43 In-Network Facilites found within 20 miles of [address], 12 SNFs were found to potentially accommodate pt's bariatric status.) Pt agreed to start with these referrals, and broaden the search as needed.      Referrals sent by SW:    [facility]  [address] [place] [address]  [phone]  4/5: Referral sent via [facility]  [address] [place] [address]  [phone];  Admissions:  [phone]  4/5: Referral sent via [facility]  PH: [phone]  F: [phone]  4/5: Referral faxed.    [facility]   [address] [place], [place] [id number] [phone]  4/5: Referral sent via [facility]  [address] [place] [id number] [phone]  4/5: Referral faxed.    [facility]  [address] [place], [place] [address] [phone]  4/5: [facility]erral [facility] via [facility]  [address] [place], [place] [id number] [phone]  4/5: Referral faxed.      [facility]  PH: [phone]  F: [phone]  4/5: GLOBAL REFERRAL faxed for facilities in contract with [facility] and ability to accommodate pt's bariatric needs (535lbs), which includes the below SNFs:    [facility]  PH: [phone]  F: [phone]    [facility]  PH: [phone]  F: [phone]     [facility]  [address] [place], [place] [id number] [phone]      [facility]  PH: [phone]  F: [phone]  4/5: GLOBAL REFERRAL faxed for facilities in contract with [facility] and ability to accommodate pt's bariatric needs (535lbs), which includes the below SNF:    [facility]  PH: [phone]  F: [phone]        BRODIE Sandhu, LSW  [facility]   Pager: [phone]

## 2022-04-05 NOTE — PLAN OF CARE
"Discharge Planner Post-Acute Rehab PT:     Discharge Plan: Home with spouse, mod I    Precautions: fall risk    Current Status:  Bed Mobility: CGA - min A rolling, Mod A for supine to sit, Max A for legs sit to supine  Transfer: Supervision sit to stand from bed, raise bed up to stand, lower bed to sit. Max A x1 with pt pulling on // bars from W/c x5  Gait:short amb in // bars down and back with SBA - limited by SOB weakness and fatigue. Pt amb with Front WW ~ 20' from bed to w/c in hallway   Stairs: NT  Balance: sits EOB demonstrates good balance ~20 min with intermittent UE support. Requires heavy UE support on walker with standing balance    Assessment:Once in gym, pt performs 2 stands in // bars with Max A (anterior and posterior support), Once in standing, pt can maintain for ~ 1.5 min with SBA. Pt attempts stands with only Max A x 1 but is unsuccessful and unsafe just pulling himself out of chair. Upon return to his room pt again uses the bedrail with Max A x 2 to stand, then uses FWW to pivot into recliner. In general, pt appears to require significant assist for any out of chair mobility, and all activity requires significant prep due to pt's size and weakness.    Other Barriers to Discharge (DME, Family Training, etc):   - Bariatric FWW - in 4th fl rehab gym  - w/c - Needs 28-30\" (30 ideal for pt). Need to confirm doorway width, spouse was asked to measure  - Stairs - Spouse reports he cannot come home until he can complete 16 stairs to bedroom  - Hospital bed - If spouse allows main level living    "

## 2022-04-05 NOTE — PLAN OF CARE
"Discharge Planner Post-Acute Rehab OT:     Discharge Plan: home w/ wife, home OT TBD  Recert due: 4/18    Precautions: falls, needs bariatric equip including FWW (pt is 6ft 6in, 465#), Pt allowed to leave ROOM for therapy (re: covid vacination status)  Current Status:  ADLs:    Mobility: rolling cga to min A and bedrail, supine to sit w/ min A of 2 and bedrail/HOB elevated 30 deg, STS w/ min A of 1-2 w/ ht of bed elevated and use of bariatric FWW    Grooming: set up    Dressing: uB drg w/ gown change w/ set up, LB pants/shorts declined, feet w/ total assist    Bathing: ub set up, LB dep w/ sponge bath    Toileting: bed pan dep w/ placement and dep w/ pericares  IADLs: PTA pt and wife both performed, wife works full time but mostly from home  Vision/Cognition: WFL for basic cog, pt reports no glasses except occasionally used reading glasses    Assessment: Goal of session to complete full shower. Time spent discussing transfer planning and setup with patient. Decided safest transfer would be via liko lift d/t instability on EOB being safety concern the past few days. Ax2 via liko lift from bed > rolling tima commode (22\" height). Unable to sit pt far enough in back of shower chair resulting in problem solving to reposition hips safely. Pt demos poor judgment with safe way to reposition. Agreed to writer's suggestion of STS with bed rail while Ax2 stabilizes shower chair with universal sling/lift remaining under patient for increased safety. Pt stood from chair with Mod Ax2, heavy use of bed rail, and extreme effort from patient. Able to then sit properly on shower chair. Dep to wheel pt to/from shower chair with A1-2. During shower task pt assists with management of hand held shower, directing cares, face level and BUEs washing/rinsing/drying. Pt is dep with LB, back, and trunk d/t body habitus/skin folds. Assist for donning hospital gown and total A for donning socks. Pt completed STS from 22\" shower chair with bed " rail and Mod Ax1, second A for stabilizing chair for safety. Pt then provided tima FWW and amb x4 steps to recliner. Overall, this task is extremely fatiguing for patient, very SOB d/t low endurance with mobility. Cont w/ POC as able.    3/28: Care conference held with patient, pt's wife, RN care coordinator, GRACIA, PT/OT. Updated current functional status. Encouraged OOB/EOB activity. Plan to initiate UB HEP for pt to complete independently in room outside of therapies. Pt will need bariatric BSC upon discharge. Pt will most likely require sponge bathing initially upon discharge d/t shower upstairs and pt unable to do stairs at this time. See Mari Polk's note from 3/28 for further care conference details.    Other Barriers to Discharge (DME, Family Training, etc): 5 steps to get into house (has ramp but prefers to use stairs), 15 steps in house to get to bedroom/full bathroom, wife works full time but mostly from home, PTA used SEC , main level bathroom half bath w/ taller toilet/grab bar, full bathroom upstairs std ht toilet w/ grab bar on wall, tub/shower combo w/ grab bars but does not have bench/shower chair, pt reports being house hold ambulator but used w/c at clinic for appts (does NOT own w/c, states he as 4WW in garage donated to him but not sure it is the right size)     Th provided pt with picture reference handout to consider bariatric shower bench for home.

## 2022-04-06 NOTE — PLAN OF CARE
"Discharge Planner Post-Acute Rehab OT:     Discharge Plan: home w/ wife, home OT TBD  Recert due: 4/18    Precautions: falls, needs bariatric equip including FWW (pt is 6ft 6in, 465#), Pt allowed to leave ROOM for therapy (re: covid vacination status)  Current Status:  ADLs:    Mobility: rolling cga to min A and bedrail, supine to sit w/ min A of 2 and bedrail/HOB elevated 30 deg, STS w/ min A of 1-2 w/ ht of bed elevated and use of bariatric FWW    Grooming: set up    Dressing: uB drg w/ gown change w/ set up, LB pants/shorts declined, feet w/ total assist    Bathing: ub set up, LB dep w/ sponge bath    Toileting: bed pan dep w/ placement and dep w/ pericares  IADLs: PTA pt and wife both performed, wife works full time but mostly from home  Vision/Cognition: WFL for basic cog, pt reports no glasses except occasionally used reading glasses    Assessment: Goal of session to problem solve safe bed mobility and complete functional transfer with FWW from EOB > w/c. Deflated bed prior to transfer to prevent sliding off EOB. Pt required Max A to sit EOB, although unable to hold self up safely with kings UE support. Pt became very SOB, perspirating, and requesting oxygen. Applied 5L O2 via NC per pt request. Sats at 100% HR 108bpm. Pt returned to supine with Ax2, HR decreased. Noted pt incontinent of loose stool requiring Total Ax2 for clean up and linen change. Pt rolling with Max Ax2 and Min-Max A to sustain sidelying. Pt SOB throughout. Following cleanup, pt states, \"Let's try it again\". Pt lacks insight to safety concern. Unable to transfer pt to w/c via liko lift at this time d/t soiled universal sling. Ordered new one at end of session. Overall, pt continues to require Ax1-2 with bed mobility and transfers which is decline from previous week. Cont w/ POC as able.    Note: Collaborated face-to-face with OTR/L (Cindy Porras) for 10th CHATMAN visit.     Other Barriers to Discharge (DME, Family Training, etc): 5 steps to " get into house (has ramp but prefers to use stairs), 15 steps in house to get to bedroom/full bathroom, wife works full time but mostly from home, PTA used SEC , main level bathroom half bath w/ taller toilet/grab bar, full bathroom upstairs std ht toilet w/ grab bar on wall, tub/shower combo w/ grab bars but does not have bench/shower chair, pt reports being house hold ambulator but used w/c at clinic for appts (does NOT own w/c, states he as 4WW in garage donated to him but not sure it is the right size)     Th provided pt with picture reference handout to consider bariatric shower bench for home.

## 2022-04-06 NOTE — PLAN OF CARE
"Discharge Planner Post-Acute Rehab PT:     Discharge Plan: Home with spouse, mod I    Precautions: fall risk    Current Status:  Bed Mobility: CGA - min A rolling, Mod A for supine to sit, Max A for legs sit to supine  Transfer: Supervision sit to stand from bed, raise bed up to stand, lower bed to sit. Max A x1 with pt pulling on // bars from W/c x5  Gait:short amb in // bars down and back with SBA - limited by SOB weakness and fatigue. Pt amb with Front WW ~ 20' from bed to w/c in hallway   Stairs: NT  Balance: sits EOB demonstrates good balance ~20 min with intermittent UE support. Requires heavy UE support on walker with standing balance    Assessment: max A x 2 for lyco sling from bed < w/c, max A x 2 for lyco sling from w/c < recliner d/t difficulty with STS. STS in // with 3 attempts being unsuccessful, requiring assist of 3 for final repositioning for transport to room. Gait activity in // with Pt declining step ups d/t fatigue.    Other Barriers to Discharge (DME, Family Training, etc):   - Bariatric FWW - in 4th fl rehab gym  - w/c - Needs 28-30\" (30 ideal for pt). Need to confirm doorway width, spouse was asked to measure  - Stairs - Spouse reports he cannot come home until he can complete 16 stairs to bedroom  - Hospital bed - If spouse allows main level living    "

## 2022-04-06 NOTE — PROGRESS NOTES
"TCU Notice of Medicare Non-Coverage Note:     Met with patient to Introduce self and role.     Discussed Notice of Medicare Non-Coverage and last day of coverage as required for all patients with Medicare coverage during Skilled Nursing Facility (SNF) stays.Informed patient that Medicare covers stay until midnight of day before discharge. TCU does not bill for discharge date.    Last coverage day is 4/8/22. Discharge date expected 4/9/22. Patient given NOMNC with Advanced Beneficiary Notice (ABN) as well with RUG level of stay (RAC). Also provided patient with information on how the RUG level is determined (per [facility] website- What is Case mix). Also provided patient with list of covered and non-covered services during his stay.     Initially, the patient stated that he is going to \"need to pack up my stuff and get home. I can't afford to stay here.\"     Informed of contact number to appeal the decision with [employer]. He began the call when this author was in the room.    Copy of all documentation provided to patient.     REGLA FAGAN, Care Coordinator, therapy team, Rehab director, Billing provided update and notice of intent to appeal.     Patient very pleasant and verbalized understanding of discussion.     Copy of NOMNC sent electronically secured to [facility] .     Jyoti Pearson RN, BSN  [facility] Quality and           "

## 2022-04-06 NOTE — PLAN OF CARE
Goal Outcome Evaluation:    Plan of Care Reviewed With: patient     Overall Patient Progress: no change    Outcome Evaluation: Pt makes needs known. Pain controlled with fentalyn patch. Minimal drainage to legs contained in dressings. Patch to R chest.    Orientation: A/O x4  Bowel: Continent using bedpan; LBM 4/5/22   Bladder: Continent using urinal  Pain: Bilateral legs  Ambulation/Transfers: Liko lift x2  Diet/ Liquids/ Pills: Regular, thin. Whole.   Tubes/ Lines/ Drains: None  Skin: Weaping bilateral LE, R heel PI

## 2022-04-06 NOTE — PLAN OF CARE
Alert and oriented times four. Able to use call light and make needs known. SOB when repositioning in bed. Ceiling lift with sling required to safely transfer between surfaces. Fentanyl patch on right chest. Dressings on legs replaced 4/5, due to be changed 4/7. DELL drain less than 5ml output. Continent of bladder (uses bedside urinal). Incontinent of bowel. Last BM 4/6. Continue with plan of care.     Patient's most recent vital signs are:     Vital signs:  BP: 115/66  Temp: 96.7  HR: 95  RR: 18  SpO2: 100 %     Patient does not have new respiratory symptoms.  Patient does not have new sore throat.  Patient does not have a fever greater than 99.5.

## 2022-04-06 NOTE — PLAN OF CARE
Goal Outcome Evaluation:        Patient is alert and oriented x4 , able to make his neds known. Pain controlled with fentalyn patch om the rt. chest. Minimal drainage to legs contained in dressings. Continent of B/B, uses bedpan and urinal. Pt. On regular diet , thin fluids. Transfer using liko lift.        Patient's most recent vital signs are:     Vital signs:  BP: 123/67  Temp: 95.6  HR: 80  RR: 18  SpO2: 91 %     Patient does not have new respiratory symptoms.  Patient does not have new sore throat.  Patient does not have a fever greater than 99.5.

## 2022-04-07 NOTE — PROGRESS NOTES
"GRACIA met with pt at bedside to discuss discharge planning. Pt reports that he wants to go home tomorrow and will need a hospital bed, WC, and O2.  GRACIA informed pt that he would have to pay OOP for bariatric WC at [facility], as therapy dept has not been able to  Find local vendor contracted with [facility] that have a bariatric WC immediately available. GRACIA educated pt that  it has to be considered medically necessary in order for insurance to pay for it. GRACIA left VM for spouse, Angelica, as this discharge plan contradicts the last conversation SW had with pt and spouse re: d/c plan, where it was agreed that pt would need community SNF because he cannot return home, per spouse. Pt declined SW setting-up medical transportation and stated that he would have someone pick him up.  During  coversation, GRACIA told pt that he's an A2 with transfers and inquired how he would manage that at home. Pt stated that, \"I'll just have to do it myself.\"     Angelica returned GRACIA's phone call. GRACIA reported conversation had with pt earlier. Angelica stated, \"Well, he pays the mortgage, so I can't keep him out of his house.\" Angelica expressed how pt is in denial about what his healthcare needs are and where his physical deficits lie. Both pt and Angelica have reported reaching out to pt's family for assistance, as Angelica continues to state that she will not be able to assist pt with ADLs at home. Angelica will also be leaving for [place], [place], for a week beginning on 4/13. Angelica told GRACIA that pt reported to her that pt will be returning home for a short-period of time until community SNF is found for him. GRACIA informed spouse that that information is incorrect, and SW will re-educate pt on SNFs. GRACIA informed Angelica of Home Health working with pt upon discharge for a short period of time, and if additional PCA/HHA is needed, then a that would be an OOP cost with a HC agency. GRACIA provided Angelica the Senior [employer] Line " "phone number for future resources and assistance with finding a Home Care agency. SW updated spouse re: FC has been in contact with pt. Pt does not qualify for MA at this time, as he is well-over assets. FC reported to SW that [employer]  as a resource to help try and navigate this further was offer to pt, but pt declined. SW will updated spouse once [employer] decision is determined.    SW spoke with pt again, confirmed discharge plan and reported to pt that Angelica is up-to-date. SW discussed how SNF admission would occur from home/community; provided update on current referral status. Pt expressed understanding. Pt is still planning to return home. SW explained Home Health Medicare benefit, along with OOP HC costs if ADL assistance is needed at pt's home. Pt called [employer] while SW was in room; it was reported that they only have a 24\" WC and it would be up to 10 weeks to special order. [employer] provided two other DME companies for pt to try ([employer] and [employer]). Pt was going to call those 2 DME companies to inquire about a 30\" WC and update SW. Pt aware the OT will be working with pt this afternoon to better determine pt's O2 needs.    SW, RNCC, and OT met with pt at bedside after rounds to discuss safe discharge plan; spouse, Angelica, was on speaker phone. IDT wanted to establish who caregivers were going to be, and ensure that education is provided to them before pt discharges home. Angelica confirmed that she will not be pt's caregiver and asked pt which of his family members will be his caregiver. Pt began making phone calls to family, requesting they come in tomorrow for teaching. Pt was also calling DME companies to find a 30\" WC. SW left bedside to gather HC agency phone numbers and provide [employer] Line for phone number.    SW was delayed in getting back to pt. In the meantime, education was scheduled for tomorrow for two of pt's family members. " "[employer] had called SW, stating that they favor [facility]'s decision. Last coverage day is 4/8, and pt will be financially liable beginning Saturday 4/9. SW and RNCC consulted with Therapy Supervisor; it was agreed that hospital bed, mechanical life, commode, and other DME cannot be ordered until caregiver schedule is established for safe discharge plan, which will be undetermined until caregivers come in for training and education tomorrow. Pt would still not have a WC, as all local vendors are stating that it could be weeks to months before special order arrives.    SW returned to pt's room, provided [facility] decision and number to call for 2nd appeal if wanted. SW had another thorough discussion with pt re: his discharge plan and MA application process. Pt admitted that he \"can't make it well at home,\" and agreed for SW to continue looking for community SNFs. Pt requested that SW make Senior Linkage Line referral and talk to FC again on his behalf.    ADDENDUM: Pt called family members to cancel education/tranining tomorrow. SW updated rehab  of no family training for pt tomorrow.        REFERRAL STATUS    PENDING  [facility]  [address] [place] [address] [phone]  4/5: Referral sent via epic  4/6: SW left VM for Kaye and Jayson in Admissions, requesting call back to discuss referral status.        [facility]  [address] [place], [place] [id number] [phone]  4/5: Referral sent via epic  4/6: SW left a VM for Mihaela in Admissions, requested call back.            DECLINED  [facility]  [address] [place], [place] [id number] [phone];  Admissions:  [phone]  4/6 - cannot meet pt's bariatric needs.     [facility]  [address] [place] [address] [phone]  4/6 - no bed availability     [facility]  [address] [place] [address]  [phone]  4/6 - no bariatric beds in LTC, no LTC beds at this time.     [facility]  PH: [phone]  F: [phone]  4/6 - cannot meet pt's bariatric needs and don't have a private room to accommodate his BiPap.     [facility] [place] [address] [place] [address]  [phone]  4/6 - facility cannot meet pt's needs.     [facility]  PH: [phone]  F: [phone]  4/6: Globally declined - cannot meet pt's bariatric needs    [facility]  PH: [phone]  F: [phone]  4/7: Globally declined - cannot meet pt's bariatric needs.        BRODIE Sandhu, LSW  [facility]   Pager: [phone]

## 2022-04-07 NOTE — PLAN OF CARE
Goal Outcome Evaluation:   Pt is alert and oriented x4. Able to make needs known through the use of call light. Pt slept through out the night. Called for urinal only. Pt appeared to be comfortable this shift. Fentanyl patch in place on right chest. Minimal drainage noted on dressing. Will continue with plan of care.      Patient's most recent vital signs are:     Vital signs:  BP: 119/64  Temp: 97.1  HR: 94  RR: 20  SpO2: 99 %     Patient does not have new respiratory symptoms.  Patient does not have new sore throat.  Patient does not have a fever greater than 99.5.

## 2022-04-07 NOTE — PLAN OF CARE
Goal Outcome Evaluation:    Pt alert and oriented X4. Able to make needs known. Dressings on BLE intact and new compression to BLE applied. No C/O SOB, chest pain, N/V, constipation/diarrhea. No lines. Mechanical lift.         Patient's most recent vital signs are:     Vital signs:  BP: 104/64  Temp: 95.9  HR: 102  RR: 18  SpO2: 96 %     Patient does not have new respiratory symptoms.  Patient does not have new sore throat.  Patient does not have a fever greater than 99.5.

## 2022-04-07 NOTE — PLAN OF CARE
"Discharge Planner Post-Acute Rehab PT:     Discharge Plan: Home with spouse, mod I    Precautions: fall risk    Current Status:  Bed Mobility: CGA - min A rolling, Mod A for supine to sit, Max A for legs sit to supine  Transfer: Supervision sit to stand from bed, raise bed up to stand, lower bed to sit. Max A x1 with pt pulling on // bars from W/c x5  Gait:short amb in // bars down and back with SBA - limited by SOB weakness and fatigue. Pt amb with Front WW ~ 20' from bed to w/c in hallway   Stairs: NT  Balance: sits EOB demonstrates good balance ~20 min with intermittent UE support. Requires heavy UE support on walker with standing balance    Assessment: completed IND activities as able d/t potential discharge d/t insurance issues. Pt continues to require max A x 1 to 3 persons with activities d/t poor endurance, SOB and significant size.    Other Barriers to Discharge (DME, Family Training, etc):   - Bariatric FWW - in 4th fl rehab gym  - w/c - Needs 28-30\" (30 ideal for pt). Need to confirm doorway width, spouse was asked to measure  - Stairs - Spouse reports he cannot come home until he can complete 16 stairs to bedroom  - Hospital bed - If spouse allows main level living    "

## 2022-04-07 NOTE — PLAN OF CARE
Discharge Planner Post-Acute Rehab OT:     Discharge Plan: home w/ PCA assist with pt's extended family filling in opposite PCA, home OT.  Versus SNF.     Recert due: 4/18    Precautions: falls, needs bariatric equip including FWW (pt is 6ft 6in, 465#), Pt allowed to leave ROOM for therapy (re: covid vacination status)    Current Status:  ADLs:    Mobility: rolling cga to min A and bedrail, supine to sit w/ min A of 2 and bedrail/HOB elevated 30 deg, STS w/ min A of 1-2 w/ ht of bed elevated and use of bariatric FWW.  As of 4/7/22 notes over the past week, pt is assist of 2 for rolling in bed and cleaning up after incontinence.  Pt is requiring assist of 2-3 to stand or resorting to the mechanical lift.     Grooming: set up    Dressing: uB drg w/ gown change w/ set up, LB pants/shorts declined, feet w/ total assist    Bathing: ub set up for chest/underarms and face - pt will help lift UE's and requires assist to cleanse abdominal folds, LB dep w/ sponge bath    Toileting: bed pan dep w/ placement and dep w/ pericares.  Mechanical lift to/from commode, at times able to stand for portion of cares but recently a minimum of max A of 2 to stand.  IADLs: PTA pt and wife both performed, wife works full time but mostly from home  Vision/Cognition: WFL for basic cog, pt reports no glasses except occasionally used reading glasses    Assessment: Pt would like to discharge home as insurance has denied his TCU stay after 4/8/22.  Currently, home is not a safe option, pt does not have support firmed up at home and his performance has recently declined in his ability to participate in STS or pivot transfer with assist of 1-2.  A longer LOS on TCU or SNF is recommended.  Pt needs AE and at present, he does not even have a feasible plan to get into his house (there is a ramp but his w/c cannot fit through the door and it takes a minimum of 2 people for STS).  Pt insists he will be able to manage at home and has lined up assist of 2  family members to help him when PCA assistance is not present.  One of pt's family members is coming at 8am tomorrow and a second is coming at 1pm.  OT and PT plan to split their time, PT during AM session and OT during PM session. Th present while pt contacted one family member, it did not seem clear to family member the extent to which pt will need support at home.  Current AE list is bariatric items, including but not limited to, a hospital bed, w/c, mechanical lift and a bed pan.  OT plans to provide information re: a bariatric commode.  Focus of OT today was STS transfers.  Pt stated that he could use his door frame at the front door at home to pull himself into standing.  Th assisted pt to get into this doorway position, with assist of 2 staff, he was unable to come to a stand from the recliner due to weakness and habitus, with slipper socks or tennis shoes.  Multiple attempts made.  Pt then needed the commode for a BM.  Th positioned recliner by the bed so pt could use the bedrail.  Several attempts with assist of 2 and pt unable to stand from recliner.  LIKO lift was used to get pt onto the commode.  From that surface (approx 22 inches) pt was able to come to partial stand with max assist of 2.   Pt demo need for increase assist this week. He is not consistent with STS transfers, ambulation or pivot transfers.  Plan for family training tomorrow.     Other Barriers to Discharge (DME, Family Training, etc): 5 steps to get into house (has ramp but prefers to use stairs), 15 steps in house to get to bedroom/full bathroom, wife works full time but mostly from home, PTA used SEC , main level bathroom half bath w/ taller toilet/grab bar, full bathroom upstairs std ht toilet w/ grab bar on wall, tub/shower combo w/ grab bars but does not have bench/shower chair, pt reports being house hold ambulator but used w/c at clinic for appts (does NOT own w/c, states he as 4WW in garage donated to him but not sure it is the  right size)     4/7/22 Current AE list is bariatric items, including but not limited to, a hospital bed, w/c, mechanical lift and a bed pan.  OT plans to provide information re: a bariatric commode.     Th provided pt with picture reference handout to consider bariatric shower bench for home.

## 2022-04-07 NOTE — PROGRESS NOTES
[facility]    Medicine Progress Note - Hospitalist Service    Date of Admission:  3/18/2022    Assessment & Plan        Panda Miranda is a 59 y/o gentleman who has multiple medical problems including chronic HFrEF (EF 45-50% on 1/29/22), small perimembranous VSD w/ left to right shunt, HTN, CKD4 w/ baseline cr ~ 1.8 - 2.1, chronic atrial fibrillation, gout, pseudogout, morbid obesity s/p bariatric surgery and numerous other medical conditions.  He was hospitalized at [facility] from 1/27 - 1/30/22 for sepsis 2/2 complicated UTI vs. pyelonephritis.  He was hospitalized at [facility] from 2/11 - 2/20/22 for hyperkalemia, SHANTE and acute gout.  He was then hospitalized at [facility] 2/20 - 3/18/22 with weakness.  Pt was unable to walk up the steps in his home.  He was later found to have recurrent hyperkalemia.  During [facility] hospital stay, patient was also treated for acute polyarticular gout.  He completed prednisone taper during [facility] hospital stay.  He was also transitioned from uloric to allopurinol.  He had pancytopenia during [facility] hospital stay that resolved prior to discharge.  Transferred to [facility] on 3/18/22.       Generalized Weakness and Debility   ---   Pt remained severely deconditioned  ---   Continue PT/OT     HFrEF w/ acute exacerbation, EF 45-50% per TTE on 01/29/22   Small perimembranous VSD with left to right shunting  HTN  ---   Not a candidate for ACE-I or ARB due to Hyperkalemia in the context of CKD4  ---   Continue Metoprolol XL 25 mg daily  ---   Not on diuretics chronically  ---   CXR on 4/4/22 revealed pulm edema  ---   On exam, pt has 2+ LE edema and anasarca  ---   He barone not have PIV  ---   Will treat him w/ Bumex 1 mg po x one dose today  ---   Check BMP in am     CKD4 w/ baseline cr ~ 1.8 - 2.1    Chronic metabolic acidosis related to CKD4  Hyperkalemia (related to previous SHANTE/CKD4 and ACE-I)  History of albuminuria   ---   K is back to normal range  ---   Cr is  at baseline range of 2.15  ---   Sees [facility]. Omega Coreas [facility]    ---   Continue renal siet  ---   Continue NaHCO3 1,300 mg PO TID  ---   Follow up with nephrology after discharge       Paroxysmal Atria Fibrillation   ---   On Warfarin for stroke prophylaxis, pharmacy dosing  ---   INR 3.01 today  ---   Metoprolol XL for rate control.      TASHA vs. OHS   ---   Continue CPAP at night      ACD  ---   Hgb is stable at 8-10 g.     Gout Arthritis with recent Flares   ---   On colchicine 0.6 mg po daily  ---   On Maintenance Allopurinol      Morbid Obesity w/ BMI  61.83 Kg/m2  ---   S/p gastroplasty vertical banded surgery in 1996  ---   Wt loss recommended    Hx of colon cancer   ---   S/p hemicolectomy in 2011    Chronic B/L LE edema and venous stasis ulcers   ---   B/l LE arterial US w/ doppler 2/3/22  ---   sara 1.48 RLE and 1.44 LLE, may be falsely elevated due to arterial wall calcification  ---   B/L LE duplex demonstrates normal waveforms through both feet without significant stenosis or occlusion.  ---   Lymphedema treatment per therapy      Chronic pain syndrome  ---   Continue Fentanyl patch, tylenol, oxycodone and robaxin    Universal COVID-19 infection  ---   Tested positive for COVID-19 on 1/27/22  ---   Considered COVID-19 recovered.  ---   Received vaccination, second dose was 2/20/22, needs  booster       Diet: 3 Gram K Diet    DVT Prophylaxis: Warfarin  Cheema Catheter: Not present  Fluids: N/A  Central Lines: None  Cardiac Monitoring: None  Code Status: Full Code    Disposition Plan   Expected Discharge: 04/08/2022     Anticipated discharge location: home with family or SNF         Adeline Rodriguez MD.   Hospitalist.  [phone], pager.  Hospitalist Service  [facility]  Securely message with the Vocera Web Console (learn more here) Text page via [employer] u Paging/Directory     ______________________________________________________    Interval History    C/o SOB w/ minimal activities.  Denied  CP.  He endorses increased LE edema.  On RA during this eval.  He does not have PIV.     Data reviewed today: I reviewed all medications, new labs and imaging results over the last 24 hours.    Physical Exam   Vital Signs: Temp: (!) 95.9  F (35.5  C) Temp src: Oral BP: 104/64 Pulse: 102   Resp: 18 SpO2: 96 % O2 Device: None (Room air)    Weight: 535 lbs 0 oz   General: Severe morbid obesity, aao x 3, NAD.  HEENT:  NC/AT, PERRL, EOMI, neck supple, no thyromegaly, op clear, mmm.  CVS:  NL s 1 and s2, no m/r/g.  Lungs:  CTA B/L anteriorly   Abd:  Soft, + bs, NT, no rebound or gaurding, no fluid shift.  Ext:  No c/c.  Lymph:  2+ edema.  Neuro:  Nonfocal.  Musculoskeletal: No calf tenderness to palpation.    Skin:  Chronic venous stasis dermatitis skin change w/ wound  Psychiatry:  Mood and affect appropriate.      Data   Recent Labs   Lab 04/07/22  0647 04/05/22  1155 04/04/22  0740   WBC  --   --  5.6   HGB  --   --  9.6*   MCV  --   --  87   PLT  --   --  155   INR 3.01*  --  2.84*   NA  --  140 140   POTASSIUM  --  4.5 4.8   CHLORIDE  --  107 111*   CO2  --  21 22   BUN  --  25 23   CR  --  2.15* 1.97*   ANIONGAP  --  12 7   JOHN  --  9.3 9.0   GLC  --  112* 97     No results found for this or any previous visit (from the past 24 hour(s)).

## 2022-04-07 NOTE — PROGRESS NOTES
04/07/22 3254   Additional Documentation   Rehab Comments CC with nsg, SW, pt, and wife who was on the phone.  Th offered instruction re: pt current performance with ADL and STS transfers/pivot transfers and bed mobility.  Returning home is not recommended, though insurance denied pt's stay after tomorrow and he does not want to private pay for TCU or SNF care.  If he does choose to go home, 24 hour care is recommended.  He is dependent with toileting and skin integrity is already an issue.  Pt has been needing assist of a minimum of 2 staff with STS. And staff has had to use the mechanical lift.  He is no longer able to participate in bed mobility without assist of 2.  Home is not a safe option.  This has been a decline for pt over the past week.  Previously he had therapy sessions where he could demo STS with assist of one staff and pivot transfer but this was not yet consistent.  Pt con't to speak of a return to home plan and is calling PCA agencies for care with family providing back up care.  Family coming in for training on 4/8/22.   Total Session Time   Total Session Time (minutes) 45 minutes  (45 CC (charged for 30 minutes))   Post Acute Settings Only   What unit is patient on? TCU

## 2022-04-07 NOTE — PLAN OF CARE
Pt is alert and oriented x4. Able to make needs known. Denies pain, cp or SOB. Continent of bowel and bladder. Utilizes bed pan and urinal; staff empties. Assit of 2 pivot transfer but staff used ceiling lift this shift, pt said it would be much easier than for him to walk and that what they have been using during the night. Had bed bath. BLE dressings changed per WOC orders. Declined scheduled Micatin powder, just lotion applied. Fentanyl patch to R chest area. Declined rooke boots, said they make his feet hurt. Pleasant and cooperative. Call light is in reach, continue w/ POC.      Patient's most recent vital signs are:     Vital signs:  BP: 119/64  Temp: 97.1  HR: 94  RR: 20  SpO2: 99 %     Patient does not have new respiratory symptoms.  Patient does not have new sore throat.  Patient does not have a fever greater than 99.5.

## 2022-04-08 NOTE — PROGRESS NOTES
Supposed 50 lbs wt gain since admission to [facility]    Much weaker c/w admit, gets SOB w/ just transfer or minimal activity.    He says he had good UOP from Bumex 1 mg IV once yesterday    A/p:  Insert PIV.  Bumex 1 mg iv bid      Adeline Rodriguez MD.   Hospitalist.  [phone], pager.

## 2022-04-08 NOTE — PLAN OF CARE
Discharge Planner Post-Acute Rehab OT:     Discharge Plan: home w/ PCA assist with pt's extended family filling in opposite PCA, home OT.  Versus SNF.     Recert due: 4/18    Precautions: falls, needs bariatric equip including FWW (pt is 6ft 6in, 465#), Pt allowed to leave ROOM for therapy (re: covid vacination status)    Current Status:  ADLs:    Mobility: rolling cga to min A and bedrail, supine to sit w/ min A of 2 and bedrail/HOB elevated 30 deg, STS w/ min A of 1-2 w/ ht of bed elevated and use of bariatric FWW.  As of 4/7/22 notes over the past week, pt is assist of 2 for rolling in bed and cleaning up after incontinence.  Pt is requiring assist of 2-3 to stand or resorting to the mechanical lift.     Grooming: set up    Dressing: uB drg w/ gown change w/ set up, LB pants/shorts declined, feet w/ total assist    Bathing: ub set up for chest/underarms and face - pt will help lift UE's and requires assist to cleanse abdominal folds, LB dep w/ sponge bath    Toileting: bed pan dep w/ placement and dep w/ pericares.  Mechanical lift to/from commode, at times able to stand for portion of cares but recently a minimum of max A of 2 to stand.  IADLs: PTA pt and wife both performed, wife works full time but mostly from home  Vision/Cognition: WFL for basic cog, pt reports no glasses except occasionally used reading glasses    Assessment:  Family training deferred.  Per  staff, pt has agreed that home is unsafe at present and he needs to remain in TCU type setting.  This follows OT recommendation that he con't with therapy, he is performing below the baseline at which he entered this TCU. He is currently waiting to be transferred to a room where the liko lift can safely accomodate his weight.  While in bed th set pt up for UE ex, see doc flow for details.  Plan to work on mobility again when pt can be safely lifted OOB.     Other Barriers to Discharge (DME, Family Training, etc): 5 steps to get into house (has ramp  but prefers to use stairs), 15 steps in house to get to bedroom/full bathroom, wife works full time but mostly from home, PTA used SEC , main level bathroom half bath w/ taller toilet/grab bar, full bathroom upstairs std ht toilet w/ grab bar on wall, tub/shower combo w/ grab bars but does not have bench/shower chair, pt reports being house hold ambulator but used w/c at clinic for appts (does NOT own w/c, states he as 4WW in garage donated to him but not sure it is the right size)     4/7/22 Current AE list is bariatric items, including but not limited to, a hospital bed, w/c, mechanical lift and a bed pan.  OT plans to provide information re: a bariatric commode.     Th provided pt with picture reference handout to consider bariatric shower bench for home.

## 2022-04-08 NOTE — PLAN OF CARE
Goal Outcome Evaluation:           Pt alert and oriented X4. Able to make needs known. Dressings on BLE changed and intact, compression to BLE applied. No C/O SOB, chest pain, N/V, constipation/diarrhea. No lines. Mechanical lift. IV bumex ordered, flyer called to place PIV.        Patient's most recent vital signs are:     Vital signs:  BP: 102/65  Temp: 96.3  HR: 92  RR: 18  SpO2: 100 %     Patient does not have new respiratory symptoms.  Patient does not have new sore throat.  Patient does not have a fever greater than 99.5.

## 2022-04-08 NOTE — PLAN OF CARE
A/O x 4, afberile, denied pain, on RA ( no SOB). Per report has been sitting on the recliner since 0900H, was re approached during this shift several times if can be assisted back to bed but refused, said he's comfortable on the chair and would call staff when he wants to lay down in bed, refused skin assessment also, wears tubigrips on both legs. Uses call light for help.    Patient's most recent vital signs are:     Vital signs:  BP: 129/74  Temp: 96.9  HR: 112  RR: 18  SpO2: 96 %     Patient does not have new respiratory symptoms.  Patient does not have new sore throat.  Patient does not have a fever greater than 99.5.

## 2022-04-08 NOTE — PLAN OF CARE
Goal Outcome Evaluation:    Patient received sitting in chair at bed side. Alert and awake with no acute distress noted. Routine med tolerated well. Wound dressing done. Denies pain, resting comfortably in bed with eyes closed. Safety measures in place. Call light and personal items in reach.        Patient's most recent vital signs are:     Vital signs:  BP: 129/74  Temp: 96.9  HR: 112  RR: 18  SpO2: 96 %     Patient does not have new respiratory symptoms.  Patient does not have new sore throat.  Patient does not have a fever greater than 99.5.

## 2022-04-09 NOTE — PLAN OF CARE
Goal Outcome Evaluation:    Patient is alert and oriented x4. Makes needs known. Denied SOB at rest and chest pain. BLE dressing are CDI. New LUE PIV placed this afternoon. IV Bumex administered per order. Miconazole powder applied to lateral skin folds. Will continue with POC.    Patient's most recent vital signs are:     Vital signs:  BP: 110/69  Temp: 96.8  HR: 91  RR: 16  SpO2: 100 %     Patient does not have new respiratory symptoms.  Patient does not have new sore throat.  Patient does not have a fever greater than 99.5.

## 2022-04-09 NOTE — PLAN OF CARE
Goal Outcome Evaluation:  Patient awake most of the night, complained of skin itching, assist of 2 to repositioned. Patient had large loose BM this shift using bedpan. Continue to urinate small frequent urine, generalize edema with lymph edema wraps to bilateral lower legs. Patient SOB with activity and is using C-pap overnight. IV Bumex given, continue to monitor.    Patient's most recent vital signs are:     Vital signs:  BP: 95/56  Temp: 96.2  HR: 65  RR: 20  SpO2: 93 %     Patient does not have new respiratory symptoms.  Patient does not have new sore throat.  Patient does not have a fever greater than 99.5.

## 2022-04-09 NOTE — PLAN OF CARE
"Patient resting in bed. Easily aroused. Able to make needs known. Patient states, \" I am tired. I didn't sleep much last night.\" Medications administered without difficult.Denies SOB, pain, and chest pain. Patient request assessment later due to tiredness. Lymph wraps noted to BLE. 22 gauge left hand. Dressing dry and intact. Call bell within patient reach.  .        Patient's most recent vital signs are:     Vital signs:  BP: 125/77  Temp: 95.3  HR: 94  RR: 18  SpO2: 100 %     Patient does not have new respiratory symptoms.  Patient does not have new sore throat.  Patient does not have a fever greater than 99.5.                            "

## 2022-04-09 NOTE — PLAN OF CARE
"Discharge Planner Post-Acute Rehab PT:     Discharge Plan: Home with spouse, mod I    Precautions: fall risk    Current Status:  Bed Mobility: CGA - min A rolling, Mod A for supine to sit, Max A for legs sit to supine  Transfer: A x2 sit to stand from bed, raise bed up to stand, lower bed to sit. Max A x1 with pt pulling on // bars from W/c x5, max A x 3 for standing from w/c pivot to recliner  Gait:short amb in // bars down and back with SBA - limited by SOB weakness and fatigue. Pt was able to amb with Front WW ~ 20' from bed to w/c in hallway, pt currently only ambulating in // bars  Stairs: NT  Balance: sits EOB demonstrates good balance ~20 min with intermittent UE support. Requires heavy UE support on walker with standing balance    Assessment:  A x 4 to get pt to stand and pivot to w/c, A to block feet, max A for boost and min A to maintain balance. Pt requires 3 attempts to come to stand with max A x 1 and // bars. Pt amb ~16' with 180 degree turn and CGA in // bars. Pt more fatigued today. Upon return to room max A x 3 while pt reaches forward from w/c to bed rails to pull to stand. Pivots to recliner.     Patient should wear shoes when transferring to avoid slipping. Updated whiteboard.    Other Barriers to Discharge (DME, Family Training, etc):   - Bariatric FWW - in 4th fl rehab gym  - w/c - Needs 28-30\" (30 ideal for pt). Need to confirm doorway width, spouse was asked to measure  - Stairs - Spouse reports he cannot come home until he can complete 16 stairs to bedroom  - Hospital bed - If spouse allows main level living    "

## 2022-04-10 NOTE — PLAN OF CARE
Patient alert and oriented x 4. Fentanyl patch change this A.M. Lymph wraps in place. Patient refuses Rooke boots due to pain they cause when applied.Uses urinal and bedpan.No condition changes noted.         Patient's most recent vital signs are:     Vital signs:  BP: 118/71  Temp: 96.3  HR: 93  RR: 18  SpO2: 100 %     Patient does not have new respiratory symptoms.  Patient does not have new sore throat.  Patient does not have a fever greater than 99.5.

## 2022-04-10 NOTE — PLAN OF CARE
Discharge Planner Post-Acute Rehab OT:     Discharge Plan: home w/ PCA assist with pt's extended family filling in opposite PCA, home OT.  Versus SNF.     Recert due: 4/18    Precautions: falls, needs bariatric equip including FWW (pt is 6ft 6in, 465#), Pt allowed to leave ROOM for therapy (re: covid vacination status)    Current Status:  ADLs:    Mobility: rolling cga to min A and bedrail, supine to sit w/ min A of 2 and bedrail/HOB elevated 30 deg, STS w/ min A of 1-2 w/ ht of bed elevated and use of bariatric FWW.  As of 4/7/22 notes over the past week, pt is assist of 2 for rolling in bed and cleaning up after incontinence.  Pt is requiring assist of 2-3 to stand or resorting to the mechanical lift.     Grooming: set up    Dressing: uB drg w/ gown change w/ set up, LB pants/shorts declined, feet w/ total assist    Bathing: ub set up for chest/underarms and face - pt will help lift UE's and requires assist to cleanse abdominal folds, LB dep w/ sponge bath    Toileting: bed pan dep w/ placement and dep w/ pericares.  Mechanical lift to/from commode, at times able to stand for portion of cares but recently a minimum of max A of 2 to stand.  IADLs: PTA pt and wife both performed, wife works full time but mostly from home  Vision/Cognition: WFL for basic cog, pt reports no glasses except occasionally used reading glasses    Assessment:  Pt in room where 2 mechanical lifts can be used to safely transfer pt between bed and w/c or commode or recliner.  He transferred to the w/c today.  Needed A of 3 for STS.  He is still not back to his baseline of when he came to TCU unit.  Good effort on pt's part during STS but weakness persists. Con't OT for STS transfers, UE/core ex and ADLs.    Other Barriers to Discharge (DME, Family Training, etc): 5 steps to get into house (has ramp but prefers to use stairs), 15 steps in house to get to bedroom/full bathroom, wife works full time but mostly from home, PTA used SEC , main level  bathroom half bath w/ taller toilet/grab bar, full bathroom upstairs std ht toilet w/ grab bar on wall, tub/shower combo w/ grab bars but does not have bench/shower chair, pt reports being house hold ambulator but used w/c at clinic for appts (does NOT own w/c, states he as 4WW in garage donated to him but not sure it is the right size)     4/7/22 Current AE list is bariatric items, including but not limited to, a hospital bed, w/c, mechanical lift and a bed pan.  OT plans to provide information re: a bariatric commode.     Th provided pt with picture reference handout to consider bariatric shower bench for home.

## 2022-04-10 NOTE — PLAN OF CARE
Problem: Plan of Care - These are the overarching goals to be used throughout the patient stay.    Goal: Absence of Hospital-Acquired Illness or Injury  Outcome: Ongoing, Progressing  Goal: Optimal Comfort and Wellbeing  Outcome: Ongoing, Progressing  Goal: Readiness for Transition of Care  Outcome: Ongoing, Progressing     Problem: Individualization  Goal: Patient Preferences  Outcome: Ongoing, Progressing     Problem: TCU Patient Goals  Goal: TCU Plan of Care  Outcome: Ongoing, Progressing  Flowsheets (Taken 4/10/2022 0744)  Bowel: continent  Bladder: continent  Bath days:   Mon Thurs  Patient is on a psychotropic medication, compazine and/or reglan: no  Patient is on an anti-anxiety and /or anti-depressant medication: no  Patient is on a hypnotic and/or sleep aid medication: no  Teaching Goals:   lines   pressure ulcer prevention   DVT   medication management   falls   wound care  Skin Integrity:   tissue tolerance assessment completed   repositioning   specialty mattress  Antibiotics: no     Problem: Goal Outcome Summary  Goal: Goal Outcome Summary  Outcome: Ongoing, Progressing   Goal Outcome Evaluation:     VS: Stable   O2: Cpap at night   Output: Good urinary output   Last BM: 4/8/2022   Activity: As tolerated   Skin: Wound dressing clean and intact   Pain: Patient denies any s/s of pain   CMS: Patient denies   Dressing: Clean and intact   Diet: Regular diet   LDA: Left wrist peripheral   Equipment:    Plan:    Additional Info:             Patient's most recent vital signs are:     Vital signs:  BP: 113/64  Temp: 97.4  HR: 96  RR: 18  SpO2: 100 %     Patient does not have new respiratory symptoms.  Patient does not have new sore throat.  Patient does not have a fever greater than 99.5.

## 2022-04-10 NOTE — PROGRESS NOTES
[facility]    Medicine Progress Note - Hospitalist Service    Date of Admission:  3/18/2022    Assessment & Plan        Panda Miranda is a 61 y/o gentleman who has multiple medical problems including chronic HFrEF (EF 45-50% on 1/29/22), small perimembranous VSD w/ left to right shunt, HTN, CKD4 w/ baseline cr ~ 1.8 - 2.1, chronic atrial fibrillation, gout, pseudogout, morbid obesity s/p bariatric surgery and numerous other medical conditions.  He was hospitalized at [facility] from 1/27 - 1/30/22 for sepsis 2/2 complicated UTI vs. pyelonephritis.  He was hospitalized at [facility] from 2/11 - 2/20/22 for hyperkalemia, SHANTE and acute gout.  He was then hospitalized at [facility] 2/20 - 3/18/22 with weakness.  Pt was unable to walk up the steps in his home.  He was later found to have recurrent hyperkalemia.  During [facility] hospital stay, patient was also treated for acute polyarticular gout.  He completed prednisone taper during [facility] hospital stay.  He was also transitioned from uloric to allopurinol.  He had pancytopenia during [facility] hospital stay that resolved prior to discharge.  Transferred to [facility] on 3/18/22.       Generalized Weakness and Debility   ---   Pt remained severely deconditioned  ---   Continue PT/OT     HFrEF w/ acute exacerbation, EF 45-50% per TTE on 01/29/22   Small perimembranous VSD with left to right shunting  HTN  ---   Not a candidate for ACE-I or ARB due to Hyperkalemia in the context of CKD4  ---   Continue Metoprolol XL 25 mg daily  ---   Not on diuretics chronically  ---   CXR on 4/4/22 revealed pulm edema  ---   On exam, pt has 2+ LE edema and anasarca  ---   He barone not have PIV  ---   Wt was up and pt was more edematous   ---   Treated w/ Bumex 1 mg po x one dose on 4/7  ---   On Bumex 1 mg iv bid since 4/8/22  ---   Wt is down and I/O is negative 6.1 liters (negative 2.5 liters past 2 days)  ---   Cr is 2.2 and K is 4.2 today  ---   Check BMP in am     CKD4 w/  baseline cr ~ 1.8 - 2.1    Chronic metabolic acidosis related to CKD4  Hyperkalemia (related to previous SHANTE/CKD4 and ACE-I)  History of albuminuria   ---   Sees Dr. Omega Coreas of [facility]    ---   Continue renal siet  ---   Continue NaHCO3 1,300 mg PO TID  ---   Follow up with nephrology after discharge    ---   K is back to normal range  ---   Cr is 2.2 today     Paroxysmal Atria Fibrillation   ---   On Warfarin for stroke prophylaxis, pharmacy dosing  ---   INR 3.01 today  ---   Metoprolol XL for rate control.      TASHA vs. OHS   ---   Continue CPAP at night      ACD  ---   Hgb is stable at 8-10 g.     Gout Arthritis with recent Flares   ---   On colchicine 0.6 mg po daily  ---   On Maintenance Allopurinol      Morbid Obesity w/ BMI  61.83 Kg/m2  ---   S/p gastroplasty vertical banded surgery in 1996  ---   Wt loss recommended    Hx of colon cancer   ---   S/p hemicolectomy in 2011    Chronic B/L LE edema and venous stasis ulcers   ---   B/l LE arterial US w/ doppler 2/3/22  ---   sara 1.48 RLE and 1.44 LLE, may be falsely elevated due to arterial wall calcification  ---   B/L LE duplex demonstrates normal waveforms through both feet without significant stenosis or occlusion.  ---   Lymphedema treatment per therapy      Chronic pain syndrome  ---   Continue Fentanyl patch, tylenol, oxycodone and robaxin    Universal COVID-19 infection  ---   Tested positive for COVID-19 on 1/27/22  ---   Considered COVID-19 recovered.  ---   Received vaccination, second dose was 2/20/22, needs  booster       Diet: 3 Gram K Diet    DVT Prophylaxis: Warfarin  Cheema Catheter: Not present  Fluids: N/A  Central Lines: None  Cardiac Monitoring: None  Code Status: Full Code    Disposition Plan   Expected Discharge:      Anticipated discharge location: home with family or SNF         Adeline Rodriguez MD.   Hospitalist.  [phone], pager.  Hospitalist Service  [facility]  Securely message with the Vocera Web Console (learn  more here) Text page via [facility] Paging/Directory     ______________________________________________________    Interval History    No complaints.  SOB is better.  He says his UOP has increased since he was initiated on Bumex.      Data reviewed today: I reviewed all medications, new labs and imaging results over the last 24 hours.    Physical Exam   Vital Signs: Temp: (!) 96.3  F (35.7  C) Temp src: Axillary BP: 118/71 Pulse: 93   Resp: 18 SpO2: 100 % O2 Device: None (Room air)    Weight: 535 lbs 0 oz   General: Severe morbid obesity, aao x 3, NAD.  HEENT:  NC/AT, neck supple, no thyromegaly, op clear, mmm.  CVS:  NL s 1 and s2, no m/r/g.  Lungs:  CTA B/L anteriorly   Abd:  Soft, + bs, NT, no rebound or gaurding, no fluid shift.  Ext:  No c/c.  Lymph:  2+ edema.  Neuro:  Nonfocal.  Musculoskeletal: No calf tenderness to palpation.    Skin:  Chronic venous stasis dermatitis skin change w/ wound  Psychiatry:  Mood and affect appropriate.      Data   Recent Labs   Lab 04/10/22  1326 04/10/22  0845 04/09/22  0629 04/08/22  0701 04/07/22  0647 04/05/22  1155 04/04/22  0740   WBC  --   --   --   --   --   --  5.6   HGB  --   --   --   --   --   --  9.6*   MCV  --   --   --   --   --   --  87   PLT  --   --   --   --   --   --  155   INR  --  2.97* 3.45* 3.42*   < >  --  2.84*     --   --  140  --  140 140   POTASSIUM 4.2  --   --  4.4  --  4.5 4.8   CHLORIDE 108  --   --  110*  --  107 111*   CO2 28  --   --  23  --  21 22   BUN 26  --   --  25  --  25 23   CR 2.20*  --   --  2.15*  --  2.15* 1.97*   ANIONGAP 5  --   --  7  --  12 7   JOHN 8.4*  --   --  9.0  --  9.3 9.0   *  --   --  103*  --  112* 97    < > = values in this interval not displayed.     No results found for this or any previous visit (from the past 24 hour(s)).

## 2022-04-11 NOTE — PROGRESS NOTES
[facility]    Medicine Progress Note - Hospitalist Service    Date of Admission:  3/18/2022    Assessment & Plan        Panda Miranda is a 61 y/o gentleman who has multiple medical problems including chronic HFrEF (EF 45-50% on 1/29/22), small perimembranous VSD w/ left to right shunt, HTN, CKD4 w/ baseline cr ~ 1.8 - 2.1, chronic atrial fibrillation, gout, pseudogout, morbid obesity s/p bariatric surgery and numerous other medical conditions.  He was hospitalized at [facility] from 1/27 - 1/30/22 for sepsis 2/2 complicated UTI vs. pyelonephritis.  He was hospitalized at [facility] from 2/11 - 2/20/22 for hyperkalemia, SHANTE and acute gout.  He was then hospitalized at [facility] 2/20 - 3/18/22 with weakness.  Pt was unable to walk up the steps in his home.  He was later found to have recurrent hyperkalemia.  During [facility] hospital stay, patient was also treated for acute polyarticular gout.  He completed prednisone taper during [facility] hospital stay.  He was also transitioned from uloric to allopurinol.  He had pancytopenia during [facility] hospital stay that resolved prior to discharge.  Transferred to [place] [facility] on 3/18/22.       Generalized weakness and debility   ---   Pt remained severely deconditioned  ---   Continue PT/OT     HFrEF w/ acute exacerbation, EF 45-50% per TTE on 01/29/22   Small perimembranous VSD with left to right shunting  HTN  ---   Not a candidate for ACE-I or ARB due to Hyperkalemia in the context of CKD4  ---   Continue Metoprolol XL 25 mg daily  ---   Not on diuretics chronically  ---   CXR on 4/4/22 revealed pulm edema  ---   On exam, pt has 2+ LE edema and anasarca  ---   Wt is up and pt is more edematous   ---   Treated him w/ Bumex 1 mg po x one dose on 4/7/22  ---   On Bumex 1 mg iv bid since 4/8/22  ---   Pt is drinking too much water  ---   Restrict free water intake to 1.2  ---   Cr is 2.15 and K is 4.0 today  ---   Continue Bumex 1 mg IV bid  ---   Check BMP in  am     CKD4 w/ baseline cr ~ 1.8 - 2.1    Chronic metabolic acidosis related to CKD4  Hyperkalemia (related to previous SHANTE/CKD4 and ACE-I)  History of albuminuria   ---   Sees [facility] Omega Coreas of [facility]    ---   Continue renal siet  ---   Continue NaHCO3 1,300 mg PO TID  ---   Follow up with nephrology after discharge    ---   K is 4.0 today  ---   Cr is 2.15 today  ---   Check BMP in am     Paroxysmal Atria Fibrillation   ---   On Warfarin for stroke prophylaxis, pharmacy dosing  ---   INR 2.65 today  ---   Metoprolol XL for rate control.      TASHA vs. OHS   ---   Continue CPAP at night      ACD  ---   Hgb is stable at 8-10 g.     Gout Arthritis with recent Flares   ---   On colchicine 0.6 mg po daily  ---   On Maintenance Allopurinol      Morbid Obesity w/ BMI  61.83 Kg/m2  ---   S/p gastroplasty vertical banded surgery in 1996  ---   Wt loss recommended    Hx of colon cancer   ---   S/p hemicolectomy in 2011    Chronic B/L LE edema and venous stasis ulcers   ---   B/l LE arterial US w/ doppler 2/3/22  ---   sara 1.48 RLE and 1.44 LLE, may be falsely elevated due to arterial wall calcification  ---   B/L LE duplex demonstrates normal waveforms through both feet without significant stenosis or occlusion.  ---   Lymphedema treatment per PT     Chronic pain syndrome  ---   Continue Fentanyl patch, tylenol, oxycodone and robaxin    Universal COVID-19 infection  ---   Tested positive for COVID-19 on 1/27/22  ---   Considered COVID-19 recovered.  ---   Received vaccination, second dose was 2/20/22, needs  booster       Diet: 3 Gram K Diet    DVT Prophylaxis: Warfarin  Cheema Catheter: Not present  Fluids: N/A  Central Lines: None  Cardiac Monitoring: None  Code Status: Full Code    Disposition Plan   Expected Discharge:      Anticipated discharge location: home with family or SNF         Adeline Rodriguez MD.   Hospitalist.  [phone], pager.  Hospitalist Service  [facility]  Securely message with the  [employer] Web Console (learn more here) Text page via [facility] Paging/Directory     ______________________________________________________    Interval History    No complaints.  SOB is better.  UOP has increased since he was initiated on IV Bumex.  However he says he has been drinking lots of water.    Data reviewed today: I reviewed all medications, new labs and imaging results over the last 24 hours.    Physical Exam   Vital Signs: Temp: 97.4  F (36.3  C) Temp src: Oral BP: 122/71 Pulse: 77   Resp: 16 SpO2: 99 % O2 Device: BiPAP/CPAP    Weight: 535 lbs 0 oz   General: Severe morbid obesity, aao x 3, NAD.  HEENT:  NC/AT, neck supple, no thyromegaly, op clear, mmm.  CVS:  NL s 1 and s2, no m/r/g.  Lungs:  CTA B/L anteriorly   Abd:  Soft, + bs, NT, no rebound or gaurding, no fluid shift.  Ext:  No c/c.  Lymph:  2+ edema.  Neuro:  Nonfocal.  Musculoskeletal: No calf tenderness to palpation.    Skin:  Chronic venous stasis dermatitis skin change w/ wound  Psychiatry:  Mood and affect appropriate.      Data   Recent Labs   Lab 04/11/22  0718 04/10/22  1326 04/10/22  0845 04/09/22  0629 04/08/22  0701   WBC 5.1  --   --   --   --    HGB 10.4*  --   --   --   --    MCV 86  --   --   --   --      --   --   --   --    INR 2.65*  --  2.97* 3.45* 3.42*    141  --   --  140   POTASSIUM 4.0 4.2  --   --  4.4   CHLORIDE 107 108  --   --  110*   CO2 25 28  --   --  23   BUN 26 26  --   --  25   CR 2.15* 2.20*  --   --  2.15*   ANIONGAP 6 5  --   --  7   JOHN 8.9 8.4*  --   --  9.0   GLC 99 102*  --   --  103*     No results found for this or any previous visit (from the past 24 hour(s)).

## 2022-04-11 NOTE — PLAN OF CARE
"Discharge Planner Post-Acute Rehab PT:     Discharge Plan: Home with spouse, mod I    Precautions: fall risk    Current Status:  Bed Mobility: CGA - min A rolling, Mod A for supine to sit, Max A for legs sit to supine  Transfer: A x2 sit to stand from bed, raise bed up to stand, lower bed to sit. Max A x1 with pt pulling on // bars from W/c x5, max A x 3 for standing from w/c pivot to recliner  Gait:short amb in // bars down and back with SBA - limited by SOB weakness and fatigue. Pt was able to amb with Front WW ~ 20' from bed to w/c in hallway, pt currently only ambulating in // bars  Stairs: NT  Balance: sits EOB demonstrates good balance ~20 min with intermittent UE support. Requires heavy UE support on walker with standing balance    Assessment: Pt performs leg press from w/c which is fatiguing for legs. Unable to stand this date, requires max A x 3 to get him up enough to push w/c fully under him so he doesn't slip out, pt pulls up on // bars. Max A x 3 using liko lift from w/c to recliner. Pt less short of breath with activity than he was 7 days ago.    Patient should wear shoes when transferring to avoid slipping. Updated whiteboard.    Other Barriers to Discharge (DME, Family Training, etc):   - Bariatric FWW - in 4th fl rehab gym  - w/c - Needs 28-30\" (30 ideal for pt). Need to confirm doorway width, spouse was asked to measure  - Stairs - Spouse reports he cannot come home until he can complete 16 stairs to bedroom  - Hospital bed - If spouse allows main level living    "

## 2022-04-11 NOTE — PLAN OF CARE
Goal Outcome Evaluation:    Patient in stable condition. AAO X 4. Verbalized needs and follow commands. Respiration even and unlabored. Medications well tolerated. Denies pain at this time. Safety measures maintained. Personal items and call light with in reach.        Patient's most recent vital signs are:     Vital signs:  BP: 119/73  Temp: 96.8  HR: 98  RR: 18  SpO2: 99 %     Patient does not have new respiratory symptoms.  Patient does not have new sore throat.  Patient does not have a fever greater than 99.5.

## 2022-04-11 NOTE — PROGRESS NOTES
MDS Pain Assessment    The following is the pain interview as conducted by the TCU RN caring for the patient on April / 07 / 2022. This assessment is required by the [facility] for all patients in [place] SNF (Skilled Nursing Facilities).       1. Have you had pain or hurting at any time in the last 5 days? Yes    2. How much of the time have you experienced pain or hurting over the last 5 days? frequently    3. Over the past 5 days, has pain made it hard for you to sleep at night? Yes    4. Over the past 5 days, have you limited your day-to-day activities because of pain? No    5. Pain intensity (Numeric scale used first-if patient unable to answer, verbal scale to be used.)    Numeric Scale: Please rate your worst pain over the last 5 days on a zero to ten scale, with zero being no pain and ten being the worst pain you can imagine.     9    Verbal Scale: USED ONLY if unable to give numeric, otherwise N/A  Please rate the intensity of your worst pain over the last 5 days.     moderate-severe     Jyoti Pearson RN, BSN  MDS Quality and

## 2022-04-11 NOTE — PLAN OF CARE
Patient alert and oriented x 4. Voices pain at level 2. Refuses PRN pain pill when offered. Fentanyl patch to right chest. Lymph wraps BLE. Dressing intact to left lower leg, right heel, and right shin. Bilateral legs swelling decreased significantly. Uses urinal and bedpan for continence. Assist of 1-2 with ADL care. Lift for transfers or use of walker if patient doesn't have BLE weakness.         Patient's most recent vital signs are:     Vital signs:  BP: 122/71  Temp: 97.4  HR: 77  RR: 16  SpO2: 99 %     Patient does not have new respiratory symptoms.  Patient does not have new sore throat.  Patient does not have a fever greater than 99.5.

## 2022-04-11 NOTE — PLAN OF CARE
Discharge Planner Post-Acute Rehab OT:     Discharge Plan: home w/ PCA assist with pt's extended family filling in opposite PCA, home OT.  Versus SNF.     Recert due: 4/18    Precautions: falls, needs bariatric equip including FWW (pt is 6ft 6in, 465#), Pt allowed to leave ROOM for therapy (re: covid vacination status)    Current Status:  ADLs:    Mobility: rolling cga to min A and bedrail, supine to sit w/ min A of 2 and bedrail/HOB elevated 30 deg, STS w/ min A of 1-2 w/ ht of bed elevated and use of bariatric FWW.  As of 4/7/22 notes over the past week, pt is assist of 2 for rolling in bed and cleaning up after incontinence.  Pt is requiring assist of 2-3 to stand or resorting to the mechanical lift.     Grooming: set up    Dressing: uB drg w/ gown change w/ set up, LB pants/shorts declined, feet w/ total assist    Bathing: ub set up for chest/underarms and face - pt will help lift UE's and requires assist to cleanse abdominal folds, LB dep w/ sponge bath    Toileting: bed pan dep w/ placement and dep w/ pericares.  Mechanical lift to/from commode, at times able to stand for portion of cares but recently a minimum of max A of 2 to stand.  IADLs: PTA pt and wife both performed, wife works full time but mostly from home  Vision/Cognition: WFL for basic cog, pt reports no glasses except occasionally used reading glasses    Assessment: Upon arrival, pt requesting shower. Due to time restraint and level of assist, unable to complete during 45 minute session. Pt provided setup for UB sponge bathing from bed. Pt agreeable to transfer from bed > w/c for UB strengthening. While rolling to place universal sling, writer noted bed sheets and Chux x3 were completely soiled with urine. Writer educated pt on concern for skin breakdown and pt needs to use call light for linen change when bed is soiled. Pt lacks insight to importance of this, commenting on smell of urine without verbalizing understanding and agreement to call  when soiled. Pt's incontinence and hygiene limits OT sessions d/t cleanup. Pt continues to be Ax2 with transfers and cleanup. Con't OT for STS transfers, UE/core ex and ADLs.    Other Barriers to Discharge (DME, Family Training, etc): 5 steps to get into house (has ramp but prefers to use stairs), 15 steps in house to get to bedroom/full bathroom, wife works full time but mostly from home, PTA used SEC , main level bathroom half bath w/ taller toilet/grab bar, full bathroom upstairs std ht toilet w/ grab bar on wall, tub/shower combo w/ grab bars but does not have bench/shower chair, pt reports being house hold ambulator but used w/c at clinic for appts (does NOT own w/c, states he as 4WW in garage donated to him but not sure it is the right size)     4/7/22 Current AE list is bariatric items, including but not limited to, a hospital bed, w/c, mechanical lift and a bed pan.  OT plans to provide information re: a bariatric commode.     Th provided pt with picture reference handout to consider bariatric shower bench for home.

## 2022-04-12 NOTE — PLAN OF CARE
"Discharge Planner Post-Acute Rehab PT:     Discharge Plan: Home with spouse, mod I    Precautions: fall risk    Current Status:  Bed Mobility: CGA - min A rolling, Mod A for supine to sit, Max A for legs sit to supine  Transfer: A x2 sit to stand from bed, raise bed up to stand, lower bed to sit. Max A x1 with pt pulling on // bars from W/c x5, max A x 3 for standing from w/c pivot to recliner  Gait:short amb in // bars down and back with SBA - limited by SOB weakness and fatigue. Pt was able to amb with Front WW ~ 20' from bed to w/c in hallway, pt currently only ambulating in // bars  Stairs: NT  Balance: sits EOB demonstrates good balance ~20 min with intermittent UE support. Requires heavy UE support on walker with standing balance    Assessment: Attempted to use lift pants to boost pt to stand so he could step on scale and be weighed. Pt lifted with [employer] lift, A x 3 for plaement of lift pants in chair. With lifting pt with lift pants on, he complains of severe pain from the lift pants pulling up between legs. .Pt repositioned in chair, attempts made to pad lift pants and reposition slightly. Second attempt with lift pants secured on shorter length so there is room to lift him higher, so he can place feet and get boost from pants. Pants are too painful. Attempt to stand with max A x 3 to front WW to step on scale and remove lift pants. Pt unable to lift feet up once standing onto scale (<2\"). Could try stranding from edge of bed for improved height advantage.    Patient should wear shoes when transferring to avoid slipping. Updated whiteboard.    Other Barriers to Discharge (DME, Family Training, etc):   - Bariatric FWW - in 4th fl rehab gym  - w/c - Needs 28-30\" (30 ideal for pt). Need to confirm doorway width, spouse was asked to measure  - Stairs - Spouse reports he cannot come home until he can complete 16 stairs to bedroom  - Hospital bed - If spouse allows main level living    "

## 2022-04-12 NOTE — PLAN OF CARE
Goal Outcome Evaluation:    Patient stable with no acute distress noted. Denies pain or discomfort. All needs met, safety measures maintained. Personal items and call light in reach.        Patient's most recent vital signs are:     Vital signs:  BP: 123/88  Temp: 97.1  HR: 84  RR: 18  SpO2: 100 %     Patient does not have new respiratory symptoms.  Patient does not have new sore throat.  Patient does not have a fever greater than 99.5.

## 2022-04-12 NOTE — PLAN OF CARE
Discharge Planner Post-Acute Rehab OT:     Discharge Plan: home w/ PCA assist with pt's extended family filling in opposite PCA, home OT.  Versus SNF.     Recert due: 4/18    Precautions: falls, needs bariatric equip including FWW (pt is 6ft 6in, 465#), Pt allowed to leave ROOM for therapy (re: covid vacination status)    Current Status:  ADLs:    Mobility: rolling cga to min A and bedrail, supine to sit w/ min A of 2 and bedrail/HOB elevated 30 deg, STS w/ min A of 1-2 w/ ht of bed elevated and use of bariatric FWW.  As of 4/7/22 notes over the past week, pt is assist of 2 for rolling in bed and cleaning up after incontinence.  Pt is requiring assist of 2-3 to stand or resorting to the mechanical lift.     Grooming: set up    Dressing: uB drg w/ gown change w/ set up, LB pants/shorts declined, feet w/ total assist    Bathing: ub set up for chest/underarms and face - pt will help lift UE's and requires assist to cleanse abdominal folds, LB dep w/ sponge bath    Toileting: bed pan dep w/ placement and dep w/ pericares.  Mechanical lift to/from commode, at times able to stand for portion of cares but recently a minimum of max A of 2 to stand.  IADLs: PTA pt and wife both performed, wife works full time but mostly from home  Vision/Cognition: WFL for basic cog, pt reports no glasses except occasionally used reading glasses    Assessment: Goal of session to transfer to w/c and attempt STS. Pt has not had recent weight which has made it difficult to assess equipment needs for progressing transfers/mobility. Ax2 for use of universal liko lift to raise pt from bed to zero it out. Trialed x2 although bed weight appears inaccurate (306# and 286#). Completed lift transfer to w/c to attempt STS with scale from w/c level. Skilled setup for transfer. Max Ax2 with pet setup with bedrail on R side and WW placed in front of pt. Two initial attempts unsuccessful. Third attempt successful although unable to sustain standing d/t  "increased pain, SOB, and extremely effortful. Writer did not feel it would be safe to continue to trial STS and stepping onto scale (1\" step). Pt SOB, although not as much as previous week. Pt BLE edema does appear to be improving. Pt continues to be Ax2 with transfers and cleanup. Con't OT for STS transfers, UE/core ex and ADLs.    Other Barriers to Discharge (DME, Family Training, etc): 5 steps to get into house (has ramp but prefers to use stairs), 15 steps in house to get to bedroom/full bathroom, wife works full time but mostly from home, PTA used SEC , main level bathroom half bath w/ taller toilet/grab bar, full bathroom upstairs std ht toilet w/ grab bar on wall, tub/shower combo w/ grab bars but does not have bench/shower chair, pt reports being house hold ambulator but used w/c at clinic for appts (does NOT own w/c, states he as 4WW in garage donated to him but not sure it is the right size)     4/7/22 Current AE list is bariatric items, including but not limited to, a hospital bed, w/c, mechanical lift and a bed pan. OT plans to provide information re: a bariatric commode.     Th provided pt with picture reference handout to consider bariatric shower bench for home.    "

## 2022-04-12 NOTE — PROGRESS NOTES
[facility] Transitional Care    Brief Note    Panda Miranda is a 66 year old male who was admitted on 3/18/2022.     I have reviewed the chart and nursing notes.   I have reviewed the plan laid out.   Patient seems to be stable and doing good. No new issues noted.   Continue current treatment plan as laid out before.   Full note to follow in coming days.      David Donovan MD  Text Page  (7am - 5pm, M-F)    Temp: 97.1  F (36.2  C) Temp src: Oral BP: 123/88 Pulse: 84   Resp: 18 SpO2: 100 % O2 Device: None (Room air)    Vitals:    04/04/22 1100   Weight: (!) 242.7 kg (535 lb)     Vital Signs with Ranges  Temp:  [97.1  F (36.2  C)] 97.1  F (36.2  C)  Pulse:  [84] 84  Resp:  [18] 18  BP: (123)/(88) 123/88  SpO2:  [100 %] 100 %  I/O last 3 completed shifts:  In: 400 [P.O.:400]  Out: 1100 [Urine:1100]    Medications     - MEDICATION INSTRUCTIONS -       - MEDICATION INSTRUCTIONS -       Warfarin Therapy Reminder         allopurinol  300 mg Oral Daily     bumetanide  1 mg Intravenous Q12H     colchicine  0.6 mg Oral Daily     fentaNYL  25 mcg Transdermal Q72H     fentaNYL   Transdermal Q8H     magnesium chloride  535 mg Oral Daily     magnesium sulfate  2 g Intravenous Once     metoprolol succinate ER  25 mg Oral Daily     miconazole   Topical BID     sodium bicarbonate  1,300 mg Oral TID     warfarin ANTICOAGULANT  4 mg Oral ONCE at 18:00       Data   Recent Labs   Lab 04/12/22  0610 04/11/22  0718 04/10/22  1326 04/10/22  0845   WBC  --  5.1  --   --    HGB  --  10.4*  --   --    MCV  --  86  --   --    PLT  --  164  --   --    INR 2.75* 2.65*  --  2.97*    138 141  --    POTASSIUM 3.8 4.0 4.2  --    CHLORIDE 107 107 108  --    CO2 26 25 28  --    BUN 25 26 26  --    CR 2.13* 2.15* 2.20*  --    ANIONGAP 8 6 5  --    JOHN 9.1 8.9 8.4*  --    * 99 102*  --        No results found for this or any previous visit (from the past 24 hour(s)).

## 2022-04-12 NOTE — PLAN OF CARE
Patient is alert and oriented x 4. Denies pain. Fentanyl patch to right upper chest. Patient denies SOB, chest pain, and numbness, tingling in BLE. Lymph wraps to BLE. Swelling decrease in BLE extremities.Right foot edema +2. BLE elevated on pillows. Urinal in reach of patient. Continent of Bowel with use of bedpan. Refuses rooke boots.         Patient's most recent vital signs are:     Vital signs:  BP: 123/88  Temp: 97.1  HR: 84  RR: 18  SpO2: 100 %     Patient does not have new respiratory symptoms.  Patient does not have new sore throat.  Patient does not have a fever greater than 99.5.

## 2022-04-12 NOTE — PROGRESS NOTES
SPIRITUAL HEALTH SERVICES  SPIRITUAL ASSESSMENT Progress Note  [facility] ([facility] TCU R 416 4/12/22      REFERRAL SOURCE: Follow Up Visit    Met with Pt to see if he needs any spiritual assistance such as prayer. Pt discussed his  daily process and wishes to be able to sit in the chair and leave the room. Affirmed Pt's feelings and provided encouragement and prayer.    PLAN: Pt has support from family and is connected spiritually.    Guido Kim MA, MPA  Associate   Pager: [phone]

## 2022-04-13 NOTE — PROGRESS NOTES
Report given to oncoming nurse. Reported patient dressing needed to be change. After patient bed bath this evening.

## 2022-04-13 NOTE — PLAN OF CARE
Problem: Plan of Care - These are the overarching goals to be used throughout the patient stay.    Goal: Absence of Hospital-Acquired Illness or Injury  Outcome: Ongoing, Progressing  Intervention: Identify and Manage Fall Risk  Recent Flowsheet Documentation  Taken 4/13/2022 0200 by Patricia Wesley RN  Safety Promotion/Fall Prevention:   activity supervised   assistive device/personal items within reach   clutter free environment maintained   mobility aid in reach   nonskid shoes/slippers when out of bed  Intervention: Prevent Skin Injury  Recent Flowsheet Documentation  Taken 4/13/2022 0200 by Patricia Wesley RN  Body Position: position changed independently  Skin Protection: adhesive use limited  Intervention: Prevent and Manage VTE (Venous Thromboembolism) Risk  Recent Flowsheet Documentation  Taken 4/13/2022 0200 by Patricia Wesley RN  VTE Prevention/Management: other (see comments)  Activity Management:   activity adjusted per tolerance   ambulated in room  Intervention: Prevent Infection  Recent Flowsheet Documentation  Taken 4/13/2022 0200 by Patricia Wesley RN  Infection Prevention: hand hygiene promoted  Goal: Optimal Comfort and Wellbeing  Outcome: Ongoing, Progressing  Intervention: Provide Person-Centered Care  Recent Flowsheet Documentation  Taken 4/13/2022 0200 by Patricia Wesley RN  Trust Relationship/Rapport: care explained     Problem: TCU Patient Goals  Goal: TCU Plan of Care  Outcome: Ongoing, Progressing  Flowsheets (Taken 4/13/2022 0259)  Bowel: continent  Bladder: continent  Bath days:   Mon Thurs  Patient is on a psychotropic medication, compazine and/or reglan: no  Patient is on an anti-anxiety and /or anti-depressant medication: no  Patient is on a hypnotic and/or sleep aid medication: no  Teaching Goals:   DVT   falls   lines   medication management   wound care   pressure ulcer prevention  Skin Integrity:   tissue tolerance assessment completed   repositioning   activity promotion    specialty mattress  Antibiotics: no     Problem: Goal Outcome Summary  Goal: Goal Outcome Summary  Outcome: Ongoing, Progressing   Goal Outcome Evaluation:     Patient turn and repositioning. Denies SOB, or pain. Bilateral lower extremities wound care provided as per ordered. Safety measures maintained. Call light and personal items within reach. Will continue to monitor.        Patient's most recent vital signs are:     Vital signs:  BP: 96/55  Temp: 97.7  HR: 86  RR: 18  SpO2: 100 %     Patient does not have new respiratory symptoms.  Patient does not have new sore throat.  Patient does not have a fever greater than 99.5.

## 2022-04-13 NOTE — PROGRESS NOTES
"SW consulted with TESSA, Dustin Mcgowan, re: pt's qualifications for MA. Dustin had previously recommnded that pt consult with Elder Law , which pt declined at time of that conversation. Dustin emailed SW a list of Elder Care Law  information, per SW's request, as pt is more receptive to SNF and MA at this time. Dustin instructed to inquire first the  offers a free consultation, and reiterated: \"The idea of speaking with an  can sound unnecessary or like a lost cause to some patients, so I like to note to patients that they aren t speaking to an  to argue against any sort of law/rule. Instead, they are speaking to a specific type of  that specializes in navigating these specific MA-LTC qualification issues to see if there is a solution. It s about speaking to an expert as opposed to trying to appeal a process.\"  SW met with pt at bedside, conveyed above message to pt and provided list of Elder Law attorneys. Pt agreeable to consulting with an . SW asked pt if it would be helpful for SW to be present during consultation. Pt stated, \"yes it would.\" Pt agreed to contact  on the list, inquire about a free consultation, set-up a consultation time, and then notify SW of said time.        REFERRAL STATUS     PENDING  [facility]   ([facility])  [address] [place] [address]  Admissions: [phone] or [phone]  Fax: [phone]  4/13: Referral faxed.      [facility] [address] [address] [phone]  4/5: Referral sent via epic  4/6: SW left VM for Kaye and Jayson in Admissions, requesting call back to discuss referral status.  4/13: SW left VM for Admissions, requested a callback to discuss referral status.         DECLINED  [facility]  [address] [place] [address] [phone];  Admissions:  [phone]  4/6 - cannot " meet pt's bariatric needs.     [facility]  [address] [place]  [place] [address]  [phone]  4/6 - no bed availability     [facility]  [address]  [place], [place] [id number] [phone]  4/6 - no bariatric beds in LTC, no LTC beds at this time.     [facility]  PH: [phone]  F: [phone]  4/6 - cannot meet pt's bariatric needs and don't have a private room to accommodate his BiPap.     [facility]   [address] [place], [place] [id number] [phone]  4/6 - facility cannot meet pt's needs.     [facility]  PH: [phone]  F: [phone]  4/6: Globally declined - cannot meet pt's bariatric needs     [facility]  PH: [phone]  F: [phone]  4/7: Globally declined - cannot meet pt's bariatric needs.     [facility]  [address] [place], [place] [id number] [phone]  4/7 - declined notification through Epic, no reason given.          BRODIE Sandhu, LSW  [facility]   Pager: [phone]

## 2022-04-13 NOTE — PLAN OF CARE
"Discharge Planner Post-Acute Rehab PT:     Discharge Plan: Home with spouse, mod I    Precautions: fall risk    Current Status:  Bed Mobility: CGA - min A rolling, Mod A for supine to sit, Max A for legs sit to supine  Transfer: A x2 sit to stand from bed, raise bed up to stand, lower bed to sit. Max A x1 with pt pulling on // bars from W/c x5, max A x 3 for standing from w/c pivot to recliner  Gait:short amb in // bars down and back with SBA - limited by SOB weakness and fatigue. Pt was able to amb with Front WW ~ 20' from bed to w/c in hallway, pt currently only ambulating in // bars  Stairs: NT  Balance: sits EOB demonstrates good balance ~20 min with intermittent UE support. Requires heavy UE support on walker with standing balance    Assessment: This writer was asked by OT to see if there is a deeper seated wc available for pt for safer sitting , however, the [employer] website shows multiple widths available, but only 20 inch depth, which pt has currently. There was no tech help available during session, but pt did participate in LE strengthening and ex to increase circulation to help reduce edema in LEs.  Pt feels lower legs improved, but thighs and abdomen still with increased fluid.  WOC nurse noted that pt's L ankle wound was much better now, and pt was pleased.              Patient should wear shoes when transferring to avoid slipping. Updated whiteboard.    Other Barriers to Discharge (DME, Family Training, etc):   - Bariatric FWW - in 4th fl rehab gym  - w/c - Needs 28-30\" (30 ideal for pt). Need to confirm doorway width, spouse was asked to measure  - Stairs - Spouse reports he cannot come home until he can complete 16 stairs to bedroom  - Hospital bed - If spouse allows main level living    "

## 2022-04-13 NOTE — PROGRESS NOTES
WOC Nurse Inpatient Wound Assessment   Reason for consultation: Evaluate and treat  Left lateral shin wounds, R heel    Assessment  Left lateral Shin wounds due to Venous Ulcer  Status: healed 4/13    Bilateral upper shin wounds due to venous stasis and edema  Status: Right healing, left healed    Right lateral heel Pressure injury community acquired.   Pressure injury stage 2  Status: healing      Treatment Plan  Bilateral legs: Every other day    Spray all intact skin to BL lower extremities with Carmela cleanse and protect and rub in. Allow to soak for 10mins and then wipe away any excess.   Apply sween 24 (Pink top lotion) from base of toes to below knees. (Do NOT use critic aid -black top)  Apply Adaptic (mineral gauze) to any open areas  Cover with kerlix and tape.     Right lateral heel and left lateral lower leg every other day  Cleanse with wound cleanser   Dry and protect surrounding skin with no sting barrier film wipe #[id number] (this is very important)  Cover with silicone foam dressing Mepilex  4x4 #839013  Make sure heel is elevated off bed at all times.    Pressure Injury prevention (RN-please order supplies if not in room)  Turn every 2 hours, side to side avoid supine on bariatric pressure redistribution support surface  Float heels off bed with use of  Heel Lift boots (Prevalon #[id number])    Prevent sliding and shear by limiting HOB to 30 degrees or less unless contraindicated, use knee gatch first if not contraindicated  If sitting, use pressure redistribution chair cushion large(#[id number]); if unable to shift weight every hour, please limit sitting to an hour at a time.  Mepilex PRN,Change at least q 3 days, peel back, peek and replace for daily skin inspection.      Orders Revised  Recommended provider order: Lymphedema  WOC Nurse follow-up plan: Weekly  Nursing to notify the Provider(s) and re-consult the WOC Nurse if wound(s) deteriorates or new skin concern.    Patient History  According to provider  note(s):   66 year old male admitted through on 2/20/2022 after being discharged earlier from [facility] stay from 2/11 - 2/20 for acute gout flare and subsequent SHANTE and hyperkalemia likely caused by gout treatment. Patient is morbidly obese and he was discharged to his home with home health but he was unable to manage going up steps immediately upon arrival home.  He was brought in to [facility] for placement. In the ER he was seen by Dr Henning, vitals were normal.  Labs were reviewed from his recent discharge with Cr of 1.93 which was improved from his discharge and actually better than baseline.     3/15/2022 pending TCU for deconditioning      No Known Allergies  Objective Data  Containment of urine/stool: Incontinence Protocol as needed    Body mass index is 61.83 kg/m .     Active Diet Order  Orders Placed This Encounter      3 Gram K Diet      Intake/Output Summary (Last 24 hours) at 3/15/2022 1444  Last data filed at 3/15/2022 1030  Gross per 24 hour   Intake 260 ml   Output 600 ml   Net -340 ml       Pressure Injury Risk Assessment:   Sensory Perception: 4-->no impairment  Moisture: 3-->occasionally moist  Activity: 2-->chairfast  Mobility: 3-->slightly limited  Nutrition: 3-->adequate  Friction and Shear: 1-->problem  Sandoval Score: 16                          Labs:   Recent Labs   Lab 04/13/22  0528 04/12/22  0610 04/11/22  0718   HGB  --   --  10.4*   INR 2.96*   < > 2.65*   WBC  --   --  5.1    < > = values in this interval not displayed.       Physical Exam  Areas of skin assessed: Left Lateral lower leg, Right lateral foot and heel    Right and left lateral foot wounds healed on assessment 3/21    Wound Location:  Left Lateral lower leg  See Media tab for mor images      3/21      3/29    Wound Base: dermis, red, moist small areas of granulation tissue     Palpation of the wound bed: normal   resurfaced    Wound Location:  Right lateral heel        3/21     3/29        Wound History: present on  admit to TCU  Wound Base: % open dermis % fibrin     Palpation of the wound bed: moist     Drainage: none     Description of drainage: none     Measurements (length x width x depth, in cm) Irregularly shaped  0.9 x 2.3 x 0.2 cm       Tunneling N/A     Undermining N/A  Periwound skin: intact      Color: normal and consistent with surrounding tissue      Temperature: normal   Odor: none  Pain: denies ,     Wound Location:  Bilateral upper shins    3/21 Right upper shin    3/29 Right upper shin        Date of last photo 3/29/22  Wound History: present on admit. Pt has had fluctuating edema in LE causing weepy areas and skin to open.   Wound Base: 100 % dermis     Palpation of the wound bed: normal      Drainage: small     Description of drainage: serous     Measurements (length x width x depth, in cm)   Right: 0.2 x 0.2 x 0.1 cm  Left: healed  Periwound skin: hemosiderin staining and irritant dermatitis      Color: normal and consistent with surrounding tissue      Temperature: normal   Odor: none  Pain: denies , none      Interventions  Visual inspection and assessment completed   Wound Care Rationale Right heel and foot Protect and monitor as DTPI evolves. Left lateral leg selective debridement (autolysis) of nonviable tissue   Wound Care: done per plan of care  Supplies: at bedside  Current off-loading measures: Chair cushion and ordered PRN  Current support surface: Bariatric Low air loss mattress with extention  Education provided to: plan of care, wound progress, Off-loading pressure and effects of neuropathy  Discussed plan of care with Patient and Nurse    Interventions  Visual inspection and assessment completed   Wound Care Rationale Protect periwound skin, Promote moist wound healing without tissue dehydration , Gently fill dead space to prevent abscess formation  and Provide protection   Wound Care: done per plan of care  Supplies: at bedside  Current off-loading measures: pillows for positioning, HOB 30  degrees or less, heels floating off beds, bariatric low airloss support surfaces, chair cushion ordered PRN  Current support surface: Bariatric low air loss  Education provided to: importance of repositioning and plan of care  Discussed plan of care with Patient, Nurse and Physician     Michelle Meléndez RN BSN CWOCN    Pager [phone]

## 2022-04-13 NOTE — PLAN OF CARE
Discharge Planner Post-Acute Rehab OT:     Discharge Plan: home w/ PCA assist with pt's extended family filling in opposite PCA, home OT.  Versus SNF.     Recert due: 4/18    Precautions: falls, needs bariatric equip including FWW (pt is 6ft 6in, 465#), Pt allowed to leave ROOM for therapy (re: covid vacination status)    Current Status:  ADLs:    Mobility: rolling cga to min A and bedrail, supine to sit w/ min A of 2 and bedrail/HOB elevated 30 deg, STS w/ min A of 1-2 w/ ht of bed elevated and use of bariatric FWW.  As of 4/7/22 notes over the past week, pt is assist of 2 for rolling in bed and cleaning up after incontinence.  Pt is requiring assist of 2-3 to stand or resorting to the mechanical lift.     Grooming: set up    Dressing: uB drg w/ gown change w/ set up, LB pants/shorts declined, feet w/ total assist    Bathing: ub set up for chest/underarms and face - pt will help lift UE's and requires assist to cleanse abdominal folds, LB dep w/ sponge bath    Toileting: bed pan dep w/ placement and dep w/ pericares.  Mechanical lift to/from commode, at times able to stand for portion of cares but recently a minimum of max A of 2 to stand.  IADLs: PTA pt and wife both performed, wife works full time but mostly from home  Vision/Cognition: WFL for basic cog, pt reports no glasses except occasionally used reading glasses    Assessment: Goal of OT session was to work on bed mobility and functional activies from w/c, discussed pt's status and potential goals, pt attributes decline in fucntion to increased water retention, pt has visible changes in tissue since admit that appear to be increase in fluid retention, used double mech lifts w/ assist of 3 staff but unable to transfer pt to w/c ,different w/c in room (bariatric) but seat depth inadequate to safely accomodate pt's size, pt lacks appreciation for safety concerns but transfered back to bed w/double mechanical lifts and assist of 3. OT and PT discussed pt's  decline in funtion and attempted to problem solve approach to effective rehab w/ pt.  Plan to do OT/PT coTx  5days/week to utilized skills of both disciples to problem solve best approach for improving pt's adl and mobility function. Extend goal date.       Other Barriers to Discharge (DME, Family Training, etc): 5 steps to get into house (has ramp but prefers to use stairs), 15 steps in house to get to bedroom/full bathroom, wife works full time but mostly from home, PTA used SEC , main level bathroom half bath w/ taller toilet/grab bar, full bathroom upstairs std ht toilet w/ grab bar on wall, tub/shower combo w/ grab bars but does not have bench/shower chair, pt reports being house hold ambulator but used w/c at clinic for appts (does NOT own w/c, states he as 4WW in garage donated to him but not sure it is the right size)     4/7/22 Current AE list is bariatric items, including but not limited to, a hospital bed, w/c, mechanical lift and a bed pan. OT plans to provide information re: a bariatric commode.     Th provided pt with picture reference handout to consider bariatric shower bench for home.

## 2022-04-13 NOTE — PLAN OF CARE
Patient alert and oriented x 4. Voices generalized body pain +2. Patient offered PRN pain medication. Patient doesn't want PRN medication. Fentanyl patch changed this A.M. and placed on the left chest. Peripheral IV intact to the left forearm. BLE no edema. Lymph wraps and dressings intact to lower left ankle, right shin, and right ankle. No drainage noted. Patient has dry skin to chest and BUE.Skin moisturizer applied to areas. Patient denies SOB and chest pain. Call bell and personal items within patient reach.

## 2022-04-14 NOTE — PROGRESS NOTES
CLINICAL NUTRITION SERVICES - REASSESSMENT NOTE     Nutrition Prescription    RECOMMENDATIONS FOR MDs/PROVIDERS TO ORDER:  Consider sodium restriction     Malnutrition Status:    Patient does not meet two of the established criteria necessary for diagnosing malnutrition    Recommendations already ordered by Registered Dietitian (RD):  None additionally     Future/Additional Recommendations:  Monitor itnakes and wt trends  Additional diet educaiton as needed      EVALUATION OF THE PROGRESS TOWARD GOALS   Diet: 3 g Potassium, 1.2L fluid restriction   Intake: 100% of meals per flowsheet        NEW FINDINGS   Attempted to visit pt however he was out of the room. Information obtained from chart review. Pt continues to eat well per flowsheet. Noted significant wt gain of 77# over the past month. Per discussion on rounds pt with increasing edema, currently on IV bumex     04/13/22 245.8  Kg (542 lb)   04/04/22 242.7 kg (535 lb)   03/02/22 (!) 210.9 kg (465 lb)   02/20/22 (!) 198.8 kg (438 lb 3.2 oz)   02/10/22 (!) 196.4 kg (433 lb)   01/29/22 (!) 196.5 kg (433 lb 1.6 oz)   12/07/21 (!) 209.3 kg (461 lb 8 oz)   09/08/21 (!) 206.9 kg (456 lb 3.2 oz)   06/17/21 (!) 209.7 kg (462 lb 4.9 oz)   06/09/21 (!) 220 kg (485 lb)   06/01/21 (!) 220.1 kg (485 lb 3.2 oz)   05/17/21 (!) 219.2 kg (483 lb 3.2 oz)   Labs: Mg 1.5 (L)->receiving 535 mg magnesium sulfate daily      Per 4/5 WOC RN note:  Left lateral Shin wounds due to Venous Ulcer  Status: healing     Bilateral upper shin wounds due to venous stasis and edema  Status: Right healing, left healed     Right lateral heel Pressure injury community acquired.   Pressure injury stage 2  Status: healing  MALNUTRITION  % Intake: No decreased intake noted  % Weight Loss: None noted  Subcutaneous Fat Loss: Unable to assess today-4/4 RD Note-difficult to assess d/t body habitus   Muscle Loss: Unable to assess today-4/4 RD Note-difficult to assess d/t body habitus   Fluid Accumulation/Edema:  None per flowsheet however pt with increasing edema per team   Malnutrition Diagnosis: Patient does not meet two of the established criteria necessary for diagnosing malnutrition    Previous Goals   Patient to consume % of nutritionally adequate meal trays TID, or the equivalent with supplements/snacks.  Evaluation: Met     Previous Nutrition Diagnosis  Predicted inadequate nutrient intake (protein) related to menu fatigue r/t LOS and high protein requirements for wound healing.   Evaluation: No change    CURRENT NUTRITION DIAGNOSIS  Predicted inadequate nutrient intake (protein) related to menu fatigue r/t LOS and high protein requirements for wound healing.     INTERVENTIONS  Implementation  None additionally     Goals  Patient to consume % of nutritionally adequate meal trays TID, or the equivalent with supplements/snacks.    Monitoring/Evaluation  Progress toward goals will be monitored and evaluated per protocol.    Mari Herndon MS, RD, LDN  Unit Pager [phone]

## 2022-04-14 NOTE — PLAN OF CARE
Discharge Planner Post-Acute Rehab OT:     Discharge Plan: home w/ PCA assist with pt's extended family filling in opposite PCA, home OT.  Versus SNF.     Recert due: 4/18    Precautions: falls, needs bariatric equip including FWW (pt is 6ft 6in, 465#), Pt allowed to leave ROOM for therapy (re: covid vacination status)  2 liko lifts: use the proximal loops to the sling with the upper body liko lift and use the distal loops for LB lift as this encourages a more seated lift transfer with less reclining.    Current Status:  ADLs:    Mobility: rolling cga to min A and bedrail, supine to sit w/ min A of 2 and bedrail/HOB elevated 30 deg, STS w/ min A of 1-2 w/ ht of bed elevated and use of bariatric FWW.  As of 4/7/22 notes over the past week, pt is assist of 2 for rolling in bed and cleaning up after incontinence.  Pt is requiring assist of 2-3 to stand or resorting to the mechanical lift.     Grooming: set up    Dressing: uB drg w/ gown change w/ set up, LB pants/shorts declined, feet w/ total assist    Bathing: ub set up for chest/underarms and face - pt will help lift UE's and requires assist to cleanse abdominal folds, LB dep w/ sponge bath    Toileting: bed pan dep w/ placement and dep w/ pericares.  Mechanical lift to/from commode, at times had been able to stand for portion of cares but recently a minimum of max A of 2 to stand.  IADLs: PTA pt and wife both performed, wife works full time but mostly from home  Vision/Cognition: WFL for basic cog, pt reports no glasses except occasionally used reading glasses    Assessment: PT and OT are co-txing with pt to problem solve safe transfers for bed to w/c with liko lift.  And plan to work on STS transfers as able.  Pt con't to perform below his baseline of when he entered TCU, MD is aware.  Please see doc flow sheet for details re: pt session today.  Pt willing to participate but not always realistic in re: to his ability.  Limited success with sitting EOB to stand but  therapists did safely lift pt to w/c with liko lifts so pt can work on w/c mobility and be OOB. Th modifying sling set-up to encourage a more seated transfer in the sling vs reclining, as pt tends to slip out of sitting surface is not seated back far enough/reclining too far. Con't OT 5x/week.    Other Barriers to Discharge (DME, Family Training, etc): 5 steps to get into house (has ramp but prefers to use stairs), 15 steps in house to get to bedroom/full bathroom, wife works full time but mostly from home, PTA used SEC , main level bathroom half bath w/ taller toilet/grab bar, full bathroom upstairs std ht toilet w/ grab bar on wall, tub/shower combo w/ grab bars but does not have bench/shower chair, pt reports being house hold ambulator but used w/c at clinic for appts (does NOT own w/c, states he as 4WW in garage donated to him but not sure it is the right size)     4/7/22 Current AE list is bariatric items, including but not limited to, a hospital bed, w/c, mechanical lift and a bed pan. OT plans to provide information re: a bariatric commode.     Th provided pt with picture reference handout to consider bariatric shower bench for home.

## 2022-04-14 NOTE — PLAN OF CARE
A&Ox4. Ax2 with liko lift. Continent of both bowel and bladder; urinal, bedpan. LBM 4/14. 3 gram diet, fluid restriction 1200 ml, pills whole. Wound care done to BLE. Denies pain, SOB, chest pain, numbness, or tingling. R wrist PIV SL. Fentanyl  patch on L shoulder. Uses call light appropriately and makes needs known.       Patient's most recent vital signs are:     Vital signs:  BP: 117/73  Temp: 96.3  HR: 75  RR: 20  SpO2: 100 %     Patient does not have new respiratory symptoms.  Patient does not have new sore throat.  Patient does not have a fever greater than 99.5.

## 2022-04-14 NOTE — PROGRESS NOTES
Pt reports that he's reached out to several different , but hasn't gotten through to any of them. Pt states that he's waiting for a Calderon Karen to get back to him. SW requested pt to keep SW updated. Pt agreed.  SW made referral to [facility] for further assistance with Elder Law attorneys, community resources i.e. enhanced ALFs that could accommodate pt's bariatric needs.        REFERRAL STATUS     PENDING  [facility]  [address] [place] [id number] [phone]  4/5: Referral sent via epic  4/6: SW left VM for Kaye and Jayson in Admissions, requesting call back to discuss referral status.  4/13: SW left VM for Admissions, requested a callback to discuss referral status.          [facility]  [address] [place] [address]  [phone];  Admissions:  [phone]  4/6 - cannot meet pt's bariatric needs.     [facility]  [address] [place], [place] [address] [phone]  4/6 - no bed availability     [facility]  [address] [place] [id number] [phone]  4/6 - no bariatric beds in LTC, no LTC beds at this time.     [facility]  PH: [phone]  F: [phone]  4/6 - cannot meet pt's bariatric needs and don't have a private room to accommodate his BiPap.     [facility]   [address] [place], [place] [phone]  4/6 - facility cannot meet pt's needs.     [facility]  PH: [phone]  F: [phone]  4/6: Globally declined - cannot meet pt's bariatric needs     [facility]  PH: [phone]  F: [phone]  4/7: Globally declined - cannot meet pt's bariatric needs.     [facility]  [address] [place], [place] [id number] [phone]  4/7 - declined notification through Epic, no reason given.      [facility]  [address] [place] [address]  Admissions: [phone] or [phone]  Fax: [phone]  4/14 - pt is over their wt limit.        BRODIE Sandhu, LSW  [facility]   Pager: [phone]

## 2022-04-14 NOTE — PLAN OF CARE
"Discharge Planner Post-Acute Rehab PT:     Discharge Plan: Home with spouse, mod I    Precautions: fall risk    Current Status:  Bed Mobility: CGA - min A rolling, Mod A for supine to sit, Max A for legs sit to supine  Transfer: A x2 sit to stand from bed, raise bed up to stand, lower bed to sit. Max A x1 with pt pulling on // bars from W/c x5, max A x 3 for standing from w/c pivot to recliner  Gait:short amb in // bars down and back with SBA - limited by SOB weakness and fatigue. Pt was able to amb with Front WW ~ 20' from bed to w/c in hallway, pt currently only ambulating in // bars  Stairs: NT  Balance: sits EOB demonstrates good balance ~20 min with intermittent UE support. Requires heavy UE support on walker with standing balance    Assessment: [place] with OT to trouble shoot edge of bed sitting, sit to stand transfers, see flow sheet for details. Pt requires max A x 2 and two ceiling lifts to get to EOB. Pt unable to attempt to stand this date. Pt wants to participate to the point of being unrealistic about what can be safely attempted. He has declined significantly with functional mobility since admission, from being SBA with sit to stand from edge of bed to being max A of 3 to stand when able, more often he requires the lift for transfers. He was able ambulate ~20' with front WW and SBA at admission, currently is only able to ambulate ~10 feet with use of parallel bars. MD is aware. Plan to continue co treat to trouble shoot safe mobility.       Patient should wear shoes when transferring to avoid slipping. Updated whiteboard.    Other Barriers to Discharge (DME, Family Training, etc):   - Bariatric FWW - in 4th fl rehab gym  - w/c - Needs 28-30\" (30 ideal for pt). Need to confirm doorway width, spouse was asked to measure  - Stairs - Spouse reports he cannot come home until he can complete 16 stairs to bedroom  - Hospital bed - If spouse allows main level living    "

## 2022-04-14 NOTE — PLAN OF CARE
Goal Outcome Evaluation:  Problem: TCU Patient Goals  Goal: TCU Plan of Care  Outcome: Ongoing, Progressing  Flowsheets (Taken 4/13/2022 2330)  Bowel: continent  Bladder: continent  Bath days:   Mon Thurs  Patient is on a psychotropic medication, compazine and/or reglan: no  Patient is on an anti-anxiety and /or anti-depressant medication: no  Patient is on a hypnotic and/or sleep aid medication: no  Teaching Goals:   DVT   falls   medication management   lines   pressure ulcer prevention   wound care  Skin Integrity:   tissue tolerance assessment completed   repositioning   activity promotion   specialty mattress  Antibiotics: no     Problem: Goal Outcome Summary  Goal: Goal Outcome Summary  Outcome: Ongoing, Progressing     Patient is in stable condition. Denies SOB or pain. Po medications well tolerated.IV to left lower arm flushed and paten. ADL care provided. Safety maintained, call light and personal items in reach.        Patient's most recent vital signs are:     Vital signs:  BP: 126/80  Temp: 96.8  HR: 99  RR: 20  SpO2: 96 %     Patient does not have new respiratory symptoms.  Patient does not have new sore throat.  Patient does not have a fever greater than 99.5.

## 2022-04-15 NOTE — DISCHARGE SUMMARY
Occupational Therapy Discharge Summary    Reason for therapy discharge:    Planning discharge to acute when bed available.      Progress towards therapy goal(s). See goals on Care Plan in [facility] electronic health record for goal details.  Goals not met.  Barriers to achieving goals:   limited tolerance for therapy and discharge from facility.  Pt performance has declined while on TCU.  He was intermittently using liko lift upon admission but was able to amb up to 20 feet with 2WW in PT and he was able to do a STS SBA if surface was high enough.  Currently, pt either unable to stand or was assist of 3, unable to take steps.     Therapy recommendation(s):   Recommend pt con't with skilled OT to improve his ADL/mobility as is safely allowed and increase the challenge for increase IND as he progresses.

## 2022-04-15 NOTE — PLAN OF CARE
The patient is alert and oriented x 3. VSS. Able to make needs known. Denies HA, sob, chest pain, N/V. Appetite is good. Continent of bowel and bladder. Voiding spontaneously with urinal at the bedside. Declined miconazole powder to skin folds.         Patient's most recent vital signs are:     Vital signs:  BP: 110/61  Temp: 97.5  HR: 72  RR: 18  SpO2: 100 %     Patient does not have new respiratory symptoms.  Patient does not have new sore throat.  Patient does not have a fever greater than 99.5.

## 2022-04-15 NOTE — LETTER
Transition Communication Hand-off for Care Transitions to Next Level of Care Provider    Name: Panda Miranda  : 1955  MRN #: [id number]  Primary Care Provider: Ben Hamlin     Primary Clinic: [address] [place] [id number]     Reason for Hospitalization:  Generalized weakness [R53.1]  Admit Date/Time: 4/15/2022  9:37 PM  Discharge Date: 2022  Payor Source: Payor: [place] [facility] / Plan: [facility] MEDICARE ADVANTAGE / Product Type: HMO /      Discharge Plan: Home with Home care RN/ PT  Best home care        Concern for non-adherence with plan of care:  No  Discharge Needs Assessment:  Needs    Flowsheet Row Most Recent Value   Equipment Currently Used at Home none          Any outstanding tests or procedures:        Referrals     Future Labs/Procedures    Home Care Referral     Comments:    [facility] (ph:[phone] fx:[phone])     Your provider has ordered home health services. If you have not been contacted within 2 days of your discharge please call the inpatient department phone number at [phone] .          Supplies     Future Labs/Procedures    Commode     Process Instructions:    By signing this order, the Authorizing Provider is attesting that they have completed a face-to-face evaluation on the patient to determine their need for this equipment in the last 60 days. A new face-to-face evaluation is required each time  A new prescription for one of the specified items is ordered.   If an additional provider completed the evaluation, please indicate their name below.     **As of 2018, an order requisition and face sheet will print for all DME orders. Please give printed order and face sheet to patient if not obtaining product from [facility] DME closet.     Comments:    DME Documentation:   Describe the reason for need to support medical necessity:: Patient is a 59 y/o man who has multiple medical problems including gout, pseudogout, atrial  fibrillation, morbid obesity s/p past bariatric surgery and chronic kidney disease stage IIIb. Patient was hospitalized at [facility] from 27-Jan-2022 to 30-Jan-2022 for sepsis secondary to complicated urinary tract infection vs. pyelonephritis. Patient was hospitalized at [facility] from 11-Feb-2022 to 20-Feb-2022 for hyperkalemia, acute kidney injury and acute gout. Patient was discharged on 20-Feb-2022 and presented to [facility] with weakness - patient reportedly was unable to walk up the steps to his home. Patient was later found to have recurrent hyperkalemia. During hospital stay, patient was also treated for acute polyarticular gout. Patient completed a prednisone taper during hospital stay.   Currently has limited mobility due to significant weakness from prolonged hospitalization and combined TCU stay of 3 months as of May 11, 2022.  .     I, the undersigned, certify that the above prescribed supplies are medically necessary for this patient and is both reasonable and necessary in reference to accepted standards of medical and necessary in reference to accepted standards of medical practice in the treatment of this patient's condition and is not prescribed as a convenience.    Wheelchair     Process Instructions:    By signing this order, the Authorizing Provider is attesting that they have completed a face-to-face evaluation on the patient to determine their need for this equipment in the last 60 days. A new face-to-face evaluation is required each time  A new prescription for one of the specified items is ordered.   If an additional provider completed the evaluation, please indicate their name below.     **As of 2018, an order requisition and face sheet will print for all DME orders. Please give printed order and face sheet to patient if not obtaining product from [facility] DME closet.     Comments:    Wheelchair Documentation:   Describe the reason for need to support medical  necessity: : Patient is a 61 y/o man who has multiple medical problems including gout, pseudogout, atrial fibrillation, morbid obesity s/p past bariatric surgery and chronic kidney disease stage IIIb. Patient was hospitalized at [facility] from 27-Jan-2022 to 30-Jan-2022 for sepsis secondary to complicated urinary tract infection vs. pyelonephritis. Patient was hospitalized at [facility] from 11-Feb-2022 to 20-Feb-2022 for hyperkalemia, acute kidney injury and acute gout. Patient was discharged on 20-Feb-2022 and presented to [facility] with weakness - patient reportedly was unable to walk up the steps to his home. Patient was later found to have recurrent hyperkalemia. During hospital stay, patient was also treated for acute polyarticular gout. Patient completed a prednisone taper during hospital stay.   Currently has limited mobility due to significant weakness from prolonged hospitalization and combined TCU stay of 3 months as of May 11, 2022.  .   1. The patient has mobility limitations that impairs their ability to participate in one or more mobility related activities: yes  2. The patient's mobility limitations cannot be safely resolved by using a cane/walker: No  3. The patients home has adequate access to use a manual wheelchair: Yes  4. The use of a manual wheelchair on a regular basis will improve the patients ability to participate in mobility related ADL's at home: Yes  5. The patient is willing to use a manual wheelchair at home: Yes  6. The patient has adequate upper body strength and the mental capability to safely use a manual wheelchair and/or has a caregiver that is able to assist: Yes  7. Does the patient have a lower extremity injury or edema?: Yes    Reason for Type of Wheelchair: Heavy Duty Wheelchair: Patient is over the 350lb weight limit for other wheelchairs.    **Use of a manual wheelchair will significantly improve the patient's ability to participate in MRADLs and the patient  will use it on a regular basis in the home. The patient has not expressed an unwillingness to use the manual wheelchair that is provided in the home.**    I, the undersigned, certify that the above prescribed supplies are medically necessary for this patient and is both reasonable and necessary in reference to accepted standards of medical and necessary in reference to accepted standards of medical practice in the treatment of this patient's condition and is not prescribed as a convenience.          Key Recommendations:      Jessika Gottlieb RN    AVS/Discharge Summary is the source of truth; this is a helpful guide for improved communication of patient story

## 2022-04-15 NOTE — H&P
[facility]    History and Physical - Hospitalist Service, GOLD TEAM        Date of Admission:  4/15/2022    Assessment & Plan      Panda Miranda is a 66 year old male admitted on 4/15/22. He has a past medical history significant for chronic HFrEF (EF 45-50% on 1/29/22), small perimembranous VSD w/ left to right shunt, HTN, CKD4 w/ baseline cr ~ 1.8 - 2.1, chronic atrial fibrillation, gout, pseudogout, morbid obesity s/p bariatric surgery and numerous other medical conditions.  He was hospitalized at [facility] from 1/27 - 1/30/22 for sepsis 2/2 complicated UTI vs. pyelonephritis.  He was hospitalized at [facility] from 2/11 - 2/20/22 for hyperkalemia, SHANTE and acute gout.  He was then hospitalized at [facility] 2/20 - 3/18/22 with weakness.  Pt was unable to walk up the steps in his home.  He was later found to have recurrent hyperkalemia.  During [facility] hospital stay, patient was also treated for acute polyarticular gout.  He completed prednisone taper during [facility] hospital stay.  He was also transitioned from uloric to allopurinol.  He had pancytopenia during [facility] hospital stay that resolved prior to discharge.  [facility] 3[facility]22 for rehab and strengthening. He was transferred to [facility] on 4/15 due to persistent anasarca resistant to IV diuresis.      Anasarca  Pulmonary edema w/ symptomatic improvement  HFrEF, EF 45-50%  Small perimembranous VSD w/ L->R shunting  HTN  Paroxysmal atrial fibrillation  Most recent TTE 1/2022 w/ EF 45-50%. CXR 4/4/22: 1.  Cardiomegaly with low lung volumes and bilateral diffuse hazy opacities most suggestive of pulmonary edema. Infection, atelectasis, and ARDS are not excluded. 2.  Pulmonary vascular congestion suggestive of pulmonary hypertension. 3.  Cardiomegaly. Has been getting diuresed at TCU w/ Bumex 1mg IV BID. No improvement in anasarca. Pt denies dyspnea but is very bothered by his degree of swelling  everywhere. Weight is up >3kg over the past 6 days; ~35kg up over the past month.  -Continue metoprolol XL 25mg daily  -Continue bumetanide 1mg IV BID  -Continue warfarin per pharmacy dosing  -1.2L fluid restriction  -Strict I/Os  -Daily weights  -Daily BMP, INR  -Will repeat CXR in AM  -Consult cardiology, appreciate assistance    CKD 4  Chronic metabolic acidosis 2/2 CKD 4  Hx albuminuria  Baseline Cr ~1.8-2.1, most recently 2.05 (4/14)  -Avoid nephrotoxic agents  -Renally dose medications  -Not a candidate for ACEi/ARB d/t hyperkalemia  -Continue sodium bicarb 1300mg TID  -Daily BMP    Hyperkalemia 2/2 SHANTE/CKD and ACEi - resolved  Hypomagnesemia  -BMP, Mg daily  -Replace per protocol    Chronic BLE edema  Chronic venous stasis ulcers  BLE arterial US w/ doppler 2/3/22: NAHID 1.48 RLE and 1.44 LLE, may be falsely elevated due to arterial wall calcification. BLE duplex demonstrates normal waveforms through both feet without significant stenosis or occlusion.  -Lymphedema treatment per PT  -WOC consult    Anemia of chronic disease, stable  Hgb has been stable, ~8-10.  -CBC in AM  -CTM    Chronic pain syndrome  -Continue fentanyl patch, Tylenol, oxycodone and Robaxin    Gout arthritis w/ recent flares  -Continue colchicine and allopurinol daily    TASHA vs OHS  -Continue CPAP at HS    Left eye irritation  Dry eyes  No evidence of ocular emergency on exam, pt denies pain, just irritation. Eyes appear quite dry.  -Refresh tears PRN  -Refresh Liquigel at HS PRN  -Consider ophthalmology consult if new/worsening symptoms    Morbid obesity w/ BMI 62.63 kg/m2  S/p vertical band gastroplasty (1996)  -Consider nutrition consult    Hx colon cancer  S/p hemicolectomy (2011)  -CTM    Generalized weakness and debility  Severe deconditioning  PT/OT during TCU stay was limited by weight gain and lack of meaningful participation in care.  -PT/OT consults       Diet: Fluid restriction 1200 ML FLUID  Combination Diet Renal Diet  "(non-dialysis); Low Saturated Fat Na <2400mg Diet  DVT Prophylaxis: Warfarin  Cheema Catheter: Not present  Central Lines: None  Cardiac Monitoring: None  Code Status: Full Code    Clinically Significant Risk Factors Present on Admission           # Hypomagnesemia: Mg = 1.5 mg/dL (Ref range: 1.6 - 2.3 mg/dL) on admission, will replace as needed     # Coagulation Defect: home medication list includes an anticoagulant medication    # Severe Obesity: Estimated body mass index is 62.63 kg/m  (pended) as calculated from the following:    Height as of 4/13/22: (P) 1.981 m (6' 6\").    Weight as of 4/13/22: (P) 245.8 kg (542 lb).      Disposition Plan   Expected Discharge:  TBD   Anticipated discharge location:  Awaiting care coordination huddle  Delays:            The patient's care was discussed with the Attending Physician, Dr. Blanco and Patient.    DANAY HAAS PA  Hospitalist Service, [facility]  Securely message with the Vocera Web Console (learn more here)  Text page via [facility] Paging/Directory         ______________________________________________________________________    Chief Complaint   Swelling    History is obtained from the patient and electronic health record    History of Present Illness   Panda Miranda is a 66 year old male who  has a past medical history significant for chronic HFrEF (EF 45-50% on 1/29/22), small perimembranous VSD w/ left to right shunt, HTN, CKD4 w/ baseline cr ~ 1.8 - 2.1, chronic atrial fibrillation, gout, pseudogout, morbid obesity s/p bariatric surgery and numerous other medical conditions.  He was hospitalized at [facility] from 1/27 - 1/30/22 for sepsis 2/2 complicated UTI vs. pyelonephritis.  He was hospitalized at [facility] from 2/11 - 2/20/22 for hyperkalemia, SHANTE and acute gout.  [facility].  Pt was unable to walk up the steps in his home.  He was " "later found to have recurrent hyperkalemia.  During [facility] hospital stay, patient was also treated for acute polyarticular gout.  He completed prednisone taper during [facility] hospital stay.  He was also transitioned from uloric to allopurinol.  He had pancytopenia during [facility] hospital stay that resolved prior to discharge.  Transferred to [facility] on 3/18/22 for rehab and strengthening. He was transferred to [facility] on 4/15 due to persistent anasarca resistant to IV diuresis.    Patient reports having swelling \"everywhere\" and especially bothered by the swelling in his lateral chest and abdominal folds. He feels heavier, like he's just full of water. It is uncomfortable but not painful. He denies dyspnea; had been dyspneic previously but states that has gotten better since starting the diuretics. He denies chest pain or palpitations. No abdominal discomfort or GI upset; appetite has been good. He is urinating well, \"a lot\" since starting the diuretics, no dysuria or hematuria. His legs feel tight and heavy. His only other complaint at this time is some left eye irritation. He denies pain, states it just feels irritated, like there might be something in his eye. He has been rubbing the eye frequently, states he knows he is probably just making it worse. No vision changes. He did request a warm washcloth from this provider to put over his eye, which was provided after exam. He reported \"a lot\" of improvement with this intervention. No other problems or concerns.     Review of Systems    The 10 point Review of Systems is negative other than noted in the HPI or here.     Past Medical History    I have reviewed this patient's medical history and updated it with pertinent information if needed.   Past Medical History:   Diagnosis Date     (HFpEF) heart failure with preserved ejection fraction (H)      Arthritis      Arthropathy of wrist      Atrial fibrillation (H)      Bradycardia      Cancer (H) 2011    colon cancer; " hemicolectomy     CHF (congestive heart failure) (H)      Chronic kidney disease      Gout      Hand swelling      Heart valve disease     intermittent mitral regurgitation     HTN (hypertension)      Hyperlipidemia      Morbid obesity (H)      Obesity      TASHA (obstructive sleep apnea)     CPAP     Perimembranous ventricular septal defect        Past Surgical History   I have reviewed this patient's surgical history and updated it with pertinent information if needed.  Past Surgical History:   Procedure Laterality Date     COLON SURGERY       COLONOSCOPY N/A 12/12/2019    Procedure: COLONOSCOPY;  Surgeon: Flaco Magaña MD;  Location: [facility];  Service: General     GASTROPLASTY VERTICAL BANDED      Dr. Arizmendi U [facility] 1996 Initial weight 800++ Lost to 440- (was 320# at lowest)     KNEE SURGERY       ZZC PART REMOVAL COLON W ANASTOMOSIS      Description: Partial Colectomy;  Recorded: 12/02/2011;  Comments: Dr Magaña       Social History   I have reviewed this patient's social history and updated it with pertinent information if needed.  Social History     Tobacco Use     Smoking status: Never Smoker     Smokeless tobacco: Never Used   Substance Use Topics     Alcohol use: Yes     Alcohol/week: 0.8 standard drinks     Comment: Alcoholic Drinks/day: occasional     Drug use: No       Family History   I have reviewed this patient's family history and updated it with pertinent information if needed.  Family History   Problem Relation Age of Onset     Diabetes Type 2  Other      Heart Failure Other      Heart Disease Mother      Hypertension Mother      Diabetes Sister        Prior to Admission Medications   Prior to Admission Medications   Prescriptions Last Dose Informant Patient Reported? Taking?   acetaminophen (TYLENOL) 325 MG tablet   No No   Sig: Take 2 tablets (650 mg) by mouth every 6 hours as needed for mild pain or other (and adjunct with moderate or severe pain or per patient request)    allopurinol (ZYLOPRIM) 300 MG tablet   No No   Sig: Take 1 tablet (300 mg) by mouth daily   colchicine (COLCYRS) 0.6 MG tablet   No No   Sig: Take 1 tablet (0.6 mg) by mouth daily   famotidine (PEPCID) 20 MG tablet   No No   Sig: Take 1 tablet (20 mg) by mouth 2 times daily as needed (heartburn)   ferrous sulfate (FEROSUL) 325 (65 Fe) MG tablet   No No   Sig: Take 1 tablet (325 mg) by mouth daily   melatonin 1 MG TABS tablet   No No   Sig: Take 1 tablet (1 mg) by mouth nightly as needed for sleep   methocarbamol (ROBAXIN) 500 MG tablet   Yes No   Sig: Take 500 mg by mouth 3 times daily as needed for muscle spasms   metoprolol succinate ER (TOPROL-XL) 25 MG 24 hr tablet   No No   Sig: Take 1 tablet (25 mg) by mouth daily   sodium bicarbonate 650 MG tablet   No No   Sig: Take 2 tablets (1,300 mg) by mouth 3 times daily   warfarin ANTICOAGULANT (COUMADIN) 3 MG tablet   No No   Sig: Take 1 tablet (3 mg) by mouth daily Adjust dose based on INR      Facility-Administered Medications: None     Allergies   No Known Allergies    Physical Exam   Vital Signs:                    Weight: 0 lbs 0 oz    Physical Exam  Vitals and nursing note reviewed.   Constitutional:       General: He is not in acute distress.     Appearance: He is obese. He is not ill-appearing, toxic-appearing or diaphoretic.   HENT:      Head: Normocephalic.      Mouth/Throat:      Mouth: Mucous membranes are moist.   Eyes:      General: No scleral icterus.     Extraocular Movements: Extraocular movements intact.      Pupils: Pupils are equal, round, and reactive to light.      Comments: Eyes noted to be very dry in appearance, L>R. No evidence of conjunctivitis. No visible foreign body or injury noted.   Cardiovascular:      Rate and Rhythm: Normal rate and regular rhythm.      Pulses: Normal pulses.      Heart sounds: Normal heart sounds.   Pulmonary:      Effort: Pulmonary effort is normal. No respiratory distress.      Breath sounds: Normal breath  sounds. No wheezing, rhonchi or rales.   Chest:      Chest wall: Edema present. No tenderness.   Abdominal:      General: There is no distension.      Palpations: Abdomen is soft.      Tenderness: There is no abdominal tenderness.      Comments: Edema bilateral flank   Musculoskeletal:         General: Swelling present. No tenderness.      Right upper arm: Edema present.      Left upper arm: Edema present.      Cervical back: Neck supple.      Right lower leg: Edema present.      Left lower leg: Edema present.      Comments: Lymphedema wraps in place bilaterally   Skin:     General: Skin is warm and dry.      Capillary Refill: Capillary refill takes less than 2 seconds.      Findings: Lesion present. No erythema.      Comments: Excoriated areas noted to bilateral forearms, L>R   Neurological:      General: No focal deficit present.      Mental Status: He is alert and oriented to person, place, and time.      Cranial Nerves: No cranial nerve deficit.   Psychiatric:         Mood and Affect: Mood normal.         Behavior: Behavior normal.         Thought Content: Thought content normal.         Data   Data reviewed today: I reviewed all medications, new labs and imaging results over the last 24 hours. I personally reviewed no images or EKG's today.    Recent Labs   Lab 04/15/22  0634 04/14/22  1222 04/14/22  0635 04/13/22  0528 04/12/22  0610 04/11/22  0718   WBC  --   --   --   --   --  5.1   HGB  --   --   --   --   --  10.4*   MCV  --   --   --   --   --  86   PLT  --   --   --   --   --  164   INR 3.08*  --  2.95* 2.96* 2.75* 2.65*   NA  --  142  --   --  141 138   POTASSIUM  --  3.7  --   --  3.8 4.0   CHLORIDE  --  105  --   --  107 107   CO2  --  28  --   --  26 25   BUN  --  27  --   --  25 26   CR  --  2.05*  --   --  2.13* 2.15*   ANIONGAP  --  9  --   --  8 6   JOHN  --  9.3  --   --  9.1 8.9   GLC  --  95  --   --  110* 99     No results found for this or any previous visit (from the past 24 hour(s)).

## 2022-04-15 NOTE — LETTER
Health Information Management Services               Recipient:    Elise ibarra Chen Macias      Sender: Looking for Long Term Care Bed    Minda FAGAN      Date: April 25, 2022  Patient Name:  Panda Miranda  Patient YOB: 1955  Routing Message:            The documents accompanying this notice contain confidential information belonging to the sender.  This information is intended only for the use of the individual or entity named above.  The authorized recipient of this information is prohibited from disclosing this information to any other party and is required to destroy the information after its stated need has been fulfilled, unless otherwise required by state law.      If you are not the intended recipient, you are hereby notified that any disclosure, copy, distribution or action taken in reliance on the contents of these documents is strictly prohibited.  If you have received this document in error, please return it by fax to [phone] with a note on the cover sheet explaining why you are returning it (e.g. not your patient, not your provider, etc.).  If you need further assistance, please call [facility] Centralized Transcription at [phone].  Documents may also be returned by mail to [employer], , [address], [place], [place] [address].

## 2022-04-15 NOTE — DISCHARGE SUMMARY
[facility]  Discharge Summary    Panda Miranda MRN# [id number]   YOB: 1955 Age: 66 year old     Date of Admission:  3/18/2022  Date of Discharge:  4/15/2022  Admitting Physician:  Omer Gilmore MD  Discharge Physician:  David Donovan MD  Discharging Service:  Internal Medicine     Primary Provider: Ben Hamlin              Discharge Diagnosis:     Primary Discharge Diagnosis:    Generalized weakness and debility   HFrEF w/ acute exacerbation, EF 45-50% per TTE on 01/29/22   Small perimembranous VSD with left to right shunting  HTN   CKD4 w/ baseline cr ~ 1.8 - 2.1    Chronic metabolic acidosis related to CKD4  Hyperkalemia (related to previous SHANTE/CKD4 and ACE-I)  History of albuminuria   Paroxysmal Atria Fibrillation   TASHA vs. OHS   ACD  Gout Arthritis with recent Flares    Morbid Obesity w/ BMI  61.83 Kg/m2, S/p gastroplasty vertical banded surgery in 1996  Hx of colon cancer, S/p hemicolectomy in 2011  Chronic B/L LE edema and venous stasis ulcers   Chronic pain syndrome  Universal COVID-19 infection        Past Medical History:   Diagnosis Date     (HFpEF) heart failure with preserved ejection fraction (H)      Arthritis      Arthropathy of wrist      Atrial fibrillation (H)      Bradycardia      Cancer (H) 2011    colon cancer; hemicolectomy     CHF (congestive heart failure) (H)      Chronic kidney disease      Gout      Hand swelling      Heart valve disease     intermittent mitral regurgitation     HTN (hypertension)      Hyperlipidemia      Morbid obesity (H)      Obesity      TASHA (obstructive sleep apnea)     CPAP     Perimembranous ventricular septal defect                     Discharge Medications:     Current Discharge Medication List      CONTINUE these medications which have NOT CHANGED    Details   acetaminophen (TYLENOL) 325 MG tablet Take 2 tablets (650 mg) by mouth every 6 hours as needed for mild pain or other (and adjunct with moderate or severe pain  or per patient request)    Associated Diagnoses: Pain      allopurinol (ZYLOPRIM) 300 MG tablet Take 1 tablet (300 mg) by mouth daily    Associated Diagnoses: Gout, unspecified cause, unspecified chronicity, unspecified site      colchicine (COLCYRS) 0.6 MG tablet Take 1 tablet (0.6 mg) by mouth daily    Associated Diagnoses: Gout, unspecified cause, unspecified chronicity, unspecified site      famotidine (PEPCID) 20 MG tablet Take 1 tablet (20 mg) by mouth 2 times daily as needed (heartburn)    Associated Diagnoses: Gastroesophageal reflux disease with esophagitis, unspecified whether hemorrhage      ferrous sulfate (FEROSUL) 325 (65 Fe) MG tablet Take 1 tablet (325 mg) by mouth daily  Qty: 90 tablet, Refills: 3    Associated Diagnoses: Iron deficiency; Encounter for medication refill      melatonin 1 MG TABS tablet Take 1 tablet (1 mg) by mouth nightly as needed for sleep    Associated Diagnoses: Insomnia, unspecified type      methocarbamol (ROBAXIN) 500 MG tablet Take 500 mg by mouth 3 times daily as needed for muscle spasms      metoprolol succinate ER (TOPROL-XL) 25 MG 24 hr tablet Take 1 tablet (25 mg) by mouth daily    Associated Diagnoses: Chronic atrial fibrillation (H)      sodium bicarbonate 650 MG tablet Take 2 tablets (1,300 mg) by mouth 3 times daily  Qty: 180 tablet, Refills: 0    Associated Diagnoses: CKD (chronic kidney disease) stage 4, GFR 15-29 ml/min (H); Hyperkalemia      warfarin ANTICOAGULANT (COUMADIN) 3 MG tablet Take 1 tablet (3 mg) by mouth daily Adjust dose based on INR    Associated Diagnoses: Chronic atrial fibrillation (H)         STOP taking these medications       fentaNYL (DURAGESIC) 25 mcg/hr 72 hr patch Comments:   Reason for Stopping:                    Hospital Course:       Panda Miranda is a 59 y/o gentleman who has multiple medical problems including chronic HFrEF (EF 45-50% on 1/29/22), small perimembranous VSD w/ left to right shunt, HTN, CKD4 w/ baseline cr ~ 1.8 -  2.1, chronic atrial fibrillation, gout, pseudogout, morbid obesity s/p bariatric surgery and numerous other medical conditions.  He was hospitalized at [facility] from 1/27 - 1/30/22 for sepsis 2/2 complicated UTI vs. pyelonephritis.  He was hospitalized at [facility] from 2/11 - 2/20/22 for hyperkalemia, SHANTE and acute gout.  He was then hospitalized at [facility] 2/20 - 3/18/22 with weakness.  Pt was unable to walk up the steps in his home.  He was later found to have recurrent hyperkalemia.  During [facility] hospital stay, patient was also treated for acute polyarticular gout.  He completed prednisone taper during [facility] hospital stay.  He was also transitioned from uloric to allopurinol.  He had pancytopenia during [facility] hospital stay that resolved prior to discharge.  Transferred to [facility] on 3/18/22 for rehab and strenthening but PT, OT has been limited due to weight gain and him not able to meningfuly participate in his care.       Generalized weakness and debility   ---   Pt remained severely deconditioned  ---   Continue PT/OT     HFrEF w/ acute exacerbation, EF 45-50% per TTE on 01/29/22   Small perimembranous VSD with left to right shunting  HTN  ---   Not a candidate for ACE-I or ARB due to Hyperkalemia in the context of CKD4  ---   Continue Metoprolol XL 25 mg daily  ---   Not on diuretics chronically  ---   CXR on 4/4/22 revealed pulm edema  ---   On exam, pt has 2+ LE edema and anasarca. There is a lot of fluid around his  Buttocks, thigh and abdominal skin folds  ---   Wt is up and pt is more edematous   ---   Treated him w/ Bumex 1 mg iv bid since 4/8/22, but no improvement. He needs bumex or lasix drop.   ---   Pt was drinking too much water => now on fluid restrictions for 1.5 L  ---   Restrict free water intake to 1.5  ---   Cr is 2.15 and K is 4.0 range       CKD4 w/ baseline cr ~ 1.8 - 2.1    Chronic metabolic acidosis related to CKD4  Hyperkalemia (related to previous SHANTE/CKD4 and ACE-I)  History of  albuminuria   ---   Sees Dr. Omega Coreas of [facility]    ---   Continue renal siet  ---   Continue NaHCO3 1,300 mg PO TID  ---   Follow up with nephrology after discharge    ---   K is 4.0 range  ---   Cr is 2.15 ranage    Hypomagnesemia: Replace per protocl     Paroxysmal Atria Fibrillation   ---   On Warfarin for stroke prophylaxis, pharmacy dosing  ---   INR 2.65 today  ---   Metoprolol XL for rate control.   ---   HR is controlled     TASHA vs. OHS   ---   Continue CPAP at night      ACD  ---   Hgb is stable at 8-10 g.     Gout Arthritis with recent Flares   ---   On colchicine 0.6 mg po daily  ---   On Maintenance Allopurinol      Morbid Obesity w/ BMI  61.83 Kg/m2  ---   S/p gastroplasty vertical banded surgery in 1996  ---   Wt loss recommended     Hx of colon cancer   ---   S/p hemicolectomy in 2011     Chronic B/L LE edema and venous stasis ulcers   ---   B/l LE arterial US w/ doppler 2/3/22  ---   sara 1.48 RLE and 1.44 LLE, may be falsely elevated due to arterial wall calcification  ---   B/L LE duplex demonstrates normal waveforms through both feet without significant stenosis or occlusion.  ---   Lymphedema treatment per PT     Chronic pain syndrome  ---   Continue Fentanyl patch, tylenol, oxycodone and robaxin     Universal COVID-19 infection  ---   Tested positive for COVID-19 on 1/27/22  ---   Considered COVID-19 recovered.  ---   Received vaccination, second dose was 2/20/22, needs  booster         Diet: 3 Gram K Diet    DVT Prophylaxis: Warfarin  Cheema Catheter: Not present  Fluids: N/A  Central Lines: None  Cardiac Monitoring: None  Code Status: Full Code           Discharge Disposition:   Transferred to the hospital           Condition on Discharge:   Discharge condition: Stable   Code status on discharge: Full Code           Procedures:   none          Consultations:   Consultation during this admission received from PT, OT.               Final Day of Progress before Discharge:       Physical Exam:  Blood  "pressure 100/52, pulse 77, temperature 97.3  F (36.3  C), temperature source Oral, resp. rate 18, height (P) 1.981 m (6' 6\"), weight (!) (P) 245.8 kg (542 lb), SpO2 98 %.  GENERAL: Alert and oriented x 3. NAD.   HEENT: Anicteric sclera. PERRL. Mucous membranes moist and without lesions.   CV: RRR. S1, S2. No murmurs appreciated.   RESPIRATORY: Effort normal. Lungs CTAB with no wheezing, rales, rhonchi.   GI: Abdomen soft and non distended with normoactive bowel sounds present in all quadrants. No tenderness, rebound, guarding.      Data:  All laboratory data reviewed             Significant Results:     Results for orders placed or performed during the hospital encounter of 03/18/22   XR Chest 2 Views     Status: None    Narrative    EXAM: XR CHEST 2 VW  4/4/2022 12:47 PM    HISTORY: 66-year-old male with PMH of morbid obesity, chronic  diastolic CHF, VSD, bradycardia, mitral regurgitation, atrophic  fibrillation, chronic kidney disease, sleep apnea, now presenting with  shortness of breath. Initially admitted for UTI/pyelonephritis, acute  kidney injury, and acute polyarticular gout.    COMPARISON: 2/10/2022.    TECHNIQUE: Frontal and lateral views of the chest. Lateral views  significantly underpenetrated. Likely due to body habitus.    FINDINGS:   Midline trachea. Stable cardiomegaly. Congested pulmonary vasculature.  Costophrenic recesses are not appropriately visualized. No discernible  pneumothorax. Low lung volumes. Unchanged bilateral diffuse hazy  pulmonary opacities. No acute osseous adenopathy.      Impression    IMPRESSION:   1.  Cardiomegaly with low lung volumes and bilateral diffuse hazy  opacities most suggestive of pulmonary edema. Infection, atelectasis,  and ARDS are not excluded.  2.  Pulmonary vascular congestion suggestive of pulmonary  hypertension.  3.  Cardiomegaly.    Please note limitation in diagnostic interpretation given overexposure  of the study secondary to technical/physical " limitations.    GLEN DÍAZ,     I have personally reviewed the examination and initial interpretation  and I agree with the findings.    PHU SO MD         SYSTEM ID:  [id number]   INR     Status: Abnormal   Result Value Ref Range    INR 2.65 (H) 0.86 - 1.14   INR     Status: Abnormal   Result Value Ref Range    INR 2.72 (H) 0.86 - 1.14   Extra Tube     Status: None    Narrative    The following orders were created for panel order Extra Tube.  Procedure                               Abnormality         Status                     ---------                               -----------         ------                     Extra Green Top (Lithium...[[id number]]                      Final result               Extra Purple Top Tube[[id number]]                            Final result                 Please view results for these tests on the individual orders.   Extra Green Top (Lithium Heparin) Tube     Status: None   Result Value Ref Range    Hold Specimen [facility]    Extra Purple Top Tube     Status: None   Result Value Ref Range    Hold Specimen [facility]    INR     Status: Abnormal   Result Value Ref Range    INR 2.60 (H) 0.86 - 1.14   CBC with platelets     Status: Abnormal   Result Value Ref Range    WBC Count 4.9 4.0 - 11.0 10e3/uL    RBC Count 3.37 (L) 4.40 - 5.90 10e6/uL    Hemoglobin 8.9 (L) 13.3 - 17.7 g/dL    Hematocrit 28.7 (L) 40.0 - 53.0 %    MCV 85 78 - 100 fL    MCH 26.4 (L) 26.5 - 33.0 pg    MCHC 31.0 (L) 31.5 - 36.5 g/dL    RDW 16.3 (H) 10.0 - 15.0 %    Platelet Count 199 150 - 450 10e3/uL   Basic metabolic panel     Status: Abnormal   Result Value Ref Range    Sodium 137 133 - 144 mmol/L    Potassium 4.9 3.4 - 5.3 mmol/L    Chloride 110 (H) 94 - 109 mmol/L    Carbon Dioxide (CO2) 24 20 - 32 mmol/L    Anion Gap 3 3 - 14 mmol/L    Urea Nitrogen 25 7 - 30 mg/dL    Creatinine 1.94 (H) 0.66 - 1.25 mg/dL    Calcium 9.0 8.5 - 10.1 mg/dL    Glucose 93 70 - 99 mg/dL    GFR Estimate 37 (L) >60 mL/min/1.73m2    INR     Status: Abnormal   Result Value Ref Range    INR 2.47 (H) 0.86 - 1.14   Extra Tube     Status: None    Narrative    The following orders were created for panel order Extra Tube.  Procedure                               Abnormality         Status                     ---------                               -----------         ------                     Extra Green Top (Lithium...[[id number]]                      Final result               Extra Purple Top Tube[[id number]]                            Final result                 Please view results for these tests on the individual orders.   Extra Green Top (Lithium Heparin) Tube     Status: None   Result Value Ref Range    Hold Specimen [facility]    Extra Purple Top Tube     Status: None   Result Value Ref Range    Hold Specimen [facility]    INR     Status: Abnormal   Result Value Ref Range    INR 2.13 (H) 0.86 - 1.14   INR     Status: Abnormal   Result Value Ref Range    INR 2.21 (H) 0.86 - 1.14   CBC with platelets     Status: Abnormal   Result Value Ref Range    WBC Count 5.1 4.0 - 11.0 10e3/uL    RBC Count 3.76 (L) 4.40 - 5.90 10e6/uL    Hemoglobin 10.0 (L) 13.3 - 17.7 g/dL    Hematocrit 32.3 (L) 40.0 - 53.0 %    MCV 86 78 - 100 fL    MCH 26.6 26.5 - 33.0 pg    MCHC 31.0 (L) 31.5 - 36.5 g/dL    RDW 16.4 (H) 10.0 - 15.0 %    Platelet Count 188 150 - 450 10e3/uL   Basic metabolic panel     Status: Abnormal   Result Value Ref Range    Sodium 138 133 - 144 mmol/L    Potassium 5.1 3.4 - 5.3 mmol/L    Chloride 110 (H) 94 - 109 mmol/L    Carbon Dioxide (CO2) 23 20 - 32 mmol/L    Anion Gap 5 3 - 14 mmol/L    Urea Nitrogen 22 7 - 30 mg/dL    Creatinine 1.95 (H) 0.66 - 1.25 mg/dL    Calcium 9.2 8.5 - 10.1 mg/dL    Glucose 90 70 - 99 mg/dL    GFR Estimate 37 (L) >60 mL/min/1.73m2   INR     Status: Abnormal   Result Value Ref Range    INR 2.31 (H) 0.86 - 1.14   Potassium     Status: Abnormal   Result Value Ref Range    Potassium 5.4 (H) 3.4 - 5.3 mmol/L   Extra Tube     Status:  None    Narrative    The following orders were created for panel order Extra Tube.  Procedure                               Abnormality         Status                     ---------                               -----------         ------                     Extra Purple Top Tube[[id number]]                            Final result                 Please view results for these tests on the individual orders.   Extra Purple Top Tube     Status: None   Result Value Ref Range    Hold Specimen [facility]    Basic metabolic panel     Status: Abnormal   Result Value Ref Range    Sodium 138 133 - 144 mmol/L    Potassium 5.1 3.4 - 5.3 mmol/L    Chloride 111 (H) 94 - 109 mmol/L    Carbon Dioxide (CO2) 20 20 - 32 mmol/L    Anion Gap 7 3 - 14 mmol/L    Urea Nitrogen 24 7 - 30 mg/dL    Creatinine 2.04 (H) 0.66 - 1.25 mg/dL    Calcium 9.1 8.5 - 10.1 mg/dL    Glucose 136 (H) 70 - 99 mg/dL    GFR Estimate 35 (L) >60 mL/min/1.73m2   Basic metabolic panel     Status: Abnormal   Result Value Ref Range    Sodium 139 133 - 144 mmol/L    Potassium 5.0 3.4 - 5.3 mmol/L    Chloride 111 (H) 94 - 109 mmol/L    Carbon Dioxide (CO2) 24 20 - 32 mmol/L    Anion Gap 4 3 - 14 mmol/L    Urea Nitrogen 23 7 - 30 mg/dL    Creatinine 1.98 (H) 0.66 - 1.25 mg/dL    Calcium 8.8 8.5 - 10.1 mg/dL    Glucose 93 70 - 99 mg/dL    GFR Estimate 37 (L) >60 mL/min/1.73m2   Extra Tube     Status: None    Narrative    The following orders were created for panel order Extra Tube.  Procedure                               Abnormality         Status                     ---------                               -----------         ------                     Extra Purple Top Tube[[id number]]                            Final result                 Please view results for these tests on the individual orders.   Extra Purple Top Tube     Status: None   Result Value Ref Range    Hold Specimen [facility]    CBC with platelets     Status: Abnormal   Result Value Ref Range    WBC Count 5.1 4.0 -  11.0 10e3/uL    RBC Count 3.54 (L) 4.40 - 5.90 10e6/uL    Hemoglobin 9.4 (L) 13.3 - 17.7 g/dL    Hematocrit 29.9 (L) 40.0 - 53.0 %    MCV 85 78 - 100 fL    MCH 26.6 26.5 - 33.0 pg    MCHC 31.4 (L) 31.5 - 36.5 g/dL    RDW 16.5 (H) 10.0 - 15.0 %    Platelet Count 178 150 - 450 10e3/uL   Basic metabolic panel     Status: Abnormal   Result Value Ref Range    Sodium 142 133 - 144 mmol/L    Potassium 5.0 3.4 - 5.3 mmol/L    Chloride 113 (H) 94 - 109 mmol/L    Carbon Dioxide (CO2) 21 20 - 32 mmol/L    Anion Gap 8 3 - 14 mmol/L    Urea Nitrogen 22 7 - 30 mg/dL    Creatinine 2.01 (H) 0.66 - 1.25 mg/dL    Calcium 8.8 8.5 - 10.1 mg/dL    Glucose 100 (H) 70 - 99 mg/dL    GFR Estimate 36 (L) >60 mL/min/1.73m2   INR     Status: Abnormal   Result Value Ref Range    INR 2.19 (H) 0.86 - 1.14   Basic metabolic panel     Status: Abnormal   Result Value Ref Range    Sodium 142 133 - 144 mmol/L    Potassium 4.7 3.4 - 5.3 mmol/L    Chloride 112 (H) 94 - 109 mmol/L    Carbon Dioxide (CO2) 21 20 - 32 mmol/L    Anion Gap 9 3 - 14 mmol/L    Urea Nitrogen 21 7 - 30 mg/dL    Creatinine 2.08 (H) 0.66 - 1.25 mg/dL    Calcium 9.0 8.5 - 10.1 mg/dL    Glucose 93 70 - 99 mg/dL    GFR Estimate 34 (L) >60 mL/min/1.73m2   Extra Tube     Status: None    Narrative    The following orders were created for panel order Extra Tube.  Procedure                               Abnormality         Status                     ---------                               -----------         ------                     Extra Purple Top Tube[[id number]]                            Final result                 Please view results for these tests on the individual orders.   Extra Purple Top Tube     Status: None   Result Value Ref Range    Hold Specimen [facility]    INR     Status: Abnormal   Result Value Ref Range    INR 2.36 (H) 0.85 - 1.15   INR     Status: Abnormal   Result Value Ref Range    INR 2.84 (H) 0.86 - 1.14   CBC with platelets     Status: Abnormal   Result Value Ref  Range    WBC Count 5.6 4.0 - 11.0 10e3/uL    RBC Count 3.67 (L) 4.40 - 5.90 10e6/uL    Hemoglobin 9.6 (L) 13.3 - 17.7 g/dL    Hematocrit 31.9 (L) 40.0 - 53.0 %    MCV 87 78 - 100 fL    MCH 26.2 (L) 26.5 - 33.0 pg    MCHC 30.1 (L) 31.5 - 36.5 g/dL    RDW 17.3 (H) 10.0 - 15.0 %    Platelet Count 155 150 - 450 10e3/uL   Basic metabolic panel     Status: Abnormal   Result Value Ref Range    Sodium 140 133 - 144 mmol/L    Potassium 4.8 3.4 - 5.3 mmol/L    Chloride 111 (H) 94 - 109 mmol/L    Carbon Dioxide (CO2) 22 20 - 32 mmol/L    Anion Gap 7 3 - 14 mmol/L    Urea Nitrogen 23 7 - 30 mg/dL    Creatinine 1.97 (H) 0.66 - 1.25 mg/dL    Calcium 9.0 8.5 - 10.1 mg/dL    Glucose 97 70 - 99 mg/dL    GFR Estimate 37 (L) >60 mL/min/1.73m2   Basic metabolic panel     Status: Abnormal   Result Value Ref Range    Sodium 140 133 - 144 mmol/L    Potassium 4.5 3.4 - 5.3 mmol/L    Chloride 107 94 - 109 mmol/L    Carbon Dioxide (CO2) 21 20 - 32 mmol/L    Anion Gap 12 3 - 14 mmol/L    Urea Nitrogen 25 7 - 30 mg/dL    Creatinine 2.15 (H) 0.66 - 1.25 mg/dL    Calcium 9.3 8.5 - 10.1 mg/dL    Glucose 112 (H) 70 - 99 mg/dL    GFR Estimate 33 (L) >60 mL/min/1.73m2   Extra Tube     Status: None    Narrative    The following orders were created for panel order Extra Tube.  Procedure                               Abnormality         Status                     ---------                               -----------         ------                     Extra Purple Top Tube[[id number]]                            Final result                 Please view results for these tests on the individual orders.   Extra Purple Top Tube     Status: None   Result Value Ref Range    Hold Specimen [facility]    INR     Status: Abnormal   Result Value Ref Range    INR 3.01 (H) 0.85 - 1.15   Extra Tube     Status: None    Narrative    The following orders were created for panel order Extra Tube.  Procedure                               Abnormality         Status                      ---------                               -----------         ------                     Extra Green Top (Lithium...[[id number]]                      Final result               Extra Purple Top Tube[[id number]]                            Final result                 Please view results for these tests on the individual orders.   Extra Green Top (Lithium Heparin) Tube     Status: None   Result Value Ref Range    Hold Specimen [facility]    Extra Purple Top Tube     Status: None   Result Value Ref Range    Hold Specimen [facility]    INR     Status: Abnormal   Result Value Ref Range    INR 3.42 (H) 0.86 - 1.14   Basic metabolic panel     Status: Abnormal   Result Value Ref Range    Sodium 140 133 - 144 mmol/L    Potassium 4.4 3.4 - 5.3 mmol/L    Chloride 110 (H) 94 - 109 mmol/L    Carbon Dioxide (CO2) 23 20 - 32 mmol/L    Anion Gap 7 3 - 14 mmol/L    Urea Nitrogen 25 7 - 30 mg/dL    Creatinine 2.15 (H) 0.66 - 1.25 mg/dL    Calcium 9.0 8.5 - 10.1 mg/dL    Glucose 103 (H) 70 - 99 mg/dL    GFR Estimate 33 (L) >60 mL/min/1.73m2   Magnesium     Status: Abnormal   Result Value Ref Range    Magnesium 1.5 (L) 1.6 - 2.3 mg/dL   Extra Tube ([place] Draw)     Status: None    Narrative    The following orders were created for panel order Extra Tube ([place] Draw).  Procedure                               Abnormality         Status                     ---------                               -----------         ------                     Extra Purple Top Tube[[id number]]                            Final result                 Please view results for these tests on the individual orders.   Extra Purple Top Tube     Status: None   Result Value Ref Range    Hold Specimen [facility]    INR     Status: Abnormal   Result Value Ref Range    INR 3.45 (H) 0.86 - 1.14   Extra Tube ([place] Draw)     Status: None    Narrative    The following orders were created for panel order Extra Tube ([place] Draw).  Procedure                               Abnormality          Status                     ---------                               -----------         ------                     Extra Green Top (Lithium...[[id number]]                      Final result                 Please view results for these tests on the individual orders.   Extra Green Top (Lithium Heparin) Tube     Status: None   Result Value Ref Range    Hold Specimen [facility]    Extra Tube ([facility] Draw)     Status: None    Narrative    The following orders were created for panel order Extra Tube ([facility] Draw).  Procedure                               Abnormality         Status                     ---------                               -----------         ------                     Extra Purple Top Tube[[id number]]                            Final result                 Please view results for these tests on the individual orders.   Extra Purple Top Tube     Status: None   Result Value Ref Range    Hold Specimen [facility]    INR     Status: Abnormal   Result Value Ref Range    INR 2.97 (H) 0.86 - 1.14   Basic metabolic panel     Status: Abnormal   Result Value Ref Range    Sodium 141 133 - 144 mmol/L    Potassium 4.2 3.4 - 5.3 mmol/L    Chloride 108 94 - 109 mmol/L    Carbon Dioxide (CO2) 28 20 - 32 mmol/L    Anion Gap 5 3 - 14 mmol/L    Urea Nitrogen 26 7 - 30 mg/dL    Creatinine 2.20 (H) 0.66 - 1.25 mg/dL    Calcium 8.4 (L) 8.5 - 10.1 mg/dL    Glucose 102 (H) 70 - 99 mg/dL    GFR Estimate 32 (L) >60 mL/min/1.73m2   Magnesium     Status: Abnormal   Result Value Ref Range    Magnesium 1.3 (L) 1.6 - 2.3 mg/dL   CBC with platelets     Status: Abnormal   Result Value Ref Range    WBC Count 5.1 4.0 - 11.0 10e3/uL    RBC Count 3.94 (L) 4.40 - 5.90 10e6/uL    Hemoglobin 10.4 (L) 13.3 - 17.7 g/dL    Hematocrit 33.7 (L) 40.0 - 53.0 %    MCV 86 78 - 100 fL    MCH 26.4 (L) 26.5 - 33.0 pg    MCHC 30.9 (L) 31.5 - 36.5 g/dL    RDW 18.0 (H) 10.0 - 15.0 %    Platelet Count 164 150 - 450 10e3/uL   Basic metabolic panel     Status: Abnormal    Result Value Ref Range    Sodium 138 133 - 144 mmol/L    Potassium 4.0 3.4 - 5.3 mmol/L    Chloride 107 94 - 109 mmol/L    Carbon Dioxide (CO2) 25 20 - 32 mmol/L    Anion Gap 6 3 - 14 mmol/L    Urea Nitrogen 26 7 - 30 mg/dL    Creatinine 2.15 (H) 0.66 - 1.25 mg/dL    Calcium 8.9 8.5 - 10.1 mg/dL    Glucose 99 70 - 99 mg/dL    GFR Estimate 33 (L) >60 mL/min/1.73m2   INR     Status: Abnormal   Result Value Ref Range    INR 2.65 (H) 0.85 - 1.15   Magnesium     Status: Abnormal   Result Value Ref Range    Magnesium 1.5 (L) 1.6 - 2.3 mg/dL   INR     Status: Abnormal   Result Value Ref Range    INR 2.75 (H) 0.86 - 1.14   Basic metabolic panel     Status: Abnormal   Result Value Ref Range    Sodium 141 133 - 144 mmol/L    Potassium 3.8 3.4 - 5.3 mmol/L    Chloride 107 94 - 109 mmol/L    Carbon Dioxide (CO2) 26 20 - 32 mmol/L    Anion Gap 8 3 - 14 mmol/L    Urea Nitrogen 25 7 - 30 mg/dL    Creatinine 2.13 (H) 0.66 - 1.25 mg/dL    Calcium 9.1 8.5 - 10.1 mg/dL    Glucose 110 (H) 70 - 99 mg/dL    GFR Estimate 34 (L) >60 mL/min/1.73m2   Magnesium     Status: Abnormal   Result Value Ref Range    Magnesium 1.4 (L) 1.6 - 2.3 mg/dL   INR     Status: Abnormal   Result Value Ref Range    INR 2.96 (H) 0.85 - 1.15   Extra Tube     Status: None    Narrative    The following orders were created for panel order Extra Tube.  Procedure                               Abnormality         Status                     ---------                               -----------         ------                     Extra Green Top (Lithium...[[id number]]                      Final result                 Please view results for these tests on the individual orders.   Extra Green Top (Lithium Heparin) Tube     Status: None   Result Value Ref Range    Hold Specimen [facility]    Extra Tube ([place] Draw)     Status: None    Narrative    The following orders were created for panel order Extra Tube ([place] Draw).  Procedure                               Abnormality          Status                     ---------                               -----------         ------                     Extra Purple Top Tube[[id number]]                            Final result                 Please view results for these tests on the individual orders.   Extra Purple Top Tube     Status: None   Result Value Ref Range    Hold Specimen JI    INR     Status: Abnormal   Result Value Ref Range    INR 2.95 (H) 0.85 - 1.15   Extra Tube     Status: None    Narrative    The following orders were created for panel order Extra Tube.  Procedure                               Abnormality         Status                     ---------                               -----------         ------                     Extra Green Top (Lithium...[[id number]]                      Final result               Extra Purple Top Tube[[id number]]                            Final result                 Please view results for these tests on the individual orders.   Extra Green Top (Lithium Heparin) Tube     Status: None   Result Value Ref Range    Hold Specimen [facility]    Extra Purple Top Tube     Status: None   Result Value Ref Range    Hold Specimen [facility]    Basic metabolic panel     Status: Abnormal   Result Value Ref Range    Sodium 142 133 - 144 mmol/L    Potassium 3.7 3.4 - 5.3 mmol/L    Chloride 105 94 - 109 mmol/L    Carbon Dioxide (CO2) 28 20 - 32 mmol/L    Anion Gap 9 3 - 14 mmol/L    Urea Nitrogen 27 7 - 30 mg/dL    Creatinine 2.05 (H) 0.66 - 1.25 mg/dL    Calcium 9.3 8.5 - 10.1 mg/dL    Glucose 95 70 - 99 mg/dL    GFR Estimate 35 (L) >60 mL/min/1.73m2   Magnesium     Status: Abnormal   Result Value Ref Range    Magnesium 1.5 (L) 1.6 - 2.3 mg/dL   INR     Status: Abnormal   Result Value Ref Range    INR 3.08 (H) 0.85 - 1.15      No results found for this or any previous visit (from the past 48 hour(s)).             Pending Results:   Unresulted Labs Ordered in the Past 30 Days of this Admission     No orders found from 2/16/2022  to 3/19/2022.                  Discharge Instructions and Follow-Up:   No discharge procedures on file.         David Donovan MD  Internal Medicine Staff Hospitalist  [phone]  April 15, 2022        Time spent on patient: 35 minutes total including face to face and coordinating care time reviewing current illness, any medication changes, and the care plan for today.

## 2022-04-15 NOTE — PLAN OF CARE
A?O x4, afebrile, denied SOB (tolerating room air, uses CPAP at night), no complaint of pain. IV Mg replacement ( Serum M.5 today) given via Rt. Arm PIV at 1825H, to start on Magnesium PO by tomorrow. On Bumex, uses bedside urinal.Fentanyl patch intact on left chest ( placed: ) . All dressings on BLE changed this morning , wears tubi  on both legs, refused full skin assessment. Appears comfortable at this time. To continue POC.     Patient's most recent vital signs are:     Vital signs:  BP: 121/75  Temp: 97.1  HR: 75  RR: 20  SpO2: 97 %     Patient does not have new respiratory symptoms.  Patient does not have new sore throat.  Patient does not have a fever greater than 99.5.

## 2022-04-15 NOTE — PROGRESS NOTES
04/15/22 1101   Appointment Canceled   Appointment Canceled Other (see Cancel Comments row)   Cancel Comments PT: pt is going to d/c back to hospital for medical reasons   Signing Clinician's Name / Credentials   Signing clinician's name / credentials RAFY PrietoT

## 2022-04-15 NOTE — PLAN OF CARE
Goal Outcome Evaluation:    Pt is alert and oriented x 4. Able to make needs known through the use of call light. Pt continent of of both bowel and bladder. Denied pain, SOB and chest pain on assessment. Pt complain of being itchy at skin folds. Folds clean and powder applied per patient request. PIV on right arm intact clean and dry. Fentanyl patch in place due to be changed this morning. Appears to be comfortable at the time of this report. Will continue with plan of care.         Patient's most recent vital signs are:     Vital signs:  BP: 121/75  Temp: 97.1  HR: 75  RR: 20  SpO2: 97 %     Patient does not have new respiratory symptoms.  Patient does not have new sore throat.  Patient does not have a fever greater than 99.5.

## 2022-04-16 NOTE — CONSULTS
Care Management Initial Consult    General Information  Assessment completed with: Patient,    Type of CM/SW Visit: Initial Assessment    Primary Care Provider verified and updated as needed:     Readmission within the last 30 days: previous discharge plan unsuccessful, current reason for admission unrelated to previous admission   Return Category: New Diagnosis  Reason for Consult: discharge planning, utilization management concerns  Advance Care Planning: Advance Care Planning Reviewed: no concerns identified          Communication Assessment  Patient's communication style: spoken language (English or Bilingual)    Hearing Difficulty or Deaf: no   Wear Glasses or Blind: no    Cognitive  Cognitive/Neuro/Behavioral: WDL                      Living Environment:   People in home: spouse     Current living Arrangements: house      Able to return to prior arrangements: yes       Family/Social Support:  Care provided by: self, spouse/significant other  Provides care for: no one  Marital Status:   Wife          Description of Support System: Supportive, Involved         Current Resources:   Patient receiving home care services: No     Community Resources: None  Equipment currently used at home: walker, standard  Supplies currently used at home: Wound Care Supplies    Employment/Financial:  Employment Status: retired        Financial Concerns: No concerns identified           Lifestyle & Psychosocial Needs:  Social Determinants of Health     Tobacco Use: Low Risk      Smoking Tobacco Use: Never Smoker     Smokeless Tobacco Use: Never Used   Alcohol Use: Not on file   Financial Resource Strain: Not on file   Food Insecurity: Not on file   Transportation Needs: Not on file   Physical Activity: Not on file   Stress: Not on file   Social Connections: Not on file   Intimate Partner Violence: Not on file   Depression: Not at risk     PHQ-2 Score: 0   Housing Stability: Not on file       Functional Status:  Prior to  admission patient needed assistance:   Dependent ADLs:: Independent (pt states previously independent but now needs Ax1)  Dependent IADLs:: Cleaning, Cooking, Laundry, Shopping, Medication Management, Meal Preparation, Transportation       Mental Health Status:  Mental Health Status: No Current Concerns       Chemical Dependency Status:  Chemical Dependency Status: No Current Concerns             Values/Beliefs:  Spiritual, Cultural Beliefs, Islam Practices, Values that affect care: no (pt states that he has spoken with the  before)               Additional Information:  Panda Miranda is a 66 year old male admitted on 4/15/22. He has a past medical history significant for chronic HFrEF (EF 45-50% on 1/29/22), small perimembranous VSD w/ left to right shunt, HTN, CKD4 w/ baseline cr ~ 1.8 - 2.1, chronic atrial fibrillation, gout, pseudogout, morbid obesity s/p bariatric surgery and numerous other medical conditions.  He was hospitalized at [facility] from 1/27 - 1/30/22 for sepsis 2/2 complicated UTI vs. pyelonephritis.  He was hospitalized at [facility] from 2/11 - 2/20/22 for hyperkalemia, SHANTE and acute gout.  He was then hospitalized at [facility] 2/20 - 3/18/22 with weakness.  Pt was unable to walk up the steps in his home.  He was later found to have recurrent hyperkalemia.  During [facility] hospital stay, patient was also treated for acute polyarticular gout.  He completed prednisone taper during [facility] hospital stay.  He was also transitioned from uloric to allopurinol.  He had pancytopenia during [facility] hospital stay that resolved prior to discharge.  Transferred to [facility] on 3/18/22 for rehab and strengthening. He was transferred to [facility] on 4/15 due to persistent anasarca resistant to IV diuresis.    SW spoke with pt by bedside phone, introduced self, and explained role in pt's care.  Pt verified Dr. Hamlin as pts PCP.  Pt reports that he lives at home with spouse and reports that spouse also  takes care of her 38 year old son.  Pt reports that at discharge he will arrange friend/family to transport back home.  Pt reports needing help Ax1 for daily living tasks such as bathing and dressing and reports that spouse will help pt with any needs.    Pt reports that pt has a walker at home and has been working on receiving a wheelchair.  Pt reports no current mental health or chemical dependency concerns.    OREN Stephens, [facility]    Office: [phone]   Pager: [phone]  [email or web address]     For weekend social work needs, contact information below and available on [facility]:  4A, 4C, 4E, 5A, 5B                 pager [phone]  6A, 6B, 6C, 6D                        pager [phone]  7A, 7B, 7C, 7D, 5C                 pager [phone]    For weekend RN care coordinator needs (home discharge with needs including home care, assisted living facility returns, durable medical equip, IV antibiotics) - page [phone].

## 2022-04-16 NOTE — PLAN OF CARE
Arrived from TCU due to decline in condition. VSS on room air, home CPAP with sleep for TASHA. A&Ox4, strengths intact, edema noted in BLE's. Renal diet with 1200 mL fluid restriction- has had 400 mL since midnight - educated patient on spreading out fluid intake (ex. Approximately 400mL for day shift and 400mL for night shift). Uses urinal at bedside and LBM 4/14. Per report from TCU RN blaire lift is used for activity. Per flowsheets, assist 2 stand pivot with gait belt has recently been tolerated. Patient not OOB overnight - was slid on hover mat to bed. Currently on standard bed due to not being able to transport Pulsate and bariatric bed from TCU without taking parts of bed apart. Size wise contacted and will bring over the bariatric bed and recliner. Bariatric wheelchair is in patients room and was brought over with belongings. Wounds noted on both lower extremities - R lateral foot and L lateral ankle. WOC consult placed. Abdominal wound noted but patient refused full skin assessment at this time - unable to assess abdomen, skin folds, back/spine, and buttocks. RN managed electrolyte protocols in place. PIV SL. IV bumex scheduled.    Plan: Chest Xray this AM. Cardiology consult. WOC consult. PT/OT. Continue POC.

## 2022-04-16 NOTE — CONSULTS
[facility]    Cardiology Consult Note-Cardiology      Date of Admission:  4/15/2022  Consult Requested by: Medicine  Reason for Consult: Congestive heart failure    Assessment & Plan: SL   Panda Miranda is a 66 year old male admitted on 4/15/2022 with morbid obesity, hypertension, CKD III, obstructive sleep apnea/obesity hypoventilation syndrome, persistent atrial fibrillation, a restrictive small perimembranous VSD. He has moderate RV dilation and dysfunction likely due to chronic obesity.     #Acute on chronic heart failure with preserved ejection fraction  #Moderate RV dilation and dysfunction  #Morbid obesity  #Obesity hypoventilation syndrome    Recommendations:  -Aggressive IV diuresis to achieve fluid status of -3 L daily; this may require initiation of Lasix or Bumex continuous infusion  -Replete electrolytes, goal potassium greater than 4 magnesium greater than 2  -Goal weight will be approximately 75 pounds lower than current weight (475 pounds)  -Clinically okay to remain on [facility] for the time being, however may consider transferring to the [facility] for daily cardiology consult examination and evaluation    Zeke Rodriges MD, MSc  Cardiovascular Disease Fellow  [facility]      ______________________________________________________________________    Chief Complaint   Dyspnea and belly/leg swelling    History is obtained from the patient and electronic health record    History of Present Illness   Mr. Panda Miranda is a 66 y.o. male with morbid obesity, hypertension, CKD III, obstructive sleep apnea/obesity hypoventilation syndrome, persistent atrial fibrillation, a restrictive small perimembranous VSD.    Review of chart reveals complex recent medical history, including hospitalization at [facility] from 1/27 - 1/30/22 for sepsis 2/2 complicated UTI vs. Pyelonephritis, hospitalization at [facility] from 2/11 -  2/20/22 for hyperkalemia, SHANTE and acute gout, hospitalization at [facility] 2/20 - 3/18/22 with weaknessinability to walk up the steps in his home, later found to have recurrent hyperkalemia, acute polyarticular gout.  He completed prednisone taper during [facility] hospital stay.  Transferred to [facility] on 3/18/22 for rehab and strengthening. He was transferred to [facility] on 4/15 due to significant volume overload. By chart review, he is 75 lbs up from his weight at his last cardiology appointment.     Interview performed via iPad video assistance this patient is currently located on the [facility] and the cardiology service is located on the [place].  Patient reported that he had significant shortness of breath with low levels of activity including getting out of bed and ambulating.  He does not report shortness of breath with activities such as brushing his teeth.  As he has been in rehab, he has not been changing his close and has been consistently in a gown.  He is in good spirits and very pleasant during our encounter.  He notes significant swelling throughout his entire body as well as weight gain.  Notes that last summer he was able to ambulate for modest periods of time before having to take a break.  He was able to use the stairs in and out of his apartment.       Review of Systems   A comprehensive ROS was done and the details are included above in the HPI.    Past Medical History    I have reviewed this patient's medical history and updated it with pertinent information if needed.   Past Medical History:   Diagnosis Date    (HFpEF) heart failure with preserved ejection fraction (H)     Arthritis     Arthropathy of wrist     Atrial fibrillation (H)     Bradycardia     Cancer (H) 2011    colon cancer; hemicolectomy    CHF (congestive heart failure) (H)     Chronic kidney disease     Gout     Hand swelling     Heart valve disease     intermittent mitral regurgitation    HTN (hypertension)      Hyperlipidemia     Morbid obesity (H)     Obesity     TASHA (obstructive sleep apnea)     CPAP    Perimembranous ventricular septal defect        Past Surgical History   I have reviewed this patient's surgical history and updated it with pertinent information if needed.  Past Surgical History:   Procedure Laterality Date    COLON SURGERY      COLONOSCOPY N/A 12/12/2019    Procedure: COLONOSCOPY;  Surgeon: Flaco Magaña MD;  Location: [facility];  Service: General    GASTROPLASTY VERTICAL BANDED      Dr. Arizmendi [facility] 1996 Initial weight 800++ Lost to 440- (was 320# at lowest)    KNEE SURGERY      ZZC PART REMOVAL COLON W ANASTOMOSIS      Description: Partial Colectomy;  Recorded: 12/02/2011;  Comments: Dr Magaña       Social History   I have reviewed this patient's social history and updated it with pertinent information if needed.  Social History     Tobacco Use    Smoking status: Never Smoker    Smokeless tobacco: Never Used   Substance Use Topics    Alcohol use: Yes     Alcohol/week: 0.8 standard drinks     Comment: Alcoholic Drinks/day: occasional    Drug use: No     Family History   I have reviewed this patient's family history and updated it with pertinent information if needed.   I have reviewed this patient's family history and updated it with pertinent information if needed.  Family History   Problem Relation Age of Onset    Diabetes Type 2  Other     Heart Failure Other     Heart Disease Mother     Hypertension Mother     Diabetes Sister        Medications   Medications Prior to Admission   Medication Sig Dispense Refill Last Dose    acetaminophen (TYLENOL) 325 MG tablet Take 2 tablets (650 mg) by mouth every 6 hours as needed for mild pain or other (and adjunct with moderate or severe pain or per patient request)   Unknown at Unknown time    allopurinol (ZYLOPRIM) 300 MG tablet Take 1 tablet (300 mg) by mouth daily   4/16/2022 at Unknown time    colchicine (COLCYRS) 0.6 MG tablet Take 1  tablet (0.6 mg) by mouth daily   4/16/2022 at Unknown time    famotidine (PEPCID) 20 MG tablet Take 1 tablet (20 mg) by mouth 2 times daily as needed (heartburn)   Unknown at Unknown time    ferrous sulfate (FEROSUL) 325 (65 Fe) MG tablet Take 1 tablet (325 mg) by mouth daily 90 tablet 3 Unknown at Unknown time    melatonin 1 MG TABS tablet Take 1 tablet (1 mg) by mouth nightly as needed for sleep   Unknown at Unknown time    methocarbamol (ROBAXIN) 500 MG tablet Take 500 mg by mouth 3 times daily as needed for muscle spasms   Unknown at Unknown time    metoprolol succinate ER (TOPROL-XL) 25 MG 24 hr tablet Take 1 tablet (25 mg) by mouth daily   4/16/2022 at Unknown time    sodium bicarbonate 650 MG tablet Take 2 tablets (1,300 mg) by mouth 3 times daily 180 tablet 0 4/16/2022 at Unknown time    warfarin ANTICOAGULANT (COUMADIN) 3 MG tablet Take 1 tablet (3 mg) by mouth daily Adjust dose based on INR   4/15/2022 at Unknown time     Current Facility-Administered Medications   Medication    acetaminophen (TYLENOL) tablet 650 mg    allopurinol (ZYLOPRIM) tablet 300 mg    bisacodyl (DULCOLAX) Suppository 10 mg    bumetanide (BUMEX) injection 2 mg    carboxymethylcellulose PF (REFRESH LIQUIGEL) 1 % ophthalmic gel 1 drop    carboxymethylcellulose PF (REFRESH PLUS) 0.5 % ophthalmic solution 1 drop    colchicine (COLCYRS) tablet 0.6 mg    docusate sodium (COLACE) capsule 100 mg    famotidine (PEPCID) tablet 20 mg    fentaNYL (DURAGESIC) 25 mcg/hr 72 hr patch 1 patch    fentaNYL (DURAGESIC) Patch in Place    magnesium chloride CR tablet 1,070 mg    magnesium hydroxide (MILK OF MAGNESIA) suspension 30 mL    methocarbamol (ROBAXIN) tablet 500 mg    metoprolol succinate ER (TOPROL-XL) 24 hr tablet 25 mg    miconazole (MICATIN) 2 % powder    naloxone (NARCAN) injection 0.2 mg    Or    naloxone (NARCAN) injection 0.4 mg    Or    naloxone (NARCAN) injection 0.2 mg    Or    naloxone (NARCAN) injection 0.4 mg    Nurse may request  from Pharmacy a change of form of medication (e.g. Liquid to tablet).    ondansetron (ZOFRAN-ODT) ODT tab 4 mg    Or    ondansetron (ZOFRAN) injection 4 mg    oxyCODONE (ROXICODONE) tablet 5 mg    Patient is already receiving anticoagulation with heparin, enoxaparin (LOVENOX), warfarin (COUMADIN)  or other anticoagulant medication    polyethylene glycol (MIRALAX) Packet 17 g    sodium bicarbonate tablet 1,300 mg    warfarin ANTICOAGULANT (COUMADIN) tablet 2 mg    Warfarin Therapy Reminder (Check START DATE - warfarin may be starting in the FUTURE)       Allergies   No Known Allergies    Physical Exam   Vital Signs: Temp: 97.6  F (36.4  C) Temp src: Oral BP: 98/67 Pulse: 78   Resp: 18 SpO2: 100 % O2 Device: None (Room air)    Weight: 0 lbs 0 oz      GENERAL: Healthy, alert and no distress  Eyes: EOMI  NECK: No neck masses/goitre.   ENT: Moist mucosal membranes.  CV: JVP could not be appreciated via iPad.  RESPIRATORY: No signs of resp distress.   EXTREMITIES: Well-perfused, no edema.   NEUROLOGY: GCS 15/15, no focal deficits.  SKIN: No ecchymoses, no rashes.  PSYCH: Cooperative, pleasant affect.   .          Data   Results for orders placed or performed during the hospital encounter of 04/15/22 (from the past 24 hour(s))   INR   Result Value Ref Range    INR 3.00 (H) 0.85 - 1.15   Basic metabolic panel   Result Value Ref Range    Sodium 140 133 - 144 mmol/L    Potassium 3.8 3.4 - 5.3 mmol/L    Chloride 105 94 - 109 mmol/L    Carbon Dioxide (CO2) 29 20 - 32 mmol/L    Anion Gap 6 3 - 14 mmol/L    Urea Nitrogen 29 7 - 30 mg/dL    Creatinine 2.07 (H) 0.66 - 1.25 mg/dL    Calcium 9.4 8.5 - 10.1 mg/dL    Glucose 108 (H) 70 - 99 mg/dL    GFR Estimate 35 (L) >60 mL/min/1.73m2   CBC with platelets   Result Value Ref Range    WBC Count 5.5 4.0 - 11.0 10e3/uL    RBC Count 3.92 (L) 4.40 - 5.90 10e6/uL    Hemoglobin 10.3 (L) 13.3 - 17.7 g/dL    Hematocrit 34.0 (L) 40.0 - 53.0 %    MCV 87 78 - 100 fL    MCH 26.3 (L) 26.5 - 33.0 pg     MCHC 30.3 (L) 31.5 - 36.5 g/dL    RDW 17.9 (H) 10.0 - 15.0 %    Platelet Count 154 150 - 450 10e3/uL   Magnesium   Result Value Ref Range    Magnesium 1.9 1.6 - 2.3 mg/dL   Phosphorus   Result Value Ref Range    Phosphorus 3.1 2.5 - 4.5 mg/dL   XR Chest 2 Views    Narrative    EXAM: XR CHEST 2 VW 4/16/2022    HISTORY: anasarca, HF, f/u pulmonary edema.    COMPARISON: 4/4/2022.    TECHNIQUE: Upright frontal and lateral views of the chest.    FINDINGS: Unchanged cardiomegaly and mild symmetric attenuation of the  lower chest representing soft tissue. Improved volumes. Unchanged  perihilar predominant pulmonary opacities. No large pleural effusion,  or pneumothorax. No overt radiographic abnormality bones or upper  abdomen. Low lung volumes. Right costophrenic angle is not included.  Retrocardiac opacity may represent atelectasis versus consolidation.  Possible small left pleural effusion.      Impression    IMPRESSION: Persistent cardiomegaly with relatively unchanged  perihilar predominant bilateral pulmonary opacities, which may  represent pulmonary edema. Low lung volumes. Retrocardiac opacity may  represent atelectasis versus consolidation. Possible small left  pleural effusion.    I have personally reviewed the examination and initial interpretation  and I agree with the findings.    FLAKITA ADAME MD         SYSTEM ID:  [id number]

## 2022-04-16 NOTE — PHARMACY-ADMISSION MEDICATION HISTORY
Admission Medication History Completed by Pharmacy    See [facility] Admission Navigator for allergy information, preferred outpatient pharmacy, prior to admission medications and immunization status.     Medication History Sources:     [facility]    Pharmacy medication history completed 2/21/22    Changes made to PTA medication list (reason):    Added: None    Deleted: None    Changed: None    Additional Information:    Pt has been admitted to [facility] since 3/18/22, see eMAR for current medications    Prior to Admission medications    Medication Sig Last Dose Taking? Auth Provider   acetaminophen (TYLENOL) 325 MG tablet Take 2 tablets (650 mg) by mouth every 6 hours as needed for mild pain or other (and adjunct with moderate or severe pain or per patient request) Unknown at Unknown time Yes Fei Jones DO   allopurinol (ZYLOPRIM) 300 MG tablet Take 1 tablet (300 mg) by mouth daily 4/16/2022 at Unknown time Yes Barrera Jeter MD   colchicine (COLCYRS) 0.6 MG tablet Take 1 tablet (0.6 mg) by mouth daily 4/16/2022 at Unknown time Yes Barrera Jeter MD   famotidine (PEPCID) 20 MG tablet Take 1 tablet (20 mg) by mouth 2 times daily as needed (heartburn) Unknown at Unknown time Yes Fei Jones DO   ferrous sulfate (FEROSUL) 325 (65 Fe) MG tablet Take 1 tablet (325 mg) by mouth daily Unknown at Unknown time Yes Ben Hamlin MD   melatonin 1 MG TABS tablet Take 1 tablet (1 mg) by mouth nightly as needed for sleep Unknown at Unknown time Yes Fei Jones DO   methocarbamol (ROBAXIN) 500 MG tablet Take 500 mg by mouth 3 times daily as needed for muscle spasms Unknown at Unknown time Yes Unknown, Entered By History   metoprolol succinate ER (TOPROL-XL) 25 MG 24 hr tablet Take 1 tablet (25 mg) by mouth daily 4/16/2022 at Unknown time Yes Barrera Jeter MD   sodium bicarbonate 650 MG tablet Take 2 tablets (1,300 mg) by mouth 3 times daily 4/16/2022 at Unknown  time Yes Flaco Selby MD   warfarin ANTICOAGULANT (COUMADIN) 3 MG tablet Take 1 tablet (3 mg) by mouth daily Adjust dose based on INR 4/15/2022 at Unknown time Yes Barrera Jeter MD       Date completed: 04/16/22    Medication history completed by: Desirae Cook, [facility]

## 2022-04-16 NOTE — PLAN OF CARE
Goal Outcome Evaluation:TBD    Plan of Care Reviewed With: patient     Overall Patient Progress: no change

## 2022-04-16 NOTE — PLAN OF CARE
Blood pressure 98/67, pulse 78, temperature 97.6  F (36.4  C), temperature source Oral, resp. rate 18, SpO2 100 %.  Alert and O X 4, On RA. Personal CAPA beside.     Baratric bed is ready in the room, pt needs a double lifts to transfer, so he will be transfer from 805 to 830 when the room is ready.     Bilateral calves are wrapped, heels floated off bed,     R PIV, SL.     Using urina independently,     1200 ml fluid restriction.     Continue with POC.

## 2022-04-16 NOTE — PLAN OF CARE
/64 (BP Location: Left arm)   Pulse 86   Temp 97.8  F (36.6  C) (Oral)   Resp 18   SpO2 96%     Came from TCU due to decline in condition. A&Ox4. VSS on RA. Wears a CPAP at night when sleeping. Strengths in tact. Uses urinal at bedside. Last BM on 4/14. Quirino lift for getting in and out of bed. Flowsheets say assist of 2 pivot with gait belt (recently tolerated in TCU).     Has wounds on R calf, R heel, and L calf. Wound care done today per order. Patient currently on regular bed. Bariatric bed is in room (waiting for quirino to charge before moving to new bed). Patient had a chest x-ray done this morning and was started on IV-Bumex. IV is patent and SL. Patient able to make needs known and uses call light appropriately.     Renal diet with 1200 mL fluid restriction- has had 400 mL since midnight - educated patient on spreading out fluid intake (ex. Approximately 400mL for day shift and 400mL for night shift).

## 2022-04-16 NOTE — PROGRESS NOTES
[facility]    Medicine Progress Note - Hospitalist Service, GOLD TEAM 17    Date of Admission:  4/15/2022    Assessment & Plan          A: Patient is a 61 y/o man who has multiple medical problems including gout, pseudogout, atrial fibrillation, morbid obesity s/p past bariatric surgery and chronic kidney disease stage IIIb. Patient was hospitalized at [facility] from 27-Jan-2022 to 30-Jan-2022 for sepsis secondary to complicated urinary tract infection vs. pyelonephritis. Patient was hospitalized at [facility] from 11-Feb-2022 to 20-Feb-2022 for hyperkalemia, acute kidney injury and acute gout. Patient was discharged on 20-Feb-2022 and presented to [facility] with weakness - patient reportedly was unable to walk up the steps to his home. Patient was later found to have recurrent hyperkalemia. During hospital stay, patient was also treated for acute polyarticular gout. Patient completed a prednisone taper during hospital stay. Patient was also transitioned from uloric to allopurinol. Patient had pancytopenia during hospital stay that resolved prior to discharge. Patient was discharged to TCU on 18-Mar-2022. Patient was admitted to [place] on 15-Apr-2022 for anasarca.    P:  1.) Anasarca, acute on chronic systolic heart failure: Will increase bumex to 2 mg IV every 8 hours. Patient also on fluid restriction.   2.) Paroxysmal atrial fibrillation: Patient on coumadin for anticoagulation.   3.) Chronic kidney disease stage IIIb: Monitoring labs with diuretics.  4.) Chronic venous stasis ulcers: Wound care.  5.) Gout: Patient on colchicine and allopurinol.  6.) Morbid obesity: To f/u as outpatient.  7.) History of colon cancer s/p hemicolectomy in 2011: Further surveillance as outpatient.  8.) Generalized weakness, debility, deconditioning: PT/OT.  9.) Anemia of chronic disease: Monitoring labs periodically.  10.) Obstructive sleep apnea: Patient to use  CPAP.      Omer Gilmore MD  Hospitalist Service, GOLD TEAM 17  [facility]  Securely message with the [employer] Web Console (learn more here)  Text page via [facility] Paging/Directory   Please see signed in provider for up to date coverage information    ______________________________________________________________________    Interval History     Patient notes some improvement in edema. Patient notes no dyspnea, no cough and no new problems.    Physical Exam   Vital Signs: Temp: 97.8  F (36.6  C) Temp src: Oral BP: 109/64 Pulse: 86   Resp: 18 SpO2: 96 % O2 Device: None (Room air)      General: Patient comfortable, NAD.  HEENT: No scleral icterus or conjunctival injection.  Heart: Irregularly irregular. S1 S2 w/o murmurs.  Lungs: Breath sounds present. No crackles/wheezes heard.  Abdomen: Soft, nontender.    Data   Labs noted. Sodium 140; Creatinine 2.07; WBC 5.5; Hb 10.3

## 2022-04-16 NOTE — PHARMACY-ANTICOAGULATION SERVICE
Clinical Pharmacy - Warfarin Dosing Consult     Pharmacy has been consulted to manage this patient s warfarin therapy.  Indication: Atrial Fibrillation  Therapy Goal: INR 2-3  Warfarin Prior to Admission: Yes  Warfarin PTA Regimen: 7.5 mg Tuesdays, 5 mg ROW  Recent documented change in oral intake/nutrition: Unknown    INR   Date Value Ref Range Status   04/16/2022 3.00 (H) 0.85 - 1.15 Final   04/15/2022 3.08 (H) 0.85 - 1.15 Final       Recommend warfarin 2 mg today.  Pharmacy will monitor Panda Miranda daily and order warfarin doses to achieve specified goal.      Please contact pharmacy as soon as possible if the warfarin needs to be held for a procedure or if the warfarin goals change.      Desirae Cook, KpD, BCPS

## 2022-04-17 NOTE — PROGRESS NOTES
04/17/22 1400   Quick Adds   Type of Visit Initial PT Evaluation   Living Environment   People in Home spouse   Current Living Arrangements house   Home Accessibility stairs within home   Number of Stairs, Main Entrance 5   Stair Railings, Main Entrance railings on both sides of stairs   Number of Stairs, Within Home, Primary greater than 10 stairs  (15)   Stair Railings, Within Home, Primary railings on both sides of stairs   Transportation Anticipated family or friend will provide   Living Environment Comments PT: Pt lives with wife and adult son who requires 24/7 nursing cares. Pt reports bedroom on 2nd level but states that he could remain on main level if needed. This is contradictory to TCU charting which states wife reports unable to discharge unless able to demostrate 2nd level access.   Self-Care   Usual Activity Tolerance moderate   Current Activity Tolerance fair   Regular Exercise No   Equipment Currently Used at Home none   Fall history within last six months no   Activity/Exercise/Self-Care Comment PT: Prior to admission pt was IND w/ ambulation for short distances and ADLs. Pt most recently at  TCU where he experienced a decline in mobility status to the point on working on standin w/ increased physical assist.   General Information   Onset of Illness/Injury or Date of Surgery 04/15/22   Referring Physician Keke Saleem, PA   Patient/Family Therapy Goals Statement (PT) to regain mobility   Pertinent History of Current Problem (include personal factors and/or comorbidities that impact the POC) Patient is a 59 y/o man who has multiple medical problems including gout, pseudogout, atrial fibrillation, morbid obesity s/p past bariatric surgery and chronic kidney disease that, from chart review, is stage IV. Patient was hospitalized at [facility] from 27-Jan-2022 to 30-Jan-2022 for sepsis secondary to complicated urinary tract infection vs. pyelonephritis. Patient was hospitalized at [facility] from 11-Feb-2022 to 20-Feb-2022 for hyperkalemia, acute kidney injury and acute gout. Patient was discharged on 20-Feb-2022 and presented to [facility] with weakness - patient reportedly was unable to walk up the steps to his home. Patient was later found to have recurrent hyperkalemia. During hospital stay, patient was also treated for acute polyarticular gout. Patient completed a prednisone taper during hospital stay. Patient was also transitioned from uloric to allopurinol. Patient had pancytopenia during hospital stay that resolved prior to discharge. Patient was discharged to TCU on 18-Mar-2022. Patient was admitted to [place] on 15-Apr-2022 for anasarca.   Existing Precautions/Restrictions fall   Heart Disease Risk Factors Race;Age;Gender;Medical history;Lack of physical activity   Cognition   Affect/Mental Status (Cognition) WNL   Orientation Status (Cognition) oriented x 4   Follows Commands (Cognition) WNL   Pain Assessment   Patient Currently in Pain Yes, see Vital Sign flowsheet  (reports all over BLEs, always in pain)   Integumentary/Edema   Integumentary/Edema Comments significant anasarca w/ agressive diuresis currently. firm fibrotic over legs, upper thighs and trunk bilaterally. open wounds visible proximal LLE, wrapped in gauze BLE from knee creases to MTPs.   Posture    Posture Forward head position;Protracted shoulders   Range of Motion (ROM)   ROM Comment LEs reduced 2/2 soft tissue approximation and pain/ body habitus limiting full assessment. Pt unable to demo heel slide w/o AAROM assist BLE through partial range. WFL BUE   Strength Comprehensive (MMT)   Comment, General Manual Muscle Testing (MMT) Assessment severely deconditioned, unable to demo heel slide bilaterally. WFL BUE.   Bed Mobility   Comment, (Bed Mobility) maxA of 2   Transfers   Comment, (Transfers) NT, unsafe 2/2 deconditioning   Gait/Stairs (Locomotion)   Comment, (Gait/Stairs) NT, unsafe 2/2 deconditioning    Balance   Balance Comments unable to assess balance fully, requires full support of sling or recliner back today in sitting   Sensory Examination   Sensory Perception Comments painful to deep pressure in BLEs, light touch absent inferior to mid calf kings   Coordination   Coordination no deficits were identified   Muscle Tone   Muscle Tone no deficits were identified   Clinical Impression   Criteria for Skilled Therapeutic Intervention Yes, treatment indicated   PT Diagnosis (PT) impaired fucntional mobility   Influenced by the following impairments medical status, comorbidities, activity tolerance, functinal strength and balance, edema   Functional limitations due to impairments decreased (I) w/ bed mobility, transfers, gait   Clinical Presentation (PT Evaluation Complexity) Unstable/Unpredictable   Clinical Presentation Rationale comorbidities, PLOF, current status, clinical judgement   Clinical Decision Making (Complexity) high complexity   Planned Therapy Interventions (PT) balance training;bed mobility training;home exercise program;neuromuscular re-education;patient/family education;postural re-education;ROM (range of motion);stair training;strengthening;stretching;transfer training;wheelchair management/propulsion training;gait training;manual therapy techniques;progressive activity/exercise;risk factor education;home program guidelines   Anticipated Equipment Needs at Discharge (PT) bariatric equipment;walker, standard  (likely TCU discharge)   Risk & Benefits of therapy have been explained evaluation/treatment results reviewed;care plan/treatment goals reviewed;risks/benefits reviewed;current/potential barriers reviewed;participants voiced agreement with care plan;participants included;patient   Clinical Impression Comments PT: Pt is well below baseline status and presents with significnat deconditioning, functional strength, balance and activity tolerance. Significant edema and BMI present as factors  complicating POC and mobility progression. Will require skilled PT to progress functional IND.   PT Discharge Planning   PT Discharge Recommendation (DC Rec) Transitional Care Facility   PT Rationale for DC Rec Pt well below baseline, Lift assist at this time. Will require return to TCU to progress functional IND.   PT Brief overview of current status OH lift w/ double boon and ultra sling. A of 3 for rolling   Plan of Care Review   Plan of Care Reviewed With patient   Total Evaluation Time   Total Evaluation Time (Minutes) 5   Physical Therapy Goals   PT Frequency 5x/week   PT Predicted Duration/Target Date for Goal Attainment 04/24/22   PT Goals Bed Mobility;Transfers;Gait;Stairs   PT: Bed Mobility Minimal assist;Rolling;Supine to/from sit   PT: Transfers Supervision/stand-by assist;Modified independent;Sit to/from stand;Bed to/from chair   PT: Gait 50 feet;Supervision/stand-by assist;Standard walker   PT: Stairs Supervision/stand-by assist;Greater than 10 stairs;Rail on right

## 2022-04-17 NOTE — PROGRESS NOTES
[facility]    Medicine Progress Note - Hospitalist Service, GOLD TEAM 17    Date of Admission:  4/15/2022    Assessment & Plan          A: Patient is a 61 y/o man who has multiple medical problems including gout, pseudogout, atrial fibrillation, morbid obesity s/p past bariatric surgery and chronic kidney disease that, from chart review, is stage IV. Patient was hospitalized at [facility] from 27-Jan-2022 to 30-Jan-2022 for sepsis secondary to complicated urinary tract infection vs. pyelonephritis. Patient was hospitalized at [facility] from 11-Feb-2022 to 20-Feb-2022 for hyperkalemia, acute kidney injury and acute gout. Patient was discharged on 20-Feb-2022 and presented to [facility] with weakness - patient reportedly was unable to walk up the steps to his home. Patient was later found to have recurrent hyperkalemia. During hospital stay, patient was also treated for acute polyarticular gout. Patient completed a prednisone taper during hospital stay. Patient was also transitioned from uloric to allopurinol. Patient had pancytopenia during hospital stay that resolved prior to discharge. Patient was discharged to TCU on 18-Mar-2022. Patient was admitted to [place] on 15-Apr-2022 for anasarca.     P:  1.) Anasarca, acute on chronic systolic heart failure: Continuing bumex 2 mg IV every 8 hours. Per Cardiology recommendations, diuresis with a target of -2 L daily. Patient also on fluid restriction.   2.) Permanent atrial fibrillation: Patient on coumadin for anticoagulation.   3.) Chronic kidney disease stage IV: Monitoring labs with diuretics.  4.) Chronic venous stasis ulcers: Wound care.  5.) Gout: Patient on colchicine and allopurinol.  6.) Morbid obesity: To f/u as outpatient.  7.) History of colon cancer s/p hemicolectomy in 2011: Further surveillance as outpatient.  8.) Generalized weakness, debility, deconditioning: PT/OT.  9.) Anemia of chronic  disease: Monitoring labs periodically.  10.) Obstructive sleep apnea: Patient to use CPAP.      Omer Gilmore MD  Hospitalist Service, GOLD TEAM [address] [place] [address]  Securely message with the [employer] Web Console (learn more here)  Text page via [facility] Paging/Directory   Please see signed in provider for up to date coverage information    ______________________________________________________________________    Interval History     Patient notes some improvement in edema. Patient notes no dyspnea and notes no new problems.    Physical Exam   Vital Signs: Temp: 97.7  F (36.5  C) Temp src: Oral BP: 96/53 Pulse: 82   Resp: 16 SpO2: 100 % O2 Device: None (Room air)      General: Patient comfortable, NAD.  HEENT: No scleral icterus or conjunctival injection.  Heart: RRR, S1 S2 w/o murmurs.  Lungs: Breath sounds present. No crackles/wheezes heard.  Abdomen: Firm, nontender.    Data   Labs noted. Sodium 139; Potassium 3.8; Creatinine 2.07

## 2022-04-17 NOTE — PROGRESS NOTES
04/17/22 1353   Quick Adds   Type of Visit Initial Occupational Therapy Evaluation   Living Environment   People in Home spouse   Current Living Arrangements house   Home Accessibility stairs within home   Living Environment Comments Tub; stairs to bedroom   Self-Care   Usual Activity Tolerance moderate   Current Activity Tolerance fair   Equipment Currently Used at Home none   Activity/Exercise/Self-Care Comment Patient independent in ADLs/mobility without AE   General Information   Onset of Illness/Injury or Date of Surgery 04/16/22   Referring Physician Dr. Gilmore   Patient/Family Therapy Goal Statement (OT) return home to baseline   Additional Occupational Profile Info/Pertinent History of Current Problem : Patient is a 61 y/o man who has multiple medical problems including gout, pseudogout, atrial fibrillation, morbid obesity s/p past bariatric surgery and chronic kidney disease that, from chart review, is stage IV. Patient was hospitalized at [facility] from 27-Jan-2022 to 30-Jan-2022 for sepsis secondary to complicated urinary tract infection vs. pyelonephritis. Patient was hospitalized at [facility] from 11-Feb-2022 to 20-Feb-2022 for hyperkalemia, acute kidney injury and acute gout. Patient was discharged on 20-Feb-2022 and presented to [facility] with weakness - patient reportedly was unable to walk up the steps to his home. Patient was later found to have recurrent hyperkalemia. During hospital stay, patient was also treated for acute polyarticular gout. Patient completed a prednisone taper during hospital stay. Patient was also transitioned from uloric to allopurinol. Patient had pancytopenia during hospital stay that resolved prior to discharge. Patient was discharged to [facility] on 18-Mar-2022. Patient was admitted to [place] on 15-Apr-2022 for anasarca.   Cognitive Status Examination   Orientation Status orientation to person, place and time   Follows Commands WNL   Pain Assessment   Patient  Currently in Pain Yes, see Vital Sign flowsheet   Bed Mobility   Bed Mobility rolling right;rolling left   Rolling Left [place] (Bed Mobility) maximum assist (25% patient effort);2 person assist   Rolling Right [place] (Bed Mobility) maximum assist (25% patient effort);2 person assist   Transfers   Transfers bed-chair transfer   Transfer Skill: Bed to Chair/Chair to Bed   Bed-Chair [place] (Transfers) dependent (less than 25% patient effort)   Activities of Daily Living   BADL Assessment/Intervention lower body dressing;toileting   Lower Body Dressing Assessment/Training   [place] Level (Lower Body Dressing) dependent (less than 25% patient effort)   Toileting   [place] Level (Toileting) dependent (less than 25% patient effort)   Clinical Impression   Criteria for Skilled Therapeutic Interventions Met (OT) Yes, treatment indicated   OT Diagnosis impaired self-cares/mobility   OT Problem List-Impairments impacting ADL strength   Assessment of Occupational Performance 1-3 Performance Deficits   Identified Performance Deficits dressing, bathing, toileting   Planned Therapy Interventions (OT) ADL retraining   Clinical Decision Making Complexity (OT) low complexity   Risk & Benefits of therapy have been explained evaluation/treatment results reviewed;care plan/treatment goals reviewed;patient   OT Discharge Planning   OT Discharge Recommendation (DC Rec) Transitional Care Facility   OT Rationale for DC Rec Patient is below baseline and would benefit from continued therapy for maximum independence in ADLs/mobility   OT Brief overview of current status Patient is alert and oriented; pleasant.  Limited by fluid/body habitus; max A x 2 to roll in bed, dependent ceiling lift to chair.  Max A for LB dressing, toileting.   Total Evaluation Time (Minutes)   Total Evaluation Time (Minutes) 8   OT Goals   Therapy Frequency (OT) 5 times/wk   OT Predicted Duration/Target Date for Goal Attainment 04/30/22    OT: Hygiene/Grooming modified independent   OT: Upper Body Dressing Modified independent   OT: Lower Body Dressing Minimal assist;using adaptive equipment   OT: Upper Body Bathing Modified independent   OT: Toilet Transfer/Toileting Minimal assist;toilet transfer;cleaning and garment management;using adaptive equipment

## 2022-04-17 NOTE — PLAN OF CARE
Denies pain. Fluid restriction followed. IV diuretics (strict A&O). Frequently voiding in urinal. Pt in bed throughout shift. Dressings intact. Double ceiling lift for transfers. Playing games on his phone and watching TV.

## 2022-04-17 NOTE — PROGRESS NOTES
VS: /62 (BP Location: Left arm, Patient Position: Semi-Garcia's, Cuff Size: Adult Regular)   Pulse 87   Temp 98  F (36.7  C) (Oral)   Resp 16   SpO2 96%      O2: No issues on room air, wore home CPAP overnight   Output: Voids spontaneously and aggresively diuresing with IV bumex   Last BM: 4/14   Activity: Liko with 2 assist   Skin: Venous stasis ulcers on shins and R heel PU - see WOC note for details   Pain: Denied   CMS: Intact   Dressing: CDI   Diet: Renal diet, low saturated fat,   1200 mL fluid restriction   LDA: PIV RFA SL   Plan: Patient should be negative 3L per day of fluid per cardiology, important that we track Is&Os accurately    Additional Info:

## 2022-04-17 NOTE — PLAN OF CARE
"BP 96/53   Pulse 82   Temp 97.7  F (36.5  C) (Oral)   Resp 16   SpO2 100%     Patient is A&Ox4. VSS on RA. Denies pain. CMS intact. Patient states that he has \"more feeling\" in his lower extremities.  Patient is getting IV diuretics for swelling and is having very good output. Patient is on I&O's. Patient uses urinal at bedside and uses a bedpan for BM.  Writer got patient in his chair today with OT. Patient tolerated very well.  Patient has daily weights.  Patient has wounds on R calf, R lateral foot and L calf. Wound care was done this morning.   Patient is on a bariatric bed. Patient uses double ceiling lift for transfers.  Patient is able to make needs known and uses call light appropriately.     Renal diet with 1200 mL fluid restriction- has had 400 mL since midnight - educated patient on spreading out fluid intake (ex. Approximately 400mL for day shift and 400mL for night shift).   "

## 2022-04-18 NOTE — CARE PLAN
VS: /69 (BP Location: Left arm)   Pulse 81   Temp 98.5  F (36.9  C) (Oral)   Resp 18   SpO2 98%      O2: No issues, room air during the day, home CPAP at night   Output: Voids spontaneously   Last BM: 4/14   Activity: Lift x2 for transfers   Skin: Venous stasis ulcers on BLE, PU on heel. See WOC note for details   Pain: Denies   CMS: Intact   Dressing: CDI   Diet: Renal, low fat, 1200 mL fluid restriction   LDA: PIV   Plan: Continue diuresis   Additional Info: Bed scale on patient's bed was not working 4/17

## 2022-04-18 NOTE — PROGRESS NOTES
S: [facility] consult for BLE acknowledged. Pt known to [facility] service.     B: History per MD note: Patient is a 61 y/o man who has multiple medical problems including gout, pseudogout, atrial fibrillation, morbid obesity s/p past bariatric surgery and chronic kidney disease that, from chart review, is stage IV. Patient was hospitalized at [facility] from 27-Jan-2022 to 30-Jan-2022 for sepsis secondary to complicated urinary tract infection vs. pyelonephritis. Patient was hospitalized at [facility] from 11-Feb-2022 to 20-Feb-2022 for hyperkalemia, acute kidney injury and acute gout. Patient was discharged on 20-Feb-2022 and presented to [facility] with weakness - patient reportedly was unable to walk up the steps to his home. Patient was later found to have recurrent hyperkalemia. During hospital stay, patient was also treated for acute polyarticular gout. Patient completed a prednisone taper during hospital stay. Patient was also transitioned from uloric to allopurinol. Patient had pancytopenia during hospital stay that resolved prior to discharge. Patient was discharged to TCU on 18-Mar-2022. Patient was admitted to [place] on 15-Apr-2022 for anasarca.       A/R: Last assessment Wed 4/13 by this writer. Per chart review, no new skin concerns identified on transfer. Wounds have been improving, POC remains in place, will re-assess later this week.     Michelle Meléndez RN BSN CWOCN

## 2022-04-18 NOTE — PLAN OF CARE
/64 (BP Location: Left arm)   Pulse 76   Temp 97.3  F (36.3  C) (Oral)   Resp 18   Wt (!) 210 kg (462 lb 15.5 oz)   SpO2 100%   BMI (P) 53.50 kg/m      Patient A&Ox4. VSS on RA. Denies pain.  Patient off of Tele.  Writer and NST gave patient a full bed bath. Writer noticed that patient had a couple open abrasions within his skin folds. Writer placed tegaderm over the open areas (X2). Writer also notified Provider and they said to just wash with NS and place wound patch over it. Writer also completed wound care on patients wounds on his R calf, R lateral heel, and L calf per wound care order.  Patient got up and walked to his chair with PT/OT. Tolerated well.  Patient has daily weights (not done during AM shift. Weigh patient in bed.)  Patient is able to make needs known and uses call light appropriately. Continue with POC.    Renal diet with 1200 mL fluid restriction- has had 400 mL since midnight - educated patient on spreading out fluid intake (ex. Approximately 400mL for day shift and 400mL for night shift).

## 2022-04-18 NOTE — PROGRESS NOTES
[facility]    Medicine Progress Note - Hospitalist Service, GOLD TEAM 17    Date of Admission:  4/15/2022    Assessment & Plan            A: Patient is a 59 y/o man who has multiple medical problems including gout, pseudogout, atrial fibrillation, morbid obesity s/p past bariatric surgery and chronic kidney disease that, from chart review, is stage IV. Patient was hospitalized at [facility] from 27-Jan-2022 to 30-Jan-2022 for sepsis secondary to complicated urinary tract infection vs. pyelonephritis. Patient was hospitalized at [facility] from 11-Feb-2022 to 20-Feb-2022 for hyperkalemia, acute kidney injury and acute gout. Patient was discharged on 20-Feb-2022 and presented to [facility] with weakness - patient reportedly was unable to walk up the steps to his home. Patient was later found to have recurrent hyperkalemia. During hospital stay, patient was also treated for acute polyarticular gout. Patient completed a prednisone taper during hospital stay. Patient was also transitioned from uloric to allopurinol. Patient had pancytopenia during hospital stay that resolved prior to discharge. Patient was discharged to TCU on 18-Mar-2022. Patient was admitted to [place] on 15-Apr-2022 for anasarca.    On 17-Apr-2022, net fluid balance was -5,465 mL     P:  1.) Anasarca, acute on chronic systolic heart failure: Continuing bumex 2 mg IV every 8 hours. Patient also on fluid restriction.   2.) Permanent atrial fibrillation: Patient on coumadin for anticoagulation.   3.) Chronic kidney disease stage IV: Monitoring labs with diuretics.  4.) Chronic venous stasis ulcers: Wound care.  5.) Gout: Patient on colchicine and allopurinol.  6.) Morbid obesity: To f/u as outpatient.  7.) History of colon cancer s/p hemicolectomy in 2011: Further surveillance as outpatient.  8.) Generalized weakness, debility, deconditioning: PT/OT.  9.) Anemia of chronic disease: Monitoring labs  periodically.  10.) Obstructive sleep apnea: Patient to use CPAP.       Omer Gilmore MD  Hospitalist Service, GOLD TEAM 17  [facility]  Securely message with the Vocera Web Console (learn more here)  Text page via [facility] Paging/Directory   Please see signed in provider for up to date coverage information    ______________________________________________________________________    Interval History     Patient notes feeling less swollen in general. Patient reports dyspnea on exertion has improved and reports being able to get from bed to chair without dyspnea.     Physical Exam   Vital Signs: Temp: 97.3  F (36.3  C) Temp src: Oral BP: 103/64 Pulse: 76   Resp: 18 SpO2: 100 % O2 Device: None (Room air)    Weight: 462 lbs 15.46 oz    General: Patient comfortable, NAD.  HEENT: No scleral icterus or conjunctival injection.  Heart: RRR, S1 S2 w/o murmurs.  Lungs: Breath sounds attenuated by body habitus and positioning but no overt crackles/wheezes heard.  Abdomen: Soft, nontender.     Data

## 2022-04-18 NOTE — PROGRESS NOTES
Clinic Care Coordination Contact    Situation: Patient chart reviewed by care coordinator.    Background: SW completed chart review    Assessment: Patient was in TCU, now is admitted to [facility]. Notes indicate patient will likely discharge back to TCU.     Plan/Recommendations: GRACIA will chart review in two weeks

## 2022-04-19 NOTE — PROGRESS NOTES
[facility]    Medicine Progress Note - Hospitalist Service, GOLD TEAM 17    Date of Admission:  4/15/2022    Assessment & Plan            A: Patient is a 59 y/o man who has multiple medical problems including gout, pseudogout, atrial fibrillation, morbid obesity s/p past bariatric surgery and chronic kidney disease that, from chart review, is stage IV. Patient was hospitalized at [facility] from 27-Jan-2022 to 30-Jan-2022 for sepsis secondary to complicated urinary tract infection vs. pyelonephritis. Patient was hospitalized at [facility] from 11-Feb-2022 to 20-Feb-2022 for hyperkalemia, acute kidney injury and acute gout. Patient was discharged on 20-Feb-2022 and presented to [facility] with weakness - patient reportedly was unable to walk up the steps to his home. Patient was later found to have recurrent hyperkalemia. During hospital stay, patient was also treated for acute polyarticular gout. Patient completed a prednisone taper during hospital stay. Patient was also transitioned from uloric to allopurinol. Patient had pancytopenia during hospital stay that resolved prior to discharge. Patient was discharged to TCU on 18-Mar-2022. Patient was admitted to [place] on 15-Apr-2022 for anasarca.       P:  1.) Anasarca, acute on chronic systolic heart failure: Continuing bumex 2 mg IV every 8 hours. Patient also on fluid restriction. Volume statis improving. Strict I/O's. Daily weight.     2.) Permanent atrial fibrillation: Patient on coumadin for anticoagulation.     3.) Chronic kidney disease stage IV: Monitoring labs with diuretics.    4.) Chronic venous stasis ulcers: Wound care.    5.) Gout: Patient on colchicine and allopurinol.    6.) Morbid obesity: To f/u as outpatient.    7.) History of colon cancer s/p hemicolectomy in 2011: Further surveillance as   outpatient.    8.) Generalized weakness, debility, deconditioning: PT/OT.    9.) Anemia of chronic  disease: Monitoring labs periodically.    10.) Obstructive sleep apnea: Patient to use CPAP.       Michael Blanco MD  Hospitalist Service, GOLD TEAM [address] [place] [address]  Securely message with the Vocera Web Console (learn more here)  Text page via [facility] Paging/Directory   Please see signed in provider for up to date coverage information    ______________________________________________________________________    Interval History     No acute events overnight   He was pleasant.   Peripheral edema is improving.   Shortness of breath is improving.   No chest pain or palpitations.     Physical Exam   Vital Signs: Temp: 98.6  F (37  C) Temp src: Axillary BP: 120/71 Pulse: 68   Resp: 18 SpO2: 99 % O2 Device: None (Room air)    Weight: 462 lbs 15.46 oz    Vitals:    04/17/22 1600   Weight: (!) 210 kg (462 lb 15.5 oz)       Intake/Output Summary (Last 24 hours) at 4/19/2022 1500  Last data filed at 4/19/2022 1435  Gross per 24 hour   Intake 400 ml   Output 3400 ml   Net -3000 ml     General: Patient comfortable, NAD.  HEENT: No scleral icterus or conjunctival injection.  Heart: RRR, S1 S2 w/o murmurs.  Lungs: Breath sounds attenuated by body habitus and positioning but no overt crackles/wheezes heard.  Abdomen: Soft, nontender.     Data     Last Comprehensive Metabolic Panel:  Sodium   Date Value Ref Range Status   04/19/2022 138 133 - 144 mmol/L Final   06/18/2021 140 133 - 144 mmol/L Final     Potassium   Date Value Ref Range Status   04/19/2022 3.7 3.4 - 5.3 mmol/L Final   06/18/2021 4.0 3.4 - 5.3 mmol/L Final     Chloride   Date Value Ref Range Status   04/19/2022 99 94 - 109 mmol/L Final   06/18/2021 103 94 - 109 mmol/L Final     Carbon Dioxide   Date Value Ref Range Status   06/18/2021 28 20 - 32 mmol/L Final     Carbon Dioxide (CO2)   Date Value Ref Range Status   04/19/2022 33 (H) 20 - 32 mmol/L Final     Anion Gap   Date Value Ref Range Status   04/19/2022 6 3 - 14  mmol/L Final   06/18/2021 8 3 - 14 mmol/L Final     Glucose   Date Value Ref Range Status   04/19/2022 106 (H) 70 - 99 mg/dL Final   06/18/2021 122 (H) 70 - 99 mg/dL Final     Urea Nitrogen   Date Value Ref Range Status   04/19/2022 30 7 - 30 mg/dL Final   06/18/2021 42 (H) 7 - 30 mg/dL Final     Creatinine   Date Value Ref Range Status   04/19/2022 2.23 (H) 0.66 - 1.25 mg/dL Final   06/18/2021 2.35 (H) 0.66 - 1.25 mg/dL Final     GFR Estimate   Date Value Ref Range Status   04/19/2022 32 (L) >60 mL/min/1.73m2 Final     Comment:     Effective December 21, 2021 eGFRcr in adults is calculated using the 2021 CKD-EPI creatinine equation which includes age and gender (Farzana hodgson al., NEJ, DOI: 10.1056/VOCPao9154454)   06/18/2021 28 (L) >60 mL/min/[1.73_m2] Final     Comment:     Non  GFR Calc  Starting 12/18/2018, serum creatinine based estimated GFR (eGFR) will be   calculated using the Chronic Kidney Disease Epidemiology Collaboration   (CKD-EPI) equation.       Calcium   Date Value Ref Range Status   04/19/2022 9.5 8.5 - 10.1 mg/dL Final   06/18/2021 9.2 8.5 - 10.1 mg/dL Final

## 2022-04-19 NOTE — PLAN OF CARE
Pt was calm and cooperative. PT was able to place pt in a wheelchair that he stated was better than one he used the other day. He was happy to be able to get out of his room and get some exercise. Pt need help via the lift to be placed back into bed. Pt's leg wound dressing was replaced. The dry gauze appeared to have some dried and fresh drainage with minimal blood. The wounds were washed of the dead skin and some spots started to bleed. Fresh gauze was placed; next dressing change is 4/21. Continue POC.

## 2022-04-19 NOTE — PROGRESS NOTES
VS: BP 99/60 (BP Location: Left arm, Patient Position: Supine, Cuff Size: Adult Large)   Pulse 89   Temp 98.6  F (37  C) (Axillary)   Resp 18   Wt (!) 210 kg (462 lb 15.5 oz)   SpO2 99%   BMI (P) 53.50 kg/m   Pt denies CP or SOB, dizziness, lightheaded and nausea and vomiting.   O2: Room air sat. > 90%.    Output: Voiding adequate amount in Urinal, staff empty.   Last BM: 4/18/22. Bedpan   Activity: Lift with AX 2 to transfer. Not oob this shift.   Skin: Intact.Ex BLE wounds, heel,   Pain: Denies   CMS: CMS and neuro intact.A/O X 4. Able to make needs known. N/T Baseline.   Dressing: CDI   Diet: Renal, low saturated fat. FR 1200.   LDA: PIV SL   Equipment: IV pole, urinal,and personal belongings.   Plan: Continue with plan of care. Call light within reach.   Additional Info:  Discharge TBD[facility]

## 2022-04-19 NOTE — PLAN OF CARE
/63 (BP Location: Right arm)   Pulse 73   Temp 97.3  F (36.3  C) (Oral)   Resp 17   Wt (!) 210 kg (462 lb 15.5 oz)   SpO2 100%   BMI (P) 53.50 kg/m  .     A&O x 4. VSS. Baseline N/T BLE. Denies N/V. LS clear. Denies SOB. Denies chest pain. RA. Last BM on 4/18/22 - Medium, formed. Pt used bedpan. Voids without difficulty. Uses urinal. Assist x 2 w/ walker and GB. Used total lift w/ bariatric sling to get pt back in bed from bedside chair. Pulsate mattress. Wound cares complete this AM. R PIV saline locked and patent. Pain rated 2/10. Low saturated fat/Renal diet ordered. 1200ml fluid restriction.     Continue POC. Discharge TBD, likely transferred back to [facility].

## 2022-04-20 NOTE — PLAN OF CARE
Pt was calm and cooperative. PT got pt out of bed and pt was in the house in his wheelchair to get some exercise. Pt wheeled himself to the  where he sat in his wheelchair and appeared to rest before asking to be wheeled back to his room so he could use the urinal. Pt did need help sometimes to place the urinal. Pt's dressing on his wounds were changed today by the WOC team next change is due 4/22. Pt shaved during the shift. Continue the pt on a renal diet with fluid restrictions. Continue with POC.

## 2022-04-20 NOTE — PROGRESS NOTES
[facility]    Medicine Progress Note - Hospitalist Service, GOLD TEAM 17    Date of Admission:  4/15/2022    Assessment & Plan            A: Patient is a 61 y/o man who has multiple medical problems including gout, pseudogout, atrial fibrillation, morbid obesity s/p past bariatric surgery and chronic kidney disease that, from chart review, is stage IV. Patient was hospitalized at [facility] from 27-Jan-2022 to 30-Jan-2022 for sepsis secondary to complicated urinary tract infection vs. pyelonephritis. Patient was hospitalized at [facility] from 11-Feb-2022 to 20-Feb-2022 for hyperkalemia, acute kidney injury and acute gout. Patient was discharged on 20-Feb-2022 and presented to [facility] with weakness - patient reportedly was unable to walk up the steps to his home. Patient was later found to have recurrent hyperkalemia. During hospital stay, patient was also treated for acute polyarticular gout. Patient completed a prednisone taper during hospital stay. Patient was also transitioned from uloric to allopurinol. Patient had pancytopenia during hospital stay that resolved prior to discharge. Patient was discharged to TCU on 18-Mar-2022. Patient was admitted to [place] on 15-Apr-2022 for anasarca.       P:  1.) Anasarca, acute on chronic systolic heart failure: Continuing bumex 2 mg IV every 8 hours. Patient also on fluid restriction. Volume statis improving. Strict I/O's. Daily weight. Symptoms are improving.     2.) Permanent atrial fibrillation: Patient on coumadin for anticoagulation.     3.) Chronic kidney disease stage IV: Monitoring labs with diuretics. Renal function remains stable.     4.) Chronic venous stasis ulcers: Wound care.    5.) Gout: Patient on colchicine and allopurinol.    6.) Morbid obesity: To f/u as outpatient.    7.) History of colon cancer s/p hemicolectomy in 2011: Further surveillance as   outpatient.    8.) Generalized weakness,  debility, deconditioning: PT/OT.    9.) Anemia of chronic disease: Monitoring labs periodically.    10.) Obstructive sleep apnea: Patient to use CPAP.       Michael Blanco MD  Hospitalist Service, GOLD TEAM [address] [place] [address]  Securely message with the Vocera Web Console (learn more here)  Text page via [facility] Paging/Directory   Please see signed in provider for up to date coverage information    ______________________________________________________________________    Interval History     No acute events overnight   He was pleasant.   Peripheral edema is improving.   Shortness of breath is improving.   No chest pain or palpitations.     Physical Exam   Vital Signs: Temp: 97.7  F (36.5  C) Temp src: Oral BP: 93/56 Pulse: 91   Resp: 18 SpO2: 99 % O2 Device: None (Room air)    Weight: 462 lbs 15.46 oz    Vitals:    04/17/22 1600   Weight: (!) 210 kg (462 lb 15.5 oz)         Intake/Output Summary (Last 24 hours) at 4/20/2022 1415  Last data filed at 4/20/2022 1300  Gross per 24 hour   Intake --   Output 4100 ml   Net -4100 ml       General: Patient comfortable, NAD. Room air.   HEENT: No scleral icterus or conjunctival injection.  Heart: RRR, S1 S2 w/o murmurs.  Lungs: Normal respiratory effort, distant breath sounds due to body habitus, no crackles or wheezing.   Abdomen: Obese, + BS, soft, no tenderness to palpation.     Data     Last Comprehensive Metabolic Panel:  Sodium   Date Value Ref Range Status   04/20/2022 137 133 - 144 mmol/L Final   06/18/2021 140 133 - 144 mmol/L Final     Potassium   Date Value Ref Range Status   04/20/2022 3.7 3.4 - 5.3 mmol/L Final   06/18/2021 4.0 3.4 - 5.3 mmol/L Final     Chloride   Date Value Ref Range Status   04/20/2022 99 94 - 109 mmol/L Final   06/18/2021 103 94 - 109 mmol/L Final     Carbon Dioxide   Date Value Ref Range Status   06/18/2021 28 20 - 32 mmol/L Final     Carbon Dioxide (CO2)   Date Value Ref Range Status   04/20/2022  32 20 - 32 mmol/L Final     Anion Gap   Date Value Ref Range Status   04/20/2022 6 3 - 14 mmol/L Final   06/18/2021 8 3 - 14 mmol/L Final     Glucose   Date Value Ref Range Status   04/20/2022 113 (H) 70 - 99 mg/dL Final   06/18/2021 122 (H) 70 - 99 mg/dL Final     Urea Nitrogen   Date Value Ref Range Status   04/20/2022 29 7 - 30 mg/dL Final   06/18/2021 42 (H) 7 - 30 mg/dL Final     Creatinine   Date Value Ref Range Status   04/20/2022 2.37 (H) 0.66 - 1.25 mg/dL Final   06/18/2021 2.35 (H) 0.66 - 1.25 mg/dL Final     GFR Estimate   Date Value Ref Range Status   04/20/2022 29 (L) >60 mL/min/1.73m2 Final     Comment:     Effective December 21, 2021 eGFRcr in adults is calculated using the 2021 CKD-EPI creatinine equation which includes age and gender (Farzana et al., NEJ, DOI: 10.1056/IDUWqi2363538)   06/18/2021 28 (L) >60 mL/min/[1.73_m2] Final     Comment:     Non  GFR Calc  Starting 12/18/2018, serum creatinine based estimated GFR (eGFR) will be   calculated using the Chronic Kidney Disease Epidemiology Collaboration   (CKD-EPI) equation.       Calcium   Date Value Ref Range Status   04/20/2022 9.8 8.5 - 10.1 mg/dL Final   06/18/2021 9.2 8.5 - 10.1 mg/dL Final

## 2022-04-20 NOTE — PLAN OF CARE
End of shift Summary: See flowsheet for VS and detailed assessments.     Changes this Shift:      Pulmonary: Denies cough/SOB/chest pain. Breathing RA. Uses CPAP at night (is at bedside)     Output: Voiding frequently in bedside urinal.      Activity/Transfer: Bedrest. Reportedly up with PT in AM shift.      Skin: WDL ex for bilat LE wounds (R calf wound, R lateral ankle wound, L calf wound): bilat LEs wrapped in Kerlix and tubigrip.      Pain: Notes pain is constant, consistent @ 2/10. Fentanyl patch in place to R chest.      Neuro/CMS: A&O x4. Baseline N/T to bilat LEs.      Dressing: Bilat Les wrapped in Kerlix and tubigrip, CDI.     IV/Drains: R PIV patent, saline locked    GI/Diet: Fluid restriction of 1200mL/day (400mL/shift). Renal diet, low sat fat diet.      Plan: Continue to monitor

## 2022-04-20 NOTE — PLAN OF CARE
A&oX4. Ax2 with walker, gait belt and liko lift. Renal diet/low saturated fat, fluid restriction, pills whole. C/o pain in BL feet; declined interventions. Numbness and tingling to BL feet. Denies SOB, chest pain, or headache. Continent of both bowel and bladder; urinal and bedpan. LBM 4/18- passing gas. R PIV saline locked. [facility] nurse saw pt today- all dressing changes due 4/22. Uses call light appropriately and makes needs known. Continue POC.

## 2022-04-20 NOTE — PLAN OF CARE
A&Ox4, calm and cooperative,   Denies pain, fentanyl patch in place on right chest   No SOB, CP or nausea reported  Baseline numbness & tingling in BLE  Voiding frequently in urinal- bumex given at start of shift  Linen and gown changed x1 due to urine spilling   No BM this shift  Pt declined repositioning, bariatric pulsate mattress in place, not OOB this shift  1200cc daily fluid restrictions   CPAP on for the night  Right PIV -SL  Continue w/POC

## 2022-04-20 NOTE — PROGRESS NOTES
WOC Nurse Inpatient Wound Assessment   Reason for consultation: Evaluate and treat  Left lateral shin wounds, R heel    Assessment  Left lateral Shin wounds due to Venous Ulcer  Status: healed 4/13    Bilateral upper shin wounds due to venous stasis and edema  Status: Right healed, left healed    Right lateral heel Pressure injury community acquired.   Pressure injury stage 2  Status: healed 4/20      Treatment Plan  Bilateral legs: Every other day    Spray all intact skin to BL lower extremities with Carmela cleanse and protect and rub in. Allow to soak for 10mins and then wipe away any excess.   Apply sween 24 (Pink top lotion) from base of toes to below knees. (Do NOT use critic aid -black top)  Apply Adaptic (mineral gauze) to any open areas  Cover with kerlix and tape.     Right lateral heel and left lateral lower leg every other day  Cleanse with wound cleanser   Dry and protect surrounding skin with no sting barrier film wipe #[id number] (this is very important)  Cover with silicone foam dressing Mepilex  4x4 #[id number]  Make sure heel is elevated off bed at all times.    Pressure Injury prevention (RN-please order supplies if not in room)  Turn every 2 hours, side to side avoid supine on bariatric pressure redistribution support surface  Float heels off bed with use of  Heel Lift boots (Prevalon #[id number])    Prevent sliding and shear by limiting HOB to 30 degrees or less unless contraindicated, use knee gatch first if not contraindicated  If sitting, use pressure redistribution chair cushion large(#[id number]); if unable to shift weight every hour, please limit sitting to an hour at a time.  Mepilex PRN,Change at least q 3 days, peel back, peek and replace for daily skin inspection.      Orders Revised  Recommended provider order: Lymphedema  WOC Nurse follow-up plan: Weekly  Nursing to notify the Provider(s) and re-consult the WOC Nurse if wound(s) deteriorates or new skin concern.    Patient History  According to  provider note(s):   66 year old male admitted through on 2/20/2022 after being discharged earlier from [facility] stay from 2/11 - 2/20 for acute gout flare and subsequent SHANTE and hyperkalemia likely caused by gout treatment. Patient is morbidly obese and he was discharged to his home with home health but he was unable to manage going up steps immediately upon arrival home.  He was brought in to [facility] ED for placement. In the ER he was seen by Dr Henning, vitals were normal.  Labs were reviewed from his recent discharge with Cr of 1.93 which was improved from his discharge and actually better than baseline.     3/15/2022 pending TCU for deconditioning      No Known Allergies  Objective Data  Containment of urine/stool: Incontinence Protocol as needed    Body mass index is 53.5 kg/m  (pended).     Active Diet Order  Orders Placed This Encounter      Combination Diet Renal Diet (non-dialysis); Low Saturated Fat Na <2400mg Diet      Intake/Output Summary (Last 24 hours) at 3/15/2022 1444  Last data filed at 3/15/2022 1030  Gross per 24 hour   Intake 260 ml   Output 600 ml   Net -340 ml       Pressure Injury Risk Assessment:   Sensory Perception: 4-->no impairment  Moisture: 3-->occasionally moist  Activity: 2-->chairfast  Mobility: 2-->very limited  Nutrition: 3-->adequate  Friction and Shear: 2-->potential problem  Sandoval Score: 16                          Labs:   Recent Labs   Lab 04/20/22  0621 04/17/22  0657 04/16/22  0853   HGB  --   --  10.3*   INR 2.52*   < > 3.00*   WBC  --   --  5.5    < > = values in this interval not displayed.       Physical Exam  Areas of skin assessed: Left Lateral lower leg, Right lateral foot and heel    All wounds resurfaced 4/20: pt is high risk to re-open, will see next week and sign off if remains healed.     Right and left lateral foot wounds healed on assessment 3/21    Wound Location:  Left Lateral lower leg  See Media tab for mor images      3/21      3/29    Wound Base:  dermis, red, moist small areas of granulation tissue     Palpation of the wound bed: normal   resurfaced    Wound Location:  Right lateral heel        3/21     3/29    4/13    4/20 resurfaced    Wound History: present on admit to TCU  Wound Base: resurfaced 4/20    Wound Location:  Bilateral upper shins    3/21 Right upper shin    3/29 Right upper shin        Date of last photo 3/29/22  Wound History: present on admit. Pt has had fluctuating edema in LE causing weepy areas and skin to open.   Wound Base:resurfaced 4/20      Interventions  Visual inspection and assessment completed   Wound Care Rationale Right heel and foot Protect and monitor as DTPI evolves. Left lateral leg selective debridement (autolysis) of nonviable tissue   Wound Care: done per plan of care  Supplies: at bedside  Current off-loading measures: Chair cushion and ordered PRN  Current support surface: Bariatric Low air loss mattress with extention  Education provided to: plan of care, wound progress, Off-loading pressure and effects of neuropathy  Discussed plan of care with Patient and Nurse    Interventions  Visual inspection and assessment completed   Wound Care Rationale Protect periwound skin, Promote moist wound healing without tissue dehydration , Gently fill dead space to prevent abscess formation  and Provide protection   Wound Care: done per plan of care  Supplies: at bedside  Current off-loading measures: pillows for positioning, HOB 30 degrees or less, heels floating off beds, bariatric low airloss support surfaces, chair cushion ordered PRN  Current support surface: Bariatric low air loss  Education provided to: importance of repositioning and plan of care  Discussed plan of care with Patient, Nurse and Physician     Michelle Meléndez RN BSN CWN    Pager [phone]

## 2022-04-21 NOTE — PROGRESS NOTES
"Pt requires 2 ceiling lifts to transfer from the chair to the bed. Has been out of bed x2 today.  Sometimes wheels himself around in the halls near the nursing station.      Dependent edema in the BLE.  Poor venous return in the BLE leading to numerous wounds on the legs-- see WOC note.  Dressing change due 4/22.  Wounds do have some drainage.      Pt states that he feels constipated, but when offered PRN medications he said he \"wants his body to work it out\".  Bowel sounds are audible.  Continent of bowel and bladder, uses urinal at the side of the bed.      Baseline numbness and tingling in the lower extremities.   "

## 2022-04-21 NOTE — PROGRESS NOTES
[facility]    Medicine Progress Note - Hospitalist Service, GOLD TEAM 17    Date of Admission:  4/15/2022    Assessment & Plan            A: Patient is a 61 y/o man who has multiple medical problems including gout, pseudogout, atrial fibrillation, morbid obesity s/p past bariatric surgery and chronic kidney disease that, from chart review, is stage IV. Patient was hospitalized at [facility] from 27-Jan-2022 to 30-Jan-2022 for sepsis secondary to complicated urinary tract infection vs. pyelonephritis. Patient was hospitalized at [facility] from 11-Feb-2022 to 20-Feb-2022 for hyperkalemia, acute kidney injury and acute gout. Patient was discharged on 20-Feb-2022 and presented to [facility] with weakness - patient reportedly was unable to walk up the steps to his home. Patient was later found to have recurrent hyperkalemia. During hospital stay, patient was also treated for acute polyarticular gout. Patient completed a prednisone taper during hospital stay. Patient was also transitioned from uloric to allopurinol. Patient had pancytopenia during hospital stay that resolved prior to discharge. Patient was discharged to TCU on 18-Mar-2022. Patient was admitted to [place] on 15-Apr-2022 for anasarca.       P:  1.) Anasarca, acute on chronic systolic heart failure: Continuing bumex 2 mg IV every 8 hours. Patient also on fluid restriction. Volume statis improving. Strict I/O's. Daily weight. Symptoms are improving. He is having good urine output.     2.) Permanent atrial fibrillation: Patient on coumadin for anticoagulation.     3.) Chronic kidney disease stage IV: Monitoring labs with diuretics. Renal function remains stable. Continue monitoring daily BMP.     4.) Chronic venous stasis ulcers: Wound care.    5.) Gout: Patient on colchicine and allopurinol.    6.) Morbid obesity: To f/u as outpatient.    7.) History of colon cancer s/p hemicolectomy in 2011: Further  surveillance as   outpatient.    8.) Generalized weakness, debility, deconditioning: PT/OT.    9.) Anemia of chronic disease: Monitoring labs periodically.    10.) Obstructive sleep apnea: Patient to use CPAP.       Michael Blanco MD  Hospitalist Service, GOLD TEAM 17  [facility]  Securely message with the Vocera Web Console (learn more here)  Text page via [facility] Paging/Directory   Please see signed in provider for up to date coverage information    ______________________________________________________________________    Interval History     No acute events overnight   He was pleasant.   Peripheral edema is improving.   Shortness of breath is improving.   No chest pain or palpitations.     Physical Exam   Vital Signs: Temp: 98  F (36.7  C) Temp src: Oral BP: 109/56 Pulse: 89   Resp: 18 SpO2: 94 % O2 Device: None (Room air)    Weight: 454 lbs 12.8 oz    Vitals:    04/17/22 1600 04/21/22 1115   Weight: (!) 210 kg (462 lb 15.5 oz) (!) 206.3 kg (454 lb 12.8 oz)           Intake/Output Summary (Last 24 hours) at 4/21/2022 1437  Last data filed at 4/21/2022 0857  Gross per 24 hour   Intake --   Output 1950 ml   Net -1950 ml         General: Patient comfortable, NAD. Room air. Obese.   HEENT: No scleral icterus or conjunctival injection.  Heart: RRR, S1 S2 w/o murmurs.  Lungs: Normal respiratory effort, distant breath sounds due to body habitus, no crackles or wheezing.   Abdomen: Obese, + BS, soft, no tenderness to palpation.     Data     Last Comprehensive Metabolic Panel:  Sodium   Date Value Ref Range Status   04/21/2022 137 133 - 144 mmol/L Final   06/18/2021 140 133 - 144 mmol/L Final     Potassium   Date Value Ref Range Status   04/21/2022 3.6 3.4 - 5.3 mmol/L Final   06/18/2021 4.0 3.4 - 5.3 mmol/L Final     Chloride   Date Value Ref Range Status   04/21/2022 98 94 - 109 mmol/L Final   06/18/2021 103 94 - 109 mmol/L Final     Carbon Dioxide   Date Value Ref Range  Status   06/18/2021 28 20 - 32 mmol/L Final     Carbon Dioxide (CO2)   Date Value Ref Range Status   04/21/2022 30 20 - 32 mmol/L Final     Anion Gap   Date Value Ref Range Status   04/21/2022 9 3 - 14 mmol/L Final   06/18/2021 8 3 - 14 mmol/L Final     Glucose   Date Value Ref Range Status   04/21/2022 118 (H) 70 - 99 mg/dL Final   06/18/2021 122 (H) 70 - 99 mg/dL Final     Urea Nitrogen   Date Value Ref Range Status   04/21/2022 29 7 - 30 mg/dL Final   06/18/2021 42 (H) 7 - 30 mg/dL Final     Creatinine   Date Value Ref Range Status   04/21/2022 2.30 (H) 0.66 - 1.25 mg/dL Final   06/18/2021 2.35 (H) 0.66 - 1.25 mg/dL Final     GFR Estimate   Date Value Ref Range Status   04/21/2022 31 (L) >60 mL/min/1.73m2 Final     Comment:     Effective December 21, 2021 eGFRcr in adults is calculated using the 2021 CKD-EPI creatinine equation which includes age and gender (Farzana et al., NEJM, DOI: 10.1056/AOOYfl1230987)   06/18/2021 28 (L) >60 mL/min/[1.73_m2] Final     Comment:     Non  GFR Calc  Starting 12/18/2018, serum creatinine based estimated GFR (eGFR) will be   calculated using the Chronic Kidney Disease Epidemiology Collaboration   (CKD-EPI) equation.       Calcium   Date Value Ref Range Status   04/21/2022 9.6 8.5 - 10.1 mg/dL Final   06/18/2021 9.2 8.5 - 10.1 mg/dL Final

## 2022-04-21 NOTE — PLAN OF CARE
A&Ox4. Ax2 w/ walker/GB and/or liko lift. Fluid restriction 1200cc, has had ~250cc so far today, pt monitors closely. Pain in bilateral feet, repositioned for comfort. Denies CP/SOB. Urinal at bedside, uses bedpan for BM, LBM: 4/18. R hand PIV-SL. Wounds weeping on legs, dressing intact. WOC managed. Able to clearly communicate needs and directs cares. Plan of return to TCU.

## 2022-04-22 NOTE — PLAN OF CARE
[id number]:Pt A&O x's 4. VSS. Afebrile. 02 sats in the 90s on RA.  Lungs clear. Denies SOB, CP and nausea. Tolerating regular diet. Bowel sound active in all quadrants. No BM but passing gas. Voiding large amount into the urinal. Denies pain when asked.  CMS intact, baseline N/T. BLEs dressing in place with some dried drainage.  PIV patent and SL. Pt is  able to make needs known, call light with in reach. Will continue to monitor.

## 2022-04-22 NOTE — PROGRESS NOTES
[facility]    Medicine Progress Note - Hospitalist Service, GOLD TEAM 17    Date of Admission:  4/15/2022    Assessment & Plan            A: Patient is a 59 y/o man who has multiple medical problems including gout, pseudogout, atrial fibrillation, morbid obesity s/p past bariatric surgery and chronic kidney disease that, from chart review, is stage IV. Patient was hospitalized at [facility] from 27-Jan-2022 to 30-Jan-2022 for sepsis secondary to complicated urinary tract infection vs. pyelonephritis. Patient was hospitalized at [facility] from 11-Feb-2022 to 20-Feb-2022 for hyperkalemia, acute kidney injury and acute gout. Patient was discharged on 20-Feb-2022 and presented to [facility] with weakness - patient reportedly was unable to walk up the steps to his home. Patient was later found to have recurrent hyperkalemia. During hospital stay, patient was also treated for acute polyarticular gout. Patient completed a prednisone taper during hospital stay. Patient was also transitioned from uloric to allopurinol. Patient had pancytopenia during hospital stay that resolved prior to discharge. Patient was discharged to TCU on 18-Mar-2022. Patient was admitted to [place] on 15-Apr-2022 for anasarca.       P:  1.) Anasarca, acute on chronic systolic heart failure: Weight is trending down and volume status is slowly improving.   Cr slowly trending up. Change diuresis regimen to Bumex 2 mg IV BID for now.  Continue on fluid restriction. Continue monitoring strict I/O's. Daily weight. Symptoms are improving.    2.) Permanent atrial fibrillation: Patient on coumadin for anticoagulation.     3.) Chronic kidney disease stage IV: Monitoring labs with diuretics. Continue monitoring daily BMP. Cr 2.46 trending up.     4.) Chronic venous stasis ulcers: Wound care.    5.) Gout: Patient on colchicine and allopurinol.    6.) Morbid obesity: To f/u as outpatient.    7.) History  of colon cancer s/p hemicolectomy in 2011: Further surveillance as   outpatient.    8.) Generalized weakness, debility, deconditioning: PT/OT. This is improving. He is ambulating more.     9.) Anemia of chronic disease: Monitoring labs periodically.    10.) Obstructive sleep apnea: Patient to use CPAP.    11) Right lateral heel Pressure injury community acquired.   -Wound care following the patient. Continue recs per Wound Care. Continue monitoring. Continue monitoring LE edema.        Michael Blanco MD  Hospitalist Service, GOLD TEAM [address] [place] [address]  Securely message with the Vocera Web Console (learn more here)  Text page via [facility] Paging/Directory   Please see signed in provider for up to date coverage information    ______________________________________________________________________    Interval History     No acute events overnight.   He was pleasant.   No new issues.   He is ambulating better.   Peripheral edema is improving.   Shortness of breath is improving.   No chest pain or palpitations.     Physical Exam   Vital Signs: Temp: 97.9  F (36.6  C) Temp src: Oral BP: 106/61 Pulse: 85   Resp: 17 SpO2: 95 % O2 Device: None (Room air)    Weight: 454 lbs 12.8 oz    Vitals:    04/17/22 1600 04/21/22 1115   Weight: (!) 210 kg (462 lb 15.5 oz) (!) 206.3 kg (454 lb 12.8 oz)         Intake/Output Summary (Last 24 hours) at 4/22/2022 1316  Last data filed at 4/22/2022 0851  Gross per 24 hour   Intake 600 ml   Output 1550 ml   Net -950 ml       General: Patient comfortable, NAD. Room air. Obese.   HEENT: No scleral icterus or conjunctival injection.  Heart: RRR, S1 S2 w/o murmurs.  Lungs: Normal respiratory effort, distant breath sounds due to body habitus, no crackles or wheezing.   Abdomen: Obese, + BS, soft, no tenderness to palpation.     Data     Last Comprehensive Metabolic Panel:  Sodium   Date Value Ref Range Status   04/22/2022 138 133 - 144 mmol/L Final    06/18/2021 140 133 - 144 mmol/L Final     Potassium   Date Value Ref Range Status   04/22/2022 3.5 3.4 - 5.3 mmol/L Final   06/18/2021 4.0 3.4 - 5.3 mmol/L Final     Chloride   Date Value Ref Range Status   04/22/2022 97 94 - 109 mmol/L Final   06/18/2021 103 94 - 109 mmol/L Final     Carbon Dioxide   Date Value Ref Range Status   06/18/2021 28 20 - 32 mmol/L Final     Carbon Dioxide (CO2)   Date Value Ref Range Status   04/22/2022 31 20 - 32 mmol/L Final     Anion Gap   Date Value Ref Range Status   04/22/2022 10 3 - 14 mmol/L Final   06/18/2021 8 3 - 14 mmol/L Final     Glucose   Date Value Ref Range Status   04/22/2022 102 (H) 70 - 99 mg/dL Final   06/18/2021 122 (H) 70 - 99 mg/dL Final     Urea Nitrogen   Date Value Ref Range Status   04/22/2022 31 (H) 7 - 30 mg/dL Final   06/18/2021 42 (H) 7 - 30 mg/dL Final     Creatinine   Date Value Ref Range Status   04/22/2022 2.46 (H) 0.66 - 1.25 mg/dL Final   06/18/2021 2.35 (H) 0.66 - 1.25 mg/dL Final     GFR Estimate   Date Value Ref Range Status   04/22/2022 28 (L) >60 mL/min/1.73m2 Final     Comment:     Effective December 21, 2021 eGFRcr in adults is calculated using the 2021 CKD-EPI creatinine equation which includes age and gender (Farzana et al., NEJM, DOI: 10.1056/MABSxv2758115)   06/18/2021 28 (L) >60 mL/min/[1.73_m2] Final     Comment:     Non  GFR Calc  Starting 12/18/2018, serum creatinine based estimated GFR (eGFR) will be   calculated using the Chronic Kidney Disease Epidemiology Collaboration   (CKD-EPI) equation.       Calcium   Date Value Ref Range Status   04/22/2022 9.7 8.5 - 10.1 mg/dL Final   06/18/2021 9.2 8.5 - 10.1 mg/dL Final

## 2022-04-22 NOTE — PROGRESS NOTES
Wound cares on the BLE was done this shift per the wound care note.      Wounds bilaterally on the legs.  Mepelex on the R heel and L lateral leg. Dry cracked skin down the lower extremities onto the heels and feet.  Abdominal scar.     Uses the urinal, sometimes misses and requires some clean up/linen change.  BM this shift. Bariatric commode was brought up by OT-- fits well for pt.    Has been up out of bed.  Assist of 2 with a walker and gait belt to the wheelchair.  Stand and pivot to the commode.     No electrolyte replacement required this shift.  Labs will be drawn in the AM.     Baseline numbness and tingling in the BLE.

## 2022-04-22 NOTE — PLAN OF CARE
/56 (BP Location: Left arm)   Pulse 73   Temp 97  F (36.1  C) (Oral)   Resp 18   Wt (!) 206.3 kg (454 lb 12.8 oz)   SpO2 97%   BMI (P) 52.56 kg/m    Uses CPAP overnight. Denies SOB or chest pain. LS clear, deminished. Pt report pain to legs and feet. Refused pain medication. Pt refused repositioning. Pt has not been OOB overnight. Fluid restriction 1200 ml. Pt had 600 ml this shift.  Bumex given. Alert and oriented x4, baseline numbness and tingling BLE. No sensation Bilateral feet. Dependent edema BLE, with multiple weeping wounds on pt legs. Pt dressing change due 4/22/22. Voids adequately without difficulty. Pt used bedpan and urinal at bedside. Pt has large BM this shift. R PIV- SL. Calls appropriately. Continue POC.

## 2022-04-22 NOTE — PROGRESS NOTES
Care Management Follow Up    Length of Stay (days): 7    Expected Discharge Date: TBD - Patient is medically ready to discharge but needs placement     Concerns to be Addressed: Discharge planning    Patient plan of care discussed at interdisciplinary rounds: Yes    Anticipated Discharge Disposition: TCU then potentially transfer to LTC     Anticipated Discharge Services: TBD    Anticipated Discharge DME: TBD    Patient/family educated on Medicare website which has current facility and service quality ratings: N/a     Education Provided on the Discharge Plan: Yes    Patient/Family in Agreement with the Plan: Yes    Referrals Placed by CM/SW: See below     Private pay costs discussed: Not applicable    Additional Information:   met with patient and patient wife to discuss what patients options are moving forward. Patient needs to go to TCU and in the past, has had a stay at  TCU. Patient could go back to  TCU, however, last time he was there, insurance cut out and patient was not able to go back to his home because his wife did not feel she could manage his care at that time. He was then paying out of pocket for  TCU. Since patients wife does not feel she can accommodate his needs after a TCU stay, the plan is now for patient to go to a TCU that that LTC as well. Patient will go to TCU and work on building his strength and if hes not stable enough to go home, he will then transfer to LTC and continue working with PT. Writer sent referrals tot he following facilities that have both TCU and LTC.     [facility]  [address]  P: [phone]  F: [phone]    [facility]  [address] [place]  [address]  P: [phone]  F: [phone]    [facility]  [address] [place]  [address]  P: [phone]  P: [phone] - Admissions  F: [phone]    [address]  [address]  P: [phone]  F: [phone]    [facility]  [address] [place] [address]  [address]  P: [phone]  F: [phone]    Weekend SW to follow up with these facilities and see if they have availability. Patient is medically ready to discharge.     OREN Reyna, LGSW  8A and 10 ICU   [facility]   Phone: [phone]  Pager: [phone]

## 2022-04-23 NOTE — PROGRESS NOTES
[facility]    Medicine Progress Note - Hospitalist Service, GOLD TEAM 17    Date of Admission:  4/15/2022    Assessment & Plan            A: Patient is a 59 y/o man who has multiple medical problems including gout, pseudogout, atrial fibrillation, morbid obesity s/p past bariatric surgery and chronic kidney disease that, from chart review, is stage IV. Patient was hospitalized at [facility] from 27-Jan-2022 to 30-Jan-2022 for sepsis secondary to complicated urinary tract infection vs. pyelonephritis. Patient was hospitalized at [facility] from 11-Feb-2022 to 20-Feb-2022 for hyperkalemia, acute kidney injury and acute gout. Patient was discharged on 20-Feb-2022 and presented to [facility] with weakness - patient reportedly was unable to walk up the steps to his home. Patient was later found to have recurrent hyperkalemia. During hospital stay, patient was also treated for acute polyarticular gout. Patient completed a prednisone taper during hospital stay. Patient was also transitioned from uloric to allopurinol. Patient had pancytopenia during hospital stay that resolved prior to discharge. Patient was discharged to TCU on 18-Mar-2022. Patient was admitted to [facility] on 15-Apr-2022 for anasarca.       P:  1.) Anasarca, acute on chronic systolic heart failure.  Pt continues to lose weight, feels much improved. Dry weight difficult to assess in view of body habitus. Still with significant presacral edema/ Cr had been slowly trending up, but improved today.  Bumex reduced to 2 mg IV BID for now.  Continue on fluid restriction. Continue monitoring strict I/O's. Daily weight. Daily BMP, consider transition to po Bumex in 2 days (concern re impaired GI absorption for oral dosing)    2.) Permanent atrial fibrillation:   HR controlled on Metoprolol  Patient on coumadin for anticoagulation.     3.) Chronic kidney disease stage IV: Improved today  Plan continue to  monitor renal function    4.) Chronic venous stasis ulcers: Wound care.    5.) Gout: Patient on colchicine and allopurinol.    6.) Morbid obesity: To f/u as outpatient.    7.) History of colon cancer s/p hemicolectomy in 2011: Further surveillance as   outpatient.    8.) Generalized weakness, debility, deconditioning: PT/OT. Marked improvement in functional status with diuresis    9.) Anemia of chronic disease: Monitoring labs periodically.    10.) Obstructive sleep apnea: Patient to use CPAP.    11) Right lateral heel Pressure injury community acquired.   -Continue tx per Wound Care.        Nasir Escobedo MD  Hospitalist Service, GOLD TEAM [address] [place] [address]  Securely message with the Vocera Web Console (learn more here)  Text page via [facility] Paging/Directory   Please see signed in provider for up to date coverage information    ______________________________________________________________________    Interval History     No acute events overnight.   He was pleasant.   No new issues.   He is ambulating better.   Peripheral edema and abd distension, flank edema continue to improve      Physical Exam   Vital Signs: Temp: (!) 95.4  F (35.2  C) Temp src: Oral BP: 98/53 Pulse: 91   Resp: 18 SpO2: 96 % O2 Device: BiPAP/CPAP    Weight: 454 lbs 12.8 oz    Vitals:    04/17/22 1600 04/21/22 1115   Weight: (!) 210 kg (462 lb 15.5 oz) (!) 206.3 kg (454 lb 12.8 oz)         Intake/Output Summary (Last 24 hours) at 4/22/2022 1316  Last data filed at 4/22/2022 0851  Gross per 24 hour   Intake 600 ml   Output 1550 ml   Net -950 ml       General: Patient comfortable, lying supine  Heart: RRR, S1 S2 w/o murmurs.  Lungs:clear  Abdomen: Obese, + BS, soft, no tenderness to palpation.   Still with significant presacral edema  2 + LE edea,    Data     Last Comprehensive Metabolic Panel:  Sodium   Date Value Ref Range Status   04/23/2022 139 133 - 144 mmol/L Final   06/18/2021 140 133 - 144  mmol/L Final     Potassium   Date Value Ref Range Status   04/23/2022 3.6 3.4 - 5.3 mmol/L Final   06/18/2021 4.0 3.4 - 5.3 mmol/L Final     Chloride   Date Value Ref Range Status   04/23/2022 99 94 - 109 mmol/L Final   06/18/2021 103 94 - 109 mmol/L Final     Carbon Dioxide   Date Value Ref Range Status   06/18/2021 28 20 - 32 mmol/L Final     Carbon Dioxide (CO2)   Date Value Ref Range Status   04/23/2022 32 20 - 32 mmol/L Final     Anion Gap   Date Value Ref Range Status   04/23/2022 8 3 - 14 mmol/L Final   06/18/2021 8 3 - 14 mmol/L Final     Glucose   Date Value Ref Range Status   04/23/2022 107 (H) 70 - 99 mg/dL Final   06/18/2021 122 (H) 70 - 99 mg/dL Final     Urea Nitrogen   Date Value Ref Range Status   04/23/2022 32 (H) 7 - 30 mg/dL Final   06/18/2021 42 (H) 7 - 30 mg/dL Final     Creatinine   Date Value Ref Range Status   04/23/2022 2.38 (H) 0.66 - 1.25 mg/dL Final   06/18/2021 2.35 (H) 0.66 - 1.25 mg/dL Final     GFR Estimate   Date Value Ref Range Status   04/23/2022 29 (L) >60 mL/min/1.73m2 Final     Comment:     Effective December 21, 2021 eGFRcr in adults is calculated using the 2021 CKD-EPI creatinine equation which includes age and gender (Farzana et al., NEJM, DOI: 10.1056/RODSze3587677)   06/18/2021 28 (L) >60 mL/min/[1.73_m2] Final     Comment:     Non  GFR Calc  Starting 12/18/2018, serum creatinine based estimated GFR (eGFR) will be   calculated using the Chronic Kidney Disease Epidemiology Collaboration   (CKD-EPI) equation.       Calcium   Date Value Ref Range Status   04/23/2022 9.6 8.5 - 10.1 mg/dL Final   06/18/2021 9.2 8.5 - 10.1 mg/dL Final

## 2022-04-23 NOTE — PROGRESS NOTES
Care Management Follow Up    Length of Stay (days): 8    Expected Discharge Date: 04/25/2022     Concerns to be Addressed:  Discharge planning     Patient plan of care discussed at interdisciplinary rounds: No    Anticipated Discharge Disposition:  TCU     Anticipated Discharge Services:  Rehab (PT/OT)  Anticipated Discharge DME:  To be determined    Patient/family educated on Medicare website which has current facility and service quality ratings:  yes  Education Provided on the Discharge Plan:  yes  Patient/Family in Agreement with the Plan:  yes    Referrals Placed by CM/SW:  See Below  Private pay costs discussed: Not applicable    Additional Information:  SW called following facilities to follow up on referrals made by Unit SW:    [facility]  [address]  P: [phone]  F: [phone]  4/23: left voicemail message for admissions, wait call back     [facility]  [address] [place]  [address]  P: [phone]  F: [phone]  4/23: left message for call back     [place] [facility] [address] [place]  [address]  P: [phone]  P: [phone] - Admissions  F: [phone]   4/23: Referrals checked on Weekdays (not Weekends). Writer advised to have unit SW follow up on Monday with Admissions.    [facility]  [address] [place] [address]  [address]  P: [phone]  F: [phone]  4/23: voicemail message left    PENDING BOARD AND CARE  REFERRAL:    [facility]   [address] [place] [address]  P: [phone]  F: [phone]   This is a board and care with focus on Mental Health (may only take females). Does not offer skilled PT/OT.  But I did leave a voicemail message 4/23      CALIN Song, OREN     [facility] Saturday     Text paging available through [employer] on [employer] - search SOCIAL WORK     ON CALL PAGER [phone] Saturday ONLY!  ON  CALL COVERAGE AFTER 1600 [phone]

## 2022-04-23 NOTE — PROGRESS NOTES
VSS. Room air during day. CPAP at NOC. Denies SOB/CP/N/V/DIZZINESS/PAIN. Voiding well, BMx2 today. A2 PVT transfer with walker. BLE dressing remains CDI. Strict I/O, frequent education needed on what counts as liquid. Pt had an understanding that ice chips did not count towards fluid intake. Pt now understands, and wants to abide by fluid restrictions. Pt pleasant and cooperative throughout day and sat in wheelchair with frequent weight shifting.

## 2022-04-23 NOTE — PLAN OF CARE
Uses CPAP overnight. Denies SOB or chest pain. LS diminished. Denies pain to BLE.  Pt has not been OOB overnight. Pt refused repositioning. Dependent edema BLE, with multiple wounds on pt legs, CDI. Dressing was changed yesterday. Scab to BUE and chest. Heels suspended. Voids adequately without difficulty. Pt used bedpan and urinal at bedside. Low saturated fat Na diet, denies N/V. Fluid restriction 1200 ml. Pt had 650 ml this shift. Alert and oriented x4, report baseline numbness BLE. Up with assist of 2, walker and gait belt. Pt is likely to be discharge to TCU when medically cleared. Continue POC.

## 2022-04-24 NOTE — PROGRESS NOTES
[facility]    Medicine Progress Note - Hospitalist Service, GOLD TEAM 17    Date of Admission:  4/15/2022    Case reviewed with nursing staff    Assessment & Plan            A: Patient is a 59 y/o man who has multiple medical problems including gout, pseudogout, atrial fibrillation, morbid obesity s/p past bariatric surgery and chronic kidney disease that, from chart review, is stage IV. Patient was hospitalized at [facility] from 27-Jan-2022 to 30-Jan-2022 for sepsis secondary to complicated urinary tract infection vs. pyelonephritis. Patient was hospitalized at [facility] from 11-Feb-2022 to 20-Feb-2022 for hyperkalemia, acute kidney injury and acute gout. Patient was discharged on 20-Feb-2022 and presented to [facility] with weakness - patient reportedly was unable to walk up the steps to his home. Patient was later found to have recurrent hyperkalemia. During hospital stay, patient was also treated for acute polyarticular gout. Patient completed a prednisone taper during hospital stay. Patient was also transitioned from uloric to allopurinol. Patient had pancytopenia during hospital stay that resolved prior to discharge. Patient was discharged to TCU on 18-Mar-2022. Patient was admitted to [facility] on 15-Apr-2022 for anasarca.       P:  1.) Anasarca, acute on chronic systolic heart failure.  Pt continues to lose weight, feels much improved. Dry weight difficult to assess in view of body habitus. Wt today pending.  He continues to have LE edema and significant presacral edema/ Cr had been slowly trending up while Pt was receiving Bumex 2 mg tid, now continues to improve following reduction in Bumex  to 2 mg IV BID   Continue on fluid restriction. Continue monitoring strict I/O's. Daily weight. Daily BMP. Will continue IV Bumex today, anticipate transition to po Bumex as discharge plan becomes more clear (likely Bumex 4 mg po bid)    2.) Permanent atrial  fibrillation:   HR controlled on Metoprolol  Patient on coumadin for anticoagulation.     3.) Chronic kidney disease stage IV: continues to improve  Hypokalemia secondary to diuresis  Plan continue to monitor renal function. Continue K replacement    4.) Chronic venous stasis ulcers: Wound care.    5.) Gout: Patient on colchicine and allopurinol.    6.) Morbid obesity: To f/u as outpatient.    7.) History of colon cancer s/p hemicolectomy in 2011: Further surveillance as   outpatient.    8.) Generalized weakness, debility, deconditioning: PT/OT. Marked improvement in functional status with diuresis    9.) Anemia of chronic disease: Monitoring labs periodically.    10.) Obstructive sleep apnea: Patient to use CPAP.    11) Right lateral heel Pressure injury community acquired.   -Continue tx per Wound Care.        Nasir Escobedo MD  Hospitalist Service, [address]  Securely message with the Vocera Web Console (learn more here)  Text page via [employer] Paging/Directory   Please see signed in provider for up to date coverage information    Disposition TBD, based on progress in therapy, possible discharge to home vs back to a TCU    ______________________________________________________________________    Interval History     Pt notes no new concerns  He continues to feel stronger, continues to progress in therapies  Hopes to discharge directly to home vs TCU  Feels he is still losing weight, notes continued gradual improvement in abdominal and flank swelling    Physical Exam   Vital Signs: Temp: 97.1  F (36.2  C) Temp src: Axillary BP: 99/63 Pulse: 80   Resp: 18 SpO2: 99 %      Weight: 454 lbs 12.8 oz    Vitals:    04/17/22 1600 04/21/22 1115   Weight: (!) 210 kg (462 lb 15.5 oz) (!) 206.3 kg (454 lb 12.8 oz)         Intake/Output Summary (Last 24 hours) at 4/22/2022 1316  Last data filed at 4/22/2022 0851  Gross per 24 hour   Intake 600 ml   Output 1550 ml    Net -950 ml       General: Sleepy, wearing CPAP  Appears comfortable lying on back  Heart: irreg, HR 70s  Lungs:clear  Abdomen: Obese, + BS, soft, no tenderness to palpation.   + presacral edema  2 + LE edea,    Data     Last Comprehensive Metabolic Panel:  Sodium   Date Value Ref Range Status   04/24/2022 138 133 - 144 mmol/L Final   06/18/2021 140 133 - 144 mmol/L Final     Potassium   Date Value Ref Range Status   04/24/2022 3.3 (L) 3.4 - 5.3 mmol/L Final   06/18/2021 4.0 3.4 - 5.3 mmol/L Final     Chloride   Date Value Ref Range Status   04/24/2022 99 94 - 109 mmol/L Final   06/18/2021 103 94 - 109 mmol/L Final     Carbon Dioxide   Date Value Ref Range Status   06/18/2021 28 20 - 32 mmol/L Final     Carbon Dioxide (CO2)   Date Value Ref Range Status   04/24/2022 35 (H) 20 - 32 mmol/L Final     Anion Gap   Date Value Ref Range Status   04/24/2022 4 3 - 14 mmol/L Final   06/18/2021 8 3 - 14 mmol/L Final     Glucose   Date Value Ref Range Status   04/24/2022 109 (H) 70 - 99 mg/dL Final   06/18/2021 122 (H) 70 - 99 mg/dL Final     Urea Nitrogen   Date Value Ref Range Status   04/24/2022 32 (H) 7 - 30 mg/dL Final   06/18/2021 42 (H) 7 - 30 mg/dL Final     Creatinine   Date Value Ref Range Status   04/24/2022 2.29 (H) 0.66 - 1.25 mg/dL Final   06/18/2021 2.35 (H) 0.66 - 1.25 mg/dL Final     GFR Estimate   Date Value Ref Range Status   04/24/2022 31 (L) >60 mL/min/1.73m2 Final     Comment:     Effective December 21, 2021 eGFRcr in adults is calculated using the 2021 CKD-EPI creatinine equation which includes age and gender (Farzana hodgson al., NEJM, DOI: 10.1056/DUSXqx6297493)   06/18/2021 28 (L) >60 mL/min/[1.73_m2] Final     Comment:     Non  GFR Calc  Starting 12/18/2018, serum creatinine based estimated GFR (eGFR) will be   calculated using the Chronic Kidney Disease Epidemiology Collaboration   (CKD-EPI) equation.       Calcium   Date Value Ref Range Status   04/24/2022 9.4 8.5 - 10.1 mg/dL Final    06/18/2021 9.2 8.5 - 10.1 mg/dL Final

## 2022-04-24 NOTE — PROGRESS NOTES
[]Hover for details    Potassium was low this am at 3.3, replaced with oral tablets, recheck was 4.0 this afternoon. VSS. Room air during day. CPAP at NOC. Denies SOB/CP/N/V/DIZZINESS/PAIN. Voiding well, last BMx2 was yesterday. A2 PVT transfer with walker. BLE wound care performed today. Strict I/O, did better today with maintaining fluid restriction.

## 2022-04-24 NOTE — PLAN OF CARE
Status Note    [id number]    Patient is A&Ox4 but forgetful at times, VSS on RA, able to make needs known, using call light appropriately, slept throughout night.  Denied pain throughout night, no acute events noted.  Patient did scratch a scab on right inner calf which led to bleeding.  Patient leg cleaned, no need for dressing.  Continue with plan of care.

## 2022-04-25 NOTE — PROGRESS NOTES
"CLINICAL NUTRITION SERVICES - ASSESSMENT NOTE     Nutrition Prescription    RECOMMENDATIONS FOR MDs/PROVIDERS TO ORDER:  None today     Malnutrition Status:    Patient does not meet two of the established criteria necessary for diagnosing malnutrition    Recommendations already ordered by Registered Dietitian (RD):  Orange Gelatein BID at lunch and dinner meals - do not count towards diet orders    Future/Additional Recommendations:  Monitor meal intakes, supplement acceptance, weight and lab trends      REASON FOR ASSESSMENT  Panda Miranda is a/an 66 year old male assessed by the dietitian for LOS    NUTRITION/MEDICAL HISTORY  Per chart review: Pt remains admitted to hospital in the setting of anasarca, acute on chronic systolic heart failure, a-fib, CKD. Other past medical history noted for gout, pseudogout, atrial fibrillation, morbid obesity s/p past bariatric surgery (1996), colon cancer, chronic kidney disease.    Per pt visit: Brief visit with pt, pt reports tolerating meals, agreeing to continue diet as ordered, but open to any interventions to aid in improving pt's fluid retention, RD reviewed importance of adequate protein intakes and suggested protein supplement, pt agreeing to above.     CURRENT NUTRITION ORDERS  Diet: Low Saturated Fat/2400 mg Sodium, Renal  Intake/Tolerance: 100% per flow sheets     LABS  Labs reviewed    MEDICATIONS  Fentanyl patch, PRN Oxycodone, Sodium bicarb tablets, Coumadin, Bumex, Allopurniol, Colchicine, Mag chloride, Pepcid    ANTHROPOMETRICS  Ht Readings from Last 1 Encounters:   04/13/22 (P) 1.981 m (6' 6\")   Most Recent Weight: 206.3 kg (454 lb 12.8 oz)    IBW: 97.3 kg  BMI: Obesity Grade III BMI >40  Weight History:   04/21/22 206.3 kg (454 lb 12.8 oz)   04/13/22 245.8 kg (542 lb) - question accuracy    03/02/22 210.9 kg (465 lb)   02/20/22 198.8 kg (438 lb 3.2 oz)   02/10/22 196.4 kg (433 lb)   01/29/22 196.5 kg (433 lb 1.6 oz)   12/07/21 209.3 kg (461 lb 8 oz) "   09/08/21 206.9 kg (456 lb 3.2 oz)   06/17/21 209.7 kg (462 lb 4.9 oz)   06/09/21 220 kg (485 lb)   06/01/21 220.1 kg (485 lb 3.2 oz)   05/17/21 219.2 kg (483 lb 3.2 oz)   03/29/21 216.8 kg (478 lb)     Weight assessment: Non-significant 2.3% weight loss in 1 month, 21 lb wt gain in 3 months, 0.4% weight loss in 6 months, non-significant 5% weight loss in 1 year. Weight variation likely multifactorial in the setting of fluid status.    Dosing Weight: 124.5 kg - adjusted BW     ASSESSED NUTRITION NEEDS  Estimated Energy Needs: 7421-4525 kcals/day (15 - 20 kcals/kg)  Justification: Obese  Estimated Protein Needs: 100-125 grams protein/day (0.8 - 1 grams of pro/kg)  Justification: CKD vs healed wounds vs fluid retention   Estimated Fluid Needs: 1 mL/kcal vs 1200 ml/day   Justification: Maintenance vs on a fluid restriction    PHYSICAL FINDINGS  WOC RN note reviewed:   Left lateral Shin wounds due to Venous Ulcer - Status: healed 4/13     Bilateral upper shin wounds due to venous stasis and edema - Status: Right healed, left healed     Right lateral heel Pressure injury community acquired, stage 2 - Status: healed 4/20    MALNUTRITION  % Intake: No decreased intake noted  % Weight Loss: Weight loss does not meet criteria  Subcutaneous Fat Loss: None observed  Muscle Loss: None observed  Fluid Accumulation/Edema: Mild to severe (anasarca noted)  Malnutrition Diagnosis: Patient does not meet two of the established criteria necessary for diagnosing malnutrition    NUTRITION DIAGNOSIS  Predicted inadequate nutrient intake vs increased nutrient needs related to appetite vs other      INTERVENTIONS  Implementation  Nutrition Education: RD reviewed the importance of adequate protein intakes    Medical food supplement therapy - ordered as above      Goals  Patient to consume % of nutritionally adequate meal trays TID, or the equivalent with supplements/snacks.     Monitoring/Evaluation  Progress toward goals will be  monitored and evaluated per protocol.    Elle Ozuna RD, CNSC, LD  8A RD pager: [phone]

## 2022-04-25 NOTE — PLAN OF CARE
Goal Outcome Evaluation:    Patient alert/oriented x4. VSS. Bowel sounds audible. On room air. Edema 2+ on lower extremities. On strict I/O, 2 G sodium restriction.Voided 1200 ml urine. Patients weight today was 412.5lb, at admission he was 454.8 lb's. Left PIV saline locked.Baseline numbness lower extremities.  He was up watching TV most of the day. Wears CPAP at night. Waiting for placement.

## 2022-04-25 NOTE — PLAN OF CARE
Goal Outcome Evaluation:        Pt A&Ox4 this shift. Pt denies chest pain, SOB, N/V, pt has BL numbness bilaterally in lower extremities and weeping edema bilaterally, dressing CDI. Pt on CPAP this shift. Pt on strict I&O, and 2g sodium restriction. Pt denies pain. Continue POC, pt waiting for placement TCU.

## 2022-04-25 NOTE — PROGRESS NOTES
"Care Management Follow Up    Length of Stay (days): 10    Expected Discharge Date: TBD, pending placement     Concerns to be Addressed: Discharge planning to LTC    Patient plan of care discussed at interdisciplinary rounds: Yes    Anticipated Discharge Disposition: LTC     Anticipated Discharge Services: TBD    Anticipated Discharge DME: TBD    Patient/family educated on Medicare website which has current facility and service quality ratings: Yes     Education Provided on the Discharge Plan: Yes    Patient/Family in Agreement with the Plan: Yes    Referrals Placed by CM/SW: See below    Private pay costs discussed: Not applicable    Additional Information:  SW still continuing to find placement for patient. Patients wife does not want him in the home if he is not \"fully independent\", therefore, LTC appears to be the appropriate LOC at this time. No accepting facilities at this time. Patient is ready to discharge from the hospital.     [facility] - (4/25) Declined due to no beds and short staffing.  [address]  P: [phone]  F: [phone]  4/23: left voicemail message for admissions, wait call back     [facility] - (4/25) Does not work will [facility]. Patient denied from this facility.   [address] [place]  [address]  P: [phone]  F: [phone]  4/23: left message for call back     [facility] - (4/25) Facility does not take patients who are over 400lbs. Declined from facility.   [address] [place]  [address]  P: [phone]  P: [phone] - Admissions  F: [phone]   4/23: Referrals checked on Weekdays (not Weekends). Writer advised to have unit SW follow up on Monday with Admissions.     [facility] - (4/25) No beds available   [address] [place] [address]  [address]  P: [phone]  F: [phone]  4/23: voicemail message left    OREN Reyna, LGSW  8A and 10 ICU "   [facility]   Phone: [phone]  Pager: [phone]

## 2022-04-25 NOTE — PLAN OF CARE
VS: /65 (BP Location: Left arm)   Pulse 79   Temp 97.6  F (36.4  C) (Oral)   Resp 18   Wt (!) 206.3 kg (454 lb 12.8 oz)   SpO2 98%   BMI (P) 52.56 kg/m     O2: Stable on room air   Output: Voided 730ml this shift in urinal   Last BM: 4/23/22   Activity: Up with lift device, pt wheeled himself through hallways this evening   Up for meals? Yes   Skin: Some abrasions to lower legs bilaterally   Pain: Denies   CMS: Intact   Dressing: CDI   Diet: 2g sodium, fluid restriction, strict I+O's   LDA: PIV SL   Equipment: IV pole, personal belongings, call light within reach   Plan: TCU when medically cleared   Additional Info:

## 2022-04-25 NOTE — PROGRESS NOTES
[facility]    Medicine Progress Note - Hospitalist Service, GOLD TEAM 17    Date of Admission:  4/15/2022    Case reviewed with nursing staff    Assessment & Plan            A: Patient is a 61 y/o man who has multiple medical problems including gout, pseudogout, atrial fibrillation, morbid obesity s/p past bariatric surgery and chronic kidney disease that, from chart review, is stage IV. Patient was hospitalized at [facility] from 27-Jan-2022 to 30-Jan-2022 for sepsis secondary to complicated urinary tract infection vs. pyelonephritis. Patient was hospitalized at [facility] from 11-Feb-2022 to 20-Feb-2022 for hyperkalemia, acute kidney injury and acute gout. Patient was discharged on 20-Feb-2022 and presented to [facility] with weakness - patient reportedly was unable to walk up the steps to his home. Patient was later found to have recurrent hyperkalemia. During hospital stay, patient was also treated for acute polyarticular gout. Patient completed a prednisone taper during hospital stay. Patient was also transitioned from uloric to allopurinol. Patient had pancytopenia during hospital stay that resolved prior to discharge. Patient was discharged to TCU on 18-Mar-2022. Patient was admitted to [place] on 15-Apr-2022 for anasarca.       P:  1.) Anasarca, acute on chronic systolic heart failure.  Pt continues to lose weight, feels much improved. Dry weight difficult to assess in view of body habitus. Wt today represents approx 50 pound wt loss over the last 8 days He continues to have LE edema and significant presacral edema/ Cr had been slowly trending up while Pt was receiving Bumex 2 mg tid, now stable following reduction in Bumex  to 2 mg IV BID   Continue on fluid restriction. Continue monitoring strict I/O's. Daily weight. Daily BMP. Will continue IV Bumex today, anticipate transition to po Bumex as discharge plan becomes more clear (likely Bumex 4 mg po  bid)    2.) Permanent atrial fibrillation:   HR controlled on Metoprolol  Patient on coumadin for anticoagulation.     3.) Chronic kidney disease stage IV: stable, sl above baseline, may need to accept sl worsening of renal function to maintain adequate diuresis.  Hypokalemia, secondary to diuresis, on K replacement    4.) Chronic venous stasis ulcers: Wound care.    5.) Gout: Patient on colchicine and allopurinol.    6.) Morbid obesity: To f/u as outpatient.    7.) History of colon cancer s/p hemicolectomy in 2011: Further surveillance as outpatient.    8.) Generalized weakness, debility, deconditioning: PT/OT. Continued marked improvement in functional status with diuresis  Pt will still likely need to discharge to SNF (not [facility])    9.) Anemia of chronic disease: Monitoring labs periodically.    10.) Obstructive sleep apnea: Patient to use CPAP.    11) Right lateral heel Pressure injury community acquired.   -Continue tx per Wound Care.        Nasir Escobedo MD  Hospitalist Service, [address]  Securely message with the Vocera Web Console (learn more here)  Text page via [facility] Paging/Directory   Please see signed in provider for up to date coverage information    Disposition TBD, based on progress in therapy, possible discharge to home vs back to a TCU    ______________________________________________________________________    Interval History     Pt notes no new concerns  He continues to feel stronger, continues to progress in therapies  Notes continued gradual improvement in abdominal and flank swelling    Physical Exam   Vital Signs: Temp: 96.8  F (36  C) Temp src: Oral BP: 114/71 Pulse: 74   Resp: 17 SpO2: 99 % O2 Device: None (Room air)    Weight: 454 lbs 12.8 oz    Vitals:    04/17/22 1600 04/21/22 1115   Weight: (!) 210 kg (462 lb 15.5 oz) (!) 206.3 kg (454 lb 12.8 oz)     Weight today reported to be 412 .6 !    Intake/Output Summary (Last 24  hours) at 4/22/2022 1316  Last data filed at 4/22/2022 0851  Gross per 24 hour   Intake 600 ml   Output 1550 ml   Net -950 ml       General: alert, fully oriented  Appears comfortable lying on back  Heart: irreg, HR 70s  Lungs:clear  Abdomen: Obese, + BS, soft, no tenderness to palpation.   + presacral edema  Trace  LE edema B    Data     Last Comprehensive Metabolic Panel:  Sodium   Date Value Ref Range Status   04/25/2022 138 133 - 144 mmol/L Final   06/18/2021 140 133 - 144 mmol/L Final     Potassium   Date Value Ref Range Status   04/25/2022 3.4 3.4 - 5.3 mmol/L Final   06/18/2021 4.0 3.4 - 5.3 mmol/L Final     Chloride   Date Value Ref Range Status   04/25/2022 99 94 - 109 mmol/L Final   06/18/2021 103 94 - 109 mmol/L Final     Carbon Dioxide   Date Value Ref Range Status   06/18/2021 28 20 - 32 mmol/L Final     Carbon Dioxide (CO2)   Date Value Ref Range Status   04/25/2022 30 20 - 32 mmol/L Final     Anion Gap   Date Value Ref Range Status   04/25/2022 9 3 - 14 mmol/L Final   06/18/2021 8 3 - 14 mmol/L Final     Glucose   Date Value Ref Range Status   04/25/2022 108 (H) 70 - 99 mg/dL Final   06/18/2021 122 (H) 70 - 99 mg/dL Final     Urea Nitrogen   Date Value Ref Range Status   04/25/2022 32 (H) 7 - 30 mg/dL Final   06/18/2021 42 (H) 7 - 30 mg/dL Final     Creatinine   Date Value Ref Range Status   04/25/2022 2.30 (H) 0.66 - 1.25 mg/dL Final   06/18/2021 2.35 (H) 0.66 - 1.25 mg/dL Final     GFR Estimate   Date Value Ref Range Status   04/25/2022 31 (L) >60 mL/min/1.73m2 Final     Comment:     Effective December 21, 2021 eGFRcr in adults is calculated using the 2021 CKD-EPI creatinine equation which includes age and gender (Farzana hodgson al., NEJM, DOI: 10.1056/NDUPjh3681351)   06/18/2021 28 (L) >60 mL/min/[1.73_m2] Final     Comment:     Non  GFR Calc  Starting 12/18/2018, serum creatinine based estimated GFR (eGFR) will be   calculated using the Chronic Kidney Disease Epidemiology Collaboration    (CKD-EPI) equation.       Calcium   Date Value Ref Range Status   04/25/2022 9.7 8.5 - 10.1 mg/dL Final   06/18/2021 9.2 8.5 - 10.1 mg/dL Final

## 2022-04-26 NOTE — PROGRESS NOTES
Care Management Follow Up    Length of Stay (days): 11    Expected Discharge Date: 04/25/2022     Concerns to be Addressed:    Discharge disposition   Patient plan of care discussed at interdisciplinary rounds: Yes    Anticipated Discharge Disposition:  TCU/LTC     Anticipated Discharge Services:  rehab  Anticipated Discharge DME:  ANNA        Additional Information:  This RNCC spoke with patients spouse at length regarding discharge plan;spouse states that is up to Panda , however spouse wanted to make clear that she would be unable to offer him any assistance at home with toileting, bathing etc. Spouse also states there is not room to put another hospital bed on her first floor as their 38 year old son was the victim of a stabbing and he is in a hospital bed in the living room and has home care coming in for him;spouse states she also works full time.    Per [facility], patient was previously in their facility as private pay as patient had plateaued at that time and insurance was no longer paying. Recommendation was a TCU in the community that also has a LTC this  would be much more feasible financially and if patient does not progress to complete independence he maybe could transfer to the LTC. If patient really wants to come back back to [facility] as private pay they would still look at this.    Addendum:  This RNCC  Together with GRACIA (Minda) met with patient at bedside to discuss DC planning. Patient is highly motivated to return home  But if medically ready to leave acute setting before he can be independant at home is willing to go to TCU, agree we would look for TCU in the community that would be a lower cost than [facility] but if unable to find will go back to [facility].    Patient declined from [facility]  2/2 to weight and [place] facilities would nt be able to accept due to weight.    Spoke with Kendrick at TCU, patient needs to be off of diuretics before they will start auth to accept patient back.    Kassie Bradley BSN RN  [facility]  RN Care Coordinator [address] [place] [id number]  [email or web address]   Office: [phone]   Pager: [phone]    To contact Weekend RNCC, dial [phone] and enter job code [phone] at prompt. This pager can not be contacted by text page or outside line.

## 2022-04-26 NOTE — PLAN OF CARE
Goal Outcome Evaluation:    VSS on RA. Alert and oriented x4.  Pt sat on chair for part of this shift, later transferred to bed with assist x2 using lift.  Pt stated he was picking at skin on LLE, causing oozing.   No pain reported. BLE numbness and edema +1.  Using CPAP overnight. Slept well.   Awaiting placement. Able to make all needs known, calls appropriately.

## 2022-04-26 NOTE — PLAN OF CARE
Goal Outcomne Evaluation:    /65 (BP Location: Right arm)   Pulse 73   Temp (!) 95.8  F (35.4  C) (Oral)   Resp 16   Wt (!) 186.9 kg (412 lb)   SpO2 96%   BMI (P) 47.61 kg/m      Patient up in wheelchair throughout the day, lift used to put patient back into their bed. No acute changes during the shift. Patient denies any pain. P. IV on left forearm saline locked. Patients legs have dressings on them, not able to visualize.   Continue with plan of care.

## 2022-04-27 NOTE — PROGRESS NOTES
VSS. Room air during day. CPAP at NOC. Denies SOB/CP/N/V/DIZZINESS/PAIN. Voiding well. A1-2 PVT transfer with walker, Lift device to return to bed in order to place pt comfortably in bed. BLE wounds, WOC RN will see patient tomorrow AM and perform WC. Strict I/O.

## 2022-04-27 NOTE — PROGRESS NOTES
[facility]    Medicine Progress Note - Hospitalist Service, GOLD TEAM 17    Date of Admission:  4/15/2022    Assessment & Plan          Panda Miranda is a 61 y/o man who has multiple medical problems including gout, pseudogout, atrial fibrillation, morbid obesity s/p past bariatric surgery and chronic kidney disease. Patient was hospitalized at [facility] from 27-Jan-2022 to 30-Jan-2022 for sepsis secondary to complicated urinary tract infection vs. pyelonephritis. Patient was hospitalized at [facility] from 11-Feb-2022 to 20-Feb-2022 for hyperkalemia, acute kidney injury and acute gout. Patient was discharged on 20-Feb-2022 and presented to [facility] with weakness - patient reportedly was unable to walk up the steps to his home. Patient was later found to have recurrent hyperkalemia. During hospital stay, patient was also treated for acute polyarticular gout. Patient completed a prednisone taper during hospital stay. Patient was also transitioned from uloric to allopurinol. Patient had pancytopenia during hospital stay that resolved prior to discharge. Patient was discharged to TCU on 18-Mar-2022. Patient was admitted to [place] on 15-Apr-2022 for anasarca.        Anasarca  -  On iv bumex now  - continue to monitor renal functions, has  lost a lot of fluid on board still with significant edema but much improved    - weight down , was 210 kg ( 462 lbs) on 4.17 and is down to 187 kg ( 421 lbs) as of 4/25   - continue with iv bumex currently on 2 mg bid till creatinine has significant rise , continue with fluid restrictions , change to oral bumex once closer to discharge  ( possibly on bumex 4 mg po bid )   - cardiology  recommended aggressive diuresis with 2 liters a day, so monitor for need to adjust bumex dose         HFrEF , EF 45-50% on  echocardiogram 01/29/22   -cannot use ACEi/ARB with renal functions and recurrent hyperkalemia needing hospitalization  left    Known Perimembranous VSD (small) with left to right shunting   - follow up  With cardiology   as out patient       Hypertension    -continue metoprolol , now bumex    - Recommend no resumption of ACE-I/ARB anytime soon (may potentially never tolerate due to life threatening complications)    Permanent atrial fibrillation  -HR controlled on Metoprolol, might need to adjust dose if blood pressure  Are low to allow room for diuresis   - on chronic anticoagulating with coumadin       TASHA vs. OHS   - Continue CPAP at night      Chronic kidney disease stage IV   - continue to monitor on IV bumex, switch to orals once creatinine rises   -creatinine bit up , continue to monitor closely   - replace k   - has had history of hospitalization with hyperkalemia       Chronic venous stasis ulcers   continue Wound care.      Gout   - continue  colchicine  and allopurinol.      Morbid obesity:  - stared on diet per nutritionist  -  Follow up  as outpatient.     History of colon cancer  - s/p hemicolectomy in 2011: Further surveillance as outpatient.      Anemia of Chronic Disease, pancytopenia   -was seen by enma during one of his hospitalization  -pancytopenia was felty to be due to bone marrow suppression from acute illness and medications ,  No iron, B12 or folate deficiency noted    - WBC plt counts now normal  - Hg stable now in 10s       Generalized weakness, debility, deconditioning  - continue  PT/OT. Continued marked improvement in functional status with diuresis  - Pt will still likely need to discharge to SNF (not [facility])         Right lateral heel Pressure injury community acquired.   -Continue tx per Wound Care.            Diet: Fluid restriction 1200 ML FLUID  Combination Diet Renal Diet (non-dialysis); Low Saturated Fat Na <2400mg Diet  Snacks/Supplements Adult: Other; Orange Gelatein BID at lunch and dinner meals - do not count towards diet orders; Between Meals    DVT Prophylaxis: Warfarin and Ambulate every  shift  Cheema Catheter: Not present  Central Lines: None  Cardiac Monitoring: None  Code Status: Full Code      Disposition Plan   Expected Discharge: needs ongoing iv diuresis    Anticipated discharge location:  Awaiting care coordination huddle  likely TCU as still needs ongoing strengthening as his wife would be unable to assist him        The patient's care was discussed with the care team     Gayle Almanzar MD  Hospitalist Service, GOLD TEAM 17  [facility]  Securely message with the Vocera Web Console (learn more here)  Text page via [employer] Paging/Directory   Please see signed in provider for up to date coverage information      Clinically Significant Risk Factors Present on Admission                    ______________________________________________________________________    Interval History    Was sitting out in the house, doing ok, energy is slowly coming back and able to move more. Is motivated to loose weight and motivated to walk as wants to go home     Data reviewed today: I reviewed all medications, new labs and imaging results over the last 24 hours.    Physical Exam   Vital Signs: Temp: (!) 95.8  F (35.4  C) Temp src: Oral BP: 96/54 Pulse: 89   Resp: 16 SpO2: 100 % O2 Device: None (Room air)    Weight: 412 lbs 0 oz     General appearence: awake alert  no apparent distress    HEENT: EOMI, PEARLA, sclera nonicteric,  moist, y mucus membranes,   NECK : supple  RESPIRATORY: lungs clear to auscultation bilateral,  no wheezing or crackles   CARDIOVASCULAR:S1 S2 regular rate and rhythm, no rubs gallops or murmurs appreciated  GASTROINTESTINAL: obese soft, non-distended , non-tender , + bowel sounds,    SKIN: warm and dry, no mottling noted   NEUROLOGIC; awake alert and oriented, no focal deficits found  EXTREMITIES: no clubbing, cyanosis, + edema up to post thigh lower abdomen    MUSCULOSKELETAL: without deformity     Data   Recent Labs   Lab 04/27/22  0806  04/26/22  0635 04/25/22  0653   HGB  --   --  10.5*   INR 2.71* 2.56* 2.54*    140 138   POTASSIUM 3.4 3.6 3.4   CHLORIDE 98 104 99   CO2 31 29 30   BUN 40* 36* 32*   CR 2.33* 2.24* 2.30*   ANIONGAP 8 7 9   JOHN 9.3 9.3 9.7   GLC 98 107* 108*

## 2022-04-27 NOTE — PROGRESS NOTES
[facility]    Medicine Progress Note - Hospitalist Service, GOLD TEAM 17    Date of Admission:  4/15/2022    Assessment & Plan          Panda Miranda is a 61 y/o man who has multiple medical problems including gout, pseudogout, atrial fibrillation, morbid obesity s/p past bariatric surgery and chronic kidney disease. Patient was hospitalized at [facility] from 27-Jan-2022 to 30-Jan-2022 for sepsis secondary to complicated urinary tract infection vs. pyelonephritis. Patient was hospitalized at [facility] from 11-Feb-2022 to 20-Feb-2022 for hyperkalemia, acute kidney injury and acute gout. Patient was discharged on 20-Feb-2022 and presented to [facility] with weakness - patient reportedly was unable to walk up the steps to his home. Patient was later found to have recurrent hyperkalemia. During hospital stay, patient was also treated for acute polyarticular gout. Patient completed a prednisone taper during hospital stay. Patient was also transitioned from uloric to allopurinol. Patient had pancytopenia during hospital stay that resolved prior to discharge. Patient was discharged to TCU on 18-Mar-2022. Patient was admitted to [place] on 15-Apr-2022 for anasarca.          Anasarca  -  On iv bumex now  - continue to monitor renal functions, has  lost a lot of fluid on board still with significant edema but much improved    - weight down , was 210 kg ( 462 lbs) on 4.17 and is down to 187 kg ( 421 lbs) as of 4/25   - continue with iv bumex currently on 2 mg bid till creatinine has significant rise , continue with fluid restrictions , change to oral bumex once closer to discharge  ( possibly on bumex 4 mg po bid )   - cardiology  recommended aggressive diuresis with 2 liters a day, so monitor for need to adjust bumex dose         HFrEF , EF 45-50% on  echocardiogram 01/29/22   -cannot use ACEi/ARB with renal functions and recurrent hyperkalemia needing  hospitalization left    Known Perimembranous VSD (small) with left to right shunting   - follow up  With cardiology   as out patient       Hypertension    -continue metoprolol , now bumex    - Recommend no resumption of ACE-I/ARB anytime soon (may potentially never tolerate due to life threatening complications)    Permanent atrial fibrillation  -HR controlled on Metoprolol, might need to adjust dose if blood pressure  Are low to allow room for diuresis   - on chronic anticoagulating with coumadin       TASHA vs. OHS   - Continue CPAP at night      Chronic kidney disease stage IV   - continue to monitor on IV bumex, switch to orals once creatinine rises   - replace k   - ha shad history of hospitalization with hyperkalemia       Chronic venous stasis ulcers   continue Wound care.      Gout   - continue  colchicine  and allopurinol.      Morbid obesity:  - stared on diet per nutritionist  -  Follow up  as outpatient.     History of colon cancer  - s/p hemicolectomy in 2011: Further surveillance as outpatient.      Anemia of Chronic Disease, pancytopenia   -was seen by enma during one of his hospitalization  -pancytopenia was felty to be due to bone marrow suppression from acute illness and medications ,  No iron, B12 or folate deficiency noted    - WBC plt counts now normal  - Hg stable now in 10s       Generalized weakness, debility, deconditioning  - continue  PT/OT. Continued marked improvement in functional status with diuresis  - Pt will still likely need to discharge to SNF (not [facility])         Right lateral heel Pressure injury community acquired.   -Continue tx per Wound Care.            Diet: Fluid restriction 1200 ML FLUID  Combination Diet Renal Diet (non-dialysis); Low Saturated Fat Na <2400mg Diet  Snacks/Supplements Adult: Other; Orange Gelatein BID at lunch and dinner meals - do not count towards diet orders; Between Meals    DVT Prophylaxis: Warfarin and Ambulate every shift  Cheema Catheter: Not  present  Central Lines: None  Cardiac Monitoring: None  Code Status: Full Code      Disposition Plan   Expected Discharge: needs ongoing iv diuresis    Anticipated discharge location:  Awaiting care coordination huddle  likely TCU as still needs ongoing strengthening as his wife would be unable to assist him        The patient's care was discussed with the care team     Gayle Almanzar MD  Hospitalist Service, GOLD TEAM [address] [place] [address]  Securely message with the Vocera Web Console (learn more here)  Text page via [employer] Paging/Directory   Please see signed in provider for up to date coverage information      Clinically Significant Risk Factors Present on Admission                    ______________________________________________________________________    Interval History      He is feeling better, breathing is much easier , also has been brianna to get out of bed with less shortness of breath  And brianna to participate with therapies, he wants  to continue with his diet for ongoing weight loss       Data reviewed today: I reviewed all medications, new labs and imaging results over the last 24 hours.    Physical Exam   Vital Signs: Temp: (!) 95.8  F (35.4  C) Temp src: Oral BP: 114/65 Pulse: 73   Resp: 16 SpO2: 96 % O2 Device: None (Room air)    Weight: 412 lbs 0 oz     General appearence: awake alert  no apparent distress    HEENT: EOMI, PEARLA, sclera nonicteric,  moist, y mucus membranes,   NECK : supple  RESPIRATORY: lungs clear to auscultation bilateral,  no wheezing or crackles   CARDIOVASCULAR:S1 S2 regular rate and rhythm, no rubs gallops or murmurs appreciated  GASTROINTESTINAL: obese soft, non-distended , non-tender , + bowel sounds,    SKIN: warm and dry, no mottling noted   NEUROLOGIC; awake alert and oriented, no focal deficits found  EXTREMITIES: no clubbing, cyanosis, + edema up to post thigh lower abdomen    MUSCULOSKELETAL: without deformity     Data   Recent  Labs   Lab 04/26/22  0635 04/25/22  0653 04/24/22  1419 04/24/22  0612   HGB  --  10.5*  --   --    INR 2.56* 2.54*  --  2.60*    138  --  138   POTASSIUM 3.6 3.4 4.0 3.3*   CHLORIDE 104 99  --  99   CO2 29 30  --  35*   BUN 36* 32*  --  32*   CR 2.24* 2.30*  --  2.29*   ANIONGAP 7 9  --  4   JOHN 9.3 9.7  --  9.4   * 108*  --  109*

## 2022-04-27 NOTE — PLAN OF CARE
Goal Outcome Evaluation:    Plan of Care Reviewed With: patient     Overall Patient Progress: improving    Outcome Evaluation: stable. slept most of the shift, voiding well    Pt is stable. Slept most of the shift. Voiding using urinal within reach. No complaints of pain. Able to make his needs known. Will continue with plan of care.

## 2022-04-28 NOTE — PROGRESS NOTES
[facility]    Medicine Progress Note - Hospitalist Service, GOLD TEAM 17    Date of Admission:  4/15/2022    Assessment & Plan          Panda Miranda is a 61 y/o man who has multiple medical problems including gout, pseudogout, atrial fibrillation, morbid obesity s/p past bariatric surgery and chronic kidney disease. Patient was hospitalized at [facility] from 27-Jan-2022 to 30-Jan-2022 for sepsis secondary to complicated urinary tract infection vs. pyelonephritis. Patient was hospitalized at [facility] from 11-Feb-2022 to 20-Feb-2022 for hyperkalemia, acute kidney injury and acute gout. Patient was discharged on 20-Feb-2022 and presented to [facility] with weakness - patient reportedly was unable to walk up the steps to his home. Patient was later found to have recurrent hyperkalemia. During hospital stay, patient was also treated for acute polyarticular gout. Patient completed a prednisone taper during hospital stay. Patient was also transitioned from uloric to allopurinol. Patient had pancytopenia during hospital stay that resolved prior to discharge. Patient was discharged to TCU on 18-Mar-2022. Patient was admitted to [place] on 15-Apr-2022 for anasarca.        Anasarca  -  On iv bumex now  - continue to monitor renal functions, has  lost a lot of fluid on board still with significant edema but much improved    - weight down , was 210 kg ( 462 lbs) on 4.17 and is down to 187 kg ( 421 lbs) as of 4/25 but now 4/28 weight is  200.9 kbg 442 lbs, suspect the 412 lbs was not correct or drinking more as urin output has been ~ 2 liters, monitor trend and if weight goes up increase bumex to tid   - continue with iv bumex currently on 2 mg bid till creatinine has significant rise , continue with fluid restrictions , change to oral bumex once closer to discharge  ( possibly on bumex 4 mg po bid )   - cardiology  recommended aggressive diuresis with 2 liters a day,  so monitor for need to adjust bumex dose         HFrEF , EF 45-50% on  echocardiogram 01/29/22   -cannot use ACEi/ARB with renal functions and recurrent hyperkalemia needing hospitalization left    Known Perimembranous VSD (small) with left to right shunting   - follow up  With cardiology   as out patient       Hypertension    -continue metoprolol , now bumex    - Recommend no resumption of ACE-I/ARB anytime soon (may potentially never tolerate due to life threatening complications)    Permanent atrial fibrillation  -HR controlled on Metoprolol, might need to adjust dose if blood pressure  Are low to allow room for diuresis   - on chronic anticoagulating with coumadin       TASHA vs. OHS   - Continue CPAP at night      Chronic kidney disease stage IV   - continue to monitor on IV bumex, switch to orals once creatinine rises   -creatinine bit up , continue to monitor closely   - replace k   - has had history of hospitalization with hyperkalemia       Chronic venous stasis ulcers   continue Wound care.      Gout   - continue  colchicine  and allopurinol.      Morbid obesity:  - stared on diet per nutritionist  -  Follow up  as outpatient.     History of colon cancer  - s/p hemicolectomy in 2011: Further surveillance as outpatient.      Anemia of Chronic Disease, pancytopenia   -was seen by enma during one of his hospitalization  -pancytopenia was felty to be due to bone marrow suppression from acute illness and medications ,  No iron, B12 or folate deficiency noted    - WBC plt counts now normal  - Hg stable now in 10s       Generalized weakness, debility, deconditioning  - continue  PT/OT. Continued marked improvement in functional status with diuresis  - Pt will still likely need to discharge to SNF (not [facility])         Right lateral heel Pressure injury community acquired.   -Continue tx per Wound Care.            Diet: Fluid restriction 1200 ML FLUID  Combination Diet Renal Diet (non-dialysis); Low Saturated Fat Na  <2400mg Diet  Snacks/Supplements Adult: Other; Orange Gelatein BID at lunch and dinner meals - do not count towards diet orders; Between Meals    DVT Prophylaxis: Warfarin and Ambulate every shift  Cheema Catheter: Not present  Central Lines: None  Cardiac Monitoring: None  Code Status: Full Code      Disposition Plan   Expected Discharge: needs ongoing iv diuresis    Anticipated discharge location:  Awaiting care coordination huddle  likely TCU as still needs ongoing strengthening as his wife would be unable to assist him        The patient's care was discussed with the care team     Gayle Almanzar MD  Hospitalist Service, GOLD TEAM [address] [place] [address]  Securely message with the Vocera Web Console (learn more here)  Text page via [facility] Paging/Directory   Please see signed in provider for up to date coverage information      Clinically Significant Risk Factors Present on Admission                    ______________________________________________________________________    Interval History      Feels a bit tired but no new complaints , no chest pain  No shortness of breath         Data reviewed today: I reviewed all medications, new labs and imaging results over the last 24 hours.    Physical Exam   Vital Signs: Temp: (!) 96  F (35.6  C) Temp src: Oral BP: 105/56 Pulse: 66   Resp: 16 SpO2: 100 % O2 Device: BiPAP/CPAP    Weight: 442 lbs 14.4 oz     General appearence: awake alert  no apparent distress    HEENT: EOMI, PEARLA, sclera nonicteric,  moist, y mucus membranes,   NECK : supple  RESPIRATORY: lungs clear to auscultation bilateral,  no wheezing or crackles   CARDIOVASCULAR:S1 S2 regular rate and rhythm, no rubs gallops or murmurs appreciated  GASTROINTESTINAL: obese soft, non-distended , non-tender , + bowel sounds,    SKIN: warm and dry, no mottling noted   NEUROLOGIC; awake alert and oriented, no focal deficits found  EXTREMITIES: no clubbing, cyanosis, + edema up to  post thigh lower abdomen    MUSCULOSKELETAL: without deformity     Data   Recent Labs   Lab 04/28/22  0637 04/27/22  0806 04/26/22  0635 04/25/22  0653   HGB  --   --   --  10.5*   INR 2.78* 2.71* 2.56* 2.54*    137 140 138   POTASSIUM 3.6 3.4 3.6 3.4   CHLORIDE 102 98 104 99   CO2 29 31 29 30   BUN 45* 40* 36* 32*   CR 2.19* 2.33* 2.24* 2.30*   ANIONGAP 6 8 7 9   JOHN 9.5 9.3 9.3 9.7   * 98 107* 108*

## 2022-04-28 NOTE — PROGRESS NOTES
WOC Nurse Inpatient Wound Assessment   Reason for consultation: Evaluate and treat  Left lateral shin wounds, R heel    Assessment  Left lateral Shin wounds due to Venous Ulcer  Status: healed 4/13    Bilateral upper shin wounds due to venous stasis and edema  Status: Right healed, left healed    Right lateral heel Pressure injury community acquired.   Pressure injury stage 2  Status: healed 4/20      Treatment Plan  Bilateral legs: Every other day    Spray all intact skin to BL lower extremities with Carmela cleanse and protect and rub in. Allow to soak for 10mins and then wipe away any excess.   Apply sween 24 (Pink top lotion) from base of toes to below knees. (Do NOT use critic aid -black top)  Apply Adaptic (mineral gauze) to any open areas  Cover with kerlix and tape.     Right lateral heel and left lateral lower leg every other day  Cleanse with wound cleanser   Dry and protect surrounding skin with no sting barrier film wipe #[id number] (this is very important)  Cover with silicone foam dressing Mepilex  4x4 #[id number]  Make sure heel is elevated off bed at all times.    Pressure Injury prevention (RN-please order supplies if not in room)  Turn every 2 hours, side to side avoid supine on bariatric pressure redistribution support surface  Float heels off bed with use of  Heel Lift boots (Prevalon #[id number])    Prevent sliding and shear by limiting HOB to 30 degrees or less unless contraindicated, use knee gatch first if not contraindicated  If sitting, use pressure redistribution chair cushion large(#[id number]); if unable to shift weight every hour, please limit sitting to an hour at a time.  Mepilex PRN,Change at least q 3 days, peel back, peek and replace for daily skin inspection.      Orders Revised  Recommended provider order: Lymphedema  WOC Nurse follow-up plan: Every other week  Nursing to notify the Provider(s) and re-consult the WOC Nurse if wound(s) deteriorates or new skin concern.    Patient History  According  to provider note(s):   66 year old male admitted through on 2/20/2022 after being discharged earlier from [facility] stay from 2/11 - 2/20 for acute gout flare and subsequent SHANTE and hyperkalemia likely caused by gout treatment. Patient is morbidly obese and he was discharged to his home with home health but he was unable to manage going up steps immediately upon arrival home.  He was brought in to [facility] ED for placement. In the ER he was seen by Dr Henning, vitals were normal.  Labs were reviewed from his recent discharge with Cr of 1.93 which was improved from his discharge and actually better than baseline.     3/15/2022 pending TCU for deconditioning      No Known Allergies  Objective Data  Containment of urine/stool: Incontinence Protocol as needed    Body mass index is 47.61 kg/m  (pended).     Active Diet Order  Orders Placed This Encounter      Combination Diet Renal Diet (non-dialysis); Low Saturated Fat Na <2400mg Diet      Intake/Output Summary (Last 24 hours) at 3/15/2022 1444  Last data filed at 3/15/2022 1030  Gross per 24 hour   Intake 260 ml   Output 600 ml   Net -340 ml       Pressure Injury Risk Assessment:   Sensory Perception: 3-->slightly limited  Moisture: 3-->occasionally moist  Activity: 2-->chairfast  Mobility: 2-->very limited  Nutrition: 3-->adequate  Friction and Shear: 2-->potential problem  Sandoval Score: 15                          Labs:   Recent Labs   Lab 04/28/22  0637 04/26/22  0635 04/25/22  0653   HGB  --   --  10.5*   INR 2.78*   < > 2.54*    < > = values in this interval not displayed.       Physical Exam  Areas of skin assessed: Left Lateral lower leg, Right lateral foot and heel    4/28: right shin re-opened due to edema. Overall legs have improved since admit. WOC will see every other week. POC remains in place.     Right and left lateral foot wounds healed on assessment 3/21    Wound Location:  Left Lateral lower leg  See Media tab for mor  images      3/21      3/29    Wound Base: dermis, red, moist small areas of granulation tissue     Palpation of the wound bed: normal   resurfaced    Wound Location:  Right lateral heel        3/21     3/29    4/13    4/20 resurfaced    Wound History: present on admit to TCU  Wound Base: resurfaced 4/20    Wound Location:  Bilateral upper shins    3/21 Right upper shin    3/29 Right upper shin        Date of last photo 3/29/22  Wound History: present on admit. Pt has had fluctuating edema in LE causing weepy areas and skin to open.   Wound Base:re-opened 4/28 likely due to edema  Wound Base: 100 % dermis     Palpation of the wound bed: normal      Drainage: small     Description of drainage: serosanguinous     Measurements (length x width x depth, in cm) 0.3 x 0.3 x 0.1 cm  Periwound skin: intact      Color: normal and consistent with surrounding tissue      Temperature: normal   Odor: none  Pain: denies , none      Interventions  Visual inspection and assessment completed   Wound Care Rationale Right heel and foot Protect and monitor as DTPI evolves. Left lateral leg selective debridement (autolysis) of nonviable tissue   Wound Care: done per plan of care  Supplies: at bedside  Current off-loading measures: Chair cushion and ordered PRN  Current support surface: Bariatric Low air loss mattress with extention  Education provided to: plan of care, wound progress, Off-loading pressure and effects of neuropathy  Discussed plan of care with Patient and Nurse    Interventions  Visual inspection and assessment completed   Wound Care Rationale Protect periwound skin, Promote moist wound healing without tissue dehydration , Gently fill dead space to prevent abscess formation  and Provide protection   Wound Care: done per plan of care  Supplies: at bedside  Current off-loading measures: pillows for positioning, HOB 30 degrees or less, heels floating off beds, bariatric low airloss support surfaces, chair cushion ordered  PRN  Current support surface: Bariatric low air loss  Education provided to: importance of repositioning and plan of care  Discussed plan of care with Patient, Nurse and Physician     Michelle Meléndez RN BSN CWOCN    Pager [phone]

## 2022-04-28 NOTE — PLAN OF CARE
Pt is alert and oriented and able to make needs knows. 1200 mL fluid restriction, strict Is and Os. Denied pain overnight. VSS x soft BP. Scattered rash/ discoloration  acrost chest and peeling/ scaling to feet. DARLENE shins due to dressing. Assist of 2 to stand pivot and A2 with lift to transfer back to bed. Able to use urinal in bed. Denies SOB/ chest pain. No new concerns.Continue POC

## 2022-04-28 NOTE — PROGRESS NOTES
Pt alert and oriented x4, on 1200 ml fluid restriction, had 800 mls this shift. Weight today 442 lbs. Was up with SBA 2 people and walker. Worked with PT. Pt has good appetite. {t cooperative with meds. Pt has edema to BLE, itchy legs, lotion applied. WOCN saw pt and performed wound care and wrapped legs. Able to make needs known, call light in reach, continue POC.

## 2022-04-29 NOTE — PROGRESS NOTES
[facility]    Medicine Progress Note - Hospitalist Service, GOLD TEAM 17    Date of Admission:  4/15/2022    Assessment & Plan          Panda Miranda is a 59 y/o man who has multiple medical problems including gout, pseudogout, atrial fibrillation, morbid obesity s/p past bariatric surgery and chronic kidney disease. Patient was hospitalized at [facility] from 27-Jan-2022 to 30-Jan-2022 for sepsis secondary to complicated urinary tract infection vs. pyelonephritis. Patient was hospitalized at [facility] from 11-Feb-2022 to 20-Feb-2022 for hyperkalemia, acute kidney injury and acute gout. Patient was discharged on 20-Feb-2022 and presented to [facility] with weakness - patient reportedly was unable to walk up the steps to his home. Patient was later found to have recurrent hyperkalemia. During hospital stay, patient was also treated for acute polyarticular gout. Patient completed a prednisone taper during hospital stay. Patient was also transitioned from uloric to allopurinol. Patient had pancytopenia during hospital stay that resolved prior to discharge. Patient was discharged to TCU on 18-Mar-2022. Patient was admitted to [place] on 15-Apr-2022 for anasarca.        Anasarca  -  On iv bumex now  - continue to monitor renal functions, has  lost a lot of fluid on board still with significant edema but much improved    - weight down , was 210 kg ( 462 lbs) on 4.17 and is down to 187 kg ( 421 lbs) as of 4/25 but now 4/28 weight is  200.9 kbg 442 lbs, suspect the 412 lbs was not correct or drinking more as urin output has been ~ 2 liters, monitor trend and if weight goes up increase bumex to tid   - continue with iv bumex currently on 2 mg bid till creatinine has significant rise , continue with fluid restrictions , change to oral bumex once closer to discharge  ( possibly on bumex 4 mg po bid )   - if trend of decreasing urin output continue will up bumex to tid    - cardiology  recommended aggressive diuresis with 2 liters a day, so monitor for need to adjust bumex dose         HFrEF , EF 45-50% on  echocardiogram 01/29/22   -cannot use ACEi/ARB with renal functions and recurrent hyperkalemia needing hospitalization left    Known Perimembranous VSD (small) with left to right shunting   - follow up  With cardiology   as out patient       Hypertension    -continue metoprolol , now bumex    - Recommend no resumption of ACE-I/ARB anytime soon (may potentially never tolerate due to life threatening complications)    Permanent atrial fibrillation  -HR controlled on Metoprolol, might need to adjust dose if blood pressure  Are low to allow room for diuresis   - on chronic anticoagulating with coumadin       TASHA vs. OHS   - Continue CPAP at night      Chronic kidney disease stage IV   - continue to monitor on IV bumex, switch to orals once creatinine rises   -creatinine bit up , continue to monitor closely   - replace k   - has had history of hospitalization with hyperkalemia       Chronic venous stasis ulcers   continue Wound care.      Gout   - continue  colchicine  and allopurinol.      Morbid obesity:  - stared on diet per nutritionist  -  Follow up  as outpatient.     History of colon cancer  - s/p hemicolectomy in 2011: Further surveillance as outpatient.      Anemia of Chronic Disease, pancytopenia   -was seen by enma during one of his hospitalization  -pancytopenia was felty to be due to bone marrow suppression from acute illness and medications ,  No iron, B12 or folate deficiency noted    - WBC plt counts now normal  - Hg stable now in 10s       Generalized weakness, debility, deconditioning  - continue  PT/OT. Continued marked improvement in functional status with diuresis  - Pt will still likely need to discharge to SNF (not [facility])         Right lateral heel Pressure injury community acquired.   -Continue tx per Wound Care.            Diet: Fluid restriction 1200 ML  FLUID  Combination Diet Renal Diet (non-dialysis); Low Saturated Fat Na <2400mg Diet  Snacks/Supplements Adult: Other; Orange Gelatein BID at lunch and dinner meals - do not count towards diet orders; Between Meals    DVT Prophylaxis: Warfarin and Ambulate every shift  Cheema Catheter: Not present  Central Lines: None  Cardiac Monitoring: None  Code Status: Full Code      Disposition Plan   Expected Discharge: needs ongoing iv diuresis    Anticipated discharge location:  Awaiting care coordination huddle  likely TCU as still needs ongoing strengthening as his wife would be unable to assist him        The patient's care was discussed with the care team     Gayle Almanzar MD  Hospitalist Service, GOLD TEAM [address] [place] [address]  Securely message with the Vocera Web Console (learn more here)  Text page via [employer] Paging/Directory   Please see signed in provider for up to date coverage information      Clinically Significant Risk Factors Present on Admission                    ______________________________________________________________________    Interval History      Feels ok , feels stronger . No nausea vomiting          Data reviewed today: I reviewed all medications, new labs and imaging results over the last 24 hours.    Physical Exam   Vital Signs: Temp: 97.8  F (36.6  C) Temp src: Oral BP: 91/77 Pulse: 80   Resp: 16 SpO2: 96 % O2 Device: None (Room air)    Weight: 440 lbs 9.6 oz     General appearence: awake alert  no apparent distress    HEENT: EOMI, PEARLA, sclera nonicteric,  moist, y mucus membranes,   NECK : supple  RESPIRATORY: lungs clear to auscultation bilateral,  no wheezing or crackles   CARDIOVASCULAR:S1 S2 regular rate and rhythm, no rubs gallops or murmurs appreciated  GASTROINTESTINAL: obese soft, non-distended , non-tender , + bowel sounds,    SKIN: warm and dry, no mottling noted   NEUROLOGIC; awake alert and oriented, no focal deficits  found  EXTREMITIES: no clubbing, cyanosis, + edema up to post thigh lower abdomen    MUSCULOSKELETAL: without deformity     Data   Recent Labs   Lab 04/29/22  0945 04/28/22  0637 04/27/22  0806 04/26/22  0635 04/25/22  0653   HGB  --   --   --   --  10.5*   INR 2.66* 2.78* 2.71*   < > 2.54*    137 137   < > 138   POTASSIUM 3.3* 3.6 3.4   < > 3.4   CHLORIDE 100 102 98   < > 99   CO2 31 29 31   < > 30   BUN 45* 45* 40*   < > 32*   CR 2.18* 2.19* 2.33*   < > 2.30*   ANIONGAP 8 6 8   < > 9   JOHN 10.0 9.5 9.3   < > 9.7   * 112* 98   < > 108*    < > = values in this interval not displayed.

## 2022-04-29 NOTE — PLAN OF CARE
VSS. AOx4. CMS: baseline n/t to bilat feet. On RA.   Up with 1-2 assist. Washed up/had partial bath with NST assistance and was up in his WC throughout the day.   Scattered scabs and scars across body; see flowsheets for skin assessment and all I/O details. Bumex given as scheduled, potassium replaced. Recheck scheduled.  Bilat LEs dressings CDI, due to be changed tomorrow. 3+ dependent edema to lower extremeties. Fentanyl patch L shoulder intact. PIV L arm patent, SL.   Weight this  lbs. Pt on 1200mL fluid restriction. Pt whining that the prisoners in [facility] received more water than what writer was giving him. RN reinforced rationale for fluid restriction with family member present. Pt had 920mL from 7a-7p. Voids spontaneously, UOP clear, nadya. LBM today, large.   Pt able to make needs known. Call light in reach. Continue POC.

## 2022-04-30 NOTE — CONSULTS
CROSS COVER NOTE    Contated by RN because fentanyl patch not on patient. Not found anywhere in bed or on patient's body. Concern it might have been thrown out with linens. Called Pharmacy-patch applied yesterday. Need to renew order to start today since order is to change it on 5/1.     Plan:    Fentanyl patch reordered at the same dose. Changed time to start today so new patch can be obtained.     Heather Conrad PA-C  Internal Medicine [facility] Hospitalist  [facility]  Pager [phone]

## 2022-04-30 NOTE — PROGRESS NOTES
[facility]    Medicine Progress Note - Hospitalist Service, GOLD TEAM 17    Date of Admission:  4/15/2022    Assessment & Plan          Panda Miranda is a 61 y/o man who has multiple medical problems including gout, pseudogout, atrial fibrillation, morbid obesity s/p past bariatric surgery and chronic kidney disease. Patient was hospitalized at [facility] from 27-Jan-2022 to 30-Jan-2022 for sepsis secondary to complicated urinary tract infection vs. pyelonephritis. Patient was hospitalized at [facility] from 11-Feb-2022 to 20-Feb-2022 for hyperkalemia, acute kidney injury and acute gout. Patient was discharged on 20-Feb-2022 and presented to [facility] with weakness - patient reportedly was unable to walk up the steps to his home. Patient was later found to have recurrent hyperkalemia. During hospital stay, patient was also treated for acute polyarticular gout. Patient completed a prednisone taper during hospital stay. Patient was also transitioned from uloric to allopurinol. Patient had pancytopenia during hospital stay that resolved prior to discharge. Patient was discharged to TCU on 18-Mar-2022. Patient was admitted to [facility] on 15-Apr-2022 for anasarca.        Anasarca  -  On iv bumex now  - continue to monitor renal functions, has  lost a lot of fluid on board still with significant edema but much improved    - weight down , was 210 kg ( 462 lbs) on 4.17 and is down to 187 kg ( 421 lbs) as of 4/25 but now 4/28 weight is  200.9 kbg 442 lbs, suspect the 412 lbs was not correct or drinking more as urin output has been ~ 2 liters, I/o now + still with lots of edema and urine output down so  increased  bumex to tid 4/30    - continue with iv bumex currently on 2 mg bid till creatinine has significant rise , continue with fluid restrictions , change to oral bumex once closer to discharge  ( possibly on bumex 4 mg po bid )   - if trend of decreasing urin  output continue will up bumex to tid   - cardiology  recommended aggressive diuresis with 2 liters a day, so monitor for need to adjust bumex dose         HFrEF , EF 45-50% on  echocardiogram 01/29/22   -cannot use ACEi/ARB with renal functions and recurrent hyperkalemia needing hospitalization left    Known Perimembranous VSD (small) with left to right shunting   - follow up  With cardiology   as out patient       Hypertension    -continue metoprolol , now bumex    - Recommend no resumption of ACE-I/ARB anytime soon (may potentially never tolerate due to life threatening complications)    Permanent atrial fibrillation  -HR controlled on Metoprolol, might need to adjust dose if blood pressure  Are low to allow room for diuresis   - on chronic anticoagulating with coumadin       TASHA vs. OHS   - Continue CPAP at night      Chronic kidney disease stage IV   - continue to monitor on IV bumex, switch to orals once creatinine rises   -creatinine bit up , continue to monitor closely   - replace k   - has had history of hospitalization with hyperkalemia       Chronic venous stasis ulcers   continue Wound care.      Gout   - continue  colchicine  and allopurinol.      Morbid obesity:  - stared on diet per nutritionist  -  Follow up  as outpatient.     History of colon cancer  - s/p hemicolectomy in 2011: Further surveillance as outpatient.      Anemia of Chronic Disease, pancytopenia   -was seen by enma during one of his hospitalization  -pancytopenia was felty to be due to bone marrow suppression from acute illness and medications ,  No iron, B12 or folate deficiency noted    - WBC plt counts now normal  - Hg stable now in 10s       Generalized weakness, debility, deconditioning  - continue  PT/OT. Continued marked improvement in functional status with diuresis  - Pt will still likely need to discharge to SNF (not [facility])         Right lateral heel Pressure injury community acquired.   -Continue tx per Wound Care.             Diet: Fluid restriction 1200 ML FLUID  Combination Diet Renal Diet (non-dialysis); Low Saturated Fat Na <2400mg Diet  Snacks/Supplements Adult: Other; Orange Gelatein BID at lunch and dinner meals - do not count towards diet orders; Between Meals    DVT Prophylaxis: Warfarin and Ambulate every shift  Cheema Catheter: Not present  Central Lines: None  Cardiac Monitoring: None  Code Status: Full Code      Disposition Plan   Expected Discharge: needs ongoing iv diuresis    Anticipated discharge location:  Awaiting care coordination huddle  likely TCU as still needs ongoing strengthening as his wife would be unable to assist him        The patient's care was discussed with the care team     Gayle Almanzar MD  Hospitalist Service, GOLD TEAM [address] [place] [address]  Securely message with the Vocera Web Console (learn more here)  Text page via [email or web address] Paging/Directory   Please see signed in provider for up to date coverage information      Clinically Significant Risk Factors Present on Admission                    ______________________________________________________________________    Interval History      No new complaints, just waking up, no nausea vomiting   Data reviewed today: I reviewed all medications, new labs and imaging results over the last 24 hours.    Physical Exam   Vital Signs: Temp: 97.4  F (36.3  C) Temp src: Axillary BP: 94/57 Pulse: 76   Resp: 16 SpO2: 99 % O2 Device: None (Room air)    Weight: 440 lbs 9.6 oz     General appearence: awake alert  no apparent distress    HEENT: EOMI, PEARLA, sclera nonicteric,  moist, y mucus membranes,   NECK : supple  RESPIRATORY: lungs clear to auscultation bilateral,  no wheezing or crackles   CARDIOVASCULAR:S1 S2 regular rate and rhythm, no rubs gallops or murmurs appreciated  GASTROINTESTINAL: obese soft, non-distended , non-tender , + bowel sounds,    SKIN: warm and dry, no mottling noted   NEUROLOGIC; awake alert and  oriented, no focal deficits found  EXTREMITIES: no clubbing, cyanosis, + edema up to post thigh lower abdomen    MUSCULOSKELETAL: without deformity     Data   Recent Labs   Lab 04/30/22  0831 04/29/22  2206 04/29/22  0945 04/28/22  0637 04/26/22  0635 04/25/22  0653   HGB  --   --   --   --   --  10.5*   INR 3.04*  --  2.66* 2.78*   < > 2.54*     --  139 137   < > 138   POTASSIUM 3.9 4.5 3.3* 3.6   < > 3.4   CHLORIDE 104  --  100 102   < > 99   CO2 29  --  31 29   < > 30   BUN 48*  --  45* 45*   < > 32*   CR 2.23*  --  2.18* 2.19*   < > 2.30*   ANIONGAP 6  --  8 6   < > 9   JOHN 9.6  --  10.0 9.5   < > 9.7   GLC 99  --  104* 112*   < > 108*    < > = values in this interval not displayed.

## 2022-04-30 NOTE — PLAN OF CARE
[id number]    /53 (BP Location: Left arm, Patient Position: Supine, Cuff Size: Adult Large)   Pulse 79   Temp 98.4  F (36.9  C) (Oral)   Resp 16   Wt (!) 199.9 kg (440 lb 9.6 oz)   SpO2 97%   BMI (P) 50.92 kg/m  .     A&O x 4. VSS. Baseline N/T BLE. Denies N/V. LS clear BUL, diminished BLL. Denies SOB. Denies chest pain. Pt on RA. Last BM on 4/29/22. Voids without difficulty - uses bedside urinal. OOB w/ assist x 2, walker, GB. Several wounds - see flowsheet. New small abrasion found in pt L mid back, skin fold - small Primapore applied. 3+ edema BLE. L PIV saline locked. New fentanyl patch placed on R shoulder due to loss of previous patch - See previous note. Low saturated fat diet / Renal diet. 1200ml fluid restriction.    Continue POC.

## 2022-04-30 NOTE — PROGRESS NOTES
Pt reported missing fentanyl patch at 2000, states it fell off L shoulder after lunch while he was sitting in his wheelchair. RN completed full body search, bed search, linen search. No patch found. Possibly thrown out w/ pt linens. Cross cover provider paged. Pharmacy consulted. Order renewed for new patch to be placed. New patch placed on R shoulder w/ tegaderm over it.

## 2022-04-30 NOTE — PLAN OF CARE
Vitals: Temp: 97.4 L axillary Sats: 99% RA  P: 76 Resp: 16 BP: 94/57 L arm supine     A&O: x4  Patient reported pain in legs and feet kings requesting pain medication. Denies NV. Lungs sound clear upper lobes, lower lobes diminished. Denies cough or SOB. Last BM 4/29/22. Voids independently. Ambulated with PT and in room with assistance and walker. Complained of dry skin on legs. Changed bandage and cleaned legs with soap and water. Replaced bandages kings legs with primary RN. Patient tolerated cares well.    Eileen Arias, nursing student on 4/30/2022 at 2:04 PM

## 2022-05-01 NOTE — PROGRESS NOTES
[facility]    Medicine Progress Note - Hospitalist Service, GOLD TEAM 17    Date of Admission:  4/15/2022    Assessment & Plan          Panda Miranda is a 59 y/o man who has multiple medical problems including gout, pseudogout, atrial fibrillation, morbid obesity s/p past bariatric surgery and chronic kidney disease. Patient was hospitalized at [facility] from 27-Jan-2022 to 30-Jan-2022 for sepsis secondary to complicated urinary tract infection vs. pyelonephritis. Patient was hospitalized at [facility] from 11-Feb-2022 to 20-Feb-2022 for hyperkalemia, acute kidney injury and acute gout. Patient was discharged on 20-Feb-2022 and presented to [facility] with weakness - patient reportedly was unable to walk up the steps to his home. Patient was later found to have recurrent hyperkalemia. During hospital stay, patient was also treated for acute polyarticular gout. Patient completed a prednisone taper during hospital stay. Patient was also transitioned from uloric to allopurinol. Patient had pancytopenia during hospital stay that resolved prior to discharge. Patient was discharged to TCU on 18-Mar-2022. Patient was admitted to [place] on 15-Apr-2022 for anasarca.        Anasarca  -  On iv bumex now  - continue to monitor renal functions, has  lost a lot of fluid on board still with significant edema but much improved    - weight down , was 210 kg ( 462 lbs) on 4.17 and is down to 187 kg ( 421 lbs) as of 4/25 but now 4/28 weight is  200.9 kbg 442 lbs, suspect the 412 lbs was not correct or drinking more as urin output has been ~ 2 liters, I/o now + still with lots of edema and urine output down so  increased  bumex to tid 4/30    - continue with iv bumex currently on 2 mg bid till creatinine has significant rise , continue with fluid restrictions , change to oral bumex once closer to discharge  ( possibly on bumex 4 mg po bid )   - if trend of decreasing urin  output continue will up bumex to tid   - cardiology  recommended aggressive diuresis with 2 liters a day, so monitor for need to adjust bumex dose         HFrEF , EF 45-50% on  echocardiogram 01/29/22   -cannot use ACEi/ARB with renal functions and recurrent hyperkalemia needing hospitalization left    Known Perimembranous VSD (small) with left to right shunting   - follow up  With cardiology   as out patient       Hypertension    -continue metoprolol , now bumex    - Recommend no resumption of ACE-I/ARB anytime soon (may potentially never tolerate due to life threatening complications)    Permanent atrial fibrillation  -HR controlled on Metoprolol, might need to adjust dose if blood pressure  Are low to allow room for diuresis   - on chronic anticoagulating with coumadin       TASHA vs. OHS   - Continue CPAP at night      Chronic kidney disease stage IV   - continue to monitor on IV bumex, switch to orals once creatinine rises   -creatinine bit up , continue to monitor closely   - replace k   - has had history of hospitalization with hyperkalemia       Chronic venous stasis ulcers   continue Wound care.      Gout   - continue  colchicine  and allopurinol.      Morbid obesity:  - stared on diet per nutritionist  -  Follow up  as outpatient.     History of colon cancer  - s/p hemicolectomy in 2011: Further surveillance as outpatient.      Anemia of Chronic Disease, pancytopenia   -was seen by enma during one of his hospitalization  -pancytopenia was felty to be due to bone marrow suppression from acute illness and medications ,  No iron, B12 or folate deficiency noted    - WBC plt counts now normal  - Hg stable now in 10s       Generalized weakness, debility, deconditioning  - continue  PT/OT. Continued marked improvement in functional status with diuresis  - Pt will still likely need to discharge to SNF (not [facility])         Right lateral heel Pressure injury community acquired.   -Continue tx per Wound Care.             Diet: Fluid restriction 1200 ML FLUID  Combination Diet Renal Diet (non-dialysis); Low Saturated Fat Na <2400mg Diet  Snacks/Supplements Adult: Other; Orange Gelatein BID at lunch and dinner meals - do not count towards diet orders; Between Meals    DVT Prophylaxis: Warfarin and Ambulate every shift  Cheema Catheter: Not present  Central Lines: None  Cardiac Monitoring: None  Code Status: Full Code      Disposition Plan   Expected Discharge: needs ongoing iv diuresis    Anticipated discharge location:  Awaiting care coordination huddle  likely TCU as still needs ongoing strengthening as his wife would be unable to assist him        The patient's care was discussed with the care team     Gayle Amlanzar MD  Hospitalist Service, GOLD TEAM [address] [place] [address]  Securely message with the Vocera Web Console (learn more here)  Text page via [employer] Paging/Directory   Please see signed in provider for up to date coverage information      Clinically Significant Risk Factors Present on Admission                    ______________________________________________________________________    Interval History    No new complaints, states is compliment with fluid restrictions, no chest pain  , had good BM      Data reviewed today: I reviewed all medications, new labs and imaging results over the last 24 hours.    Physical Exam   Vital Signs: Temp: 98.1  F (36.7  C) Temp src: Axillary BP: 98/60 Pulse: 72   Resp: 18 SpO2: 100 % O2 Device: BiPAP/CPAP    Weight: 439 lbs 0 oz     General appearence: awake alert  no apparent distress    HEENT: EOMI, PEARLA, sclera nonicteric,  moist, y mucus membranes,   NECK : supple  RESPIRATORY: lungs clear to auscultation bilateral,  no wheezing or crackles   CARDIOVASCULAR:S1 S2 regular rate and rhythm, no rubs gallops or murmurs appreciated  GASTROINTESTINAL: obese soft, non-distended , non-tender , + bowel sounds,    SKIN: warm and dry, no mottling noted    NEUROLOGIC; awake alert and oriented, no focal deficits found  EXTREMITIES: no clubbing, cyanosis, + edema up to post thigh lower abdomen    MUSCULOSKELETAL: without deformity     Data   Recent Labs   Lab 05/01/22  0815 04/30/22  0831 04/29/22  2206 04/29/22  0945 04/26/22  0635 04/25/22  0653   HGB  --   --   --   --   --  10.5*   INR 2.99* 3.04*  --  2.66*   < > 2.54*    139  --  139   < > 138   POTASSIUM 3.9 3.9 4.5 3.3*   < > 3.4   CHLORIDE 103 104  --  100   < > 99   CO2 28 29  --  31   < > 30   BUN 49* 48*  --  45*   < > 32*   CR 2.30* 2.23*  --  2.18*   < > 2.30*   ANIONGAP 6 6  --  8   < > 9   JOHN 9.4 9.6  --  10.0   < > 9.7   * 99  --  104*   < > 108*    < > = values in this interval not displayed.

## 2022-05-01 NOTE — PLAN OF CARE
Goal Outcome Evaluation:      Temp: 98.1 L axillary  P: 72   Resp: 18 Sats: 100  BP: 98/60    Pt A&O x 4 Denies NV. Lungs sounds clear upper lobes, lower lobes diminished. Denies coughing or SOB. Used CPAP overnight. Reported a BM 4/30/22. Voiding independently without difficulty. Pt reported itchy dry skin on knees and thighs, applied lotion. Encouraged position change as tolerated. Pt has good appetite and cooperative with cares. Call button in reach.     Eileen Arias, nursing student on 5/1/2022 at 12:23 PM

## 2022-05-01 NOTE — PROGRESS NOTES
Output: Voids adequate amount in urinal   Last BM: 4/30 and passing gas   Activity: WBAT. Up with A1.  Repositioned in bed 1x per pt request   Skin: Scabs, pitting edema in BLE.    Pain: Minimal pain on his back when repostioned.   Decline pain meds.    Neuro: Alert and oriented x4.  Baseline numbness in LEs   Dressing: Bilateral legs dressing: CDI  Small primapore on L-mid back: CDI    Diet: Renal diet  Fluid restriction 1200mls   LDA: PIV: SL    Equipment: PCD, IV pole, pulsate mattress, CPAP,   Personal belongings at bedside   Plan: TCU/LTC, pending placement    Additional Info: Dressing to BLE changed this afternoon.

## 2022-05-01 NOTE — PLAN OF CARE
VSS.Not on CPAP start of shift,reinforced on importance of use and able to don and used it rest of the night.Spot checked for 02,WNL.  BLE wounds,UTV,covered,drsg with small dried drainage.  L upper back abrasion wound with drsg,intact.  Voiding on urinal,,x2.  Passing gas,no BM this tonight.  PIV line L forearm,saline locked.  On strict fluid restriction,had a total of 150ml water this shift.  Denies pain.Fentanyl patch on R side of chest ,CDI.  Baseline numbness/tingling on BLE.  Refusing to turn and repos.Did turn on skin inspection for his upper back wound.  On special mattress.Bariatric bed.  Plan for discharge to TCU once medically stable.

## 2022-05-01 NOTE — PROGRESS NOTES
Output: Voids adequate amount in urinal   Last BM: 4/30 and passing gas   Activity: WBAT. Up with A1.  Repositioned in bed 1x per pt request   Skin: Scabs, pitting edema in BLE.    Pain: Minimal pain on his back when repostioned.   Decline pain meds.    Neuro: Alert and oriented x4.  Baseline numbness in LEs   Dressing: Bilateral legs dressing: CDI  Small primapore on L-mid back: CDI    Diet: Renal diet  Fluid restriction 1200mls   LDA: PIV: SL    Equipment: PCD, IV pole, pulsate mattress, CPAP,   Personal belongings at bedside   Plan: TCU/LTC, pending placement    Additional Info:

## 2022-05-01 NOTE — PLAN OF CARE
Goal Outcome Evaluation:  [id number]    VS: Temp: 97.5  F (36.4  C) Temp src: Oral BP: 118/71 Pulse: 92   Resp: 15 SpO2: 95 % O2 Device: None (Room air)      O2: Sats > 92% on RA.   Denies SOB and chest pain.  CPAP at night   Output: Voids adequate amount in urinal   Last BM: 4/30 and passing gas   Activity: WBAT. Up with A1.  Repositioned in bed 1x per pt request   Skin: Scabs, pitting edema in BLE.    Pain: Minimal pain on his back when repostioned.   Decline pain meds.    Neuro: Alert and oriented x4.  Baseline numbness in LEs   Dressing: Bilateral legs dressing: CDI  Small primapore on L-mid back: CDI    Diet: Renal diet  Fluid restriction 1200mls   LDA: PIV: SL    Equipment: PCD, IV pole, pulsate mattress, CPAP,   Personal belongings at bedside   Plan: TCU/LTC, pending placement    Additional Info:

## 2022-05-02 NOTE — PLAN OF CARE
Patient is alert and oriented x4, and able to communicate. VSS. Patient c/o pain but refused prn pain medication. He stated pain is manageable without prn pain medication. Voiding independently without difficulty. Patient c/o being constipation and miralax prn was given. He later stated prn was effective and had a large BM. Patient noted coughing at times, nonproductive. Lung sounds clear. Patient is up in his wheelchair. Call is within reach. No concern noted. Will continue with current POC.

## 2022-05-02 NOTE — PLAN OF CARE
Goal Outcome Evaluation:    /62 (BP Location: Left arm)   Pulse 86   Temp 97.8  F (36.6  C) (Axillary)   Resp 19   Wt (!) 199.1 kg (439 lb)   SpO2 98%   BMI (P) 50.73 kg/m       A&Ox4. RA. Lungs CTA, dim bases. No O2 requirements at this time. Respirations reg, non-labored. Denies SOB. Denies CP. Abd soft, NT, BS+.  LBM 4/30. Anasarca. BLE edema with venous stasis ulcers with kerlex wraps from foot to knees, Wounds mildly weeping. Fluid restriction. Pt asks for pitchers of ice beyond what is allowed for restriction. Encouraged pt to stick to protocol. Gave only half buckets at a time to promote compliance. Did not get OOB this shift thus far. Wears CPAP at HS. Denies pain. Calls appropriately. Will c/t monitor.

## 2022-05-03 NOTE — PLAN OF CARE
Pt up in wheelchair for most of shift. Declined miconazole powder this morning, however pt c/o moisture and pain in skin fold this afternoon.Unable to inspect full skin fold d/t body habitus and pt sitting up in chair. Did apply powder in area this afternoon. Pt washed up by OT. Pt weight 434lb this morning. Leg dressing change done on NOC shift. Continuing to diuresis with IV bumex. Pt using urinal. Pt has 440ml left for PO fluid intake. Fluid restriction needs reinforcement. Pt able to make needs known.

## 2022-05-03 NOTE — PLAN OF CARE
C/o pain refused pain medication  Itchiness in legs  Requested wound care, washed legs with warm water, applied new vaseline/gauze to wounds/scabs  Rewrapped in new wrap, wounds bleeding     CPAP on overnight  Fluid restriciton

## 2022-05-03 NOTE — PROGRESS NOTES
[facility]    Medicine Progress Note - Hospitalist Service, GOLD TEAM 17    Date of Admission:  4/15/2022    Assessment & Plan        Panda Miranda is a 61 y/o gentleman w/ h/o chronic HFrEF (EF 45-50% on 1/29/22), small perimembranous VSD w/ left to right shunt, HTN, CKD4 w/ baseline cr ~ 1.8 - 2.1, chronic atrial fibrillation, gout, pseudogout, morbid obesity s/p bariatric surgery and numerous other medical conditions.  He was hospitalized at [facility] from 1/27 - 1/30/22 for sepsis 2/2 complicated UTI vs. pyelonephritis.  He was hospitalized at [facility] from 2/11 - 2/20/22 for hyperkalemia, SHANTE and acute gout flare.  He was then hospitalized at [facility] 2/20 - 3/18/22 with weakness, unable to walk up the steps in his home.  He was later found to have recurrent hyperkalemia.  During [facility] hospital stay, patient was also treated for acute polyarticular gout.  He completed prednisone taper during [facility] hospital stay.  He was also transitioned from uloric to allopurinol.  He had pancytopenia during [facility] hospital stay that resolved prior to discharge.  Transferred to [facility] on 3/18/22.  Unfortunately patient's [facility] stay was complicated by anasarca (worsening fluid retention, significant weight gain and patient inability to participate in therapy).  Transfer to [facility] on 4/15/22 for treatment of anasarca with IV Bumex.        Anasarca  ---   On IV Bumex since admit, titrated up to 2 mg po tid  ---   Patient diuresed well  ---   I/O is negative 14.3 L since admit  ---   Wt is markedly down, ~  lb (?)  ---   Discontinue IV Bumex  ---   Start Bumex 2 mg po tid     HFrEF , EF 45-50% on  echocardiogram 01/29/22   -cannot use ACEi/ARB with renal functions and recurrent hyperkalemia needing hospitalization left    Known Perimembranous VSD (small) with left to right shunting   - follow up  With cardiology as out patient       Hypertension     -continue metoprolol and bumex    - Recommend no resumption of ACE-I/ARB anytime soon (may potentially never tolerate due to life threatening complications)    Permanent atrial fibrillation  -HR controlled on Metoprolol  - on chronic anticoagulating with coumadin, INR therapeutic at 2.70     TASHA vs. OHS   - Continue CPAP at night      Chronic kidney disease stage IV  -Baseline creatinine 1.8-2.1   -Creatinine is 2.42    Chronic venous stasis ulcers  -Continue Wound care.      Gout  - Continue colchicine and allopurinol.      Morbid obesity:  - stared on diet per nutritionist  -  Follow up  as outpatient.     History of colon cancer  - S/p hemicolectomy in 2011  - Further surveillance as outpatient.     Anemia of chronic disease, pancytopenia   -was seen by enma during one of his hospitalization  -pancytopenia was felty to be due to bone marrow suppression from acute illness and medications ,  No iron, B12 or folate deficiency noted    - WBC plt counts now normal  - Hg stable now in 10s      Generalized weakness, debility, deconditioning  - continue PT/OT. Continued marked improvement in functional status with diuresis  - Pt will still likely need to discharge to SNF (not )      Right lateral heel Pressure injury community acquired.   -Continue tx per Wound Care.        Diet: Fluid restriction 1200 ML FLUID  Combination Diet Renal Diet (non-dialysis); Low Saturated Fat Na <2400mg Diet  Snacks/Supplements Adult: Other; Orange Gelatein BID at lunch and dinner meals - do not count towards diet orders; Between Meals    DVT Prophylaxis: Warfarin and Ambulate every shift  Cheema Catheter: Not present  Central Lines: None  Cardiac Monitoring: None  Code Status: Full Code      Disposition Plan   Expected Discharge: needs ongoing iv diuresis    Anticipated discharge location:  Awaiting care coordination huddle likely TCU as still needs ongoing strengthening as his wife would be unable to assist him         Adeline Rodriguez,  MD  Hospitalist Service, GOLD TEAM 17  [facility]  Securely message with the [employer] Web Console (learn more here)  Text page via [facility] Paging/Directory   Please see signed in provider for up to date coverage information      ______________________________________________________________________    Interval History    No complaints.  Uneventful night.  Patient feels he is getting stronger and able to ambulate about 50 feet without any difficulties.  He is able to go up 2 steps.     Data reviewed today: I reviewed all medications, new labs and imaging results over the last 24 hours.    Physical Exam   Vital Signs: Temp: 98.2  F (36.8  C) Temp src: Axillary BP: 102/51 Pulse: 74   Resp: 16 SpO2: 95 % O2 Device: BiPAP/CPAP    Weight: 434 lbs 6.4 oz     General appearence: awake alert  no apparent distress  HEENT: EOMI, PEARLA, sclera nonicteric,  moist, y mucus membranes,   NECK : supple  RESPIRATORY: lungs clear to auscultation bilateral,  no wheezing or crackles   CARDIOVASCULAR:S1 S2 regular rate and rhythm, no rubs gallops or murmurs appreciated  GASTROINTESTINAL: obese soft, non-distended , non-tender , + bowel sounds,    SKIN: warm and dry, no mottling noted   NEUROLOGIC; awake alert and oriented, no focal deficits found  EXTREMITIES: no clubbing, cyanosis, + edema up to post thigh lower abdomen    MUSCULOSKELETAL: without deformity     Data   Recent Labs   Lab 05/03/22  0805 05/02/22  0733 05/01/22  0815   INR 2.73* 2.71* 2.99*    138 137   POTASSIUM 3.5 3.6 3.9   CHLORIDE 101 101 103   CO2 28 29 28   BUN 56* 55* 49*   CR 2.42* 2.35* 2.30*   ANIONGAP 9 8 6   JOHN 9.6 9.7 9.4   * 103* 100*

## 2022-05-04 NOTE — PROGRESS NOTES
Care Management Follow Up    Length of Stay (days): 19    Expected Discharge Date: TBD, patient is medically ready to discharge     Concerns to be Addressed: Discharge planning    Patient plan of care discussed at interdisciplinary rounds: Yes    Anticipated Discharge Disposition: TBD, pending prior auth of  TCU. Patient may be cleared to go home.     Anticipated Discharge Services: To be determined      Anticipated Discharge DME: To be determined     Patient/family educated on Medicare website which has current facility and service quality ratings: Not applicable    Education Provided on the Discharge Plan: Yes     Patient/Family in Agreement with the Plan: Patients discharge plan is not finalized. SW will talk with patient and patients wife to discuss options after leaving the hospital.    Referrals Placed by CM/SW: Not applicable     Private pay costs discussed: Not applicable    Additional Information:  Social work notified by medical staff that patient is medically ready to discharge. Kendrick at  TCU is currently working on prior auth to see if patients stay at TCU would be covered. If not, SW will need to have a conversation with patient and wife regarding next steps and whether patient can return home or not.     Social work will continue to follow and provide assistance to ensure a safe and timely discharge.     Minda Gross, OREN, LGSW  8A and 10 ICU   [facility]   Phone: [phone]  Pager: [phone]

## 2022-05-04 NOTE — PLAN OF CARE
Patient is alert and oriented x4, and able to communicate. VSS. Patient c/o pain but refused prn pain medication. Assist of x2 with transfers. Voiding independently without difficulty. Patient uses bedside urinal. He was up in his wheelchair before bedtime. Call is within reach. No concern noted. Will continue with current POC.

## 2022-05-04 NOTE — PROGRESS NOTES
VSS. BP continues to be hypotensive, metoprolol held this am. A/Ox4. Ambulated in house with PT and writer, did well. On PO bumex. Output 800cc, in 700cc today from 4210-3279. WC completed.

## 2022-05-04 NOTE — PROGRESS NOTES
[facility]    Medicine Progress Note - Hospitalist Service, GOLD TEAM 17    Date of Admission:  4/15/2022    Assessment & Plan        Panda Miranda is a 59 yo gentleman w/ h/o chronic HFrEF (EF 45-50% on 1/29/22), small perimembranous VSD w/ left to right shunt, HTN, CKD4 w/ baseline cr ~ 1.8 - 2.1, chronic atrial fibrillation, gout, pseudogout, morbid obesity s/p bariatric surgery and numerous other medical conditions.  He was hospitalized at [facility] from 1/27 - 1/30/22 for sepsis 2/2 complicated UTI vs. pyelonephritis.  He was hospitalized at [facility] from 2/11 - 2/20/22 for hyperkalemia, SHANTE and acute gout flare.  He was then hospitalized at [facility] 2/20 - 3/18/22 with weakness, unable to walk up the steps in his home.  He was later found to have recurrent hyperkalemia.  During [facility] hospital stay, patient was also treated for acute polyarticular gout.  He completed prednisone taper during [facility] hospital stay and was transitioned from uloric to allopurinol.  He had pancytopenia during [facility] hospital stay that resolved prior to discharge.  Transferred to [facility] on 3/18/22.  Unfortunately patient's [facility] stay was complicated by anasarca (worsening fluid retention, significant weight gain and patient inability to participate in therapy).  Transfer to [facility] on 4/15/22 for treatment of anasarca with IV Bumex.    Chronic HFrEF with acute exacerbation,   TTE 1/29/22 revealed EF 45-50%   Anasarca  ---   On IV Bumex since admit, titrated up to 2 mg po tid  ---   Patient diuresed well  ---   I/O is negative 11.8 L since admit  ---   Wt is markedly down, ~  lb (?)  ---   Discontinued IV Bumex and started Bumex 2 mg po tid on 5/3/22  ---   No change in oral Bumex today  ---   Not a candidate for ACE inhibitor or ARB due to underlying CKD4 with baseline creatinine ~ 1.8-2.1    Known perimembranous VSD (small) with left to right shunting  ---    Follow up with cardiology as out patient      Hypertension    ---   BP is soft  ---   Continue metoprolol and bumex      Permanent atrial fibrillation  ---   HR controlled w/ PTA Metoprolol  ---   On chronic anticoagulating with coumadin, INR therapeutic at 2.75     TASHA, ? OHS   ---   Continue CPAP during sleep    CKD IV  ---   Baseline creatinine ~1.8-2.1   ---   Creatinine is 2.49  ---   Avoid nephrotoxins other than Bumex    Chronic venous stasis ulcers  ---   Care per WOCN.      Gout  ---   Continue colchicine and allopurinol.     Morbid obesity w/ BMI 50.2 kg/m2:  ---   H/o gastroplasty vertical banded by Dr. Arizmendi at [facility] [place] [address]  ---   He is currently 434 pounds  ---   Weight loss strongly recommended  ---   Follow-up with bariatric clinic after d/c.     History of colon cancer  ---   S/p hemicolectomy in 2011  ---   Further surveillance as outpatient.     Anemia of chronic disease, pancytopenia   ---   Was seen by enma during one of his recent hospitalization  ---   Pancytopenia was felty to be due to bone marrow suppression from acute illness and medications,    ---   Iron, B12 or folate levels were wnl  ---   WBC and Plt counts back to normal limits   ---   Hg stable at 10 g    Generalized weakness, debility, deconditioning  ---   Continue PT/OT.   ---   Patient continues to get stronger, now able to transfer on his own and ambulate up to 50 feet without any difficulties.  He is able to go up 2 steps maybe a little more  ---   He is hoping to discharge to home     Right lateral heel pressure injury, present prior to admit.   ---   Continue wound care per WOCN rec.         Diet: Fluid restriction 1200 ML FLUID  Combination Diet Renal Diet (non-dialysis); Low Saturated Fat Na <2400mg Diet  Snacks/Supplements Adult: Other; Orange Gelatein BID at lunch and dinner meals - do not count towards diet orders; Between Meals    DVT Prophylaxis: Warfarin and Ambulate every shift  Cheema Catheter: Not  present  Central Lines: None  Cardiac Monitoring: None  Code Status: Full Code      Disposition Plan   Expected Discharge:   Medically stable for discharge as soon as placement issue figured out  Anticipated discharge location: TCU vs home        Adeline Rodriguez MD  Hospitalist Service, GOLD TEAM 17  [facility]  Securely message with the [employer] Web Console (learn more here)  Text page via [facility] Paging/Directory   Please see signed in provider for up to date coverage information      ______________________________________________________________      Interval History    No complaints.  Uneventful night.  Patient says he feels he is getting stronger every day.  He is now able to transfer on his own.  Able to ambulate up to 50 feet or even more.  He states he is able to go up a flight of strairs but this was not observed by therapy team yet.  He states he continued to diurese well on oral Bumex even though his I/O measurement states other wise.    Data reviewed today:  I reviewed all medications, new labs and imaging results over the last 24 hours.    Physical Exam   Vital Signs: Temp: 98.2  F (36.8  C) Temp src: Oral BP: 91/58 Pulse: 71   Resp: 16 SpO2: 99 % O2 Device: None (Room air)    Weight: 434 lbs 6.4 oz   General: Severe morbidly obese, aao x 3, NAD.  HEENT:  NC/AT, neck supple, no thyromegaly  CVS:  NL s 1 and s2, no m/r/g.  Lungs:  CTA B/L.   Abd:  Soft, + bs, NT, no rebound or gaurding, no fluid shift.  Ext:  No c/c.  Lymph:  + Lymphedema  Neuro:  Nonfocal.  Musculoskeletal: No calf tenderness to palpation.    Skin:  No rash.  Psychiatry:  Mood and affect appropriate.     Data   Recent Labs   Lab 05/04/22  0601 05/03/22  0805 05/02/22  0733   INR 2.75* 2.73* 2.71*    138 138   POTASSIUM 3.6 3.5 3.6   CHLORIDE 101 101 101   CO2 28 28 29   BUN 59* 56* 55*   CR 2.49* 2.42* 2.35*   ANIONGAP 8 9 8   JOHN 9.5 9.6 9.7   * 105* 103*

## 2022-05-05 NOTE — PROGRESS NOTES
CLINICAL NUTRITION SERVICES - REASSESSMENT NOTE     Nutrition Prescription    RECOMMENDATIONS FOR MDs/PROVIDERS TO ORDER:  None today     Malnutrition Status:    Patient does not meet two of the established criteria necessary for diagnosing malnutrition    Recommendations already ordered by Registered Dietitian (RD):  Rotate cherry or fruit punch Gelatein BID at lunch and dinner meals - do not count towards diet orders    Future/Additional Recommendations:  Continue to monitor meal/supplement intakes, weight and lab trends      EVALUATION OF THE PROGRESS TOWARD GOALS   Diet: Low Saturated Fat/2400 mg Sodium and Renal  Supplement: Orange Gelatein BID  Intake: 100% per flow sheets        NEW FINDINGS   MAR reviewed. Labs reviewed. Pt visited at bedside, reports tolerating meals and Gelatein supplement, but would like alternative flavors, RD will update orders.     05/03/22 1009 197 kg (434 lb 6.4 oz)   04/30/22 1442 199.1 kg (439 lb)   04/29/22 0859 199.9 kg (440 lb 9.6 oz)    04/28/22 1039 200.9 kg (442 lb 14.4 oz)   04/25/22 0900 186.9 kg (412 lb)   04/21/22 1115 206.3 kg (454 lb 12.8 oz)   04/17/22 1600 210 kg (462 lb 15.5 oz)      03/02/22 210.9 kg (465 lb)   02/20/22 198.8 kg (438 lb 3.2 oz)   02/10/22 196.4 kg (433 lb)   01/29/22 196.5 kg (433 lb 1.6 oz)   12/07/21 209.3 kg (461 lb 8 oz)   09/08/21 206.9 kg (456 lb 3.2 oz)   06/17/21 209.7 kg (462 lb 4.9 oz)   06/09/21 220 kg (485 lb)   06/01/21 220.1 kg (485 lb 3.2 oz)   05/17/21 219.2 kg (483 lb 3.2 oz)   03/29/21 216.8 kg (478 lb)     Weight assessment: Significant 6% weight loss in almost 1 month, 1 lb wt gain in 3 months, non-significant 4.8% weight loss in >6 months, non-significant 9.2% weight loss in 1 year, weight variation are multifactorial in the setting of fluid status.    MALNUTRITION  % Intake: No decreased intake noted  % Weight Loss: > 5% in 1 month (severe) vs fluid status changes   Subcutaneous Fat Loss: None observed  Muscle Loss: None  observed  Fluid Accumulation/Edema: Trace to mild per flow sheets   Malnutrition Diagnosis: Patient does not meet two of the established criteria necessary for diagnosing malnutrition    Previous Goals   Patient to consume % of nutritionally adequate meal trays TID, or the equivalent with supplements/snacks  Evaluation: Met    Previous Nutrition Diagnosis  Predicted inadequate nutrient intake vs increased nutrient needs related to appetite vs other   Evaluation: No change    CURRENT NUTRITION DIAGNOSIS  Predicted inadequate nutrient intake vs increased nutrient needs related to appetite vs other     INTERVENTIONS  Implementation  Medical food supplement therapy - order updated as above per pt preference     Goals  Patient to consume % of nutritionally adequate meal trays TID, or the equivalent with supplements/snacks.    Monitoring/Evaluation  Progress toward goals will be monitored and evaluated per protocol.    Elle Ozuna RD, CNSC, LD  8A RD pager: [phone]

## 2022-05-05 NOTE — PROGRESS NOTES
[facility]    Medicine Progress Note - Hospitalist Service, GOLD TEAM 17    Date of Admission:  4/15/2022    Assessment & Plan        Panda Miranda is a 61 yo gentleman w/ h/o chronic HFrEF (EF 45-50% on 1/29/22), small perimembranous VSD w/ left to right shunt, HTN, CKD4 w/ baseline cr ~ 1.8 - 2.1, chronic atrial fibrillation, gout, pseudogout, morbid obesity s/p bariatric surgery and numerous other medical conditions.  He was hospitalized at [facility] from 1/27 - 1/30/22 for sepsis 2/2 complicated UTI vs. pyelonephritis.  He was hospitalized at [facility] from 2/11 - 2/20/22 for hyperkalemia, SHANTE and acute gout flare.  He was then hospitalized at [facility] 2/20 - 3/18/22 with weakness, unable to walk up the steps in his home.  He was later found to have recurrent hyperkalemia.  During [facility] hospital stay, patient was also treated for acute polyarticular gout.  He completed prednisone taper during [facility] hospital stay and was transitioned from uloric to allopurinol.  He had pancytopenia during [facility] hospital stay that resolved prior to discharge.  Transferred to [facility] on 3/18/22.  Unfortunately patient's [facility] stay was complicated by anasarca (worsening fluid retention, significant weight gain and patient inability to participate in therapy).  Transfer to [facility] on 4/15/22 for treatment of anasarca with IV Bumex.    Chronic HFrEF with acute exacerbation,   TTE 1/29/22 revealed EF 45-50%   Anasarca  ---   On IV Bumex since admit, titrated up to 2 mg po tid  ---   Patient diuresed well  ---   I/O is negative 9.3 L since admit  ---   Wt is markedly down, suspect this is not accurate  ---   Discontinued IV Bumex and started Bumex 2 mg po tid on 5/3/22  ---   No change in oral Bumex today  ---   Not a candidate for ACE inhibitor or ARB due to underlying CKD4 with baseline creatinine ~ 1.8-2.1    Known perimembranous VSD (small) with left to right  shunting  ---   Follow up with cardiology as out patient      Hypertension    ---   BP is soft  ---   Continue metoprolol and bumex      Permanent atrial fibrillation  ---   HR controlled w/ PTA Metoprolol  ---   On chronic anticoagulating with coumadin, INR therapeutic at 2.87     TASHA, ? OHS   ---   Continue CPAP during sleep    CKD IV  ---   Baseline creatinine ~1.8-2.1   ---   Creatinine is 2.28, better c/w the last 3 days  ---   Avoid nephrotoxins other than Bumex    Chronic venous stasis ulcers  ---   Care per WOCN.      Gout  ---   Continue colchicine and allopurinol.     Morbid obesity w/ BMI 50.2 kg/m2:  ---   H/o gastroplasty vertical banded by Dr. Arizmendi at [facility] in [address]  ---   He is currently 434 pounds  ---   Weight loss strongly recommended  ---   Follow-up with bariatric clinic after d/c.     History of colon cancer  ---   S/p hemicolectomy in 2011  ---   Further surveillance as outpatient.     Anemia of chronic disease, pancytopenia   ---   Was seen by enma during one of his recent hospitalization  ---   Pancytopenia was felty to be due to bone marrow suppression from acute illness and medications,    ---   Iron, B12 or folate levels were wnl  ---   WBC and Plt counts back to normal limits   ---   Hg stable at 10 g    Generalized weakness, debility, deconditioning  ---   Continue PT/OT.   ---   Patient continues to get stronger, now able to transfer on his own and ambulate up to 50 feet without any difficulties.  He is able to go up 2 steps maybe a little more  ---   He is hoping to discharge to home     Right lateral heel pressure injury, present prior to admit.   ---   Continue wound care per WOCN rec.         Diet: Fluid restriction 1200 ML FLUID  Combination Diet Renal Diet (non-dialysis); Low Saturated Fat Na <2400mg Diet  Snacks/Supplements Adult: Other; Rotate cherry or fruit punch Gelatein BID at lunch and dinner meals - do not count towards diet orders; Between Meals    DVT Prophylaxis:  Warfarin and Ambulate every shift  Cheema Catheter: Not present  Central Lines: None  Cardiac Monitoring: None  Code Status: Full Code      Disposition Plan   Expected Discharge:   Medically stable for discharge as soon as placement issue figured out  Anticipated discharge location: TCU vs home        Adeline Rodriguez MD  Hospitalist Service, GOLD TEAM 17  [facility]  Securely message with the [employer] Web Console (learn more here)  Text page via [facility] Paging/Directory   Please see signed in provider for up to date coverage information      ______________________________________________________________      Interval History    No complaints.  Uneventful night.  Patient continues to diurese.  I/O and weight measurement not accurate    Data reviewed today:  I reviewed all medications, new labs and imaging results over the last 24 hours.    Physical Exam   Vital Signs: Temp: 97.7  F (36.5  C) Temp src: Oral BP: (!) 88/58 Pulse: 69   Resp: 18 SpO2: 100 % O2 Device: None (Room air)    Weight: 434 lbs 6.4 oz   General: Severe morbidly obese, aao x 3, NAD.  HEENT:  NC/AT, neck supple, no thyromegaly  CVS:  NL s 1 and s2, no m/r/g.  Lungs:  CTA B/L.   Abd:  Soft, + bs, NT, no rebound or gaurding, no fluid shift.  Ext:  No c/c.  Lymph:  + Lymphedema  Neuro:  Nonfocal.  Musculoskeletal: No calf tenderness to palpation.    Skin:  No rash.  Psychiatry:  Mood and affect appropriate.     Data   Recent Labs   Lab 05/05/22  0640 05/04/22  0601 05/03/22  0805   INR 2.87* 2.75* 2.73*    137 138   POTASSIUM 3.6 3.6 3.5   CHLORIDE 101 101 101   CO2 26 28 28   BUN 57* 59* 56*   CR 2.28* 2.49* 2.42*   ANIONGAP 9 8 9   JOHN 9.3 9.5 9.6   * 105* 105*

## 2022-05-05 NOTE — PLAN OF CARE
Major Shift Events:  Patient slept well. Prefers the mattress on the firmest setting.     Neuro: A&O x4. PERRLA.  Cardiac: BP and HR WNL.  Resp: LS diminished. On RA when awake and CPAP with sleep.  GI/: Urinating with urinal. Last BM 5/4. Had remainder of 1200 ml fluid allowance overnight.  Skin: Dressings to lower legs are CDI.  Lines/Drains/Drips: 1 PIV, saline locked.  Pain: No pain.      Plan: Continue to follow plan of care.    For vital signs and complete assessments, please see documentation flowsheets.

## 2022-05-06 NOTE — PROGRESS NOTES
[facility]    Medicine Progress Note - Hospitalist Service, GOLD TEAM 17    Date of Admission:  4/15/2022    Assessment & Plan        Panda Miranda is a 61 yo gentleman w/ h/o chronic HFrEF (EF 45-50% on 1/29/22), small perimembranous VSD w/ left to right shunt, HTN, CKD4 w/ baseline cr ~ 1.8 - 2.1, chronic atrial fibrillation, gout, pseudogout, morbid obesity s/p bariatric surgery and numerous other medical conditions.  He was hospitalized at [facility] from 1/27 - 1/30/22 for sepsis 2/2 complicated UTI vs. pyelonephritis.  He was hospitalized at [facility] from 2/11 - 2/20/22 for hyperkalemia, SHANTE and acute gout flare.  [facility] with weakness, unable to walk up the steps in his home.  He was later found to have recurrent hyperkalemia.  [facility], patient was also treated for acute polyarticular gout.  He completed prednisone taper [facility] and was transitioned from uloric to allopurinol.  He had pancytopenia during [facility] that resolved prior to discharge.  Transferred to [facility] on 3/18/22.  Unfortunately patient's [facility] stay was complicated by anasarca (worsening fluid retention, significant weight gain and patient inability to participate in therapy).  Transfer to [facility] on 4/15/22 for treatment of anasarca with IV Bumex.    Chronic HFrEF with acute exacerbation,   TTE 1/29/22 revealed EF 45-50%   Anasarca  ---   On IV Bumex since admit, titrated up to 2 mg po tid  ---   Patient diuresed well  ---   I/O is negative 8.2 L since admit  ---   Wt is markedly down, suspect this is not accurate  ---   Discontinued IV Bumex and started Bumex 2 mg po tid on 5/3/22  ---   Pt has been retaining past 3 days.  ---   Discontinue oral Bumex and start Bumex 2 mg v bid  ---   Not a candidate for ACE inhibitor or ARB due to underlying CKD4 with baseline creatinine ~  1.8-2.1    Known perimembranous VSD (small) with left to right shunting  ---   Follow up with cardiology as out patient      Hypertension    ---   BP is soft  ---   Continue metoprolol and bumex      Permanent atrial fibrillation  ---   HR controlled w/ PTA Metoprolol  ---   On chronic anticoagulating with coumadin, INR therapeutic at 2.87     TASHA, ? OHS   ---   Continue CPAP during sleep    CKD IV  ---   Baseline creatinine ~1.8-2.1   ---   Creatinine is 2.28, stable  ---   Avoid nephrotoxins other than Bumex    Chronic venous stasis ulcers  ---   Care per WOCN.      Gout  ---   Continue colchicine and allopurinol.     Morbid obesity w/ BMI 50.2 kg/m2:  ---   H/o gastroplasty vertical banded by Dr. Arizmendi at [facility] in [id number]  ---   He is currently 434 pounds  ---   Weight loss strongly recommended  ---   Follow-up with bariatric clinic after d/c.     History of colon cancer  ---   S/p hemicolectomy in 2011  ---   Further surveillance as outpatient.     Anemia of chronic disease, pancytopenia   ---   Was seen by enma during one of his recent hospitalization  ---   Pancytopenia was felty to be due to bone marrow suppression from acute illness and medications,    ---   Iron, B12 or folate levels were wnl  ---   WBC and Plt counts back to normal limits   ---   Hg stable at 10 g    Generalized weakness, debility, deconditioning  ---   Continue PT/OT.   ---   Patient continues to get stronger, now able to transfer on his own and ambulate up to 50 feet without any difficulties.  He is able to go up 2 steps maybe a little more  ---   He is hoping to discharge to home     Right lateral heel pressure injury, present prior to admit.   ---   Continue wound care per WOCN rec.         Diet: Fluid restriction 1200 ML FLUID  Combination Diet Renal Diet (non-dialysis); Low Saturated Fat Na <2400mg Diet  Snacks/Supplements Adult: Other; Rotate cherry or fruit punch Gelatein BID at lunch and dinner meals - do not count towards diet  orders; Between Meals    DVT Prophylaxis: Warfarin and Ambulate every shift  Cheema Catheter: Not present  Central Lines: None  Cardiac Monitoring: None  Code Status: Full Code      Disposition Plan   Expected Discharge:   Medically stable for discharge as soon as placement issue figured out  Anticipated discharge location: TCU vs home        Adeline Rodriguez MD  Hospitalist Service, GOLD TEAM 17  [facility]  Securely message with the Vocera Web Console (learn more here)  Text page via [facility] Paging/Directory   Please see signed in provider for up to date coverage information      ______________________________________________________________      Interval History    No complaints.  Uneventful night.  Patient continues to diurese.  I/O and weight measurement are still inaccurate    Data reviewed today:  I reviewed all medications, new labs and imaging results over the last 24 hours.    Physical Exam   Vital Signs: Temp: 97.5  F (36.4  C) Temp src: Oral BP: 107/68 Pulse: 80   Resp: 16 SpO2: 95 % O2 Device: None (Room air)    Weight: 432 lbs 4.8 oz   General: Severe morbidly obese, aao x 3, NAD.  HEENT:  NC/AT, neck supple, no thyromegaly  CVS:  NL s 1 and s2, no m/r/g.  Lungs:  CTA B/L.   Abd:  Soft, + bs, NT, no rebound or gaurding, no fluid shift.  Ext:  No c/c.  Lymph:  + Lymphedema  Neuro:  Nonfocal.  Musculoskeletal: No calf tenderness to palpation.    Skin:  No rash.  Psychiatry:  Mood and affect appropriate.     Data   Recent Labs   Lab 05/06/22  1107 05/06/22  0538 05/05/22  0640 05/04/22  0601   INR  --  2.71* 2.87* 2.75*   NA  --  138 136 137   POTASSIUM 3.8 3.4 3.6 3.6   CHLORIDE  --  101 101 101   CO2  --  25 26 28   BUN  --  58* 57* 59*   CR  --  2.28* 2.28* 2.49*   ANIONGAP  --  12 9 8   JOHN  --  9.4 9.3 9.5   GLC  --  98 103* 105*

## 2022-05-06 NOTE — PLAN OF CARE
Weighed this morning (432 lbs 4.8 oz) pt states he thinks yesterdays weight was inaccurate. Fluid restriction 1200, had 800 mL throughout day (last 400 mL in water pitcher). In w/c throughout day. Heart murmur and afib at baseline. Numbness in BLE. Dressings changed. Legs have scabs, some open areas and skin was dry. SO present at bedside. Tolerated meals well.

## 2022-05-06 NOTE — PLAN OF CARE
/47 (BP Location: Left arm)   Pulse 78   Temp 98.1  F (36.7  C) (Oral)   Resp 16   Wt (!) 192.6 kg (424 lb 11.2 oz)   SpO2 100%   BMI (P) 49.08 kg/m  on RA.    Pt lost 10lbs compared to couple days ago. On PO Bumax.  Lytes all good today ( K, Mg, Phos) , so no replacement, recheck all lytes tomorrow morning    Up with assist of one to w/c  and  pt stayed in w/c  for eating and mobilizing ion hallway many times independently.    Fentanyl patch replaced to left shoulder. Pain well controlled.    Kerlix dressing to BLEs  dry and intact-- dressing change due tomorrow. Rt heel  Mepilex dressing CDI.  Abdomen dressing CDI    Good appetite and  on 1200 Fluid restriction and renal diet.  Voiding good amount of urine in urinal  Last BM was today .    CPAP on for night sleep.    Discharge plan :  back to  TCU vs Home per GRACIA ,   TBDARA,   pt wants to go home

## 2022-05-06 NOTE — PLAN OF CARE
Goal Outcome Evaluation:    VS: VSS and afebrile   O2: Sats > 92% on RA  CPAP at night   Output: Voids adequate amount.  Incontinent 1x.   Last BM: 5/5 and passing gas   Activity: Up with A1-GB and walker.  Decline reposition while sleeping.   Skin: Scabs all over body, rash on pannus, wounds on BLE and back    Pain: Denies pain   Neuro: Alert and oriented x4.  Baseline numbness on BLEs.   Dressing: Dried drainage on BLEs.   Pt decline dressing changed.    Diet: Renal diet.  Strict I and O-on 1200 Fluid restriction .   LDA: L lower FA PIV- SL   Equipment: Pulsate mattress, bariatric bed, IV pole,walker and personal belongings at bedside,    Plan: Home vs TCU    Additional Info:

## 2022-05-06 NOTE — PROGRESS NOTES
Care Management Follow Up    Length of Stay (days): 21    Expected Discharge Date: To be determined pending treatment plan      Concerns to be Addressed: Discharge planning    Patient plan of care discussed at interdisciplinary rounds: Yes    Anticipated Discharge Disposition: Home with home care     Anticipated Discharge Services: To be determined    Anticipated Discharge DME: To be determined, patient asked about getting a walker and wheelchair to have home     Patient/family educated on Medicare website which has current facility and service quality ratings: Yes    Education Provided on the Discharge Plan: Yes    Patient/Family in Agreement with the Plan: Yes    Referrals Placed by CM/SW: Not applicable     Private pay costs discussed: Not applicable    Additional Information:  Social work met with patient to discuss his plan upon discharge as patient is medically ready to leave. SW asked patient what he would ideally like to do. Patient stated hes ready to leave the hospital and that he would like to go home with home care. Social work offered to call patients wife to discuss this and patient was open to that. SW called wife, Angelica, to discuss having patient come home with home care. Angelica had a few questions regarding what the services would look like if the family was to do that. Angelica stated she wont be able to care for patient or bathe him, etc and that they would need someone to help with that. SW told the family that home care would not come in every day but that they could help with things such as bathing and other ADLs. Patients wife is open to having patient come home and stated it would be fine for the team to start looking for home care.     RNCC aware that family would like home care and will follow up.     Social work will continue to follow and provide assistance to ensure a safe and timely discharge.     Minda Gross, OREN, LGSW  8A and 10 ICU   [facility]    Phone: [phone]  Pager: [phone]

## 2022-05-07 NOTE — PROGRESS NOTES
RNCC working on securing home care.   [employer] not in network  [facility]-cannot check benefits until Monday  [facility]-pending review  [employer]-paged on-call @845 a.m., paged again 955 a.m.  [facility]: Not open to review coverage  [facility]: do not take [facility]  [facility]:paged 1048 am  [facility]: no answer  [facility]: they do not take referrals on the weekend.  [facility]-At full capacity in the zip code.   [facility]: Paged 11:24 am  [facility]: no answer  [facility]: no answer  [facility]: no answer  [facility]: office closed  [facility]: Accepted and can start Tuesday/Wednesday  [facility]  (ph:[phone] fx:[phone])   Orders placed and faxed to [facility]. RN updated and informed of start of care and that company information will be on the discharge summary.   Shraddha Murray, RN, BSN, PHN  Weekend/Holiday RNCC Pager [phone]

## 2022-05-07 NOTE — PROGRESS NOTES
[facility]    Medicine Progress Note - Hospitalist Service, GOLD TEAM 17    Date of Admission:  4/15/2022    Assessment & Plan        Panda Miranda is a 59 yo gentleman w/ h/o chronic HFrEF (EF 45-50% on 1/29/22), small perimembranous VSD w/ left to right shunt, HTN, CKD4 w/ baseline cr ~ 1.8 - 2.1, chronic atrial fibrillation, gout, pseudogout, morbid obesity s/p bariatric surgery and numerous other medical conditions.  He was hospitalized at [facility] from 1/27 - 1/30/22 for sepsis 2/2 complicated UTI vs. pyelonephritis.  He was hospitalized at [facility] from 2/11 - 2/20/22 for hyperkalemia, SHANTE and acute gout flare.  He was then hospitalized at [facility] 2/20 - 3/18/22 with weakness, unable to walk up the steps in his home.  He was later found to have recurrent hyperkalemia.  During [facility] hospital stay, patient was also treated for acute polyarticular gout.  He completed prednisone taper during [facility] hospital stay and was transitioned from uloric to allopurinol.  He had pancytopenia during [facility] hospital stay that resolved prior to discharge.  Transferred to [facility] on 3/18/22.  Unfortunately patient's [facility] stay was complicated by anasarca (worsening fluid retention, significant weight gain and patient inability to participate in therapy).  Transfer to [facility] on 4/15/22 for treatment of anasarca with IV Bumex.    Chronic HFrEF with acute exacerbation,   TTE 1/29/22 revealed EF 45-50%   Anasarca  ---   On IV Bumex since admit, titrated up to 2 mg po tid  ---   Patient diuresed well  ---   I/O is negative 6.7 L since admit  ---   Wt is markedly down, suspect this is not accurate  ---   Discontinued IV Bumex and started Bumex 2 mg po tid on 5/3/22  ---   Pt has been retaining past 4 days.  ---   Discontinue oral Bumex and start Bumex 2 mg v bid on 5/6/22  ---   Continue IV Bumex  ---   Not a candidate for ACE inhibitor or ARB due to underlying  CKD4 with baseline creatinine ~ 1.8-2.1    Known perimembranous VSD (small) with left to right shunting  ---   Follow up with cardiology as out patient      Hypertension    ---   BP is soft  ---   Continue metoprolol and bumex      Permanent atrial fibrillation  ---   HR controlled w/ PTA Metoprolol  ---   On chronic anticoagulating with coumadin, INR therapeutic at 2.38     TASHA, ? OHS   ---   Continue CPAP during sleep    CKD IV  ---   Baseline creatinine ~1.8-2.1   ---   Creatinine is 2.28, stable  ---   Avoid nephrotoxins other than Bumex    Chronic venous stasis ulcers  ---   Care per WOCN.      Gout  ---   Continue colchicine and allopurinol.     Morbid obesity w/ BMI 50.2 kg/m2:  ---   H/o gastroplasty vertical banded by Dr. Arizmendi at [facility] in [id number]  ---   He is currently 434 pounds  ---   Weight loss strongly recommended  ---   Follow-up with bariatric clinic after d/c.     History of colon cancer  ---   S/p hemicolectomy in 2011  ---   Further surveillance as outpatient.     Anemia of chronic disease, pancytopenia   ---   Was seen by enma during one of his recent hospitalization  ---   Pancytopenia was felty to be due to bone marrow suppression from acute illness and medications,    ---   Iron, B12 or folate levels were wnl  ---   WBC and Plt counts back to normal limits   ---   Hg stable at 10 g    Generalized weakness, debility, deconditioning  ---   Continue PT/OT.   ---   Patient continues to get stronger, now able to transfer on his own and ambulate up to 50 feet without any difficulties.  He is able to go up 2 steps maybe a little more  ---   He is hoping to discharge to home     Right lateral heel pressure injury, present prior to admit.   ---   Continue wound care per WOCN rec.         Diet: Fluid restriction 1200 ML FLUID  Combination Diet Renal Diet (non-dialysis); Low Saturated Fat Na <2400mg Diet  Snacks/Supplements Adult: Other; Rotate cherry or fruit punch Gelatein BID at lunch and dinner  meals - do not count towards diet orders; Between Meals    DVT Prophylaxis: Warfarin and Ambulate every shift  Cheema Catheter: Not present  Central Lines: None  Cardiac Monitoring: None  Code Status: Full Code      Disposition Plan   Expected Discharge:   Medically stable for discharge as soon as placement issue figured out  Anticipated discharge location: TCU vs home        Adeline Rodriguez MD  Hospitalist Service, GOLD TEAM 17  M [facility]  Securely message with the Vocera Web Console (learn more here)  Text page via [facility] Paging/Directory   Please see signed in provider for up to date coverage information      ______________________________________________________________      Interval History    No complaints.  Uneventful night.  Patient continues to diurese.  I/O and weight measurement are still inaccurate.  Back on IV Bumex as of 5/6/22    Data reviewed today:  I reviewed all medications, new labs and imaging results over the last 24 hours.    Physical Exam   Vital Signs: Temp: 98.6  F (37  C) Temp src: Oral BP: 100/49 Pulse: 82   Resp: 16 SpO2: 99 % O2 Device: None (Room air)    Weight: 431 lbs 3.2 oz   General: Severe morbidly obese, aao x 3, NAD.  HEENT:  NC/AT, neck supple, no thyromegaly  CVS:  NL s 1 and s2, no m/r/g.  Lungs:  CTA B/L.   Abd:  Soft, + bs, NT, no rebound or gaurding, no fluid shift.  Ext:  No c/c.  Lymph:  + Lymphedema  Neuro:  Nonfocal.  Musculoskeletal: No calf tenderness to palpation.    Skin:  No rash.  Psychiatry:  Mood and affect appropriate.     Data   Recent Labs   Lab 05/07/22  0756 05/06/22  1107 05/06/22  0538 05/05/22  0640   INR 2.72*  --  2.71* 2.87*     --  138 136   POTASSIUM 3.7 3.8 3.4 3.6   CHLORIDE 104  --  101 101   CO2 28  --  25 26   BUN 59*  --  58* 57*   CR 2.38*  --  2.28* 2.28*   ANIONGAP 6  --  12 9   JOHN 9.6  --  9.4 9.3   *  --  98 103*

## 2022-05-07 NOTE — PLAN OF CARE
A/Ox4. He is calm and cooperative. Pain in leg sores bilaterally at 2/10. Tolerated meals well. Fluids at 1100 mL this shift(may have some leftover in his pitcher). Loss of 1 pound since yesterday; Weight at 431 pounds. No sensation in Bilateral feet.Denies any tingling. Voids adequately without difficulty. Uses a commode and urinal at bedside. He had a large BM this shift. R PIV- SL. Bumex given. Metoprolol held this AM d/t /49. Calls appropriately.

## 2022-05-07 NOTE — PLAN OF CARE
A&Ox4, calm and cooperative  Was able to transfer independently from  to bed,   At 0130 pt suddenly reported increased pain in bilateral ankles, gauze dressings had moderate serosang drainage- kerlix was changed, wound cleanser applied and lymphedema wraps placed over kerlix per pt request, skin to bilateral shin had several open bleeding areas which were covered with Vaseline gauze before applying kerlix  Pt reported relief from pain after wraps were applied  Using urinal to void  On 1200 ml fluid restrictions daily   CPAP on while asleep

## 2022-05-08 NOTE — PLAN OF CARE
"A&Ox4, calm and cooperative  Was able to transfer independently from WC to bed,   Pt has been continuously requesting ice chips \"I am thirsty\" - tried to explain fluid restriction without success. Pt currently on 1200 ml fluid restrictions daily   Ate left over meal from evening and then reported \"throwing it up\" but refused antiemetic   CPAP on while asleep   Intermittently sleeping during the shift, with TV on                            "

## 2022-05-08 NOTE — PROGRESS NOTES
[facility]    Medicine Progress Note - Hospitalist Service, GOLD TEAM 17    Date of Admission:  4/15/2022    Assessment & Plan        Panda Miranda is a 61 yo gentleman w/ h/o chronic HFrEF (EF 45-50% on 1/29/22), small perimembranous VSD w/ left to right shunt, HTN, CKD4 w/ baseline cr ~ 1.8 - 2.1, chronic atrial fibrillation, gout, pseudogout, morbid obesity s/p bariatric surgery and numerous other medical conditions.  He was hospitalized at [facility] from 1/27 - 1/30/22 for sepsis 2/2 complicated UTI vs. pyelonephritis.  He was hospitalized at [facility] from 2/11 - 2/20/22 for hyperkalemia, SHANTE and acute gout flare.  He was then hospitalized at [facility] 2/20 - 3/18/22 with weakness, unable to walk up the steps in his home.  He was later found to have recurrent hyperkalemia.  During [facility] hospital stay, patient was also treated for acute polyarticular gout.  He completed prednisone taper during [facility] hospital stay and was transitioned from uloric to allopurinol.  He had pancytopenia during [facility] hospital stay that resolved prior to discharge.  Transferred to [facility] on 3/18/22.  Unfortunately patient's [facility] stay was complicated by anasarca (worsening fluid retention, significant weight gain and patient inability to participate in therapy).  Transfer to [facility] on 4/15/22 for treatment of anasarca with IV Bumex.    Chronic HFrEF with acute exacerbation,   TTE 1/29/22 revealed EF 45-50%   Anasarca  ---   On IV Bumex since admit, titrated up to 2 mg po tid  ---   Patient diuresed well  ---   I/O is negative 6.4 L since admit  ---   Wt is markedly down, suspect this is not accurate  ---   Discontinued IV Bumex and started Bumex 2 mg po tid on 5/3/22  ---   Pt has been retaining past 5 days.  ---   Discontinued oral Bumex and restarted Bumex 2 mg iv bid on 5/6/22  ---   Continue IV Bumex  ---   Not a candidate for ACE inhibitor or ARB due to  underlying CKD4 with baseline creatinine ~ 1.8-2.1    Known perimembranous VSD (small) with left to right shunting  ---   Follow up with cardiology as out patient      Hypertension    ---   BP is soft  ---   Continue metoprolol and bumex      Permanent atrial fibrillation  ---   HR controlled w/ PTA Metoprolol  ---   On chronic anticoagulating with coumadin, INR therapeutic at 2.38     TASHA, ? OHS   ---   Continue CPAP during sleep    CKD IV  ---   Baseline creatinine ~1.8-2.1   ---   Creatinine is 2.25, stable  ---   Avoid nephrotoxins other than Bumex    Chronic venous stasis ulcers  ---   Care per WOCN.      Gout  ---   Continue colchicine and allopurinol.     Morbid obesity w/ BMI 50.2 kg/m2:  ---   H/o gastroplasty vertical banded by Dr. Arizmendi at [facility] in [id number]  ---   He is currently 434 pounds  ---   Weight loss strongly recommended  ---   Follow-up with bariatric clinic after d/c.     History of colon cancer  ---   S/p hemicolectomy in 2011  ---   Further surveillance as outpatient.     Anemia of chronic disease, pancytopenia   ---   Was seen by enma during one of his recent hospitalization  ---   Pancytopenia was felty to be due to bone marrow suppression from acute illness and medications,    ---   Iron, B12 or folate levels were wnl  ---   WBC and Plt counts back to normal limits   ---   Hg stable at 10 g    Generalized weakness, debility, deconditioning  ---   Continue PT/OT.   ---   Patient continued to get stronger, now able to transfer on his own and ambulate up to 100 feet without any difficulties.  He is able to go up 8 steps.  ---   He is hoping to discharge to home     Right lateral heel pressure injury, present prior to admit.   ---   Continue wound care per WOCN rec.         Diet: Fluid restriction 1200 ML FLUID  Combination Diet Renal Diet (non-dialysis); Low Saturated Fat Na <2400mg Diet  Snacks/Supplements Adult: Other; Rotate cherry or fruit punch Gelatein BID at lunch and dinner meals -  do not count towards diet orders; Between Meals    DVT Prophylaxis: Warfarin and Ambulate every shift  Cheema Catheter: Not present  Central Lines: None  Cardiac Monitoring: None  Code Status: Full Code      Disposition Plan   Expected Discharge:   Medically stable for discharge as soon as placement issue figured out  Anticipated discharge location: TCU vs home        Adeline Rodriguez MD  Hospitalist Service, GOLD TEAM 17  [facility]  Securely message with the Vocera Web Console (learn more here)  Text page via [facility] Paging/Directory   Please see signed in provider for up to date coverage information      ______________________________________________________________      Interval History    No complaints.  Uneventful night.  I/O and weight measurement are still inaccurate.  Back on IV Bumex as of 5/6/22.  Home care urgency that accepted patient yesterday declined him today.  Awaiting for placement.    Data reviewed today:  I reviewed all medications, new labs and imaging results over the last 24 hours.    Physical Exam   Vital Signs: Temp: 97.8  F (36.6  C) Temp src: Oral BP: 103/64 Pulse: 72   Resp: 15 SpO2: 96 % O2 Device: None (Room air)    Weight: 431 lbs 3.2 oz   General: Severe morbidly obese, aao x 3, NAD.  HEENT:  NC/AT, neck supple, no thyromegaly  CVS:  NL s 1 and s2, no m/r/g.  Lungs:  CTA B/L.   Abd:  Soft, + bs, NT, no rebound or gaurding, no fluid shift.  Ext:  No c/c.  Lymph:  + Lymphedema  Neuro:  Nonfocal.  Musculoskeletal: No calf tenderness to palpation.    Skin:  No rash.  Psychiatry:  Mood and affect appropriate.     Data   Recent Labs   Lab 05/08/22  0749 05/07/22  0756 05/06/22  1107 05/06/22  0538   INR 2.64* 2.72*  --  2.71*    138  --  138   POTASSIUM 3.8 3.7 3.8 3.4   CHLORIDE 104 104  --  101   CO2 29 28  --  25   BUN 58* 59*  --  58*   CR 2.25* 2.38*  --  2.28*   ANIONGAP 6 6  --  12   JOHN 9.6 9.6  --  9.4   * 102*  --  98

## 2022-05-08 NOTE — PLAN OF CARE
Changed dressings on legs today. Edema has improved since Friday. L leg had a few small open areas and R leg had a couple small and one larger open area on the lower lateral side of leg (less bleeding compared to Friday). Pain is still at 2. In w/c roaming halls and watching TV in room, worked with PT today. Good appetite. 1200 mL Fluid restriction. Using urinal. LBM 5/7.

## 2022-05-09 NOTE — PROGRESS NOTES
"Diagnosis: Patient is a 61 y/o man who has multiple medical problems including gout, pseudogout, atrial fibrillation, morbid obesity s/p past bariatric surgery and chronic kidney disease stage IIIb. Patient was hospitalized at [facility] from 27-Jan-2022 to 30-Jan-2022 for sepsis secondary to complicated urinary tract infection vs. pyelonephritis. Patient was hospitalized at [facility] from 11-Feb-2022 to 20-Feb-2022 for hyperkalemia, acute kidney injury and acute gout. Patient was discharged on 20-Feb-2022 and presented to [facility] with weakness - patient reportedly was unable to walk up the steps to his home. Patient was later found to have recurrent hyperkalemia. During hospital stay, patient was also treated for acute polyarticular gout. Patient completed a prednisone taper during hospital stay.   Currently has limited mobility due to significant weakness from prolonged hospitalization and combined TCU stay of 3 months as of May 11, 2022.     Current transfer status:   Patient will use bariatric wheelchair to complete mobility related ADL's in the home including toileting, dressing, self-cares, meal prep.    Will require bariatric commode and bariatric shower chair also    Without a wheelchair patient will be unable to safely move about his home or community.  This will allow him to, participate in home activities with family, and it will be part of a fall prevention plan for safe mobility.  A cane or walker would be unsafe secondary to his weight.     Patient's home will accommodate use of bariatric wheelchair.  Patient has a family member willing and able to assist as necessary with wheelchair.   Patient needs a bariatric chair due to body size greater than 250 pounds, seat height ~17\" to allow possible propulsion with feet.      Arm and leg strength: functional UE strength but less than antigravity in hips LE     Gait/balance/coordination: non-ambulatory     Length of need: 99 months     Current " "height:6' 6\"  Current weight: 427 lbs  : 1955     "

## 2022-05-09 NOTE — PROGRESS NOTES
A/Ox4. VSS. Transfers from bed to chair ind and chair to bed ind. Able to reposition self in bed. 1200ml fluid restriction becoming very challenging for patient. Voiding well. IV Bumex continues. Currently up in wheelchair wheeling in hallways for change of scenery.

## 2022-05-09 NOTE — PLAN OF CARE
"A&Ox4, calm and cooperative  Was able to transfer independently from WC to bed,   Pt has been continuously requesting ice chips or water \"I am thirsty\" - tried to explain fluid restriction again without success. Pt currently on 1200 ml fluid restrictions daily   New fentanyl patch applied   Bariatric pulsate mattress in place,   Pt able to change/reposition independently   CPAP on while asleep   Intermittently sleeping during the shift, with TV on                      "

## 2022-05-09 NOTE — PROGRESS NOTES
[facility]    Medicine Progress Note - Hospitalist Service, GOLD TEAM 17    Date of Admission:  4/15/2022    Assessment & Plan        Panda Miranda is a 61 yo gentleman w/ h/o chronic HFrEF (EF 45-50% on 1/29/22), small perimembranous VSD w/ left to right shunt, HTN, CKD4 w/ baseline cr ~ 1.8 - 2.1, chronic atrial fibrillation, gout, pseudogout, morbid obesity s/p bariatric surgery and numerous other medical conditions.  He was hospitalized at [facility] from 1/27 - 1/30/22 for sepsis 2/2 complicated UTI vs. pyelonephritis.  He was hospitalized at [facility] from 2/11 - 2/20/22 for hyperkalemia, SHANTE and acute gout flare.  [facility] with weakness, unable to walk up the steps in his home.  He was later found to have recurrent hyperkalemia.  [facility], patient was also treated for acute polyarticular gout.  He completed prednisone taper [facility] and was transitioned from uloric to allopurinol.  He had pancytopenia [facility] that resolved prior to discharge.  Transferred to [facility] on 3/18/22.  Unfortunately patient's [facility] stay was complicated by anasarca (worsening fluid retention, significant weight gain and patient inability to participate in therapy).  Transfer to [facility] on 4/15/22 for treatment of anasarca with IV Bumex.    Chronic HFrEF with acute exacerbation,   TTE 1/29/22 revealed EF 45-50%   Anasarca  ---   On IV Bumex since admit, titrated up to 2 mg po tid  ---   Patient diuresed well  ---   I/O is negative 5.01 L since admit, suspect this is inaccurate  ---   Wt is markedly down, suspect this is inaccurate  ---   Discontinued IV Bumex and started Bumex 2 mg po tid on 5/3/22  ---   Discontinued oral Bumex and restarted Bumex 2 mg iv bid on 5/6/22  ---   Continue IV Bumex  ---   Not a candidate for ACE inhibitor or ARB due to underlying CKD4 with  baseline creatinine ~ 1.8-2.1    Known perimembranous VSD (small) with left to right shunting  ---   Follow up with cardiology as out patient      Hypertension    ---   BP is soft  ---   Continue metoprolol and bumex      Permanent atrial fibrillation  ---   HR controlled w/ PTA Metoprolol  ---   On chronic anticoagulating with coumadin, INR therapeutic at 2.67     TASHA, ? OHS   ---   Continue CPAP during sleep    SHANTE superimposed on CKD IV  ---   Baseline creatinine ~1.8-2.1   ---   Creatinine is 2.45 today  ---   Avoid nephrotoxins other than Bumex  ---   Will discontinue IV Bumex if cr worse tomorrow    Chronic venous stasis ulcers  ---   Care per WOCN.      Gout  ---   Continue colchicine and allopurinol.     Morbid obesity w/ BMI 50.2 kg/m2:  ---   H/o gastroplasty vertical banded by Dr. Arizmendi at [facility] in [id number]  ---   He is 427 pounds today, continued to lose wt.  ---   Weight loss strongly recommended  ---   Follow-up with bariatric clinic after d/c.     History of colon cancer  ---   S/p hemicolectomy in 2011  ---   Further surveillance as outpatient.     Anemia of chronic disease, pancytopenia   ---   Was seen by enma during one of his recent hospitalization  ---   Pancytopenia was felty to be due to bone marrow suppression from acute illness and medications,    ---   Iron, B12 or folate levels were wnl  ---   WBC and Plt counts back to normal limits   ---   Hg stable at 10 g    Generalized weakness, debility, deconditioning  ---   Continue PT/OT.   ---   Patient continued to get stronger, now able to transfer on his own and ambulate up to 100 feet without any difficulties.  He is able to go up 8 steps.  ---   He is hoping to discharge to home     Right lateral heel pressure injury, present prior to admit.   ---   Continue wound care per WOCN rec.         Diet: Fluid restriction 1200 ML FLUID  Combination Diet Renal Diet (non-dialysis); Low Saturated Fat Na <2400mg Diet  Snacks/Supplements Adult: Other;  Rotate cherry or fruit punch Gelatein BID at lunch and dinner meals - do not count towards diet orders; Between Meals    DVT Prophylaxis: Warfarin and Ambulate every shift  Cheema Catheter: Not present  Central Lines: None  Cardiac Monitoring: None  Code Status: Full Code      Disposition Plan   Expected Discharge:   Medically stable for discharge as soon as placement issue figured out  Anticipated discharge location: TCU vs home        Adeline Rodriguez MD  Hospitalist Service, GOLD TEAM 17  M [facility]  Securely message with the Vocera Web Console (learn more here)  Text page via [facility] Paging/Directory   Please see signed in provider for up to date coverage information      ______________________________________________________________      Interval History    No complaints.  Uneventful night.  I/O and weight measurement are inaccurate (I/O less negative but pt is losing wt every day)      Data reviewed today:  I reviewed all medications, new labs and imaging results over the last 24 hours.    Physical Exam   Vital Signs: Temp: (!) 96.1  F (35.6  C) Temp src: Oral BP: 122/56 Pulse: 66   Resp: 18 SpO2: 100 % O2 Device: None (Room air)    Weight: 427 lbs 4.8 oz   General: Severe morbidly obese, aao x 3, NAD.  HEENT:  NC/AT, neck supple, no thyromegaly  CVS:  NL s 1 and s2, no m/r/g.  Lungs:  CTA B/L.   Abd:  Soft, + bs, NT, no rebound or gaurding, no fluid shift.  Ext:  No c/c.  Lymph:  + Lymphedema  Neuro:  Nonfocal.  Musculoskeletal: No calf tenderness to palpation.    Skin:  No rash.  Psychiatry:  Mood and affect appropriate.     Data   Recent Labs   Lab 05/09/22  0842 05/08/22  0749 05/07/22  0756   INR 2.67* 2.64* 2.72*    139 138   POTASSIUM 3.5 3.8 3.7   CHLORIDE 103 104 104   CO2 28 29 28   BUN 63* 58* 59*   CR 2.45* 2.25* 2.38*   ANIONGAP 8 6 6   JOHN 9.4 9.6 9.6   * 100* 102*

## 2022-05-10 NOTE — PLAN OF CARE
BP 93/55 (BP Location: Right arm, Cuff Size: Adult Large)   Pulse 60   Temp 97.6  F (36.4  C) (Oral)   Resp 20   Wt (!) 193.8 kg (427 lb 4.8 oz)   SpO2 100%   BMI (P) 49.38 kg/m    On RA while awake uses CPAP while sleeping. LS clear, BS active. Patient denies chest pain, SOB, N/V, and/or dizziness.  A&Ox4, baseline numbness in BLE, strengths intact.   Patient is OOB independently able to get self into wheelchair and back into bed.  Patient voiding without difficulty in bedside urinal, LBM 5/8/22.  Renal combination diet with 1200 mL fluid restriction. Patient does not like fluid restriction will try to find ways around it, sign outside door. 1200mL is completed for today no more fluids till 5/10/22.  Patient has swelling in BLE with wounds from weeping on BLE. Has lymphedema wraps in place. Dressing changes every other day done on 5/8/22.  L PIV SL.  Able to make needs known, call light within reach, discharge to home with home care tomorrow, continue plan of care.

## 2022-05-10 NOTE — PROGRESS NOTES
Care Coordinator Progress Note    Admission Date/Time:  4/15/2022  Attending MD:  Omer Gilmore MD      Coordination of Care and Referrals: Faxed referral to [facility] for Bariatric Commode, shower chair and wheel chair    12:31 PM-[facility] is not in network with insurance-faxed scripts and Face 2 Face to [facility].        Plan  Anticipated Discharge Date:  When DME  equipment for home can be secured  Anticipated Discharge Plan:  Home with home care    Kassie BATESN RN CCM  RN Care Coordinator [address] [place] [id number]  [email or web address]   Office: [phone]   Pager: [phone]    To contact Weekend RNCC, dial [phone] and enter job code [phone] at prompt. This pager can not be contacted by text page or outside line.

## 2022-05-10 NOTE — PLAN OF CARE
Goal Outcome Evaluation:    [id number]    Patient is A&Ox4, VSS on RA/cpap, able to make needs known, using call light appropriately, slept between cares.  Patient denies pain throughout shift, voiding without difficulty using urinal in bed.  Patient aware of 1.5L fluid restriction, attempted to get nurses to give extra fluids, educated patient again on importance of fluid restriction and why it is in place.  Patient verbalizes understanding but likely will not follow fluid restriction unless forced to by nursing.  No acute events throughout night, continue with POC.

## 2022-05-10 NOTE — PROGRESS NOTES
[facility]    Medicine Progress Note - Hospitalist Service, GOLD TEAM 17    Date of Admission:  4/15/2022    Assessment & Plan        Panda Miranda is a 59 yo gentleman w/ h/o chronic HFrEF (EF 45-50% on 1/29/22), small perimembranous VSD w/ left to right shunt, HTN, CKD4 w/ baseline cr ~ 1.8 - 2.1, chronic atrial fibrillation, gout, pseudogout, morbid obesity s/p bariatric surgery and numerous other medical conditions.  He was hospitalized at [facility] from 1/27 - 1/30/22 for sepsis 2/2 complicated UTI vs. pyelonephritis.  He was hospitalized at [facility] from 2/11 - 2/20/22 for hyperkalemia, SHANTE and acute gout flare.  He was then hospitalized at [facility] 2/20 - 3/18/22 with weakness, unable to walk up the steps in his home.  He was later found to have recurrent hyperkalemia.  During [facility] hospital stay, patient was also treated for acute polyarticular gout.  He completed prednisone taper during [facility] hospital stay and was transitioned from uloric to allopurinol.  He had pancytopenia during [facility] hospital stay that resolved prior to discharge.  Transferred to [facility] on 3/18/22.  Unfortunately patient's [facility] stay was complicated by anasarca (worsening fluid retention, significant weight gain and patient inability to participate in therapy).  Transfer to [facility] on 4/15/22 for treatment of anasarca with IV Bumex.    Chronic HFrEF with acute exacerbation,   TTE 1/29/22 revealed EF 45-50%   Anasarca  ---   Change to oral Bumex 4 mg po tid, add zaroxolyn 2.5 mg once.    ---   I/O is negative 5.01 L since admit, suspect this is inaccurate, Daily weight  ---   Wt is markedly down, suspect this is inaccurate  ---   Discontinued IV Bumex and started Bumex 2 mg po tid on 5/3/22  ---   Discontinued oral Bumex and restarted Bumex 2 mg iv bid on 5/6/22  ---   Continue IV Bumex  ---   Not a candidate for ACE inhibitor or ARB due to underlying CKD4 with  baseline creatinine ~ 1.8-2.1    Known perimembranous VSD (small) with left to right shunting  ---   Follow up with cardiology as out patient      Hypertension    ---   BP is soft  ---   Continue metoprolol and bumex      Permanent atrial fibrillation  ---   HR controlled w/ PTA Metoprolol  ---   On chronic anticoagulating with coumadin, INR therapeutic at 2.67     TASHA, ? OHS   ---   Continue CPAP during sleep    SHANTE superimposed on CKD IV  ---   Baseline creatinine ~1.8-2.1   ---   Creatinine is 2.45 today  ---   Avoid nephrotoxins other than Bumex  ---   Will discontinue IV Bumex if cr worse tomorrow    Chronic venous stasis ulcers  ---   Care per WOCN.      Gout  ---   Continue colchicine and allopurinol.     Morbid obesity w/ BMI 50.2 kg/m2:  ---   H/o gastroplasty vertical banded by Dr. Arizmendi at [facility] in [id number]  ---   He is 427 pounds today, continued to lose wt.  ---   Weight loss strongly recommended  ---   Follow-up with bariatric clinic after d/c.     History of colon cancer  ---   S/p hemicolectomy in 2011  ---   Further surveillance as outpatient.     Anemia of chronic disease, pancytopenia   ---   Was seen by enma during one of his recent hospitalization  ---   Pancytopenia was felty to be due to bone marrow suppression from acute illness and medications,    ---   Iron, B12 or folate levels were wnl  ---   WBC and Plt counts back to normal limits   ---   Hg stable at 10 g    Generalized weakness, debility, deconditioning  ---   Continue PT/OT.   ---   Patient continued to get stronger, now able to transfer on his own and ambulate up to 100 feet without any difficulties.  He is able to go up 8 steps.  ---   He is hoping to discharge to home     Right lateral heel pressure injury, present prior to admit.   ---   Continue wound care per WOCN rec.         Diet: Fluid restriction 1200 ML FLUID  Combination Diet Renal Diet (non-dialysis); Low Saturated Fat Na <2400mg Diet  Snacks/Supplements Adult: Other;  Rotate cherry or fruit punch Gelatein BID at lunch and dinner meals - do not count towards diet orders; Between Meals    DVT Prophylaxis: Warfarin and Ambulate every shift  Cheema Catheter: Not present  Central Lines: None  Cardiac Monitoring: None  Code Status: Full Code      Disposition Plan   Expected Discharge:   Medically stable for discharge as soon as placement issue figured out  Anticipated discharge location: TCU vs home        David Donovan MD  Hospitalist Service, GOLD TEAM 17  M [facility]  Securely message with the Vocera Web Console (learn more here)  Text page via [facility] Paging/Directory   Please see signed in provider for up to date coverage information      ______________________________________________________________      Interval History    No complaints.  Uneventful night.  I/O and weight measurement are inaccurate (I/O less negative but pt is losing wt every day)      Data reviewed today:  I reviewed all medications, new labs and imaging results over the last 24 hours.    Physical Exam   Vital Signs: Temp: 98.4  F (36.9  C) Temp src: Oral BP: 91/50 Pulse: 72   Resp: 17 SpO2: 96 % O2 Device: None (Room air)    Weight: 429 lbs 8 oz   General: Severe morbidly obese, aao x 3, NAD.  HEENT:  NC/AT, neck supple, no thyromegaly  CVS:  NL s 1 and s2, no m/r/g.  Lungs:  CTA B/L.   Abd:  Soft, + bs, NT, no rebound or gaurding, no fluid shift.  Ext:  No c/c.  Lymph:  + Lymphedema  Neuro:  Nonfocal.  Musculoskeletal: No calf tenderness to palpation.    Skin:  No rash.  Psychiatry:  Mood and affect appropriate.     Data   Recent Labs   Lab 05/10/22  0601 05/09/22  0842 05/08/22  0749   INR 2.82* 2.67* 2.64*    139 139   POTASSIUM 3.6 3.5 3.8   CHLORIDE 101 103 104   CO2 29 28 29   BUN 67* 63* 58*   CR 2.36* 2.45* 2.25*   ANIONGAP 8 8 6   JOHN 9.3 9.4 9.6   * 116* 100*

## 2022-05-10 NOTE — PLAN OF CARE
Measurements for bariatric wheelchair needed for safe return to home based on current chair being used: seat is 28 in wide x 21 in deep x 22 in high from floor to seat.

## 2022-05-10 NOTE — PROGRESS NOTES
05/10/22 1300   Timed Up & Go Test (TUG)   Time for 'Up and Go' test- Seconds: 33   Unstable on tuming? (yes or no) No   Walking aid used? Yes   Type of aid: FWW     A score of >13.5 seconds for community dwelling adults indicates an increased fall risk.

## 2022-05-10 NOTE — PROGRESS NOTES
A/Ox4. VSS. Uneventful day. Tolerated therapies well. Plan to discharge in 2-3 days. IV bumex discontinued at 1430, PO Bumex and Metolazone suspension added. 1200ml fluid restriction continues. SBA with activity.

## 2022-05-10 NOTE — PLAN OF CARE
/53 (BP Location: Right arm)   Pulse 82   Temp 97.4  F (36.3  C) (Oral)   Resp 18   Wt (!) 194.8 kg (429 lb 8 oz)   SpO2 98%   BMI (P) 49.63 kg/m      A&O x 4. VSS. Baseline numbness BLE. Denies N/V.  LS clear. Denies SOB. Dyspnea upon exertion. Currently on RA.  Last BM on 5/10/22 in AM. Voids without difficulty - uses urinal.  Assist x 1 w/ walker and GB. Pt moves around in wheelchair independently.  Fentanyl patch in place - R shoulder.  Wound cares complete per POC.  L PIV saline locked.  Denies pain.  Renal diet/Low saturated fat diet. 1200mL fluid restriction.    Continue POC.

## 2022-05-11 NOTE — PROGRESS NOTES
Clinic Care Coordination Contact    Situation: Patient chart reviewed by care coordinator.    Background: GRACIA completed chart review    Assessment: Patient is still admitted to the hospital    Plan/Recommendations: Chart review in 2 weeks

## 2022-05-11 NOTE — PROGRESS NOTES
[facility]    Medicine Progress Note - Hospitalist Service, GOLD TEAM 17    Date of Admission:  4/15/2022    Assessment & Plan        Panda Miranda is a 59 yo gentleman w/ h/o chronic HFrEF (EF 45-50% on 1/29/22), small perimembranous VSD w/ left to right shunt, HTN, CKD4 w/ baseline cr ~ 1.8 - 2.1, chronic atrial fibrillation, gout, pseudogout, morbid obesity s/p bariatric surgery and numerous other medical conditions.  He was hospitalized at [facility] from 1/27 - 1/30/22 for sepsis 2/2 complicated UTI vs. pyelonephritis.  He was hospitalized at [facility] from 2/11 - 2/20/22 for hyperkalemia, SHANTE and acute gout flare.  [facility] with weakness, unable to walk up the steps in his home.  He was later found to have recurrent hyperkalemia.  [facility], patient was also treated for acute polyarticular gout.  He completed prednisone taper [facility] and was transitioned from uloric to allopurinol.  He had pancytopenia [facility] that resolved prior to discharge.  Transferred to [facility] on 3/18/22.  Unfortunately patient's [facility] stay was complicated by anasarca (worsening fluid retention, significant weight gain and patient inability to participate in therapy).  Transfer to [facility] on 4/15/22 for treatment of anasarca with IV Bumex.    Chronic HFrEF with acute exacerbation,   TTE 1/29/22 revealed EF 45-50%   Anasarca  ---   Ct oral Bumex 4 mg po BID. SP one time dose of zaroxolyn 2.5 mg once on 05/10/22.    ---   Follow-up Daily weight  ---   Wt is markedly down, suspect this is inaccurate  ---   Discontinued IV Bumex and started Bumex 2 mg po tid on 5/3/22  ---   Discontinued oral Bumex and restarted Bumex 2 mg iv bid on 5/6/22  ---   Not a candidate for ACE inhibitor or ARB due to underlying CKD4 with baseline creatinine ~ 1.8-2.1    Known perimembranous VSD  (small) with left to right shunting  ---   Follow up with cardiology as out patient      Hypertension    ---   BP is soft  ---   Continue metoprolol and bumex      Permanent atrial fibrillation  ---   HR controlled w/ PTA Metoprolol  ---   On chronic anticoagulating with coumadin, INR therapeutic at 2.67     TASHA, ? OHS   ---   Continue CPAP during sleep    SHANTE superimposed on CKD IV  ---   Baseline creatinine ~1.8-2.1   ---   Creatinine is 2.45 today  ---   Avoid nephrotoxins other than Bumex  ---   Will discontinue IV Bumex if cr worse tomorrow    Chronic venous stasis ulcers  ---   Care per WOCN.      Gout  ---   Continue colchicine and allopurinol.     Morbid obesity w/ BMI 50.2 kg/m2:  ---   H/o gastroplasty vertical banded by Dr. Arizmendi at [facility] in 11996  ---   He is 427 pounds today, continued to lose wt.  ---   Weight loss strongly recommended  ---   Follow-up with bariatric clinic after d/c.     History of colon cancer  ---   S/p hemicolectomy in 2011  ---   Further surveillance as outpatient.     Anemia of chronic disease, pancytopenia   ---   Was seen by enma during one of his recent hospitalization  ---   Pancytopenia was felty to be due to bone marrow suppression from acute illness and medications,    ---   Iron, B12 or folate levels were wnl  ---   WBC and Plt counts back to normal limits   ---   Hg stable at 10 g    Generalized weakness, debility, deconditioning  ---   Continue PT/OT.   ---   Patient continued to get stronger, now able to transfer on his own and ambulate up to 100 feet without any difficulties.  He is able to go up 8 steps.  ---   He is hoping to discharge to home     Right lateral heel pressure injury, present prior to admit.   ---   Continue wound care per WOCN rec.         Diet: Fluid restriction 1200 ML FLUID  Combination Diet Renal Diet (non-dialysis); Low Saturated Fat Na <2400mg Diet  Snacks/Supplements Adult: Other; Rotate cherry or fruit punch Gelatein BID at lunch and  dinner meals - do not count towards diet orders; Between Meals    DVT Prophylaxis: Warfarin and Ambulate every shift  Cheema Catheter: Not present  Central Lines: None  Cardiac Monitoring: None  Code Status: Full Code      Disposition Plan   Expected Discharge:   Medically stable for discharge as soon as placement issue figured out  Anticipated discharge location: TCU vs home        David Donovan MD  Hospitalist Service, GOLD TEAM 17  [facility]  Securely message with the Vocera Web Console (learn more here)  Text page via [facility] Paging/Directory   Please see signed in provider for up to date coverage information      ______________________________________________________________      Interval History    No complaints.  Uneventful night.  I/O and weight measurement are inaccurate (I/O less negative but pt is losing wt every day)      Data reviewed today:  I reviewed all medications, new labs and imaging results over the last 24 hours.    Physical Exam   Vital Signs: Temp: 97.4  F (36.3  C) Temp src: Oral BP: 106/59 Pulse: 79   Resp: 18 SpO2: 97 % O2 Device: None (Room air)    Weight: 422 lbs 4.8 oz   General: Severe morbidly obese, aao x 3, NAD.  HEENT:  NC/AT, neck supple, no thyromegaly  CVS:  NL s 1 and s2, no m/r/g.  Lungs:  CTA B/L.   Abd:  Soft, + bs, NT, no rebound or gaurding, no fluid shift.  Ext:  No c/c.  Lymph:  + Lymphedema  Neuro:  Nonfocal.  Musculoskeletal: No calf tenderness to palpation.    Skin:  No rash.  Psychiatry:  Mood and affect appropriate.     Data   Recent Labs   Lab 05/11/22  0635 05/10/22  0601 05/09/22  0842   INR 2.55* 2.82* 2.67*    138 139   POTASSIUM 3.5 3.6 3.5   CHLORIDE 100 101 103   CO2 30 29 28   BUN 62* 67* 63*   CR 2.32* 2.36* 2.45*   ANIONGAP 7 8 8   JOHN 9.8 9.3 9.4   * 109* 116*

## 2022-05-11 NOTE — PLAN OF CARE
Pt states he has not felt this good in years. States that his wound on his right foot is itchy. Pt complains of having a hard time sleeping dt having to use the urinal often during the night. Pt was weighed at 422.3 lbs today; down from 429 lbs yesterday. Urine is a pale clear yellow and pt has been urinating frequently throughout the shift. Pt was up and walking with a walker with PT. Continue to monitor I/O and continue POC.

## 2022-05-11 NOTE — PLAN OF CARE
Goal Outcome Evaluation:    [id number]    Patient is A&Ox4, VSS on RA/cpap, able to make needs known, using call light appropriately, slept between cares.  Patient denies pain throughout shift, voiding without difficulty using urinal in bed.Call light within reach, bed in lowest position, 3 side rails elevated.  No acute events over night, continue with POC.

## 2022-05-11 NOTE — PROGRESS NOTES
Care Management Follow Up    Length of Stay (days): 26    Expected Discharge Date: 05/07/2022     Concerns to be Addressed:  Discharge DME     Patient plan of care discussed at interdisciplinary rounds: Yes    Anticipated Discharge Disposition:  Home     Anticipated Discharge Services: Home Care   Anticipated Discharge DME:  Bariatric wheel chair, commode and shower chair    Patient/family educated on Medicare website which has current facility and service quality ratings:    Education Provided on the Discharge Plan:  yes  Patient/Family in Agreement with the Plan:  yes  Referrals Placed by CM/SW:  [facility]  Private pay costs discussed: Not applicable    Additional Information:  Spoke with RN @ [facility]  ([phone]) update her  That patient would not discharge until Friday. Request that DC orders and DC Summary be faxed to ([phone])    This RNCC spoke with [facility], DME orders faxed twice on 5/10/22 verified through Right fax that orders went through-[facility] not able to locate order. [facility] requested that orders be E-mailed to them at [email or web address]    Orders e mailed/CC Laila PALMA    Call to Loly @ [facility]/Phone: [phone] that order be sent again via  E mail, will watch for it, informed her I sent again as we were on phone, will call back as soon as received      3:11 PM-Verified with Loly that orders were received, will send to review team. Review team will reach out to patient regarding delivery. Will let RNCC know if any further information required.      Kassie BATESN RN CCM  RN Care Coordinator [address] [place] [id number]  [email or web address]   Office: [phone]   Pager: [phone]    To contact Weekend RNCC, dial [phone] and enter job code [phone] at prompt. This pager can not be contacted by text page or outside line.

## 2022-05-12 NOTE — PLAN OF CARE
Patient is alert and oriented per baseline, and able to communicate needs. Patient had low BP, provider ws paged no new order given  Patient is asymptomatic. Other vitals WNL. Patient c/o pain but refused prn pain medication. He stated pain is manageable without prn pain medication, rating pain at 2/10 in the LE. Voiding independently without difficulty. Lung sounds clear. Uses bedside urinal.  Patient is up in his wheelchair. Received his bed bath, no new skin issue noted.  Call is within reach. No concern noted. Will continue with current POC.

## 2022-05-12 NOTE — PLAN OF CARE
/56 (BP Location: Right arm, Patient Position: Semi-Garcia's, Cuff Size: Adult Large)   Pulse 90   Temp 98  F (36.7  C) (Oral)   Resp 18   Wt (!) 191.6 kg (422 lb 4.8 oz)   SpO2 98%   BMI (P) 48.80 kg/m      A&Ox4. VSS on RA.   Pt on strict I and Os. On 1200mL fluid restriction. 300mLs this shift. Regular diet.   Not OOB overnight. Assist x1 w/ GB.   L PIV - SL.   BLE wrapped - see wound care orders.   R heel and R inner knee wounds - see wound care orders.   Rated pain 2/10 and has fentanyl patch on L shoulder.

## 2022-05-12 NOTE — PROGRESS NOTES
[facility]    Medicine Progress Note - Hospitalist Service, GOLD TEAM 17    Date of Admission:  4/15/2022    Assessment & Plan           Panda Miranda is a 61 yo gentleman w/ h/o chronic HFrEF (EF 45-50% on 1/29/22), small perimembranous VSD w/ left to right shunt, HTN, CKD4 w/ baseline cr ~ 1.8 - 2.1, chronic atrial fibrillation, gout, pseudogout, morbid obesity s/p bariatric surgery and numerous other medical conditions.  He was hospitalized at [facility] from 1/27 - 1/30/22 for sepsis 2/2 complicated UTI vs. pyelonephritis.  He was hospitalized at [facility] from 2/11 - 2/20/22 for hyperkalemia, SHANTE and acute gout flare.  He was then hospitalized at [facility] 2/20 - 3/18/22 with weakness, unable to walk up the steps in his home.  He was later found to have recurrent hyperkalemia.  During [facility] hospital stay, patient was also treated for acute polyarticular gout.  He completed prednisone taper during [facility] hospital stay and was transitioned from uloric to allopurinol.  He had pancytopenia during [facility] hospital stay that resolved prior to discharge.  Transferred to [facility] on 3/18/22.  Unfortunately patient's [facility] stay was complicated by anasarca (worsening fluid retention, significant weight gain and patient inability to participate in therapy).  Transfer to [facility] on 4/15/22 for treatment of anasarca with IV Bumex.    Chronic HFrEF with acute exacerbation,   TTE 1/29/22 revealed EF 45-50%   Anasarca  ---   Change oral Bumex 4 mg po BID to 3 mg BID. SP one time dose of zaroxolyn 2.5 mg once on 05/10/22.    ---   Follow-up Daily weight  ---   Wt is markedly down, suspect this is inaccurate  ---   Discontinued IV Bumex and started Bumex 2 mg po tid on 5/3/22  ---   Discontinued oral Bumex and restarted Bumex 2 mg iv bid on 5/6/22  ---   Not a candidate for ACE inhibitor or ARB due to underlying CKD4 with baseline creatinine ~ 1.8-2.1    Known  perimembranous VSD (small) with left to right shunting  ---   Follow up with cardiology as out patient      Hypertension    ---   BP is soft  ---   Continue metoprolol and bumex      Permanent atrial fibrillation  ---   HR controlled w/ PTA Metoprolol  ---   On chronic anticoagulating with coumadin, INR therapeutic at 2.67     TASHA, ? OHS   ---   Continue CPAP during sleep    SHANTE superimposed on CKD IV  ---   Baseline creatinine ~1.8-2.1   ---   Creatinine is 2.45 today  ---   Avoid nephrotoxins other than Bumex  ---   Will discontinue IV Bumex if cr worse tomorrow    Chronic venous stasis ulcers  ---   Care per WOCN.      Gout  ---   Continue colchicine and allopurinol.     Morbid obesity w/ BMI 50.2 kg/m2:  ---   H/o gastroplasty vertical banded by Dr. Arizmendi at [facility] in [id number]  ---   He is 427 pounds today, continued to lose wt.  ---   Weight loss strongly recommended  ---   Follow-up with bariatric clinic after d/c.     History of colon cancer  ---   S/p hemicolectomy in 2011  ---   Further surveillance as outpatient.     Anemia of chronic disease, pancytopenia   ---   Was seen by enma during one of his recent hospitalization  ---   Pancytopenia was felty to be due to bone marrow suppression from acute illness and medications,    ---   Iron, B12 or folate levels were wnl  ---   WBC and Plt counts back to normal limits   ---   Hg stable at 10 g    Generalized weakness, debility, deconditioning  ---   Continue PT/OT.   ---   Patient continued to get stronger, now able to transfer on his own and ambulate up to 100 feet without any difficulties.  He is able to go up 8 steps.  ---   He is hoping to discharge to home     Right lateral heel pressure injury, present prior to admit.   ---   Continue wound care per WOCN rec.         Diet: Fluid restriction 1200 ML FLUID  Combination Diet Renal Diet (non-dialysis); Low Saturated Fat Na <2400mg Diet  Snacks/Supplements Adult: Other; Rotate cherry or fruit punch Gelatein  BID at lunch and dinner meals - do not count towards diet orders; Between Meals    DVT Prophylaxis: Warfarin and Ambulate every shift  Cheema Catheter: Not present  Central Lines: None  Cardiac Monitoring: None  Code Status: Full Code      Disposition Plan   Expected Discharge:   Medically stable for discharge as soon as placement issue figured out  Anticipated discharge location: TCU vs home        David Donovan MD  Hospitalist Service, GOLD TEAM 17  [facility]  Securely message with the Vocera Web Console (learn more here)  Text page via [facility] Paging/Directory   Please see signed in provider for up to date coverage information      ______________________________________________________________      Interval History    No complaints.  Uneventful night.  I/O and weight measurement are inaccurate (I/O less negative but pt is losing wt every day)      Data reviewed today:  I reviewed all medications, new labs and imaging results over the last 24 hours.    Physical Exam   Vital Signs: Temp: 98  F (36.7  C) Temp src: Oral BP: 97/51 Pulse: 74   Resp: 16 SpO2: 92 % O2 Device: None (Room air)    Weight: 419 lbs 9.6 oz   General: Severe morbidly obese, aao x 3, NAD.  HEENT:  NC/AT, neck supple, no thyromegaly  CVS:  NL s 1 and s2, no m/r/g.  Lungs:  CTA B/L.   Abd:  Soft, + bs, NT, no rebound or gaurding, no fluid shift.  Ext:  No c/c.  Lymph:  + Lymphedema  Neuro:  Nonfocal.  Musculoskeletal: No calf tenderness to palpation.    Skin:  No rash.  Psychiatry:  Mood and affect appropriate.     Data   Recent Labs   Lab 05/12/22  0617 05/11/22  0635 05/10/22  0601   INR 2.58* 2.55* 2.82*    137 138   POTASSIUM 3.7 3.5 3.6   CHLORIDE 100 100 101   CO2 31 30 29   BUN 69* 62* 67*   CR 2.54* 2.32* 2.36*   ANIONGAP 5 7 8   JOHN 9.7 9.8 9.3   * 108* 109*

## 2022-05-12 NOTE — PLAN OF CARE
/57 (BP Location: Right arm)   Pulse 74   Temp 98.3  F (36.8  C) (Oral)   Resp 16   Wt (!) 191.6 kg (422 lb 4.8 oz)   SpO2 97%   BMI (P) 48.80 kg/m  .     A&O x 4. VSS. Baseline N/T BLE. Denies N/V.  LS clear. Denies SOB. Denies chest pain. Currently on RA.  Last BM on 5/10. Voids without difficulty - uses urinal.   SBA w/ walker, pt refused GB. Pt independent once in wheelchair.  Wound cares due on 5/12.  L PIV - saline locked.  Denies pain. Fentanyl patch replaced this afternoon - applied to L shoulder w/ Tegaderm over the top.  Renal diet/low saturated fat diet. 1200ml fluid restriction.     Continue POC. Potential discharge on 5/13/22.

## 2022-05-12 NOTE — PROGRESS NOTES
Care Management Follow Up    Length of Stay (days): 27    Expected Discharge Date: 05/15/2022     Concerns to be Addressed: Discharge planning       Patient plan of care discussed at interdisciplinary rounds: Yes    Anticipated Discharge Disposition: Home with [facility]- RN/PT  Anticipated Discharge Services: RN/PT   Anticipated Discharge DME: Bariatric wheelchair and commode. Possible shower chair       Patient/family educated on Medicare website which has current facility and service quality ratings: yes  Education Provided on the Discharge Plan: yes  Patient/Family in Agreement with the Plan: yes     Additional Information:  Patient will be discharging home with [facility]- RN/PT when medically ready (still diuresing- anticipated to be ready to discharge on Ashutosh 5/15/22). Updated [facility] about anticipated discharge date. Informed patient of plan and he verbalized understanding and agreement. Called [facility] and they are out of network and he would have to pay a deductible. Previous RNCC sent the orders to [facility] but she was told that they were out of network for [employer]. However, [employer] website states that [facility] is in network. Spoke to [employer] with customer service and she stated that they are able to accept his insurance. Refaxed the orders/documentation.      [facility]  Phone: [phone]  Fax: [phone]    [facility]- out of network   [email or web address]  Phone: [phone] (option 5)    [facility]- [facility]ed   Phone: [phone]  Fax: [phone]      KALPESH Jalloh RNCC  RN Care Coordinator   Office: [phone]   Pager: [phone]

## 2022-05-13 NOTE — PLAN OF CARE
Goal Outcome Evaluation:    Pt resting in bed. Pt given ice and water the equivalent of 400 ml. Instructed pt that this was 1/3 of  his fluid restriction and could not have more during this shift. Legs wrapped and intact. No report of pain. Call light is in reach cont to assess.

## 2022-05-13 NOTE — PROGRESS NOTES
Care Management Follow Up    Length of Stay (days): 28    Expected Discharge Date: 05/15/2022     Concerns to be Addressed: Discharge planning       Patient plan of care discussed at interdisciplinary rounds: Yes    Anticipated Discharge Disposition: [facility] with [facility]- [facility]  Anticipated Discharge Services: RN/PT   Anticipated Discharge DME: Bariatric wheelchair and commode. Shower chair and walker.     Patient/family educated on Medicare website which has current facility and service quality ratings: yes  Education Provided on the Discharge Plan: yes  Patient/Family in Agreement with the Plan: yes     Additional Information:  Patient will be discharging home with [facility]- RN/PT when medically ready. [facility] is aware of anticipated discharge on Sunday. Called [facility] [employer] to check on status of bariatric wheelchair and commode. They will be calling back after looking into his orders. Spoke to OT yesterday and today and they are planning to issue a shower chair and walker at discharge.     10:43 AM  Met with patient to give him an update. He stated that his wife will be picking him up at discharge.     12:55 PM  Called [facility] again to check order status. They still don't have the patient's information that was faxed in their system. Customer service is checking to see if they even have the equipment needed in their warehouse.     Called [facility] and Tammi confirmed that they have a bariatric wheelchair and commode and are in network for his insurance. Faxed orders/information.     1:12 PM  [facility] does not have the equipment in stock.     3:24 PM  Called [facility] and they confirmed that they received the orders for the wheelchair and commode. However, they were not able to discuss delivery/fees with patient due to him not answering and his voicemail being full. Provided them with patient's room number.     3:58 PM  Spoke to Sybil at  [employer] and they will be delivering the wheelchair and commode to the hospital tomorrow. Provided the unit phone number for the  to call when an ETA is known.       [facility]  Phone: [phone]  Fax: [phone]    [facility]  [phone]  Fax- [phone]    [facility]- out of network   [email or web address]  Phone: [phone] (option 5)    [facility]- Does not have bariatric wheelchair or commode in stock.   Phone: [phone]  Fax: [phone]      KALPESH Jalloh RNCC  RN Care Coordinator   Office: [phone]   Pager: [phone]

## 2022-05-13 NOTE — PROGRESS NOTES
CLINICAL NUTRITION SERVICES - REASSESSMENT NOTE     Nutrition Prescription    RECOMMENDATIONS FOR MDs/PROVIDERS TO ORDER:  None today     Malnutrition Status:    Patient does not meet two of the established criteria necessary for diagnosing malnutrition    Recommendations already ordered by Registered Dietitian (RD):  None - continue supplements as ordered until pt is discharged    RD provided pt education and handouts on 2 gm Na diet and 1200 ml FR for home, encouraged pt to continue to have follow up with outpatient Providers on fluid restriction requirements     Future/Additional Recommendations:  None - pt is planned for discharge this weekend      EVALUATION OF THE PROGRESS TOWARD GOALS   Diet: 1200 mL Fluid Restriction, Low Saturated Fat/2400 mg Sodium and Renal  Supplement: Rotate cherry or fruit punch Gelatein BID at lunch and dinner meals - do not count towards diet orders  Intake: 100% per flow sheets        NEW FINDINGS   MAR reviewed. Labs reviewed. RD visited pt earlier today, reviewed intakes/diet, pt continues to accept supplement as ordered, pt confirms plan to discharge this weekend and reported would like diet education for recommendations for home, RD confirmed fluid restriction with rounding MD, and then later returned to pt and reviewed 2 gm Na diet recommendations for HF, reviewed high sodium foods to avoid reviewed cooking more from scratch to avoid unwanted sodium in diet, encouraged pt to avoid convenience foods, RD reviewed tips for maintaining fluid restriction for home, and reviewed protein supplement options for home, pt was provided handouts to review prior to discharge, and was encouraged to request nursing page RD services if pt has further questions. RD also encouraged pt to request outpatient RD services for continued diet monitoring, pt verbalizes understanding.     05/12/22 0851 190.3 kg (419 lb 9.6 oz)    05/05/22 2000 192.6 kg (424 lb 11.2 oz)    04/28/22 1039 200.9 kg (442 lb  14.4 oz)    04/21/22 1115 206.3 kg (454 lb 12.8 oz)    04/17/22 1600 210 kg (462 lb 15.5 oz)      02/10/22 196.4 kg (433 lb)   12/07/21 209.3 kg (461 lb 8 oz)   09/08/21 206.9 kg (456 lb 3.2 oz)   05/17/21 219.2 kg (483 lb 3.2 oz)     Weight assessment: 5 lb/1.1% weight loss in 1 week, significant 9.3% weight loss in ~1 month/since hospital admit, pt has been aggressively diuresed since admission so weight losses are likely more affected by fluid status vs true loss. Non-significant 3.2% weight loss in 3 months, non-significant 8.1% weight loss in 6 months, non-significant 13.2% weight loss in 1 year.     MALNUTRITION  % Intake: No decreased intake noted  % Weight Loss: > 5% in 1 month (severe) vs fluid status changes   Subcutaneous Fat Loss: None observed  Muscle Loss: None observed  Fluid Accumulation/Edema: Trace to mild per flow sheets   Malnutrition Diagnosis: Patient does not meet two of the established criteria necessary for diagnosing malnutrition    Previous Goals   Patient to consume % of nutritionally adequate meal trays TID, or the equivalent with supplements/snacks  Evaluation: Met    Previous Nutrition Diagnosis  Predicted inadequate nutrient intake vs increased nutrient needs related to appetite vs other  Evaluation: No change    CURRENT NUTRITION DIAGNOSIS  Predicted inadequate nutrient intake vs increased nutrient needs related to appetite vs other    INTERVENTIONS  Implementation  Education: Pt educated and provided handouts on 2 gm Na diet, 1200 ml FR  Medical food supplement therapy - continue as ordered     Goals  Patient to consume % of nutritionally adequate meal trays TID, or the equivalent with supplements/snacks.    Monitoring/Evaluation  Progress toward goals will be monitored and evaluated per protocol.    Elle Ozuna RD, CNSC, LD  8A RD pager: [phone]

## 2022-05-13 NOTE — PROGRESS NOTES
[facility]    Medicine Progress Note - Hospitalist Service, GOLD TEAM 17    Date of Admission:  4/15/2022    Assessment & Plan           Panda Miranda is a 59 yo gentleman w/ h/o chronic HFrEF (EF 45-50% on 1/29/22), small perimembranous VSD w/ left to right shunt, HTN, CKD4 w/ baseline cr ~ 1.8 - 2.1, chronic atrial fibrillation, gout, pseudogout, morbid obesity s/p bariatric surgery and numerous other medical conditions.  He was hospitalized at [facility] from 1/27 - 1/30/22 for sepsis 2/2 complicated UTI vs. pyelonephritis.  He was hospitalized at [facility] from 2/11 - 2/20/22 for hyperkalemia, SHANTE and acute gout flare.  He was then hospitalized at [facility] 2/20 - 3/18/22 with weakness, unable to walk up the steps in his home.  He was later found to have recurrent hyperkalemia.  During [facility] hospital stay, patient was also treated for acute polyarticular gout.  He completed prednisone taper during [facility] hospital stay and was transitioned from uloric to allopurinol.  He had pancytopenia during [facility] hospital stay that resolved prior to discharge.  Transferred to [facility] on 3/18/22.  Unfortunately patient's [facility] stay was complicated by anasarca (worsening fluid retention, significant weight gain and patient inability to participate in therapy).  Transfer to [facility] on 4/15/22 for treatment of anasarca with IV Bumex.    Chronic HFrEF with acute exacerbation,   TTE 1/29/22 revealed EF 45-50%   Anasarca  ---   Current Bumex dose is  3 mg BID. SP one time dose of zaroxolyn 2.5 mg once on 05/10/22.    ---   Weight today is 419 #  Pt still with considerable dependent trunk edema  Trend since readmission to medical unit    05/12/22 0851 190.3 kg (419 lb 9.6 oz) Abnormal  FG   05/11/22 1221 191.6 kg (422 lb 4.8 oz) Abnormal  SC   05/10/22 1100 194.8 kg (429 lb 8 oz) Abnormal  MV   05/09/22 1115 193.8 kg (427 lb 4.8 oz) Abnormal  IR   05/07/22 1022 195.6  kg (431 lb 3.2 oz) Abnormal  NC   05/06/22 0913 196.1 kg (432 lb 4.8 oz) Abnormal  AL   05/05/22 2000 192.6 kg (424 lb 11.2 oz) Abnormal  EO   05/03/22 1009 197 kg (434 lb 6.4 oz) Abnormal  AN   04/30/22 1442 199.1 kg (439 lb) Abnormal  AH   04/29/22 0859 199.9 kg (440 lb 9.6 oz) Abnormal  FG   04/28/22 1039 200.9 kg (442 lb 14.4 oz) Abnormal  JW   04/25/22 0900 186.9 kg (412 lb) Abnormal  JG   04/21/22 1115 206.3 kg (454 lb 12.8 oz) Abnormal  RV   04/17/22 1600 210 kg (462 lb 15.5 oz) Abnormal     Plan continue Bumex 3 mg bid, repeat BMP in am    ---   Not a candidate for ACE inhibitor or ARB due to underlying CKD4 with baseline creatinine ~ 1.8-2.1    Known perimembranous VSD (small) with left to right shunting  ---   Follow up with cardiology as out patient      Hypertension    ---   BP controlled  ---   Continue metoprolol and bumex      Permanent atrial fibrillation  ---   HR controlled w/ PTA Metoprolol  ---   On chronic anticoagulating with coumadin     TASHA,  ---   Continue CPAP during sleep    SHANTE superimposed on CKD IV  ---   Baseline creatinine ~1.8-2.1   ---   Creatinine stable over the last 48 hours sl over 2.5  Repeat BMP in am    Chronic venous stasis ulcers  ---   Care per WOCN.      Gout  ---   Continue colchicine and allopurinol.     Morbid obesity w/ BMI 50.2 kg/m2:  ---   H/o gastroplasty vertical banded by Dr. Arizmendi at [facility] in [id number]  ---   He is 427 pounds today, continued to lose wt.  ---   Weight loss strongly recommended  ---   Follow-up with bariatric clinic after d/c.     History of colon cancer  ---   S/p hemicolectomy in 2011  ---   Further surveillance as outpatient.     Anemia of chronic disease, pancytopenia   Stable    Generalized weakness, debility, deconditioning  Improved markedly with diuresis  ---   Continue PT/OT.        Right lateral heel pressure injury, present prior to admit.   ---   Continue wound care per WOCN rec.         Diet: Fluid restriction 1200 ML  FLUID  Combination Diet Renal Diet (non-dialysis); Low Saturated Fat Na <2400mg Diet  Snacks/Supplements Adult: Other; Rotate cherry or fruit punch Gelatein BID at lunch and dinner meals - do not count towards diet orders; Between Meals    DVT Prophylaxis: Warfarin and Ambulate every shift  Cheema Catheter: Not present  Central Lines: None  Cardiac Monitoring: None  Code Status: Full Code      Disposition Plan   Expected Discharge:   To home on 5/15/2022    Nasir Escobedo MD  Hospitalist Service, GOLD TEAM 17  [facility]  Securely message with the Vocera Web Console (learn more here)  Text page via [facility] Paging/Directory   Please see signed in provider for up to date coverage information      ______________________________________________________________      Interval History    No new complaints  Notes continued swelling over flanks and upper back  Mild MACKEY, no chest pain    Data reviewed today:  I reviewed all medications, new labs and imaging results over the last 24 hours.    Physical Exam   Vital Signs: Temp: 97  F (36.1  C) Temp src: Oral BP: 97/54 Pulse: 84   Resp: 16 SpO2: 98 % O2 Device: None (Room air)    Weight: 419 lbs 9.6 oz   General: Severe morbidly obese, lying on nack  CVS:  HR 80s  Lungs:  clear   Abd:  Soft, protuberant  Ext:  1 + LE edema  Firm edema both flanks and behind shoulders      Data   Recent Labs   Lab 05/13/22  0702 05/12/22  0617 05/11/22  0635   INR 2.70* 2.58* 2.55*    136 137   POTASSIUM 3.7 3.7 3.5   CHLORIDE 99 100 100   CO2 31 31 30   BUN 75* 69* 62*   CR 2.53* 2.54* 2.32*   ANIONGAP 6 5 7   JOHN 9.8 9.7 9.8   * 112* 108*

## 2022-05-13 NOTE — PROGRESS NOTES
Baseline numbness and tingling bilaterally in the feet.      Pt has intermittent scabbing/scarring from scratching. Cracking on the feet and heels.  Dressings on the BLE were changed this shift per orders.  WO was able to assess at this time.  Wound on the R lateral heel, L lateral leg, R lateral leg.     1200cc fluid restriction, renal diet.  Does request more fluids, but needs education on why he cannot have more than the restricted amount.      Bariatric commode in the room, bariatric pulsate mattress.    Uses urinal at bedside.  BM this shift-- large formed brown stool    K, Mg, P electrolyte replacement was not needed, labs to be drawn in the AM.

## 2022-05-13 NOTE — PROGRESS NOTES
WO Nurse Inpatient Wound Assessment   Reason for consultation: Evaluate and treat  Left lateral shin wounds, R heel    Assessment  Left lateral Shin wounds due to Venous Ulcer  Status: healed 4/13    Bilateral upper shin wounds due to venous stasis and edema  Status: Right healed, left healed    Right lateral heel Pressure injury community acquired.   Pressure injury stage 2  Status: healed 4/20 5/13/2022: Above wounds all remain healed on assessment today. WOC will sign off. Continue below plan of care to treat chronic edema to lower legs.     Treatment Plan  Bilateral legs: Every other day    Spray all intact skin to BL lower extremities with Carmela cleanse and protect and rub in. Allow to soak for 10mins and then wipe away any excess.   Apply sween 24 (Pink top lotion) from base of toes to below knees. (Do NOT use critic aid -black top)  Apply Adaptic (mineral gauze) to any open areas  Cover with kerlix and tape.     Pressure Injury prevention (RN-please order supplies if not in room)  Turn every 2 hours, side to side avoid supine on bariatric pressure redistribution support surface  Float heels off bed with use of  Heel Lift boots (Prevalon #[id number])    Prevent sliding and shear by limiting HOB to 30 degrees or less unless contraindicated, use knee gatch first if not contraindicated  If sitting, use pressure redistribution chair cushion large(#[id number]); if unable to shift weight every hour, please limit sitting to an hour at a time.  Mepilex PRN,Change at least q 3 days, peel back, peek and replace for daily skin inspection.      Orders Revised  Recommended provider order: Lymphedema  WOC Nurse follow-up plan: signing off.   Nursing to notify the Provider(s) and re-consult the WOC Nurse if wound(s) deteriorates or new skin concern.    Patient History  According to provider note(s):   66 year old male admitted through on 2/20/2022 after being discharged earlier from [facility] stay from 2/11 - 2/20 for  acute gout flare and subsequent SHANTE and hyperkalemia likely caused by gout treatment. Patient is morbidly obese and he was discharged to his home with home health but he was unable to manage going up steps immediately upon arrival home.  He was brought in to [facility] ED for placement. In the ER he was seen by Dr Henning, vitals were normal.  Labs were reviewed from his recent discharge with Cr of 1.93 which was improved from his discharge and actually better than baseline.     3/15/2022 pending TCU for deconditioning      No Known Allergies  Objective Data  Containment of urine/stool: Incontinence Protocol as needed    Body mass index is 48.3 kg/m  (pended).     Active Diet Order  Orders Placed This Encounter      Combination Diet Renal Diet (non-dialysis); Low Saturated Fat Na <2400mg Diet      Intake/Output Summary (Last 24 hours) at 3/15/2022 1444  Last data filed at 3/15/2022 1030  Gross per 24 hour   Intake 260 ml   Output 600 ml   Net -340 ml       Pressure Injury Risk Assessment:   Sensory Perception: 3-->slightly limited  Moisture: 3-->occasionally moist  Activity: 3-->walks occasionally  Mobility: 3-->slightly limited  Nutrition: 3-->adequate  Friction and Shear: 2-->potential problem  Sandoval Score: 17                          Labs:   Recent Labs   Lab 05/13/22  0702   INR 2.70*       Physical Exam  Areas of skin assessed: Left Lateral lower leg, Right lateral foot and heel    4/28: right shin re-opened due to edema. Overall legs have improved since admit. WOC will see every other week. POC remains in place.     Right and left lateral foot wounds healed on assessment 3/21    Wound Location:  Left Lateral lower leg  See Media tab for mor images      3/21      3/29    Wound Base: dermis, red, moist small areas of granulation tissue     Palpation of the wound bed: normal   resurfaced    Wound Location:  Right lateral heel        3/21     3/29    4/13    4/20 resurfaced    Wound History: present on admit to  TCU  Wound Base: resurfaced 4/20    Wound Location:  Bilateral upper shins    3/21 Right upper shin    3/29 Right upper shin        Date of last photo 3/29/22  Wound History: present on admit. Pt has had fluctuating edema in LE causing weepy areas and skin to open.   Wound Base:re-opened 4/28 likely due to edema  Wound Base: 100 % dermis     Palpation of the wound bed: normal      Drainage: small     Description of drainage: serosanguinous     Measurements (length x width x depth, in cm) 0.3 x 0.3 x 0.1 cm  Periwound skin: intact      Color: normal and consistent with surrounding tissue      Temperature: normal   Odor: none  Pain: denies , none      Interventions  Visual inspection and assessment completed   Wound Care Rationale Right heel and foot Protect and monitor as DTPI evolves. Left lateral leg selective debridement (autolysis) of nonviable tissue   Wound Care: done per plan of care  Supplies: at bedside  Current off-loading measures: Chair cushion and ordered PRN  Current support surface: Bariatric Low air loss mattress with extention  Education provided to: plan of care, wound progress, Off-loading pressure and effects of neuropathy  Discussed plan of care with Patient and Nurse    Interventions  Visual inspection and assessment completed   Wound Care Rationale Protect periwound skin, Promote moist wound healing without tissue dehydration , Gently fill dead space to prevent abscess formation  and Provide protection   Wound Care: done per plan of care  Supplies: at bedside  Current off-loading measures: pillows for positioning, HOB 30 degrees or less, heels floating off beds, bariatric low airloss support surfaces, chair cushion ordered PRN  Current support surface: Bariatric low air loss  Education provided to: importance of repositioning and plan of care  Discussed plan of care with Patient, Nurse and Physician     Cynthia Ortega RN, CWOCN    Pager [phone]

## 2022-05-13 NOTE — PROGRESS NOTES
VS: Blood pressure 107/63, pulse 89, temperature 97.5  F (36.4  C), temperature source Oral, resp. rate 16, weight (!) 190.3 kg (419 lb 9.6 oz), SpO2 100 %.       O2: Spo2> 90% on RA; lung sounds clear and equal bilaterally    Output: Voids spontaneously in urinal; strict I/O    Last BM: 05/11   Activity: x1 with walker and gait belt    Skin: r heel pressure wound, Wounds to BLE- wrapped per orders    Pain: Denies. Patch on L shoulder    CMS: Aox4, BLE edema and numbness and tingling    Dressing: BLE dressings CDI    Diet: Regular diet, strict I/O   LDA: L PIV SL    Equipment: Wheelchair, gait belt, call light in reach   Plan: Continue with POC, discharge 05/15   Additional Info:

## 2022-05-14 NOTE — PLAN OF CARE
BP 95/47 (BP Location: Right arm)   Pulse 85   Temp 98  F (36.7  C) (Oral)   Resp 14   Wt (!) 188 kg (414 lb 9 oz)   SpO2 100%   BMI (P) 47.91 kg/m      Patient A&Ox4. VSS on RA. Standby assist with transfers with walker.   Denies pain.  Uses urinal at bedside.  Up to the commode (BM today).  Daily weight done.  Wheelchair in room.  BL N/T in BLE. Wraps on LE. CDI. Wound care every other day (due tomorrow 5/15).  Provider increased fluid intake to 1500ml per day.  During shift received 420ml.  Uses call light appropriately and is able to make needs known.  Patient received a commode and wheelchair to take home with him.     Plan: medically stable for discharge. Waiting for plan on placement at TCU vs. Home.

## 2022-05-14 NOTE — PLAN OF CARE
Goal Outcome Evaluation:  /64 (BP Location: Right arm)   Pulse 104   Temp 97.7  F (36.5  C) (Oral)   Resp 16   Wt (!) 189.6 kg (418 lb)   SpO2 100%   BMI (P) 48.30 kg/m    Alert and oriented x4. VSS on RA.  No new symptoms or concerns. No report of pain.  Uses urinal at bedside, bariatric commode at bedside, LBM 5/13  Transfers IND, wheelchair in room. Minimal assist x1 to turn in bed  Episode of incontinence, bed bath given and linens changed  BL N/T in BLE. Wraps on LE CDI changed today  Scabbing/scarring from scratching. Cracked heels.  Continued 1200 ml fluid restriction and renal diet  This shift received ice and water equivalent of 350 ml.

## 2022-05-14 NOTE — PROGRESS NOTES
[facility]    Medicine Progress Note - Hospitalist Service, GOLD TEAM 17    Date of Admission:  4/15/2022    Assessment & Plan           Panda Miranda is a 59 yo gentleman w/ h/o chronic HFrEF (EF 45-50% on 1/29/22), small perimembranous VSD w/ left to right shunt, HTN, CKD4 w/ baseline cr ~ 1.8 - 2.1, chronic atrial fibrillation, gout, pseudogout, morbid obesity s/p bariatric surgery and numerous other medical conditions.  He was hospitalized at [facility] from 1/27 - 1/30/22 for sepsis 2/2 complicated UTI vs. pyelonephritis.  He was hospitalized at [facility] from 2/11 - 2/20/22 for hyperkalemia, SHANTE and acute gout flare.  He was then hospitalized at [facility] 2/20 - 3/18/22 with weakness, unable to walk up the steps in his home.  He was later found to have recurrent hyperkalemia.  During [facility] hospital stay, patient was also treated for acute polyarticular gout.  He completed prednisone taper during [facility] hospital stay and was transitioned from uloric to allopurinol.  He had pancytopenia during [facility] hospital stay that resolved prior to discharge.  Transferred to [facility] on 3/18/22.  Unfortunately patient's [facility] stay was complicated by anasarca (worsening fluid retention, significant weight gain and patient inability to participate in therapy).  Transfer to [facility] on 4/15/22 for treatment of anasarca with IV Bumex.    Chronic HFrEF with acute exacerbation,   TTE 1/29/22 revealed EF 45-50%   Anasarca  ---   Continue oral Bumex 3 mg BID. SP one time dose of zaroxolyn 2.5 mg once on 05/10/22.    ---   Follow-up Daily weight, its trending down  ---   Wt is markedly down, suspect this is inaccurate  ---   Discontinued IV Bumex and started Bumex 2 mg po tid on 5/3/22  ---   Discontinued oral Bumex and restarted Bumex 2 mg iv bid on 5/6/22  ---   Not a candidate for ACE inhibitor or ARB due to underlying CKD4 with baseline creatinine ~ 1.8-2.1  ---    Has not gotten metoprolol for days, cut back on the dose to 12.5 mg every day with hold for SBP less than 95  ---   Stop fentanyl patch    Known perimembranous VSD (small) with left to right shunting  ---   Follow up with cardiology as out patient      Hypertension    ---   BP is soft  ---   Continue metoprolol and bumex      Permanent atrial fibrillation  ---   HR controlled w/ PTA Metoprolol  ---   On chronic anticoagulating with coumadin, INR therapeutic at 2.67     TASHA, ? OHS   ---   Continue CPAP during sleep    SHANTE superimposed on CKD IV  ---   Baseline creatinine ~1.8-2.1   ---   Creatinine is 2.45 today  ---   Avoid nephrotoxins other than Bumex  ---   Will discontinue IV Bumex if cr worse tomorrow    Chronic venous stasis ulcers  ---   Care per WOCN.      Gout  ---   Continue colchicine and allopurinol.     Morbid obesity w/ BMI 50.2 kg/m2:  ---   H/o gastroplasty vertical banded by Dr. Arizmendi at [facility] in [id number]  ---   He is 427 pounds today, continued to lose wt.  ---   Weight loss strongly recommended  ---   Follow-up with bariatric clinic after d/c.     History of colon cancer  ---   S/p hemicolectomy in 2011  ---   Further surveillance as outpatient.     Anemia of chronic disease, pancytopenia   ---   Was seen by enma during one of his recent hospitalization  ---   Pancytopenia was felty to be due to bone marrow suppression from acute illness and medications,    ---   Iron, B12 or folate levels were wnl  ---   WBC and Plt counts back to normal limits   ---   Hg stable at 10 g    Generalized weakness, debility, deconditioning  ---   Continue PT/OT.   ---   Patient continued to get stronger, now able to transfer on his own and ambulate up to 100 feet without any difficulties.  He is able to go up 8 steps.  ---   He is hoping to discharge to home     Right lateral heel pressure injury, present prior to admit.   ---   Continue wound care per WOCN rec.         Diet: Fluid restriction 1200 ML  FLUID  Combination Diet Renal Diet (non-dialysis); Low Saturated Fat Na <2400mg Diet  Snacks/Supplements Adult: Other; Rotate cherry or fruit punch Gelatein BID at lunch and dinner meals - do not count towards diet orders; Between Meals    DVT Prophylaxis: Warfarin and Ambulate every shift  Cheema Catheter: Not present  Central Lines: None  Cardiac Monitoring: None  Code Status: Full Code      Disposition Plan   Expected Discharge:   Medically stable for discharge as soon as placement issue figured out  Anticipated discharge location: TCU vs home        David Donovan MD  Hospitalist Service, GOLD TEAM 17  [facility]  Securely message with the Vocera Web Console (learn more here)  Text page via [facility] Paging/Directory   Please see signed in provider for up to date coverage information      ______________________________________________________________      Interval History      Doing better.   Pain not so bad  Does not feel he needs fentanyl      Data reviewed today:  I reviewed all medications, new labs and imaging results over the last 24 hours.    Physical Exam   Vital Signs: Temp: 98  F (36.7  C) Temp src: Oral BP: 95/47 Pulse: 85   Resp: 14 SpO2: 100 % O2 Device: None (Room air)    Weight: 418 lbs 0 oz   General: Severe morbidly obese, aao x 3, NAD.  HEENT:  NC/AT, neck supple, no thyromegaly  CVS:  NL s 1 and s2, no m/r/g.  Lungs:  CTA B/L.   Abd:  Soft, + bs, NT, no rebound or gaurding, no fluid shift.  Ext:  No c/c.  Lymph:  + Lymphedema  Neuro:  Nonfocal.  Musculoskeletal: No calf tenderness to palpation.    Skin:  No rash.  Psychiatry:  Mood and affect appropriate.     Data   Recent Labs   Lab 05/13/22  0702 05/12/22  0617 05/11/22  0635   INR 2.70* 2.58* 2.55*    136 137   POTASSIUM 3.7 3.7 3.5   CHLORIDE 99 100 100   CO2 31 31 30   BUN 75* 69* 62*   CR 2.53* 2.54* 2.32*   ANIONGAP 6 5 7   JOHN 9.8 9.7 9.8   * 112* 108*

## 2022-05-15 NOTE — PLAN OF CARE
Patient is alert and oriented per baseline, and able to communicate needs. VSS. Patient c/o pain on both of his lower legs, rating pain at 2/10. Refused prn pain medication stating pain is manageable. Voiding independently without difficulty. Uses bedside urinal.  Patient has been up on his wheelchair for the most part of the shift. Family came and took his bedside commode and wheelchair to home. He is looking forward to be discharged tomorrow. Currently in bed. Call light is within reach. No concern noted. Will continue with current POC.       I have reviewed and confirmed nurses' notes for patient's medications, allergies, medical history, and surgical history.

## 2022-05-15 NOTE — PLAN OF CARE
A&Ox4, calm and cooperative  Pain in right ankle area- ice applied   Bilateral LE wrapping intact   Baseline neuropathy in BLE  Repositions self in bed  Using urinal to void   Had 450cc of water at start of shift- pt is on 1500cc/day  fluid restriction   Plan to discharge either today or Monday  Able to make needs known

## 2022-05-15 NOTE — PLAN OF CARE
Occupational Therapy Discharge Summary    Reason for therapy discharge:    All goals and outcomes met, no further needs identified.    Progress towards therapy goal(s). See goals on Care Plan in [facility] electronic health record for goal details.  Goals met    Therapy recommendation(s):    No further therapy is recommended.

## 2022-05-15 NOTE — PLAN OF CARE
/63 (BP Location: Right arm, Patient Position: Supine)   Pulse 68   Temp 98.2  F (36.8  C) (Oral)   Resp 18   Wt (!) 187.1 kg (412 lb 8 oz)   SpO2 98%   BMI (P) 47.67 kg/m      Patient A&O x4. VSS on RA. Up with SBA with walker.   Daily weight done.  Wound care done per wound care order.  Uses urinal at bedside. Last BM 5/14.  Had 400cc of water during shift.  Uses call light appropriately and is able to make needs known.  Continue with POC.    Plan: patient was going to discharge today, but creatinine has been increasing. Provider reduced bumex dose. Patient may discharge tomorrow, depending on what his creatinine level is.

## 2022-05-16 NOTE — PROGRESS NOTES
Care Management Follow Up    Length of Stay (days): 31    Expected Discharge Date: 05/15/2022     Concerns to be Addressed:  DME needed at discharge    Additional Information:  This RNCC spoke with Panda, all of his equipment was delivered to the hospital and his nephew has taken all of the equipment home, states his wife will pick him up tomorrow and transport. home.    [facility]  Phone: [phone]  Fax: [phone]    Home care notified of DC on 5/17/2022      Kassie BATESN RN CCM  RN Care Coordinator [address] [place] [id number]  [email or web address]   Office: [phone]   Pager: [phone]    To contact Weekend RNCC, dial [phone] and enter job code [phone] at prompt. This pager can not be contacted by text page or outside line.

## 2022-05-16 NOTE — PLAN OF CARE
VSS.Not OOB tonight.Sleeping mostly through the night.Voiding on urinal,staff empties.Is passing gas, LBM 5/14.Denies pain.BLE woundsUTA,covered and acewrapped.Plan for creat recheck and discharge to home with home care when result WNL.

## 2022-05-16 NOTE — PLAN OF CARE
Goal Outcome Evaluation:         VS: /70 (BP Location: Right arm)   Pulse 65   Temp 97.7  F (36.5  C) (Oral)   Resp 18   Wt (!) 187.1 kg (412 lb 8 oz)   SpO2 100%   BMI (P) 47.67 kg/m     O2: RA   Output: Voids spontaneously with handheld urinal   Last BM: 5/14/2022   Activity: Up with standby assist   Up for meals?    Skin: Dressing change today,    Pain: Pain rated at a 2, no PRN meds offered or requested   CMS: A/Ox4, uses call light appropriately, purposeful motion x4 extremities, able to stand and pivot alone during this shift   Dressing: CDI   Diet: Regular thin   Equipment: Tima mattress, tima wheelchair, walker   Plan: Continue POC   Additional Info:

## 2022-05-16 NOTE — PLAN OF CARE
/68 (BP Location: Right arm, Patient Position: Sitting)   Pulse 85   Temp 97.8  F (36.6  C) (Oral)   Resp 16   Wt (!) 187.1 kg (412 lb 8 oz)   SpO2 100%   BMI (P) 47.67 kg/m      Patient A&ox4. VSS on RA. Lung sounds clear.  Up with SBA.  Uses urinal at bedside and bedside commode.  L-PIV-SL  Was on a 1500ml fluid restriction, got switched to a 1200ml restriction during shift. Renal diet.  Patient able to make needs known and uses call light appropriately.     Plan: possible discharge tomorrow, pending morning labs.

## 2022-05-16 NOTE — PROGRESS NOTES
[facility]    Medicine Progress Note - Hospitalist Service, GOLD TEAM 17    Date of Admission:  4/15/2022    Assessment & Plan           Panda Miranda is a 59 yo gentleman w/ h/o chronic HFrEF (EF 45-50% on 1/29/22), small perimembranous VSD w/ left to right shunt, HTN, CKD4 w/ baseline cr ~ 1.8 - 2.1, chronic atrial fibrillation, gout, pseudogout, morbid obesity s/p bariatric surgery and numerous other medical conditions.  He was hospitalized at [facility] from 1/27 - 1/30/22 for sepsis 2/2 complicated UTI vs. pyelonephritis.  He was hospitalized at [facility] from 2/11 - 2/20/22 for hyperkalemia, SHANTE and acute gout flare.  [facility] with weakness, unable to walk up the steps in his home.  He was later found to have recurrent hyperkalemia.  [facility], patient was also treated for acute polyarticular gout.  He completed prednisone taper [facility] and was transitioned from uloric to allopurinol.  He had pancytopenia [facility] that resolved prior to discharge.  Transferred to [facility] on 3/18/22.  Unfortunately patient's [facility] stay was complicated by anasarca (worsening fluid retention, significant weight gain and patient inability to participate in therapy).  Transfer to [facility] on 4/15/22 for treatment of anasarca with IV Bumex.    Chronic HFrEF with acute exacerbation,   TTE 1/29/22 revealed EF 45-50%   Anasarca  ---   Change oral Bumex to 2.5 mg BID. SP one time dose of zaroxolyn 2.5 mg once on 05/10/22.    ---   Follow-up Daily weight, its trending down  ---   Wt is markedly down, suspect this is inaccurate  ---   Discontinued IV Bumex and started Bumex 2 mg po tid on 5/3/22  ---   Discontinued oral Bumex and restarted Bumex 2 mg iv bid on 5/6/22  ---   Not a candidate for ACE inhibitor or ARB due to underlying CKD4 with baseline creatinine ~ 1.8-2.1  ---    Has not gotten metoprolol for days, cut back on the dose to 12.5 mg every day with hold for SBP less than 95  ---   Stop fentanyl patch    Known perimembranous VSD (small) with left to right shunting  ---   Follow up with cardiology as out patient      Hypertension    ---   BP is soft  ---   Continue metoprolol and bumex      Permanent atrial fibrillation  ---   HR controlled w/ PTA Metoprolol  ---   On chronic anticoagulating with coumadin, INR therapeutic at 2.67     TASHA, ? OHS   ---   Continue CPAP during sleep    SHANTE superimposed on CKD IV  ---   Baseline creatinine ~1.8-2.1   ---   Creatinine is 2.75 today  ---   Avoid nephrotoxins other than Bumex  ---   Cut back on bumex. Encourage to drink fluids.     Chronic venous stasis ulcers  ---   Care per WOCN.      Gout  ---   Continue colchicine and allopurinol.     Morbid obesity w/ BMI 50.2 kg/m2:  ---   H/o gastroplasty vertical banded by Dr. Arizmendi at [facility] in [id number]  ---   He is 427 pounds today, continued to lose wt.  ---   Weight loss strongly recommended  ---   Follow-up with bariatric clinic after d/c.     History of colon cancer  ---   S/p hemicolectomy in 2011  ---   Further surveillance as outpatient.     Anemia of chronic disease, pancytopenia   ---   Was seen by enma during one of his recent hospitalization  ---   Pancytopenia was felty to be due to bone marrow suppression from acute illness and medications,    ---   Iron, B12 or folate levels were wnl  ---   WBC and Plt counts back to normal limits   ---   Hg stable at 10 g    Generalized weakness, debility, deconditioning  ---   Continue PT/OT.   ---   Patient continued to get stronger, now able to transfer on his own and ambulate up to 100 feet without any difficulties.  He is able to go up 8 steps.  ---   He is hoping to discharge to home     Right lateral heel pressure injury, present prior to admit.   ---   Continue wound care per WOCN rec.         Diet: Combination Diet Renal Diet  (non-dialysis); Low Saturated Fat Na <2400mg Diet  Snacks/Supplements Adult: Other; Rotate cherry or fruit punch Gelatein BID at lunch and dinner meals - do not count towards diet orders; Between Meals  Fluid restriction 1500 ML FLUID    DVT Prophylaxis: Warfarin and Ambulate every shift  Cheema Catheter: Not present  Central Lines: None  Cardiac Monitoring: None  Code Status: Full Code      Disposition Plan   Expected Discharge:   Medically stable for discharge as soon as placement issue figured out  Anticipated discharge location: TCU vs home        David Donovan MD  Hospitalist Service, GOLD TEAM 17  [facility]  Securely message with the Vocera Web Console (learn more here)  Text page via [facility] Paging/Directory   Please see signed in provider for up to date coverage information      ______________________________________________________________      Interval History      Doing better.   Pain not so bad  Does not feel he needs fentanyl      Data reviewed today:  I reviewed all medications, new labs and imaging results over the last 24 hours.    Physical Exam   Vital Signs: Temp: 98.1  F (36.7  C) Temp src: Oral BP: 113/54 Pulse: 84   Resp: 16 SpO2: 95 % O2 Device: None (Room air)    Weight: 412 lbs 8 oz   General: Severe morbidly obese, aao x 3, NAD.  HEENT:  NC/AT, neck supple, no thyromegaly  CVS:  NL s 1 and s2, no m/r/g.  Lungs:  CTA B/L.   Abd:  Soft, + bs, NT, no rebound or gaurding, no fluid shift.  Ext:  No c/c.  Lymph:  + Lymphedema  Neuro:  Nonfocal.  Musculoskeletal: No calf tenderness to palpation.    Skin:  No rash.  Psychiatry:  Mood and affect appropriate.     Data   Recent Labs   Lab 05/16/22  0724 05/15/22  2228 05/15/22  0608 05/14/22  1011   INR 2.73*  --  2.74* 2.69*     --  137 137   POTASSIUM 3.6  --  3.8 3.8   CHLORIDE 98  --  98 100   CO2 31  --  30 31   BUN 75*  --  75* 73*   CR 2.62*  --  2.67* 2.45*   ANIONGAP 9  --  9 6   JOHN 10.0   --  10.1 9.6   * 142* 107* 110*

## 2022-05-17 NOTE — PLAN OF CARE
BP 99/65 (BP Location: Left arm, Patient Position: Semi-Garcia's, Cuff Size: Adult Large)   Pulse 82   Temp 98  F (36.7  C) (Oral)   Resp 16   Wt (!) 187.1 kg (412 lb 8 oz)   SpO2 98%   BMI (P) 47.67 kg/m      No significant changes to pt condition noted overnight. Pt on 1200mL FR. Pt appears to be resting comfortably & consisently asleep on safety rounds. Calls appropriately for assistance as needed. Plan is for pt to discharge 5/17 pending evaluation of kidney function w/ am labs.

## 2022-05-17 NOTE — PROGRESS NOTES
ANTICOAGULATION  MANAGEMENT: Discharge Review    Panda Miranda chart reviewed for anticoagulation continuity of care    Hospital Admission on 4/15 for chf.    Discharge disposition: Home    Results:    Recent labs: (last 7 days)     05/11/22  0635 05/12/22  0617 05/13/22  0702 05/14/22  1011 05/15/22  0608 05/16/22  0724 05/17/22  0625   INR 2.55* 2.58* 2.70* 2.69* 2.74* 2.73* 2.59*     Anticoagulation inpatient management:     less warfarin administered than maintenance regimen    Anticoagulation discharge instructions:     Warfarin dosing: 3 mg daily   Bridging: No   INR goal change: No      Medication changes affecting anticoagulation: No    Additional factors affecting anticoagulation: No    Plan     No adjustment to anticoagulation plan needed    Spoke with Panda who will shcedule to see Dr Hamlin friday, we will do an inr then    Anticoagulation Calendar updated    Patricia Acosta RN

## 2022-05-17 NOTE — PLAN OF CARE
Physical Therapy Discharge Summary    Reason for therapy discharge:    All goals and outcomes met, no further needs identified.    Progress towards therapy goal(s). See goals on Care Plan in [facility] electronic health record for goal details.  Goals met    Therapy recommendation(s):    Continued therapy is recommended.  Rationale/Recommendations:  Recommend  PT for home safety evaluation and progression of IND with functional mobility.

## 2022-05-17 NOTE — DISCHARGE SUMMARY
[facility]  Discharge Summary    Panda Miranda MRN# [id number]   YOB: 1955 Age: 66 year old     Date of Admission:  4/15/2022  Date of Discharge:  5/17/2022  Admitting Physician:  Omer Gilmore MD  Discharge Physician:  David Donovan MD  Discharging Service:  Internal Medicine     Primary Provider: Ben Hamlin              Discharge Diagnosis:     Primary Discharge Diagnosis:    Chronic HFrEF with acute exacerbation,   TTE 1/29/22 revealed EF 45-50%   Anasarca  Known perimembranous VSD (small) with left to right shunting  Hypertension    Permanent atrial fibrillation  TASHA, ? OHS   SHANTE superimposed on CKD IV  Chronic venous stasis ulcers  Gout  Morbid obesity w/ BMI 50.2 kg/m2, H/o gastroplasty vertical banded by Dr. Arizmendi at [facility] in [address]  History of colon cancer, S/p hemicolectomy in 2011  Anemia of chronic disease, pancytopenia   Generalized weakness, debility, deconditioning  Right lateral heel pressure injury, present prior to admit.         Past Medical History:   Diagnosis Date     (HFpEF) heart failure with preserved ejection fraction (H)      Arthritis      Arthropathy of wrist      Atrial fibrillation (H)      Bradycardia      Cancer (H) 2011    colon cancer; hemicolectomy     CHF (congestive heart failure) (H)      Chronic kidney disease      Gout      Hand swelling      Heart valve disease     intermittent mitral regurgitation     HTN (hypertension)      Hyperlipidemia      Morbid obesity (H)      Obesity      TASHA (obstructive sleep apnea)     CPAP     Perimembranous ventricular septal defect                     Discharge Medications:     Discharge Medication List as of 5/17/2022  1:00 PM      START taking these medications    Details   bumetanide (BUMEX) 0.5 MG tablet Take 5 tablets (2.5 mg) by mouth 2 times daily, Disp-300 tablet, R-0, E-Prescribe      carboxymethylcellulose PF (REFRESH LIQUIGEL) 1 % ophthalmic gel Place 1 drop into both eyes nightly  as needed for dry eyes, Disp-1 each, R-0, E-Prescribe      carboxymethylcellulose PF (REFRESH PLUS) 0.5 % ophthalmic solution Place 1 drop into both eyes every hour as needed for dry eyes, Disp-1 each, R-0, E-Prescribe      magnesium chloride 535 (64 Mg) MG TBEC CR tablet Take 1 tablet (535 mg) by mouth daily, Disp-30 tablet, R-0, E-Prescribe      miconazole (MICATIN) 2 % external powder Apply topically 2 times dailyDisp-90 g, [id number]      polyethylene glycol (MIRALAX) 17 GM/Dose powder Take 17 g by mouth daily as needed for constipation, Disp-510 g, R-0, E-Prescribe         CONTINUE these medications which have CHANGED    Details   acetaminophen (TYLENOL) 325 MG tablet Take 2 tablets (650 mg) by mouth every 6 hours as needed for mild pain or other (and adjunct with moderate or severe pain or per patient request), Disp-30 tablet, R-0, E-Prescribe      allopurinol (ZYLOPRIM) 300 MG tablet Take 1 tablet (300 mg) by mouth daily, Disp-30 tablet, R-0, E-Prescribe      colchicine (COLCYRS) 0.6 MG tablet Take 1 tablet (0.6 mg) by mouth daily, Disp-30 tablet, R-0, E-Prescribe      famotidine (PEPCID) 20 MG tablet Take 1 tablet (20 mg) by mouth 2 times daily as needed (heartburn), Disp-60 tablet, R-0, E-Prescribe      methocarbamol (ROBAXIN) 500 MG tablet Take 1 tablet (500 mg) by mouth 3 times daily as needed for muscle spasms, Disp-30 tablet, R-0, E-Prescribe      metoprolol succinate ER (TOPROL XL) 25 MG 24 hr tablet Take 0.5 tablets (12.5 mg) by mouth daily, Disp-15 tablet, R-0, E-Prescribe      sodium bicarbonate 650 MG tablet Take 2 tablets (1,300 mg) by mouth daily, Disp-30 tablet, R-0, E-Prescribe      warfarin ANTICOAGULANT (COUMADIN) 3 MG tablet Take 1 tablet (3 mg) by mouth daily Adjust dose based on INR, Disp-30 tablet, R-0, E-Prescribe         CONTINUE these medications which have NOT CHANGED    Details   melatonin 1 MG TABS tablet Take 1 tablet (1 mg) by mouth nightly as needed for sleep, No Print Out          STOP taking these medications       ferrous sulfate (FEROSUL) 325 (65 Fe) MG tablet Comments:   Reason for Stopping:         oxyCODONE (ROXICODONE) 5 MG tablet Comments:   Reason for Stopping:                    Hospital Course:     Panda Miranda is a 61 yo gentleman w/ h/o chronic HFrEF (EF 45-50% on 1/29/22), small perimembranous VSD w/ left to right shunt, HTN, CKD4 w/ baseline cr ~ 1.8 - 2.1, chronic atrial fibrillation, gout, pseudogout, morbid obesity s/p bariatric surgery and numerous other medical conditions.  He was hospitalized at [facility] from 1/27 - 1/30/22 for sepsis 2/2 complicated UTI vs. pyelonephritis.  He was hospitalized at [facility] from 2/11 - 2/20/22 for hyperkalemia, SHANTE and acute gout flare.  He was then hospitalized at [facility] 2/20 - 3/18/22 with weakness, unable to walk up the steps in his home.  He was later found to have recurrent hyperkalemia.  During [facility] hospital stay, patient was also treated for acute polyarticular gout.  He completed prednisone taper during [facility] hospital stay and was transitioned from uloric to allopurinol.  He had pancytopenia during [facility] hospital stay that resolved prior to discharge.  Transferred to [facility] on 3/18/22.  Unfortunately patient's [facility] stay was complicated by anasarca (worsening fluid retention, significant weight gain and patient inability to participate in therapy).  Transfer to [facility] on 4/15/22 for treatment of anasarca with IV Bumex.     Chronic HFrEF with acute exacerbation,   TTE 1/29/22 revealed EF 45-50%   Anasarca  ---   He was admited for bumex drip on which he lost a lot of fluid weight and has gotten much better. Now IV bumex has been changed to po bumex which has been titrated.   ---   Change oral Bumex to 2.5 mg BID. SP one time dose of zaroxolyn 2.5 mg once on 05/10/22.  On Admit weight was 462 lb, at discharge weight was 412 lb. He is doing well on this dose. Creat is stable at 2.6  ---   Follow-up  Daily weight, its trending down  ---   Not a candidate for ACE inhibitor or ARB due to underlying CKD4 with baseline creatinine ~ 1.8-2.1  ---   Has not gotten metoprolol for days, cut back on the dose to 12.5 mg every day with hold for SBP less than 95  ---   Stop fentanyl patch  ---   Follow-up with PCP in 3 days with [facility]  ---   Educated on weighing himself daily  ---   Ct Bl leg wraps     Known perimembranous VSD (small) with left to right shunting  ---   Follow up with cardiology as out patient       Hypertension      ---   Continue metoprolol and bumex  as above     Permanent atrial fibrillation  ---   HR controlled w/ PTA Metoprolol  ---   On chronic anticoagulating with coumadin, INR therapeutic at 2.67     TASHA, ? OHS   ---   Continue CPAP during sleep     SHANTE superimposed on CKD IV  ---   Baseline creatinine ~1.8-2.1. Creat has gone up to 2.6 and its now stable. Likely new baseline.   ---   Avoid nephrotoxins other than Bumex. Encourage to drink fluids.      Chronic venous stasis ulcers  ---   Care per WOCN.      Gout  ---   Continue colchicine and allopurinol.     Morbid obesity w/ BMI 50.2 kg/m2:  ---   H/o gastroplasty vertical banded by Dr. Arizmendi at [facility] in [address]  ---   He is 427 pounds today, continued to lose wt.  ---   Weight loss strongly recommended  ---   Follow-up with bariatric clinic after d/c.     History of colon cancer  ---   S/p hemicolectomy in 2011  ---   Further surveillance as outpatient.     Anemia of chronic disease, pancytopenia   ---   Was seen by enma during one of his recent hospitalization  ---   Pancytopenia was felty to be due to bone marrow suppression from acute illness and medications,    ---   Iron, B12 or folate levels were wnl  ---   WBC and Plt counts back to normal limits   ---   Hg stable at 10 g     Generalized weakness, debility, deconditioning  ---   Continue PT/OT.   ---   Patient continued to get stronger, now able to transfer on his own and ambulate up to  100 feet without any difficulties.  He is able to go up 8 steps.  ---   He is hoping to discharge to home     Right lateral heel pressure injury, present prior to admit.   ---   Continue wound care per WOCN rec.         Diet: Combination Diet Renal Diet (non-dialysis); Low Saturated Fat Na <2400mg Diet  Snacks/Supplements Adult: Other; Rotate cherry or fruit punch Gelatein BID at lunch and dinner meals - do not count towards diet orders; Between Meals  Fluid restriction 1500 ML FLUID    DVT Prophylaxis: Warfarin and Ambulate every shift  Cheema Catheter: Not present  Central Lines: None  Cardiac Monitoring: None  Code Status: Full Code           Discharge Disposition:   Discharged to home           Condition on Discharge:   Discharge condition: Stable   Code status on discharge: Full Code           Procedures:   none          Consultations:   Consultation during this admission received from Lymphedema, Pt, OT.               Final Day of Progress before Discharge:       Physical Exam:  Blood pressure 96/53, pulse 84, temperature 97.3  F (36.3  C), temperature source Oral, resp. rate 16, weight (!) 187.1 kg (412 lb 8 oz), SpO2 99 %.  GENERAL: Alert and oriented x 3. NAD.   HEENT: Anicteric sclera. PERRL. Mucous membranes moist and without lesions.   CV: RRR. S1, S2. No murmurs appreciated.   RESPIRATORY: Effort normal. Lungs CTAB with no wheezing, rales, rhonchi.   GI: Abdomen soft and non distended with normoactive bowel sounds present in all quadrants. No tenderness, rebound, guarding.      Data:  All laboratory data reviewed             Significant Results:     Results for orders placed or performed during the hospital encounter of 04/15/22 (from the past 24 hour(s))   INR   Result Value Ref Range    INR 2.59 (H) 0.85 - 1.15   Magnesium   Result Value Ref Range    Magnesium 2.2 1.6 - 2.3 mg/dL   Basic metabolic panel   Result Value Ref Range    Sodium 135 133 - 144 mmol/L    Potassium 3.8 3.4 - 5.3 mmol/L     Chloride 98 94 - 109 mmol/L    Carbon Dioxide (CO2) 31 20 - 32 mmol/L    Anion Gap 6 3 - 14 mmol/L    Urea Nitrogen 81 (H) 7 - 30 mg/dL    Creatinine 2.63 (H) 0.66 - 1.25 mg/dL    Calcium 9.9 8.5 - 10.1 mg/dL    Glucose 110 (H) 70 - 99 mg/dL    GFR Estimate 26 (L) >60 mL/min/1.73m2   Extra Tube    Narrative    The following orders were created for panel order Extra Tube.  Procedure                               Abnormality         Status                     ---------                               -----------         ------                     Extra Purple Top Tube[[id number]]                            Final result                 Please view results for these tests on the individual orders.   Extra Purple Top Tube   Result Value Ref Range    Hold Specimen JIC    Phosphorus   Result Value Ref Range    Phosphorus 3.2 2.5 - 4.5 mg/dL      No results found for this or any previous visit (from the past 48 hour(s)).             Pending Results:   Unresulted Labs Ordered in the Past 30 Days of this Admission     No orders found from 3/16/2022 to 4/16/2022.                  Discharge Instructions and Follow-Up:     Discharge Procedure Orders   CBC with platelets   Standing Status: Future Standing Exp. Date: 05/17/23   Order Comments: CBC  on coming thursday     INR   Standing Status: Future Standing Exp. Date: 05/17/23   Order Comments: INR on coming thursday     Basic metabolic panel  (Ca, Cl, CO2, Creat, Gluc, K, Na, BUN)   Standing Status: Future Standing Exp. Date: 05/17/23   Order Comments: BMP in  on coming thursday     Home Care Referral   Referral Priority: Routine: Next available opening Referral Type: Home Health Therapies & Aides   Number of Visits Requested: 1     Reason for your hospital stay   Order Comments: Admitted for acute decompensation of CHF     Activity   Order Comments: Your activity upon discharge: activity as tolerated     Order Specific Question Answer Comments   Is discharge order? Yes      Adult  [facility] Follow-up and recommended labs and tests   Order Comments: Follow up with primary care provider, Ben Hamlin, within 7 days for hospital follow- up.  The following labs/tests are recommended: BMP and Mg  and INR in 3 days.      Appointments on [place] and/or [facility] (with [facility] or [facility] provider or service). Call [phone] if you haven't heard regarding these appointments within 7 days of discharge.     Wheelchair   Order Comments: Wheelchair Documentation:   Describe the reason for need to support medical necessity: : Patient is a 61 y/o man who has multiple medical problems including gout, pseudogout, atrial fibrillation, morbid obesity s/p past bariatric surgery and chronic kidney disease stage IIIb. Patient was hospitalized at [facility] from 27-Jan-2022 to 30-Jan-2022 for sepsis secondary to complicated urinary tract infection vs. pyelonephritis. Patient was hospitalized at [facility] from 11-Feb-2022 to 20-Feb-2022 for hyperkalemia, acute kidney injury and acute gout. Patient was discharged on 20-Feb-2022 and presented to [facility] with weakness - patient reportedly was unable to walk up the steps to his home. Patient was later found to have recurrent hyperkalemia. During hospital stay, patient was also treated for acute polyarticular gout. Patient completed a prednisone taper during hospital stay.   Currently has limited mobility due to significant weakness from prolonged hospitalization and combined TCU stay of 3 months as of May 11, 2022.  .   1. The patient has mobility limitations that impairs their ability to participate in one or more mobility related activities: yes  2. The patient's mobility limitations cannot be safely resolved by using a cane/walker: No  3. The patients home has adequate access to use a manual wheelchair: Yes  4. The use of a manual wheelchair on a regular basis will improve the patients ability to participate in mobility related ADL's at home:  Yes  5. The patient is willing to use a manual wheelchair at home: Yes  6. The patient has adequate upper body strength and the mental capability to safely use a manual wheelchair and/or has a caregiver that is able to assist: Yes  7. Does the patient have a lower extremity injury or edema?: Yes    Reason for Type of Wheelchair: Heavy Duty Wheelchair: Patient is over the 350lb weight limit for other wheelchairs.    **Use of a manual wheelchair will significantly improve the patient's ability to participate in MRADLs and the patient will use it on a regular basis in the home. The patient has not expressed an unwillingness to use the manual wheelchair that is provided in the home.**    I, the undersigned, certify that the above prescribed supplies are medically necessary for this patient and is both reasonable and necessary in reference to accepted standards of medical and necessary in reference to accepted standards of medical practice in the treatment of this patient's condition and is not prescribed as a convenience.     Order Specific Question Answer Comments   Wheelchair Type: Extra-Heavy Duty (300+ lbs)    Length of Need: Lifetime    Leg Rests: Standard    Arm Rests: Standard    The face to face evaluation was performed on: 5/9/2022      Commode   Order Comments: DME Documentation:   Describe the reason for need to support medical necessity:: Patient is a 61 y/o man who has multiple medical problems including gout, pseudogout, atrial fibrillation, morbid obesity s/p past bariatric surgery and chronic kidney disease stage IIIb. Patient was hospitalized at [facility] from 27-Jan-2022 to 30-Jan-2022 for sepsis secondary to complicated urinary tract infection vs. pyelonephritis. Patient was hospitalized at [facility] from 11-Feb-2022 to 20-Feb-2022 for hyperkalemia, acute kidney injury and acute gout. Patient was discharged on 20-Feb-2022 and presented to [facility] with weakness - patient reportedly was  unable to walk up the steps to his home. Patient was later found to have recurrent hyperkalemia. During hospital stay, patient was also treated for acute polyarticular gout. Patient completed a prednisone taper during hospital stay.   Currently has limited mobility due to significant weakness from prolonged hospitalization and combined TCU stay of 3 months as of May 11, 2022.  .     I, the undersigned, certify that the above prescribed supplies are medically necessary for this patient and is both reasonable and necessary in reference to accepted standards of medical and necessary in reference to accepted standards of medical practice in the treatment of this patient's condition and is not prescribed as a convenience.     Order Specific Question Answer Comments   DME Provider: [facility] [place]    Commode Type: Bariatric (300 +lbs)    Length of Need: Lifetime    Confined conditions applicable to this patient (check all that apply): Chair    Confined conditions applicable to this patient (check all that apply): Floor w/o bathroom    Reason for Need (check all that apply): Ambulation impaired    Reason for Need (check all that apply): There is need for greater stability and security than provided by a cane or crutches      Diet   Order Comments: Follow this diet upon discharge: Orders Placed This Encounter      Snacks/Supplements Adult: Other; Rotate cherry or fruit punch Gelatein BID at lunch and dinner meals - do not count towards diet orders; Between Meals      Fluid restriction 1200 ML FLUID      Combination Diet Renal Diet (non-dialysis); Low Saturated Fat Na <2400mg Diet     Order Specific Question Answer Comments   Is discharge order? Yes             David Donovan MD  Internal Medicine Staff Hospitalist  [phone]  May 17, 2022        Time spent on patient: 45 minutes total including face to face and coordinating care time reviewing current illness, any medication changes, and the care plan for  today.

## 2022-05-17 NOTE — PROGRESS NOTES
IMM form discussed with patient at bedside. Patient verbalized understanding and form signed. Signed form placed in his chart.    Jessika Ramírez RN, MSN  Casual RN Care Coordinator  [facility]  Phone: [phone]

## 2022-05-17 NOTE — PROGRESS NOTES
BP 96/53 (BP Location: Left arm)   Pulse 84   Temp 97.3  F (36.3  C) (Oral)   Resp 16   Wt (!) 187.1 kg (412 lb 8 oz)   SpO2 99%   BMI (P) 47.67 kg/m       Denies pain.  A/Ox4. 1200mL FR and good appetite - 100% bkfst.  Noted labs.    Prep for discharge to home today.  Last bm 5/16, [place].  SBA out of bed and into w/c - propels self in w/c,  Education provided for anticipated discharge to home.  Drsg changes completed as ordered - lots of flaking dry skin. Legs 1+ edema     Discharge orders are in, but still waiting for hospitalist to complete med rec and other task items.  At this time, patient is medically stable.

## 2022-05-17 NOTE — PLAN OF CARE
Goal Outcome Evaluation:    Plan of Care Reviewed With: patient     Overall Patient Progress: improving    Outcome Evaluation: Medically stable, deemed ready to transition to home w/ HC services.    BP 96/53 (BP Location: Left arm)   Pulse 84   Temp 97.3  F (36.3  C) (Oral)   Resp 16   Wt (!) 187.1 kg (412 lb 8 oz)   SpO2 99%   BMI (P) 47.67 kg/m      Denies pain.    DISCHARGE SUMMARY    Pt discharging to: home w/ home care services  Transportation: family  AVS given and discussed: yes and acknowledged understanding  Stoplight Tool given and discussed:   Medications given:    Belongings returned: all retained and sent w/ patient home.  Comments: Supplies remaining in room sent w/ patient.  Per wife, delivery of supplies already rec'd for drsg changes.

## 2022-05-17 NOTE — PROGRESS NOTES
Care Management Discharge Note    Discharge Date: 05/17/2022     Discharge Disposition:  Home with home care RN/PT    Discharge Services:  RN/PT    Discharge DME:  Bariatric wheelchair and commode. Shower chair and walker.     Discharge Transportation: wife will provide    Patient/Family in Agreement with the Plan:  yes    Handoff Referral Completed: Yes    Additional Information:  [facility] notified of discharge. Discharge orders faxed to [phone].  Handoff completed. Wife will provide transport.    Jessika Ramírez RN, MSN  Casual RN Care Coordinator  [facility]  Phone: [phone]

## 2022-05-20 NOTE — TELEPHONE ENCOUNTER
Reason for Call:  Home Health Care    Jessy with [facility] called regarding (reason for call): Verbal Orders     Orders are needed for this patient. PT, OT AND Skill Nursing     PT: to evaluate and treat     OT: to evaluate and treat     Skilled Nursin times q week for 5 weeks for infection control, teaching ,health monitoring and woundcare    Pt Provider: Dr. Hamlin     Phone Number Homecare Nurse can be reached at: [phone]    Can we leave a detailed message on this number? YES    Phone number patient can be reached at: Home number on file [phone] (home)    Best Time: anytime    Call taken on 2022 at 9:48 AM by Edilberto Herring

## 2022-05-24 NOTE — PROGRESS NOTES
Clinic Care Coordination Contact  [facility]/Voicemail       Clinical Data: Care Coordinator Outreach    Background: Care Coordination has been peripherally following patient within standard work after a hospitalization 2/20-3/18/22 and discharged to TCU.  Patient was admitted back to hospital from TCU on 4/15/22 and discharged home on 5/17/22. Patient discharged with referral to [facility] for skilled home care services as well as DME: Bariatric wheelchair, commode, shower chair and walker per inpatient care management notes. Also noted patient has engaged family support.    Outreach attempted x 1.  Left message on patient's voicemail with call back information and requested return call if patient has any outstanding concerns or questions following recent hospital discharge. Of note, patient has open outreach to his PCP clinic care team to secure a PCP follow up appointment.    Plan: Care Coordinator will try to reach patient again in 1-2 business days.

## 2022-05-25 NOTE — PROGRESS NOTES
Clinic Care Coordination Contact  [facility]/Voicemail- TCM outreach       Clinical Data: Care Coordinator Outreach  See initial note below for further details    Outreach attempted x 2.  Left message on patient's voicemail with call back information to PCP clinic as well as 24-7 [facility] central line for scheduling or to speak with a nurse advisor.    Plan:  Care Coordinator will do no further outreaches at this time.    JANA CaballeroN, RN   [facility]  - Ambulatory Care Management

## 2022-05-27 NOTE — TELEPHONE ENCOUNTER
ANTICOAGULATION     Panda Miranda is overdue for INR check.     Spoke to patient, he had INR done on 5/23 and never heard anything back.    I addressed result:    ANTICOAGULATION MANAGEMENT     Panda Miranda 66 year old male is on warfarin with therapeutic INR result. (Goal INR )      Recent labs: (last 7 days)     05/23/22  1135   INR 2.19*       ASSESSMENT       Source(s): Chart Review and Patient/Caregiver Call       Warfarin doses taken: Warfarin taken as instructed    Diet: No new diet changes identified    New illness, injury, or hospitalization: No    Medication/supplement changes: None noted    Signs or symptoms of bleeding or clotting: No    Previous INR: Therapeutic last 2(+) visits    Additional findings: None       PLAN     Recommended plan for no diet, medication or health factor changes affecting INR     Dosing Instructions: continue your current warfarin dose with next INR in 3 weeks       Summary  As of 5/27/2022    Full warfarin instructions:  3 mg every day   Next INR check:  6/13/2022             Telephone call with Panda who verbalizes understanding and agrees to plan    Patient offered & declined to schedule next visit    Education provided: Please call back if any changes to your diet, medications or how you've been taking warfarin    Plan made per [facility] anticoagulation protocol    Radha Jimenez RN  Anticoagulation Clinic  5/27/2022    _______________________________________________________________________     Anticoagulation Episode Summary     Current INR goal:  2.0-3.0   TTR:  98.7 % (6.7 mo)   Target end date:  Indefinite   Send INR reminders to:  [facility]    Indications    Atrial fibrillation  unspecified type (H) [I48.91]           Comments:           Anticoagulation Care Providers     Provider Role Specialty Phone number    Ben Hamlin MD Referring Family Medicine [phone]

## 2022-06-01 PROBLEM — E74.39 GLUCOSE INTOLERANCE: Status: ACTIVE | Noted: 2022-01-01

## 2022-06-01 PROBLEM — H54.7 DECREASED VISUAL ACUITY: Status: ACTIVE | Noted: 2019-11-20

## 2022-06-01 PROBLEM — N25.81 SECONDARY RENAL HYPERPARATHYROIDISM (H): Status: ACTIVE | Noted: 2021-05-26

## 2022-06-01 PROBLEM — M72.2 PLANTAR FASCIITIS: Status: ACTIVE | Noted: 2019-04-16

## 2022-06-01 PROBLEM — L97.521 SKIN ULCER OF LEFT FOOT, LIMITED TO BREAKDOWN OF SKIN (H): Status: ACTIVE | Noted: 2019-05-03

## 2022-06-01 PROBLEM — R06.09 DOE (DYSPNEA ON EXERTION): Status: ACTIVE | Noted: 2021-05-26

## 2022-06-01 PROBLEM — I27.81 COR PULMONALE (H): Status: ACTIVE | Noted: 2018-01-18

## 2022-06-01 PROBLEM — E83.59 OTHER DISORDER OF CALCIUM METABOLISM: Status: ACTIVE | Noted: 2022-01-01

## 2022-06-02 PROBLEM — D57.00 SICKLE CELL CRISIS (H): Status: RESOLVED | Noted: 2022-01-27 | Resolved: 2022-01-01

## 2022-06-02 NOTE — PROGRESS NOTES
Assessment/ Plan  1. Hospital discharge follow-up  All medications reconciled    2. Need for hepatitis C screening test    - Hepatitis C Screen Reflex to HCV RNA Quant and Genotype    3. CKD (chronic kidney disease) stage 4, GFR 15-29 ml/min (H)  Stressed importance of following up with nephrology.  Will make referral.  New higher baseline of chronic kidney disease  - Basic metabolic panel  (Ca, Cl, CO2, Creat, Gluc, K, Na, BUN)  - CBC with platelets    4. Calcium pyrophosphate deposition disease      5. Diastolic congestive heart failure, unspecified HF chronicity (H)  Euvolemic, weight is down.  Follow-up with cardiology    - TSH WITH FREE T4 REFLEX    6. Malignant neoplasm of sigmoid colon (H)  Patient referred last year back to colorectal surgery    7. Secondary renal hyperparathyroidism (H)  Per nephrology    8. Chronic diastolic congestive heart failure (H)  Euvolemic, per cardiology    9. Post-traumatic osteoarthritis of right knee  - methylPREDNISolone (DEPO-MEDROL) injection 40 mg  - methylPREDNISolone (DEPO-MEDROL) injection 40 mg  - lidocaine 1 % 2 mL      Body mass index is 48.19 kg/m  (pended).    Subjective  CC:  chief complaint  HPI:  Patient hospitalized [facility] 4/15 to 5/17/2022.  Reason was diastolic congestive heart failure, resistance to diuresis.  Anasarca.  Admitted for Bumex drip.    Has known chronic kidney disease.  Creatinine was elevated over baseline at around 2.6.    Discharge medications include Bumex 2.5 mg twice daily, magnesium, MiraLAX, as well as allopurinol, colchicine, Pepcid, metoprolol ER 25, warfarin     He was diuresed successfully converted to oral medications.  Dose of metoprolol was adjusted, he was to stop fentanyl patch, continue warfarin, follow-up with cardiology and with renal.    Patient reports that he is feeling well, has been losing weight by eating better.  Indicates that his legs have gotten a lot better.  Had a number of  sores that healed up during hospital stay.  Has not been using his wraps.  Now notes a new sore again on his left lower leg.  Agrees that he needs to be wrapping as he was prior to hospitalization.    No follow-up plans with nephrology or cardiology at this point.  Encouraged him to do this.      Patient complains of severe right knee pain.  Had football injury in that knee as well as gout and pseudogout.  Has seen surgery for this and they indicated they would not have to do a total knee replacement unless he is able to lose a lot of weight.  Patient Active Problem List   Diagnosis     Morbid obesity (H)     Arthritis of knee     Calcium pyrophosphate deposition disease     Chronic venous hypertension (idiopathic) with ulcer of left lower extremity (H)     Diastolic congestive heart failure (H)     Essential hypertension     Malignant neoplasm of sigmoid colon (H)     Non-rheumatic mitral regurgitation     Urge incontinence of urine     Venous stasis     Vitamin D deficiency     Ventricular septal defect     Status post bariatric surgery     Atrial fibrillation, unspecified type (H)     Healthcare maintenance     Venous stasis ulcers of both lower extremities (H)-right shin and left lateral ankle     Hyperglycemia     Sickle cell crisis (H)     Hyperkalemia     Back pain     Urinary tract infection     Hypotension     Intractable low back pain     Anemia of chronic renal failure, stage 4 (severe) (H)     Gout, unspecified cause, unspecified chronicity, unspecified site     Muscle weakness (generalized)     Polyarticular gout     History of 2019 novel coronavirus disease (COVID-19)-Jan 2022, now recovered     Chronic systolic congestive heart failure (H)     Chronic atrial fibrillation (H)     TASHA (obstructive sleep apnea)-only uses CPAP when weight is over 450     Gout attack     CKD (chronic kidney disease) stage 4, GFR 15-29 ml/min (H)     Acute kidney injury (H)-possible     Generalized muscle weakness     Renal  failure, unspecified chronicity     Aches     Generalized weakness     Anemia     Compliance with medication regimen     Cor pulmonale (H)     Decreased visual acuity     MACKEY (dyspnea on exertion)     Glucose intolerance     Hematuria     Onychomycosis     Osteoarthritis of both knees     Other disorder of calcium metabolism     Plantar fasciitis     Secondary renal hyperparathyroidism (H)     Skin ulcer of left foot, limited to breakdown of skin (H)     Current medications reviewed as follows:  allopurinol (ZYLOPRIM) 300 MG tablet, Take 1 tablet (300 mg) by mouth daily  bumetanide (BUMEX) 0.5 MG tablet, Take 5 tablets (2.5 mg) by mouth 2 times daily  carboxymethylcellulose PF (REFRESH LIQUIGEL) 1 % ophthalmic gel, Place 1 drop into both eyes nightly as needed for dry eyes  carboxymethylcellulose PF (REFRESH PLUS) 0.5 % ophthalmic solution, Place 1 drop into both eyes every hour as needed for dry eyes  colchicine (COLCYRS) 0.6 MG tablet, Take 1 tablet (0.6 mg) by mouth daily  famotidine (PEPCID) 20 MG tablet, Take 1 tablet (20 mg) by mouth 2 times daily as needed (heartburn)  febuxostat (ULORIC) 80 MG TABS tablet,   magnesium chloride 535 (64 Mg) MG TBEC CR tablet, Take 1 tablet (535 mg) by mouth daily  melatonin 1 MG TABS tablet, Take 1 tablet (1 mg) by mouth nightly as needed for sleep  methocarbamol (ROBAXIN) 500 MG tablet, Take 1 tablet (500 mg) by mouth 3 times daily as needed for muscle spasms  metoprolol succinate ER (TOPROL XL) 25 MG 24 hr tablet, Take 0.5 tablets (12.5 mg) by mouth daily  metoprolol tartrate (LOPRESSOR) 100 MG tablet,   polyethylene glycol (MIRALAX) 17 GM/Dose powder, Take 17 g by mouth daily as needed for constipation  warfarin ANTICOAGULANT (COUMADIN) 3 MG tablet, Take 1 tablet (3 mg) by mouth daily Adjust dose based on INR  acetaminophen (TYLENOL) 325 MG tablet, Take 2 tablets (650 mg) by mouth every 6 hours as needed for mild pain or other (and adjunct with moderate or severe pain or  per patient request) (Patient not taking: Reported on 6/2/2022)  miconazole (MICATIN) 2 % external powder, Apply topically 2 times daily (Patient not taking: Reported on 6/2/2022)  sodium bicarbonate 650 MG tablet, Take 2 tablets (1,300 mg) by mouth daily (Patient not taking: Reported on 6/2/2022)    No current facility-administered medications on file prior to visit.     History   Smoking Status     Never Smoker   Smokeless Tobacco     Never Used     Social History     Social History Narrative     Not on file     Patient Care Team:  Ben Hamlin MD as PCP - General (Family Medicine)  Ben Hamlin MD as Assigned PCP  Cynthia Argueta MD as Assigned Heart and Vascular Provider  Xin Brush NP as Assigned Surgical Provider  Bloch, Lauren Turner, RPH as Assigned [place] Pharmacist  ROS  As above      Objective  Physical Exam  Vitals:    06/02/22 1106   BP: 137/82   BP Location: Left arm   Patient Position: Sitting   Cuff Size: Adult Large   Pulse: 113   Resp: 18   Temp: 97.6  F (36.4  C)   TempSrc: Temporal   SpO2: 99%   Weight: (!) 189.1 kg (417 lb)     Obese looking better.  Chest clear to auscultation with decreased breath sounds  Irregular heart rhythm.  No elevated JVP.  He is in no distress    Right knee is not markedly swollen, atrophic degenerative change, no significant effusion        Knee Injection Procedure Note    Pre-operative Diagnosis: right,  Knee osteoarthritis    Post-operative Diagnosis: same    Indications: pain      Procedure Details   Verbal consent was obtained for procedure.  Area was prepped with alcohol.  Landmarks were palpated and 27-gauge 1-1/4 needle advanced from medial approach under the patella into the joint space.  Medications then injected in typical fashion.  Medications injected:  Depo-medrol 40/ 2 cc  Lidocaine 2 cc    Complications:  None; patient tolerated the procedure well.  Diagnostics  none    Please note: Voice recognition software was used in this dictation.  It may  therefore contain typographical errors.

## 2022-06-17 NOTE — TELEPHONE ENCOUNTER
Reason for Call:  Medication or medication refill:    Do you use a [facility]?  Name of the pharmacy and phone number for the current request:  [facility]     Name of the medication requested:  metoprolol succinate ER (TOPROL XL) 25 MG 24 hr tablet  warfarin ANTICOAGULANT (COUMADIN) 3 MG tablet  bumetanide (BUMEX) 0.5 MG tablet  colchicine (COLCYRS) 0.6 MG tablet  allopurinol (ZYLOPRIM) 300 MG tablet    Other request: Pt requesting medications refills a these 5 meds. Per pt, he is running out of meds and in need of meds. Please look into request and order med if applicable. Thank you.     Can we leave a detailed message on this number? YES    Phone number patient can be reached at: Cell number on file:    Telephone Information:   Mobile [phone]       Best Time: anytime     Call taken on 6/17/2022 at 11:50 AM by Edilberto Herring

## 2022-06-20 NOTE — TELEPHONE ENCOUNTER
Patient called to check on the status of this refill request. Patient is out of medications. Please send refills to pharmacy.    **Routing to DOD, PCP out of office**

## 2022-06-20 NOTE — TELEPHONE ENCOUNTER
ANTICOAGULATION     Panda Miranda is overdue for INR check.      Was unable to reach patient and was unable to leave a voicemail. If patient calls, please schedule INR check as soon as possible. letter sent    Patricia Acosta RN

## 2022-06-20 NOTE — LETTER
June 21, 2022      Panda Miranda  [address]  [place] [facility] [address]            Dear Panda,    You are currently under the care of [facility] for your warfarin (Coumadin , Jantoven ) therapy.  We are contacting you because our records show you were due for an INR on 6/7/22.    There are potentially serious risks when taking warfarin without careful monitoring and we want to make sure you are safely managed.  Routine lab monitoring is required for warfarin refills.     Please call [phone] as soon as possible to schedule an appointment.  If there has been a change in your care or other concerns, please let us know so we can help and or update our records.     Sincerely,       [facility]

## 2022-06-22 NOTE — ED TRIAGE NOTES
BIBA from home with worsening chronic back spasms over the past week. Rates 10/10. Has not tried any home remedies. Pt was able to walk from home chair to stretcher for EMS. Pt's wife states he hasn't gotten any of his meds filled in weeks, including coumadin. VSS en route.

## 2022-06-23 NOTE — TELEPHONE ENCOUNTER
ANTICOAGULATION  MANAGEMENT: Discharge Review    Panda Miranda chart reviewed for anticoagulation continuity of care    Emergency room visit on 66/22 sacroiliac pain .    Discharge disposition: Home    Results:    No results for input(s): INR, [id number], F2, ALMWH, AAUFH in the last 168 hours.  Anticoagulation inpatient management:     not applicable     Anticoagulation discharge instructions:     Warfarin dosing: home regimen continued   Bridging: No   INR goal change: No      Medication changes affecting anticoagulation: No    Additional factors affecting anticoagulation: No     PLAN     No adjustment to anticoagulation plan needed    Spoke with Panda scheduled inr for today    No adjustment to Anticoagulation Calendar was required    Patricia Acosta RN

## 2022-06-23 NOTE — DISCHARGE INSTRUCTIONS
TODAY'S VISIT:  You were seen today for lower back pain    Please follow up with your PCP about your back and chronic issues.    FOLLOW-UP:  Please make an appointment to follow up with:  - Your Primary Care Provider within a week.      PRESCRIPTIONS / MEDICATIONS:  - Lidocaine patches    OTHER INSTRUCTIONS:  - Please use your Robaxin that was previously prescribed for your back pain.    RETURN TO THE EMERGENCY DEPARTMENT  Return to the Emergency Department at any time for any new or worsening symptoms or any concerns.

## 2022-06-23 NOTE — ED PROVIDER NOTES
ED Provider Note  [facility]      History     Chief Complaint   Patient presents with     Back Pain     HPI  Panda Miranda is a 66 year old male on Coumadin with history of colon cancer, atrial fibrillation, Stage 4 CKD, CHF, heart valve disease, hypertension, hyperlipidemia, and morbid obesity who presents with complaints of bilateral lower back pain.    He reports pain began earlier today, he denies radiation into his legs. Denies saddle anaesthesia, loss of bowel or bladder. Denies weakness in legs. He reports he has felt well recently, denies fevers, chills, night sweats, abdominal pain, nausea, vomiting. He denies any new or worsening leg swelling, denies chest pain, shortness of breath.    He reports he was able to fill all his medications earlier today, including his coumadin, anti-hypertensive medications, and diuretics.    Past Medical History  Past Medical History:   Diagnosis Date     (HFpEF) heart failure with preserved ejection fraction (H)      Arthritis      Arthropathy of wrist      Atrial fibrillation (H)      Bradycardia      Cancer (H) 2011    colon cancer; hemicolectomy     CHF (congestive heart failure) (H)      Chronic kidney disease      Gout      Hand swelling      Heart valve disease     intermittent mitral regurgitation     HTN (hypertension)      Hyperlipidemia      Morbid obesity (H)      Obesity      TASHA (obstructive sleep apnea)     CPAP     Perimembranous ventricular septal defect      Past Surgical History:   Procedure Laterality Date     COLON SURGERY       COLONOSCOPY N/A 12/12/2019    Procedure: COLONOSCOPY;  Surgeon: Flaco Magaña MD;  [place] [facility] [place]  Service: General     GASTROPLASTY VERTICAL BANDED      Dr. Arizmendi [facility] 1996 Initial weight 800++ Lost to 440- (was 320# at lowest)     KNEE SURGERY       ZZC PART REMOVAL COLON W ANASTOMOSIS      Description: Partial Colectomy;  Recorded: 12/02/2011;  Comments: Dr Magaña      lidocaine (LIDODERM) 5 % patch  acetaminophen (TYLENOL) 325 MG tablet  allopurinol (ZYLOPRIM) 300 MG tablet  bumetanide (BUMEX) 0.5 MG tablet  carboxymethylcellulose PF (REFRESH LIQUIGEL) 1 % ophthalmic gel  carboxymethylcellulose PF (REFRESH PLUS) 0.5 % ophthalmic solution  colchicine (COLCYRS) 0.6 MG tablet  febuxostat (ULORIC) 80 MG TABS tablet  melatonin 1 MG TABS tablet  metoprolol succinate ER (TOPROL XL) 25 MG 24 hr tablet  metoprolol tartrate (LOPRESSOR) 100 MG tablet  miconazole (MICATIN) 2 % external powder  polyethylene glycol (MIRALAX) 17 GM/Dose powder  sodium bicarbonate 650 MG tablet  warfarin ANTICOAGULANT (COUMADIN) 3 MG tablet      No Known Allergies  Family History  Family History   Problem Relation Age of Onset     Diabetes Type 2  Other      Heart Failure Other      Heart Disease Mother      Hypertension Mother      Diabetes Sister      Social History   Social History     Tobacco Use     Smoking status: Never Smoker     Smokeless tobacco: Never Used   Substance Use Topics     Alcohol use: Yes     Alcohol/week: 0.8 standard drinks     Comment: Alcoholic Drinks/day: occasional     Drug use: No      Past medical history, past surgical history, medications, allergies, family history, and social history were reviewed with the patient. No additional pertinent items.       Review of Systems   Constitutional: Negative for chills and fever.   HENT: Negative for sore throat.    Respiratory: Negative for cough, shortness of breath and wheezing.    Cardiovascular: Negative for chest pain and palpitations.   Gastrointestinal: Negative for abdominal pain, nausea and vomiting.   Genitourinary: Negative for dysuria and frequency.   Musculoskeletal: Positive for back pain.   Neurological: Negative for weakness, numbness and headaches.     A complete review of systems was performed with pertinent positives and negatives noted in the HPI, and all other systems negative.    Physical Exam   BP: (!)  "146/84  Pulse: 110  Temp: 97.7  F (36.5  C)  Resp: 18  Height: 198.1 cm (6' 6\")  Weight: (!) 187.8 kg (414 lb)  SpO2: 100 %  Physical Exam  Vitals and nursing note reviewed.   Constitutional:       Appearance: He is obese.   HENT:      Head: Normocephalic.      Right Ear: External ear normal.      Left Ear: External ear normal.      Mouth/Throat:      Mouth: Mucous membranes are moist.   Eyes:      Conjunctiva/sclera: Conjunctivae normal.      Pupils: Pupils are equal, round, and reactive to light.   Cardiovascular:      Rate and Rhythm: Normal rate and regular rhythm.      Pulses: Normal pulses.           Dorsalis pedis pulses are 2+ on the right side and 2+ on the left side.        Posterior tibial pulses are 2+ on the right side and 2+ on the left side.      Heart sounds: Normal heart sounds.   Pulmonary:      Effort: Pulmonary effort is normal.      Breath sounds: Normal breath sounds.   Abdominal:      General: Bowel sounds are normal.      Palpations: Abdomen is soft.      Tenderness: There is no abdominal tenderness. There is no guarding.   Musculoskeletal:      Cervical back: Normal and normal range of motion.      Thoracic back: Normal.      Lumbar back: Normal.      Right hip: Tenderness present. No deformity. Normal strength.      Left hip: Tenderness present. No deformity. Normal strength.      Right lower leg: Edema present.      Left lower leg: Edema present.      Comments: Point tenderness to bilateral SI joints. Negative straight leg raise. Upon examination he reports pain has resolved. Patient able to stand and ambulate with assistance of cane. He reports he usually uses 4 point walker.    Skin:     General: Skin is warm.   Neurological:      Mental Status: He is alert and oriented to person, place, and time.         ED Course      Procedures          No results found for any visits on 06/22/22.  Medications - No data to display     Assessments & Plan (with Medical Decision Making)   Panda MALONE" Ruben is a 66 year old male on Coumadin with history of colon cancer, atrial fibrillation, Stage 4 CKD, CHF, heart valve disease, hypertension, hyperlipidemia, and morbid obesity who presents with complaints of bilateral lower back pain.    During patient assessment he states pain has resolved. He denies any recent concerns about his health such as chest pain, shortness of breath, fevers, chills, or night sweats. He reports he was able to fill all his regular medications earlier today and is working on making an appointment with his primary care provider.    On exam he has no weakness in his lower extremities, seated leg raise was negative. He denies any numbness or tingling in his lower extremities, and denies any saddle anaesthesia, loss of bowel or bladder. On exam he has no red flags on exam. His pain was reproducible with palpation of his SI joints, and his vital signs were stable on recheck. I do not believe he was having an aortic emergency as his pain had resolved with rest and his vital signs were stable. Also, I do not believe his pain was rheumatologic related at this time as he reports this pain has not been ongoing and developed in the afternoon. His abdominal exam was benign and he denies any abdominal pain so I do not believe his pain was abdominal related.    He already has Robaxin at home he can take, he also reports he has Lidoderm patches at home. Discussed sending him with a new prescription for the patches. Discussed he needs to follow up with his PCP for follow up on his chronic conditions as well as his back pain. He was able to ambulate in the department a short distance and states he usually uses a 4 point walker at home. Discussed red flag symptoms to monitor for if his back pain returns, that would require re-assessment.    He was discharged home in stable condition with instructions to follow up with his PCP.    I have reviewed the nursing notes. I have reviewed the findings, diagnosis,  plan and need for follow up with the patient.    New Prescriptions    LIDOCAINE (LIDODERM) 5 % PATCH    Place 1 patch onto the skin every 24 hours for 10 days       Final diagnoses:   Sacroiliac joint pain       --  AYAZ Sinclair CNP  [facility] EMERGENCY DEPARTMENT  6/22/2022     Joe Crump APRN CNP  06/22/22 2005

## 2022-06-23 NOTE — PROGRESS NOTES
ANTICOAGULATION MANAGEMENT     Panda Miranda 67 year old male is on warfarin with subtherapeutic INR result. (Goal INR 2.0-3.0)    Recent labs: (last 7 days)     06/23/22  1437   INR 1.4*       ASSESSMENT       Source(s): Chart Review and Patient/Caregiver Call       Warfarin doses taken: Missed dose(s) may be affecting INR ran out of med. Resumed tues 6/21    Diet: No new diet changes identified    New illness, injury, or hospitalization: yes ED for back pain, yesterday. Has lidocaine patch and will follow up with Dr Hamlin    Medication/supplement changes: None noted    Signs or symptoms of bleeding or clotting: No    Previous INR: Therapeutic last 2(+) visits    Additional findings: None       PLAN     Recommended plan for no diet, medication or health factor changes affecting INR     Dosing Instructions: continue your current warfarin dose with next INR in 5 days with ov     Summary  As of 6/23/2022    Full warfarin instructions:  6/23: 6 mg; Otherwise 3 mg every day   Next INR check:  6/28/2022             Telephone call with Panda who verbalizes understanding and agrees to plan    Check at provider office visit 6/28    Education provided: None required    Plan made per [facility] anticoagulation protocol    Patricia Acosta RN  Anticoagulation Clinic  6/23/2022    _______________________________________________________________________     Anticoagulation Episode Summary     Current INR goal:  2.0-3.0   TTR:  87.6 % (7.1 mo)   Target end date:  Indefinite   Send INR reminders to:  [facility]    Indications    Atrial fibrillation  unspecified type (H) [I48.91]           Comments:           Anticoagulation Care Providers     Provider Role Specialty Phone number    Ben Hamlin MD Referring Family Medicine [phone]

## 2022-06-25 PROBLEM — R73.9 HYPERGLYCEMIA: Status: RESOLVED | Noted: 2022-01-11 | Resolved: 2022-01-01

## 2022-06-25 PROBLEM — R52 ACHES: Status: RESOLVED | Noted: 2022-01-01 | Resolved: 2022-01-01

## 2022-06-25 PROBLEM — M54.9 BACK PAIN: Status: RESOLVED | Noted: 2022-01-28 | Resolved: 2022-01-01

## 2022-06-25 PROBLEM — R53.1 GENERALIZED WEAKNESS: Status: RESOLVED | Noted: 2022-01-01 | Resolved: 2022-01-01

## 2022-06-25 PROBLEM — I95.9 HYPOTENSION: Status: RESOLVED | Noted: 2022-01-29 | Resolved: 2022-01-01

## 2022-06-25 PROBLEM — M10.9 GOUT ATTACK: Status: RESOLVED | Noted: 2022-02-12 | Resolved: 2022-01-01

## 2022-06-25 PROBLEM — I48.20 CHRONIC ATRIAL FIBRILLATION (H): Status: RESOLVED | Noted: 2022-02-11 | Resolved: 2022-01-01

## 2022-06-25 PROBLEM — M62.81 GENERALIZED MUSCLE WEAKNESS: Status: RESOLVED | Noted: 2022-01-01 | Resolved: 2022-01-01

## 2022-06-25 PROBLEM — N17.9 ACUTE KIDNEY INJURY (H): Status: RESOLVED | Noted: 2022-02-17 | Resolved: 2022-01-01

## 2022-06-25 PROBLEM — E87.5 HYPERKALEMIA: Status: RESOLVED | Noted: 2022-01-28 | Resolved: 2022-01-01

## 2022-06-28 PROBLEM — M79.2 NEUROPATHIC PAIN: Status: ACTIVE | Noted: 2022-01-01

## 2022-06-28 PROBLEM — G89.29 CHRONIC BILATERAL BACK PAIN, UNSPECIFIED BACK LOCATION: Status: ACTIVE | Noted: 2022-01-28

## 2022-06-28 NOTE — ASSESSMENT & PLAN NOTE
Tdap and Pneumo 23.   · Has been controlled as an inpatient on Coreg 12 5 BID, Cardizem 30mg Q6   HCTZ 12 5mg PO QD  · PRN hydralazing 10mg Q6H  · SBP currently 140 post ativan   · Continue to trend

## 2022-06-28 NOTE — ASSESSMENT & PLAN NOTE
Patient discussed last time, I neglected to order EMG as I had planned.  This was done today.  Decreased sensation on monofilament testing

## 2022-06-28 NOTE — ASSESSMENT & PLAN NOTE
Recurrence of low back pain.  Debilitating to patient.  Limited in exercise tolerance.  Nonsteroidal anti-inflammatories contraindicated due to chronic kidney disease and congestive heart failure and warfarin use  Muscle relaxant somewhat helpful  Agreed to restarting fentanyl patch 25 mg for 1 month then reassessing.

## 2022-06-28 NOTE — PROGRESS NOTES
..   Assessment/ Plan  Non-rheumatic mitral regurgitation  Due to follow-up with cardiology, referral made again    Chronic systolic congestive heart failure (H)  Appears euvolemic, referred back to cardiology    Atrial fibrillation, unspecified type (H)  Rate a little bit high, INR done for warfarin use, cardiology referral    Neuropathic pain  Patient discussed last time, I neglected to order EMG as I had planned.  This was done today.  Decreased sensation on monofilament testing    Healthcare maintenance  Tdap and Pneumo 23.    CKD (chronic kidney disease) stage 4, GFR 15-29 ml/min (H)  Referral back to nephrology.  I had intended to order this last visit.    Chronic bilateral back pain, unspecified back location  Recurrence of low back pain.  Debilitating to patient.  Limited in exercise tolerance.  Nonsteroidal anti-inflammatories contraindicated due to chronic kidney disease and congestive heart failure and warfarin use  Muscle relaxant somewhat helpful  Agreed to restarting fentanyl patch 25 mg for 1 month then reassessing.       Subjective  CC:  chief complaint  HPI:  1 week of increasing back pain.  Has struggled with this on and off throughout the last couple of years, much worse over the winter when you had an extended hospitalization for severe back pain and being unable to move and care for himself.  Went through extensive rehab at that time.  Now pain is worse.  Sharp, bilateral low back, no radiation into the legs, no bowel or bladder dysfunction, no numbness or tingling.    Using Tylenol, not cutting it.  Went to the emergency room, given lidocaine patch, given muscle relaxant which helps a little bit.    Last year, placed on fentanyl patch for severe knee pain by me.  Had some benefit.  This was stopped.  Patient did not have significant withdrawal.  PFSH:  Patient Active Problem List   Diagnosis     Morbid obesity (H)     Arthritis of knee     Calcium pyrophosphate deposition disease     Chronic  venous hypertension (idiopathic) with ulcer of left lower extremity (H)     Essential hypertension     Malignant neoplasm of sigmoid colon (H)     Non-rheumatic mitral regurgitation     Urge incontinence of urine     Venous stasis     Vitamin D deficiency     Ventricular septal defect     Status post bariatric surgery     Atrial fibrillation, unspecified type (H)     Healthcare maintenance     Venous stasis ulcers of both lower extremities (H)-right shin and left lateral ankle     Chronic bilateral back pain, unspecified back location     Urinary tract infection     Intractable low back pain     Anemia of chronic renal failure, stage 4 (severe) (H)     Gout, unspecified cause, unspecified chronicity, unspecified site     Muscle weakness (generalized)     Polyarticular gout     History of 2019 novel coronavirus disease (COVID-19)-Jan 2022, now recovered     Chronic systolic congestive heart failure (H)     TASHA (obstructive sleep apnea)-only uses CPAP when weight is over 450     CKD (chronic kidney disease) stage 4, GFR 15-29 ml/min (H)     Renal failure, unspecified chronicity     Cor pulmonale (H)     Decreased visual acuity     MACKEY (dyspnea on exertion)     Glucose intolerance     Hematuria     Onychomycosis     Osteoarthritis of both knees     Other disorder of calcium metabolism     Plantar fasciitis     Secondary renal hyperparathyroidism (H)     Skin ulcer of left foot, limited to breakdown of skin (H)     Neuropathic pain     acetaminophen (TYLENOL) 325 MG tablet, Take 2 tablets (650 mg) by mouth every 6 hours as needed for mild pain or other (and adjunct with moderate or severe pain or per patient request) (Patient not taking: Reported on 6/2/2022)  allopurinol (ZYLOPRIM) 300 MG tablet, TAKE 1 TABLET(300 MG) BY MOUTH DAILY  bumetanide (BUMEX) 0.5 MG tablet, TAKE 5 TABLETS(2.5 MG) BY MOUTH TWICE DAILY  carboxymethylcellulose PF (REFRESH LIQUIGEL) 1 % ophthalmic gel, Place 1 drop into both eyes nightly as  needed for dry eyes  carboxymethylcellulose PF (REFRESH PLUS) 0.5 % ophthalmic solution, Place 1 drop into both eyes every hour as needed for dry eyes  colchicine (COLCYRS) 0.6 MG tablet, Take 1 tablet (0.6 mg) by mouth daily  febuxostat (ULORIC) 80 MG TABS tablet,   lidocaine (LIDODERM) 5 % patch, Place 1 patch onto the skin every 24 hours for 10 days  melatonin 1 MG TABS tablet, Take 1 tablet (1 mg) by mouth nightly as needed for sleep  metoprolol succinate ER (TOPROL XL) 25 MG 24 hr tablet, TAKE 1/2 TABLET(12.5 MG) BY MOUTH DAILY  metoprolol tartrate (LOPRESSOR) 100 MG tablet,   miconazole (MICATIN) 2 % external powder, Apply topically 2 times daily (Patient not taking: Reported on 6/2/2022)  polyethylene glycol (MIRALAX) 17 GM/Dose powder, Take 17 g by mouth daily as needed for constipation  sodium bicarbonate 650 MG tablet, Take 2 tablets (1,300 mg) by mouth daily (Patient not taking: Reported on 6/2/2022)  warfarin ANTICOAGULANT (COUMADIN) 3 MG tablet, TAKE 1 TABLET(3 MG) BY MOUTH DAILY. ADJUST DOSE BASED ON INR    No current facility-administered medications on file prior to visit.       History   Smoking Status     Never Smoker   Smokeless Tobacco     Never Used     Social History     Social History Narrative     Not on file     Patient Care Team:  Ben Hamlin MD as PCP - General (Family Medicine)  Ben Hamlin MD as Assigned PCP  Cynthia Argueta MD as Assigned Heart and Vascular Provider  Xin Brush NP as Assigned Surgical Provider  Bloch, Lauren Turner, [facility] as Assigned [facility] Pharmacist  ROS  As above      Objective  Physical Exam  Vitals:    06/28/22 1535   BP: 106/70   BP Location: Left arm   Patient Position: Sitting   Cuff Size: Thigh   Pulse: 97   Resp: 16   Weight: (!) 191.9 kg (423 lb)     Body mass index is 48.88 kg/m .  Morbidly obese, legs wrapped, no acute distress.  Ambulation slow painful.  Diagnostics:   None      Please note: Voice recognition software was used in this dictation.   It may therefore contain typographical errors.

## 2022-06-28 NOTE — PROGRESS NOTES
ANTICOAGULATION MANAGEMENT     Panda Miranda 67 year old male is on warfarin with subtherapeutic INR result. (Goal INR 2.0-3.0)    Recent labs: (last 7 days)     06/28/22  1525   INR 1.5*       ASSESSMENT       Source(s): Chart Review and Patient/Caregiver Call       Warfarin doses taken: Warfarin taken as instructed    Diet: No new diet changes identified    New illness, injury, or hospitalization: No    Medication/supplement changes: None noted    Signs or symptoms of bleeding or clotting: No    Previous INR: Subtherapeutic    Additional findings: None       PLAN     Recommended plan for no diet, medication or health factor changes affecting INR     Dosing Instructions: booster dose then Increase your warfarin dose (14% change) with next INR in 1 week       Summary  As of 6/28/2022    Full warfarin instructions:  6/28: 6 mg; Otherwise 6 mg every Wed; 3 mg all other days   Next INR check:  7/5/2022             Telephone call with Panda who verbalizes understanding and agrees to plan    Lab visit scheduled    Education provided: None required    Plan made per [facility] anticoagulation protocol    Patricia Acosta RN  Anticoagulation Clinic  6/28/2022    _______________________________________________________________________     Anticoagulation Episode Summary     Current INR goal:  2.0-3.0   TTR:  85.6 % (7.3 mo)   Target end date:  Indefinite   Send INR reminders to:  [facility]    Indications    Atrial fibrillation  unspecified type (H) [I48.91]           Comments:           Anticoagulation Care Providers     Provider Role Specialty Phone number    Ben Hamlin MD Referring Family Medicine [phone]

## 2022-07-05 NOTE — TELEPHONE ENCOUNTER
Reason for Call:  Home Health Care    Lihn with [facility] called regarding (reason for call): Verbal orders for discharge    Orders are needed for this patient.     PT: Discharge    Skilled Nursing: Discharge      Phone Number Homecare Nurse can be reached at: [phone]    Can we leave a detailed message on this number? YES    Phone number patient can be reached at: Home number on file [phone] (home)    Best Time: ANy    Call taken on 7/5/2022 at 4:10 PM by Gladys Crandall

## 2022-07-07 NOTE — PROGRESS NOTES
ANTICOAGULATION MANAGEMENT     Panda Miranda 67 year old male is on warfarin with subtherapeutic INR result. (Goal INR 2.0-3.0)    Recent labs: (last 7 days)     07/07/22  1306   INR 1.7*       ASSESSMENT       Source(s): Chart Review and Template       Warfarin doses taken: Warfarin taken as instructed    Diet: No new diet changes identified    New illness, injury, or hospitalization: No    Medication/supplement changes: None noted    Signs or symptoms of bleeding or clotting: No    Previous INR: Subtherapeutic    Additional findings: None       PLAN     Unable to reach Panda lucinda.    No instructions provided. Unable to leave voicemail.    Follow up required to confirm warfarin dose taken and assess for changes, discuss out of range result  and discuss dosing instructions and confirm understanding of instructions   Calendar is updated for changes needed    Patricia Acosta RN  Anticoagulation Clinic  7/7/2022

## 2022-07-08 NOTE — PROGRESS NOTES
ANTICOAGULATION MANAGEMENT     Panda Miranda 67 year old male is on warfarin with subtherapeutic INR result. (Goal INR 2.0-3.0)    Recent labs: (last 7 days)     07/07/22  1306   INR 1.7*       ASSESSMENT       Source(s): Chart Review, Patient/Caregiver Call and Template       Warfarin doses taken: Warfarin taken as instructed    Diet: No new diet changes identified    New illness, injury, or hospitalization: No    Medication/supplement changes: None noted    Signs or symptoms of bleeding or clotting: No    Previous INR: Subtherapeutic    Additional findings: None       PLAN     Recommended plan for no diet, medication or health factor changes affecting INR     Dosing Instructions: Increase your warfarin dose (10% change) with next INR in 2 weeks       Summary  As of 7/7/2022    Full warfarin instructions:  6 mg every Mon, Wed, Fri; 3 mg all other days   Next INR check:  7/22/2022             Telephone call with Panda who verbalizes understanding and agrees to plan    Lab visit scheduled    Education provided: None required    Plan made per [facility] anticoagulation protocol    Patricia Acosta RN  Anticoagulation Clinic  7/8/2022    _______________________________________________________________________     Anticoagulation Episode Summary     Current INR goal:  2.0-3.0   TTR:  81.7 % (7.3 mo)   Target end date:  Indefinite   Send INR reminders to:  [facility]    Indications    Atrial fibrillation  unspecified type (H) [I48.91]           Comments:           Anticoagulation Care Providers     Provider Role Specialty Phone number    Ben Hamlin MD Referring Family Medicine [phone]

## 2022-07-12 NOTE — TELEPHONE ENCOUNTER
Subjective:     Encounter Date:07/12/2022      Patient ID: Sorin Allen is a 62 y.o. male.    Chief Complaint :Follow-up for CHF, cardiomyopathy, hypertensive cardiovascular disease, obesity, dyslipidemia    History of Present Illness        Mr. Sorin Allen has PMH of    Hypertensive cardiovascular disease with chronic diastolic CHF  Chronic HFrEF due to systolic dysfunction from dilated cardiomyopathy  [facility] ICD 4/7/2022  Cardiac cath 2/16/2022 EF of 15% no obstructive CAD  Dyslipidemia  Hypertension  Obesity with BMI over 40  ESAU/CPAP  ED  Rheumatic fever as a child  Former smoker quit 3/6/2000  Family history mother has leukemia    Here for follow-up.  Patient denies any chest pain.  Has class I-2 dyspnea on exertion.  Is complaining of some edema.    Patient's arterial blood pressure is 123/81, heart rate 64, O2 sat of 98% on room air.  BMI is over 40.    Patient reportedly had cardiac cath 8 years ago was told he has cardiomyopathy and CHF.  Underwent repeat cath 2/16/2022 which revealed EF of 15% no obstructive CAD..  Patient had repeat echocardiogram 4/6/2022 which is revealing persistent LV dysfunction with LVEF of 20% with severe pulmonary hypertension PA systolic pressure of 60 mmHg.  Review of records reveal labs from 1/4/2022 with a proBNP 1548, CMP with a glucose of 110, hemoglobin A1c 6.7, lipid profile with cholesterol 187, triglycerides 92, HDL low at 35, .  Labs from 4/11/2022 reveal proBNP normal at 907.  Labs from 6/7/2022 revealed normal CBC and BMP.    EKG done 11/14/2021 reviewed/interpreted by me reveals sinus rhythm with rate of 95 bpm with poor R wave progression    Echocardiogram 5/1/2020 revealed normal LV systolic function EF 60% with diastolic dysfunction and RV enlargement and mild aortic stenosis  Echocardiogram 1/21/2022 revealed EF of 26 to 30%  Repeat echo 4/6/2022 is revealing EF of 20% with severe pulmonary hypertension PA systolic pressure of 60  ANTICOAGULATION  MANAGEMENT    Assessment     Today's INR result of 2.0 is Therapeutic (goal INR of 2.0-3.0)        Warfarin taken as previously instructed    No new diet changes affecting INR    No new medication/supplements affecting INR    Continues to tolerate warfarin with no reported s/s of bleeding or thromboembolism     Previous INR was Therapeutic    Plan:     Left a detailed message for Panda regarding INR result and instructed:     Warfarin Dosing Instructions:  Continue current warfarin dose 7.5 mg daily on Sun/Thur; and 5 mg daily rest of week      Instructed patient to follow up no later than: 6 weeks    Education provided: importance of therapeutic range and target INR goal and significance of current INR result    Instructed to call the [facility] for any changes, questions or concerns. (#[phone])   ?   Carmencita Lopez RN    Subjective/Objective:      Panda Miranda, a 63 y.o. male is on warfarin.     Panda reports:     Home warfarin dose: as updated on anticoagulation calendar per template     Missed doses: No     Medication changes:  No     S/S of bleeding or thromboembolism:  No     New Injury or illness:  No     Changes in diet or alcohol consumption:  No     Upcoming surgery, procedure or cardioversion:  No    Anticoagulation Episode Summary     Current INR goal:   2.0-3.0   TTR:   69.1 % (2.2 y)   Next INR check:   5/28/2019   INR from last check:   2.00 (4/16/2019)   Weekly max warfarin dose:      Target end date:   Indefinite   INR check location:      Preferred lab:      Send INR reminders to:   ANTICOAGULATION POOL A (WBY,WBE,MID,RSC)    Indications    A-fib (H) (Resolved) [I48.91]           Comments:            Anticoagulation Care Providers     Provider Role Specialty Phone number    Ben Hamlin MD Referring Family Medicine [phone]         mmHg    Assessment:  :    Chronic HFrEF due to combined hypertensive cardiovascular disease and systolic dysfunction from dilated cardiomyopathy EF of 15-20%    Dilated cardiomyopathy  Hypertension  Diabetes  Obesity with BMI over 40      Recommendations / Plan:        Reviewed EKG results with patient.  We will continue medical management with aspirin, Farxiga, Entresto, furosemide, metoprolol .  Patient is continuing to have heart failure on guideline directed medical therapy and optimal medical therapy.  We will add Aldactone and discontinue KCl and check BMP.  Follow-up in heart failure clinic.  Follow-up with PMD for diabetes care.    Procedures cardiac cath 2/16/2022 performed and interpreted by me reveals dilated cardiomyopathy EF of 15%          ECG 12 Lead    Date/Time: 7/12/2022 10:12 AM  Performed by: Raoul Tirado MD  Authorized by: Raoul Tirado MD   Comparison: compared with previous ECG from 11/14/2021  Comparison to previous ECG: EKG done today reviewed/interpreted by me reveals sinus rhythm with rate of 67 bpm with intraventricular conduction delay, no new change compared EKG from 11/14/2021              The following portions of the patient's history were reviewed and updated as appropriate: allergies, current medications, past family history, past medical history, past social history, past surgical history and problem list.    Assessment:         MDM     Diagnosis Plan   1. Chronic HFrEF (heart failure with reduced ejection fraction) ([facility])  Basic Metabolic Panel   2. Dilated cardiomyopathy ([facility])  Basic Metabolic Panel   3. Presence of automatic cardioverter/defibrillator (AICD)  Basic Metabolic Panel   4. Essential hypertension  Basic Metabolic Panel   5. Type 2 diabetes mellitus without complication, with long-term current use of insulin ([facility])  Basic Metabolic Panel   6. Morbid obesity with BMI of 40.0-44.9, adult ([facility])  Basic Metabolic Panel          Plan:                Past Medical History:  Past Medical History:   Diagnosis Date   • Arthritis    • Asthma    • Cardiac disease    • Carpal tunnel syndrome    • Congenital heart disease    • CPAP (continuous positive airway pressure) dependence    • Diabetes mellitus (HCC)    • Diverticulitis of colon    • Erectile dysfunction    • Heart disease    • Heart valve disease    • Hyperlipidemia    • Hypertension    • Neuromuscular disorder (HCC)    • ESAU (obstructive sleep apnea)    • Osteoarthritis      Past Surgical History:  Past Surgical History:   Procedure Laterality Date   • CARDIAC CATHETERIZATION N/A 2/16/2022    Procedure: Left Heart Cath;  Surgeon: Raoul Tirado MD;  Location: [facility] CATH INVASIVE LOCATION;  Service: Cardiovascular;  Laterality: N/A;   • CARDIAC ELECTROPHYSIOLOGY PROCEDURE N/A 6/7/2022    Procedure: ICD single-chamber, new [facility] aware;  Surgeon: Raoul Tirado MD;  Location:  [facility] CATH INVASIVE LOCATION;  Service: Cardiovascular;  Laterality: N/A;   • HERNIA REPAIR        Allergies:  No Known Allergies  Home Meds:  Current Meds:     Current Outpatient Medications:   •  acetaminophen (TYLENOL) 500 MG tablet, Take 2 tablets by mouth Every 8 (Eight) Hours As Needed for Moderate Pain  (arthritic pain)., Disp: 180 tablet, Rfl: 5  •  aspirin (aspirin) 81 MG EC tablet, Take 1 tablet by mouth Daily., Disp: 90 tablet, Rfl: 3  •  Dapagliflozin Propanediol (Farxiga) 10 MG tablet, Take 10 mg by mouth Daily., Disp: 90 tablet, Rfl: 3  •  Entresto 24-26 MG tablet, TAKE 1 TABLET BY MOUTH TWICE A DAY, Disp: 180 tablet, Rfl: 3  •  furosemide (LASIX) 80 MG tablet, Take 1 tablet by mouth 4 (Four) Times a Day., Disp: 360 tablet, Rfl: 3  •  Insulin Glargine (Lantus SoloStar) 100 UNIT/ML injection pen, Inject 60 Units under the skin into the appropriate area as directed Daily., Disp: 20 pen, Rfl: 1  •  meloxicam (MOBIC) 15 MG tablet, Take 15 mg by mouth As Needed., Disp: , Rfl:   •  metoprolol  succinate XL (TOPROL-XL) 25 MG 24 hr tablet, Take 1 tablet by mouth Daily., Disp: 90 tablet, Rfl: 3  •  ACCU-CHEK SOFTCLIX LANCETS lancets, by Other route. Use as instructed, Disp: , Rfl:   •  Blood Glucose Calibration liquid, by In Vitro route., Disp: , Rfl:   •  Blood Glucose Monitoring Suppl (ACCU-CHEK ALEKS PLUS) w/Device kit, Use to test blood sugar twice a day, Disp: , Rfl:   •  Blood Glucose Monitoring Suppl (Accu-Chek Guide) w/Device kit, 1 kit 3 (Three) Times a Day. Use glucometer to check blood sugars 3 times a day. Dx. E11.42, Disp: 1 kit, Rfl: 0  •  Continuous Blood Gluc  (FreeStyle Torie 14 Day [place]) device, 1 Units 3 (Three) Times a Day Before Meals. Use continuous glucose monitor to monitor glucose regularly., Disp: 1 each, Rfl: 0  •  Continuous Blood Gluc Sensor (FreeStyle Torie 14 Day Sensor) misc, 1 Units Every 14 (Fourteen) Days. Use continuous glucose monitor to monitor glucose regularly., Disp: 6 each, Rfl: 1  •  gabapentin (NEURONTIN) 100 MG capsule, TAKE 1 CAPSULE BY MOUTH THREE TIMES A DAY (Patient taking differently: Take 100 mg by mouth 3 (Three) Times a Day As Needed.), Disp: 90 capsule, Rfl: 2  •  glucose blood (Accu-Chek Aleks Plus) test strip, USE TESTING STRIP TO CHECK BLOOD SUGARS 3 TIMES A DAY. DX. E11.42, Disp: 100 each, Rfl: 11  •  Insulin Pen Needle 32G X 4 MM misc, Use pen needle to deliver insulin subcutaneously daily.  Dx. E11.42, Disp: 30 each, Rfl: 5  •  spironolactone (ALDACTONE) 25 MG tablet, Take 1 tablet by mouth Daily., Disp: 90 tablet, Rfl: 3  Social History:   Social History     Tobacco Use   • Smoking status: Former Smoker     Packs/day: 2.00     Years: 20.00     Pack years: 40.00     Quit date: 3/6/2000     Years since quittin.3   • Smokeless tobacco: Never Used   Substance Use Topics   • Alcohol use: Yes     Comment: rare      Family History:  Family History   Problem Relation Age of Onset   • Leukemia Mother    • Cancer Sister    • Cancer Maternal  "Grandmother               Review of Systems   Constitutional: Negative for malaise/fatigue.   Cardiovascular: Positive for dyspnea on exertion and leg swelling. Negative for chest pain and palpitations.   Respiratory: Negative for shortness of breath.    Skin: Negative for rash.   Neurological: Negative for dizziness, light-headedness and numbness.     All other systems are negative         Objective:     Physical Exam  /81   Pulse 64   Ht 182.9 cm (72\")   Wt (!) 150 kg (330 lb)   SpO2 98%   BMI 44.76 kg/m²   General:  Appears in no acute distress, pleasant obese  Eyes: Sclera is anicteric,  conjunctiva is clear   HEENT:  No JVD.  No carotid bruits  Respiratory: Respirations regular and unlabored at rest.  Clear to auscultation  Cardiovascular: S1,S2 Regular rate and rhythm. No murmur, rub or gallop auscultated.   Extremities: No digital clubbing or cyanosis, no edema  Skin: Color pink. Skin warm and dry to touch. No rashes  No xanthoma  Neuro: Alert and awake.    Lab Reviewed:         Raoul Tirado MD  7/23/2022 10:12 EDT      EMR Dragon/Transcription:   \"Dictated utilizing Dragon dictation\".        "

## 2022-07-29 NOTE — TELEPHONE ENCOUNTER
ANTICOAGULATION     Panda Mirnada is overdue for INR check.      Left message for patient to call and schedule lab appointment as soon as possible. If returning call, please schedule.     Patricia Acosta RN

## 2022-08-15 NOTE — TELEPHONE ENCOUNTER
ANTICOAGULATION     Panda Miranda is overdue for INR check.      Spoke with Panda and scheduled lab appointment on 8/17    Patricia Acosta RN

## 2022-08-17 NOTE — PROGRESS NOTES
ANTICOAGULATION MANAGEMENT     Panda Miranda 67 year old male is on warfarin with subtherapeutic INR result. (Goal INR 2.0-3.0)    Recent labs: (last 7 days)     08/17/22  1325   INR 1.5*       ASSESSMENT       Source(s): Chart Review and Patient/Caregiver Call       Warfarin doses taken: Less warfarin taken than planned which may be affecting INR was taking 3 mg daily to conserve pills, refill sent    Diet: No new diet changes identified    New illness, injury, or hospitalization: No    Medication/supplement changes: None noted    Signs or symptoms of bleeding or clotting: No    Previous INR: Subtherapeutic    Additional findings: Refill needed today. Panda meets all criteria for refill (current [facility] referral, office visit with referring provider/group in last year, lab monitoring up to date or not exceeding 2 weeks overdue). Rx instructions and quantity supplied updated to match patient's current dosing plan. Warfarin 90 day supply with 1 refill granted per [facility] protocol        PLAN     Recommended plan for no diet, medication or health factor changes affecting INR     Dosing Instructions: booster dose then Increase your warfarin dose (12.5% change) with next INR in 2 weeks       Summary  As of 8/17/2022    Full warfarin instructions:  6 mg every Sun; 3 mg all other days   Next INR check:  8/31/2022             Telephone call with Panda who verbalizes understanding and agrees to plan    Lab visit scheduled    Education provided: None required    Plan made per [facility] anticoagulation protocol    Patricia Acosta RN  Anticoagulation Clinic  8/17/2022    _______________________________________________________________________     Anticoagulation Episode Summary     Current INR goal:  2.0-3.0   TTR:  63.2 % (7.4 mo)   Target end date:  Indefinite   Send INR reminders to:  [facility]    Indications    Atrial fibrillation  unspecified type (H) [I48.91]           Comments:           Anticoagulation Care Providers      Provider Role Specialty Phone number    Ben Hamlin MD Referring Family Medicine [phone]

## 2022-08-24 NOTE — TELEPHONE ENCOUNTER
Patient calling and needs medication refilled due to on-going back pain. Patient reports leaving for out of town mid morning tomorrow. Advised patient it can take a few days for medications to get refilled and provided urgent care hours if needing medication due to back pain. Patient agrees to plan of care. Let patient know that I will send message off to provider.     Urgent care hours at [facility]:   M- F: 10 am- 8pm    Sat/ Sun: 9 am- 8pm  Urgent care hours at [facility] ([address] [place]):   M- F: 10 am -8 pm    Sat/ Sun: 9-5    Janett Moe RN

## 2022-09-07 NOTE — TELEPHONE ENCOUNTER
ANTICOAGULATION     Panda Miranda is overdue for INR check.      Was unable to reach patient and was unable to leave a voicemail. If patient calls, please schedule INR check as soon as possible.  and Reminder letter sent    Patricia Acosta RN

## 2022-09-07 NOTE — LETTER
September 7, 2022      Panda MALONE Ruben  [address]  [facility] [address]        Dear Panda,    September 7, 2022        Panda MALONE Ruben  [address]  [facility] [address]            Dear Panda,    You are currently under the care of [facility] for your warfarin (Coumadin , Jantoven ) therapy.  We are contacting you because our records show you were due for an INR on 8/31.  We tried to call you, your phone is not accepting messages.    There are potentially serious risks when taking warfarin without careful monitoring and we want to make sure you are safely managed.  Routine lab monitoring is required for warfarin refills.     Please call [phone] as soon as possible to schedule an appointment.  If there has been a change in your care or other concerns, please let us know so we can help and or update our records.     Sincerely,       [facility]

## 2022-09-15 NOTE — PROGRESS NOTES
"Clinic Care Coordination Contact    Clinic Care Coordination Contact  OUTREACH    Referral Information:  Referral Source: Health Plan    Primary Diagnosis: Chronic Pain    Chief Complaint   Patient presents with     Clinic Care Coordination - Initial     RN Clinical Product Navigator         Universal Utilization: [facility] and [facility].  Patient was to establish with [facility] cardiology, however, did not show to an appointment on 9/6/22 at [facility].  Clinic Utilization  Difficulty keeping appointments:: Yes  Compliance Concerns: Yes  No-Show Concerns: Yes  No PCP office visit in Past Year: No  Utilization    Hospital Admissions  5             ED Visits  5             No Show Count (past year)  14                Current as of: 9/7/2022  2:05 PM              Clinical Concerns:  Current Medical Concerns:  Patient was referred to RN Clinical Product Tameka for potential care coordination.   Patient reports chronic low back pain that interferes with his ability to walk and slows down all of his ADLs.  Patient states he needs to sit on the edge of his bed in the morning for at least one hour prior to getting up and then uses a hot shower to help his pain.    Patient is interested in physical therapy \"or anything\" to help improve his pain.  Patient felt while receiving physical therapy at [facility] his pain was improved and would like to explore this in the outpatient setting.  Patient will benefit from care coordination due to missed or overdue appointments with vascular surgery, Nephrology and Cardiology.      Current Behavioral Concerns: No concerns cited by patient.      Education Provided to patient: RN Clinical Product Navigator educated patient on CPN role, [facility], overdue health maintenance and care coordination services.     Pain  Pain (GOAL):: Yes  Type: Chronic (>3mo)  Location of chronic pain:: low back  Radiating: " "No  Progression: Waxing and Waning  Description of pain: Stabbing  Chronic pain severity::  (\"20\")  Limitation of routine activities due to chronic pain:: Yes  Description: Unable to perform most daily activities (chores, hobbies, social activities, driving)  Alleviating Factors: Pain Medication  Aggravating Factors: Activity, Positioning  Health Maintenance Reviewed: Due/Overdue   Health Maintenance Due   Topic Date Due     HF ACTION PLAN  Never done     URINE DRUG SCREEN  Never done     ADVANCE CARE PLANNING  Never done     ZOSTER IMMUNIZATION (1 of 2) 03/05/2018     MEDICARE ANNUAL WELLNESS VISIT  Never done     AORTIC ANEURYSM SCREENING (SYSTEM ASSIGNED)  Never done     MICROALBUMIN  04/11/2022     COVID-19 Vaccine (3 - Booster for Pfizer series) 07/20/2022     INFLUENZA VACCINE (1) 09/01/2022     BMP  09/02/2022     HEMOGLOBIN  12/02/2022      Clinical Pathway: None    Medication Management:  Medication review status: not reviewed during this call.  Patient denies any questions with his medications.       Functional Status:  Dependent ADLs:: Independent (pt states previously independent but now needs Ax1)  Dependent IADLs:: Cleaning, Cooking, Laundry, Shopping, Medication Management, Meal Preparation, Transportation  Bed or wheelchair confined:: No  Mobility Status: Independent w/Device  Fallen 2 or more times in the past year?: No  Any fall with injury in the past year?: No    Living Situation:  Current living arrangement:: I live in a private home    Lifestyle & Psychosocial Needs:    Social Determinants of Health     Tobacco Use: Low Risk      Smoking Tobacco Use: Never Smoker     Smokeless Tobacco Use: Never Used   Alcohol Use: Not on file   Financial Resource Strain: Not on file   Food Insecurity: Not on file   Transportation Needs: Not on file   Physical Activity: Not on file   Stress: Not on file   Social Connections: Not on file   Intimate Partner Violence: Not on file   Depression: Not at risk     " "PHQ-2 Score: 0   Housing Stability: Not on file              Muslim or spiritual beliefs that impact treatment:: No  Mental health DX:: No  Mental health management concern (GOAL):: No        Patient states \"things are tight\" financially and he is aware of all [facility] resources.  Patient states he is over qualified for any [facility] assistance programs and he does not wish to utilize food insecurity resources.     Resources and Interventions:  Current Resources:      Community Resources: None  Supplies Currently Used at Home: None  Equipment Currently Used at Home: cane, quad, walker, standard  Employment Status: retired         Advance Care Plan/Directive  Advanced Care Plans/Directives on file:: No    Referrals Placed: None         Care Plan: Patient will benefit from Care Coordination for chronic pain management and adherence to medical appointments and plan of care.  Patient is due for appointments with vascular surgery, cardiology and nephrology with [facility].      Patient/Caregiver understanding: Yes       Future Appointments              In 5 days SPRS CCC RN [facility]    In 1 month Ben Hamlin MD [facility]          Plan:   RN Clinical Product Navigator scheduled patient with RN CC on 9/20/22 at 11:00 am.  Future PCP appointment for AWV and pain follow-up scheduled for next availability; 11/2/22.    Melissa Behl BSN, RN, PHN, CCM  RN Clinical Product Navigator  [phone]         "

## 2022-09-15 NOTE — TELEPHONE ENCOUNTER
"S-(situation): Chronic pain; interested in pain management and/or physical therapy    B-(background): Patient with chronic back pain    A-(assessment): Patient contacted by writer due to health plan identification of patient that would benefit from care coordination.  Patient agreeable to Care Coordination and future appointment scheduled with RN CC for 9/20/22.    Patient reports chronic low back pain interfering with his ability to walk and slows down his ADLs.  Pain rating today is \"20\"/10, stabbing in character and waxing and waning.  Pain is slightly improved with Fentanyl patch.  Patient reports his pain was improved when he was able to participate in therapy at [facility] last spring.  Patient inquires if he could participate in therapy again and/or have a pain management referral.    RN Clinical Product Navigator assisted patient in scheduling follow-up with PCP; next available appointment 11/2/22 at 3:40 pm.    R-(recommendations/plan): Please advise if a pain management referral and/or physical therapy referral can be ordered by PCP prior to future appointment.    Melissa Behl BSN, RN, PHN, [facility]  RN Clinical Product Navigator  [phone]         "

## 2022-09-15 NOTE — LETTER
[facility] CARE COORDINATION  [address] [place] [id number]   September 15, 2022        Panda Miranda  [address] [facility] [address]      Dear Panda,        Thank you for taking the time to talk with me today.    Your future appointment with your provider is:    Nov 02, 2022, Arrive by 3:20 PM  Ben Hamlin MD  [facility]  [phone]           Sincerely,    Melissa Behl BSN, RN, PHN, [facility]  RN Clinical Product Navigator  [phone]

## 2022-09-16 NOTE — TELEPHONE ENCOUNTER
Will update Lead Care Coordinator.    Melissa Behl BSN, RN, PHN, CCM  RN Clinical Product Navigator  [phone]

## 2022-09-20 NOTE — LETTER
[facility] CARE COORDINATION  [facility] [address] [place] [address]    September 20, 2022    Panda Miranda  [address]  [facility] [address]      Dear Panda,        I am a clinic care coordinator who works with Ben Hamlin MD with the [facility]. I wanted to thank you for spending the time to talk with me.  Below is a description of clinic care coordination and how I can further assist you.       The clinic care coordination team is made up of a registered nurse, , financial resource worker and community health worker who understand the health care system. The goal of clinic care coordination is to help you manage your health and improve access to the health care system. Our team works alongside your provider to assist you in determining your health and social needs. We can help you obtain health care and community resources, providing you with necessary information and education. We can work with you through any barriers and develop a care plan that helps coordinate and strengthen the communication between you and your care team.    Please feel free to contact me with any questions or concerns regarding care coordination and what we can offer.      We are focused on providing you with the highest-quality healthcare experience possible.    Sincerely,     Tianna Frazier RN      Enclosed: I have enclosed a copy of the Patient Centered Plan of Care. This has helpful information and goals that we have talked about. Please keep this in an easy to access place to use as needed.

## 2022-09-20 NOTE — TELEPHONE ENCOUNTER
Thanks Tianna!    Only other thing I can think of is making sure he follows up with nephrology, Dr. Coreas.  It looks like he was supposed to have an appointment in July, upon glancing at the chart, I do not see this.  If you would double check on that, that would be great!

## 2022-09-20 NOTE — PROGRESS NOTES
Clinic Care Coordination Contact    Clinic Care Coordination Contact  OUTREACH    Referral Information:  Referral Source: Health Plan    Primary Diagnosis: Chronic Pain    Chief Complaint   Patient presents with     Clinic Care Coordination - Initial        Universal Utilization:  Clinic Utilization  Difficulty keeping appointments:: Yes  Compliance Concerns: Yes  No-Show Concerns: Yes  No PCP office visit in Past Year: No  Utilization    Hospital Admissions  5             ED Visits  5             No Show Count (past year)  15                Current as of: 9/17/2022  7:55 AM            RN CCC met with patient to introduce self and assess for clinic care coordination enrollment.      RN CC support schedule PT  And cardiology visit for patient.       Clinical Concerns:  Current Medical Concerns:    Patient Active Problem List   Diagnosis     Morbid obesity (H)     Arthritis of knee     Calcium pyrophosphate deposition disease     Chronic venous hypertension (idiopathic) with ulcer of left lower extremity (H)     Essential hypertension     Malignant neoplasm of sigmoid colon (H)     Non-rheumatic mitral regurgitation     Urge incontinence of urine     Venous stasis     Vitamin D deficiency     Ventricular septal defect     Status post bariatric surgery     Atrial fibrillation, unspecified type (H)     Healthcare maintenance     Venous stasis ulcers of both lower extremities (H)-right shin and left lateral ankle     Chronic bilateral back pain, unspecified back location     Urinary tract infection     Intractable low back pain     Anemia of chronic renal failure, stage 4 (severe) (H)     Gout, unspecified cause, unspecified chronicity, unspecified site     Muscle weakness (generalized)     Polyarticular gout     History of 2019 novel coronavirus disease (COVID-19)-Jan 2022, now recovered     Chronic systolic congestive heart failure (H)     TASHA (obstructive sleep apnea)-only uses CPAP when weight is over 450      "CKD (chronic kidney disease) stage 4, GFR 15-29 ml/min (H)     Renal failure, unspecified chronicity     Cor pulmonale (H)     Decreased visual acuity     MACKEY (dyspnea on exertion)     Glucose intolerance     Hematuria     Onychomycosis     Osteoarthritis of both knees     Other disorder of calcium metabolism     Plantar fasciitis     Secondary renal hyperparathyroidism (H)     Skin ulcer of left foot, limited to breakdown of skin (H)     Neuropathic pain    Pain  Pain (GOAL):: Yes  Type: Chronic (>3mo)  Location of chronic pain:: low back  Radiating: No  Progression: Waxing and Waning  Description of pain: Stabbing  Chronic pain severity:: 10 (\"20\")  Limitation of routine activities due to chronic pain:: Yes  Description: Unable to perform most daily activities (chores, hobbies, social activities, driving)  Alleviating Factors: Pain Medication  Aggravating Factors: Activity, Positioning  Health Maintenance Reviewed: Due/Overdue    Medication Management:  Medication review status: Medications reviewed and no changes reported per patient.              Functional Status:  Dependent ADLs:: Independent (pt states previously independent but now needs Ax1)  Dependent IADLs:: Independent  Bed or wheelchair confined:: No  Mobility Status: Independent w/Device  Fallen 2 or more times in the past year?: No  Any fall with injury in the past year?: No    Living Situation:  Current living arrangement:: I live in a private home  Type of residence:: Private home - stairs    Lifestyle & Psychosocial Needs:    Social Determinants of Health     Tobacco Use: Low Risk      Smoking Tobacco Use: Never Smoker     Smokeless Tobacco Use: Never Used   Alcohol Use: Not At Risk     Frequency of Alcohol Consumption: 4 or more times a week     Average Number of Drinks: 1 or 2     Frequency of Binge Drinking: Never   Financial Resource Strain: Low Risk      Difficulty of Paying Living Expenses: Not very hard   Food Insecurity: No Food Insecurity "     Worried About Running Out of Food in the Last Year: Never true     Ran Out of Food in the Last Year: Never true   Transportation Needs: No Transportation Needs     Lack of Transportation (Medical): No     Lack of Transportation (Non-Medical): No   Physical Activity: Inactive     Days of Exercise per Week: 2 days     Minutes of Exercise per Session: 0 min   Stress: No Stress Concern Present     Feeling of Stress : Not at all   Social Connections: Moderately Integrated     Frequency of Communication with Friends and Family: More than three times a week     Frequency of Social Gatherings with Friends and Family: More than three times a week     Attends Judaism Services: More than 4 times per year     Active Member of Clubs or Organizations: No     Attends Club or Organization Meetings: Never     Marital Status:    Intimate Partner Violence: Unknown     Fear of Current or Ex-Partner: No     Emotionally Abused: Not on file     Physically Abused: Not on file     Sexually Abused: Not on file   Depression: Not at risk     PHQ-2 Score: 0   Housing Stability: Low Risk      Unable to Pay for Housing in the Last Year: No     Number of Places Lived in the Last Year: 1     Unstable Housing in the Last Year: No     Diet:: Regular, No added salt  Inadequate nutrition (GOAL):: No  Tube Feeding: No  Inadequate activity/exercise (GOAL):: Yes  Significant changes in sleep pattern (GOAL): No  Transportation means:: Regular car     Judaism or spiritual beliefs that impact treatment:: No  Mental health DX:: No  Mental health management concern (GOAL):: No       Resources and Interventions:  Current Resources:      Community Resources: None  Supplies Currently Used at Home: None  Equipment Currently Used at Home: cane, quad, walker, standard  Employment Status: retired         Advance Care Plan/Directive  Advanced Care Plans/Directives on file:: No  Advanced Care Plan/Directive Status: In Process    Referrals Placed: None     Care Plan:  Care Plan: ACP     Problem: HP GENERAL PROBLEM     Goal: I would like to have an advanced care plan on file                   Care Plan: Medical     Problem: medical     Goal: I will have a better understanding and plan of care for Cardiology within 3-4 months     Start Date: 9/20/2022    Note:        Barriers: access  knowledge deficit      Patient expressed understanding of goal: yes  Action steps to achieve this goal:  1. I will schedule and attend visit with Cardiology team   2. I will update Care coordination team during next outreach  3. I will report to my Community Health Worker if any additional resources or support needed.              Goal: I would like to have a plan of care for low back     Note:     Barriers: access  Strengths: engagement  Patient expressed understanding of goal: yes  Action steps to achieve this goal:  1. I will schedule and attend PT   2. I will update Care coordination team during next outreach              Goal: I will attend an Annual Wellness Exam     Note:       Patient expressed understanding of goal: yes  Action steps to achieve this goal:  1. I will schedule my annual wellness visit; [phone] ([phone])  2. I will attend my annual wellness visit.  3. I will contact my Care Management or clinic team if I have barriers to attending my annual wellness visit.                           Patient/Caregiver understanding: Pt reports understanding and denies any additional questions or concerns at this times.  CC engaged in AIDET communication during encounter.      Outreach Frequency: monthly  Future Appointments              In 1 week Benjamin Akhtar PT [facility], [facility]    In 2 weeks Benjamin Akhtar PT [facility], [facility]    In 3 weeks Benjamin Akhtar PT [facility],  [facility]    In 1 month Ben Hamlin MD [facility], [facility]          Plan: Patient will schedule and attend recommended follow up visits with speciality providers and primary care provider.    RN CC will outreach in 4-6 weeks to support ongoing recommendations and plan of care will be available sooner if needed.

## 2022-09-20 NOTE — LETTER
[facility]  Patient Centered Plan of Care  About Me:        Patient Name:  Panda Miranda    YOB: 1955  Age:         67 year old   [place] MRN:    [id number] Telephone Information:  Home Phone [phone]   Mobile [phone]       Address:  [address] [facility] [address] Email address:  No e-mail address on record      Emergency Contact(s)    Name Relationship Lgl Grd Work Phone Home Phone Mobile Phone   1. NURIA AUSTINLYN Friend   [phone]    2. NORY POLK* Spouse   [phone]           Primary language:  English     needed? No   [place] [employer] [facility]:  [phone] op. 1  Other communication barriers:No data recorded  Preferred Method of Communication:     Current living arrangement: I live in a private home    Mobility Status/ Medical Equipment: Independent w/Device        Health Maintenance  Health Maintenance Reviewed: Due/Overdue        My Access Plan  Medical Emergency 911   Primary Clinic Line [place] [phone]   24 Hour Appointment Line [phone] or  [phone]) (toll-free)   24 Hour Nurse Line [phone] (toll-free)   Preferred Urgent Care [facility], [phone]     [facility]  [phone]     Preferred Pharmacy [facility] [address] [place] [address] [address] AT [facility] OF  [address]     Behavioral Health Crisis Line The National Suicide Prevention Lifeline at 1-319.987.6626 or Text/Call 678             My Care Team Members  Patient Care Team       Relationship Specialty Notifications Start End    Ben Hamlin MD PCP - General Family Medicine  2/23/22     Phone: [phone] Fax: [phone] [address] [facility] [address]    Ben Hamlin MD Assigned PCP   6/16/21     Phone: [phone] Fax: [phone] [address] [facility] [address]     Xin Brush, NP Assigned Surgical Provider   7/16/21     Phone: [phone] Fax: [phone] [address] [facility] [address] [facility] [address]    Bloch, Lauren Turner, [facility] Assigned MTM Pharmacist   5/28/22     Phone: [phone] [address] [facility] [address]    Cynthia Argueta MD MD Interventional Cardiology  6/29/22     Phone: [phone] Fax: [phone]         [address] [facility] [address] [facility] [address]    Tianna Frazier RN Lead Care Coordinator Primary Care - CC Admissions 9/15/22             My Care Plans  Self Management and Treatment Plan  Care Plan  Care Plan: ACP     Problem: HP GENERAL PROBLEM     Goal: I would like to have an advanced care plan on file                   Care Plan: Medical     Problem: medical     Goal: I will have a better understanding and plan of care for Cardiology within 3-4 months     Start Date: 9/20/2022    Note:        Barriers: access  knowledge deficit      Patient expressed understanding of goal: yes  Action steps to achieve this goal:  1. I will schedule and attend visit with Cardiology team   2. I will update Care coordination team during next outreach  3. I will report to my Community Health Worker if any additional resources or support needed.              Goal: I would like to have a plan of care for low back     Note:     Barriers: access  Strengths: engagement  Patient expressed understanding of goal: yes  Action steps to achieve this goal:  1. I will schedule and attend PT   2. I will update Care coordination team during next outreach              Goal: I will attend an Annual Wellness Exam     Note:       Patient expressed understanding of goal: yes  Action steps to achieve this goal:  1. I will schedule my annual wellness visit; [id number] ([phone])  2. I will attend my annual wellness visit.  3. I will contact my Care Management or clinic team if I have barriers to attending my annual wellness visit.                             Action Plans on File:                       Advance Care Plans/Directives Type:   No data recorded    My Medical and Care Information  Problem List   Patient Active Problem List   Diagnosis     Morbid obesity (H)     Arthritis of knee     Calcium pyrophosphate deposition disease     Chronic venous hypertension (idiopathic) with ulcer of left lower extremity (H)     Essential hypertension     Malignant neoplasm of sigmoid colon (H)     Non-rheumatic mitral regurgitation     Urge incontinence of urine     Venous stasis     Vitamin D deficiency     Ventricular septal defect     Status post bariatric surgery     Atrial fibrillation, unspecified type (H)     Healthcare maintenance     Venous stasis ulcers of both lower extremities (H)-right shin and left lateral ankle     Chronic bilateral back pain, unspecified back location     Urinary tract infection     Intractable low back pain     Anemia of chronic renal failure, stage 4 (severe) (H)     Gout, unspecified cause, unspecified chronicity, unspecified site     Muscle weakness (generalized)     Polyarticular gout     History of 2019 novel coronavirus disease (COVID-19)-Jan 2022, now recovered     Chronic systolic congestive heart failure (H)     TASHA (obstructive sleep apnea)-only uses CPAP when weight is over 450     CKD (chronic kidney disease) stage 4, GFR 15-29 ml/min (H)     Renal failure, unspecified chronicity     Cor pulmonale (H)     Decreased visual acuity     MACKEY (dyspnea on exertion)     Glucose intolerance     Hematuria     Onychomycosis     Osteoarthritis of both knees     Other disorder of calcium metabolism     Plantar fasciitis     Secondary renal hyperparathyroidism (H)     Skin ulcer of left foot, limited to breakdown of skin (H)     Neuropathic pain      Current Medications and Allergies:    Current Outpatient Medications   Medication     allopurinol (ZYLOPRIM) 300 MG tablet     bumetanide (BUMEX) 0.5 MG tablet     carboxymethylcellulose PF  (REFRESH LIQUIGEL) 1 % ophthalmic gel     carboxymethylcellulose PF (REFRESH PLUS) 0.5 % ophthalmic solution     colchicine (COLCYRS) 0.6 MG tablet     febuxostat (ULORIC) 80 MG TABS tablet     fentaNYL (DURAGESIC) 25 mcg/hr 72 hr patch     melatonin 1 MG TABS tablet     metoprolol succinate ER (TOPROL XL) 25 MG 24 hr tablet     metoprolol tartrate (LOPRESSOR) 100 MG tablet     polyethylene glycol (MIRALAX) 17 GM/Dose powder     warfarin ANTICOAGULANT (COUMADIN) 3 MG tablet     No current facility-administered medications for this visit.          Care Coordination Start Date: 9/20/2022   Frequency of Care Coordination: monthly     Form Last Updated: 09/20/2022

## 2022-09-22 NOTE — TELEPHONE ENCOUNTER
ANTICOAGULATION     Panda Miranda is overdue for INR check.      Spoke with Gume and scheduled lab appointment on 9/23    Patricia Acosta RN

## 2022-09-23 NOTE — PROGRESS NOTES
ANTICOAGULATION MANAGEMENT     Panda Miranda 67 year old male is on warfarin with subtherapeutic INR result. (Goal INR 2.0-3.0)    Recent labs: (last 7 days)     09/23/22  1333   INR 1.6*       ASSESSMENT       Source(s): Chart Review and Patient/Caregiver Call       Warfarin doses taken: Warfarin taken as instructed template incorrect, has 3 mg tabs    Diet: No new diet changes identified    New illness, injury, or hospitalization: No    Medication/supplement changes: None noted    Signs or symptoms of bleeding or clotting: No    Previous INR: Subtherapeutic    Additional findings: None       PLAN     Recommended plan for no diet, medication or health factor changes affecting INR     Dosing Instructions: Increase your warfarin dose (12.5% change) with next INR in 1-2 weeks       Summary  As of 9/23/2022    Full warfarin instructions:  6 mg every Mon, Fri; 3 mg all other days   Next INR check:  10/7/2022             Telephone call with Panda who verbalizes understanding and agrees to plan    Patient offered & declined to schedule next visit could do with rehab on 10/4    Education provided: None required    Plan made per [facility] anticoagulation protocol    Patricia Acosta RN  Anticoagulation Clinic  9/23/2022    _______________________________________________________________________     Anticoagulation Episode Summary     Current INR goal:  2.0-3.0   TTR:  46.3 % (7.4 mo)   Target end date:  Indefinite   Send INR reminders to:  [facility]    Indications    Atrial fibrillation  unspecified type (H) [I48.91]           Comments:           Anticoagulation Care Providers     Provider Role Specialty Phone number    Ben Hamlin MD Referring Family Medicine [phone]

## 2022-09-27 NOTE — PROGRESS NOTES
Physical Therapy Initial Examination/Evaluation  September 27, 2022    Key PT Findings:   1) Ambulation with single point cane; 20 ft and needs break due to pain and cardio endurance   2) Limited cardiovascular endurance   3) Bilateral LE neuropathy affecting balance     Panda Miranda is a 67 year old male referred to physical therapy by Dr. Boubacar VAZ for treatment of Acute bilateral low back pain without sciatica with Precautions/Restrictions/MD instructions E&T    Therapist Impression: Panda Miranda presents to therapy with bilateral low back pain. Currently demonstrates impairments with low back pain. Due to these impairments, Panda Miranda is unable to standing > 1 minute, walk > 1 minute, get up from chair without pain.      Subjective:  Presenting Complaint Low back pain    Mechanism of Injury  Long time ago; back 'went out' a few weeks ago    DOI (onset)/ DOS Long time ago; few weeks ago went out    Functional Limitations Walking (from waiting room to treatment table), standing, transfers    Notable PMH See Epic chart    Prior treatment Walking  fair   Prior Imaging  X-ray; arthritis in spine (per patient)        Pain/Presentation: was in hospital for 5 months for his low back    Pain Level   Rest: 0/10  ; Activity: 9/10   Location   right sided low back pain; denies radicular pain    Frequency Intermittent   Described as sharp    Alleviated by  Warm water in shower   Progression of Sx Gradually getting better.   Time of Day  Morning, Activity related and Position related   Sleeping  Interrupted due to current issue       Social Factors/Lifestyle  Occupation and Duties Retired    Barriers at home/work None as reported by patient   Medications fentanyl patch    Current HEP/Exercise walking   Patient Reported Health fair     Patient Goals:  1) walking like he used to   2) improved cardio so not short winded     Other factors/PMH that may impact care: na      The history is provided by the patient.    Patient Health History  Panda Miranda being seen for back.       Problem occurred: unknown    Pain is reported as 10/10 on pain scale.  General health as reported by patient is fair.  Pertinent medical history includes: cancer, heart problems, kidney disease, overweight and sleep disorder/apnea.            Current medications:  High blood pressure medication.                                         Lumbar Spine Evaluation  Transfers: mod assist from supine <> sit with extreme low back pain    Dermatome Testing: negative dermatomes; no sensation bilaterally from mid-shin down - presents like peripheral neuropathy     Myotome Testing:  Negative    Motor Deficit:  Myotomes L R   L1-2 (hip flexion) 3/5 3/5   L3 (knee extension) 5/5 5/5   L4 (ankle DF) 5/5 5/5   L5 (g. toe ext) 5/5 5/5   S1 (ankle PF or knee flex) 5/5 5/5      Sensory Deficit, Reflexes: WNL    Dynamic Movement Screen  Single leg stance observations: did not test today   Double limb squat observations: requires bilateral upper extremity support and use of cane to rise from high plinth; extreme pain   Single limb squat observations: Not assessed  Gait: Excessive trunk flexion, wall surfing for balance, single point cane (uses walker at times as well)    Hip Joint Screen Positive; hip flexion and hamstring stretching creates pulling in back (not radicular pain just tightness per patient report)     Trunk Range of Motion  Extremely challenging to perform motion testing due to amount of pain and steadiness on feet     Flexibility Hamstrings Ankle Figure 4   Left moderate moderate severe   Right moderate moderate severe     Trunk Strength  Able to recruit core in hook lying position. Faulty recruitment when asked to perform isometric bridge and glut squeeze    Hip and Knee Strength   MMT Hip Abduction Hip Extension Hip ER Knee Flexion   Left nt/5 3/5 3/5 4/5   Right nt/5 3/5 3/5 4/5     Neural Tension:   SLR: negative for neural tension      Palpation:  Moderate tenderness to palpation at bilateral low back L3-5; paraspinals; R > L with pain production upon palpation     System    Physical Exam    General     ROS    Assessment/Plan:    Patient is a 67 year old male with lumbar complaints.    Patient has the following significant findings with corresponding treatment plan.                Diagnosis 1:  Low back pain  Pain -  hot/cold therapy, electric stimulation, mechanical traction and manual therapy  Decreased ROM/flexibility - manual therapy and therapeutic exercise  Decreased joint mobility - manual therapy and therapeutic exercise  Decreased strength - therapeutic exercise and therapeutic activities  Impaired balance - neuro re-education and therapeutic activities  Impaired gait - gait training  Impaired muscle performance - neuro re-education  Decreased function - therapeutic activities  Impaired posture - neuro re-education    Therapy Evaluation Codes:     1) Clinical presentation characteristics are:   Stable/Uncomplicated.  2) Decision-Making    Low complexity using standardized patient assessment instrument and/or measureable assessment of functional outcome.  Cumulative Therapy Evaluation is: Low complexity.    Previous and current functional limitations:  (See Goal Flow Sheet for this information)    Short term and Long term goals: (See Goal Flow Sheet for this information)     Communication ability:  Patient appears to be able to clearly communicate and understand verbal and written communication and follow directions correctly.  Treatment Explanation - The following has been discussed with the patient:   RX ordered/plan of care  Anticipated outcomes  Possible risks and side effects  This patient would benefit from PT intervention to resume normal activities.   Rehab potential is fair.    Frequency:  1 X week, once daily for 4 weeks then every other week 4 weeks   Duration:  for 2 months  Discharge Plan:  Achieve all LTG.  Independent in  home treatment program.  Reach maximal therapeutic benefit.    Please refer to the daily flowsheet for treatment today, total treatment time and time spent performing 1:1 timed codes.

## 2022-09-27 NOTE — PROGRESS NOTES
[facility]    OUTPATIENT Physical Therapy ORTHOPEDIC EVALUATION  PLAN OF TREATMENT FOR OUTPATIENT REHABILITATION  (COMPLETE FOR INITIAL CLAIMS ONLY)  Patient's Last Name, First Name, M.I.  YOB: 1955  Panda Miranda    Provider s Name:  [facility]   Medical Record No.  [id number]   Start of Care Date:  09/27/22   Onset Date:       Type:     _X__PT   ___OT Medical Diagnosis:    Encounter Diagnosis   Name Primary?    Acute bilateral low back pain without sciatica         Treatment Diagnosis:  low back pain        Goals:     09/27/22 0500   Body Part   Goals listed below are for low back   Goal #1   Goal #1 ambulation   Current Functional Level Minutes patient can walk   Performance Level 2 minutes with significant pain   STG Target Performance Minutes patient will be able to walk   Performance Level 5 minutes with cane   Rationale for safe household ambulation;for safe outdoor household ambulation;for safe community ambulation   Due Date 10/25/22    LTG Target Performance Minutes patient will be able to  walk   Performance Level 10 minutes with cane or walker   Rationale for safe household ambulation;for safe outdoor household ambulation   Due Date 11/22/22       Therapy Frequency:  1x/wk for 4 weeks then every other week 4 weeks  Predicted Duration of Therapy Intervention:  8 weeks    Benjamin Akhtar, PT                 I CERTIFY THE NEED FOR THESE SERVICES FURNISHED UNDER        THIS PLAN OF TREATMENT AND WHILE UNDER MY CARE     (Physician attestation of this document indicates review and certification of the therapy plan).                     Certification Date From:  11/22/22   Certification Date To:  12/25/22    Referring Provider:  Ben Hamlin    Initial Assessment        See Epic Evaluation SOC Date: 09/27/22

## 2022-10-14 NOTE — TELEPHONE ENCOUNTER
ANTICOAGULATION     Panda Miranda is overdue for INR check.      Spoke with Panda and scheduled lab appointment on 10/17    Patricia Acosta RN

## 2022-10-17 NOTE — PROGRESS NOTES
ANTICOAGULATION MANAGEMENT     Panda Miranda 67 year old male is on warfarin with subtherapeutic INR result. (Goal INR 2.0-3.0)    Recent labs: (last 7 days)     10/17/22  1340   INR 1.6*       ASSESSMENT       Source(s): Chart Review and Patient/Caregiver Call       Warfarin doses taken: Missed dose(s) may be affecting INR ran out of med    Diet: No new diet changes identified    New illness, injury, or hospitalization: No    Medication/supplement changes: None noted    Signs or symptoms of bleeding or clotting: No    Previous INR: Therapeutic last 2(+) visits    Additional findings: None       PLAN     Recommended plan for no diet, medication or health factor changes affecting INR     Dosing Instructions: booster dose then continue your current warfarin dose with next INR in 2 weeks       Summary  As of 10/17/2022    Full warfarin instructions:  10/17: 9 mg; Otherwise 6 mg every Mon, Fri; 3 mg all other days   Next INR check:  10/31/2022             Telephone call with Panda who verbalizes understanding and agrees to plan    Lab visit scheduled    Education provided: None required    Plan made per [facility] anticoagulation protocol    Patricia Acosta RN  Anticoagulation Clinic  10/17/2022    _______________________________________________________________________     Anticoagulation Episode Summary     Current INR goal:  2.0-3.0   TTR:  35.5 % (7.4 mo)   Target end date:  Indefinite   Send INR reminders to:  [facility]    Indications    Atrial fibrillation  unspecified type (H) [I48.91]           Comments:           Anticoagulation Care Providers     Provider Role Specialty Phone number    Ben Hamlin MD Referring Family Medicine [phone]

## 2022-10-24 NOTE — TELEPHONE ENCOUNTER
"Routing refill request to provider for review/approval because:  Labs out of range:  Cr  uric    Last Written Prescription Date:  6/20/22  Last Fill Quantity: 30,  # refills: 0   Last office visit provider:  6/28/22     Requested Prescriptions   Pending Prescriptions Disp Refills     colchicine (COLCYRS) 0.6 MG tablet [Pharmacy Med Name: COLCHICINE 0.6MG TABLETS] 30 tablet 0     Sig: TAKE 1 TABLET(0.6 MG) BY MOUTH DAILY       Gout Agents Protocol Failed - 10/22/2022 10:45 AM        Failed - Has Uric Acid on file in past 12 months and value is less than 6     Recent Labs   Lab Test 02/10/22  1708   URIC 7.0     If level is 6mg/dL or greater, ok to refill one time and refer to provider.           Failed - Normal serum creatinine on file in the past 12 months     Recent Labs   Lab Test 06/02/22  1158   CR 2.77*       Ok to refill medication if creatinine is low          Passed - CBC on file in past 12 months     Recent Labs   Lab Test 06/02/22  1158   WBC 6.8   RBC 4.02*   HGB 10.6*   HCT 33.6*                    Passed - ALT on file in past 12 months     Recent Labs   Lab Test 03/13/22  0725   ALT 16             Passed - Recent (12 mo) or future (30 days) visit within the authorizing provider's specialty     Patient has had an office visit with the authorizing provider or a provider within the authorizing providers department within the previous 12 mos or has a future within next 30 days. See \"Patient Info\" tab in inbasket, or \"Choose Columns\" in Meds & Orders section of the refill encounter.              Passed - Medication is active on med list        Passed - Patient is age 18 or older             Corina Ortiz, RN 10/24/22 1:23 PM  "

## 2022-10-24 NOTE — PROGRESS NOTES
Clinic Care Coordination Contact  [facility]/Voicemail       Clinical Data: Care Coordinator Outreach  Outreach attempted x 1. Unable to leave a message  Plan: CCC to outreach per standard work and updated on goal progression

## 2022-11-03 NOTE — TELEPHONE ENCOUNTER
ANTICOAGULATION     Panda Miranda is overdue for INR check.      Spoke with Panda and scheduled lab appointment on 11/7    Patricia Acosta RN

## 2022-11-07 NOTE — PROGRESS NOTES
ANTICOAGULATION MANAGEMENT     Panda Miranda 67 year old male is on warfarin with subtherapeutic INR result. (Goal INR 2.0-3.0)    Recent labs: (last 7 days)     11/07/22  1339   INR 1.8*       ASSESSMENT       Source(s): Chart Review and Patient/Caregiver Call       Warfarin doses taken: Warfarin taken as instructed    Diet: No new diet changes identified    New illness, injury, or hospitalization: No    Medication/supplement changes: None noted    Signs or symptoms of bleeding or clotting: No    Previous INR: Therapeutic last 2(+) visits    Additional findings: None       PLAN     Recommended plan for no diet, medication or health factor changes affecting INR     Dosing Instructions: Increase your warfarin dose (11.1% change) with next INR in 2 weeks       Summary  As of 11/7/2022    Full warfarin instructions:  6 mg every Mon, Wed, Fri; 3 mg all other days; Starting 11/7/2022   Next INR check:  11/21/2022             Telephone call with Panda who verbalizes understanding and agrees to plan    Lab visit scheduled    Education provided:     Contact [phone] with any changes, questions or concerns.     Plan made per [facility] anticoagulation protocol    Patricia Acosta RN  Anticoagulation Clinic  11/7/2022    _______________________________________________________________________     Anticoagulation Episode Summary     Current INR goal:  2.0-3.0   TTR:  26.0 % (7.3 mo)   Target end date:  Indefinite   Send INR reminders to:  [facility]    Indications    Atrial fibrillation  unspecified type (H) [I48.91]           Comments:           Anticoagulation Care Providers     Provider Role Specialty Phone number    Ben Hamlin MD Referring Family Medicine [phone]

## 2022-11-21 NOTE — TELEPHONE ENCOUNTER
Medication Question or Refill        What medication are you calling about (include dose and sig)?: fentaNYL (DURAGESIC) 25 mcg/hr 72 hr patch    Controlled Substance Agreement on file:   CSA -- Patient Level:    CSA: None found at the patient level.       Who prescribed the medication?:     Do you need a refill? Yes: pt called to request a refill on medication. Pt is also requesting codeine cough medicine for his cough. Please look into this request and advise. Thanks!     When did you use the medication last? N/A    Patient offered an appointment? No    Do you have any questions or concerns?  No    Preferred Pharmacy:   [id number] - [facility] [place] - [address] AT [facility] OF  [address]  [address] [facility] [address]  Phone: [phone] Fax: [phone]    [id number] - [place], [place] - [address] [facility] [address]  [address] [facility]  [address] [facility] [address]  Phone: [phone] Fax: [phone]      Okay to leave a detailed message?: Yes

## 2022-11-28 NOTE — TELEPHONE ENCOUNTER
General Call      Reason for Call:  Medical concerns    What are your questions or concerns:  Pt wife's Angelica called in regarding pt. She stated that she is very concerned, he has been in bed since wednesday, he is gaining weight and is not in good shape and is not taking care of himself. She stated that she wants him in the hospital and wants  to speak with him regarding his health. Please follow back up with wife at your earliest convenient. [phone]     Date of last appointment with provider: 6/28/22    Okay to leave a detailed message?: Yes at Other phone number:  [phone]

## 2022-11-28 NOTE — TELEPHONE ENCOUNTER
ANTICOAGULATION     Panda Miranda is overdue for INR check.      Left message for patient to call and schedule lab appointment as soon as possible. If returning call, please schedule.     Patricia Acosta RN

## 2022-11-28 NOTE — TELEPHONE ENCOUNTER
S-(situation):   What are your questions or concerns:  Pt wife's Angelica called in regarding pt. She stated that she is very concerned, he has been in bed since wednesday, he is gaining weight and is not in good shape and is not taking care of himself. She stated that she wants him in the hospital and wants  to speak with him regarding his health.  C2C on file to talk to patient's wife    B-(background):   Patient unable to lift himself up or even raise his legs on the stairs to move.  Patient's wife will lift up patient's leg step by step to get patient moving    A-(assessment):   Patient unable to care for himself and is yelling in the back ground while on speaker phone.  Patient's wife feeding patient and he is urinating, but last BM 5 days ago  Patient having severe back pain and unable to move himself or get up. Patient's wife thinks that patient has gained 30 # in 5 months. Current documented weight is 423 # on 6/28/22.  Patient also has red leg ulcer and draining clear drainage  No fever    R-(recommendations):   Advise to call 911 for assistance / ER for severe back pain and unable to get out of bed.  Message routed to SANAM. Dr Antonio for review / plan as PCP, out of clinic    Katherine Wong RN  [facility]

## 2022-11-30 NOTE — PROGRESS NOTES
Clinic Care Coordination Contact    Situation: Patient chart reviewed by care coordinator./supports     Background: Pt due for chart reivew for goal progression reivew    Assessment: pt has attended speciality visit and has schedule visit with PCP mid december    Plan/Recommendations:     RN CC will reach out for status update following PCP visit and support goal progression and reassess for new plan of care need

## 2022-12-14 NOTE — PROGRESS NOTES
"ANTICOAGULATION MANAGEMENT     Panda Miranda 67 year old male is on warfarin with subtherapeutic INR result. (Goal INR 2.0-3.0)    Recent labs: (last 7 days)     12/14/22  1243   INR 1.8*       ASSESSMENT       Source(s): Chart Review and Home Care/Facility Nurse     Warfarin doses taken: More warfarin taken than planned which may be affecting INR; per discharge note from ED: \"Give increased warfarin dose of 7.5 mg daily from 12/12-12/13/22\"    Diet: No new diet changes identified   New illness, injury, or hospitalization: Yes: Admission for chronic back pain (disc bulging) r/t morbid obesity; CKD-Cr discharged on 3.16, Cr 2.8-3.1 baseline ; Chronic bilateral lower limb edema    [facility]; transfers and daily wound care for diabetic ulcer    Medication/supplement changes: Oxycodone 5 mg/ Robaxin/ tessalon/benzonatate-no interaction anticipated    Signs or symptoms of bleeding or clotting: No    Previous INR: Subtherapeutic    Additional findings: pt may need maintenance dose increase once boosts are out of window       PLAN     Recommended plan for temporary change(s) affecting INR     Dosing Instructions: Continue your current warfarin dose with next INR in 1 week       Summary  As of 12/14/2022    Full warfarin instructions:  6 mg every Mon, Wed, Fri; 3 mg all other days; Starting 12/14/2022   Next INR check:  12/21/2022             Telephone call with [facility] nurse (medical care for seniors) who verbalizes understanding and agrees to plan and who agrees to plan and repeated back plan correctly    Orders given to  Homecare nurse/facility to recheck    Education provided:     Please call back if any changes to your diet, medications or how you've been taking warfarin    Plan made per [facility] anticoagulation protocol    Hawa Kaiser, RN  Anticoagulation Clinic  12/14/2022    _______________________________________________________________________     Anticoagulation " Episode Summary     Current INR goal:  2.0-3.0   TTR:  9.4 % (7.3 mo)   Target end date:  Indefinite   Send INR reminders to:  [facility]    Indications    Atrial fibrillation  unspecified type (H) [I48.91]           Comments:           Anticoagulation Care Providers     Provider Role Specialty Phone number    Ben Hamlin MD Referring Family Medicine [phone]

## 2022-12-14 NOTE — LETTER
12/14/2022        RE: Panda Miranda  [address]  [facility] [address]        [facility] GERIATRICS    PRIMARY CARE PROVIDER AND CLINIC:  Ben Hamlin MD, [address] [place] [id number]  Chief Complaint   Patient presents with     Hospital F/U      [facility] Record Number:  [id number]  Place of Service where encounter took place:  [facility] ([facility]) [[id number]]    Panda Miranda  is a 67 year old  (1955), admitted to the above facility from  [facility]. Hospital stay 11/29/22 through 12/12/22. Patient with PMH HFrEF, VSD, mitral regurgitation, HTN, afib on coumadin, CKD IV baseline creatinine 2.8-3.1, gout, morbid obesity s/p bariatric surgery. He presented to the ED with severe acute on chronic back pain. MRI limited due to obesity, moderate disc bulging at L5-S1 and mild bilateral neuroforaminal narrowing at L3-L4. Pain improved with medication changes.     HPI:    Patient is sitting out in the dining room, working on a puzzle. He says his pain is much better. He is easily distracted by the puzzle, but reports no other concerns  -120s/60-80s  HR 70-90s      CODE STATUS/ADVANCE DIRECTIVES DISCUSSION:  Prior  CPR/Full code   ALLERGIES: No Known Allergies   PAST MEDICAL HISTORY:   Past Medical History:   Diagnosis Date     (HFpEF) heart failure with preserved ejection fraction (H)      Arthritis      Arthropathy of wrist      Atrial fibrillation (H)      Bradycardia      Cancer (H) 2011    colon cancer; hemicolectomy     CHF (congestive heart failure) (H)      Chronic kidney disease      Gout      Hand swelling      Heart valve disease     intermittent mitral regurgitation     HTN (hypertension)      Hyperlipidemia      Morbid obesity (H)      Obesity      TASHA (obstructive sleep apnea)     CPAP     Perimembranous ventricular septal defect       PAST SURGICAL HISTORY:   has a past surgical history that includes PART REMOVAL COLON W  ANASTOMOSIS; knee surgery; Gastroplasty Vertical Banded; Colon surgery; and Colonoscopy (N/A, 12/12/2019).  FAMILY HISTORY: family history includes Diabetes in his sister; Diabetes Type 2  in an other family member; Heart Disease in his mother; Heart Failure in an other family member; Hypertension in his mother.  SOCIAL HISTORY:   reports that he has never smoked. He has never used smokeless tobacco. He reports current alcohol use of about 0.8 standard drinks per week. He reports that he does not use drugs.  Patient's living condition: lives alone    Post Discharge Medication Reconciliation Status:   MED REC REQUIRED  Post Medication Reconciliation Status:  Discharge medications reconciled, continue medications without change         Current Outpatient Medications   Medication Sig     allopurinol (ZYLOPRIM) 300 MG tablet TAKE 1 TABLET(300 MG) BY MOUTH DAILY     benzonatate (TESSALON) 100 MG capsule Take 200 mg by mouth 3 times daily as needed     bumetanide (BUMEX) 0.5 MG tablet TAKE 5 TABLETS(2.5 MG) BY MOUTH TWICE DAILY     colchicine (COLCYRS) 0.6 MG tablet TAKE 1 TABLET(0.6 MG) BY MOUTH DAILY     fentaNYL (DURAGESIC) 25 mcg/hr 72 hr patch Place 1 patch onto the skin every 72 hours remove old patch.     methocarbamol (ROBAXIN) 500 MG tablet Take 250 mg by mouth every 6 hours as needed     metoprolol succinate ER (TOPROL XL) 25 MG 24 hr tablet TAKE 1/2 TABLET(12.5 MG) BY MOUTH DAILY     oxyCODONE (ROXICODONE) 5 MG tablet Take 10 mg by mouth every 4 hours as needed     senna (SENOKOT) 8.6 MG tablet Take 1-2 tablets by mouth 2 times daily     warfarin ANTICOAGULANT (COUMADIN) 3 MG tablet Take 1-2 tablets daily as directed based on inr result     No current facility-administered medications for this visit.       ROS:  10 point ROS of systems including Constitutional, Eyes, Respiratory, Cardiovascular, Gastroenterology, Genitourinary, Integumentary, Musculoskeletal, Psychiatric were all negative except for pertinent  positives noted in my HPI.    Vitals:  There were no vitals taken for this visit.  Exam:  GENERAL APPEARANCE:  Alert, in no distress, morbidly obese  ENT:  Mouth and posterior oropharynx normal, moist mucous membranes, normal hearing acuity  EYES:  EOM normal, conjunctiva and lids normal  RESP:  respiratory effort and palpation of chest normal, lungs clear to auscultation , no respiratory distress  CV:  Palpation and auscultation of heart done , irregular rhythm (afib), rate-normal  PSYCH:  oriented X 3, affect and mood normal    Lab/Diagnostic data:  Recent labs in [facility] reviewed by me today.       ASSESSMENT/PLAN:  (M54.9,  G89.29) Chronic bilateral back pain, unspecified back location  (primary encounter diagnosis)  Comment: Chronic condition being managed with medications, frequent assessments and and therapy.  Plan: Continue current POC with no changes at this time and adjustments as needed. PT/OT eval and treat, discharge planning per their recommendations.    (N18.4) CKD (chronic kidney disease) stage 4, GFR 15-29 ml/min (H)  Comment: Chronic Kidney Disease due to: Hypertension.  Plan: Avoid nephrotoxic medications and adjust medications per renal function. Monitor renal function prn. Refer to plan of care for Hypertension.    (I48.91) Atrial fibrillation, unspecified type (H)  Comment: HR controlled. On warfarin, goal INR 2-3  Plan: Continue current POC with no changes at this time and adjustments as needed.    (I50.22) Chronic systolic congestive heart failure (H)  Comment: Chronic: CHF due to effects of HTN/Heart Disease. Currently: compensated.  Plan: Continue current medications: bumex. Monitor labs prn. Monitor weights q day. Call provider if greater than 5 pound gain from previous weight. Monitor for shortness of breath, wheezing, increasing lower extremity edema and change in activity tolerance.     (I10) Essential hypertension  Comment: Chronic, controlled  Plan: Continue current POC with no changes  at this time and adjustments as needed.      Electronically signed by:  AYAZ Walsh CNP                       Sincerely,        AYAZ Walsh CNP

## 2022-12-14 NOTE — PROGRESS NOTES
[facility] GERIATRICS    PRIMARY CARE PROVIDER AND CLINIC:  Ben Hamlin MD, [address] [place] [id number]  Chief Complaint   Patient presents with     Hospital F/U      [facility] Medical Record Number:  [id number]  Place of Service where encounter took place:  [facility] ([facility]) [[id number]]    Panda Miranda  is a 67 year old  (1955), admitted to the above facility from  [facility]. Hospital stay 11/29/22 through 12/12/22. Patient with PMH HFrEF, VSD, mitral regurgitation, HTN, afib on coumadin, CKD IV baseline creatinine 2.8-3.1, gout, morbid obesity s/p bariatric surgery. He presented to the ED with severe acute on chronic back pain. MRI limited due to obesity, moderate disc bulging at L5-S1 and mild bilateral neuroforaminal narrowing at L3-L4. Pain improved with medication changes.     HPI:    Patient is sitting out in the dining room, working on a puzzle. He says his pain is much better. He is easily distracted by the puzzle, but reports no other concerns  -120s/60-80s  HR 70-90s      CODE STATUS/ADVANCE DIRECTIVES DISCUSSION:  Prior  CPR/Full code   ALLERGIES: No Known Allergies   PAST MEDICAL HISTORY:   Past Medical History:   Diagnosis Date     (HFpEF) heart failure with preserved ejection fraction (H)      Arthritis      Arthropathy of wrist      Atrial fibrillation (H)      Bradycardia      Cancer (H) 2011    colon cancer; hemicolectomy     CHF (congestive heart failure) (H)      Chronic kidney disease      Gout      Hand swelling      Heart valve disease     intermittent mitral regurgitation     HTN (hypertension)      Hyperlipidemia      Morbid obesity (H)      Obesity      TASHA (obstructive sleep apnea)     CPAP     Perimembranous ventricular septal defect       PAST SURGICAL HISTORY:   has a past surgical history that includes PART REMOVAL COLON W ANASTOMOSIS; knee surgery; Gastroplasty Vertical Banded; Colon surgery; and Colonoscopy (N/A,  12/12/2019).  FAMILY HISTORY: family history includes Diabetes in his sister; Diabetes Type 2  in an other family member; Heart Disease in his mother; Heart Failure in an other family member; Hypertension in his mother.  SOCIAL HISTORY:   reports that he has never smoked. He has never used smokeless tobacco. He reports current alcohol use of about 0.8 standard drinks per week. He reports that he does not use drugs.  Patient's living condition: lives alone    Post Discharge Medication Reconciliation Status:   MED REC REQUIRED  Post Medication Reconciliation Status:  Discharge medications reconciled, continue medications without change         Current Outpatient Medications   Medication Sig     allopurinol (ZYLOPRIM) 300 MG tablet TAKE 1 TABLET(300 MG) BY MOUTH DAILY     benzonatate (TESSALON) 100 MG capsule Take 200 mg by mouth 3 times daily as needed     bumetanide (BUMEX) 0.5 MG tablet TAKE 5 TABLETS(2.5 MG) BY MOUTH TWICE DAILY     colchicine (COLCYRS) 0.6 MG tablet TAKE 1 TABLET(0.6 MG) BY MOUTH DAILY     fentaNYL (DURAGESIC) 25 mcg/hr 72 hr patch Place 1 patch onto the skin every 72 hours remove old patch.     methocarbamol (ROBAXIN) 500 MG tablet Take 250 mg by mouth every 6 hours as needed     metoprolol succinate ER (TOPROL XL) 25 MG 24 hr tablet TAKE 1/2 TABLET(12.5 MG) BY MOUTH DAILY     oxyCODONE (ROXICODONE) 5 MG tablet Take 10 mg by mouth every 4 hours as needed     senna (SENOKOT) 8.6 MG tablet Take 1-2 tablets by mouth 2 times daily     warfarin ANTICOAGULANT (COUMADIN) 3 MG tablet Take 1-2 tablets daily as directed based on inr result     No current facility-administered medications for this visit.       ROS:  10 point ROS of systems including Constitutional, Eyes, Respiratory, Cardiovascular, Gastroenterology, Genitourinary, Integumentary, Musculoskeletal, Psychiatric were all negative except for pertinent positives noted in my HPI.    Vitals:  There were no vitals taken for this  visit.  Exam:  GENERAL APPEARANCE:  Alert, in no distress, morbidly obese  ENT:  Mouth and posterior oropharynx normal, moist mucous membranes, normal hearing acuity  EYES:  EOM normal, conjunctiva and lids normal  RESP:  respiratory effort and palpation of chest normal, lungs clear to auscultation , no respiratory distress  CV:  Palpation and auscultation of heart done , irregular rhythm (afib), rate-normal  PSYCH:  oriented X 3, affect and mood normal    Lab/Diagnostic data:  Recent labs in [facility] reviewed by me today.       ASSESSMENT/PLAN:  (M54.9,  G89.29) Chronic bilateral back pain, unspecified back location  (primary encounter diagnosis)  Comment: Chronic condition being managed with medications, frequent assessments and and therapy.  Plan: Continue current POC with no changes at this time and adjustments as needed. PT/OT eval and treat, discharge planning per their recommendations.    (N18.4) CKD (chronic kidney disease) stage 4, GFR 15-29 ml/min (H)  Comment: Chronic Kidney Disease due to: Hypertension.  Plan: Avoid nephrotoxic medications and adjust medications per renal function. Monitor renal function prn. Refer to plan of care for Hypertension.    (I48.91) Atrial fibrillation, unspecified type (H)  Comment: HR controlled. On warfarin, goal INR 2-3  Plan: Continue current POC with no changes at this time and adjustments as needed.    (I50.22) Chronic systolic congestive heart failure (H)  Comment: Chronic: CHF due to effects of HTN/Heart Disease. Currently: compensated.  Plan: Continue current medications: bumex. Monitor labs prn. Monitor weights q day. Call provider if greater than 5 pound gain from previous weight. Monitor for shortness of breath, wheezing, increasing lower extremity edema and change in activity tolerance.     (I10) Essential hypertension  Comment: Chronic, controlled  Plan: Continue current POC with no changes at this time and adjustments as needed.      Electronically signed  by:  AYAZ Walsh CNP

## 2022-12-20 NOTE — LETTER
"    12/20/2022        RE: Panda Miranda  [address]  [facility] [address]        [facility] GERIATRICS    Chief Complaint   Patient presents with     Nursing Home Acute     HPI:  Panda Miranda is a 67 year old  (1955), who is being seen today for an episodic care visit at: [facility] ([facility]) [[id number]]. [facility] stay 11/29/22 through 12/12/22. Patient with PMH HFrEF, VSD, mitral regurgitation, HTN, afib on coumadin, CKD IV baseline creatinine 2.8-3.1, gout, morbid obesity s/p bariatric surgery. He presented to the ED with severe acute on chronic back pain. MRI limited due to obesity, moderate disc bulging at L5-S1 and mild bilateral neuroforaminal narrowing at L3-L4. Pain improved with medication changes.     Today's concern is:   Patient reports ongoing improvement in back pain and mobility. Discharge home is planned for 12/24/22. He has no acute concerns.  -120s/60-70s       Allergies, and PMH/PSH reviewed in [facility] today.  REVIEW OF SYSTEMS:  10 point ROS of systems including Constitutional, Eyes, Respiratory, Cardiovascular, Gastroenterology, Genitourinary, Integumentary, Musculoskeletal, Psychiatric were all negative except for pertinent positives noted in my HPI.    Objective:   /68   Pulse 80   Temp 97.6  F (36.4  C)   Resp 18   Ht 1.981 m (6' 6\")   Wt (!) 194 kg (427 lb 9.6 oz)   SpO2 92%   BMI 49.41 kg/m    GENERAL APPEARANCE:  Alert, in no distress, oriented, morbidly obese  ENT:  Mouth and posterior oropharynx normal, moist mucous membranes, normal hearing acuity  EYES:  EOM normal, conjunctiva and lids normal  RESP:  no respiratory distress  CV:  Palpation and auscultation of heart done , regular rate and rhythm, no murmur, rub, or gallop, no edema  SKIN:  Inspection of skin and subcutaneous tissue baseline, Palpation of skin and subcutaneous tissue baseline  PSYCH:  oriented X 3, affect and mood normal    Recent labs in [facility] " reviewed by me today.     Assessment/Plan:  (M54.9,  G89.29) Chronic bilateral back pain, unspecified back location  (primary encounter diagnosis)  Comment: Chronic condition being managed with medications and therapy.   Plan: Continue current POC with no changes at this time. PT/OT eval and treat, discharge planning per their recommendations.    (N18.4) CKD (chronic kidney disease) stage 4, GFR 15-29 ml/min (H)  Comment: Will recheck labs prior to discharge  Plan: BMP 12/21/22. Avoid nephrotoxic medications and adjust medications per renal function.     (I50.22) Chronic systolic congestive heart failure (H)  Comment: Chronic: CHF due to effects of HTN/Heart Disease. Currently: compensated.  Plan: Continue current medications. Monitor for shortness of breath, wheezing, increasing lower extremity edema and change in activity tolerance.         Orders:  VIOLETA, CBC 12/21/22      Electronically signed by: AYAZ Walsh CNP           Sincerely,        AYAZ Walsh CNP

## 2022-12-20 NOTE — PROGRESS NOTES
"[facility] GERIATRICS    Chief Complaint   Patient presents with     Nursing Home Acute     HPI:  Panda Miranda is a 67 year old  (1955), who is being seen today for an episodic care visit at: [facility] ([facility]) [[id number]]. [facility] stay 11/29/22 through 12/12/22. Patient with PMH HFrEF, VSD, mitral regurgitation, HTN, afib on coumadin, CKD IV baseline creatinine 2.8-3.1, gout, morbid obesity s/p bariatric surgery. He presented to the ED with severe acute on chronic back pain. MRI limited due to obesity, moderate disc bulging at L5-S1 and mild bilateral neuroforaminal narrowing at L3-L4. Pain improved with medication changes.     Today's concern is:   Patient reports ongoing improvement in back pain and mobility. Discharge home is planned for 12/24/22. He has no acute concerns.  -120s/60-70s       Allergies, and PMH/PSH reviewed in [facility] today.  REVIEW OF SYSTEMS:  10 point ROS of systems including Constitutional, Eyes, Respiratory, Cardiovascular, Gastroenterology, Genitourinary, Integumentary, Musculoskeletal, Psychiatric were all negative except for pertinent positives noted in my HPI.    Objective:   /68   Pulse 80   Temp 97.6  F (36.4  C)   Resp 18   Ht 1.981 m (6' 6\")   Wt (!) 194 kg (427 lb 9.6 oz)   SpO2 92%   BMI 49.41 kg/m    GENERAL APPEARANCE:  Alert, in no distress, oriented, morbidly obese  ENT:  Mouth and posterior oropharynx normal, moist mucous membranes, normal hearing acuity  EYES:  EOM normal, conjunctiva and lids normal  RESP:  no respiratory distress  CV:  Palpation and auscultation of heart done , regular rate and rhythm, no murmur, rub, or gallop, no edema  SKIN:  Inspection of skin and subcutaneous tissue baseline, Palpation of skin and subcutaneous tissue baseline  PSYCH:  oriented X 3, affect and mood normal    Recent labs in [facility] reviewed by me today.     Assessment/Plan:  (M54.9,  G89.29) Chronic bilateral back pain, " unspecified back location  (primary encounter diagnosis)  Comment: Chronic condition being managed with medications and therapy.   Plan: Continue current POC with no changes at this time. PT/OT eval and treat, discharge planning per their recommendations.    (N18.4) CKD (chronic kidney disease) stage 4, GFR 15-29 ml/min (H)  Comment: Will recheck labs prior to discharge  Plan: BMP 12/21/22. Avoid nephrotoxic medications and adjust medications per renal function.     (I50.22) Chronic systolic congestive heart failure (H)  Comment: Chronic: CHF due to effects of HTN/Heart Disease. Currently: compensated.  Plan: Continue current medications. Monitor for shortness of breath, wheezing, increasing lower extremity edema and change in activity tolerance.         Orders:  VIOLETA, CBC 12/21/22      Electronically signed by: AYAZ Walsh CNP

## 2022-12-21 NOTE — PROGRESS NOTES
ANTICOAGULATION MANAGEMENT     Panda Miranda 67 year old male is on warfarin with therapeutic INR result. (Goal INR 2.0-3.0)    Recent labs: (last 7 days)     12/21/22  0000   INR 2.3*       ASSESSMENT       Source(s): Chart Review and Home Care/Facility Nurse       Warfarin doses taken: Warfarin taken as instructed    Diet: No new diet changes identified    New illness, injury, or hospitalization: No    Medication/supplement changes: None noted    Signs or symptoms of bleeding or clotting: No    Previous INR: Subtherapeutic    Additional findings: None       PLAN     Recommended plan for no diet, medication or health factor changes affecting INR     Dosing Instructions: Continue your current warfarin dose with next INR in 1 week       Summary  As of 12/21/2022    Full warfarin instructions:  6 mg every Mon, Wed, Fri; 3 mg all other days   Next INR check:  12/28/2022             Telephone call with [facility] nurse (medical care for seniors) who verbalizes understanding and agrees to plan    Orders given to  Homecare nurse/facility to recheck    Education provided:     Please call back if any changes to your diet, medications or how you've been taking warfarin    Plan made per [facility] anticoagulation protocol    Liana Bullard, RN  Anticoagulation Clinic  12/21/2022    _______________________________________________________________________     Anticoagulation Episode Summary     Current INR goal:  2.0-3.0   TTR:  8.3 % (7.3 mo)   Target end date:  Indefinite   Send INR reminders to:  [facility] (TCU/LTC/[facility])    Indications    Atrial fibrillation  unspecified type (H) [I48.91]           Comments:  back to [facility] after dc'd from [facility]         Anticoagulation Care Providers     Provider Role Specialty Phone number    Ben Hamlin MD Referring Family Medicine [phone]

## 2022-12-21 NOTE — PROGRESS NOTES
Navya at [facility] left a VM at 10:40 AM with INR result of 2.3  [phone]  Heather Guerrero, RN  Anticoagulation Nurse - [facility], [place]

## 2022-12-21 NOTE — PROGRESS NOTES
Patient is discharged from PT.  Patient has not followed up with physical therapy in greater than two months. Please refer to progress note or current encounter for last known functional status as well as final objective/subjective values.

## 2022-12-22 NOTE — PROGRESS NOTES
[facility] GERIATRICS DISCHARGE SUMMARY  PATIENT'S NAME: Panda Mirnada  YOB: 1955  MEDICAL RECORD NUMBER:  [id number]  Place of Service where encounter took place:  [facility] ([facility]) [[id number]]    PRIMARY CARE PROVIDER AND CLINIC RESPONSIBLE AFTER TRANSFER:   Ben Hamlin MD, [address] [place] [id number]    [facility] Provider     Transferring providers: AYAZ Walsh CNP  Recent Hospitalization/ED:  [facility] stay 11/29/2022 to 12/12/2022.  Date of SNF Admission: December 12, 2022  Date of SNF (anticipated) Discharge: December 24, 2022  Discharged to: previous independent home  DME: Hospital Bed    CODE STATUS/ADVANCE DIRECTIVES DISCUSSION:  Full Code   ALLERGIES: Patient has no known allergies.    NURSING FACILITY COURSE   Medication Changes/Rationale:     none    Summary of nursing facility stay:   [facility] stay 11/29/22 through 12/12/22. Patient with PMH HFrEF, VSD, mitral regurgitation, HTN, afib on coumadin, CKD IV baseline creatinine 2.8-3.1, gout, morbid obesity s/p bariatric surgery. He presented to the ED with severe acute on chronic back pain. MRI limited due to obesity, moderate disc bulging at L5-S1 and mild bilateral neuroforaminal narrowing at L3-L4. Pain improved with medication changes.     Chronic bilateral back pain, unspecified back location  Pain well controlled. Mobility has improved, but he requires assist of 2 to sit up in bed and then assist of 1 to transfer. A hospital bed has been ordered for use at home. With this he will be able to transfer.     CKD (chronic kidney disease) stage 4, GFR 15-29 ml/min (H)  See labs    Chronic systolic congestive heart failure (H)  Currently compensated    Atrial fibrillation, unspecified type (H)  HR controlled. On warfarin per anticoagulation clinic    Essential hypertension  -120s/60-70s      Discharge Medications:    Current Outpatient Medications  "  Medication Sig Dispense Refill     allopurinol (ZYLOPRIM) 300 MG tablet TAKE 1 TABLET(300 MG) BY MOUTH DAILY 90 tablet 3     benzonatate (TESSALON) 100 MG capsule Take 200 mg by mouth 3 times daily as needed       bumetanide (BUMEX) 0.5 MG tablet TAKE 5 TABLETS(2.5 MG) BY MOUTH TWICE DAILY 900 tablet 1     colchicine (COLCYRS) 0.6 MG tablet TAKE 1 TABLET(0.6 MG) BY MOUTH DAILY 30 tablet 0     fentaNYL (DURAGESIC) 25 mcg/hr 72 hr patch Place 1 patch onto the skin every 72 hours remove old patch. 10 patch 0     methocarbamol (ROBAXIN) 500 MG tablet Take 250 mg by mouth every 6 hours as needed       metoprolol succinate ER (TOPROL XL) 25 MG 24 hr tablet TAKE 1/2 TABLET(12.5 MG) BY MOUTH DAILY 45 tablet 3     oxyCODONE (ROXICODONE) 5 MG tablet Take 2 tablets (10 mg) by mouth every 4 hours as needed for pain 30 tablet 0     senna (SENOKOT) 8.6 MG tablet Take 1-2 tablets by mouth 2 times daily       warfarin ANTICOAGULANT (COUMADIN) 3 MG tablet Take 1-2 tablets daily as directed based on inr result 135 tablet 1        Controlled medications:   Remaining Fentanyl and oxycodone to be sent     Past Medical History:   Past Medical History:   Diagnosis Date     (HFpEF) heart failure with preserved ejection fraction (H)      Arthritis      Arthropathy of wrist      Atrial fibrillation (H)      Bradycardia      Cancer (H) 2011    colon cancer; hemicolectomy     CHF (congestive heart failure) (H)      Chronic kidney disease      Gout      Hand swelling      Heart valve disease     intermittent mitral regurgitation     HTN (hypertension)      Hyperlipidemia      Morbid obesity (H)      Obesity      TASHA (obstructive sleep apnea)     CPAP     Perimembranous ventricular septal defect      Physical Exam:   Vitals: BP (!) 140/74   Pulse 84   Resp 18   Ht 1.981 m (6' 6\")   Wt (!) 193.7 kg (427 lb)   SpO2 98%   BMI 49.34 kg/m    BMI: Body mass index is 49.34 kg/m .   GENERAL APPEARANCE:  Alert, in no distress, oriented, morbidly " obese  ENT:  Mouth and posterior oropharynx normal, moist mucous membranes, normal hearing acuity  EYES:  EOM normal, conjunctiva and lids normal  RESP:  no respiratory distress  CV:  Palpation and auscultation of heart done , regular rate and rhythm, no murmur, rub, or gallop, no edema  SKIN:  Inspection of skin and subcutaneous tissue baseline, Palpation of skin and subcutaneous tissue baseline  PSYCH:  oriented X 3, affect and mood normal      SNF labs: Labs done in SNF are in [place] EPIC. Please refer to them using [employer]Care Everywhere.    DISCHARGE PLAN:    Follow up labs: No labs orders/due    Medical Follow Up:      Follow up with primary care provider in 1-2 weeks    Discharge Services: Home Care:  Occupational Therapy, Physical Therapy and Home Health Aide      TOTAL DISCHARGE TIME:   Greater than 30 minutes  Electronically signed by:  AYAZ Walsh CNP     Documentation of Face to Face and Certification for Home Health Services    I certify that services are/were furnished while this patient was under the care of a physician and that a physician or an allowed non-physician practitioner (NPP), had a face-to-face encounter that meets the physician face-to-face encounter requirements. The encounter was in whole, or in part, related to the primary reason for home health. The patient is confined to his/her home and needs intermittent skilled nursing, physical therapy, speech-language pathology, or the continued need for occupational therapy. A plan of care has been established by a physician and is periodically reviewed by a physician.  Date of Face-to-Face Encounter: 12/22/2022.    I certify that, based on my findings, the following services are medically necessary home health services: Occupational Therapy and Physical Therapy.    My clinical findings support the need for the above skilled services because: Requires assistance of another person or specialized equipment to access medical services  because patient: Requires supervision of another for safe transfer...    Patient to re-establish plan of care with their PCP within 7-10 days after leaving the facility to reestablish care.  Medicare certified PECOS provider: AYAZ Walsh CNP  Date: December 22, 2022

## 2022-12-22 NOTE — PROGRESS NOTES
Clinic Care Coordination Contact    Situation: Patient chart reviewed by care coordinator.    Background: RN CC scheduled outreach to support plan of care and assess for CCC support needs     Assessment: Pt is currently at [facility]   Anticipate discharge to home within one week     Plan/Recommendations: RN CC will monitor chart and outreach upon discharge to support follow up plan and next steps

## 2022-12-22 NOTE — PROGRESS NOTES
[facility] GERIATRICS  Face to Face and Medical Necessity Statement for DME Provider visit    Patient: Panda Miranda  Gender: male  YOB: 1955  [facility] Medical Record Number: [id number]  Demographics:  [address]  [facility] [address] [phone] (home) [phone] (work)  Social Security Number: [id number]  Primary Care Provider: Ben Hamlin  Insurance: Payor: [employer] / Plan: [employer] MEDICARE ADVANTAGE / Product Type: HMO /     HPI: Panda Miranda is a 67 year old (1955), who is being seen today for a face to face provider visit at [facility]. Medical necessity statement for DME included.     This patient requires the following: DME Ordered and Medical Necessity Statement   Hospital bed: semi-electronic with half side rails    The patient does  require positioning of the body in ways not feasible with an ordinary bed due to a medical condition that is expected to last at least 1 month due to (M48.062) Spinal stenosis, lumbar region, with neurogenic claudication and (I50.32) Chronic diastolic heart failure.   The patient does  require, for the alleviation of pain, postioning of the body in ways not feasible with an ordinary bed.   The patient does  require the head of bed elevated more than 30* most of the time due to CHF, chronic pulmonary disease or aspiration.  The patient does not require traction that can only be attached to a hospital bed.  The patient does  require a bed height different than a fixed height hospital bed to permit tranfers to wheelchair or standing position.   The patient does  require frequent or immediate changes in body position due to (M48.062) Spinal stenosis, lumbar region, with neurogenic claudication.       Pt needing above DME with expected length of need of 99 months due to medical necessity associated with following diagnosis:     Spinal stenosis, lumbar region, with neurogenic claudication  Chronic  "diastolic heart failure (H)      Past Medical History:   has a past medical history of (HFpEF) heart failure with preserved ejection fraction (H), Arthritis, Arthropathy of wrist, Atrial fibrillation (H), Bradycardia, Cancer (H) (2011), CHF (congestive heart failure) (H), Chronic kidney disease, Gout, Hand swelling, Heart valve disease, HTN (hypertension), Hyperlipidemia, Morbid obesity (H), Obesity, TASHA (obstructive sleep apnea), and Perimembranous ventricular septal defect.    Review of Systems:  10 point ROS of systems including Constitutional, Eyes, Respiratory, Cardiovascular, Gastroenterology, Genitourinary, Integumentary, Musculoskeletal, Psychiatric were all negative except for pertinent positives noted in my HPI.    Exam:  Vitals: Height 6'6\"   Weight 427.6 lbs  GENERAL APPEARANCE:  Alert, in no distress, morbidly obese  ENT:  Mouth and posterior oropharynx normal, moist mucous membranes, normal hearing acuity  EYES:  EOM normal, conjunctiva and lids normal  RESP:  respiratory effort and palpation of chest normal, lungs clear to auscultation , no respiratory distress  CV:  Palpation and auscultation of heart done , irregular rhythm (afib), rate-normal, no edema  PSYCH:  oriented X 3, affect and mood normal      Assessment/Plan:  1. Spinal stenosis, lumbar region, with neurogenic claudication    2. Chronic diastolic heart failure (H)        Orders:  1. Facility staff/TC to contact DME company to get their order form for provider to fill out    ELECTRONICALLY SIGNED BY OTSI CERTIFIED PROVIDER: AYAZ Walsh CNP   NPI: [id number]  [facility] [address] [place] [address]    "

## 2022-12-22 NOTE — TELEPHONE ENCOUNTER
[facility] Geriatrics Lab Note     Provider: AYAZ Fan CNP   Facility: [facility]  Facility Type:  TCU    No Known Allergies    Labs Reviewed by provider: VIOLETA       Verbal Order/Direction given by Provider: carlita VALDEZ to discharge as planned     Provider giving Order:  AYAZ Fan CNP     Verbal Order given to: Daniel Cantor RN

## 2022-12-22 NOTE — LETTER
12/22/2022        RE: Panda Miranda  [address]  [place] [address]        [facility] GERIATRICS DISCHARGE SUMMARY  PATIENT'S NAME: Panda Miranda  YOB: 1955  MEDICAL RECORD NUMBER:  [id number]  Place of Service where encounter took place:  [facility] ([facility]) [[id number]]    PRIMARY CARE PROVIDER AND CLINIC RESPONSIBLE AFTER TRANSFER:   Ben Hamlin MD, [address] [place] [address]    [facility] Provider     Transferring providers: AYAZ Walsh CNP  Recent Hospitalization/ED:  [facility] stay 11/29/2022 to 12/12/2022.  Date of SNF Admission: December 12, 2022  Date of [facility] (anticipated) Discharge: December 24, 2022  Discharged to: previous independent home  DME: Hospital Bed    CODE STATUS/ADVANCE DIRECTIVES DISCUSSION:  Full Code   ALLERGIES: Patient has no known allergies.    NURSING FACILITY COURSE   Medication Changes/Rationale:     none    Summary of nursing facility stay:   [facility] stay 11/29/22 through 12/12/22. Patient with PMH HFrEF, VSD, mitral regurgitation, HTN, afib on coumadin, CKD IV baseline creatinine 2.8-3.1, gout, morbid obesity s/p bariatric surgery. He presented to the ED with severe acute on chronic back pain. MRI limited due to obesity, moderate disc bulging at L5-S1 and mild bilateral neuroforaminal narrowing at L3-L4. Pain improved with medication changes.     Chronic bilateral back pain, unspecified back location  Pain well controlled. Mobility has improved, but he requires assist of 2 to sit up in bed and then assist of 1 to transfer. A hospital bed has been ordered for use at home. With this he will be able to transfer.     CKD (chronic kidney disease) stage 4, GFR 15-29 ml/min (H)  See labs    Chronic systolic congestive heart failure (H)  Currently compensated    Atrial fibrillation, unspecified type (H)  HR controlled. On warfarin per anticoagulation clinic    Essential  "hypertension  -120s/60-70s      Discharge Medications:    Current Outpatient Medications   Medication Sig Dispense Refill     allopurinol (ZYLOPRIM) 300 MG tablet TAKE 1 TABLET(300 MG) BY MOUTH DAILY 90 tablet 3     benzonatate (TESSALON) 100 MG capsule Take 200 mg by mouth 3 times daily as needed       bumetanide (BUMEX) 0.5 MG tablet TAKE 5 TABLETS(2.5 MG) BY MOUTH TWICE DAILY 900 tablet 1     colchicine (COLCYRS) 0.6 MG tablet TAKE 1 TABLET(0.6 MG) BY MOUTH DAILY 30 tablet 0     fentaNYL (DURAGESIC) 25 mcg/hr 72 hr patch Place 1 patch onto the skin every 72 hours remove old patch. 10 patch 0     methocarbamol (ROBAXIN) 500 MG tablet Take 250 mg by mouth every 6 hours as needed       metoprolol succinate ER (TOPROL XL) 25 MG 24 hr tablet TAKE 1/2 TABLET(12.5 MG) BY MOUTH DAILY 45 tablet 3     oxyCODONE (ROXICODONE) 5 MG tablet Take 2 tablets (10 mg) by mouth every 4 hours as needed for pain 30 tablet 0     senna (SENOKOT) 8.6 MG tablet Take 1-2 tablets by mouth 2 times daily       warfarin ANTICOAGULANT (COUMADIN) 3 MG tablet Take 1-2 tablets daily as directed based on inr result 135 tablet 1        Controlled medications:   Remaining Fentanyl and oxycodone to be sent     Past Medical History:   Past Medical History:   Diagnosis Date     (HFpEF) heart failure with preserved ejection fraction (H)      Arthritis      Arthropathy of wrist      Atrial fibrillation (H)      Bradycardia      Cancer (H) 2011    colon cancer; hemicolectomy     CHF (congestive heart failure) (H)      Chronic kidney disease      Gout      Hand swelling      Heart valve disease     intermittent mitral regurgitation     HTN (hypertension)      Hyperlipidemia      Morbid obesity (H)      Obesity      TASHA (obstructive sleep apnea)     CPAP     Perimembranous ventricular septal defect      Physical Exam:   Vitals: BP (!) 140/74   Pulse 84   Resp 18   Ht 1.981 m (6' 6\")   Wt (!) 193.7 kg (427 lb)   SpO2 98%   BMI 49.34 kg/m    BMI: " Body mass index is 49.34 kg/m .   GENERAL APPEARANCE:  Alert, in no distress, oriented, morbidly obese  ENT:  Mouth and posterior oropharynx normal, moist mucous membranes, normal hearing acuity  EYES:  EOM normal, conjunctiva and lids normal  RESP:  no respiratory distress  CV:  Palpation and auscultation of heart done , regular rate and rhythm, no murmur, rub, or gallop, no edema  SKIN:  Inspection of skin and subcutaneous tissue baseline, Palpation of skin and subcutaneous tissue baseline  PSYCH:  oriented X 3, affect and mood normal      SNF labs: Labs done in SNF are in [place] EPIC. Please refer to them using EPIC/Care Everywhere.    DISCHARGE PLAN:    Follow up labs: No labs orders/due    Medical Follow Up:      Follow up with primary care provider in 1-2 weeks    Discharge Services: Home Care:  Occupational Therapy, Physical Therapy and Home Health Aide      TOTAL DISCHARGE TIME:   Greater than 30 minutes  Electronically signed by:  AYAZ Walsh CNP     Documentation of Face to Face and Certification for Home Health Services    I certify that services are/were furnished while this patient was under the care of a physician and that a physician or an allowed non-physician practitioner (NPP), had a face-to-face encounter that meets the physician face-to-face encounter requirements. The encounter was in whole, or in part, related to the primary reason for home health. The patient is confined to his/her home and needs intermittent skilled nursing, physical therapy, speech-language pathology, or the continued need for occupational therapy. A plan of care has been established by a physician and is periodically reviewed by a physician.  Date of Face-to-Face Encounter: 12/22/2022.    I certify that, based on my findings, the following services are medically necessary home health services: Occupational Therapy and Physical Therapy.    My clinical findings support the need for the above skilled services  because: Requires assistance of another person or specialized equipment to access medical services because patient: Requires supervision of another for safe transfer...    Patient to re-establish plan of care with their PCP within 7-10 days after leaving the facility to reestablish care.  Medicare certified PECOS provider: AYAZ Walsh CNP  Date: December 22, 2022                  Sincerely,        AYAZ Walsh CNP

## 2022-12-26 NOTE — ED TRIAGE NOTES
"Pt BIBA with c/o back spasms. Pt states he was here last week for same issue & was found to be COVID positive. Denies any COVID symptoms this afternoon. Pain now 10/10, pt ambulatory w/cane, VSS on RA.    triager    Negative: Symptoms of COVID-19 (e.g., cough, fever, SOB, or others) and lives in an area with community spread    Negative: Sounds like a cold and there is no fever    Negative: Cough and there is no fever    Negative: Severe cough    Negative: Throat pain and there is no fever    Negative: Severe sore throat    Negative: Influenza vaccine reaction is suspected    Negative: Headache and stiff neck (can't touch chin to chest)    Negative: Chest pain  (Exception: MILD central chest pain, present only when coughing.)    Negative: Difficulty breathing that is not severe and not relieved by cleaning out the nose    Protocols used: INFLUENZA - SEASONAL-A-OH

## 2022-12-29 NOTE — TELEPHONE ENCOUNTER
ANTICOAGULATION     Panda Miranda is overdue for INR check.      Was unable to reach patient and was unable to leave a voicemail.  Attempted mobile number but mailbox was not set up.  Attempted home number but mailbox was full.  If patient calls, please schedule INR check as soon as possible.     Carrington Ren RN

## 2023-01-01 ENCOUNTER — TELEPHONE (OUTPATIENT)
Dept: ANTICOAGULATION | Facility: CLINIC | Age: 68
End: 2023-01-01
Payer: COMMERCIAL

## 2023-01-01 ENCOUNTER — PATIENT OUTREACH (OUTPATIENT)
Dept: CARE COORDINATION | Facility: CLINIC | Age: 68
End: 2023-01-01

## 2023-01-01 ENCOUNTER — PATIENT OUTREACH (OUTPATIENT)
Dept: CARE COORDINATION | Facility: CLINIC | Age: 68
End: 2023-01-01
Payer: COMMERCIAL

## 2023-01-01 DIAGNOSIS — M10.9 GOUT, UNSPECIFIED CAUSE, UNSPECIFIED CHRONICITY, UNSPECIFIED SITE: ICD-10-CM

## 2023-01-01 DIAGNOSIS — I50.22 CHRONIC SYSTOLIC CONGESTIVE HEART FAILURE (H): ICD-10-CM

## 2023-01-01 RX ORDER — COLCHICINE 0.6 MG/1
TABLET ORAL
Qty: 30 TABLET | Refills: 0 | Status: SHIPPED | OUTPATIENT
Start: 2023-01-01

## 2023-01-01 RX ORDER — BUMETANIDE 0.5 MG/1
TABLET ORAL
Qty: 900 TABLET | Refills: 1 | Status: SHIPPED | OUTPATIENT
Start: 2023-01-01

## 2023-01-04 NOTE — PROGRESS NOTES
Clinic Care Coordination Contact    Situation: Patient chart reviewed by care coordinator.    Background: RN CC review for status update    Assessment: Pt in TCU - no idnication of discharge date         Plan/Recommendations: CC will monitor and outreach upon discharge

## 2023-01-18 NOTE — TELEPHONE ENCOUNTER
"Routing refill request to provider for review/approval because:  Labs out of range:  Serum creatinine, potassium  Sl. Elevated BP    Last Written Prescription Date:  06/21/2022  Last Fill Quantity: 900,  # refills: 1   Last office visit provider:   06/28/2022    Requested Prescriptions   Pending Prescriptions Disp Refills     bumetanide (BUMEX) 0.5 MG tablet [Pharmacy Med Name: BUMETANIDE 0.5MG TABLETS] 900 tablet 1     Sig: TAKE 5 TABLETS BY MOUTH TWICE DAILY       Diuretics (Including Combos) Protocol Failed - 1/17/2023  3:52 AM        Failed - Blood pressure under 140/90 in past 12 months     BP Readings from Last 3 Encounters:   12/22/22 (!) 140/74   12/20/22 119/68   06/28/22 106/70                 Failed - Normal serum creatinine on file in past 12 months     Recent Labs   Lab Test 12/21/22  1237   CR 2.70*              Failed - Normal serum potassium on file in past 12 months     Recent Labs   Lab Test 12/21/22  1237   POTASSIUM 6.3*                    Passed - Recent (12 mo) or future (30 days) visit within the authorizing provider's specialty     Patient has had an office visit with the authorizing provider or a provider within the authorizing providers department within the previous 12 mos or has a future within next 30 days. See \"Patient Info\" tab in inbasket, or \"Choose Columns\" in Meds & Orders section of the refill encounter.              Passed - Medication is active on med list        Passed - Patient is age 18 or older        Passed - Normal serum sodium on file in past 12 months     Recent Labs   Lab Test 12/21/22  1237                      Radha Ghosh 01/18/23 2:06 PM  "

## 2023-01-26 NOTE — LETTER
[facility]  [address] [facility] [address]  Phone: [phone]  Fax: [phone]   January 26, 2023        Panda Miranda  [address]  [facility] [address]            Dear Panda,    You are currently under the care of [facility] for your warfarin (Coumadin , Jantoven ) therapy.  We are contacting you because our records show you were due for an INR on 12/28.    There are potentially serious risks when taking warfarin without careful monitoring and we want to make sure you are safely managed.  Routine lab monitoring is required for warfarin refills.     Please call [phone] as soon as possible to schedule an appointment.  If there has been a change in your care or other concerns, please let us know so we can help and or update our records.     Sincerely,       [facility]

## 2023-02-03 NOTE — TELEPHONE ENCOUNTER
"Routing refill request to provider for review/approval because:  Labs not current:  Multiple  Gap in medication    Last Written Prescription Date:  10/25/2022  Last Fill Quantity: 30,  # refills: 0   Last office visit provider:  6/28/2022     Requested Prescriptions   Pending Prescriptions Disp Refills     colchicine (COLCYRS) 0.6 MG tablet [Pharmacy Med Name: COLCHICINE 0.6MG TABLETS] 30 tablet 0     Sig: TAKE 1 TABLET(0.6 MG) BY MOUTH DAILY       Gout Agents Protocol Failed - 2/2/2023  2:44 PM        Failed - Has Uric Acid on file in past 12 months and value is less than 6     Recent Labs   Lab Test 02/10/22  1708   URIC 7.0     If level is 6mg/dL or greater, ok to refill one time and refer to provider.           Failed - Normal serum creatinine on file in the past 12 months     Recent Labs   Lab Test 12/21/22  1237   CR 2.70*       Ok to refill medication if creatinine is low          Passed - CBC on file in past 12 months     Recent Labs   Lab Test 12/21/22  1237   WBC 4.7   RBC 3.78*   HGB 10.1*   HCT 33.5*   *                 Passed - ALT on file in past 12 months     Recent Labs   Lab Test 03/13/22  0725   ALT 16             Passed - Recent (12 mo) or future (30 days) visit within the authorizing provider's specialty     Patient has had an office visit with the authorizing provider or a provider within the authorizing providers department within the previous 12 mos or has a future within next 30 days. See \"Patient Info\" tab in inbasket, or \"Choose Columns\" in Meds & Orders section of the refill encounter.              Passed - Medication is active on med list        Passed - Patient is age 18 or older             Sybil Palomo RN 02/03/23 11:35 AM      "

## 2023-02-07 NOTE — PROGRESS NOTES
Clinic Care Coordination Contact    Situation: Patient chart reviewed by care coordinator.    Background: Monthly chart review for status update    Assessment: pt TCU status unclear   Outreach unsuccessful    Plan/Recommendations: will monitor for one additional month to attempt to engage with pt and support plan of care goals

## 2023-02-09 NOTE — TELEPHONE ENCOUNTER
ANTICOAGULATION     Panda Miranda is overdue for INR check.      Was unable to reach patient and was unable to leave a voicemail. If patient calls, please schedule INR check as soon as possible.     Patricia Acosta RN

## 2023-02-16 NOTE — LETTER
[facility]  [address] [facility] [address]  Phone: [phone]  Fax: [phone]   February 16, 2023        Panda Miranda  [address]  [facility] [address]            Dear Panda,    You are currently under the care of [facility] for your warfarin (Coumadin , Jantoven ) therapy.  We are contacting you because our records show you were due for an INR on ***.    There are potentially serious risks when taking warfarin without careful monitoring and we want to make sure you are safely managed.  Routine lab monitoring is required for warfarin refills.     Please call {[phone]} as soon as possible to schedule an appointment.  If there has been a change in your care or other concerns, please let us know so we can help and or update our records.     Sincerely,       [facility]

## 2023-02-16 NOTE — TELEPHONE ENCOUNTER
ANTICOAGULATION     Panda Miranda is overdue for INR check.     VM full  Through Care Everywhere I see he is currently in hospital at [facility] awaiting tcu     Reminder letter sent    Patricia Acosta RN

## 2023-02-28 NOTE — PROGRESS NOTES
[facility]    Medicine Progress Note - Hospitalist Service, GOLD TEAM 17    Date of Admission:  4/15/2022    Assessment & Plan          Panda Mirnada is a 61 y/o man who has multiple medical problems including gout, pseudogout, atrial fibrillation, morbid obesity s/p past bariatric surgery and chronic kidney disease. Patient was hospitalized at [facility] from 27-Jan-2022 to 30-Jan-2022 for sepsis secondary to complicated urinary tract infection vs. pyelonephritis. Patient was hospitalized at [facility] from 11-Feb-2022 to 20-Feb-2022 for hyperkalemia, acute kidney injury and acute gout. Patient was discharged on 20-Feb-2022 and presented to [facility] with weakness - patient reportedly was unable to walk up the steps to his home. Patient was later found to have recurrent hyperkalemia. During hospital stay, patient was also treated for acute polyarticular gout. Patient completed a prednisone taper during hospital stay. Patient was also transitioned from uloric to allopurinol. Patient had pancytopenia during hospital stay that resolved prior to discharge. Patient was discharged to TCU on 18-Mar-2022. Patient was admitted to [facility] on 15-Apr-2022 for anasarca.        Anasarca  -  On iv bumex now  - continue to monitor renal functions, has  lost a lot of fluid on board still with significant edema but much improved    - weight down , was 210 kg ( 462 lbs) on 4.17 and is down to 187 kg ( 421 lbs) as of 4/25 but now 4/28 weight is  200.9 kbg 442 lbs, suspect the 412 lbs was not correct or drinking more as urin output has been ~ 2 liters, I/o now + still with lots of edema and urine output down so  increased  bumex to tid 4/30  , would continue with iv till there is a significant bump in creatinine   - continue with iv bumex currently on 2 mg bid till creatinine has significant rise , continue with fluid restrictions , change to oral bumex once closer to  discharge  ( possibly on bumex 4 mg po bid )   - if trend of decreasing urin output continue will up bumex to tid   - cardiology  recommended aggressive diuresis with 2 liters a day, so monitor for need to adjust bumex dose         HFrEF , EF 45-50% on  echocardiogram 01/29/22   -cannot use ACEi/ARB with renal functions and recurrent hyperkalemia needing hospitalization left    Known Perimembranous VSD (small) with left to right shunting   - follow up  With cardiology   as out patient       Hypertension    -continue metoprolol and  bumex    - Recommend no resumption of ACE-I/ARB anytime soon (may potentially never tolerate due to life threatening complications)    Permanent atrial fibrillation  -HR controlled on Metoprolol, might need to adjust dose if blood pressure  Are low to allow room for diuresis   - on chronic anticoagulating with coumadin       TASHA vs. OHS   - Continue CPAP at night      Chronic kidney disease stage IV   - continue to monitor on IV bumex, switch to orals once creatinine rises   -creatinine bit up , continue to monitor closely   - replace k   - has had history of hospitalization with hyperkalemia       Chronic venous stasis ulcers   continue Wound care.      Gout   - continue  colchicine  and allopurinol.      Morbid obesity:  - stared on diet per nutritionist  -  Follow up  as outpatient.     History of colon cancer  - s/p hemicolectomy in 2011: Further surveillance as outpatient.      Anemia of Chronic Disease, pancytopenia   -was seen by enma during one of his hospitalization  -pancytopenia was felty to be due to bone marrow suppression from acute illness and medications ,  No iron, B12 or folate deficiency noted    - WBC plt counts now normal  - Hg stable now in 10s       Generalized weakness, debility, deconditioning  - continue  PT/OT. Continued marked improvement in functional status with diuresis  - Pt will still likely need to discharge to SNF (not [facility])         Right lateral heel  Pressure injury community acquired.   -Continue tx per Wound Care.            Diet: Fluid restriction 1200 ML FLUID  Combination Diet Renal Diet (non-dialysis); Low Saturated Fat Na <2400mg Diet  Snacks/Supplements Adult: Other; Orange Gelatein BID at lunch and dinner meals - do not count towards diet orders; Between Meals    DVT Prophylaxis: Warfarin and Ambulate every shift  Cheema Catheter: Not present  Central Lines: None  Cardiac Monitoring: None  Code Status: Full Code      Disposition Plan   Expected Discharge: needs ongoing iv diuresis    Anticipated discharge location:  Awaiting care coordination huddle  likely TCU as still needs ongoing strengthening as his wife would be unable to assist him        The patient's care was discussed with the care team     Gayle Almanzar MD  Hospitalist Service, GOLD TEAM [address] [place] [address]  Securely message with the Vocera Web Console (learn more here)  Text page via [employer] Paging/Directory   Please see signed in provider for up to date coverage information      Clinically Significant Risk Factors Present on Admission                    ______________________________________________________________________    Interval History     no new issues, had had incresaed urination with increased bumex, had some right keg cramp, o C no shortness of breath    Data reviewed today: I reviewed all medications, new labs and imaging results over the last 24 hours.    Physical Exam   Vital Signs: Temp: 97.8  F (36.6  C) Temp src: Axillary BP: 101/62 Pulse: 86   Resp: 19 SpO2: 98 % O2 Device: BiPAP/CPAP    Weight: 439 lbs 0 oz     General appearence: awake alert  no apparent distress    HEENT: EOMI, PEARLA, sclera nonicteric,  moist, y mucus membranes,   NECK : supple  RESPIRATORY: lungs clear to auscultation bilateral,  no wheezing or crackles   CARDIOVASCULAR:S1 S2 regular rate and rhythm, no rubs gallops or murmurs appreciated  GASTROINTESTINAL:  obese soft, non-distended , non-tender , + bowel sounds,    SKIN: warm and dry, no mottling noted   NEUROLOGIC; awake alert and oriented, no focal deficits found  EXTREMITIES: no clubbing, cyanosis, + edema up to post thigh lower abdomen    MUSCULOSKELETAL: without deformity     Data   Recent Labs   Lab 05/02/22  0733 05/01/22  0815 04/30/22  0831   INR 2.71* 2.99* 3.04*    137 139   POTASSIUM 3.6 3.9 3.9   CHLORIDE 101 103 104   CO2 29 28 29   BUN 55* 49* 48*   CR 2.35* 2.30* 2.23*   ANIONGAP 8 6 6   JOHN 9.7 9.4 9.6   * 100* 99      2.5

## 2023-03-09 ENCOUNTER — DOCUMENTATION ONLY (OUTPATIENT)
Dept: ANTICOAGULATION | Facility: CLINIC | Age: 68
End: 2023-03-09
Payer: COMMERCIAL

## 2023-03-09 DIAGNOSIS — I48.91 ATRIAL FIBRILLATION, UNSPECIFIED TYPE (H): Primary | ICD-10-CM

## 2023-03-09 NOTE — PROGRESS NOTES
ANTICOAGULATION  MANAGEMENT    Panda Miranda is being discharged from the [facility] ([facility]).    Reason for discharge:     Anticoagulation episode resolved and Standing order discontinued        Patricia Acosta RN

## 2023-03-14 ENCOUNTER — PATIENT OUTREACH (OUTPATIENT)
Dept: CARE COORDINATION | Facility: CLINIC | Age: 68
End: 2023-03-14
Payer: COMMERCIAL

## 2023-03-14 NOTE — PROGRESS NOTES
Clinic Care Coordination Contact    Situation: Patient chart reviewed by care coordinator.    Background: Pt was at external facility, RNCC review for status     Assessment: Pt     Plan/Recommendations: close CCC program, remove from care team

## 2024-05-14 NOTE — TELEPHONE ENCOUNTER
ANTICOAGULATION  MANAGEMENT PROGRAM    Panda Miranda is overdue for INR check.  Reminder call made.    Left message for Panda. If returning call, please schedule INR check as soon as possible.    Cara Huang     Patient/Caregiver provided printed discharge information.

## 2024-08-19 ENCOUNTER — NON-APPOINTMENT (OUTPATIENT)
Age: 69
End: 2024-08-19

## 2025-02-20 NOTE — PROGRESS NOTES
[id number]:    Pt AOx4, VSS on 1L NC, Ax2 w/ GB and walker, baseline numbness otherwise CMS intact. Denied any pain. BLE ACE wrapped- CDI. Up in chair for dinner. Plan for discharge to TCU pending placement.    Hi, Dr. Farias,  Still limited glucose/insulin data.  Still incorrect dosing not according to advised.  Still missed dosing of insulin leading to hyperglycemia.   To see DM education.  Eladia will try to get staff to use medication appropriately and document better.  Has not used Ozempic for 1 month because they keep on delaying the procedures leading to longer interval without medication.  We don't have a solid 6 months of treatment to speak to failure.  They will restart presently.   Will see him in 3 months.   Thank you.  PZ